# Patient Record
Sex: FEMALE | Race: WHITE
[De-identification: names, ages, dates, MRNs, and addresses within clinical notes are randomized per-mention and may not be internally consistent; named-entity substitution may affect disease eponyms.]

---

## 2021-02-02 NOTE — SLEEPCENT
NOCTURNAL POLYSOMNOGRAPHY

 

DATE: 01/29/2021



ORDERED BY: QUYEN Campbell 



Nocturnal polysomnography was performed for evaluation of sleep physiology in

this patient with a history of nonrestorative sleep and excessive somnolence.



9 hours and 24 minutes of data were reviewed. There were 460.5 minutes of sleep

identified. Sleep latency was prolonged at 39 minutes. REM latency was

prolonged at 195 minutes. Sleep architecture once established was good. There

were four REM cycles noted. Overall sleep efficiency was 82.5%. The patient's

electrocardiogram showed a sinus rhythm with an average heart rate of 92 beats

per minute. Rate range 84 to 104. There were occasional PVCs seen. EEG showed

reasonably normal waveforms for wake and sleep. There were no focal events

identified. There were only four obstructive respiratory events identified of

10 seconds in duration or greater for an apnea-hypopnea index within normal

limits at 0.5. Loud snoring was, however, noted and arousals from respiratory

events when snoring was included occurred 2.7 times per hour. There were no

significant oxygen desaturations appreciated and scattered limb activity was

appreciated, but no trains of events. 



IMPRESSION:

Normal nocturnal polysomnography with snoring.

## 2021-11-09 NOTE — REP
INDICATION:

RT FOOT WOUND WITH NONCOMPRESSIBLE CIRA'S



COMPARISON:

None.



TECHNIQUE:

Real-time sonographic evaluation of the right lower extremity arteries with Doppler



FINDINGS:

All numeric values represent peak systolic velocities in cm/SEC



The ankle brachial index is 1.16.

CFA: 119 triphasic

Profunda: 85 triphasic

SFA proximal: 128 triphasic

SFA Mid: 115 triphasic

SFA distal: 114 triphasic

Popliteal: 105 monophasic

Tibioperoneal trunk: 74 monophasic

PTA proximal: 38 monophasic

PTA distal: 88 monophasic

Peroneal proximal: Not visualized

Peroneal distal: Not visualized

MIRIAN proximal: 107 monophasic

MIRIAN distal: 137 monophasic



Mild to moderate plaque formation was seen with calcifications throughout.  No

significant stenosis was identified.



An ankle brachial index was also obtained on the left which is 1.16



IMPRESSION:

As above





<Electronically signed by Sunil Lozano > 11/09/21 [id number]

## 2021-11-10 NOTE — CCD
Summarization Of Episode

                             Created on: 11/10/2021



ROMELIA CORADO

External Reference #: [id number]

: 1980

Sex: Female



Demographics





                          Address                   [address]

[place]  [address]

 

                          Home Phone                [phone]

 

                          Preferred Language        English

 

                          Marital Status            Unknown

 

                          Quaker Affiliation     Unknown

 

                          Race                      White

 

                          Ethnic Group              Not  or 





Author





                          Author                    [facility]

 

                          Organization              [facility]

 

                          Address                   Unknown

 

                          Phone                     Unavailable







Support





                Name            Relationship    Address         Phone

 

                Sylvester Corado  Next Of Kin     Unknown         Unavailable

 

                    DARY WHITFIELD  Next Of Kin         [address]

[place]  [address]                  Unavailable

 

                    Brian Kohler DDS    Next Of Kin         [address] [place]  [address]                    [phone]

 

                    TOPS FRIENDLY MARKET Next Of Kin         [address] [place]  [address]                     [phone]

 

                    Nigel Davidson MD    Next Of Kin         [address] [place]  [address]                    [phone]

 

                    TOPS                Next Of Kin         [address]

[place]  [address]                     [phone]

 

                    ARBYSL              Next Of Kin         [address] [place]  [address]                     [phone]

 

                    PCFOOD              [place]  [address]                     [phone]

 

                    BARBI'S BAKERY        Next Of Kin         

[place]  [address]                 [phone]

 

                    ROSLYN LOPES   Next Of Kin         [address]  [address]                  [phone]

 

                UN              Next Of Kin     Unknown         Unavailable

 

                    OSMAR WHITFIELD   Next Of Kin         [address]  [address]                  [phone]

 

                    SYLVESTER CORADO    Next Of Kin         [address]

[place]  [address]                     [phone]

 

                    SYLVESTER CORADO    ECON                [address] [place] [address]  [address]                     [phone]







Care Team Providers





                    Care Team Member Name Role                Phone

 

                    SELVIN Rosales NP Unavailable         Unavailable

 

                    LEIGH VILLANUEVA MD Unavailable         Unavailable

 

                    LEIGH VILLANUEVA MD Unavailable         Unavailable

 

                    LEIGH VILLANUEVA MD Unavailable         Unavailable

 

                    LEIGH VILLANUEVA MD Unavailable         Unavailable

 

                    LEIGH VILLANUEVA MD Unavailable         Unavailable

 

                    LEIGH VILLANUEVA MD Unavailable         Unavailable

 

                    Ting, A Cecille NP Unavailable         Unavailable

 

                    Ting, A Cecille NP Unavailable         Unavailable

 

                    Ting, A Cecille NP Unavailable         Unavailable

 

                    Ting, A Cecille NP Unavailable         Unavailable

 

                    Ting, A Cecille NP Unavailable         Unavailable

 

                    Ting, A Cecille NP Unavailable         Unavailable

 

                    Ting, A Cecille NP Unavailable         Unavailable

 

                    Ting, A Cecille NP Unavailable         Unavailable

 

                    Ting, A Cecille NP Unavailable         Unavailable

 

                    Ting, A Cecille NP Unavailable         Unavailable

 

                    Ting, A Cecille NP Unavailable         Unavailable

 

                    Ting, A Cecille NP Unavailable         Unavailable

 

                    Ting, A Cecille NP Unavailable         Unavailable

 

                    Ting, A Cecille NP Unavailable         Unavailable

 

                    Ting, A Cecille NP Unavailable         Unavailable

 

                    Ting, A Cecille NP Unavailable         Unavailable

 

                    Ting, A Cecille NP Unavailable         Unavailable

 

                    Ting, A Cecille NP Unavailable         Unavailable

 

                    Ting, A Cecille NP Unavailable         Unavailable

 

                    Ting, A Cecille NP Unavailable         Unavailable

 

                    Ting, A Cecille NP Unavailable         Unavailable

 

                    Ting, A Cecille NP Unavailable         Unavailable

 

                    Ting, A Cecille NP Unavailable         Unavailable

 

                    Ting, A Cecille NP Unavailable         Unavailable

 

                    Ting, A Cecille NP Unavailable         Unavailable

 

                    Ting, A Cecille NP Unavailable         Unavailable

 

                    Ting, A Cecille NP Unavailable         Unavailable

 

                    Ting, A Cecille NP Unavailable         Unavailable

 

                    Ting, A Cecille NP Unavailable         Unavailable

 

                    Ting, A Cecille NP Unavailable         Unavailable

 

                    Ting, A Cecille NP Unavailable         Unavailable

 

                    Ting, A Cecille NP Unavailable         Unavailable

 

                    Ting, A Cecille NP Unavailable         Unavailable

 

                    Ting, A Cecille NP Unavailable         Unavailable

 

                    Ting, A Cecille NP Unavailable         Unavailable

 

                    Ting, A Cecille NP Unavailable         Unavailable

 

                    Ting, A Cecille NP Unavailable         Unavailable

 

                    Ting, A Cecille NP Unavailable         Unavailable

 

                    Ting, A Cecille NP Unavailable         Unavailable

 

                    Ting, A Cecille NP Unavailable         Unavailable

 

                    Ting, A Cecille NP Unavailable         Unavailable

 

                    Ting, A Cecille NP Unavailable         Unavailable

 

                    Ting, A Cecille NP Unavailable         Unavailable

 

                    Ting, A Cecille NP Unavailable         Unavailable

 

                    Ting, A Cecille NP Unavailable         Unavailable

 

                    Ting, A Cecille NP Unavailable         Unavailable

 

                    Ting, A Cecille NP Unavailable         Unavailable

 

                    Ting, A Cecille NP Unavailable         Unavailable

 

                    GANN, JUDAH CLEMENTE MD Unavailable         Unavailable

 

                    GANN, JUDAH CLEMENTE MD Unavailable         Unavailable

 

                    GNAN, A BRYN MD Unavailable         Unavailable

 

                    GANN, A BRYN MD Unavailable         Unavailable

 

                    GANN, A BRYN MD Unavailable         Unavailable

 

                    GANN, A BRYN MD Unavailable         Unavailable

 

                    GANN, A BRYN MD Unavailable         Unavailable

 

                    GANN, A BRYN MD Unavailable         Unavailable

 

                    GANN, A BRYN MD Unavailable         Unavailable

 

                    GANN, A BRYN MD Unavailable         Unavailable

 

                    GANN, A BRYN MD Unavailable         Unavailable

 

                    GANN, A BRYN MD Unavailable         Unavailable

 

                    GANN, A BRYN MD Unavailable         Unavailable

 

                    GANN, A BRYN MD Unavailable         Unavailable

 

                    GANN, A BRYN MD Unavailable         Unavailable

 

                    GANN, A BRYN MD Unavailable         Unavailable

 

                    GANN, A BRYN MD Unavailable         Unavailable

 

                    GANN, A BRYN MD Unavailable         Unavailable

 

                    GANN, A BRYN MD Unavailable         Unavailable

 

                    GANN, A BRYN MD Unavailable         Unavailable

 

                    GANN, A BRYN MD Unavailable         Unavailable

 

                    GANN, A BRYN MD Unavailable         Unavailable

 

                    GANN, A BRYN MD Unavailable         Unavailable

 

                    GANN, A BRYN MD Unavailable         Unavailable

 

                    GANN, A BRYN MD Unavailable         Unavailable

 

                    GANN, A BRYN MD Unavailable         Unavailable

 

                    GANN, A BRYN MD Unavailable         Unavailable

 

                    GANN, A BRYN MD Unavailable         Unavailable

 

                    GANN, A BRYN MD Unavailable         Unavailable

 

                    GANN, A BRYN MD Unavailable         Unavailable

 

                    KEERTHI GIRON       Unavailable         Unavailable

 

                    Yazdanyar,  Amirfarbod Unavailable         Unavailable

 

                    Yazdanyar,  Amirfarbod Unavailable         Unavailable

 

                    Yazdanyar,  Amirfarbod Unavailable         Unavailable

 

                    Yazdanyar,  Amirfarbod Unavailable         Unavailable

 

                    Yazdanyar,  Amirfarbod Unavailable         Unavailable

 

                    Yazdanyar,  Amirfarbod Unavailable         Unavailable

 

                    Yazdanyar,  Amirfarbod Unavailable         Unavailable

 

                    Yazdanyar,  Amirfarbod Unavailable         Unavailable

 

                    Yazdanyar,  Amirfarbod Unavailable         Unavailable

 

                    Yazdanyar,  Amirfarbod Unavailable         Unavailable

 

                    Yazdanyar,  Amirfarbod Unavailable         Unavailable

 

                    Yazdanyar,  Amirfarbod Unavailable         Unavailable

 

                    Yazdanyar,  Amirfarbod Unavailable         Unavailable

 

                    JADA BLANTON MD         Unavailable         Unavailable

 

                    JADA BLANTON MD         Unavailable         Unavailable

 

                    Ting, A Cecille NP Unavailable         Unavailable

 

                    Ting, A Cecille NP Unavailable         Unavailable

 

                    Ting, A Cecille NP Unavailable         Unavailable

 

                    Ting, A Cecille NP Unavailable         Unavailable

 

                    Ting, A Cecille NP Unavailable         Unavailable

 

                    Ting, A Cecille NP Unavailable         Unavailable

 

                    Ting, A Cecille NP Unavailable         Unavailable

 

                    Ting, A Cecille NP Unavailable         Unavailable

 

                    Ting, A Cecille NP Unavailable         Unavailable

 

                    Ting, A Cecille NP Unavailable         Unavailable

 

                    Ting, A Cecille NP Unavailable         Unavailable

 

                    Ting, A Cecille NP Unavailable         Unavailable

 

                    Ting, A Cecille NP Unavailable         Unavailable

 

                    Ting, A Cecille NP Unavailable         Unavailable

 

                    Ting, A Cecille NP Unavailable         Unavailable

 

                    Ting, A Cecille NP Unavailable         Unavailable

 

                    Ting, A Cecille NP Unavailable         Unavailable

 

                    Ting, A Cecille NP Unavailable         Unavailable

 

                    Ting, A Cecille NP Unavailable         Unavailable

 

                    Ting, A Cecille NP Unavailable         Unavailable

 

                    Ting, A Cecille NP Unavailable         Unavailable

 

                    Ting, A Cecille NP Unavailable         Unavailable

 

                    Ting, A Cecille NP Unavailable         Unavailable

 

                    Ting, A Cecille NP Unavailable         Unavailable

 

                    Ting, A Cecille NP Unavailable         Unavailable

 

                    Ting, A Cecille NP Unavailable         Unavailable

 

                    Ting, A Cecille NP Unavailable         Unavailable

 

                    Ting, A Cecille NP Unavailable         Unavailable

 

                    Ting, A Cecille NP Unavailable         Unavailable

 

                    Ting, A Cecille NP Unavailable         Unavailable

 

                    Ting, A Cecille NP Unavailable         Unavailable

 

                    Ting, A Eccille NP Unavailable         Unavailable

 

                    Ting, A Cecille NP Unavailable         Unavailable

 

                    Ting, A Cecille NP Unavailable         Unavailable

 

                    Ting, A Cecille NP Unavailable         Unavailable

 

                    Ting, A Cecille NP Unavailable         Unavailable

 

                    Ting, A Cecille NP Unavailable         Unavailable

 

                    Ting, A Cecille NP Unavailable         Unavailable

 

                    Ting, A Cecille NP Unavailable         Unavailable

 

                    Ting, A Cecille NP Unavailable         Unavailable

 

                    Ting, A Cecille NP Unavailable         Unavailable

 

                    Ting, A Cecille NP Unavailable         Unavailable

 

                    Ting, A Cecille NP Unavailable         Unavailable

 

                    Ting, A Cecille NP Unavailable         Unavailable

 

                    Ting, A Cecille NP Unavailable         Unavailable

 

                    Ting, A Cecille NP Unavailable         Unavailable

 

                    Ting, A Cecille NP Unavailable         Unavailable

 

                    Ting, A Cecille NP Unavailable         Unavailable

 

                    DE LEON, M FAISAL PA  Unavailable         Unavailable

 

                    DE LEON, M FAISAL PA  Unavailable         Unavailable

 

                    DE LEON, M FAISAL PA  Unavailable         Unavailable

 

                    DE LEON, M FAISAL PA  Unavailable         Unavailable

 

                    DE LEON, M FAISAL PA  Unavailable         Unavailable

 

                    DE LEON, M FAISAL PA  Unavailable         Unavailable

 

                    DE LEON, M FAISAL PA  Unavailable         Unavailable

 

                    DE LEON, M FAISAL PA  Unavailable         Unavailable

 

                    DE LEON, M FAISAL PA  Unavailable         Unavailable

 

                    DE LEON, M FAISAL PA  Unavailable         Unavailable

 

                    DE LEON, M FAISAL PA  Unavailable         Unavailable

 

                    DE LEON, M FAISAL PA  Unavailable         Unavailable

 

                    DE LEON, M FAISAL PA  Unavailable         Unavailable

 

                    DE LEON, M FAISAL PA  Unavailable         Unavailable

 

                    DE LEON, M FAISAL PA  Unavailable         Unavailable

 

                    DE LEON, M FAISAL PA  Unavailable         Unavailable

 

                    DE LEON, M FAISAL PA  Unavailable         Unavailable

 

                    DE LEON, M FAISAL PA  Unavailable         Unavailable

 

                    DE LEON, M FAISAL PA  Unavailable         Unavailable

 

                    DE LEON, M FAISAL PA  Unavailable         Unavailable

 

                    DE LEON, M FAISAL PA  Unavailable         Unavailable

 

                    DE LEON, M FAISAL PA  Unavailable         Unavailable

 

                    DE LEON, M FAISAL PA  Unavailable         Unavailable

 

                    DE LEON, M FAISAL PA  Unavailable         Unavailable

 

                    DE LEON, M FAISAL PA  Unavailable         Unavailable

 

                    DE LEON, M FAISAL PA  Unavailable         Unavailable

 

                    DE LEON, M FAISAL PA  Unavailable         Unavailable

 

                    DE LEON, M FAISAL PA  Unavailable         Unavailable

 

                    DE LEON, M FAISAL PA  Unavailable         Unavailable

 

                    DE LEON, M FAISAL PA  Unavailable         Unavailable

 

                    DE LEON, M FAISAL PA  Unavailable         Unavailable

 

                    DE LEON, M FAISAL PA  Unavailable         Unavailable

 

                    DE LEON, M FAISAL PA  Unavailable         Unavailable

 

                    DE LEON, M FAISAL PA  Unavailable         Unavailable

 

                    DE LEON, M FAISAL PA  Unavailable         Unavailable

 

                    KEENE, B JULES    Unavailable         Unavailable

 

                    GEOFF GARCIA MD    Unavailable         Unavailable

 

                    GEOFF GARCIA MD    Unavailable         Unavailable

 

                    GEOFF GARCIA MD    Unavailable         Unavailable

 

                    GEOFF GARCIA MD    Unavailable         Unavailable

 

                    GEOFF GARCIA MD    Unavailable         Unavailable

 

                    GEOFF GARCIA MD    Unavailable         Unavailable

 

                    GEOFF GARCIA MD    Unavailable         Unavailable

 

                    GEOFF GARCIA MD    Unavailable         Unavailable

 

                    CELENA SENA MD    Unavailable         Unavailable

 

                    NICK, B TAY NP Unavailable         Unavailable

 

                    NICK, B TAY NP Unavailable         Unavailable

 

                    NICK, B TAY NP Unavailable         Unavailable

 

                    NICK, B TAY NP Unavailable         Unavailable

 

                    NICK, B TAY NP Unavailable         Unavailable

 

                    NICK, B TAY NP Unavailable         Unavailable

 

                    NICK, B TAY NP Unavailable         Unavailable

 

                    NICK, B TAY NP Unavailable         Unavailable

 

                    NICK, B TAY NP Unavailable         Unavailable

 

                    NICK, B TAY NP Unavailable         Unavailable

 

                    NICK, B TAY NP Unavailable         Unavailable

 

                    NICK, B TAY NP Unavailable         Unavailable

 

                    NICK, B TAY NP Unavailable         Unavailable

 

                    NICK, B TAY NP Unavailable         Unavailable

 

                    NICK, B TAY NP Unavailable         Unavailable

 

                    NICK, B TAY NP Unavailable         Unavailable

 

                    NICK, B TAY NP Unavailable         Unavailable

 

                    NICK, B TAY NP Unavailable         Unavailable

 

                    NICK, B TAY NP Unavailable         Unavailable

 

                    NICK, B TAY NP Unavailable         Unavailable

 

                    NICK, B TAY NP Unavailable         Unavailable

 

                    NICK, B TAY NP Unavailable         Unavailable

 

                    NICK, B TAY NP Unavailable         Unavailable

 

                    NICK, B TAY NP Unavailable         Unavailable

 

                    NICK, B TAY NP Unavailable         Unavailable

 

                    NICK, B TAY NP Unavailable         Unavailable

 

                    NICK, B TAY NP Unavailable         Unavailable

 

                    NICK, B TAY NP Unavailable         Unavailable

 

                    NICK, B TAY NP Unavailable         Unavailable

 

                    NICK, B TAY NP Unavailable         Unavailable

 

                    NICK, B TAY NP Unavailable         Unavailable

 

                    NICK, B TAY NP Unavailable         Unavailable

 

                    NICK, B TAY NP Unavailable         Unavailable

 

                    NICK, B TAY NP Unavailable         Unavailable

 

                    NICK, B TAY NP Unavailable         Unavailable

 

                    NICK, B TAY NP Unavailable         Unavailable

 

                    NICK, B TAY NP Unavailable         Unavailable

 

                    NICK, B TAY NP Unavailable         Unavailable

 

                    NICK, B TAY NP Unavailable         Unavailable

 

                    NICK, B TAY NP Unavailable         Unavailable

 

                    NICK, B TAY NP Unavailable         Unavailable

 

                    NICK, B TAY NP Unavailable         Unavailable

 

                    NICK, B TAY NP Unavailable         Unavailable

 

                    NICK, B TAY NP Unavailable         Unavailable

 

                    NICK, B TAY NP Unavailable         Unavailable

 

                    NICK, B TAY NP Unavailable         Unavailable

 

                    NICK, B TAY NP Unavailable         Unavailable

 

                    NICK, B TAY NP Unavailable         Unavailable

 

                    NICK, B TAY NP Unavailable         Unavailable

 

                    NICK, B TAY NP Unavailable         Unavailable

 

                    NICK, B TAY NP Unavailable         Unavailable

 

                    NICK, B TAY NP Unavailable         Unavailable

 

                    NICK, B TAY NP Unavailable         Unavailable

 

                    NICK, B TAY NP Unavailable         Unavailable

 

                    NICK, B TAY NP Unavailable         Unavailable

 

                    NICK, B TAY NP Unavailable         Unavailable

 

                    NICK, B TAY NP Unavailable         Unavailable

 

                    NICK, B TAY NP Unavailable         Unavailable

 

                    NICK, B TAY NP Unavailable         Unavailable

 

                    NICK, B TAY NP Unavailable         Unavailable

 

                    NICK, B TAY NP Unavailable         Unavailable

 

                    NICK, B TAY NP Unavailable         Unavailable

 

                    NICK, B TAY NP Unavailable         Unavailable

 

                    NICK, B TAY NP Unavailable         Unavailable

 

                    NICK, B TAY NP Unavailable         Unavailable

 

                    NICK, B TAY NP Unavailable         Unavailable

 

                    NICK, B TAY NP Unavailable         Unavailable

 

                    NICK, B TAY NP Unavailable         Unavailable

 

                    NICK, B TAY NP Unavailable         Unavailable

 

                    NICK, B TAY NP Unavailable         Unavailable

 

                    NICK, B TAY NP Unavailable         Unavailable

 

                    NICK, B TAY NP Unavailable         Unavailable

 

                    NICK, B TAY NP Unavailable         Unavailable

 

                    NICK, B TAY NP Unavailable         Unavailable

 

                    NICK, B TAY NP Unavailable         Unavailable

 

                    NICK, B TAY NP Unavailable         Unavailable

 

                    NICK, B TAY NP Unavailable         Unavailable

 

                    NICK, B TAY NP Unavailable         Unavailable

 

                    NICK, B TAY NP Unavailable         Unavailable

 

                    NICK, B TAY NP Unavailable         Unavailable

 

                    NICK, B TAY NP Unavailable         Unavailable

 

                    NICK, B TAY NP Unavailable         Unavailable

 

                    NICK, B TAY NP Unavailable         Unavailable

 

                    NICK, B TAY NP Unavailable         Unavailable

 

                    NICK, B TAY NP Unavailable         Unavailable

 

                    NICK, B TAY NP Unavailable         Unavailable

 

                    NICK, B TAY NP Unavailable         Unavailable

 

                    NICK, B TAY NP Unavailable         Unavailable

 

                    NICK, B TAY NP Unavailable         Unavailable

 

                    NICK, B TAY NP Unavailable         Unavailable

 

                    NICK, B TAY NP Unavailable         Unavailable

 

                    NICK, B TAY NP Unavailable         Unavailable

 

                    NICK, B TAY NP Unavailable         Unavailable

 

                    NICK, B TAY NP Unavailable         Unavailable

 

                    NICK, B TAY NP Unavailable         Unavailable

 

                    NICK, B TAY NP Unavailable         Unavailable

 

                    NICK, B TAY NP Unavailable         Unavailable

 

                    NICK, B TAY NP Unavailable         Unavailable

 

                    NICK, B TAY NP Unavailable         Unavailable

 

                    NICK, B TAY NP Unavailable         Unavailable

 

                    NICK, B TAY NP Unavailable         Unavailable

 

                    NICK, B TAY NP Unavailable         Unavailable

 

                    NICK, B TAY NP Unavailable         Unavailable

 

                    NICK, B TAY NP Unavailable         Unavailable

 

                    NICK, B TAY NP Unavailable         Unavailable

 

                    NICK, B TAY NP Unavailable         Unavailable

 

                    NICK, B TAY NP Unavailable         Unavailable

 

                    NICK, B TAY NP Unavailable         Unavailable

 

                    NICK, B TAY NP Unavailable         Unavailable

 

                    NICK, B TAY NP Unavailable         Unavailable

 

                    NICK, B TAY NP Unavailable         Unavailable

 

                    NICK, B TAY NP Unavailable         Unavailable

 

                    NICK, B TAY NP Unavailable         Unavailable

 

                    NICK, B TAY NP Unavailable         Unavailable

 

                    NICK, B TAY NP Unavailable         Unavailable

 

                    NICK, B TAY NP Unavailable         Unavailable

 

                    NICK, B TAY NP Unavailable         Unavailable

 

                    NICK, B TAY NP Unavailable         Unavailable

 

                    NICK, B TAY NP Unavailable         Unavailable

 

                    NICK, B TAY NP Unavailable         Unavailable

 

                    NICK, B TAY NP Unavailable         Unavailable

 

                    NICK, B TAY NP Unavailable         Unavailable

 

                    NICK, B TAY NP Unavailable         Unavailable

 

                    NICK, B TAY NP Unavailable         Unavailable

 

                    PEDRO Bustillos MD Unavailable         Unavailable

 

                    PEDRO Bustillos MD Unavailable         Unavailable

 

                    Bustillos E Delfina MD Unavailable         Unavailable

 

                    Bustillos, E Delfina JOSEPH Unavailable         Unavailable

 

                    Bustillos, E Delfina JOSEPH Unavailable         Unavailable

 

                    PEDRO Bustillos MD Unavailable         Unavailable

 

                    PEDRO Bustillos MD Unavailable         Unavailable

 

                    PEDRO Bustillos MD Unavailable         Unavailable

 

                    PEDRO Bustillos MD Unavailable         Unavailable

 

                    Bustillos, E Delfina JOSEPH Unavailable         Unavailable

 

                    Apollo E Delfina JOSEPH Unavailable         Unavailable

 

                    Bustillos, E Delfina JOSEPH Unavailable         Unavailable

 

                    PEDRO Bustillos MD Unavailable         Unavailable

 

                    PEDRO Bustillos MD Unavailable         Unavailable

 

                    PEDRO Bustillos MD Unavailable         Unavailable

 

                    PEDRO Bsutillos MD Unavailable         Unavailable

 

                    PEDRO Bustillos MD Unavailable         Unavailable

 

                    PEDRO Bustillos MD Unavailable         Unavailable

 

                    PEDRO Bustillos MD Unavailable         Unavailable

 

                    PEDRO Bustillos MD Unavailable         Unavailable

 

                    PEDRO Bustillos MD Unavailable         Unavailable

 

                    PEDRO Bustillos MD Unavailable         Unavailable

 

                    PEDRO Bustillos MD Unavailable         Unavailable

 

                    PEDRO Bustillos MD Unavailable         Unavailable

 

                    PEDRO Bustillos MD Unavailable         Unavailable

 

                    PEDRO Bustillos MD Unavailable         Unavailable

 

                    PEDRO Bustillos MD Unavailable         Unavailable

 

                    PEDRO Bustillos MD Unavailable         Unavailable

 

                    PEDRO Bustillos MD Unavailable         Unavailable

 

                    PEDRO Bustillos MD Unavailable         Unavailable

 

                    PEDRO Bustillos MD Unavailable         Unavailable

 

                    PEDRO Bustillos MD Unavailable         Unavailable

 

                    PEDRO Bustillos MD Unavailable         Unavailable

 

                    PEDRO Bustillos MD Unavailable         Unavailable

 

                    PEDRO Bustillos MD Unavailable         Unavailable

 

                    PEDRO Bustillos MD Unavailable         Unavailable

 

                    PEDRO Bustillos MD Unavailable         Unavailable

 

                    PEDRO Bustillos MD Unavailable         Unavailable

 

                    PEDRO Bustillos MD Unavailable         Unavailable

 

                    PEDRO Bustillos MD Unavailable         Unavailable

 

                    PEDRO Bustillos MD Unavailable         Unavailable

 

                    PEDRO Bustillos MD Unavailable         Unavailable

 

                    PEDRO Bustillos MD Unavailable         Unavailable

 

                    PEDRO Bustillos MD Unavailable         Unavailable

 

                    Bustillos, E Delfina JOSEPH Unavailable         Unavailable

 

                    [facility],  [place]       Unavailable         Unavailable



                                  



Re-disclosure Warning

          The records that you are about to access may contain information from 
federally-assisted alcohol or drug abuse programs. If such information is 
present, then the following federally mandated warning applies: This information
has been disclosed to you from records protected by federal confidentiality 
rules (42 CFR part 2). The federal rules prohibit you from making any further 
disclosure of this information unless further disclosure is expressly permitted 
by the written consent of the person to whom it pertains or as otherwise 
permitted by 42 CFR part 2. A general authorization for the release of medical 
or other information is NOT sufficient for this purpose. The Federal rules 
restrict any use of the information to criminally investigate or prosecute any 
alcohol or drug abuse patient.The records that you are about to access may 
contain highly sensitive health information, the redisclosure of which is 
protected by Article 27-F of the [facility] Public Health law. If you 
continue you may have access to information: Regarding HIV / AIDS; Provided by 
facilities licensed or operated by the [facility] Office of Mental Health; 
or Provided by the [facility] Office for People With Developmental 
Disabilities. If such information is present, then the following [place] 
mandated warning applies: This information has been disclosed to you from 
confidential records which are protected by state law. State law prohibits you 
from making any further disclosure of this information without the specific 
written consent of the person to whom it pertains, or as otherwise permitted by 
law. Any unauthorized further disclosure in violation of state law may result in
a fine or care home sentence or both. A general authorization for the release of 
medical or other information is NOT sufficient authorization for further disc
losure.                                                                         
    



Allergies and Adverse Reactions

          



           Type       Description Substance  Reaction   Status     Data Source(s

)

 

           Food allergy No Known Food Allergies No Known Food Allergies         

              [facility]

 

           Drug allergy No Known Drug Allergies No Known Drug Allergies         

              [facility]

 

           Propensity to adverse reactions NO KNOWN ALLERGIES NO KNOWN ALLERGIES

                       

[facility]



                                                                                
                           



Encounters

          



           Encounter  Providers  Location   Date       Indications Data Source(s

)

 

                Unknown                         [address] [facility] [place] [address] 2021 12:00:00 AM 

EDT                                                 eCW1 ([facility])

 

           Outpatient Attender: Cecille Maguire NP            10/21/2021 02:48:00 

PM EDT L03.90     [facility]

 

                                        L03.90 

 

                Outpatient      Attender: Cecille Maguire NPReferrer: Cecille shin NP                 10/21/2021 

02:03:00 PM EDT - 10/21/2021 02:47:00 PM EDT                           [facility]

 

                Outpatient      Attender: Cecille Maguire NPReferrer: Cecille shin NP                 10/11/2021 

02:04:00 PM EDT - 10/11/2021 02:49:00 PM EDT                           [facility]

 

           Outpatient Attender: Cecille Maguire NP            2021 04:55:00 

PM EDT E10.65     [facility]

 

                                        E10.65 

 

                          Inpatient                 Attender: GEOFF Philip

nder: BRYN GANN MDAdmitter: GEOFF GARCIA MDConsultant: Kaylene Rosales NP                           2021 04:0

2:00 PM EDT - 10/04/2021

02:10:00 PM EDT           RIGHT FOOT CELLULITIS     [facility]

l

 

                                        RIGHT FOOT CELLULITIS 

 

                                        Patient discharged. 

 

                Outpatient      Attender: Cecille Maguire NPReferrer: Cecille shin NP                 2021 

11:26:00 AM EDT - 2021 12:09:00 PM EDT                           [facility]

 

                Emergency       Attender: IVONNE VILLANUEVA MD                  07:40:00 AM EDT - 2021 

09:24:00 AM EDT           SORE THROAT, FEVER, R/O COVID [facility]

pital

 

                                        SORE THROAT, FEVER, R/O COVID 

 

                                        Patient discharged. 

 

           Outpatient Referrer: Cecille Maguire NP            06/10/2021 12:00:00 

AM EDT            [facility]

 

                Unknown                         [address] [facility] [place] [address] 2021 12:00:00 AM 

EDT                                                 eCW1 ([facility])

 

           Outpatient                       2021 12:00:00 AM [facility]

 

           Outpatient Attender: Delfina Bustillos MD            2021 09:00:00

 AM EDT            [facility]

 

                Outpatient      Attender: Cecille Maguire NPReferrer: Cecille shin NP                 2021 

09:52:00 AM EDT - 2021 10:12:00 AM EDT                           [facility]

 

                Outpatient      Attender: KEERTHI GIRON [id number]     2021

 12:00:00 AM EDT - 

2021 11:27:25 AM EDT Retinal edema             [facility]

 

                                        Retinal edema 

 

                Outpatient      Attender: TAY ROMERO NP Physical Therapy  12:15:00 PM 

EST                                                 [facility] ([facility]

 

           Outpatient Attender: TAY ROMERO NP            2021 08:22:0

0 AM EST E11.65     

[facility]

 

                                        E11.65 

 

                Emergency       Attender: IVONNE VILLANUEVA MD                 2021 07:07:00 AM EST - 2021 

07:49:00 AM EST           BURN                      [facility]

l

 

                                        BURN 

 

                                        Patient discharged. 

 

                Outpatient      Attender: Amy Dykes [id number]     0

2021 12:00:00 AM EST

- 2021 11:23:03 AM EST            Type 2 diabetes mellitus with stable pro

liferative 

diabetic retinopathy, bilateral         [facility]

 

                                        Type 2 diabetes mellitus with stable pro

liferative diabetic retinopathy, 

bilateral 

 

                Outpatient                      [address] [facility] [place] [address] 2021 12:00:00 AM

EST                                                 eCW1 ([facility])

 

                Unknown                         [address] [facility]

[place] [address] 2021 12:00:00 AM 

EST                                                 eCW1 ([facility])

 

                Unknown                         [address] [facility] 

[place] [address] 2021 12:00:00 AM 

EST                                                 eCW1 ([facility])

 

           Outpatient Attender: CELENA SENA MD            2021 10:06:00 AM

 EST E55.9,L02.91 

[facility]

 

                                        E55.9,L02.91 

 

                Outpatient      Attender: Cecille Maguire NPReferrer: Cecille shin NP                 2021 

09:34:00 AM EST - 2021 10:02:00 AM EST                           [facility]

 

           Outpatient Attender: Amy Dykes            2021 12:00

:00 AM [facility]

 

                Emergency       Attender: JADA BLANTON MD                 2021 1

0:06:00 AM EST - 2021 02:38:00

PM EST                    CYST                      [facility]

l

 

                                        CYST 

 

                                        Patient discharged. 

 

                Outpatient      Attender: FAISAL Abbott/Cheryl/Lawrence/Veronique brown 01/15/2021 

12:00:00 PM EST                                     [facility] ([facility]

 

                Outpatient      Attender: Amy Lemusztravis [id number]     0

2021 12:00:00 AM EST

- 2021 11:36:56 AM EST            Type 2 diabetes mellitus with stable pro

liferative 

diabetic retinopathy, bilateral         [facility]

 

                                        Type 2 diabetes mellitus with stable pro

liferative diabetic retinopathy, 

bilateral 

 

                Outpatient      Attender: TAY ROMERO NP Physical Therapy  12:00:00 PM 

EST                                                 [facility] ([facility])

 

                Outpatient      Attender: Amy Dykes [id number]     1

2020 12:00:00 AM EST

- 2020 09:30:14 AM EST Retinal edema             [facility]

 

                                        Retinal edema 

 

                Outpatient      Attender: Amy Lemuszdaavila [id number]     1

2020 12:00:00 AM EST

- 2020 11:17:07 AM EST            Type 2 diabetes mellitus with stable pro

liferative 

diabetic retinopathy, bilateral         [facility]

 

                                        Type 2 diabetes mellitus with stable pro

liferative diabetic retinopathy, 

bilateral 

 

                Outpatient      Attender: TAY ROMERO NP Physical Therapy  02:30:00 PM 

EST                                                 [facility] ([facility])

 

             Outpatient   Attender: CELENA SENA MD              10/23/2020 12:28

:00 PM EDT 

M25.559/M62.89,M65.81,R70.0,R79.82      [facility]

 

                                        M25.559/M62.89,M65.81,R70.0,R79.82 

 

                Outpatient      Attender: JULES KEENE [id number]     10/22/20

20 12:00:00 AM EDT - 

10/22/2020 11:22:54 AM EDT              Type 2 diabetes mellitus with stable pro

liferative 

diabetic retinopathy, bilateral         [facility]

 

                                        Type 2 diabetes mellitus with stable pro

liferative diabetic retinopathy, 

bilateral 

 

                Outpatient      Attender: TAY ROMERO NP Physical Therapy  03:45:00 PM 

EDT                                                 [facility] ([facility])

 

           Outpatient Attender: HENRIQUE [facility]      2020 02:47:00 PM ED

T            [facility]

 

           Outpatient Attender: HENRIQUE [facility]      2020 01:12:00 PM ED

T            [facility]

 

           Outpatient Attender: HENRQIUE [facility]      2020 12:03:01 PM ED

T            [facility]

 

             Outpatient   Attender: TAY ROMERO NP              2020 1

0:22:00 AM EDT 

E11.65,E10.65                           [facility]

 

                                        E11.65,E10.65 

 

           Outpatient Attender: HENRIQUE [facility]      2020 02:16:01 PM ED

T            [facility]



                                                                                
                                                                                
                                                                                
                                                                                
                                                                                
                                                               



Immunizations

          



             Vaccine      Date         Status       Description  Data Source(s)

 

             COVID-19 Moderna 2021 12:00:00 AM EDT completed              

   [facility]

 

             COVID-19 VACCINE Moderna 2021 12:00:00 AM EDT completed      

           [facility]

 

                                        Vaccine Series Complete: YESThis Data wa

s Submitted to [facility] Via [employer] 

 

             COVID-19 Moderna 2021 12:00:00 AM EST completed              

   [facility]

 

             COVID-19 VACCINE Moderna 2021 12:00:00 AM EST completed      

           [facility]

 

                                        Vaccine Series Complete: NOThis Data was

 Submitted to [facility] Via [employer] 



                                                                                
                                     



Medications

          



          Medication Brand Name Start Date Product Form Dose      Route     Admi

nistrative 

Instructions Pharmacy Instructions Status     Indications Reaction   Description

 Data 

Source(s)

 

                    doxycycline hyclate 100 MG Oral Tablet DOXYCYCLINE HYCLATE 1

 12:00:00 

AM EDT       tablet       20                        TAKE ONE TABLET BY MOUTH TWI

CE A DAY TAKE ONE TABLET BY MOUTH

TWICE A DAY  SOLD: 10/22/2021                                        [facility]

s

 

           Cephalexin 500 MG Oral Capsule CEPHALEXIN 10/11/2021 12:00:00 AM EDT 

capsule    28         

                                        TAKE ONE CAPSULE BY MOUTH FOUR TIMES A D

AY FOR 7 DAYS FOR CELLULITIS OF RIGHT 

FOOT                                    TAKE ONE CAPSULE BY MOUTH FOUR TIMES A D

AY FOR 7 DAYS FOR CELLULITIS OF 

RIGHT FOOT   SOLD: 10/14/2021                                        [facility]

[facility]

 

          Glipizide 5 MG Oral Tablet GLIPIZIDE 10/11/2021 12:00:00 AM EDT tablet

    60                  TAKE

ONE TABLET BY MOUTH TWICE A DAY TAKE ONE TABLET BY MOUTH TWICE A DAY SOLD: 

10/14/2021                                                      [facility]

 

        0.5 mL 30 gauge x 1/2"         10/11/2021 12:00:00 AM EDT syringe 30    

          USE 1 DAILY USE

1 DAILY      SOLD: 10/14/2021                                        [facility]

[facility]

 

          0.3 mL 31 gauge x 5/16"           10/04/2021 12:00:00 AM EDT syringe  

 30                  USE AS 

DIRECTED   USE AS DIRECTED SOLD: 10/04/2021                                  [facility]

 

        28 gauge         10/04/2021 12:00:00 AM EDT misc    30              TEST

 ONCE DAILY TEST ONCE DAILY 

SOLD: 10/04/2021                                                 [facility]

 

                          Insulin Glargine 100 UNT/ML Injectable Solution [Lantu

s] INSULIN 

GLARGINE,HUM.REC.ANLOG 10/04/2021 12:00:00 AM EDT solution     10               

         INJECT 30 UNITS 

UNDER THE SKIN DAILY INJECT 30 UNITS UNDER THE SKIN DAILY SOLD: 10/04/2021      

                      

                                        [facility]

 

           Cephalexin 500 MG Oral Capsule CEPHALEXIN 10/04/2021 12:00:00 AM EDT 

capsule    28         

                          TAKE ONE CAPSULE BY MOUTH FOUR TIMES A DAY FOR CELLULI

TIS RIGHT FOOT TAKE ONE 

CAPSULE BY MOUTH FOUR TIMES A DAY FOR CELLULITIS RIGHT FOOT SOLD: 10/04/2021    

                     

                                                    [facility]

 

          BLOOD SUGAR DIAGNOSTIC           10/04/2021 12:00:00 AM EDT strip     

50                  USE TO TEST BLOOD

 SUGAR ONCE ONCE DAILY USE TO TEST BLOOD SUGAR ONCE ONCE DAILY SOLD: 10/04/2021 

   

                                                            [facility]

 

          BLOOD-GLUCOSE METER           10/04/2021 12:00:00 AM EDT kit       1  

                 USE TO TEST BLOOD EVERY

 MORNING ( FASTING) USE TO TEST BLOOD EVERY MORNING ( FASTING) SOLD: 10/04/2021 

   

                                                            [facility]

 

                    Ondansetron 4 MG Disintegrating Oral Tablet Ondansetron     

    2021 10:10:49 AM 

EDT           4 MG                        active                      [facility]

 

                    Ondansetron 4 MG Disintegrating Oral Tablet Ondansetron     

    2021 10:10:49 AM 

EDT           4 MG                        active                      [facility]

 

        Ertugliflozin (Steglatro) 5 mg tablet         2021 09:59:32 AM EDT

         5 MG                            

active                                                          [facility]

 

        Ertugliflozin (Steglatro) 5 mg tablet         2021 09:59:32 AM EDT

         5 MG                            

active                                                          [facility]

 

          4 mg                2021 12:00:00 AM EDT tablet,disintegrating 2

1                  PLACE ONE TABLET BY

 MOUTH THREE TIMES A DAY AS NEEDED FOR NAUSEA AND VOMITING FOR 7 DAYS PLACE ONE 

TABLET BY MOUTH THREE TIMES A DAY AS NEEDED FOR NAUSEA AND VOMITING FOR 7 DAYS 

SOLD: 2021                                                 [facility]

 

                          bevacizumab-awwb (MVASI) 3 mg/0.12 mL intravitreal inj

ection 1.25 mg 

[id number]&* 2021 11:00:00 AM EDT         1.25 mg Intravitreal          

       completed 

Retinal edema of left eye                           1.25 mg, Intravitreal, Once,

 u 3/18/21 at 1100, 

For 1 dose                              [facility]

 

                                        Retinal edema of left eye 

 

                                        Medication administered onsite 

 

                                        Proparacaine hydrochloride 5 MG/ML Ophth

almic Solution proparacaine (ALCAINE) 

0.5 % ophthalmic solution 1 drop        proparacaine (ALCAINE) 0.5 % ophthalmic 

solution 1 drop 2021 10:44:04 AM EDT         1 [drp] Left Eye             

    completed 

Retinal edema of left eye                           1 drop, Left Eye, Every 2 mi

n PRN, Other, Starting 

u 3/18/21 at 1044, For 3 doses<br>1 drop every 2-3 min x3<br> [facility]

 

                                        Retinal edema of left eye 

 

                                        Medication administered onsite 

 

                                        Lidocaine Hydrochloride 0.035 MG/MG Opht

halmic Gel lidocaine (XYLOCAINE) 3.5 % 

ophthalmic gel 1 mL       lidocaine (XYLOCAINE) 3.5 % ophthalmic gel 1 mL 2021 

10:44:04 AM EDT         1 mL    Left Eye                 completed Retinal edema

 of left eye         1 mL, 

Left Eye, PRN, Pain, Starting u 3/18/21 at 1044, For 1 dose [facility]

 

                                        Retinal edema of left eye 

 

                                        Medication administered onsite 

 

                                        Proparacaine hydrochloride 5 MG/ML Ophth

almic Solution proparacaine (ALCAINE) 

0.5 % ophthalmic solution 1 drop        proparacaine (ALCAINE) 0.5 % ophthalmic 

solution 1 drop 2021 09:45:00 AM EDT         1 [drp] Both Eyes            

     completed          

                          1 drop, Both Eyes, Once, Thu 3/18/21 at 0945, For 1 do

[facility]

 

                                        Medication administered onsite 

 

                                        Tropicamide 10 MG/ML Ophthalmic Solution

 tropicamide (MYDRIACYL) 1 % ophthalmic 

solution 1 drop           tropicamide (MYDRIACYL) 1 % ophthalmic solution 1 drop

 

2021 09:45:00 AM EDT         1 [drp] Both Eyes                 completed  

               1 drop, Both 

Eyes, Once, Thu 3/18/21 at 0945, For 1 [facility]

 

                                        Medication administered onsite 

 

                                        Phenylephrine Hydrochloride 25 MG/ML Oph

thalmic Solution phenylephrine (MYDFRIN)

 2.5 % ophthalmic solution 1 drop       phenylephrine (MYDFRIN) 2.5 % ophthalmic

 

solution 1 drop 2021 09:45:00 AM EDT         1 [drp] Both Eyes            

     completed          

                          1 drop, Both Eyes, Once, Thu 3/18/21 at 0945, For 1 do

[facility]

 

                                        Medication administered onsite 

 

        3 mg/0.5 mL         03/10/2021 12:00:00 AM EST pen injector 2           

    INJECT ONCE WEEKLY 

INJECT ONCE WEEKLY SOLD: 03/10/2021                                        [facility]

[facility]

 

     Trulicity Trulicity 2021 12:00:00 AM EST                          act

[place]                [facility] 

([facility])

 

                                        silver sulfadiazine 10 MG/ML Topical Cre

am Silver Sulfadiazine (Silvadene) 1 % 

cream           Silver Sulfadiazine (Silvadene) 1 % cream 2021 07:36:02 AM

 EST                 1 

APPLIC                          completed                         [facility]

 

                                        silver sulfadiazine 10 MG/ML Topical Cre

am Silver Sulfadiazine (Silvadene) 1 % 

cream           Silver Sulfadiazine (Silvadene) 1 % cream 2021 07:36:02 AM

 EST                 1 

APPLIC                          active                          [facility]

 

                                        silver sulfadiazine 10 MG/ML Topical Cre

am Silver Sulfadiazine (Silvadene) 1 % 

cream           Silver Sulfadiazine (Silvadene) 1 % cream 2021 07:36:02 AM

 EST                 1 

APPLIC                          completed                         [facility]

 

          1 %                 2021 12:00:00 AM EST cream     400          

       APPLY TOPICALLY 1.5MM THICKNESS 

ONCE DAILY APPLY TOPICALLY 1.5MM THICKNESS ONCE DAILY SOLD: 2021          

                        

[facility]

 

          50 mcg (2,000 unit)           2021 12:00:00 AM EST tablet    30 

                 TAKE ONE TABLET BY 

MOUTH EVERY DAY TAKE ONE TABLET BY MOUTH EVERY DAY SOLD: 2021             

                     [facility]

 

                          bevacizumab-awwb (MVASI) 3 mg/0.12 mL intravitreal inj

ection 1.25 mg 

[id number] 2021 10:45:00 AM EST         1.25 mg Intravitreal          

       completed 

Stable proliferative diabetic retinopathy of both eyes associated with type 2 
diabetes mellitus                       1.25 mg, Intravitreal, Once, 21 

at 1045, For 1 dose 

[facility]

 

                                        Stable proliferative diabetic retinopath

y of both eyes associated with type 2 

diabetes mellitus 

 

                                        Medication administered onsite 

 

                                        Tetracaine hydrochloride 5 MG/ML Ophthal

pacheco Solution tetracaine (PONTOCAINE) 0.5

 % ophthalmic solution 1 drop           tetracaine (PONTOCAINE) 0.5 % ophthalmic

 solution 

1 drop  2021 10:45:00 AM EST         1 [drp] Left Eye                 comp

leted Stable 

proliferative diabetic retinopathy of both eyes associated with type 2 diabetes 
mellitus                                            1 drop, Left Eye, Every 5 mi

n, First dose on 21 at 1045, For 

3 doses<br>One drop 5 minutes apart x3<br> [facility]

 

                                        Stable proliferative diabetic retinopath

y of both eyes associated with type 2 

diabetes mellitus 

 

                                        Medication administered onsite 

 

                                        Phenylephrine Hydrochloride 25 MG/ML Oph

thalmic Solution phenylephrine (MYDFRIN)

 2.5 % ophthalmic solution 1 drop       phenylephrine (MYDFRIN) 2.5 % ophthalmic

 

solution 1 drop 2021 10:00:00 AM EST       1 [drp] Both Eyes             a

[facility]

 

                                        Proparacaine hydrochloride 5 MG/ML Ophth

almic Solution proparacaine (ALCAINE) 

0.5 % ophthalmic solution 1 drop        proparacaine (ALCAINE) 0.5 % ophthalmic 

solution 1 drop 2021 10:00:00 AM EST       1 [drp] Both Eyes             a

[facility]

 

                                        Tropicamide 10 MG/ML Ophthalmic Solution

 tropicamide (MYDRIACYL) 1 % ophthalmic 

solution 1 drop           tropicamide (MYDRIACYL) 1 % ophthalmic solution 1 drop

 

2021 10:00:00 AM EST        1 [drp] Both Eyes               active        

              [facility]

 

                          Cholecalciferol 2000 UNT Oral Capsule Cholecalciferol 

(Vitamin D3) 

Cholecalciferol (Vitamin D3) 2021 09:44:28 AM EST           2000 UNIT     

                          active

                                                                [facility]

 

                          Cholecalciferol 2000 UNT Oral Capsule Cholecalciferol 

(Vitamin D3) 

Cholecalciferol (Vitamin D3) 2021 09:44:28 AM EST           2000 UNIT     

                          active

                                                                [facility]

 

                          Cholecalciferol 2000 UNT Oral Capsule Cholecalciferol 

(Vitamin D3) 

Cholecalciferol (Vitamin D3) 2021 09:44:28 AM EST           2000 UNIT     

                          active

                                                                [facility]

 

                          Cholecalciferol 2000 UNT Oral Capsule Cholecalciferol 

(Vitamin D3) 

Cholecalciferol (Vitamin D3) 2021 09:44:28 AM EST           2000 UNIT     

                          active

                                                                [facility]

 

                          Ergocalciferol 63753 UNT Oral Capsule Ergocalciferol (

Vitamin D2) Ergocalciferol

 (Vitamin D2) 2021 09:44:09 AM EST       1250 MCG                   active

                   [facility]

 

                          Ergocalciferol 92364 UNT Oral Capsule Ergocalciferol (

Vitamin D2) Ergocalciferol

 (Vitamin D2) 2021 09:44:09 AM EST       1250 MCG                   active

                   [facility]

 

                          Ergocalciferol 03031 UNT Oral Capsule Ergocalciferol (

Vitamin D2) Ergocalciferol

 (Vitamin D2) 2021 09:44:09 AM EST       1250 MCG                   active

                   [facility]

 

                          Ergocalciferol 02662 UNT Oral Capsule Ergocalciferol (

Vitamin D2) Ergocalciferol

 (Vitamin D2) 2021 09:44:09 AM EST       1250 MCG                   [facility]

 

                    Clindamycin 300 MG Oral Capsule Clindamycin Hcl Clindamycin 

Hcl     2021 

01:43:41 PM EST        300 MG                      active                      L

[facility]

 

                    Clindamycin 300 MG Oral Capsule Clindamycin Hcl Clindamycin 

Hcl     2021 

01:43:41 PM EST        300 MG                      active                      L

[facility]

 

                    Clindamycin 300 MG Oral Capsule Clindamycin Hcl Clindamycin 

Hcl     2021 

01:43:41 PM EST        300 MG                      completed                    

  [facility]

 

                    Clindamycin 300 MG Oral Capsule Clindamycin Hcl Clindamycin 

Hcl     2021 

01:43:41 PM EST        300 MG                      completed                    

  [facility]

 

                    Clindamycin 300 MG Oral Capsule Clindamycin Hcl Clindamycin 

Hcl     2021 

01:43:41 PM EST        300 MG                      active                      L

[facility]

 

          300 mg              2021 12:00:00 AM EST capsule   40           

       TAKE ONE CAPSULE BY MOUTH FOUR 

TIMES A DAY TAKE ONE CAPSULE BY MOUTH FOUR TIMES A DAY SOLD: 2021         

                         

[facility]

 

          5 mg                2021 12:00:00 AM EST tablet    30           

       TAKE ONE TABLET BY MOUTH EVERY DAY

           TAKE ONE TABLET BY MOUTH EVERY DAY SOLD: 2021                  

                [facility]

 

                                        Tetracaine hydrochloride 5 MG/ML Ophthal

pacheco Solution tetracaine (PONTOCAINE) 0.5

 % ophthalmic solution 1 drop           tetracaine (PONTOCAINE) 0.5 % ophthalmic

 solution 

1 drop  2021 11:15:00 AM EST         1 [drp] Left Eye                 comp

leted Stable 

proliferative diabetic retinopathy of both eyes associated with type 2 diabetes 
mellitus                                                    [facility]

ospital

 

                                        Stable proliferative diabetic retinopath

y of both eyes associated with type 2 

diabetes mellitus 

 

                                        Phenylephrine Hydrochloride 25 MG/ML Oph

thalmic Solution phenylephrine (MYDFRIN)

 2.5 % ophthalmic solution 1 drop       phenylephrine (MYDFRIN) 2.5 % ophthalmic

 

solution 1 drop 2021 10:15:00 AM EST         1 [drp] Both Eyes            

     completed          

                          1 drop, Both Eyes, Once, 21 at 1015, For 1 dos

e [facility]

 

                                        Medication administered onsite 

 

                                        Tropicamide 10 MG/ML Ophthalmic Solution

 tropicamide (MYDRIACYL) 1 % ophthalmic 

solution 1 drop           tropicamide (MYDRIACYL) 1 % ophthalmic solution 1 drop

 

2021 10:15:00 AM EST         1 [drp] Both Eyes                 completed  

               1 drop, Both 

Eyes, Once, 21 at 1015, For 1 dose [facility]

 

                                        Medication administered onsite 

 

                                        Proparacaine hydrochloride 5 MG/ML Ophth

almic Solution proparacaine (ALCAINE) 

0.5 % ophthalmic solution 1 drop        proparacaine (ALCAINE) 0.5 % ophthalmic 

solution 1 drop 2021 10:15:00 AM EST         1 [drp] Both Eyes            

     completed          

                          1 drop, Both Eyes, Once, 21 at 1015, For 1 dos

e [facility]

 

                                        Medication administered onsite 

 

           dapagliflozin 5 MG Oral Tablet [Farxiga] Farxiga    2020 12:00:

00 AM EST                       

ORAL                          active                                  [facility] ([facility]

 

        1.5 mg/0.5 mL         2020 12:00:00 AM EST pen injector 2         

      INJECT ONCE WEEKLY 

INJECT ONCE WEEKLY SOLD: 2021                                        [facility]

 

        1.5 mg/0.5 mL         2020 12:00:00 AM EST pen injector 2         

      INJECT ONCE WEEKLY 

INJECT ONCE WEEKLY SOLD: 2020                                        [facility]

 

                                        Tetracaine hydrochloride 5 MG/ML Ophthal

pacheco Solution tetracaine (PONTOCAINE) 0.5

 % ophthalmic solution 1 drop           tetracaine (PONTOCAINE) 0.5 % ophthalmic

 solution 

1 drop  2020 09:30:00 AM EST         1 [drp] Left Eye                 acti

ve  Retinal edema of

 left eye                                                   [facility]

ospital

 

                                        Retinal edema of left eye 

 

                          bevacizumab-awwb (MVASI) 3 mg/0.12 mL intravitreal inj

ection 1.25 mg 

[id number] 2020 09:30:00 AM EST         1.25 mg Intravitreal          

       active  

Retinal edema of left eye                                         [facility]

 

                                        Retinal edema of left eye 

 

                                        Proparacaine hydrochloride 5 MG/ML Ophth

almic Solution proparacaine (ALCAINE) 

0.5 % ophthalmic solution 1 drop        proparacaine (ALCAINE) 0.5 % ophthalmic 

solution 1 drop 2020 08:15:00 AM EST         1 [drp] Both Eyes            

     completed          

                          1 drop, Both Eyes, Once, 20 at 0815, For 1 d

[facility]

 

                                        Medication administered onsite 

 

                                        Phenylephrine Hydrochloride 25 MG/ML Oph

thalmic Solution phenylephrine (MYDFRIN)

 2.5 % ophthalmic solution 1 drop       phenylephrine (MYDFRIN) 2.5 % ophthalmic

 

solution 1 drop 2020 10:15:00 AM EST         1 [drp] Both Eyes            

     completed          

                          1 drop, Both Eyes, Once, Mon 20 at 1015, For 1 d

[facility]

 

                                        Medication administered onsite 

 

                                        Tropicamide 10 MG/ML Ophthalmic Solution

 tropicamide (MYDRIACYL) 1 % ophthalmic 

solution 1 drop           tropicamide (MYDRIACYL) 1 % ophthalmic solution 1 drop

 

2020 10:15:00 AM EST         1 [drp] Both Eyes                 completed  

               1 drop, Both 

Eyes, Once, 20 at 1015, For 1 dose [facility]

 

                                        Medication administered onsite 

 

                                        Proparacaine hydrochloride 5 MG/ML Ophth

almic Solution proparacaine (ALCAINE) 

0.5 % ophthalmic solution 1 drop        proparacaine (ALCAINE) 0.5 % ophthalmic 

solution 1 drop 2020 10:15:00 AM EST         1 [drp] Both Eyes            

     completed          

                          1 drop, Both Eyes, Once, 20 at 1015, For 1 d

ose [facility]

 

                                        Medication administered onsite 

 

     Steglatro Steglatro 2020 12:00:00 AM EST           ORAL           com

pleted                

[facility] ([facility])

 

          1.5 mg/0.5 mL           10/29/2020 12:00:00 AM EDT pen injector 2     

              INJECT UNDER THE 

SKIN ONCE WEEKLY INJECT UNDER THE SKIN ONCE WEEKLY SOLD: 10/31/2020             

                     [facility]

 

          1.5 mg/0.5 mL           10/29/2020 12:00:00 AM EDT pen injector 2     

              INJECT UNDER THE 

SKIN ONCE WEEKLY INJECT UNDER THE SKIN ONCE WEEKLY SOLD: 2020             

                     [facility]

 

          1,250 mcg (50,000 unit)           10/27/2020 12:00:00 AM EDT capsule  

 4                   TAKE ONE 

CAPSULE BY MOUTH ONCE WEEKLY TAKE ONE CAPSULE BY MOUTH ONCE WEEKLY SOLD: 

10/28/2020                                                      [facility]

 

          1,250 mcg (50,000 unit)           10/27/2020 12:00:00 AM EDT capsule  

 4                   TAKE ONE 

CAPSULE BY MOUTH ONCE WEEKLY TAKE ONE CAPSULE BY MOUTH ONCE WEEKLY SOLD: 

2020                                                      [facility]

 

          50 mcg (2,000 unit)           10/27/2020 12:00:00 AM EDT capsule   30 

                 TAKE ONE CAPSULE 

BY MOUTH EVERY DAY TAKE ONE CAPSULE BY MOUTH EVERY DAY SOLD: 10/28/2020         

                         

[facility]

 

                          Vitamin D (Cholecalciferol) 50 MCG ( UT) Vitamin D

 (Cholecalciferol) 50 MCG 

( UT) 10/27/2020 12:00:00 AM EDT        1.0 {capsule}                      a

ctive               Vitamin D 

(Cholecalciferol) 50 MCG ( UT)      eCW1 ([facility])

 

                                        Ergocalciferol 27319 UNT Oral Capsule Vi

tamin D (Ergocalciferol) 1.25 MG (37498 

UT)             Vitamin D (Ergocalciferol) 1.25 MG (63702 UT) 10/27/2020 12:00:0

0 AM EDT                 

1.0 {capsule}                         active                  Vitamin D (Ergocal

ciferol) 1.25 MG (16795 UT) 

[facility] ([facility])

 

                                        Ergocalciferol 04301 UNT Oral Capsule Vi

tamin D (Ergocalciferol) 1.25 MG (36218 

UT)             Vitamin D (Ergocalciferol) 1.25 MG (49965 UT) 10/27/2020 12:00:0

0 AM EDT                 

1.0 {capsule}                         active                  Vitamin D (Ergocal

ciferol) 1.25 MG (09047 UT) 

[facility] ([facility])

 

                                        Ergocalciferol 55295 UNT Oral Capsule Vi

tamin D (Ergocalciferol) 1.25 MG (82413 

UT)             Vitamin D (Ergocalciferol) 1.25 MG (41833 UT) 10/27/2020 12:00:0

0 AM EDT                 

1.0 {capsule}                         active                  Vitamin D (Ergocal

ciferol) 1.25 MG (23966 UT) 

[facility] ([facility])

 

                                        Ergocalciferol 87764 UNT Oral Capsule Vi

tamin D (Ergocalciferol) 1.25 MG (45995 

UT)             Vitamin D (Ergocalciferol) 1.25 MG (03751 UT) 10/27/2020 12:00:0

0 AM EDT                 

1.0 {capsule}                         suspended                 Vitamin D (Ergoc

alciferol) 1.25 MG (32428 UT) 

[facility] ([facility])

 

          1,250 mcg (50,000 unit)           10/27/2020 12:00:00 AM EDT capsule  

 4                   TAKE ONE 

CAPSULE BY MOUTH ONCE WEEKLY TAKE ONE CAPSULE BY MOUTH ONCE WEEKLY SOLD: 

2020                                                      [facility]

 

                                        Ergocalciferol 50780 UNT Oral Capsule Vi

tamin D (Ergocalciferol) 1.25 MG (51829 

UT)             Vitamin D (Ergocalciferol) 1.25 MG (09772 UT) 10/27/2020 12:00:0

0 AM EDT                 

1.0 {capsule}                         active                          [facility]

 

          50 mcg (2,000 unit)           10/27/2020 12:00:00 AM EDT capsule   30 

                 TAKE ONE CAPSULE 

BY MOUTH EVERY DAY TAKE ONE CAPSULE BY MOUTH EVERY DAY SOLD: 2020         

                         

[facility]

 

          50 mcg (2,000 unit)           10/27/2020 12:00:00 AM EDT capsule   30 

                 TAKE ONE CAPSULE 

BY MOUTH EVERY DAY TAKE ONE CAPSULE BY MOUTH EVERY DAY SOLD: 2021         

                         

[facility]

 

                                        Tropicamide 10 MG/ML Ophthalmic Solution

 tropicamide (MYDRIACYL) 1 % ophthalmic 

solution 1 drop           tropicamide (MYDRIACYL) 1 % ophthalmic solution 1 drop

 

10/22/2020 10:15:00 AM EDT         1 [drp] Both Eyes                 completed  

               1 drop, Both 

Eyes, Once, Thu 10/22/20 at 1015, For 1 dose [facility]

 

                                        Medication administered onsite 

 

                                        Phenylephrine Hydrochloride 25 MG/ML Oph

thalmic Solution phenylephrine (MYDFRIN)

 2.5 % ophthalmic solution 1 drop       phenylephrine (MYDFRIN) 2.5 % ophthalmic

 

solution 1 drop 10/22/2020 10:15:00 AM EDT         1 [drp] Both Eyes            

     completed          

                          1 drop, Both Eyes, Once, Thu 10/22/20 at 1015, For 1 d

[facility]

 

                                        Medication administered onsite 

 

                                        Proparacaine hydrochloride 5 MG/ML Ophth

almic Solution proparacaine (ALCAINE) 

0.5 % ophthalmic solution 1 drop        proparacaine (ALCAINE) 0.5 % ophthalmic 

solution 1 drop 10/22/2020 10:15:00 AM EDT         1 [drp] Both Eyes            

     completed          

                          1 drop, Both Eyes, Once, Thu 10/22/20 at 1015, For 1 d

[facility]

 

                                        Medication administered onsite 

 

                    8 HR Acetaminophen 650 MG Extended Release Oral Tablet [Tyle

nol] Tylenol 8 Hour      

10/12/2020 12:00:00 AM EDT                                    active            

          [facility] ([facility]

 

                    0.5 ML dulaglutide 3 MG/ML Auto-Injector [Trulicity] Trulici

ty           2020 

12:00:00 AM EDT                                    completed                    

  [facility] ([facility])

 

          BLOOD SUGAR DIAGNOSTIC           2020 12:00:00 AM EDT strip     

150                 USE AS DIRECTED 

FOUR TIMES A DAY USE AS DIRECTED FOUR TIMES A DAY SOLD: 2020              

                    [facility]

 

                    Blood Sugar Diagnostic (Onetouch Verio Test Strips) strip   

                  2020 09:16:40 

AM EDT        0                           active                      [facility]

 

                    Blood Sugar Diagnostic (Onetouch Verio Test Strips) strip   

                  2020 09:16:40 

AM EDT        0                           active                      [facility]

 

                    Blood Sugar Diagnostic (Onetouch Verio Test Strips) strip   

                  2020 09:16:40 

AM EDT        0                           active                      [facility]

 

                    Blood Sugar Diagnostic (Onetouch Verio Test Strips) strip   

                  2020 09:16:40 

AM EDT        0                           active                      [facility]

 

                    Blood Sugar Diagnostic (Onetouch Verio Test Strips) strip   

                  2020 09:16:40 

AM EDT        0                           active                      [facility]

 

        0.75 mg/0.5 mL         2020 12:00:00 AM EDT pen injector 2        

       USE ONCE WEEKLY USE

 ONCE WEEKLY SOLD: 2020                                        [facility]

s

 

                    0.5 ML dulaglutide 1.5 MG/ML Auto-Injector [Trulicity] Truli

city           2020 

12:00:00 AM EDT                                    completed                    

  [facility] ([facility] )

 

                    Blood Sugar Diagnostic (Onetouch Verio Test Strips) strip   

                  2020 12:31:02 

PM EDT        0                           completed                      [facility]

 

                    Blood Sugar Diagnostic (Onetouch Verio Test Strips) strip   

                  2020 12:31:02 

PM EDT        0                           completed                      [facility]

 

                    Blood Sugar Diagnostic (Onetouch Verio Test Strips) strip   

                  2020 12:31:02 

PM EDT        0                           completed                      [facility]

 

                    Blood Sugar Diagnostic (Onetouch Verio Test Strips) strip   

                  2020 12:31:02 

PM EDT        0                           completed                      [facility]

 

                    Blood Sugar Diagnostic (Onetouch Verio Test Strips) strip   

                  2020 12:31:02 

PM EDT        0                           completed                      [facility]

 

        Ertugliflozin (Steglatro) 15 mg tablet         2020 09:59:57 AM ED

T         15 MG                   

             completed                                           [facility]

 

        Ertugliflozin (Steglatro) 15 mg tablet         2020 09:59:57 AM ED

T         15 MG                   

             completed                                           [facility]



                                                                                
                                                                                
                                                                                
                                                                                
                                                                                
                                                                                
                                                                                
                                                                                
                                                                                
                                                                                
                                                                                
                                                                                
                                                          



Insurance Providers

          



             Payer name   Policy type / Coverage type Policy ID    Covered party

 ID Covered 

party's relationship to tao Policy Tao             Plan Information

 

          Medicaid  S         [id number]            S                   [id number]

 

          Managed Care [place] P         [id number]           S                

   [id number]

 

          [facility]       I         [id number]           Self                [id number]

 

          Medicaid  O         [id number]            S                   [id number]

 

          [facility]           [id number]           SP           

       [id number]

 

          [facility]                      SP                  



 

          [facility]     0         [id number] Self                0

 

          [facility] [place] [facility]           [id number]           [id number]

                  [id number]

 

          UN COMMUNITY PLAN [facility]           [id number]           SP           

       [id number]

 

          [facility]           [id number]           [id number]     

             [id number]

 

          [facility]           [id number]           SP         

         [id number]

 

          Managed Care - [facility] Community Plan P         UNAVAILABLE           S   

                UNAVAILABLE

 

          Managed Care [place] S         [id number]           S                

   [id number]

 

          [facility]           [id number]           SP                  [id number]

[id number]



                                                                                
                                                                                
                                                          



Problems, Conditions, and Diagnoses

          



           Code       Display Name Description Problem Type Effective Dates Data

 Source(s)

 

           H35.81     Retinal edema Retinal edema Diagnosis  2021 09:00:09

 AM [facility]

 

                          E11.3553                  Type 2 diabetes mellitus wit

h stable proliferative diabetic 

retinopathy, bilateral                  Type 2 diabetes mellitus with stable pro

liferative 

diabetic retinopathy, bilateral Diagnosis           2021 09:41:50 AM EST [facility]

 

                    L97.412             [id number]           Non-pressure chronic

 ulcer of right heel and midfoot with fat 

layer exposed       Problem             10/26/2021 12:00:00 AM EDT eCW1 ([facility])

 

             E10.621      [id number]    Type 1 diabetes mellitus with foot ulcer 

Problem      10/26/2021 

12:00:00 AM EDT                         eCW1 ([facility])

 

             M62.81       [id number]     Muscle weakness (generalized) Problem    

  10/28/2020 12:00:00 AM EDT

                                        eCW1 ([facility])

 

           G47.9      [id number]   Sleep disorder, unspecified Problem    10/27/20

20 12:00:00 AM EDT 

eCW1 ([facility])

 

           R20.2      [id number]   Paresthesia of skin Problem    10/27/2020 12:00

:00 AM EDT eCW1 

([facility])

 

             R79.82       [id number] Elevated C-reactive protein (CRP) Prob

dylon      10/27/2020 

12:00:00 AM EDT                         eCW1 ([facility])

 

           E55.9      [id number]   Vitamin D deficiency Problem    10/27/2020 12:0

0:00 AM EDT eCW1 

([facility])

 

           G62.9      [id number]   Sensorimotor neuropathy Problem    10/27/2020 1

2:00:00 AM EDT eCW1 

([facility])

 

             [id number]      Lumbosacral radiculopathy Lumbosacral radiculopathy Pr

oblem      10/12/2020 

12:00:00 AM EDT                         [facility] ([facility] )

 

                [id number]        Idiopathic peripheral neuropathy Idiopathic rosalba

pheral neuropathy 

Problem                   10/12/2020 12:00:00 AM EDT [facility] ([facility]

[facility], )

 

             [id number]    Numbness of lower limb Numbness of lower limb Problem 

     10/12/2020 

12:00:00 AM EDT                         [facility] ([facility] )



                                                                                
                                                                                
                                                          



Surgeries/Procedures

          



             Procedure    Description  Date         Indications  Data Source(s)

 

             Plain chest X-ray (procedure)              2021 08:59:00 AM E

[facility]

 

             SARS-CoV-2 Rapid RNA (RT-PCR)              2021 12:00:00 AM [facility]

 

             Viral antigen assay (procedure)              2021 12:00:00 AM

 EDT              [facility]

 

                          MVASI INTRAVITREAL INJECTION - OS - LEFT <td>MVASI INT

RAVITREAL INJECTION - OS -

 LEFT</td><td>Routine</td><td>2021 11:59 AM EDT</td><td>



Retinal edema of left eye</td><td>



Results for this procedure are in the results section.</td> 2021 11:59:23 

AM EDT                    Retinal edema of left eye [facility]

 

                                        Retinal edema of left eye 

 

                          OCT RETINA                <td>OCT RETINA</td><td>Routi

ne</td><td>2021  9:58 AM 

EDT</td><td>



Retinal edema of left eye</td><td>



Results for this procedure are in the results section.</td> 2021 09:58:23 

AM EDT                    Retinal edema of left eye [facility]

 

                                        Retinal edema of left eye 

 

                          MVASI INTRAVITREAL INJECTION - OS - LEFT <td>MVASI INT

RAVITREAL INJECTION - OS -

 LEFT</td><td>Routine</td><td>2021  1:19 PM EST</td><td>



Stable proliferative diabetic retinopathy of both eyes associated with type 2 
diabetes mellitus</td><td>



Results for this procedure are in the results section.</td> 2021 01:19:16 

PM EST                                  Stable proliferative diabetic retinopath

y of both eyes associated with 

type 2 diabetes mellitus                [facility]

 

                                        Stable proliferative diabetic retinopath

y of both eyes associated with type 2 

diabetes mellitus 

 

                          OCT RETINA                <td>OCT RETINA</td><td>Routi

ne</td><td>2021 10:15 AM 

EST</td><td>



Stable proliferative diabetic retinopathy of both eyes associated with type 2 
diabetes mellitus</td><td>



Results for this procedure are in the results section.</td> 2021 10:15:17 

AM EST                                  Stable proliferative diabetic retinopath

y of both eyes associated with 

type 2 diabetes mellitus                [facility]

 

                                        Stable proliferative diabetic retinopath

y of both eyes associated with type 2 

diabetes mellitus 

 

             Aerobic microbial culture (procedure)              2021 12:00

:00 AM [facility]

 

                    Blood culture for bacteria, including anaerobic screen (proc

edure)                     2021 

12:00:00 AM [facility]

 

             Aerobic microbial culture (procedure)              2021 12:00

:00 AM [facility]

 

                    Blood culture for bacteria, including anaerobic screen (proc

edure)                     2021 

12:00:00 AM [facility]

 

             Aerobic microbial culture (procedure)              2021 12:00

:00 AM [facility]

 

                    Blood culture for bacteria, including anaerobic screen (proc

edure)                     2021 

12:00:00 AM [facility]

 

             Aerobic microbial culture (procedure)              2021 12:00

:00 AM EST              [facility]

 

                    Blood culture for bacteria, including anaerobic screen (proc

edure)                     2021 

12:00:00 AM EST                                     [facility]

l

 

             Blood Culture              2021 12:00:00 AM EST              

[facility]

 

             Wound Culture              2021 12:00:00 AM EST              

[facility]

 

                          FOCAL ARGON LASER FOR DIABETIC MACULAR EDEMA <td>FOCAL

 ARGON LASER FOR DIABETIC 

MACULAR EDEMA</td><td>Routine</td><td>2021  5:13 PM EST</td><td>



Stable proliferative diabetic retinopathy of both eyes associated with type 2 
diabetes mellitus</td><td>



Results for this procedure are in the results section.</td> 2021 05:13:57 

PM EST                                  Stable proliferative diabetic retinopath

y of both eyes associated with 

type 2 diabetes mellitus                [facility]

 

                                        Stable proliferative diabetic retinopath

y of both eyes associated with type 2 

diabetes mellitus 

 

                          OCT RETINA                <td>OCT RETINA</td><td>Routi

ne</td><td>2021 10:18 AM 

EST</td><td>



Stable proliferative diabetic retinopathy of both eyes associated with type 2 
diabetes mellitus</td><td>



Results for this procedure are in the results section.</td> 2021 10:18:01 

AM EST                                  Stable proliferative diabetic retinopath

y of both eyes associated with 

type 2 diabetes mellitus                [facility]

 

                                        Stable proliferative diabetic retinopath

y of both eyes associated with type 2 

diabetes mellitus 

 

                          MVASI INTRAVITREAL INJECTION - OS - LEFT <td>MVASI INT

RAVITREAL INJECTION - OS -

 LEFT</td><td>Routine</td><td>2020  9:28 AM EST</td><td>



Retinal edema of left eye</td><td>



Results for this procedure are in the results section.</td> 2020 09:28:10 

AM EST                    Retinal edema of left eye [facility]

 

                                        Retinal edema of left eye 

 

                          OCT RETINA                <td>OCT RETINA</td><td>Routi

ne</td><td>2020 10:27 AM 

EST</td><td>



Stable proliferative diabetic retinopathy of both eyes associated with type 2 
diabetes mellitus</td><td>



Results for this procedure are in the results section.</td> 2020 10:27:41 

AM EST                                  Stable proliferative diabetic retinopath

y of both eyes associated with 

type 2 diabetes mellitus                [facility]

 

                                        Stable proliferative diabetic retinopath

y of both eyes associated with type 2 

diabetes mellitus 

 

                    Plain x-ray of pelvis and lower extremity (procedure)       

              10/23/2020 12:55:00 PM 

EDT                                                 [facility]

l

 

             XRAY HIP RT 2-3 VIEW              10/23/2020 12:55:00 PM EDT       

       [facility]

 

                    Plain x-ray of pelvis and lower extremity (procedure)       

              10/23/2020 12:55:00 PM 

EDT                                                 [facility]

l

 

             XRAY HIP RT 2-3 VIEW              10/23/2020 12:55:00 PM EDT       

       [facility]

 

                    Plain x-ray of pelvis and lower extremity (procedure)       

              10/23/2020 12:55:00 PM 

EDT                                                 [facility]

l

 

             XRAY HIP RT 2-3 VIEW              10/23/2020 12:55:00 PM EDT       

       [facility]

 

                    Plain x-ray of pelvis and lower extremity (procedure)       

              10/23/2020 12:55:00 PM 

EDT                                                 [facility]

l

 

             XRAY HIP RT 2-3 VIEW              10/23/2020 12:55:00 PM EDT       

       [facility]

 

                    Plain x-ray of pelvis and lower extremity (procedure)       

              10/23/2020 12:55:00 PM 

EDT                                                 [facility]

l

 

             XRAY HIP RT 2-3 VIEW              10/23/2020 12:55:00 PM EDT       

       [facility]

 

                          OCT RETINA                <td>OCT RETINA</td><td>Ayannai

ne</td><td>10/22/2020 10:56 AM 

EDT</td><td>



Stable proliferative diabetic retinopathy of both eyes associated with type 2 
diabetes mellitus</td><td>



Results for this procedure are in the results section.</td> 10/22/2020 10:56:58 

AM EDT                                  Stable proliferative diabetic retinopath

y of both eyes associated with 

type 2 diabetes mellitus                [facility]

 

                                        Stable proliferative diabetic retinopath

y of both eyes associated with type 2 

diabetes mellitus 



                                                                                
                                                                                
                                                                                
                                                                                
                                                                              



Results

          



                    ID                  Date                Data Source

 

                    [id number]        10/21/2021 04:24:00 PM EDT [facility] [address] [facility] [place] [address]                

                                  [phone]  NAME                          
                    SEX    PT STATUS         ACCOUNT NUMBER  ROMELIA CORADO   REG REF              [id number]    ORDERING
 PHYSICIAN                                LOCATION                 MEDICAL 
RECORD NO.  Ceclile Maguire                                 RAD               
       [id number]    ATTENDING PHYSICIAN                               DATE OF 
BIRTH            DATE OF EXAM/TIME  Cecille Maguire NP                            
      1980               10/21/21 / 1451    TYPE / EXAM  Xray Foot 
Complete RT    REASON FOR EXAM  Cellulitis     CLINICAL HISTORY: [facility]    
Cellulitis      TECHNIQUE: Frontal, oblique, and lateral views of the right 
foot.      COMPARISON: None available.      FINDINGS:       Overall 
mineralization pattern is normal. Vascular calcification is seen in the dorsalis
 pedis artery crossing the ankle into the midfoot. Joint spaces are preserved. 
No bony erosive changes seen. No soft tissue gas or opaque foreign body noted.  
    IMPRESSION:       Vascular calcification. Otherwise unremarkable right foot 
radiographs.                Reported By Jose Vidal MD on 10/21/21 1624      
Signed By Jose Vidal MD on 10/21/21 1626                          
______________________________________________   _______  _______  Date        
Time  CC:  Cecille Maguire; Jose Vidal M.D.  Techn: [facility]             
Trans Dt/Tm:             Trans by: DT             Prt Dt/Tm:    [id number]: Total
 DLP =    0.00 mGy-cm  Fluoroscopy Time (in secs):    









          Name      Value     Range     Interpretation Code Description Data Gregoria

rce(s) Supporting 

Document(s)

 

                                                                       









                    ID                  Date                Data Source

 

                    [id number]           10/21/2021 02:10:00 PM EDT [facility]

 

                                          Patient Name: ROMELIA CORADO      

 : 1980  Sex: F  Pt Unit #: 

[id number]  Location:[facility]  Provider:   Visit Date/Time:  10/21/21  Primary 
Insurance: [facility]  Secondary Insurance: Self Pay  Intake  
Vital Signs                                                    10/21/21         
                                         14:16     Current Weight               
                  168 lb     Weight Measurement Method                  Standing
 Scale     BP                                             106/60     Blood 
Pressure Location                      Lt brachial     Position                 
                      Sitting     Respiration                                   
   20     Pulse                                           104 H     Pulse Source
                               Pulse Oximeter     Temp                          
                 98.1 F     Temp Source                                     Oral
     Pulse Oximetry (%)                               98     Oxygen Delivery 
Method                        room air      Intake  Visit Reasons: Cellulitis 
Follow-up  Nurse Note:   Cellulitis F/U of top/side (R) foot. Drg lg amt yellow.
 Wound yellow center red edges. Healing slowly. peeling. But pain is now going 
up (R) leg again. Bg 358. prior to coming here. Continues to take her medication
 properly. 2 days of antibiotic left.   Accompanied by:   Is patient in 
pain?: Yes Pain scale (1-10): 4  Allergies    No Known Drug Allergies Allergy 
(Verified 21 08:09)       No Known Food Allergies Allergy (Unverified 
21 11:37)           Medications     - Last Reconciled 10/21/21 by Cecille Maguire NP    blood sugar diagnostic (Accu-Chek Guide test strips) As directed  
blood-glucose meter (Accu-Chek Guide Glucose Meter) As directed, once daily  
cephalexin 500 mg PO QID 7 days  glipizide 5 mg PO BID  insulin glargine (Lantus
 U-100 Insulin) 35 units (0.35 mL) subcut DAILY  insulin syr/ndl U100 half sylvia 
Daily  lancets As directed        Coronavirus Screening  Screening  Are you 
currently positive or on isolation for COVID ?: No  Do you have any NEW signs of
 one or more of the following?: no symptoms  Do you have NEW signs of at least 
two of the following?: no symptoms    HPI  Additional HPI  HPI  Details:   
Romelia presents to the clinic for follow up on right foot cellulitis. She 
reports that it is painful, seems to be plateaued in the healing department. 
Pain with ambulation on that foot, rubbingon shoe, sore.       PFSH  Medical 
History (Updated 10/12/21 @ 13:14 by Cecille Maguire NP)    Anxiety  Asthma  
Chlamydia  Depression  Diabetes mellitus  Herpes genitalis  Retinal detachment  
    Surgical History (Updated 21 @ 16:48 by Geoff Garcia MD)    Hx of 
tonsillectomy  Status post tonsillectomy                           Social 
History (Updated 10/11/21 @ 14:10 by Priyanka Kuo)  Does the Patient have a 
Healthcare Proxy:  No   Does Patient have a DNR?:  No   Does Patient have a 
Living Will?:  No   Advance Directives on File or in chart?:  No   Hx Recent 
Travel (where):  No   Smoking Status:  Never smoker         Review of Systems  
Const  All systems reviewed   are unremarkable except as noted in HPI and below 
 Reports as per HPI and Reports fatigue  Skin/Breast  Reports skin ulcer and 
Reports sores  Details:   Right foot with healing wound, cellulitis. 
Painful/sore.  Thinks it is getting worse rather than better.   Endo  Reports 
fatigue and Reports other (BG's still running quite high. )    Exam  Const  
General: cooperative, healthy appearing and comfortable  Nutritional Appearance:
 average body habitus and well nourished  Orientation: alert, awake and oriented
 x3  Skin  Other:   Right foot wound still open, not much improvement from 
previous visit. Tenderness extending up the ankle, but no redness.     
Assessment   Plan ***  Assessment   Plan  (1) Cellulitis:         Code(s):  
L03.90 - Cellulitis, unspecified        Plan:   Not healing as well as I would 
like it to be.   I will do a STAT referral to Wound.   Changed abx to doxy
cycline.  Out of work until seen by wound and cleared by them to return.   (2) 
Uncontrolled type 2 diabetes mellitus:         Status: Acute        Code(s):  
E11.65 - Type 2 diabetes mellitus with hyperglycemia        SNOMED Code(s): 
[id number]        Category: Medical        Plan:   Kayley DM is very poorly 
controlled.  I will increase the insulin to 40units daily.  Placing referral to 
Endo as well.         Orders:   Orders      Xray Foot Complete RT 10/21/21 
L03.90 - Cellulitis, unspecified     Referrals      Wound Care Referral  E11.65 
- Type 2 diabetes mellitus with hyperglycemia, L03.90 - Cellulitis, unspecified 
     Endocrinology Referral  E11.65 - Type 2 diabetes mellitus with 
hyperglycemia             Medications:   New      doxycycline hyclate 100 mg PO 
BID 10 days 20 tabs 0RF      Changed      From insulin glargine (Lantus U-100 
Insulin) 35 units (0.35 mL) subcut DAILY 10 mL 0RF      To insulin glargine 
(Lantus U-100 Insulin) 40 units (0.4 mL) subcut DAILY 10 mL 0RF      Discontinue
d      cephalexin     Discontinued Reason:  Clinically Indicated 500 mg  PO QID 
7 days 28 caps 0RF Cellulitis right foot          Coding  Level of Care Code  
41726 Est Pt Intermediate Comp    Diagnoses  Cellulitis  L03.90  Uncontrolled 
type 2 diabetes mellitus  E11.65    Additional Codes  Intake - Is patient in 
pain?: Yes (1125F)      <Electronically signed by Cecille Maguire NP> 10/25/21 
1250          









          Name      Value     Range     Interpretation Code Description Data Gregoria

rce(s) Supporting 

Document(s)

 

                                                                       









                    ID                  Date                Data Source

 

                    [id number]           10/11/2021 02:06:00 PM EDT [facility]

 

                                          Patient Name: ROMELIA CORADO      

 : 1980  Sex: F  Pt Unit #: 

[id number]  Location:[facility]  Provider:   Visit Date/Time:  10/11/21  Primary 
Insurance: [facility]  Secondary Insurance: Self Pay  Intake  
Vital Signs                                                    10/11/21         
                                         14:13     Current Height               
                 5 ft 5 in     Current Weight                                 
165 lb     Weight Measurement Method                  Standing Scale     BMI    
                                         27.4     BP                            
                 102/70     Blood Pressure Location                      Lt 
brachial     Position                                       Sitting     
Respiration                                      20     Pulse                   
                         100     Pulse Source                               
Pulse Oximeter     Temp                                            98 F     Temp
 Source                                     Oral     Pulse Oximetry (%)         
                      98     Oxygen Delivery Method                        room 
air      Intake  Visit Reasons: Hospital Discharge Follow-up  Nurse Note:   F/U 
[facility] D/C on the 4th.  Cellulitis and diabetes. Low HB.  Bg this am was 275.
 Not so good at doing BG everyday but is taking her Lantus everyday. Continues 
on Keflex for 2 more days. Ambulationis getting alot better.   Accompanied by: 
  Is patient in pain?: No  Allergies    No Known Drug Allergies Allergy 
(Verified 21 08:09)       No Known Food Allergies Allergy (Unverified 
21 11:37)           Medications     - Last Reconciled 10/11/21 by Cecille Maguire NP    blood sugar diagnostic (Accu-Chek Guide test strips) As directed  
blood-glucose meter (Accu-Chek Guide Glucose Meter) As directed, once daily  
cephalexin 500 mg PO QID  insulin glargine (Lantus U-100 Insulin) 30 units (0.3 
mL) subcut DAILY  insulin syringe,safetyneedle As directed  lancets As directed 
       Coronavirus Screening  Screening  Are you currently positive or on 
isolation for COVID ?: No  Do you have any NEW signs of one or more of the 
following?: no symptoms  Do you have NEW signs of at least two of the 
following?: no symptoms    PFSH  Medical History (Updated 10/12/21 @ 13:14 by 
Cecille Maguire NP)    Anxiety  Asthma  Chlamydia  Depression  Diabetes mellitus 
 Herpes genitalis  Retinal detachment      Surgical History (Updated 21 @ 
16:48 by Geoff Garcia MD)    Hx of tonsillectomy  Status post tonsillectomy     
                      Social History (Updated 10/11/21 @ 14:10 by Priyanka Kuo)
  Does the Patient have a Healthcare Proxy:  No   Does Patient have a DNR?:  No 
  Does Patient have a Living Will?:  No   Advance Directives on File or in 
chart?:  No   Hx Recent Travel (where):  No   Smoking Status:  Never smoker     
    Review of Systems  Const  All systems reviewed   are unremarkable except as 
noted in HPI and below  Reports system reviewed and no additional complaints, 
except as documented  Card  Reports system reviewed and no additional 
complaints, except as documented  Resp  Reports system reviewed and no 
additional complaints, except as documented  Skin/Breast  Reports erythema and 
Reports wounds    Exam  Const  General: cooperative, healthy appearing and 
comfortable  Nutritional Appearance: average body habitus and well nourished  
Orientation: alert, awake and oriented x3  Skin  Other:   Right lateral foot 
with open area and yellow slough, and some peeling/bubbled up skin blister 
appearing.  Redness surrounds and extends to the ankle.  Swelling is minimal.  
No swelling noted in the leg at all (patient reports that initially there was 
swelling into the lower leg).     Assessment   Plan ***  Assessment   Plan  (1) 
Hospital discharge follow-up:         Code(s):  Z09 - Encounter for follow-up 
examination after completed treatment for conditions other than malignant 
neoplasm  (2) Abscess:         Status: Acute        Code(s):  L02.91 - Cutaneous
 abscess, unspecified        SNOMED Code(s): 775085114        Category: Medical 
 (3) Uncontrolled type 2 diabetes mellitus:         Status: Acute        
Code(s):  E11.65 - Type 2 diabetes mellitus with hyperglycemia        SNOMED 
Code(s): 321377412        Category: Medical        Plan:   Increase lantus to 35
 units daily.  New syringes sent in as well. Start on glipizide BID, daily first
 to ensure well-tolerated, then increase to BID.  Continue to improve diet.   
Added another week of abx.  Foot is still quite red up to the ankle.  Open area 
on the right lateralaspect of the foot with some yellow slough, and bubbled up 
skin. She is healing slow, which is expected d/t her diabetes.   I would like to
 see back in 1 week.   If not improving, consider wound clinic referral will be 
placed.         Medications:   New      insulin syr/ndl U100 half sylvia Daily 100
 ea 3RF       glipizide 5 mg  PO BID 60 tabs 2RF      Changed      From insulin 
glargine (Lantus U-100 Insulin) 30 units (0.3 mL) subcut DAILY 10 mL 0RF      To
 insulin glargine (Lantus U-100 Insulin) 35 units (0.35 mL) subcut DAILY 10 mL 
0RF       From cephalexin 500 mg  PO QID 28 caps 0RF Cellulitis right foot      
To cephalexin 500 mg  PO QID 7 days 28 caps 0RF Cellulitis right foot      
Discontinued      insulin syringe,safetyneedle     Discontinued Reason:  
Clinically Indicated As directed 100 ea 0RF          Coding  Level of Care Code 
 52312 Est Pt Intermediate Comp  Exam  Problem Focused    Diagnoses  Hospital 
discharge follow-up  Z09  Abscess  L02.91  Uncontrolled type 2 diabetes mellitus
  E11.65    Additional Codes  Intake - Is patient in pain?: No (1126F)      
<Electronically signed by Cecille Maguire NP> 10/12/21 1320          









          Name      Value     Range     Interpretation Code Description Data Gregoria

rce(s) Supporting 

Document(s)

 

                                                                       









                    ID                  Date                Data Source

 

                    [id number]           10/04/2021 08:40:00 AM EDT [facility]

 

                                        Name:                 ROMELIA CORADO

                      :              

    1980   Account#:             [id number]                      Age:   
               40           MR#:                  [id number]                    
   Admit Date:           21     Provider:                   
Kaylene Rosales NP                                            Room #:          
      290                             Dictation Date:             10/04/21      
                                                                                
                 Discharge Summary  Discharge Summary  Admit 
Info/Diagnoses/Course  Admission Information:   Patient, ROMELIA CORADO, a 
40 year old F, admitted on 21 16:02 by Geoff Garcia MD for RIGHT FOOT 
CELLULITIS   Family Cecille Gonzalez         Most Recent Lab Results:           
                                                                                
         10/04/21 04:50                                                10/04/21 
04:50                                       Laboratory Results Last 24 hours    
10/03/21 11:29: POC Glucose 200 H  10/03/21 20:31: POC Glucose 307 H  10/04/21 
04:50: WBC 8.2, RBC 3.06 L, Hgb 8.8 L, Hct 27.0 L, MCV 88, MCH 29, MCHC 33, RDW 
13, Plt Count 323, MPV 10.1, Immature Gran % (Auto) 0.6, Neut % (Auto) 63.0, 
Lymph % (Auto) 29.3, Mono % (Auto) 5.7, Eos % (Auto) 1.0, Baso % (Auto) 0.4, 
Lymph # (Auto) 2.4, Abs Immat Gran (auto) 0.1, Add Manual Diff No, Absolute 
Neutrophils 5.2, Monocytes # 0.5, Absolute Eosinophils 0.1, Absolute Basophils 
0.0  10/04/21 04:50: Sodium 140, Potassium 4.2, Chloride 107, Carbon Dioxide 27,
 Anion Gap 10, BUN 9, Creatinine 0.6, GFR Calculation Greater than 60, Glucose 
117 H, Calcium 8.3 L  10/04/21 07:26: POC Glucose 120 H                         
                       Microbiology    10/01/21 18:25   Urine,voided   Urine 
Culture - Final  21 15:00   Venous blood   Blood Culture - Preliminary    
                          NO GROWTH AFTER 48 HOURS  21 15:00   Venous 
blood   Blood Culture - Preliminary                              NO GROWTH AFTER
 48 HOURS  21 16:30   Abscess   Abscess Culture - Final                   
           Strep Agalactiae Group B  21 16:05   Nasopharyngeal   
SARS-CoV-2 Rapid RNA (RT-PCR) - Final                              Sars-Cov-2 
Not Detected                              Influenza A Not Detected              
                Influenza B Not Detected                              RSV Not 
Detected        Hospital Course:   40-year-old female admitted on 21 with 
cellulitis of right foot.  She has a history of Type 2 DM for which she stopped 
taking insulin 5 months ago.  She was referred to the ER by primary care after 
being seen for foot pain with complaints also of episodes of nausea and vomiting
 the previous two weeks.  In the ER DKA was ruled out, but she was initiated on 
Vancomycin and Zosyn for cellulitis of the right foot.      Culture of the 
abscess grew Strep Agalactiae Group B.  She was switched to Cefazolin per 
sensitivityresults.    Review of Systems  ROS (Free Text/Narrative)::   Reports 
pain to right foot has improved.    General: Reports No Symptoms/Complaints; 
Denies Fever, Chills or Night Sweats  HEENT: Reports No Symptoms Complaints  
Endocrine: Reports No Symptoms/Complaints; Denies Loss of appetite, Intolerance 
to cold, Intoleranceto heat, Polydipsia or Polyuria  Cardiovascular: Reports No 
Symptoms/Complaints; Denies Chest Pain or Palpitations  Pulmonary: Reports No 
Symptoms/Complaints; Denies Dyspnea or Cough  Gastrointestinal: Reports No 
Symptoms/Complaints; Denies nausea, vomiting, abdominal pain, diarrhea or 
constipation  Genitourinary: Reports No Symptoms/Complaints; Denies Dysuria or 
Frequency  Musculoskeletal: Reports No Symptoms/Complaints  Neurological: 
Reports No Symptoms/Complaints; Denies Weakness, Numbness or Confusion  Psych: 
Reports No Symptoms/Complaints  Other:   Right foot with erythema, but not hot 
to touch or acutely tender.  Bandage intact over open area.  Exam   Condition  
Vital Signs - Most Recent:                                             Last 
Vital Signs        Temp  98.3 F   10/03/21 20:30     Pulse  83   10/03/21 20:30 
    Resp  16   10/03/21 20:30     BP  131/71   10/03/21 20:30     Pulse Ox  97  
 10/03/21 20:30                                                    Ht Wt BMI    
    Current Height                 5 ft 5 in                                    
          Current Weight                 165 lb 5 oz                            
                Body Mass Index (BMI)          27.5                             
                      Body Mass Index (BMI)          Overweight                 
                           Classification                            General: 
positive Alert, positive Oriented x3, positive Cooperative and positive No acute
 distress  HEENT: Atraumatic, PERRLA and EOMI  Neck: Supple  Lungs: Clear to 
auscultation  Cardiovascular: Regular rate, Normal S1 and Normal S2  Abdomen: 
Normal bowel sounds and Soft; negative for Tenderness  Extremities: No clubbing,
 No cyanosis and Other (Right foot with erythema, but not hot to touch or 
acutely tender.  2x2 absorbant bandage intact over open area on lateral aspect.)
  Skin: Skin warm and dry, Mucus membranes moist and Color normal for race; 
negative for Rash  Neurological: Normal speech, Strength at 5/5 X4 ext, Normal 
tone and Cranial nerves 3-12 NL  Psych/Mental Status: Mental status NL  
Discharge Plan  Discharge Education Printouts:      Cellulitis (DC)      
Cellulitis (GEN)      Foot Care for People with Diabetes (GEN)      Type 2 
Diabetes in Adults: New Diagnosis (DC)  Free Text/Narrative::   1. Right foot 
cellulitis  improving  will discharge pt home on Keflex 500mg QID  s/p 
vancomycin and zosyn    2. DM type 2  medication compliance was reinforced  
Lantus 30 units daily with daily fasting glucose  Glucometer, supplies, syringes
 ordered    3. Low Hb  No sign and symptoms suggestive of active bleeding  Will 
need to be followed up on as outpatient    4. nausea/vomiting  resolved    5. 
Ambulatory dysfunction due to right foot pain  PT joanne finds she ambulates well 
without assistive device      Follow up with primary care provider within one 
week.  Medications and testing supplies ordered as per outlined above.      Dr. Garcia is the attending.  Discharge plan made in coordination with him.    
Medications                                            Home Medications    blood
 sugar diagnostic (Accu-Chek Guide test strips) #100 ea 10/04/21 [Rx]  blood-
glucose meter (Accu-Chek Guide Glucose Meter) #1 ea 10/04/21 [Rx]  cephalexin 
500 mg capsule 500 mg PO QID #28 cap 10/04/21 [Rx]  insulin glargine 100 unit/mL
 subcutaneous solution (Lantus U-100 Insulin) 30 unit (0.3 mL) SUBCUT DAILY #10 
ml 10/04/21 [Rx]  insulin syringe,safetyneedle 0.3 mL 31 gauge x 15/64" #100 ea 
10/04/21 [Rx]  lancets #100 ea 10/04/21 [Rx]      Time Spent on Discharge: > 30 
Minutes    Quality Measures  Tobacco Use  Smoking Status: Never smoker  
Influenza Immunization  Hx/Date of Influenza Vaccination (from nursing Hx): No  
Safety  Two or more falls in last year? (Future fall risk): No  Diabetes Care 
Measures  HgAic/Microalbumin:                                                   
HgA1c        Hemoglobin A1c  12.1 % (3.8-5.6)  H  21  15:00              
Glucose/POC Glucose:                                          POC Glucose last 
48 hrs          10/02/21 10/02/21 10/02/21      11:36 16:53 20:56     POC 
Glucose  251 H  173 H  254 H            10/03/21 10/03/21 10/04/21      11:29 
20:31 07:26     POC Glucose  200 H  307 H  120 H                                
               Glucose last 48 hrs          10/03/21 10/04/21      06:58 04:50  
   Glucose  101  117 H            Discharge Plan  Admission/Discharge Dx  
Primary (Admit) Diagnosis: Type 2 diabetes poorly controlled, cellulitis right 
foot    Primary DC Diagnosis: Type 2 diabetes poorly controlled, cellulitis 
right foot    Condition  Condition: Stable    Discharge Detail  Disposition: 
Home, Self-Care    Med Rec   New Prescriptions:  New    Lantus U-100 Insulin 100
 unit/mL Solution      30 unit subcut DAILY Qty: 10 0RF    (DME) lancets  Misc  
    See Rx Instructions  .Route  Qty: 100 0RF     Rx Instructions:     As 
directed    (DME) insulin syringe,safetyneedle 0.3 mL 31 gauge x 15/64" syringe 
     See Rx Instructions  .Route  Qty: 100 0RF     Rx Instructions:     As 
directed    cephalexin 500 mg capsule      500 mg PO QID Qty: 28 0RF    (DME) 
blood-glucose meter [Accu-Chek Guide Glucose Meter]  Misc      See Rx 
Instructions  .Route  Qty: 1 0RF     Rx Instructions:     As directed, once 
daily    (DME) Accu-Chek Guide test strips  Strip      See Rx Instructions  
.Route  Qty: 100 0RF     Rx Instructions:     As directed    Discharge Education
Printouts:  Cellulitis (DC), Cellulitis (GEN), Foot Care for People with 
Diabetes (GEN), Type 2 Diabetes in Adults: New Diagnosis (DC)    Follow Up 
Visit/Referrals:  Cecille Maguire NP [Primary Care Provider] - 1 Week    Diet:: 
Consistent carbohydrate    Medications  Medication reconciliation performed by 
provider at discharge: Yes    Follow Up Care/Instructions  Diet/Activity/Wound 
Care..:  Follow up with primary care provider in 1 week.  Check fingerstick 
glucose every morning before breakfast and keep record of these.  Take insulin 
daily as prescribed.  No concentrated sweets diet.    Take all antibiotic doses 
spaced out every six hours, as evenly as possible.  Take all doses unless 
advised otherwise by your primary care provider.      If fever, worsening pain, 
worsening appearance of right foot call your primary care provider or return to 
ER.      Quality Indicators  Conditions Present During Course of 
Hospitalization: None Apply    *Discharge Patient*  Discharge Orders:  Discharge
Order  (Routine); Ordered 10/04/21     Ordered By:  Kaylene Rosales        
Dictated by:  <Electronically signed by Kaylene Rosales NP>      Kaylene Rosales NP      10/04/21 1332     
_________________________________________________         Kaylene Rosales NP   
    SIGNATURE   DA    Report Cosigners:  <<Signature on File>>      Geoff Garcia MD      10/04/21 142  <Electronically signed by Geoff Garcia MD>       Geoff Garcia MD      10/04/21 1426                  D:  LUIS  10/04/21  0840 T:  LUIS
   10/04/21  0840  CC:           









          Name      Value     Range     Interpretation Code Description Data Gregoria

rce(s) Supporting 

Document(s)

 

                                                                       









                    ID                  Date                Data Source

 

                    [id number]            10/04/2021 05:34:00 AM EDT [facility]









          Name      Value     Range     Interpretation Code Description Data Gregoria

rce(s) Supporting 

Document(s)

 

           Leukocytes [#/volume] in Blood by Automated count 8.2 10*3/uL 4.45-10

.71 N                     

[facility]            

 

                Erythrocytes [#/volume] in Blood by Automated count 3.06 10*6/uL

    4.20-5.40       Below

 low normal                             [facility]  

 

           Hemoglobin [Moles/volume] in Blood 8.8 g/dL   10.7-15.4  Below low no

rmal            [facility]                  

 

                Hematocrit [Volume Fraction] of Blood by Automated count 27.0 % 

         37-47           Below low 

normal                                  [facility]  

 

                                        Erythrocyte mean corpuscular volume [Ent

itic volume] in Cord blood by Automated 

count      88 fL      80-96      N                     [facility]

ital  

 

                    Erythrocyte mean corpuscular hemoglobin [Entitic mass] by Au

tomated count 29 pg               

27-31           N                               [facility]  

 

                                        Erythrocyte mean corpuscular hemoglobin 

concentration [Mass/volume] in Cord 

blood      33 g/dL    33-37      N                     [facility]

ital  

 

             Erythrocyte distribution width [Entitic volume] by Automated count 

13 %         11-15        N            

                          [facility]  

 

           Platelets [#/volume] in Blood by Automated count 323 10*3/uL 130-472 

   N                     [facility]                  

 

           Platelet mean volume [Entitic volume] in Blood 10.1 fL    9.1-13.1   

N                     [facility]                         

 

           Neutrophils/100 leukocytes in Blood by Automated count 63.0 %     41-

77      N                     [facility]                  

 

           Neutrophils [#/volume] in Blood by Automated count 5.2 U      1.7-7.6

    N                     [facility]                  

 

           Lymphocytes/100 leukocytes in Blood by Automated count 29.3 %     14-

46      N                     [facility]                  

 

           Lymphocytes [#/volume] in Blood by Automated count 2.4 U      0.6-4.6

    N                     [facility]                  

 

           Monocytes/100 leukocytes in Blood by Automated count 5.7 %      4-12 

      N                     [facility]                        

 

           Monocytes [#/volume] in Blood by Automated count 0.5 U      0.2-1.2  

  N                     [facility]                         

 

           Eosinophils/100 leukocytes in Blood by Automated count 1.0 %      0-7

        N                     [facility]                  

 

           Eosinophils [#/volume] in Blood by Automated count 0.1 U      0.0-0.5

    N                     [facility]                  

 

           Basophils/100 leukocytes in Blood by Automated count 0.4 %      0.4-1

.3    N                     [facility]                  

 

           Basophils [#/volume] in Blood by Automated count 0.0 U      0.0-0.2  

  N                     [facility]                         

 

          NUCLEATED RED BLOOD CELL 0 %                                     [facility]  

 

          NUCLEATED RED BLOOD CELL# 0 U                                     [facility]

[facility]  

 

           Immature granulocytes [Presence] in Blood by Automated count         

   0-2        N                     [facility]                  

 

           Immature granulocytes [#/volume] in Blood by Automated count 0.1 U   

   0-0.1      N                     

[facility]            

 

          Manual Differential panel - Blood NO                                  

    [facility]  









                    ID                  Date                Data Source

 

                    [id number]            10/04/2021 06:06:00 AM EDT [facility]









          Name      Value     Range     Interpretation Code Description Data Gregoria

rce(s) Supporting 

Document(s)

 

           Urea nitrogen [Mass/volume] in Serum or Plasma 9 mg/dL    9-23       

N                     [facility]                         

 

           Sodium [Moles/volume] in Serum or Plasma 140 mmol/L 132-146    [facility]                         

 

           Potassium [Moles/volume] in Serum or Plasma 4.2 mmol/L 3.5-5.5    [facility]                         

 

           Chloride [Moles/volume] in Serum or Plasma 107 mmol/L      [facility]                         

 

           Carbon dioxide, total [Moles/volume] in Serum or Plasma 27 mmol/L  20

-31      N                     

[facility]            

 

          Anion gap in Serum or Plasma 10 mmol/L 8-16      [facility]  

 

           Glucose [Mass/volume] in Serum or Plasma 117 mg/dL       Above 

high normal            

[facility]            

 

          Creatinine 0.6 mg/dL 0.5-1.1   [facility]  

 

                                        Glomerular filtration rate/1.73 sq M.pre

dicted [Volume Rate/Area] in Serum or 

Plasma     Greater Than 60 ABOVE 60                         [facility]  

 

           Calcium [Mass/volume] in Serum or Plasma 8.3 mg/dL  8.5-10.1   Below 

low normal            

[facility]            









                    ID                  Date                Data Source

 

                    [id number]           10/03/2021 09:15:00 AM EDT [facility]

 

                                        Name:                 ROMELIA CORADO

                      :              

    1980   Account#:             [id number]                      Age:   
               40           MR#:                  [id number]                    
   Admit Date:           21     Provider:             Geoff Garcia MD     
                 Room #:               290            Consulting Provider:      
                                      Dictation Date:           10/03/21        
                                                                                
                                                           Progress Note  
Subjective-ROS  Date of service  Date of service:: 10/03/21  Review of Systems  
Attestation/Length Of Stay:                                                     
    21 16:02  Admit to Inpatient, Acute [STATUS] Routine      Location: 
[facility]     Primary diagnosis: right foot cellulitis     Isolation: Standard 
precautions     Status: Inpatient Admission     Anticipated Length of stay:: 2-3
 Midnights          Vital Signs and I O  Vitals and I O:                        
               Intake   Output Last 24 Hours         10/01/21 10/02/21 10/03/21 
    23:59 23:59 23:59     Intake Total 2420 / 2420 2980 / 2980 590 / 590     
Output Total 1700 / 1700       Balance 720 / 720 2980 / 2980 590 / 590        
Results  Results:                                                               
                                     10/03/21 06:58                             
                   10/03/21 06:58                                       
Laboratory Results Last 24 hours    10/02/21 11:36: POC Glucose 251 H  10/02/21 
16:53: POC Glucose 173 H  10/02/21 20:56: POC Glucose 254 H  10/03/21 06:58: WBC
 8.3, RBC 2.92 L, Hgb 8.4 L, Hct 25.9 L, MCV 89, MCH 29, MCHC 32 L, RDW 13, Plt 
Count 287, MPV 10.0, Immature Gran % (Auto) 0.2, Neut % (Auto) 66.7, Lymph % 
(Auto) 28.1, Mono % (Auto) 4.1, Eos % (Auto) 0.7, Baso % (Auto) 0.2 L, Lymph # 
(Auto) 2.3, Abs Immat Gran (auto) 0.0, Add Manual Diff No, Absolute Neutrophils 
5.5, Monocytes # 0.3, Absolute Eosinophils 0.1, Absolute Basophils 0.0  10/03/21
 06:58: Sodium 141, Potassium 4.0, Chloride 110 H, Carbon Dioxide 24, Anion Gap 
11, BUN 10, Creatinine 0.5, GFR Calculation Greater than 60, Glucose 101, 
Calcium 7.8 L, Total Bilirubin 0.3, AST 30, ALT 44, Alkaline Phosphatase 135 H, 
Serum Total Protein 5.4 L, Albumin 2.1 L                                        
        Microbiology    10/01/21 18:25   Urine,voided   Urine Culture - Final  
21 15:00   Venous blood   Blood Culture - Preliminary                     
         NO GROWTH AFTER 48 HOURS  21 15:00   Venous blood   Blood Culture
 - Preliminary                              NO GROWTH AFTER 48 HOURS  21 
16:30   Abscess   Abscess Culture - Final                              Strep 
Agalactiae Group B  21 16:05   Nasopharyngeal   SARS-CoV-2 Rapid RNA (RT-
PCR) - Final                              Sars-Cov-2 Not Detected               
               Influenza A Not Detected                              Influenza B
 Not Detected                              RSV Not Detected      Exam  
Orientation: Alert, Oriented x3, Cooperative and No acute distress  HEENT: Muco
us membr. moist/pink  Lungs: normal lung sounds bilaterally  Cardiovascular 
Exam: regular rate and normal rhythm  Abdomen: Normal bowel sounds and Soft; 
negative for Tenderness  Extremities: other (redness/warmth/tenderness over 
right foot significantly improved )  Skin: Skin warm and dry, Mucus membranes 
moist  Neurological: Normal speech  Psych/Mental Status: normal mood    
Assessment/Plan  A P Free Text/Narrative  ::   41 y/o F c/o right foot pain, 
redness; was admitted for right foot cellulitis.  Labs and imaging studies 
reviewed    Pt was seen and examined at bedside.   c/o mild right foot pain but 
stated that it has significantly improved.       1. Right foot cellulitis  
improving  will change antibiotics to Ancef   will discharge pt home on Keflex  
s/p vancomycin and zosyn  MRI foot reviewed    2. DM type 2  uncontrolled  
medication compliance was reinforced  Lantus 30 units  insulin sliding scale.   
 3. Low Hb  will f/u repeat labs  No sign and symptoms suggestive of active 
bleeding    4. nausea/vomiting  resolved    5. Ambulatory dysfunction due to 
right foot pain  PT eval   Care plan: Plan of care discussed with patient and or
 family, Patient encouraged to ask questions about plan, Patient agrees with 
plan of care and Discharge plan and instructions discussed with patient and or 
family      Dictated by:  <Electronically signed by Geoff Garcia MD>      Geoff Garcia MD      10/03/21 0945     _________________________________________________
         Geoff Garcia MD        SIGNATURE   DA    Report Cosigners:              
                                D:  KVNG  10/03/21  0915 T:  KVNG    10/03/21 
 0915  CC:           









          Name      Value     Range     Interpretation Code Description Data Gregoria

rce(s) Supporting 

Document(s)

 

                                                                       









                    ID                  Date                Data Source

 

                    [id number]            10/03/2021 07:32:00 AM EDT [facility]









          Name      Value     Range     Interpretation Code Description Data Gregoria

rce(s) Supporting 

Document(s)

 

           Leukocytes [#/volume] in Blood by Automated count 8.3 10*3/uL 4.45-10

.71 N                     

[facility]            

 

                Erythrocytes [#/volume] in Blood by Automated count 2.92 10*6/uL

    4.20-5.40       Below

 low normal                             [facility]  

 

           Hemoglobin [Moles/volume] in Blood 8.4 g/dL   10.7-15.4  Below low no

rmal            [facility]                  

 

                Hematocrit [Volume Fraction] of Blood by Automated count 25.9 % 

         37-47           Below low 

normal                                  [facility]  

 

                                        Erythrocyte mean corpuscular volume [Ent

itic volume] in Cord blood by Automated 

count      89 fL      80-96      N                     [facility]

ital  

 

                    Erythrocyte mean corpuscular hemoglobin [Entitic mass] by Au

tomated count 29 pg               

27-31           N                               [facility]  

 

                                        Erythrocyte mean corpuscular hemoglobin 

concentration [Mass/volume] in Cord 

blood      32 g/dL    33-37      Below low normal            [facility]  

 

             Erythrocyte distribution width [Entitic volume] by Automated count 

13 %         11-15        N            

                          [facility]  

 

           Platelets [#/volume] in Blood by Automated count 287 10*3/uL 130-472 

   N                     [facility]                  

 

           Platelet mean volume [Entitic volume] in Blood 10.0 fL    9.1-13.1   

N                     [facility]                         

 

           Neutrophils/100 leukocytes in Blood by Automated count 66.7 %     41-

77      N                     [facility]                  

 

           Neutrophils [#/volume] in Blood by Automated count 5.5 U      1.7-7.6

    N                     [facility]                  

 

           Lymphocytes/100 leukocytes in Blood by Automated count 28.1 %     14-

46      N                     [facility]                  

 

           Lymphocytes [#/volume] in Blood by Automated count 2.3 U      0.6-4.6

    N                     [facility]                  

 

           Monocytes/100 leukocytes in Blood by Automated count 4.1 %      4-12 

      N                     [facility]                        

 

           Monocytes [#/volume] in Blood by Automated count 0.3 U      0.2-1.2  

  N                     [facility]                         

 

           Eosinophils/100 leukocytes in Blood by Automated count 0.7 %      0-7

        N                     [facility]                  

 

           Eosinophils [#/volume] in Blood by Automated count 0.1 U      0.0-0.5

    N                     [facility]                  

 

                Basophils/100 leukocytes in Blood by Automated count 0.2 %      

     0.4-1.3         Below low 

normal                                  [facility]  

 

           Basophils [#/volume] in Blood by Automated count 0.0 U      0.0-0.2  

  N                     [facility]                         

 

          NUCLEATED RED BLOOD CELL 0 %                                     [facility]  

 

          NUCLEATED RED BLOOD CELL# 0 U                                     [facility]  

 

           Immature granulocytes [Presence] in Blood by Automated count         

   0-2        N                     [facility]                  

 

           Immature granulocytes [#/volume] in Blood by Automated count 0.0 U   

   0-0.1      [facility]            

 

          Manual Differential panel - Blood NO                                  

    [facility]  









                    ID                  Date                Data Source

 

                    [id number]            10/03/2021 08:05:00 AM EDT [facility]









          Name      Value     Range     Interpretation Code Description Data Gregoria

rce(s) Supporting 

Document(s)

 

           Urea nitrogen [Mass/volume] in Serum or Plasma 10 mg/dL   9-23       

[facility]                         

 

           Sodium [Moles/volume] in Serum or Plasma 141 mmol/L 132-146    [facility]                         

 

           Potassium [Moles/volume] in Serum or Plasma 4.0 mmol/L 3.5-5.5    [facility]                         

 

             Chloride [Moles/volume] in Serum or Plasma 110 mmol/L        

  Above high normal  

                          [facility]  

 

           Carbon dioxide, total [Moles/volume] in Serum or Plasma 24 mmol/L  20

-31      [facility]            

 

          Anion gap in Serum or Plasma 11 mmol/L 8-16      [facility]  

 

           Glucose [Mass/volume] in Serum or Plasma 101 mg/dL       [facility]                         

 

          Creatinine 0.5 mg/dL 0.5-1.1   [facility]  

 

                                        Glomerular filtration rate/1.73 sq M.pre

dicted [Volume Rate/Area] in Serum or 

Plasma     Greater Than 60 ABOVE 60                         [facility]  

 

                                        Alanine aminotransferase [Enzymatic acti

vity/volume] in Serum or Plasma by With 

P-5'-P     44 U/L     10-49      [facility]

ital  

 

                                        Aspartate aminotransferase [Enzymatic ac

tivity/volume] in Serum or Plasma by 

With P-5'-P 30 U/L     0-33       [facility]

pital  

 

                    Alkaline phosphatase [Enzymatic activity/volume] in Serum or

 Plasma 135 U/L             

          Above high normal                 [facility] 

 

 

           Calcium [Mass/volume] in Serum or Plasma 7.8 mg/dL  8.5-10.1   Below 

low normal            

[facility]            

 

           Bilirubin.total [Mass/volume] in Serum or Plasma 0.3 mg/dL  0.3-1.2  

  N                     [facility]                  

 

                                        Albumin [Mass/volume] in Serum or Plasma

 by Bromocresol purple (BCP) dye binding

 method    2.1 g/dL   3.2-4.8    Below low normal            [facility]  

 

           Protein [Mass/volume] in Serum or Plasma 5.4 g/dL   5.7-8.2    Below 

low normal            

[facility]            









                    ID                  Date                Data Source

 

                    [id number]           10/02/2021 09:43:00 AM EDT [facility]

 

                                        Pharmacy Department    ROMELIA CORADO : 1980                       

                          Date:  10/02/21  [id number]  [id number]         
Vancomycin Web Calculator    - General Information  Hx VRE (Vancomycin-resistant
 enterococci): No  Hx MRSA: (Methicillin-resistant Staphylococcus aureus): Yes -
 11    - Labratory/Microbiology  Labratory Values:                        
                      Creatinine/BUN        BUN  16 mg/dL (9-23)   21  
15:00         Creatinine  0.9 mg/dL (0.5-1.1)   21  15:00                 
                                                                                
          21 15:00                                                21
 15:00       Microbiology:                                         Preliminary 
Micro Results         21 15:00 Blood Culture - Preliminary     Venous 
blood    NO GROWTH AFTER 24 HOURS      21 15:00 Blood Culture - 
Preliminary     Venous blood    NO GROWTH AFTER 24 HOURS        - Infection  
Infection  (Type/Site/Source): Skin and Soft Tissue Infection    - Concurrently 
Taking  Concurrently Taking:: Zosyn - 3.375G IV Q6H    - Dosing  Current 
Maintenance dose:: Vancomycin 1g IV q12h  Vancomycin Peak Result::   22.6  
Vancomycin Trough Result::   9.9    - Additional Comments  Narrative:   Ms. Corado's labs were within goals for peak and trough. At this time no dose 
adjustments will be made to her regimen.    - General Information  
Allergies/Adverse Reactions:                                                 
Allergies        Allergy/AdvReac Type Severity Reaction Status Date / Time     
No Known Drug Allergies Allergy   Verified 21 08:09     No Known Food 
Allergies Allergy   Unverified 21 11:37                          Pharmacy 
         Jazmine Quezada           10/02/21 0943                                  
             _________________________________________________   ________  
________      Date      Time   LAST EDIT:    









          Name      Value     Range     Interpretation Code Description Data Gregoria

rce(s) Supporting 

Document(s)

 

                                                                       









                    ID                  Date                Data Source

 

                    [id number]           10/02/2021 09:19:00 AM EDT [facility]

 

                                        Name:                 ROMELIA CORADO

                      :              

    1980   Account#:             [id number]                      Age:   
               40           MR#:                  [id number]                    
   Admit Date:           21     Provider:             Geoff Garcia MD     
                 Room #:               290            Consulting Provider:      
                                      Dictation Date:           10/02/21        
                                                                                
                                                           Progress Note  
Subjective-ROS  Date of service  Date of service:: 10/02/21  Review of Systems  
Attestation/Length Of Stay:                                                     
    21 16:02  Admit to Inpatient, Acute [STATUS] Routine      Location: 
[facility]     Primary diagnosis: right foot cellulitis     Isolation: Standard 
precautions     Status: Inpatient Admission     Anticipated Length of stay:: 2-3
 Midnights          Vital Signs and I O  Vitals and I O:                        
               Intake   Output Last 24 Hours         09/30/21 10/01/21 10/02/21 
    23:59 23:59 23:59     Intake Total 1552.337 / 6214.835 9928 / 2420 1740 / 
1740     Output Total  1700 / 1700      Balance 1552.337 / 1552.337 720 / 720 
1740 / 1740     Current Weight 165 lb 5 oz          Results  Results:           
                                                                                
         10/02/21 03:15                                                10/02/21 
03:15                                       Laboratory Results Last 24 hours    
21 17:07: POC Glucose 203 H  10/01/21 11:43: POC Glucose 249 H  10/01/21 
16:46: POC Glucose 228 H  10/01/21 18:25: Urine Color Yellow, Urine Appearance 
Cloudy A, Urine pH 5.0, Ur Specific Gravity 1.038 A, Urine Protein Trace, Urine 
Ketones Negative, Urine Blood Small H, Urine Nitrate Negative, Urine Bilirubin 
Negative, Urine Urobilinogen 0.2 eu/dl, Ur Leukocyte Esterase Small A, Add Ur 
Microanalysis Microscopic added, Urine RBC 3-5, Urine WBC 12-16 H, Ur Squamous 
Epith Cells Few, Urine Bacteria Small amount H, Urine Glucose > 1000 mg/dl A  
10/01/21 20:15: Vancomycin Peak 22.6  10/01/21 20:30: POC Glucose 287 H  
10/02/21 03:15: WBC 9.1, RBC 3.11 L, Hgb 9.0 L, Hct 27.7 L, MCV 89, MCH 29, MCHC
 33, RDW 13, Plt Count 271, MPV 9.4, Immature Gran % (Auto) 0.4, Neut % (Auto) 
63.8, Lymph % (Auto) 30.4, Mono % (Auto) 4.5, Eos % (Auto) 0.6, Baso % (Auto) 
0.3 L, Lymph # (Auto) 2.8, Abs Immat Gran (auto) 0.0, Add Manual Diff No, Absol
[place] Neutrophils 5.8, Monocytes # 0.4, Absolute Eosinophils 0.1, Absolute 
Basophils 0.0  10/02/21 03:15: Sodium 140, Potassium 4.0, Chloride 110 H, Carbon
 Dioxide 25, Anion Gap 9, BUN 15, Creatinine 0.6, GFR Calculation Greater than 
60, Glucose 142 H, Calcium 7.9 L, Total Bilirubin 0.1 L, AST 48 H, ALT 46, 
Alkaline Phosphatase 129, Serum Total Protein 6.1, Albumin 2.4 L  10/02/21 
03:15: Vancomycin Trough 9.9 L  10/02/21 07:48: POC Glucose 146 H               
                                 Microbiology    21 15:00   Venous blood  
 Blood Culture - Preliminary                              NO GROWTH AFTER 24 
HOURS  21 15:00   Venous blood   Blood Culture - Preliminary              
                NO GROWTH AFTER 24 HOURS  21 16:30   Abscess   Abscess 
Culture - Preliminary                              Strep Agalactiae Group B  
21 16:05   Nasopharyngeal   SARS-CoV-2 Rapid RNA (RT-PCR) - Final         
                     Sars-Cov-2 Not Detected                              Inf
luenza A Not Detected                              Influenza B Not Detected     
                         RSV Not Detected      Exam  Orientation: Alert, 
Oriented x3, Cooperative and No acute distress  HEENT: Mucous membr. moist/pink 
 Lungs: normal lung sounds bilaterally  Cardiovascular Exam: regular rate and 
normal rhythm  Abdomen: Normal bowel sounds and Soft; negative for Tenderness  
Extremities: negative for pedal edema  Skin: Other (redness/warmth/tenderness 
over  right foot improving )  Neurological: Normal speech and Strength at 5/5 X4
 ext  Psych/Mental Status: normal mood    Assessment/Plan  A P Free 
Text/Narrative  ::   41 y/o F c/o right foot pain, redness; was admitted for 
right foot cellulitis.  Labs and imaging studies reviewed    Pt was having 
breakfast.   Pt stated that her right foot has significantly improved.     Plan 
 1. Right foot cellulitis  improving   iv vancomycin and zosyn  will f/u culture
 from right foot infection site and blood culture  MRI foot reviewed    2. DM ty
pe 2  uncontrolled  medication compliance was reinforced  increased Lantus 30 
units  insulin sliding scale.    3. Low Hb  will f/u repeat labs  No sign and 
symptoms suggestive of active bleeding     4. nausea/vomiting  resolved  Care 
plan: Plan of care discussed with patient and or family and Patient encouraged 
to ask questions about plan      Dictated by:  <Electronically signed by Geoff Garcia MD>      Geoff Garcia MD      10/02/21 0920     
_________________________________________________         Geoff Garcia MD        
SIGNATURE   DA    Report Cosigners:                                             
 D:  KVNG  10/02/21  0919 T:  KVNG    10/02/21  0919  CC:           









          Name      Value     Range     Interpretation Code Description Data Gregoria

rce(s) Supporting 

Document(s)

 

                                                                       









                    ID                  Date                Data Source

 

                    [id number]            10/02/2021 03:45:00 AM EDT [facility]

 

                                        Special Instructions: DRAW 1 HOUR PRIOR 

TO 0400 DOSE 









          Name      Value     Range     Interpretation Code Description Data Gregoria

rce(s) Supporting 

Document(s)

 

                Vancomycin [Mass/volume] in Serum or Plasma --trough 9.9 ug/mL  

     10-20           Below low 

normal                                  [facility]  

 

                                        ****************************************

******************                   As 

of 10/02/21 0345Last administered patient dose of VANCOMYCIN HCLprior to 
specimen collection:10/01/21 1739  (9.6 hours).**
******************************************************** 









                    ID                  Date                Data Source

 

                    [id number]            10/02/2021 03:25:00 AM EDT [facility]









          Name      Value     Range     Interpretation Code Description Data Gregoria

rce(s) Supporting 

Document(s)

 

           Leukocytes [#/volume] in Blood by Automated count 9.1 10*3/uL 4.45-10

.71 N                     

[facility]            

 

                Erythrocytes [#/volume] in Blood by Automated count 3.11 10*6/uL

    4.20-5.40       Below

 low normal                             [facility]  

 

           Hemoglobin [Moles/volume] in Blood 9.0 g/dL   10.7-15.4  Below low no

rmal            [facility]                  

 

                Hematocrit [Volume Fraction] of Blood by Automated count 27.7 % 

         37-47           Below low 

normal                                  [facility]  

 

                                        Erythrocyte mean corpuscular volume [Ent

itic volume] in Cord blood by Automated 

count      89 fL      80-96      N                     [facility]

ital  

 

                    Erythrocyte mean corpuscular hemoglobin [Entitic mass] by Au

tomated count 29 pg               

27-31           N                               [facility]  

 

                                        Erythrocyte mean corpuscular hemoglobin 

concentration [Mass/volume] in Cord 

blood      33 g/dL    33-37      N                     [facility]

ital  

 

             Erythrocyte distribution width [Entitic volume] by Automated count 

13 %         11-15        N            

                          [facility]  

 

           Platelets [#/volume] in Blood by Automated count 271 10*3/uL 130-472 

   N                     [facility]                  

 

           Platelet mean volume [Entitic volume] in Blood 9.4 fL     9.1-13.1   

N                     [facility]                         

 

           Neutrophils/100 leukocytes in Blood by Automated count 63.8 %     41-

77      N                     [facility]                  

 

           Neutrophils [#/volume] in Blood by Automated count 5.8 U      1.7-7.6

    N                     [facility]                  

 

           Lymphocytes/100 leukocytes in Blood by Automated count 30.4 %     14-

46      N                     [facility]                  

 

           Lymphocytes [#/volume] in Blood by Automated count 2.8 U      0.6-4.6

    N                     [facility]                  

 

           Monocytes/100 leukocytes in Blood by Automated count 4.5 %      4-12 

      N                     [facility]                        

 

           Monocytes [#/volume] in Blood by Automated count 0.4 U      0.2-1.2  

  N                     [facility]                         

 

           Eosinophils/100 leukocytes in Blood by Automated count 0.6 %      0-7

        N                     [facility]                  

 

           Eosinophils [#/volume] in Blood by Automated count 0.1 U      0.0-0.5

    N                     [facility]                  

 

                Basophils/100 leukocytes in Blood by Automated count 0.3 %      

     0.4-1.3         Below low 

normal                                  [facility]  

 

           Basophils [#/volume] in Blood by Automated count 0.0 U      0.0-0.2  

  N                     [facility]                         

 

          NUCLEATED RED BLOOD CELL 0 %                                     [facility]  

 

          NUCLEATED RED BLOOD CELL# 0 U                                     [facility]  

 

           Immature granulocytes [Presence] in Blood by Automated count         

   0-2        N                     [facility]                  

 

           Immature granulocytes [#/volume] in Blood by Automated count 0.0 U   

   0-0.1      N                     

[facility]            

 

          Manual Differential panel - Blood NO                                  

    [facility]  









                    ID                  Date                Data Source

 

                    [id number]            10/02/2021 03:44:00 AM EDT [facility]









          Name      Value     Range     Interpretation Code Description Data Gregoria

rce(s) Supporting 

Document(s)

 

           Urea nitrogen [Mass/volume] in Serum or Plasma 15 mg/dL   9-23       

N                     [facility]                         

 

           Sodium [Moles/volume] in Serum or Plasma 140 mmol/L 132-146    N     

                [facility]                         

 

           Potassium [Moles/volume] in Serum or Plasma 4.0 mmol/L 3.5-5.5    [facility]                         

 

             Chloride [Moles/volume] in Serum or Plasma 110 mmol/L        

  Above high normal  

                          [facility]  

 

           Carbon dioxide, total [Moles/volume] in Serum or Plasma 25 mmol/L  20

-31      [facility]            

 

          Anion gap in Serum or Plasma 9 mmol/L  8-16      N                   [facility]  

 

           Glucose [Mass/volume] in Serum or Plasma 142 mg/dL       Above 

high normal            

[facility]            

 

          Creatinine 0.6 mg/dL 0.5-1.1   [facility]  

 

                                        Glomerular filtration rate/1.73 sq M.pre

dicted [Volume Rate/Area] in Serum or 

Plasma     Greater Than 60 ABOVE 60                         [facility]  

 

                                        Alanine aminotransferase [Enzymatic acti

vity/volume] in Serum or Plasma by With 

P-5'-P     46 U/L     10-49      N                     [facility]

ital  

 

                                        Aspartate aminotransferase [Enzymatic ac

tivity/volume] in Serum or Plasma by 

With P-5'-P 48 U/L     0-33       Above high normal            [facility]  

 

                    Alkaline phosphatase [Enzymatic activity/volume] in Serum or

 Plasma 129 U/L             

          N                               [facility]  

 

           Calcium [Mass/volume] in Serum or Plasma 7.9 mg/dL  8.5-10.1   Below 

low normal            

[facility]            

 

                Bilirubin.total [Mass/volume] in Serum or Plasma 0.1 mg/dL      

 0.3-1.2         Below low 

normal                                  [facility]  

 

                                        Albumin [Mass/volume] in Serum or Plasma

 by Bromocresol purple (BCP) dye binding

 method    2.4 g/dL   3.2-4.8    Below low normal            [facility]  

 

           Protein [Mass/volume] in Serum or Plasma 6.1 g/dL   5.7-8.2    [facility]                         









                    ID                  Date                Data Source

 

                    [id number]            10/01/2021 09:07:00 PM EDT [facility]

 

                                        Special Instructions: 2.5 HOURS AFTER 16

00 DOSE STARTED 









          Name      Value     Range     Interpretation Code Description Data Gregoria

rce(s) Supporting 

Document(s)

 

           Vancomycin [Mass/volume] in Serum or Plasma --peak 22.6 ug/mL 20-40  

    N                     [facility]                  









                    ID                  Date                Data Source

 

                    [id number]            10/01/2021 08:41:00 PM EDT [facility]

 

                                        Reason for ordering culture: Abnormal fi

ndings UA@10/01/21 2019: UA W/ MICRO 

added. RFLXG = UMIC CIF.Method of Collection:: Voided 

 

                                        @10/01/21 2041: Urine culture added. RFL

XG = CULT.ADD.25,000 CFU/MLYeast like 

organisms no senst done 

 

                                        Reason for ordering culture: Abnormal fi

ndings UA@10/01/21 2019: UA W/ MICRO 

added. RFLXG = UMIC CIF.Method of Collection:: Voided 









          Name      Value     Range     Interpretation Code Description Data Gregoria

rce(s) Supporting 

Document(s)

 

          Color of Urine                                         [facility]  

 

           Appearance of Urine            CLEAR      Abnormal (applies to non-nu

meric results)            [facility]                  

 

          pH of Urine by Test strip 5.0       5-8                           [facility]  

 

                Specific gravity of Urine by Refractometry 1.038           1.005

-1.030     Abnormal (applies 

to non-numeric results)                     [facility]  

 

                Leukocyte esterase [Presence] in Urine by Test strip            

     NEGATIVE        Abnormal 

(applies to non-numeric results)                     [facility]

al  

 

                                        @DO MICRO!!!!A Culture has been added to

 this specimen     per established 

criteria 

 

           Nitrite [Presence] in Urine by Test strip            NEGATIVE        

                 [facility]                                 

 

           Protein [Presence] in Urine by Test strip            NEGATIVE        

                 [facility]                                 

 

                Glucose [Mass/volume] in Urine by Automated test strip > 1000 mg

/dl    NEGATIVE        

Abnormal (applies to non-numeric results)                     [facility]  

 

           Ketones [Presence] in Urine by Test strip            NEGATIVE        

                 [facility]                                 

 

           Urobilinogen [Presence] in Urine            0.2-1 EU/dl              

         [facility]

                                         

 

           Bilirubin.total [Presence] in Urine by Automated test strip          

  NEGATIVE                         [facility]                 

 

             Erythrocytes [#/volume] in Urine by Test strip SMALL        NEGATIV

E     Above high normal 

                          [facility]  

 

                                        @DO MICRO!!!! 

 

          URINE MICROSCOPIC? (CIF) Microscopic Added                            

   [facility]  









                    ID                  Date                Data Source

 

                    [id number]           10/01/2021 11:26:00 AM EDT [facility]

 

                                        Pharmacy Department    ROMELIA CORADO : 1980                       

                          Date:  10/01/21  [id number]  [id number]         
Vancomycin Initiation PK Note    - General Information  Hx VRE (Vancomycin-
resistant enterococci): No  Hx MRSA: (Methicillin-resistant Staphylococcus 
aureus): Yes - 11    - Labratory/Microbiology  Labratory Values:          
                                    Creatinine/BUN        BUN  16 mg/dL (9-23)  
 21  15:00         Creatinine  0.9 mg/dL (0.5-1.1)   21  15:00      
                                  Abnormal Laboratory Results past 24 hours     
     21      13:40 15:00 15:00     WBC    14.6 H     RBC
    3.70 L     Hct    31.4 L     Neut % (Auto)    80.3 H     Baso % (Auto)    
0.2 L     Neutrophils (Manual)    85 H     Absolute Neutrophils    11.7 H     
Monocytes (Manual)    1 L     Glucose        POC Glucose  370 H       Hemoglobin
 A1c        C-Reactive Protein        Albumin        HCG, Quant   Less than 1 L 
            21      15:00 15:00     WBC       RBC       Hct      
 Neut % (Auto)       Baso % (Auto)       Neutrophils (Manual)       Absolute 
Neutrophils       Monocytes (Manual)       Glucose  312 H      POC Glucose      
 Hemoglobin A1c   12.1 H     C-Reactive Protein  117.0 H      Albumin  3.1 L    
  HCG, Quant                                                                    
                                     21 15:00                             
                   21 15:00       Microbiology:                           
            Pending Microbiology Results         21 15:00 Blood Culture - 
Pending     Venous blood       21 15:00 Blood Culture - Pending     Venous
 blood         - Infection  Infection  (Type/Site/Source): Skin and Soft Tissue 
Infection    - Concurrently Taking  Concurrently Taking:: Zosyn - 3.375G IV Q6H 
   - Dosing  Loading Dose:: Not indicated  Initial Maintenance dose (15-20 
mg/kg): VANCOMYCIN 1G IV Q12H    - Monitoring  Vancomycin Peak ordered for:: 
10/1 @ 1830  Vancomycin Trough ordered for:: 10/2 @0300    - General Information
  Allergies/Adverse Reactions:                                                 
Allergies        Allergy/AdvReac Type Severity Reaction Status Date / Time     
No Known Drug Allergies Allergy   Verified 21 08:09     No Known Food 
Allergies Allergy   Unverified 21 11:37                          Pharmacy 
         Jzamine Quezada           10/01/21 1126                                  
             _________________________________________________   ________  
________      Date      Time   LAST EDIT:    









          Name      Value     Range     Interpretation Code Description Data Gregoria

rce(s) Supporting 

Document(s)

 

                                                                       









                    ID                  Date                Data Source

 

                    [id number]           10/01/2021 11:00:00 AM EDT [facility]

 

                                        Therapy Department     ROMELIA CORADO : 1980                       

                          Date:  10/01/21  [id number]  [id number]      
Attending: Geoff Garcia MD        Physical Therapy Inpatient Jessica    - Therapy 
Evaluation  Date PT Order Received:: 10/01/21  Date Started:: 10/01/21  Time 
Started:: 10:35  Diagnosis:: R foot cellulitis  Reason for Evaluation: New 
Admission  Rehab Diagnosis:: difficulty walking  PMH and Social Hx. Reviewed per
 MD, Nursing, and ER Assess.: Yes    - Subjective/History  Subjective::   Pt 
states she has had some balance problems in the past due to her DM but that has 
not been a problem now for about a year. Pain in R foot is decreasing her franklin. 
for walking on the foot.     Hx pertinent to PT concerns:: DM, anxiety, asthma, 
depression  Prior Level of Function:: I for all activity  Type of Dwelling: Home
  Number of Floors: 2  Able to negotiate inside stairs?: Yes  Physical Barriers 
in Home Environment: One railing  Home Environment:: Will need to go upstairs  
Does the patient have pain?: Yes    - Pain Detail  Pain Location: R foot  Pain 
Description: Aching  Pain Intensity: 4  Pain Scale Used: Numeric Scale    - 
Objective  Hearing Ability: Normal Hearing  Visual Assistive Devices: None  
Observations: Alert and oriented x 3    - Transfer  Sit<>Stand:: Supervision  
Bed<>Chair:: Supervision  Supine<>Sit:: Independent  Bed Mobility:: Independent 
   - Ambulation  Ambulation Assistance:: Supervision  # Required to Assist:: 1 -
 for IV pole  Assistive Devices: Front Wheeled Walker  Distance (ft):: 20  S
tairs:: NT  Ambulation Comments: Pt was able to walk with RW and PWB on the R 
LE. Pain limits her from putting all her weight through that leg. She felt much 
better with the walker and can at least get around now. She says she will be 
able to do the stairs fine as well.    - Balance  Balance comments: Good with RW
 on level surfaces    - Assessment  Physical Therapy Impressions/Assessment:   
Pt has decreased mobility secondary to R foot pain. However, with the RW, she is
 able to perform functional mobility as needed. She is safe for D/C home when 
medically cleared. She would need a walker however unless her pain improves to 
where she can bear normal weight.       - Plan  Treatment Plan: Transfer 
training, Gait training on level surfaces    - Plan of Care  Short Term Goal #1:
 Safe mobility with RW  Rehab Potential: Good    - End  Date Ended:: 10/01/21  
Time Ended:: 10:50  Elapsed Time (minutes): 15  Elapsed Time Minutes: 15    - PT
 Orders  Current Equipment: Walker                   Therapist      Mahendra Erwin
           10/01/21 1100                           I certify this plan of care  
                       Geoff Ma MD            10/01/21 1244    
                                                                                
                                                    
_________________________________________________   ________  ________      Date
      Time   LAST EDIT:    









          Name      Value     Range     Interpretation Code Description Data Gregoria

rce(s) Supporting 

Document(s)

 

                                                                       









                    ID                  Date                Data Source

 

                    [id number]        10/01/2021 09:36:00 AM EDT [facility] [address] [place] [address]                

                                  [phone]  NAME                          
                    SEX    PT STATUS         ACCOUNT NUMBER  ROMELIA CORADO   ADM IN               [id number]    ORDERING
 PHYSICIAN                                LOCATION                 MEDICAL 
RECORD NO.   Geoff Garcia MD                                                  
       [id number]    ATTENDING PHYSICIAN                               DATE OF 
BIRTH            DATE OF EXAM/TIME  Cecille Maguire NP                            
      1980               10/01/21 / 0928    TYPE / EXAM  MRI Foot Right w/
    REASON FOR EXAM  right foot swelling, eschar  Clinical History/Indication 
for Exam:   right foot swelling, eschar      MR RIGHT LOWER EXTREMITY WITHOUT 
AND WITH INTRAVENOUS CONTRAST FOOT      INDICATION:  Right foot swelling, eschar
      TECHNIQUE:  Multiplanar magnetic resonance images of the right foot 
without and with 7 mL  Gadavistintravenous contrast.      COMPARISON:  None of 
the right foot      FINDINGS:   LIGAMENTS:      Medial collateral:  
Unremarkable.      Lateral collateral:  Unremarkable.      Lisfranc:  
Unremarkable.      TENDONS:      Flexor:  Unremarkable.      Extensor:  
Unremarkable.      Peroneal:  Unremarkable.      Tibialis anterior:  
Unremarkable.      Tibialis posterior:  Unremarkable.      Muscles:  
Unremarkable.      Fluid:  Small effusion first metatarsophalangeal joint.      
Sinus tarsi:  Unremarkable as visualized.      Tarsal tunnel:  Unremarkable.    
  Plantar fascia:  Unremarkable.      Cartilage:  Unremarkable.      
Bones/joints:  Os Trigonum.      Soft tissues:  Subcutaneous edema plantar and 
medial aspect first metatarsophalangeal joint with mild contrast enhancement.  
Subcutaneous edema dorsal lateral aspect midfoot and forefoot.      Other 
findings:  7 mL Gadavist IV contrast.      IMPRESSION:        1.  Small effusion
 first metatarsophalangeal joint.   2.  Subcutaneous edema plantar and medial 
aspect first metatarsophalangeal joint with mild contrastenhancement.   3.  
Subcutaneous edema dorsal lateral aspect midfoot and forefoot.   4.  Os Trig
onum.         Contrast Type: Gadavist. Contrast Volume: 7mL      ** REPORT 
SIGNATURE ON FILE  10/01/2021 (09:36 Eastern Time ) **   Signed by: Stephani Beasley M.D., [facility]         Reported By Stephani Beasley MD on 10/01/21 0936       Signed By 
Stephani Beasley MD on 10/01/21 0936                                                   
 ______________________________________________   _______  _______              
                                                                           Date 
       Time  CC:  Ceiclle Maguire; Stephani Beasley MD  Techn: HANJU             
Trans Dt/Tm:             Trans by: DT             Prt Dt/Tm:    6398-4204: Total
 DLP =    0.00 mGy-cm     : Total Radiation Dose =    0.0000 mSv    
Lifetime Dose: 0 mSv   









          Name      Value     Range     Interpretation Code Description Data Gregoria

rce(s) Supporting 

Document(s)

 

                                                                       









                    ID                  Date                Data Source

 

                    [id number]           10/01/2021 08:24:00 AM EDT [facility]

 

                                        Name:                 ROMELIA CORADO

                      :              

    1980   Account#:             [id number]                      Age:   
               40           MR#:                  [id number]                    
   Admit Date:           21     Provider:             Geoff Garcia MD     
                 Room #:               290            Consulting Provider:      
                                      Dictation Date:           10/01/21        
                                                                                
                                                           Progress Note  
Subjective-ROS  Date of service  Date of service:: 10/01/21  Review of Systems  
Attestation/Length Of Stay:                                                     
    21 16:02  Admit to Inpatient, Acute [STATUS] Routine      Location: 
[facility]     Primary diagnosis: right foot cellulitis     Isolation: Standard 
precautions     Status: Inpatient Admission     Anticipated Length of stay:: 2-3
 Midnights          Vital Signs and I O  Vitals and I O:                        
                 Vital Signs last 12 hours          Temp Pulse Resp BP Pulse Ox 
     10/01/21 07:44  97.9 F  102 H  18  149/65  99      10/01/21 05:00  98.9 F  
98  18  123/67  97      21 21:04  98.1 F  85  18  110/62                  
                         Intake   Output Last 24 Hours         09/29/21 09/30/21
 10/01/21     23:59 23:59 23:59     Intake Total  1552.337 / 1552.337 640 / 640 
    Output Total   700 / 700     Balance  1552.337 / 1552.337 -60 / -60     
Current Weight  165 lb 5 oz         Results  Results:                           
                                                                         
10/01/21 05:05                                                10/01/21 05:05    
                                   Laboratory Results Last 24 hours    21 
13:40: POC Glucose 370 H  21 15:00: Magnesium 2.3, Folate 7.3, TSH 0.73, 
HCG, Quant Less than 1 L, Acetone, Qual Negative  21 15:00: Vitamin B12 
420  21 15:00: WBC 14.6 H, RBC 3.70 L, Hgb 10.7, Hct 31.4 L, MCV 85, MCH 
29, MCHC 34, RDW 13, Plt Count 321, MPV 9.3, Immature Gran % (Auto) 0.5, Neut % 
(Auto) 80.3 H, Lymph % (Auto) 14.4, Mono % (Auto) 4.5, Eos % (Auto) 0.1, Baso % 
(Auto) 0.2 L, Lymph # (Auto) 2.1, Abs Immat Gran (auto) 0.1, Add Manual Diff 
Manual diff added, Total Counted 100, Neutrophils (Manual) 85 H, Absolute 
Neutrophils 11.7 H, Lymphocytes (Manual) 14, Monocytes (Manual) 1 L, Monocytes #
 0.7, Absolute Eosinophils 0.0, Absolute Basophils 0.0, Platelet Estimate 
Appears normal, RBC Morphology Appears normal  21 15:00: Sodium 133, 
Potassium 4.0, Chloride 101, Carbon Dioxide 27, Anion Gap 9, BUN 16, Creatinine 
0.9, GFR Calculation Greater than 60, Glucose 312 H, Calcium 8.9, Total 
Bilirubin 0.4, AST 8, ALT 13, Alkaline Phosphatase 78, C-Reactive Protein 117.0 
H, Serum Total Protein 7.5, Albumin 3.1 L  21 15:00: Lactic Acid 1.0  
21 15:00: Hemoglobin A1c 12.1 H, Estim Average Glucose 301  21 
15:00: Phosphorus 3.7  21 18:27: POC Glucose 180 H  21 21:02: POC 
Glucose 257 H  10/01/21 05:05: WBC 10.9 H, RBC 3.37 L, Hgb 9.7 L, Hct 29.2 L, 
MCV 87, MCH 29, MCHC 33, RDW 13, Plt Count 279, MPV 10.0, Immature Gran % (Auto)
 0.5, Neut % (Auto) 70.7, Lymph % (Auto) 23.2, Mono % (Auto) 4.9, Eos % (Auto) 
0.4, Baso % (Auto) 0.3 L, Lymph # (Auto) 2.5, Abs Immat Gran (auto) 0.1, Add 
Manual Diff No, Absolute Neutrophils 7.7 H, Monocytes # 0.5, Absolute 
Eosinophils 0.0, Absolute Basophils 0.0  10/01/21 05:05: Sodium 141, Potassium 
4.0, Chloride 110 H, Carbon Dioxide 25, Anion Gap 10, BUN 15, Creatinine 0.7, 
GFR Calculation Greater than 60, Glucose 117 H, Calcium 8.0 L, Total Bilirubin 
0.3, AST 22, ALT 21, Alkaline Phosphatase 81, Serum Total Protein 6.1, Albumin 
2.5 L  10/01/21 07:14: POC Glucose 135 H                                        
        Microbiology    21 16:05   Nasopharyngeal   SARS-CoV-2 Rapid RNA 
(RT-PCR) - Final                              Sars-Cov-2 Not Detected           
                   Influenza A Not Detected                              
Influenza B Not Detected                              RSV Not Detected      Exam
  Orientation: Alert, Oriented x3 and Cooperative  HEENT: Mucous membr. 
moist/pink  Lungs: normal lung sounds bilaterally  Cardiovascular Exam: regular 
rate and normal rhythm  Abdomen: Normal bowel sounds and Soft; negative for 
Tenderness  Extremities: normal inspection  Skin: Other 
(redness/warmth/tenderness over lateral aspect of right dorsal foot )  
Neurological: Normal speech and Strength at 5/5 X4 ext  Psych/Mental Status: 
normal mood    Assessment/Plan  A P Free Text/Narrative  ::   41 y/o F c/o right
 foot pain, redness; was admitted for right foot cellulitis.   Labs and imaging 
studies reviewed    c/o mild foot pain.     Plan  1. Right foot cellulitis  iv 
vancomycin and zosyn  will f/u culture from right foot infection site and blood 
culture  MRI foot reviewed     2. DM type 2  uncontrolled  medication compliance
 was reinforced  increased Lantus 30 units  insulin sliding scale.    3. 
nausea/vomiting  resolved  could be related to uncontrolled DM      Dictated by:
  <Electronically signed by Geoff Garcia MD>      Geoff Garcia MD      10/01/21 
1743     _________________________________________________         Geoff Garcia MD        SIGNATURE   DA    Report Cosigners:                                   
           D:  ARYVI  10/01/21  0824 T:  ARYVI    10/01/21  0824  CC:           









          Name      Value     Range     Interpretation Code Description Data Gregoria

rce(s) Supporting 

Document(s)

 

                                                                       









                    ID                  Date                Data Source

 

                    [id number]            10/01/2021 05:57:00 AM EDT [facility]









          Name      Value     Range     Interpretation Code Description Data Gregoria

rce(s) Supporting 

Document(s)

 

                Leukocytes [#/volume] in Blood by Automated count 10.9 10*3/uL  

  4.45-10.71      Above 

high normal                             [facility]  

 

                Erythrocytes [#/volume] in Blood by Automated count 3.37 10*6/uL

    4.20-5.40       Below

 low normal                             [facility]  

 

           Hemoglobin [Moles/volume] in Blood 9.7 g/dL   10.7-15.4  Below low no

rmal            [facility]                  

 

                Hematocrit [Volume Fraction] of Blood by Automated count 29.2 % 

         37-47           Below low 

normal                                  [facility]  

 

                                        Erythrocyte mean corpuscular volume [Ent

itic volume] in Cord blood by Automated 

count      87 fL      80-96      N                     [facility]

ital  

 

                    Erythrocyte mean corpuscular hemoglobin [Entitic mass] by Au

tomated count 29 pg               

27-31           N                               [facility]  

 

                                        Erythrocyte mean corpuscular hemoglobin 

concentration [Mass/volume] in Cord 

blood      33 g/dL    33-37      N                     [facility]

ital  

 

             Erythrocyte distribution width [Entitic volume] by Automated count 

13 %         11-15        N            

                          [facility]  

 

           Platelets [#/volume] in Blood by Automated count 279 10*3/uL 130-472 

   N                     [facility]                  

 

           Platelet mean volume [Entitic volume] in Blood 10.0 fL    9.1-13.1   

N                     [facility]                         

 

           Neutrophils/100 leukocytes in Blood by Automated count 70.7 %     41-

77      N                     [facility]                  

 

                Neutrophils [#/volume] in Blood by Automated count 7.7 U        

   1.7-7.6         Above high 

normal                                  [facility]  

 

           Lymphocytes/100 leukocytes in Blood by Automated count 23.2 %     14-

46      N                     [facility]                  

 

           Lymphocytes [#/volume] in Blood by Automated count 2.5 U      0.6-4.6

    N                     [facility]                  

 

           Monocytes/100 leukocytes in Blood by Automated count 4.9 %      4-12 

      N                     [facility]                        

 

           Monocytes [#/volume] in Blood by Automated count 0.5 U      0.2-1.2  

  N                     [facility]                         

 

           Eosinophils/100 leukocytes in Blood by Automated count 0.4 %      0-7

        N                     [facility]                  

 

           Eosinophils [#/volume] in Blood by Automated count 0.0 U      0.0-0.5

    N                     [facility]                  

 

                Basophils/100 leukocytes in Blood by Automated count 0.3 %      

     0.4-1.3         Below low 

normal                                  [facility]  

 

           Basophils [#/volume] in Blood by Automated count 0.0 U      0.0-0.2  

  N                     [facility]                         

 

          NUCLEATED RED BLOOD CELL 0 %                                     [facility]  

 

          NUCLEATED RED BLOOD CELL# 0 U                                     [facility]  

 

           Immature granulocytes [Presence] in Blood by Automated count         

   0-2        N                     [facility]                  

 

           Immature granulocytes [#/volume] in Blood by Automated count 0.1 U   

   0-0.1      N                     

[facility]            

 

          Manual Differential panel - Blood NO                                  

    [facility]  









                    ID                  Date                Data Source

 

                    [id number]            10/01/2021 06:37:00 AM EDT [facility]









          Name      Value     Range     Interpretation Code Description Data Gregoria

rce(s) Supporting 

Document(s)

 

           Urea nitrogen [Mass/volume] in Serum or Plasma 15 mg/dL   9-23       

N                     [facility]                         

 

           Sodium [Moles/volume] in Serum or Plasma 141 mmol/L 132-146    [facility]                         

 

           Potassium [Moles/volume] in Serum or Plasma 4.0 mmol/L 3.5-5.5    [facility]                         

 

             Chloride [Moles/volume] in Serum or Plasma 110 mmol/L        

  Above high normal  

                          [facility]  

 

           Carbon dioxide, total [Moles/volume] in Serum or Plasma 25 mmol/L  20

-31      N                     

[facility]            

 

          Anion gap in Serum or Plasma 10 mmol/L 8-16      [facility]  

 

           Glucose [Mass/volume] in Serum or Plasma 117 mg/dL       Above 

high normal            

[facility]            

 

          Creatinine 0.7 mg/dL 0.5-1.1   [facility]  

 

                                        Glomerular filtration rate/1.73 sq M.pre

dicted [Volume Rate/Area] in Serum or 

Plasma     Greater Than 60 ABOVE 60                         [facility]  

 

                                        Alanine aminotransferase [Enzymatic acti

vity/volume] in Serum or Plasma by With 

P-5'-P     21 U/L     10-49      N                     [facility]

ital  

 

                                        Aspartate aminotransferase [Enzymatic ac

tivity/volume] in Serum or Plasma by 

With P-5'-P 22 U/L     0-33       N                     [facility]

pital  

 

                    Alkaline phosphatase [Enzymatic activity/volume] in Serum or

 Plasma 81 U/L              

          N                               [facility]  

 

           Calcium [Mass/volume] in Serum or Plasma 8.0 mg/dL  8.5-10.1   Below 

low normal            

[facility]            

 

           Bilirubin.total [Mass/volume] in Serum or Plasma 0.3 mg/dL  0.3-1.2  

  [facility]                  

 

                                        Albumin [Mass/volume] in Serum or Plasma

 by Bromocresol purple (BCP) dye binding

 method    2.5 g/dL   3.2-4.8    Below low normal            [facility]  

 

           Protein [Mass/volume] in Serum or Plasma 6.1 g/dL   5.7-8.2    [facility]                         









                    ID                  Date                Data Source

 

                    [id number]           2021 04:40:00 PM EDT [facility]

 

                                        Name:                 ROMELIA CORADO

                      :              

    1980   Account#:             [id number]                      Age:   
               40           MR#:                  [id number]                    
   Admit Date:           21     Provider:             Geoff Garcia MD     
                 Room #:               290          Consulting Provider:        
                                    Dictation Date:           21          
                                  History   Physical  HPI  Date of service  Date
 of service:: 21  History of Present Illness  Nurse screening for 
coronavirus:                                                             Recent 
Travel outside the                                                          
country (where)                         Chief Complaint: right foof pain  
Emergency Room stated complaint: Multi system (Adult)  Admitted From: Emergency 
Dept  Source of information: Patient and Family Member  HPI Free T
ext/Narrative::   41 y/o F with h/o DM type 2(stopped taking insulin 5 months 
ago) went to see her PMD for c/o worsening right foot pain for past few days, no
 specific aggravating or relieving factor. Pt also had fewepisodes of 
nausea/vomiting for past 2 weeks. Pt was sent to ER due to concern of DKA.  In 
ER pt was found with vitals of /74, , RR 18, Temp 98.3, SpO2- 96% on
 RA; Pt was initially started on iv insulin gtt that was stopped after DKA was 
ruled out. On physical examination there was area of skin 
induration/redness/tenderness/warmth over lateral aspect of rightdorsal foot. Pt
 was given iv vancomycin/zosyn for right foot cellulitis.   Hospitalist service 
was consulted to admit the patient for further management.   Pt was seen and 
examined at bedside in ER.    at bedside.   Pt c/o pain over lateral 
aspect of right foot dorsum.   Allergies/Home Meds                              
                  Allergies        Allergy/AdvReac Type Severity Reaction Status
 Date / Time     No Known Drug Allergies Allergy   Verified 21 08:09     
No Known Food Allergies Allergy   Unverified 21 11:37                     
                             Home Medications         Medication  Instructions  
Recorded  Confirmed  Last Taken  Type     No Known Home Medications  21  
Unknown History        Medication list updated and reviewed:: Yes    PFSH  
Medical History (Updated 21 @ 16:48 by Geoff Garcia MD)    Anxiety  Asthma
  Chlamydia  Depression  Diabetes mellitus  Herpes genitalis  Retinal detachment
      Surgical History (Updated 21 @ 16:48 by Geoff Garcia MD)    Hx of 
tonsillectomy  Status post tonsillectomy                          Social History
 (Reviewed 21 @ 16:45 by Geoff Garcia MD)  Does the Patient have a 
Healthcare Proxy:  No   Does Patient have a DNR?:  No   Does Patient have a 
Living Will?:  No   Advance Directives on File or in chart?:  No   Hx Recent 
Travel (where):  No   Smoking Status:  Never smoker       Sickle cell  Sickle 
Cell Screening:: Not indicated    ROS***  Const  All systems reviewed   are 
unremarkable except as noted in HPI and below  Denies excessive sweating, Denies
 fatigue and Denies fever(s)  Eyes  Denies diplopia  ENT  Denies vertigo and 
Denies throat swelling  Card  Denies chest pain  Resp  Denies cough and Denies 
wheezing  GI  Denies nausea    Denies difficulty voiding  Musc  Denies 
arthralgias  Skin/Breast  Denies rash  Neuro  Denies confusion and Denies 
vertigo  Psych  Denies confusion and Denies depression  Endo  Denies excessive 
sweating and Denies fatigue  Hema/Lymph  Denies easy bleeding  Aller/Immun  
Denies throat swelling and Denies wheezing    Vital Signs and I O  Vitals and I 
O:                                         Vital Signs last 12 hours          
Temp Pulse Resp BP Pulse Ox      21 13:53  98.3 F  103 H  18  128/74  96  
                                        Intake   Output Last 24 Hours         
21     23:59 23:59 23:59     Current Weight   160 lb    
    Height,Weight   BMI:                                                 Ht Wt 
BMI        Current Height                 5 ft 6 in                             
                 Current Weight                 160 lb                          
                            Results  Results:                                   
                                                                 21 15:00 
                                               21 15:00                   
                    Laboratory Results Last 24 hours    21 13:40: POC 
Glucose 370 H  21 15:00: Magnesium 2.3, Folate 7.3, TSH 0.73, HCG, Quant 
Less than 1 L, Acetone, Qual Negative  21 15:00: Vitamin B12 420  21
 15:00: WBC 14.6 H, RBC 3.70 L, Hgb 10.7, Hct 31.4 L, MCV 85, MCH 29, MCHC 34, 
RDW 13, Plt Count 321, MPV 9.3, Immature Gran % (Auto) 0.5, Neut % (Auto) 80.3 
H, Lymph % (Auto) 14.4, Mono % (Auto) 4.5, Eos % (Auto) 0.1, Baso % (Auto) 0.2 
L, Lymph # (Auto) 2.1, Abs Immat Gran (auto) 0.1, Add Manual Diff Manual diff 
added, Total Counted 100, Neutrophils (Manual) 85 H, Absolute Neutrophils 11.7 
H, Lymphocytes (Manual) 14, Monocytes (Manual) 1 L, Monocytes # 0.7, Absolute 
Eosinophils 0.0, Absolute Basophils 0.0, Platelet Estimate Appears normal, RBC 
Morphology Appears normal  21 15:00: Sodium 133, Potassium 4.0, Chloride 
101, Carbon Dioxide 27, Anion Gap 9, BUN 16, Creatinine 0.9, GFR Calculation G
reater than 60, Glucose 312 H, Calcium 8.9, Total Bilirubin 0.4, AST 8, ALT 13, 
Alkaline Phosphatase 78, C-Reactive Protein 117.0 H, Serum Total Protein 7.5, 
Albumin3.1 L  21 15:00: Lactic Acid 1.0  21 15:00: Hemoglobin A1c 
12.1 H, Estim Average Glucose 301  21 15:00: Phosphorus 3.7        Exam  
Const  General: cooperative, healthy appearing, comfortable and no acute 
distress  Orientation: alert, awake and oriented x3  HENMT  Mouth: moist mucous 
membranes  Eyes  Conjunctivae: conjunctivae normal  Neck  Neck: supple  Resp  
Auscultation: clear to auscultation bilaterally  Cardio  Rate: regular rate  
Rhythm: regular rhythm  Heart Sounds: S1 normal and S2 normal  GI  Palpation: 
soft  Auscultation: normal bowel sounds  Skin  Other:   eschar over dorsal later
 aspect of right foot, surrounding erythema, warmth/tenderness   Neuro  General:
 patient alert, patient awake and patient oriented x3  Cranial Nerves: CN's II-
XII intact bilaterally  Cognition: normal cognition  Speech: speech normal  
Motor: strength 5/5 throughout  Sensory Exam: no sensory deficits noted  Extrem 
 General: no pedal edema  Psych  Mood: congruent mood    Assessment/Plan  A P 
Free Text/Narrative  ::   41 y/o F c/o right foot pain, redness.   Labs and 
imaging studies reviewed     Impression- right foot cellulitis    Plan   1. 
Right foot cellulitis   iv vancomycin and zosyn   will f/u culture sent by ER 
team   will f/u blood culture   will f/u MRI foot     2. DM type 2   
uncontrolled   medication compliance was reinforced  started on Lantus 20 units 
  insulin sliding scale.     3. nausea/vomiting   resolved   could be related to
 uncontrolled DM             Dictated by:  <Electronically signed by Geoff Garcia MD>      Geoff Garcia MD      10/01/21 0824     
_________________________________________________         Geoff Garcia MD        
SIGNATURE   DA    Report Cosigners:                                             
 D:  KVNG  21  1640 T:  KVNG    21  1640  CC:           









          Name      Value     Range     Interpretation Code Description Data Gregoria

rce(s) Supporting 

Document(s)

 

                                                                       









                    ID                  Date                Data Source

 

                    [id number]            10/02/2021 10:34:00 AM EDT [facility]

 

                                        Special Instructions: right foot@10/02/2

1 1034: Aerobic ID Rafaela added. RFLXG = 

CHGAERID.SP DESC:right footTHIS ISOLATE IS PRESUMED TO BE RESISTANT TO 
CLINDAMYCINBASED ON THE DETECTION OF INDUCIBLE CLINDAMYCIN 
RESISTANCE.CLINDAMYCIN MAY STILL BE EFFECTIVE IN SOME PATIENTS.Gp B Strep Spec 
Ql Cult 









          Name      Value     Range     Interpretation Code Description Data Gregoria

rce(s) Supporting 

Document(s)

 

          Quantiy of growth NUMEROUS                                [facility]  









                    ID                  Date                Data Source

 

                    [id number]            10/02/2021 10:34:00 AM EDT [facility]

 

                                        Special Instructions: right foot@10/02/2

1 1034: Aerobic ID Rafaela added. RFLXG = 

CHGAERID.SP DESC:right footTHIS ISOLATE IS PRESUMED TO BE RESISTANT TO 
CLINDAMYCINBASED ON THE DETECTION OF INDUCIBLE CLINDAMYCIN 
RESISTANCE.CLINDAMYCIN MAY STILL BE EFFECTIVE IN SOME PATIENTS.Gp B Strep Spec 
Ql Cult 









          Name      Value     Range     Interpretation Code Description Data Gregoria

rce(s) Supporting 

Document(s)

 

                Ampicillin [Susceptibility] by Minimum inhibitory concentration 

(PACHECO) <0.06                           

Susceptible. Indicates for microbiology susceptibilities only.                  

         [facility]                         

 

                Cefotaxime [Susceptibility] by Minimum inhibitory concentration 

(PACHECO) <0.25                           

Susceptible. Indicates for microbiology susceptibilities only.                  

         [facility]                         

 

                Ceftriaxone [Susceptibility] by Minimum inhibitory concentration

 (PACHECO) <0.25                           

Susceptible. Indicates for microbiology susceptibilities only.                  

         [facility]                         

 

                Clindamycin [Susceptibility] by Minimum inhibitory concentration

 (PACHECO) >0.5                            

Resistant. Indicates for microbiology susceptibilities only.                    

       [facility]                         

 

                Erythromycin [Susceptibility] by Minimum inhibitory concentratio

n (PACHECO) >0.5                            

Resistant. Indicates for microbiology susceptibilities only.                    

       [facility]                         

 

                Penicillin [Susceptibility] by Minimum inhibitory concentration 

(PACHECO) <0.03                           

Susceptible. Indicates for microbiology susceptibilities only.                  

         [facility]                         

 

                Tetracycline [Susceptibility] by Minimum inhibitory concentratio

n (PACHECO) <0.5                            

Susceptible. Indicates for microbiology susceptibilities only.                  

         [facility]                         

 

                Vancomycin [Susceptibility] by Minimum inhibitory concentration 

(PACHECO) 0.5                             

Susceptible. Indicates for microbiology susceptibilities only.                  

         [facility]                         

 

                Levofloxacin [Susceptibility] by Minimum inhibitory concentratio

n (PACHECO) 0.5                             

Susceptible. Indicates for microbiology susceptibilities only.                  

         [facility]                         

 

                Cefepime [Susceptibility] by Minimum inhibitory concentration (M

IC) <0.25                           

Susceptible. Indicates for microbiology susceptibilities only.                  

         [facility]                         









                    ID                  Date                Data Source

 

                    [id number]        2021 04:17:00 PM EDT [facility] [address] [place]

[place] [address]                

                                  [phone]  NAME                          
                    SEX    PT STATUS         ACCOUNT NUMBER  DALLINJONASDELANOROMELIA BURCH   [facility]               [id number]    ORDERING
 PHYSICIAN                                LOCATION                 MEDICAL 
RECORD NO.  Bryn Gann MD                                                
       [id number]    ATTENDING PHYSICIAN                               DATE OF 
BIRTH            DATE OF EXAM/TIME  Cecille Maguire NP                            
      1980               09/30/21 / 0    TYPE / EXAM  Xray Chest One 
View    REASON FOR EXAM  fever  CLINICAL HISTORY: 40-year-old female with fever 
     TECHNIQUE: AP upright portable chest radiograph      COMPARISON: AP 
semiupright portable chest radiograph         FINDINGS:   LINES, TUBES AND 
HARDWARE: EKG leads overlie the chest.      PULMONARY PARENCHYMA AND PLEURA: No 
pneumothorax, focal infiltrate or effusion. There are low lung volumes.      
HEART AND MEDIASTINUM: Heart is normal in size and configuration. No significant
 abnormality in themediastinum.      BONES: No visible acute fracture or 
dislocation.      SOFT TISSUES: No subcutaneous emphysema.      VISIBLE ABDOMEN:
 No radiographic evidence of acute pathology         IMPRESSION:   1. No 
radiographic evidence of acute cardiopulmonary pathology in the chest.      END 
IMPRESSION           Reported By Lia Moya DO on 21      
Signed By Lia Moya DO on 21                          
______________________________________________   _______  _______  Date        
Time  CC:  Cecille Moya DO  Techn: MORSA             
Trans Dt/Tm:             Trans by: DT             Prt Dt/Tm:    : Total
 DLP =    0.00 mGy-cm  Fluoroscopy Time (in secs):    









          Name      Value     Range     Interpretation Code Description Data Gregoria

rce(s) Supporting 

Document(s)

 

                                                                       









                    ID                  Date                Data Source

 

                    [id number]           2021 04:08:00 PM EDT [facility]

 

                                                                             ED 

Physician Documentation      NAME:      

           ROMELIA CORADO                      :                  
1980   ACCT#:                [id number]                      AGE:       
           40           MR#:                  [id number]                       
SERVICE DATE:           21     EMERGENCY DR:           Bryn Gann MD  
                                                       PRIMARY CARE DR:         
  Cecille Maguire                      ROOM#:                [id number]          HPI 
(Adult, General)  General  Chief Complaint: Multi system (Adult)  Stated 
Complaint: INSULIN  Time Seen by Provider: 21 13:15  History of Present 
Illness Narrative:   Patient is a 40-year-old white female who has been diabetic
 on and off since her original pregnancy which she developed gestational 
diabetes.  Since then she has been notoriously noncompliant and thisis 
documented well Rosemary 2021 endocrinology note which is contained 
contained within our our computer system.  It is very informative.  She is lost 
from 275 pounds to 163 she states and haslost about 15 pounds in the last month.
  She states her blood sugars oftentimes between 3 and 500.  Her last A1c in 
February was 10.9.  Although her problem list states uncontrolled type 1 
diabetes the endocrinology note stated that she has a C-peptide which is 
positive indicating that she really is type II.      Today she is sent here by 
her primary care physician because of mildly elevated blood sugars and also an 
infection which seems to be brewing on the lateral aspect of the right foot 
which has been present for 2 days.  Her doctor was worried she might be in DKA. 
 She has not been eating much over the last 48 hours.  Her past medical history 
is otherwise unremarkable she is not allergic to anything and her surgical 
history is significant for tonsillectomy and retinal detachment surgery.  She 
denies any possibility of pregnancy.  Normally she has been quite resistant to 
care as has been documented on multiple times that I can read today.  
Allergies/Home Meds                                                Allergies    
    Allergy/AdvReac Type Severity Reaction Status Date / Time     No Known Drug 
Allergies Allergy   Verified 21 08:09     No Known Food Allergies Allergy 
  Unverified 21 11:37                                                  
Home Medications         Medication  Instructions  Recorded  Confirmed  Last 
Taken  Type     No Known Home Medications  21  Unknown History          
PMH (from Triage)  Patient Medical History  PMH Reviewed/Updated as Needed: Yes 
 PMH/PSH from Triage:                       Medical History (Updated 21 @ 
16:48 by Geoff Garcia MD)    Anxiety (Medical)   Asthma (Medical)   Chlamydia 
(Medical)   Depression (Medical)   Diabetes mellitus (Medical)   Herpes 
genitalis (Medical)   Hx of tonsillectomy (Medical) Z90.89  Retinal detachment 
(Medical) H33.20                       Surgical History (Updated 21 @ 
16:48 by Geoff Garcia MD)    Hx of tonsillectomy (Surgical) Z90.89  Status post 
tonsillectomy (Surgical)         Female History  Pregnant: No  Hx Drug Resistant
 Infections  Hx MRSA: (Methicillin-resistant Staphylococcus aureus): Yes 
(11)  Hx VRE (Vancomycin-resistant enterococci): No  Hx C.Diff: No  Hx 
CRKP: No  Hx Other Resistant Infection?: No  Isolation: Standard precautions  Hx
 Recent Travel  Out of the country within 10 days (where): No  Hx Fever: No  Hx 
Fever with a rash?: No  Nurse screening for coronavirus:                        
                                     Recent Travel outside the      No          
                                          country (where)                       
    Social History  Does patient have suicidal/homicidal thoughts or ideation?: 
No  Are you in a relationship with/Does anyone hit you, yell/swear at you, steal
 from you?: No  Substance Use  Hx Alcohol Use: No  Hx Substance Use: No  Hx 
Substance Use Treatment: No  Smoking Status: Never smoker  Tobacco Use  Hx C
hewing Tobacco Use: No  Vaccination History  Hx/Date of Tetanus, Diphtheria 
Vaccination: No  Hx/Date of Influenza Vaccination: No  Hx/Date of Pneumococcal 
Vaccination: No    PFSH  Medical History (Updated 21 @ 16:48 by Geoff Garcia MD)    Anxiety  Asthma  Chlamydia  Depression  Diabetes mellitus  Herpes 
genitalis  Retinal detachment      Surgical History (Updated 21 @ 16:48 by
 Geoff Garcia MD)    Hx of tonsillectomy  Status post tonsillectomy             
             Social History (Reviewed 21 @ 16:45 by Geoff Garcia MD)  Does
 the Patient have a Healthcare Proxy:  No   Does Patient have a DNR?:  No   Does
 Patient have a Living Will?:  No   Hx Recent Travel (where):  No   Smoking 
Status:  Never smoker       Sickle cell  Sickle Cell Screening:: Not indicated  
  ROS  Review of Systems  ROS Narrative:   Lesion lateral right foot elevated 
blood sugars low-grade fever  Constitutional: Denies fever  Eyes: Denies vision 
change, eye discharge/drng or redness  ENT: Denies mouth pain or mouth swelling 
 Respiratory: Denies cough, sputum, SOB w/exertion   rest, SOB with excertion, 
SOB at rest or wheezing  Cardiovascular: Denies chest pain or palpitations  
Gastrointestinal: Reports No Symptoms/Complaints; Denies nausea, vomiting or 
abdominal pain  Musculoskeletal: Denies neck pain, shoulder pain or muscle 
weakness  Skin/Breasts: Reports rash and lesions  Neurologic: Denies weakness, n
umbness or headache  Psychiatric: Reports No Symptoms/Complaints  Endocrine: 
Reports No Symptoms/Complaints  Hematological/Lymphatic: Reports No 
Symptoms/Complaints  Allergic/Immunologic: Reports No Symptoms/Complaints    
Physical Exam  General  Physical Exam Narrative:   No acetone notable in the 
room.  HEENT is unremarkable.  Chest heart lungs and abdomen are nontenderand 
normal.  Her right lateral foot shows a subcutaneous patch of white material 
which is undoubtedly purulent and it shows some lymphangitic spread above the 
forefoot which is actually petechial.  She has small burns on the anterior p
ortion of her right calf but otherwise is unremarkable.  Limitations: no 
limitations  Head  Head exam: Present atraumatic and normocephalic  Eye  Eye 
exam: Present normal apperance, PERRL and EOMI; Absent scleral icterus or 
conjunctival injection  Pupils: Present normal accommodation  ENT  ENT exam: 
Present normal exam and normal orophraynx  Neck  Neck exam: Present normal 
inspection and full ROM; Absent tenderness or meningismus  Respiratory  
Respiratory exam: Present normal lung sounds bilaterally; Absent respiratory 
distress, wheezes, rales, rhonchi or chest wall tenderness  Cardiovascular  Car
diovascular Exam: Present regular rate, normal rhythm, normal heart sounds and 
no murmur  GI/Abdominal  GI/Abdominal exam: Present Abd soft, bowel sounds 
present all quadrents, soft and normal bowel sounds; Absent distended or 
tenderness  Rectal  Rectal exam: Present deferred  Extremities Exam  Extremities
 exam: Present normal inspection, Full ROM without tenderness, capillary refill 
brisk, full ROM and capillary refill brisk; Absent tenderness, pedal edema or 
calf tenderness  Back Exam  Back exam: Present normal inspection and full ROM  
Neurological Exam  Neurological exam: Present alert, oriented X3, CN II-XII 
intact and normal gait  Psychiatric  Psychiatric exam: Present normal affect  
Skin  Skin exam: Present warm, dry and intact; Absent petechiae  Vital Signs  
Vital Signs:                                                Vital Signs         
21  13:53     Temperature 98.3 F     Pulse Rate 103 H     Respiratory Rate
 18     Blood Pressure 128/74     O2 Sat by Pulse Oximetry 96            MDM 
(comprehensive)  Lab Data  Labs:                                                
                                                    21 15:00              
                                  21 15:00                                
       Laboratory Results Last 24 hours    21 13:40: POC Glucose 370 H  
21 15:00: Magnesium 2.3, Folate 7.3, TSH 0.73, HCG, Quant Less than 1 L, 
Acetone, Qual Negative  21 15:00: Vitamin B12 420  21 15:00: WBC 
14.6 H, RBC 3.70 L, Hgb 10.7, Hct 31.4 L, MCV 85, MCH 29, MCHC 34, RDW 13, Plt 
Count 321, MPV 9.3, Immature Gran % (Auto) 0.5, Neut % (Auto) 80.3 H, Lymph % 
(Auto) 14.4, Mono % (Auto) 4.5, Eos % (Auto) 0.1, Baso % (Auto) 0.2 L, Lymph # 
(Auto) 2.1, Abs Immat Gran (auto) 0.1, Add Manual Diff Manual diff added, Total 
Counted 100, Neutrophils (Manual) 85 H, Absolute Neutrophils 11.7 H, Lymphocytes
 (Manual) 14, Monocytes (Manual) 1 L, Monocytes # 0.7, Absolute Eosinophils 0.0,
 Absolute Basophils 0.0, Platelet Estimate Appears normal, RBC Morphology 
Appears normal  21 15:00: Sodium 133, Potassium 4.0, Chloride 101, Carbon 
Dioxide 27, Anion Gap 9, BUN 16, Creatinine 0.9, GFR Calculation Greater than 
60, Glucose 312 H, Calcium 8.9, Total Bilirubin 0.4, AST 8, ALT 13, Alkaline 
Phosphatase 78, C-Reactive Protein 117.0 H, Serum Total Protein 7.5, Albumin3.1 
L  21 15:00: Lactic Acid 1.0  21 15:00: Hemoglobin A1c 12.1 H, Estim
 Average Glucose 301  21 15:00: Phosphorus 3.7                            
                    Microbiology    21 16:05   Nasopharyngeal   SARS-CoV-2
 Rapid RNA (RT-PCR) - Final                              Sars-Cov-2 Not Detected
                              Influenza A Not Detected                          
    Influenza B Not Detected                              RSV Not Detected      
  Medical Decision Making  Free Text/Narative::   I took her temperature myself 
and it is 99.1.  Her A1c is actually 12.  I think she is trying to become septic
 and is undoubtedly dehydrated.  She will be hydrated initially I started her on
 a insulin drip until I saw her laboratory studies come back and then I spoke to
 the hospitalist and wechanged her to Lantus and sliding scale and admission.  I
 started her on Zosyn and vancomycin as shetells me now that she is MRSA 
positive in the past.    Procedure: Informed consent was obtained.  Patient is 
essentially numb from the mid calf down and I used a 15 blade scalpel to make a 
small incision in the greenish-yellow well circumcised area just near the base 
of the fifth metatarsal and was surprised that grossly purulent material was not
 obtained.  The subcutaneous area has all been dried out.  Cultures still were 
obtained.  I was reluctant to unroofed the entire process for fear of leaving 
her with a large ulcer.  I scrubbed with Betadine and left it open for the 
nurses to dress.    Hopefully this will be a bridge to better self-care and 
control of her diabetes.  She is admitted [facility] Hospitalist service.    Plan  
Visit Medications  Administered ED medications::                                
                Medications          Discontinued Medications          Generic 
Name Dose Route Start Last Admin      Trade Name Freq  PRN Reason Stop Dose 
Admin     Acetaminophen  1,000 mg  21 14:40  21 15:48      
Acetaminophen 500 Mg Tab  PO  21 14:41  1,000 mg      1T ONE   
Administration     Sodium Chloride  1,000 mls @ 999 mls/hr  21 14:43  
21 16:50      Ns 0.9%  IV  21 15:43  Infused      .Q1H1M ONE   
Infusion     Vancomycin HCl  1.75 gm in 350 mls @ 116.6666 mls/hr  21 
14:40  21 17:02      Vancomycin/Water For Inj (Peg)  0.025 gm/kg (1.75 gm)
  21 17:39  116.7 mls/hr      IV   Administration      1T ONE       
Piperacillin/Tazobactam/Sod  100 mls @ 200 mls/hr  21 14:40  21 
16:19    Chloride 4.5 gm/ Sodium  IV  21 15:09  Infused    Chloride  1T 
ONE   Infusion     Insulin Human (Reg)/Sodium Chloride  100 unit in 100 mls @ 2 
mls/hr  21 14:44  21 17:01      Myxredlin 100 Unit/100 Ml Bag  IVC  
10/02/21 16:43  Infused      TITR ONE   Titration      Protocol        2 
UNITS/HR       Insulin Glargine  20 units  21 16:01  21 17:08      
Insulin Glargine,Hum.Rec.Anlog 100 Units/Ml Inj Mdv  SQ  21 16:02  20 
units      1T ONE   Administration          Discharge Plan  Admission/Discharge 
Dx  Primary (Admit) Diagnosis: Type 2 diabetes poorly controlled, cellulitis 
right foot    Primary DC Diagnosis: Type 2 diabetes poorly controlled, 
cellulitis right foot    ED Provider: Bryn Gann    ED Status: Discharge to 
ADM    Time Seen by Provider: 21 13:15    Triaged At: 21 12:13    
Discharge Detail  Disposition: Admit to [facility]    *Discharge 
Patient*  Discharge Date/Time: 21 18:06    Interventions  Interventions:  
ED Admission/Handoff   Last Done: 21 18:06  ED General Adult   Last Done: 
21 14:13          Report Signers:  <Electronically signed by Bryn Gann MD>      Bryn Gann MD      21 1851     
_________________________________________________         Bryn Gann MD     
   SIGNATURE   DA    Report Cosigners:                                          
    D:  CHAYITO  21  1608 T:  CHAYITO    21  1608  CC:  Cecille Maguire         









          Name      Value     Range     Interpretation Code Description Data Gregoria

rce(s) Supporting 

Document(s)

 

                                                                       









                    ID                  Date                Data Source

 

                    [id number] 05:35:00 PM EDT [facility]

 

                                        NORMAL RESULT IS "Not Detected"Cepheid S

ARS-CoV-2,FLU/RSV is Multiplex real time

 RT-PCRNegative results do not preclude SARS-COV-2, influenza orRSV infection 
and should not be used as the sole basis fortreatment or other patient 
management decisions.False negative results may occur if virus is present 
atlevels below the analytical limit of detection.This test has been authorized 
by FDA under an EUA for use byauthorized laboratoriesSARS-rel CoV RNA Resp Ql 
SARAH+probeFLUAV RNA Resp Ql SARAH+probeFLUBV RNA Resp Ql SARAH+probeRSV RNA Resp Ql 
SARAH+probe 









          Name      Value     Range     Interpretation Code Description Data Gregoria

rce(s) Supporting 

Document(s)

 

                                                                       









                    ID                  Date                Data Source

 

                    [id number]             2021 04:05:00 PM EDT [facility]









          Name      Value     Range     Interpretation Code Description Data Gregoria

rce(s) Supporting 

Document(s)

 

                                        Influenza virus A and B and SARS-CoV-2 (

COVID-19) and Respiratory syncytial 

virus RNA panel - Respir SARS-COV-2 NOT DETECTED                                

  [facility]      

 

                                        This lab was ordered by [facility] 

and reported by [facility]. 









                    ID                  Date                Data Source

 

                    [id number] 03:31:00 PM EDT [facility]

 

                                        @21 1530: MANUAL DIFF added. RFLXG

 = DIFF. 

 

                                        Special Instructions: Lab may order repe

at test if initial                      

test elevatedPhysician If elevated, reflex second test in 4-6 hrs 

 

                                        @21 1530: MANUAL DIFF added. RFLXG

 = DIFF. 









          Name      Value     Range     Interpretation Code Description Data Gregoria

rce(s) Supporting 

Document(s)

 

                Leukocytes [#/volume] in Blood by Automated count 14.6 10*3/uL  

  4.45-10.71      Above 

high normal                             [facility]  

 

                Erythrocytes [#/volume] in Blood by Automated count 3.70 10*6/uL

    4.20-5.40       Below

 low normal                             [facility]  

 

           Hemoglobin [Moles/volume] in Blood 10.7 g/dL  10.7-15.4  N           

          [facility]                                 

 

                Hematocrit [Volume Fraction] of Blood by Automated count 31.4 % 

         37-47           Below low 

normal                                  [facility]  

 

                                        Erythrocyte mean corpuscular volume [Ent

itic volume] in Cord blood by Automated 

count      85 fL      80-96      N                     [facility]

ital  

 

                    Erythrocyte mean corpuscular hemoglobin [Entitic mass] by Au

tomated count 29 pg               

27-31           N                               [facility]  

 

                                        Erythrocyte mean corpuscular hemoglobin 

concentration [Mass/volume] in Cord 

blood      34 g/dL    33-37      N                     [facility]

ital  

 

             Erythrocyte distribution width [Entitic volume] by Automated count 

13 %         11-15        N            

                          [facility]  

 

           Platelets [#/volume] in Blood by Automated count 321 10*3/uL 130-472 

   N                     [facility]                  

 

           Platelet mean volume [Entitic volume] in Blood 9.3 fL     9.1-13.1   

N                     [facility]                         

 

                Neutrophils/100 leukocytes in Blood by Automated count 80.3 %   

       41-77           Above high 

normal                                  [facility]  

 

                Neutrophils [#/volume] in Blood by Automated count 11.7 U       

   1.7-7.6         Above high 

normal                                  [facility]  

 

           Lymphocytes/100 leukocytes in Blood by Automated count 14.4 %     14-

46      N                     [facility]                  

 

           Lymphocytes [#/volume] in Blood by Automated count 2.1 U      0.6-4.6

    N                     [facility]                  

 

           Monocytes/100 leukocytes in Blood by Automated count 4.5 %      4-12 

      N                     [facility]                        

 

           Monocytes [#/volume] in Blood by Automated count 0.7 U      0.2-1.2  

  N                     [facility]                         

 

           Eosinophils/100 leukocytes in Blood by Automated count 0.1 %      0-7

        N                     [facility]                  

 

           Eosinophils [#/volume] in Blood by Automated count 0.0 U      0.0-0.5

    N                     [facility]                  

 

                Basophils/100 leukocytes in Blood by Automated count 0.2 %      

     0.4-1.3         Below low 

normal                                  [facility]  

 

           Basophils [#/volume] in Blood by Automated count 0.0 U      0.0-0.2  

  N                     [facility]                         

 

          NUCLEATED RED BLOOD CELL 0 %                                     [facility]  

 

          NUCLEATED RED BLOOD CELL# 0 U                                     [facility]  

 

           Immature granulocytes [Presence] in Blood by Automated count         

   0-2        N                     [facility]                  

 

           Immature granulocytes [#/volume] in Blood by Automated count 0.1 U   

   0-0.1      N                     

[facility]            

 

           Manual Differential panel - Blood Manual Diff Added                  

                [facility]                                 









                    ID                  Date                Data Source

 

                    [id number]            2021 03:33:00 PM EDT [facility]

 

                                        @21 1530: MANUAL DIFF added. RFLXG

 = DIFF. 

 

                                        Special Instructions: Lab may order repe

at test if initial                      

test elevatedPhysician If elevated, reflex second test in 4-6 hrs 

 

                                        @21 1530: MANUAL DIFF added. RFLXG

 = DIFF. 









          Name      Value     Range     Interpretation Code Description Data Gregoria

rce(s) Supporting 

Document(s)

 

           Urea nitrogen [Mass/volume] in Serum or Plasma 16 mg/dL   9-23       

N                     [facility]                         

 

           Sodium [Moles/volume] in Serum or Plasma 133 mmol/L 132-146    N     

                [facility]                         

 

           Potassium [Moles/volume] in Serum or Plasma 4.0 mmol/L 3.5-5.5    [facility]                         

 

           Chloride [Moles/volume] in Serum or Plasma 101 mmol/L      N   

                  [facility]                         

 

           Carbon dioxide, total [Moles/volume] in Serum or Plasma 27 mmol/L  20

-31      N                     

[facility]            

 

          Anion gap in Serum or Plasma 9 mmol/L  8-16      N                   [facility]  

 

           Glucose [Mass/volume] in Serum or Plasma 312 mg/dL       Above 

high normal            

[facility]            

 

          Creatinine 0.9 mg/dL 0.5-1.1   [facility]  

 

                                        Glomerular filtration rate/1.73 sq M.pre

dicted [Volume Rate/Area] in Serum or 

Plasma     Greater Than 60 ABOVE 60                         [facility]  

 

                                        Alanine aminotransferase [Enzymatic acti

vity/volume] in Serum or Plasma by With 

P-5'-P     13 U/L     10-49      N                     [facility]

ital  

 

                                        Aspartate aminotransferase [Enzymatic ac

tivity/volume] in Serum or Plasma by 

With P-5'-P 8 U/L      0-33       [facility]

pital  

 

                    Alkaline phosphatase [Enzymatic activity/volume] in Serum or

 Plasma 78 U/L              

          [facility]  

 

           Calcium [Mass/volume] in Serum or Plasma 8.9 mg/dL  8.5-10.1   [facility]                         

 

           Bilirubin.total [Mass/volume] in Serum or Plasma 0.4 mg/dL  0.3-1.2  

  [facility]                  

 

                                        Albumin [Mass/volume] in Serum or Plasma

 by Bromocresol purple (BCP) dye binding

 method    3.1 g/dL   3.2-4.8    Below low normal            [facility]  

 

           Protein [Mass/volume] in Serum or Plasma 7.5 g/dL   5.7-8.2    [facility]                         









                    ID                  Date                Data Source

 

                    [id number]            2021 03:40:00 PM EDT [facility]

 

                                        @21 1530: MANUAL DIFF added. RFLXG

 = DIFF. 

 

                                        Special Instructions: Lab may order repe

at test if initial                      

test elevatedPhysician If elevated, reflex second test in 4-6 hrs 

 

                                        @21 1530: MANUAL DIFF added. RFLXG

 = DIFF. 









          Name      Value     Range     Interpretation Code Description Data Gregoria

rce(s) Supporting 

Document(s)

 

          Lactic w Rfx (if elevated) 1.0 mmol/L 0.5-2.0   N                   [facility] 

 









                    ID                  Date                Data Source

 

                    [id number]            10/05/2021 03:08:00 PM EDT [facility]

 

                                        @21 1530: MANUAL DIFF added. RFLXG

 = DIFF. 

 

                                        Special Instructions: Lab may order repe

at test if initial                      

test elevatedPhysician If elevated, reflex second test in 4-6 hrs 

 

                                        @21 1530: MANUAL DIFF added. RFLXG

 = DIFF. 









          Name      Value     Range     Interpretation Code Description Data Gregoria

rce(s) Supporting 

Document(s)

 

          Bacteria identified in Blood by Culture                               

          [facility]  

 

                                        NO GROWTH AFTER 5 DAYS 









                    ID                  Date                Data Source

 

                    [id number] 03:31:00 PM EDT [facility]

 

                                        @21 1530: MANUAL DIFF added. RFLXG

 = DIFF. 

 

                                        Special Instructions: Lab may order repe

at test if initial                      

test elevatedPhysician If elevated, reflex second test in 4-6 hrs 

 

                                        @21 1530: MANUAL DIFF added. RFLXG

 = DIFF. 









          Name      Value     Range     Interpretation Code Description Data Gregoria

rce(s) Supporting 

Document(s)

 

          Cells counted [#] 100                                     [facility]  

 

           Neutrophils [#/volume] in Blood by Manual count 85 %       41-77     

 Above high normal            

[facility] [facility]            

 

           Lymphocytes [#/volume] in Blood by Manual count 14 %       14-46     

 N                     [facility]                         

 

           Monocytes [#/volume] in Blood by Manual count 1 %        4-12       B

elow low normal            [facility]                  

 

           Platelets [#/volume] in Blood by Estimate APPEARS NORMAL NORMAL      

                     [facility]                        

 

           Morphology [Interpretation] in Blood Narrative APPEARS NORMAL NORMAL 

                          [facility]                  









                    ID                  Date                Data Source

 

                    [id number] 03:33:00 PM EDT [facility]

 

                                        @21 1530: MANUAL DIFF added. RFLXG

 = DIFF. 

 

                                        Special Instructions: Lab may order repe

at test if initial                      

test elevatedPhysician If elevated, reflex second test in 4-6 hrs 

 

                                        @21 1530: MANUAL DIFF added. RFLXG

 = DIFF. 









          Name      Value     Range     Interpretation Code Description Data Gregoria

rce(s) Supporting 

Document(s)

 

                C reactive protein [Mass/volume] in Serum or Plasma 117.0 mg/L  

    0.0-5.0         Above 

high normal                             [facility]  









                    ID                  Date                Data Source

 

                    [id number]            2021 03:54:00 PM EDT [facility]









          Name      Value     Range     Interpretation Code Description Data Gregoria

rce(s) Supporting 

Document(s)

 

           Magnesium [Mass/volume] in Serum or Plasma 2.3 mg/dL  1.3-2.7    N   

                  [facility]                         









                    ID                  Date                Data Source

 

                    [id number]            2021 04:13:00 PM EDT [facility]









          Name      Value     Range     Interpretation Code Description Data Gregoria

rce(s) Supporting 

Document(s)

 

          Vitamin B12 420 pg/mL -911   N                   [facility]  









                    ID                  Date                Data Source

 

                    [id number] 03:54:00 PM EDT [facility]









          Name      Value     Range     Interpretation Code Description Data Gregoria

rce(s) Supporting 

Document(s)

 

           Acetone [Presence] in Serum or Plasma            NEGATIVE            

             [facility]                                 

 

                                        @Reenter manual test result: NEGATIVE@by

 Stacey Knapp at 21 1526.@Enter 

lot# for AimTab tablets [id number],2022 









                    ID                  Date                Data Source

 

                    [id number] 03:54:00 PM EDT [facility]









          Name      Value     Range     Interpretation Code Description Data Gregoria

rce(s) Supporting 

Document(s)

 

           Folate [Mass/volume] in Serum or Plasma 7.3 ng/mL                    

               [facility]                                 

 

                                                          FOLATE INTERPRETATION 

               NORMAL:  GREATER THAN 

5.38                INDETERMINATE:  3.38 - 5.38                DEFICIENT:  LESS 
THAN 3.37 









                    ID                  Date                Data Source

 

                    [id number] 03:54:00 PM EDT [facility]









          Name      Value     Range     Interpretation Code Description Data Gregoria

rce(s) Supporting 

Document(s)

 

                    Choriogonadotropin.beta subunit [Units/volume] in Serum or P

lasma Less Than 1         

1-3             Below low normal                 [facility]  

 

                                        @Report as less than lower limitApproxim

ate Gestational Age      Approximate HCG

 Range 0.2-1 Week                           5 - 50 1-2 Weeks                    
       50 - 500 2-3 Weeks                         100 - 5,000 3-4 Weeks         
                 500 - 10,000 4-5 Weeks                        1,000 - 50,000 5-
6 Weeks                       10,000 - 100,000 6-8 Weeks                       
15,000 - 100,000 2-3 Months                      10,000 - 100,000 









                    ID                  Date                Data Source

 

                    [id number] 03:54:00 PM [facility]









          Name      Value     Range     Interpretation Code Description Data Gregoria

rce(s) Supporting 

Document(s)

 

                          Thyrotropin [Units/volume] in Serum or Plasma by Detec

tion limit <= 0.005 mIU/L 

0.73 u[iU]/mL 0.35-5.50    N                         [facility]  









                    ID                  Date                Data Source

 

                    [id number] 03:40:00 PM [facility]









          Name      Value     Range     Interpretation Code Description Data Gregoria

rce(s) Supporting 

Document(s)

 

           Hemoglobin A1c [Mass/volume] in Blood 12.1 %     3.8-5.6    Above hig

h normal            [facility]                  

 

                                        @A repeat was not needed due to historic

al results.@Review & document.          

 The following ranges may be used for                interpretation of results: 
             HGBA1C degree of glucose control:              Greater than 8%: 
Action Suggested *              Less than 7%: Goal of Diabetic Therapy **       
       Less than 5.6%:  NormalFactors such as duration of diabetes, adherence to
 therapyand the age of the patient should also be considered inassessing the 
degree of blood glucose control.* High risk of developing long term 
complications such asretinopathy, nephropathy, neuropathy, cardiopathy, etc.** 
Some danger of hypoglycemic reaction in Type I diabetics.Some glucose intolerant
 individuals and "Sub Clinical"diabetics may demonstrate HGBA1C levels in this 
area. 

 

                          Glucose mean value [Moles/volume] in Blood Estimated f

rom glycated hemoglobin 

301 mg/dL                                           [facility]

l  

 

                                        An A1C of 7% - the goal of diabetic ther

apy - is equivalentto an EAG of 154 

mg/dl. 









                    ID                  Date                Data Source

 

                    [id number]            2021 03:38:00 PM [facility]









          Name      Value     Range     Interpretation Code Description Data Gregoria

rce(s) Supporting 

Document(s)

 

           Phosphate [Mass/volume] in Serum or Plasma 3.7 mg/dL  2.4-5.1    N   

                  [facility]                         









                    ID                  Date                Data Source

 

                    [id number]           2021 11:29:00 AM [facility]

 

                                          Patient Name: ROMELIA CORADO      

 : 1980  Sex: F  Pt Unit #: 

[id number]  Location:[facility]  Provider:   Visit Date/Time:  21  Primary 
Insurance: [facility]  Secondary Insurance: Self Pay  Intake  
Vital Signs                                                    21         
                                         11:35     Current Height               
                5 ft 6.3 in     Current Weight                                 
160 lb     Weight Measurement Method                  Standing Scale     BMI    
                                         25.5     BP                            
                 120/70     Blood Pressure Location                      Lt 
brachial     Position                                       Sitting     
Respiration                                      20     Pulse                   
                        121 H     Pulse Source                               
Pulse Oximeter     Temp                                           98.7 F     
Temp Source                                     Oral     Pulse Oximetry (%)     
                          96      Intake  Visit Reasons: Foot pain  Nurse Note: 
  (R) foot- has a lg purplish red area on top and lateral side. Pinky toe red. 
Pt stated she woke up with it. very uncomfortbale. Bilateral leg cramps. Unable 
to keep any drink or food down. gets to mid upper abdomen and it comes back up. 
Pt stated she stopped caring about her self long time ago and stopped 
everything. Pt stated if there was a pill to make her care about her self again.
   Accompanied by:   Is patient in pain?: Yes Pain scale (1-10): 6  
Allergies    No Known Drug Allergies Allergy (Verified 21 08:09)       No 
Known Food Allergies Allergy (Unverified 21 11:37)           Medications  
   - Last Reconciled 21 by Cecille Maguire NP    No Known Home Medications 
        Coronavirus Screening  Screening  Are you currently positive or on 
isolation for COVID ?: No  Do you have any NEW signs of one or more of the 
following?: no symptoms  Do you have NEW signs of at least two of the 
following?: no symptoms    HPI  Foot Injury/Condition  Romelia presents to the 
clinic for right foot pain.  In addition to this she has not been able to keep 
food or drink down in days.  She is vomiting often.  Has "severe pain" in the 
abdomen.  She hasnot taken any medication for her diabetes (type 1) in months pe
r patient.  She reports "I just don'tcare, anymore".  She wants a pill that will
 make her feel like caring again.      [facility]  Medical History (Updated 21 @
 12:23 by Cecille Maguire NP)    Anxiety  Asthma  Chlamydia  Depression  Diabetes
 mellitus  Herpes genitalis      Surgical History (Reviewed 21 @ 12:07 by 
Che Duron)    Status post tonsillectomy                           Social 
History (Updated 21 @ 11:39 by Priyanka Kuo)  Does the Patient have a 
Healthcare Proxy:  No   Does Patient have a DNR?:  No   Does Patient have a 
Living Will?:  No   Hx Recent Travel (where):  No   Smoking Status:  Never 
smoker         Review of Systems  Const  All systems reviewed   are unremarkable
 except as noted in HPI and below  Reports body aches, Reports lethargy and 
Reports weight loss  Eyes  Reports change in vision  ENT  Reports disequilibrium
  Card  Reports system reviewed and no additional complaints, except as 
documented  Resp  Reports system reviewed and no additional complaints, except 
as documented  GI  Reports abdominal pain, Reports nausea and Reports vomiting  
Musc  Reports myalgias and Reports arthralgias (Right foot pain)  Neuro  Reports
 lack of coordination, Reports localized weakness and Reports disequilibrium    
Exam  Const  General: cooperative  Orientation: alert, awake and oriented x3  
Skin  Other:   Linear bruise to the dorsal aspect of the right foot, mild 
redness.  No open area.    Neuro  General: patient alert, patient awake and 
patient oriented x3  Cognition: normal cognition  Speech: speech normal  Gait: 
staggering (weak)  Psych  Appearance: disheveled  Mental Status: mental status 
grossly normal  Speech and Movement: speech and movement normal and slowed 
movement  Mood: dysthymic mood  Affect: indifferent  Attitude: cooperative  
Thought Process: normal    Results    Urinalysis (DipStick)       Urine Specific
 Gravity                   1.010     Last Edit by Cecille Maguire NP on 21 
12:20      Urine PH                                     5     Last Edit by 
Cecille Maguire NP on 21 12:20      Urine Leukocytes                      
MODERATE     Last Edit by Cecille Maguire NP on 21 12:20      Urine 
Nitrates                        POSITIVE     Last Edit by Cecille Maguire NP on 
21 12:20      Urine Protein                  ++    100 mg/dl     Last Edit
 by Cecille Maguire NP on 21 12:20      Urine Ketones                      
   NEG  mg/dl   Last Edit by Cecille Maguire NP on 21 12:20      Urine 
Urobilinogen                      NORMAL     Last Edit by Cecille Maguire NP on 
21 12:20      Urine Blood                  about 5-10 Stanley/ul     Last Edit
 by Cecille Maguire NP on 21 12:20      Urine Hemoglobin                   
                Last Edit by Cecille Maguire NP on 21 12:20      Urine 
Glucose                  1000 mg/dl   1+     Last Edit by Cecille Maguire NP on 
21 12:20      Urine Bilirubin                                    Last Edit
 by Cecille Maguire NP on 21 12:20      Assessment   Plan ***  Assessment  
 Plan  (1) Foot pain:         Code(s):  M79.673 - Pain in unspecified foot      
  Plan:   Right foot is bruised with a small white area to the lateral aspect of
 the foot ? infected.  Appearsthat she scrapped the foot along something.  She 
has little to no feeling in her feet, thus she likely injured without knowing.  
I am sending to the ER today, she will likely need antibiotics.  (Given other dx
 she will  likely be on IV abx and this should resolve)  (2) Uncontrolled type 1
 diabetes mellitus:         Status: Acute        Code(s):  E10.65 - Type 1 
diabetes mellitus with hyperglycemia        SNOMED Code(s): 33420726        
Category: Medical        Plan:   Office BG is 514 when checked.  UA collected in
 office and is POS nitrate as well as a pH of 5.  No ketones.    Given her 
severe abdominal pain; body pain; problems with balance and recent loss of about
 20lbs, I am sending her to the ER for evaluation for likely DKA.  Her  
is with her and will escort herto the ER today.  Urine culture is sent. SENT TO 
THE ER.         Orders:   Orders      Urine culture Today E10.65 - Type 1 
diabetes mellitus with hyperglycemia      URINALYSIS W/O MICROSCOPIC Today R30.0
 - Dysuria         Coding  Level of Care Code  66096 Est Pt Limited Comp  Exam  
Problem Focused    Diagnoses  Foot pain  M79.673  Uncontrolled type 1 diabetes 
mellitus  E10.65    Additional Codes  Intake - Is patient in pain?: Yes (1125F) 
     <Electronically signed by Cecille Maguire NP> 21 1232          









          Name      Value     Range     Interpretation Code Description Data Gregoria

rce(s) Supporting 

Document(s)

 

                                                                       









                    ID                  Date                Data Source

 

                    [id number]        2021 09:52:00 AM EDT [facility] [address], [place] [id number] [phone]  NAME                          
                    SEX    PT STATUS         ACCOUNT NUMBER  ROMELIA CORADO   [facility] ER               [id number]    ORDERING
 PHYSICIAN                                LOCATION                 MEDICAL 
RECORD NO.  Ivonne Villanueva MD                                ER                
       [id number]    ATTENDING PHYSICIAN                               DATE OF 
BIRTH            DATE OF EXAM/TIME  Cecille Maguire NP                            
      1980               08/21/21 / 0859    TYPE / EXAM  Xray Chest One 
View    REASON FOR EXAM  cough  Clinical History/Indication for Exam:   cough   
   RADIOGRAPH OF THE CHEST 1 VIEW      INDICATION:  cough      COMPARISON:  No 
old films available.      FINDINGS:      Lungs:  Unremarkable.  No 
consolidation.      Pleural space:  Unremarkable.  No pneumothorax.      Heart: 
 Unremarkable.  No cardiomegaly.      Mediastinum:  Unremarkable.      
Bones/joints:  Unremarkable.      IMPRESSION:        Normal chest x-ray.        
    ** REPORT SIGNATURE ON FILE  2021 (09:52 Eastern Time ) **   Signed 
by: Bam Gutierres M.D.        Reported By Bam Gutierres MD on 21    
  Signed By Bam Gutierres MD on 21                          
______________________________________________   _______  _______  Date        
Time  CC:  Bam Gutierres MD; Cecille Maguire  Techn: FROSA             Trans 
Dt/Tm:             Trans by: DT             Prt Dt/Tm:    : Total DLP =
    0.00 mGy-cm  Fluoroscopy Time (in secs):    









          Name      Value     Range     Interpretation Code Description Data Gregoria

rce(s) Supporting 

Document(s)

 

                                                                       









                    ID                  Date                Data Source

 

                    [id number]           2021 08:16:00 AM EDT [facility]

 

                                                                             ED 

Physician Documentation      NAME:      

           ROMELIA CORADO                      :                  
1980   ACCT#:                [id number]                      AGE:       
           40           MR#:                  [id number]                       
SERVICE DATE:           21     EMERGENCY DR:           Ivonne Villanueva MD  
                                                       PRIMARY CARE DR:         
  Cecille Maguire                      ROOM#:                             HPI 
(Adult, General)  General  Chief Complaint: Multi system (Adult)  Stated 
Complaint: SORE THROAT, FEVER, R/O COVID  Resident LT, travel outisde home, 
exposure to hot tubs:: No  Time Seen by Provider: 21 07:43  Source: 
patient  Exam Limitations: no limitations  History of Present Illness Narrative:
 41 yo woman with DM, presents with c/o sore throat, ear pain, body aches, and 
congestion since yesterday.  She is concerned about possible COVID infection.  
She has no cough and denies having fever despite the cc above.  Allergies/Home 
Meds                                                Allergies        
Allergy/AdvReac Type Severity Reaction Status Date / Time     No Known Drug 
Allergies Allergy   Verified 21 08:09                                     
             Home Medications         Medication  Instructions  Recorded  
Confirmed  Last Taken  Type     alcohol swabs 1 pad TOP QID 90 Days #200 each 
20 Unknown Rx     blood-glucose meter #1 each 20 
Unknown Rx     lancets #100 each 20 Unknown Rx     dulaglutide 
0.75 mg/0.5 mL mg SQ 20 Unknown History    subcutaneous pen 
injector          OneTouch Verio test strips See Rx Instructions .ROUTE 20 Unknown Rx     .COMPLEX 30 Days #150 each NS         cholecalciferol 
(vitamin D3) 50 2,000 unit PO QDAY  cap 21 Unknown History    mcg
 (2,000 unit) capsule          ergocalciferol (vitamin D2) 1,250 1,250 mcg PO Q
WEEK  cap 21 Unknown History    mcg (50,000 unit) capsule        
  ertugliflozin 5 mg tablet 5 mg PO QAM 21 Unknown History     
ondansetron 4 mg disintegrating 4 mg PO TID PRN 7 Days #21 tab 21
 Unknown Rx    tablet               PMH (from Triage)  Patient Medical History  
PMH Reviewed/Updated as Needed: Yes  PMH/PSH from Triage: Medical History 
(Updated 21 @ 10:11 by Cecille Maguire NP)    Anxiety (Medical)   Asthma 
(Medical)   Chlamydia (Medical)   Depression (Medical)   Diabetes mellitus 
(Medical)   Herpes genitalis (Medical)                        Surgical History 
(Updated 18 @ 15:08 by Blanca Gomez)    Status post tonsillectomy 
(Surgical)       Female History  Pregnant: No  Hx Drug Resistant Infections  Hx 
MRSA: (Methicillin-resistant Staphylococcus aureus): Yes (11)  Hx VRE 
(Vancomycin-resistant enterococci): No  Hx C.Diff: No  Hx CRKP: No  Hx Other 
Resistant Infection?: No  Isolation: Standard precautions  Hx Recent Travel  Out
 of the country within 10 days (where): No  Hx Fever: No  Hx Fever with a rash?:
 No  Nurse screening for coronavirus:                          Recent Travel 
outside the      No                                                    country 
(where)                    Has patient experienced        No                    
                                coronavirus symptoms                    Social 
History  Does patient have suicidal/homicidal thoughts or ideation?: No  Are you
 in a relationship with/Does anyone hit you, yell/swear at you, steal from you?:
 No  Substance Use  Hx Alcohol Use: No  Hx Substance Use: No  Hx Substance Use 
Treatment: No  Second Hand Smoke Exposure: No  Smoking Status: Never smoker  
Tobacco Use  Hx Chewing Tobacco Use: No  Vaccination History  Hx/Date of 
Tetanus, Diphtheria Vaccination: No  Hx/Date of Influenza Vaccination: No  
Hx/Date of Pneumococcal Vaccination: No    ROS  Review of Systems  
Constitutional: Denies fever  Eyes: Denies eye discharge/drng  ENT: Reports ear 
pain, nasal congestion and throat pain  Respiratory: Denies cough  
Cardiovascular: Denies chest pain  Gastrointestinal: Denies nausea, vomiting, 
abdominal pain or diarrhea  Genitourinary-Female: Denies dysuria or frequency  
Musculoskeletal: Denies muscle pain or joint pain  Skin/Breasts: Denies rash  
Neurologic: Denies headache  Endocrine: Denies Loss of appetite  
Allergic/Immunologic: Denies rash    [facility]  Medical History (Updated 21 @ 
10:11 by Cecille Maguire NP)    Anxiety  Asthma  Chlamydia  Depression  Diabetes 
mellitus  Herpes genitalis      Surgical History (Reviewed 21 @ 12:07 by 
Che Duron)    Status post tonsillectomy                           Social 
History (Updated 21 @ 09:55 by Priyanka Kuo)  Does the Patient have a 
Healthcare Proxy:  No   Does Patient have a DNR?:  No   Does Patient have a 
Living Will?:  No   Hx Recent Travel (where):  No   Smoking Status:  Never 
smoker         Physical Exam  General  Physical Exam Narrative: wd woman, awake 
and alert, appears fatigued  Limitations: no limitations  General appearance: 
alert and in no apparent distress  Head  Head exam: Present atraumatic, 
normocephalic and normal inspection  Eye  Eye exam: Present normal apperance and
 EOMI; Absent scleral icterus or conjunctival injection  ENT  ENT exam: Present 
normal orophraynx and TM's normal bilaterally; Absent normal exam (increased 
nasalmucus)  Expanded ENT Exam  Expanded:         Ear exam: Present normal 
external inspection        Mouth exam: Present normal external inspection       
 Teeth exam: Present normal inspection        Throat exam: normal inspection; 
negative for tonsillar erythema or tonsillomegaly  Neck  Neck exam: Present 
normal inspection; Absent lymphadenopathy  Respiratory  Respiratory exam: 
Present normal lung sounds bilaterally; Absent respiratory distress  
Cardiovascular  Cardiovascular Exam: Present regular rate and normal rhythm  
GI/Abdominal  GI/Abdominal exam: Present Abd soft, bowel sounds present all 
quadrents; Absent tenderness  Extremities Exam  Extremities exam: Present normal
 inspection and full ROM; Absent tenderness  Back Exam  Back exam: Present 
normal inspection and full ROM; Absent tenderness  Neurological Exam  
Neurological exam: Present alert and oriented X3; Absent motor sensory deficit  
Psychiatric  Psychiatric exam: Present normal affect and normal mood  Skin  Skin
 exam: Present warm, dry, intact and normal color  Vital Signs  Vital Signs:    
                             Vital Signs         21  07:41     Temperature
 98.6 F     Pulse Rate 100     Respiratory Rate 16     Blood Pressure 142/78    
 O2 Sat by Pulse Oximetry 99          MDM (comprehensive)  Lab Data  Labs:      
                                 Microbiology    21 07:42   Nasopharyngeal
   SARS-CoV-2 Rapid RNA (RT-PCR) - Final                              Sars-Cov-2
 Not Detected                              Influenza A Not Detected             
                 Influenza B Not Detected                              RSV Not 
Detected  21 07:42   Throat   Streptococcus Rapid Screen - Final      
Radiology Data  Radiology results: report reviewed and image reviewed  Medical 
Decision Making  Free Text/Narative:: The patient was evaluated for URI 
symptoms.  The PE was significant for nasal congestion and normal temperature.  
COVID, flu, RSV and strep were negative.  The CXR was ALANIS.  The patient has a 
viral URI.    Plan  Plan  Plan: d/c home  Plan of care: Plan of care discussed 
with patient and or family, Patient encouraged to ask questionsabout plan and 
Patient agrees with plan of care    Discharge Plan  Admission/Discharge Dx  
Primary DC Diagnosis: Viral URI    ED Provider: Ivonne Villanueva    ED Status: 
Discharged    Time Seen by Provider: 21 07:43    Triaged At: 21 
07:41    Condition  Condition: Good    Discharge Detail  Disposition: Home, 
Self-Care    Med Rec   New Prescriptions:  No Action    alcohol swabs [Alcohol 
Wipes]  Pads, Medicated      1 pad TOP QID 90 Days Qty: 200 RF: 3    (DME) 
lancets [Accu-Chek Softclix Lancets]  Misc      See Rx Instructions  .ROUTE 
.MEDSUPPLY Qty: 100 RF: 5    (DME) blood-glucose meter [OneTouch Verio Flex 
Start]  Kit      See Rx Instructions  .ROUTE .MEDSUPPLY Qty: 1 RF: 0    
Trulicity 0.75 mg/0.5 mL pen injector        SQ  RF: 0    ergocalciferol 
(vitamin D2) 1,250 mcg (50,000 unit) capsule      1,250 mcg PO QWEEK RF: 0    
cholecalciferol (vitamin D3) 50 mcg (2,000 unit) capsule      2,000 unit PO QDAY
RF: 0    Steglatro 5 mg tablet      5 mg PO QAM RF: 0    ondansetron 4 mg 
tablet,disintegrating      4 mg PO TID PRN (Reason: nausea and vomiting) 7 Days 
Qty: 21 RF: 0    OneTouch Verio test strips  Strip      See Rx Instructions  
.ROUTE .COMPLEX 30 Days Qty: 150 RF: 5    Medications  Medication reconciliation
performed by provider at discharge: Yes    Forms  Forms   Work Release:  
Work/School/Activ/Gym Release    Follow Up Care/Instructions  
Diet/Activity/Wound Care..:  continue with increased fluids, rest, motrin for 
fever or body aches    *Discharge Patient*  Discharge Orders:  Discharge Order  
(Routine); Ordered 21     Ordered By:  Ivonne Villanueva    Discharge 
Date/Time: 21 09:24    Interventions  Interventions:  ED Discharge 
Instructions   Last Done: 21 09:23  ED General Adult   Last Done: 21
07:43          Report Signers:  <Electronically signed by Ivonne Villanueva MD>    
 Ivonne Villanueva MD      21     __________________________________
_______________         Ivonne Villanueva MD        SIGNATURE   DA    Report 
Cosigners:                                              D:  DAVE  21 T:  DAVE    21  CC:  Cecille Maguire         









          Name      Value     Range     Interpretation Code Description Data Gregoria

rce(s) Supporting 

Document(s)

 

                                                                       









                    ID                  Date                Data Source

 

                    [id number] 09:28:00 AM EDT [facility]

 

                                        @21 0821: Throat strep sc added. R

FLXG = THSS. 

 

                                        @21 08: Throat strep sc added. R

FLXG = THSS. 









          Name      Value     Range     Interpretation Code Description Data Gregoria

rce(s) Supporting 

Document(s)

 

          Throat culture strep No Beta Strep isolated                           

    [facility] 











                    ID                  Date                Data Source

 

                    [id number] 09:28:00 AM EDT [facility]

 

                                        @21 0821: Throat strep sc added. R

FLXG = THSS. 

 

                                        @21 08: Throat strep sc added. R

FLXG = THSS. 









          Name      Value     Range     Interpretation Code Description Data Gregoria

rce(s) Supporting 

Document(s)

 

           Strep Antigen throat Negative by antigen detection methods           

                       [facility]                         









                    ID                  Date                Data Source

 

                    [id number]            2021 08:46:00 AM EDT [facility]

 

                                        NORMAL RESULT IS "Not Detected"Cepheid S

ARS-CoV-2,FLU/RSV is Multiplex real time

RT-PCRNegative results do not preclude SARS-COV-2, influenza orRSV infection and
should not be used as the sole basis fortreatment or other patient management 
decisions.False negative results may occur if virus is present atlevels below 
the analytical limit of detection.This test has been authorized by FDA under an 
EUA for use byauthorized laboratoriesSARS-rel CoV RNA Resp Ql SARAH+probeFLUAV RNA
Resp Ql SARAH+probeFLUBV RNA Resp Ql SARAH+probeRSV RNA Resp Ql SARAH+probe 









          Name      Value     Range     Interpretation Code Description Data Gregoria

rce(s) Supporting 

Document(s)

 

                                                                       









                    ID                  Date                Data Source

 

                    [id number]             2021 07:42:00 AM EDT [facility]









          Name      Value     Range     Interpretation Code Description Data Gregoria

rce(s) Supporting 

Document(s)

 

          Cepheid SARS/FLU/RSV RT-PCR SARS-COV-2 NOT DETECTED                   

            [facility]     

 

                                        This lab was ordered by [facility] 

and reported by [facility]. 









                    ID                  Date                Data Source

 

                    [id number]           2021 03:59:53 PM EDT [facility]









          Name      Value     Range     Interpretation Code Description Data Gregoria

rce(s) Supporting 

Document(s)

 

          Progress Note                                         [facility] 

LOVNJm5jWnOQZtGe90/WPYehICEdl2IePKhsRGc3VCnuHVIcW7NrCYU8mS8dWDP1BFqTTuRzYsTuOrAy

m
KzNcbJRiGyDRLjSjwJLdYrHRjrJlgeyCHjEG2UeSX0FFQwQ44wRWYiULZfZ7WmYNN7SMZ+Mt8FXHFgiR

JmVU6SGcaR2S5Cw8gCRU9TbL/GDZVGQ7bE+hPxSIRNpqXqvWtbVFhmDJW3dPhixsC9eztqsiDo6aiqo+

ISVpy7HRvkfZu0RNZfb773oyq9t+Qylv/7he1p3/M8
lv5X/msstkVl5bv/xQcTGVQr6i49ZTftcXIS+Q3Jn5J/cgUoo5X/2pJHU7m+TO3htcMA6XprJwn98eWn

fju+m8Xrp+a7tQxBqxS0hk6/Kjp1J5LJ/Sh4/8MP7Po/9Iupnl1Jx85C9JV2PaOeH0vZHQ7zlXxrFDk6

ZS+4nOSowP7UHchqXb8PEvrg/rtNS8aqIfxyIT2VTA
w6Zjd2+Ct8DGZgEf4wRrMzEhqhN7vA3GMzUj4WqlrQsbeomqumbe7j83PB1ML8cdyLKVt/coSg3f3MmV

08MmqpSTON51zFuNn2GpuQQmDorvQgqEjdQfDTXsdWKn0Fa9ZJT3ZyOWSlYebHqg5BxjXedQ/i+WhmyV

OPKaOCMKzMd/NilS5tIPhoD+7tC3/oGfz87K6hdmC8
XHQw39vFLceJ1lj630TYeZzT5dXX8YM4/xrzPjKxdN8HC8ATtcuKLs9vz8++r6X1ifdYj9BxbnN9TKGF

EQ7lPzprWtopK8LyoBTz/1eCv3bJIIuQxGqmbcJbxZlSS1PVXBkKX4MVW9xdNqnFbUuY8M1OPxeYFgGj

jL4MP7vqLc2X29BlPhL1onm70iwJm6fKVIo6C72nah
yutHFzPi0fzzH1ulQRKkZ7cZ9ENtHKsie/XPak1AywiNu3mahv6LOOqn1BZgIHdj+H+8ITnLHoQHrscJ

XZILw3qI7+uq6vqk0dBCw0mTxMTrTQ21vyuCZ9VWQGRtvlHRx6AwQaGUxJDIKZSY3XsoHTlf5VmA5A5I

rz5EYi4Sp7JzjDu22jSGNqA5WmHv1Kq3nI/HUT7f6Y
Pt0CAz/AoaSHESYSF/Ro2YKiN943lJtjD6P7JSxgtmBvAxdx93rcyzmzVqBsKaqOb4dPYZK8iikQgbGF

TSCdofBmLHk9nLo75e7MZAHICi5RSaaPE2M7u7ML1w7pWLqRu0SC0ocOP03o+aW6OLWl++AihxdDaqyA

Bl8Fcadh5ebtZzk4KQloHUOB3YKh2QPgucP5x7Mmyy
9F7CMJguduuA+8O45NaB0tvQJl+P+/IWi36C/sdgUzLlniSCuOyVp+0KB+Ym5CGL1uH9FPm3/93fM+iH

/8m12/dme/JAQFlpU1wlyZNvG8RkeBJLsdBbiyacJv5VWJSmLCV2zNeqT8srxGSlhBsM4M7rJp+BG7cL

w8yaO+GV5wiQc1u024DkfElOcGZK+jCqdchLlVLg/J
DeQiI2dfu6ioMGzKU+M/yP4Nn9ZwEA1WpF5jBWkJy4jWLLQnXFER4B5tSMRW4+FVlOhgDSBrCbyIZFhB

vyZKSJCkbThEStDKocJTCyR6P2QQb48LViBy2+5wKXzbykhkPUUIXCZu5f/9UpJjijc3gSN+FCsXFUmd

+fZuGt+jC9r5euecsj/Ue5FM/qGfCktHiSlEduTiyx
6wFr5Qgck1vZKkTu2tmsUZXOnyKMcg6t8exsYKjbLTjnDYoCpUTBlRZyCmMkW19XfbgelZTQ+0gt4CtU

p/mFR+T5Uk1CmXL+hkBN4ScQQ3GNkm4B7rWH5wt/rd6IpzG2Wb2R49iYyDe+noQ7yN+53DVLNcRmkVhT

/FTAhUkBWKlxev+glpnqEeMRdp0z0i6dCMK0eGmtX/
3ra0V5fv0gkDhHJBs0Z1TFjBlW+s2QrHT8LNPNRTYTUi2aSbU6b3jfBnPmkfjH+nWTydW1JKd0giE7Ta

PuO2jzvDwlPKhxhDeyE6PMkpm9G1ndNuaExzTTzm48HjIZBFvdhn+VPAuv8uXGD8Hj9jS8MzRtlZMFQ/

wc9OkVGuRSrP+gid49vUSbKiQN3FFBnZSQjLYq3R8W
rTQlnOUvMJloCanhwrRSJ2l4BXcChFQakFeP0cYciXSHFgGfXp94ORjLc0cAzvXODHBhmQwkeQH6NKWH

dATNoIyLYoirrPlzahAKslvIYS/r716UlDndEq9pvT5RyPo4LDyClqVWGC7vEHyG7+ZZcTWN/L8niHt4

6GMCiW/zwaJEZn3NlkIqoOjxfILOdX3+uZqlVUNL1b
MBlq9K0JOyeL0jN12SArehQVmQ73rKIKUHzO5UovX0CfEHGGWW3UtRQNsQckoNU49jTVb6mqJ2KUDfeq

bnwJjn9rf/R/xn4PwWSSX/tPEbQvdAONUotKOtHKMcsAj3D33NEjSVvHc1mCXigtydu5n8WmW8QBVog7

4qaARY9Jv1QJBw33ZrGCoBxnX1zLny7PH80N3CTQ6V
snbY/Iv+YwzUaBAlJrQrI/+jcrzUuIgmp9UfbnErOIbHb7CdWcDxoZ23zI7DWTrG7mksqJfYp3zIcYLF

E/0mcwqiVDw8WJ7NufnN+DBOwY2Ord/VAQSXLOccs6XExtzXdVlYep3iqgbQvrAS3DhhPOK7OSedsWEy

7URAYJ3swd17F6nIBbXdM97Pj9fEQQTzwWUjtL3AMY
93V+U/64IKRE2mxsT2WFQL9NFfeoWeGWTzSCVAKIsIkcRnMn4bs7JJF2Uk9O2lvlWhmq9X+pEDuHIUbW

YHKXcyNNVRO8PufoNHiQaoTJAdxRcN+AcO8lcQYePCrsw4YMMoB8ePY9bKo6ieUBZXxhubbDw2rIsVM5

5nlDiSxSPir4QsMvFlCvY8UAkbJ9Syk4hueUS33dJ6
rYvPmp/2a/o/7Iso3I79FvHusEd+MCHWSriHwzdtVzIwij2aU7kQr00Q8QJsTwoxEn8rHKcoHlIAg4b3

cbrF2X845r+n/CmNlepR7e2D+P0VCqVvsEt6DF7xCkEeSJqI0uMkVAOxmodOZiEIMYFdIBMKEqF9YxzV

Y3sSkMtTt8N7omAmvzBrVKnBdd7PPfecSG7JOGneMY
q5nLVhfVU+XgGL1Ru3vq0T371kZVldDSM9eMpygViizWLSx96fbFZCReog2cju7kz8jrmghPdbE3d3A5

7qqgGXkpXuPmBczXZW4kGkwT+Ly1Xfts9oP9OUm+VQn4cOnms4pIwVozYWInd78O0hQISVlydxAkeb6A

OI5XQc63hKGqnkgNL/MnWTid5AMxD+3EKS86za1dMT
bhjPG7NI1j5c7jEvG571wuCjQcTF3JRT0XCnXqSMXLJSpZHyfodJFSsGnx+xHMKLxXTubWPSC6j2ek9o

PSDpHQcQeAgijnkO0+wKyicLNluTMMM7ZmccvbCviaFAM6jmfVHqXr3CYYeriN47/Y6lXvjB++xpMsnU

2b6MzEx40gfGHx0zlXoU9WM13sHtve/aR22F1vbSq/
Eywr+QjwiPnO4vE8LHgGgFI2+qmHX5S+K51+nikki+FbJdhavZAtD4KqNcNP3kYPZwiNnVXasQNxVFZP2

jpwrpDzOawpdTm/aeed64EcE9i5HWzLPvAFpYoF3XfVF32to53c/ZW/VbLXVwZeRRyh5YTwdsA4lIk8I

2/pYMk9E0YJhEfVE727MZL9Lr8UvYR9477Gzwrwklf
2+C1iQpCXYsXVEWHcD+xagpysJ0aSCU+w6STdNsrDHSdDA3ZkBPW2sVnEatwMX8cOQl+/m1HE32Azoqn

ka2k88VcfpfHit1NJJDdsdEC2UDWXrJk90+qkJZdy8xtRXTe2IjVSKe4HlyMXGVf7N3aJdhzaKX0Kn1i

R1O11mUvFLNElaWXKcdt3rp6WKsA/aW5RwG1UX531P
480I5p1XW+4il4znzQJoPO1E2S9F1DmM33PWvJegV3FHnMsvEd7ZWkC5/KeMlbido7Y61wH6SFqBAVH4

Eo2WcewB0o9v3Jo6ACsqNzFhSgIaVQWGF+tjKn3qR15NxFW2Hvun5vwHr0KDdy/i+Coex4MmZ/Op9LLq

UHDv/eE1dnKqqZ1Fp0p/3J67ddkUA25cgY+wBreb9s
we/8eSpwlktnqpRFljbJxVLv5Z97DUd0HM+650sA410VM68kJT39FN2f8DCSrod2a68cgWNqzqFf6iPq

osisTGvwzq6De+0+lFY92ItzJ9nlJ3xEqsKUQ4SQSnvVnzggjYjGZtU3nOfLE3ZTOiqZXYSWjqVieBNI

GFssX5jae1xp89VRNZr4n/kYJ37sygHz1UCE29pA8F
4JTc0F6cF1d5DUw3W+bkuz3ZnG1K4ToC7oBtdtWEXDvVgX7IThl4224cZPeqPJJ8LwjMEFQ8zQs2q5+3

mQwi/pQXGUuPI3jMiyXRsWY5P+2GgRtsLhjnFXjuvDewOcFkq+Ztv2YvjxPEPumkpcfDDZeXd2NzLaDj

nWcZ5N0aqDBVPwF9vjq4u5dTu2x8kK3kwRQOlCU7P5
B0Skh8qTgPmGGKgL+AuemlLXtuH7igHrAp9yv7rvdYtUFVm5IBjNugZfXxqMRniSuvPCHyiuc2t3cKrY

jfeCienSD0wwFpPT7TzM+0emq5yQ9Pws5PyLh81n7JmZh4sIV4mGmcNJWtr4f7ThwDF2fe2M8dU8Y16l

r3a2hlCfxsObV27YoYYg5c1b7ss+sKVXjfjTnT/ksA
e2q3+s4eBlFBYbK7oDbFksF432NUk7hXfntD5BWSoSvphWNrm9YDTngDml0hjSFRJyE5Xc3O1rO4VxsP

pOdGgC1Fq8gdCdks8vpHzCAP0AyzM+DbGt+34M/bEV9EXItHCiZgJXM1qnTcrP4TGC8vc4FcQAz0FENc

j2YhGIcoKBe9RSxePVVfG3W9iLCqXOAdAG3PDMHsOT
0FLIGuelSrDwFpSYKQQmGnBOElIkInx2TiW1KtPSLaWIHGDEviJPSoO55zWPpzGs46KXfuCHDePbWwMO

c7As0UQyKhVXCmZ51fmNManLYzLMTkOVYYNbXgNRYaA7DszRInNMtjM9NzX1VbZN6grEKwEX9eaPFzU5

JbK7RzhdxhPFEAMdJkCLYyHBfaHQFdZoWoEVbrQNQ+
Bs9LLZY+Nr7XNM4li5WmMWk5KFHfl4NtHWpkWQr9V1BheEQksnSbXqstqWWNEJAgXQKdG2ntdbt8oUZi

PLyfLd8PEsDzm4YtONBnPXyHlx4utQQwyRB+3pn+X9giDO2MxDKAHAz7o4m5yIdpq8fBuY79fpIunTr0

qSGuz1QjrI++EHJDTShG2bbQ6kuUNyLoZN1XY9+ABS
FBag60c5pDcTANyp/0DrV1WXthl/vMsQ1pPy9f13r5cJk/TS2Cq2pqaRlebh49Pv+G0CDcoK3Sq3DI/x

piTA1ar/fJ/XuLjSnXsqY9g1gy/+nlsJIX1r3+ID+gj2Bcn4BWNZLaBIu/Bv/xhgzfUFKMjNzcFRfvSR

D66SNII0HefUOemRuYFA8G2Kr3eLmVTkLof97Neb5Z
AGGrDm111m62+DNy6UZNO+PdEhgdXk7nvbFC7UrTeEyi8tzZJ096aGIn54YICILg7NBCFVs+4TSOqPGS

Q53sxXvz8HHO64iCFFt3DAKljKO23TsKiCgxmQwCVwnP1hikYpWZnEXh4HTq70KdOaTU20NUzyaF9DYC

flt94imwJzT4BaLbSm58bQQM78jQAnFoKm4YITRBdt
35m6tfxzePGOQwyKIZN1ztCnLEQ3o0WooJcHiyRAvQOrcJu14OqVGuIJP4Oy67DfO4OK25CaEtiBtE6E

RyrWLBtLl2MVYB6ch9Fmoe0W8eUVBFgBiSjgDARUz6mvTHKxkWF59YYmlpo+qizlaugayIFJEA9pxdgP

iU0DYZOk+FlqfI4MFQYoBZk7noITDOp/seRYl1ewi3
MHAlLBchA+mpLxp+KevApXaH2MeuBRHv3krXGNYfauQKPVj9WnurcgEoGvw3BJruLAhtFW194fTPf011

0oTNXFPU3pqY6LmVOVYOPRRCkgYeaN3cexz8kN6tp8wktR8NsPc2G89rad3svBOHeFzmEXO/4EWjBBth

Ftz3ZhEeJycO0TMea3x8/6pdWrJzhqu1G+abtSAssa
ue8F64RHBSEPf+KNyu83JjAeweXiqeLy5UDcRBK7CvLlw2Txg462msBBJtBWfTc3k1tCeNHwEL88Znzv

Kx/6Z4mK6LxL4mi2hGtMbhHA6xR1PZgTxyQcxYgOXIpCdf7iv07Uq7euR3Gdb34rjkGkDsNtuJGLLP4t

kHDs+s0NMPQCLcptzHp6SQRM7mUf0q+mBwfZn6U3MY
izWWZRo6K0cB3GFUsIR7TXROr5se7MhnEAv9ihxY2NTSnqHf6K9/CF2TNmEhfs5KNZpy2+pRmR+UeL7w

FqwO4jA8kIWbGe3jDmnMvPsyfppsUPQQTdRteNYoRiqSE3FQb/jv6vXCPF+Wm/ugzOlEoNl0w2C1ngtl

ctd+1d+KUFZFCieOwmP2t0X97Vjyv2YGpqVjs1DJcL
RkKgRBDWYdtzxWOZYEO2w1VXTbre9w4YPqJi96Do9jiVtKfcvf+oqitm1R2XmCx8FL1awwCYzaSB8EiF

1m5B3m+H9kr93cKGqJHI1cMPyw0Np0Vbmeb36r0N/EiXFe4LyRUsZ6tUS1KmhCptymlFbQvDPPzxXQFA

NqB6TzRkBRB4SSjE6vsC59oDvhM6lAH9g4iZKALLr/
CsDd4o/TYmGGt/iTdBwu/rUgr3CU6aSikjKn2nlpTjHu/XW2+MMC9cXYzFV7RaPjsXoc/aKeI80pmvYH

u0w3+OJ3ugI6dzPDkjSL6RA8PQsDO+E+G0AKNu3OR43fh/2aJthoMDlULTn1wcm0nwIDHov8/C7fcWw6

xux+oeHZKdC+vziSElMS1ETNxl35nXCtj+hARLMXiV
fd4CRHocCjUwrVjjidr5vZRLrCSmUZdmgE0sI2E7/Q4cHR9Gsep7wk9HYOz+bb8GqVqJPLdTellFUWPO

wAVAov/vdAitnM0bwN+B989g4z3LMRD3YgbnStv7vH9pDNn6XkeZ0tRmybRHx40behGOV15KJUhZzKZv

EyxwdDM5eB81uxX9rpDwBmfKdROlduzNQe02aZMWsY
+mjDLbSb8QrvOspkfDDlcmAZqIytCewRgTeWbAnB+26ftP4+42fGM7Co9Q9LvK2mc4FyK+c8oxNd6ELj

3OCxodc8vbkDk5APiIQaEce3suo9IibfgiBJ8zXag6QVF2QN82Uokus05PasZw7JUqZ+WuOmtGkInNKG

ff1oajvX2Qr4LRUtg5WYm5zvb4IPvHX923ityqU2/V
SwbW0u2giCXDBd3xnlP/KDQYQiZ2yyC0WXKhV1fMeWc+J6hOk5rV4kHwDOwVAiP7m2qECeUkm8BzLmc5

pIywef9N15M5f5O5kD6+70/pU/Ryqm0J8B4lLI6K9BZKvxLxEeiQdidcCXGFLJCMB7aoK+2XBEAdzy+F

fDbB+xTY7l6Zn33vw5Oqyy3P3J7hIkdPUe0oDt3i33
X1lXD/YFguGyrnMwWjI+ubQ5+eLK0HZiM9xkL6L/2hfDvs/Nadya/o9zl8ASPhLzVfRDTHFiO5g/FeK3gM7

9eI2Orbf9Th05AChU36T75qG5lVlbcvQOxk6GygkpTFM1ZS0HQKWKb/desBQpvpgwE4UmZ8YMrg20IgW

+ccyY5ZBhBpUsKGk7DUm7PWrVyIcnU5tz7cpgKqaaw
Rv9jx0QmJ2T6KCcog5X2TutFlNpWSlE+q1vjvdAuuEpdtq1jkjtYCY0z3WF1d3Ug4/lxuj3MszfseF8V

itSflyzqkG93uKP0l/UqLtPHT/45WnuOWx1AMghrlWa3JCkkUtb9UjZSgZDHAGJrrYSu1JAhr/+Jd2tj

IXq/aZIxk1yr77v/O0nfBvfNNvu9zerpp3X7UbNQzN
IGuk3Ghq+btB3FOKlBEeiyTfiz2sesuJAH4Nnwved/GUpAYuwmbZFHVGTffov1HEyn+ruBJK5WhP2dHh

GmjsB5xY3qzDgZPnPE4Bi6E1NaKsbEURw5EkIx2OtUApry0Rc1xNKppLgCSwHz6hfD3yyhZkvMr3jMZG

5ONkkpLPs/WCQBmRpqGZL4Jtm7aiFt6bZe30MeTRmC
4NOZOB9dWrhFs1ExTFi9gilOok72hVvWWtXNPUfcunDG114SU5IfslUL7U2VtRI0lvjKR/kscdGH70dz

8G/TTND/ketiTt5sCuBDf/0P7szpz+IjykyHbVmEUpIwNSIh+H9lnhdmw/Fe0a+Vzc+2N7XoFiuZGbY1

rCQjbpOZte5lG1+KAObnlYoVILOhO8sDNU2kjj733A
Dttbo930HXDlsf5HwRP8VCGuYZHPZuLltXggPP4cVTy9jMScqkBAbPoMFNSBs4VSu1f0SQ32GCJYNkK3

MhHGYtMjtnBCU6KBdCLfnxfZIFiubrJ6a4/jmdB89lZ5W9l4BdTjhozH+UW/D3GKLn1qfsoxmIZttDIM

qDBuyOIdXp6Vi30VDWJLnrPyrPYKqfLOZXMGSALOpR
WyAqvOqqOBtfRrr+GBtfTXAbk/1nDocKmyP4JKHzH2LsNjxhMjuwd7gcpk6Yex+alBQ/YfG1QKFpChiH

vhKPExiYQKjy4jkVUZFSew7uD9QlqSi/BzBjgFuh6F+9KlN9S4gdHtWZQ4ZMUTIX5OObdri0FOQBIjZ3

mIFT59CXriOoJgARRX8ARLS+KUNmClgM+shYwfkNjM
RnQ7E5nvkPUgEWj2aohZruoj8G+UOb7BInuGGge1OLdK6DRdZecIxa8QAobY1bEjjiJ6Mx/IDxuwUsBn

9tXXM4jgYp4Q/ffdNz37XGdYrunBbZODKujLnFI0qOUwyuBC3XTXp7nKCA/wlUrzQDeZwx5972Vc4sy8

x+Guh6JjlBHMoSOZS1VUqK6e+tjdbnRwWHZ6u1mzx0
5Ip+Bxtc9xqVUwdVrnqnr9Z0gNTJmk3FpJ56dmCrIiaYZ6Bnn6J6gRHsBx+Mm47ERHNCWypwcr5MnM7O

CnVNNaCXq8QdVl4D0/vO3KSW/2gtYwl0VSsFJGH0MuhTeRqGAsCiEzq4prBUztROiiL0i26eZ1/F4mGV

p5bAwgUmrP5RSRCXPbWdbNmbRRsHimzSbMbu9VQyQl
fPvTLUNzQ09SQKGqpknNDeN9A2NDunVjqQGdG4o4Mx3dH4ctDDLvcqiEzWIS5GvfXuVCir8P3SRnHV59

PDOSykpvWxw0Rd0GlteKFb3REblM3Vl3YDBWjHPhVGtj11t9TU31EscSoen0dxP/tPR+AG//CxtnJcgN

LrSeAQR8uuUngD9MTJ9nf7KcLAn7WBGbm6MvLLfeEI
h9LBnbTBNtQ5S9kPTiHCNlVL1OVVZnNM5NVGPzpwRsQwEgAPAAPcWkLXIqZsNrv8XnM4IuWXTrPEVWIH

egRAUqS79dZYdiZb95KIykOTAhZuLlDEm7Mr8TXyGuZNFbM92abBTxeKLmWxNrLVYAHdNlZKDhM4YhcJ

RmVJqkM7JoJ6OeHV2ooGEfLB6sfSIaE7SiD5Oyrwym
ZVJHQiAvSSBmYWxzZSAvSyBmYWxzZSA+Zv8XZAM+Pa3KEB7ta2NrSFt2GVMtp1IkSWcsJPi6P4NfpQIx

vjPdIxaciEDXBKPlKRNdD6fmtwv3kVAyZgSfJs3YPhMff1XoVRHxWVuJag8ycC9tWbU+Xqn/OB5gMjYj

w77gafPBy0PqWZXDyzb+QSYVxMtMcW9Rp5/7zi/MsH
wgsYv2vSYTzeOx4txK820XwRfb7k1RkUWJbpJmfe/1M4Q80eyK+tBEdUsalfTgj67z0fFgiyuC9hsT+V

D1am3nTcbC+VV+lt2amVf5bvMX+4r82Ff11kvHeq2q36eedXQx/Xa1TQyVW7/ptLa2hxH625VKWWTdi/

T0Y+TmQ4S943nnAFdkzsbeHNx8csjgIvSmhVpUrrhI
/AxsmL8vb9winvyt3sCZ1nafyiM3BGZDU3+o1ZAqozu8Vr4ttF3bNi2rE4eCK1e78UsXbpWCjaDGs7RC

9clTBsVrDfy2iL6ttNA4JtjsA5Ny2KN77XB8UP7rvcK6ttJrPKB2vZ4RKhuvsSkQ0cs0Awm4Bz7OjCRk

EJbQYTno+4dujoJXw1VL3AF5lYS2BnWG00f4CuRA3/
6YxE689EHejctxfqGdS/akSQnau2NZ9aWLRK5oVSWn2weiendhi2cLyydzAxtyElJEwVYLzGGUhSA66d

ErkLTwHEJ3AP7k+pyHbCeh8oXveIbbWFWYxa3ojFSlhWtD2ec1/pH4RVLnfPmgr7hiB5/2oa26bPkqQx

UVxJQlpGrtByw3i2oM4iG8K+rUtOS3V9H4zsVctQG4
k0mK9RJiATLzKT5uMQL2lTBGrzbT2o5J+pzbaKLl0BaSh2QQ5s9z+zmJSK3u4PjFlGuzbk2q547VbTxi

v+YAO1SGOer2b8zTKlh/2LscWvfhH0+TMzhg6gaLP5XUzRvPW6XRV76Il4Ws5nn6ZT8zjYCLTzmXVtkH

CoqEIfHmObUpJpnT7bnF2P36lhSMCpjn64nqWWDAKZ
2S+KEDPImj7zmxqRkHlL+EnnIqBBuU10fRoJZYSLHtjoLJxD2Td5rjzWSRSjS+EGbtzuEp7X9Pk8oY6J

K4VB1Um+VoEKE5WARHLnjqumjBqphGtp7ux9Ys7ivRE6W+pEJ/UOGyjTFdRIxmWsLo6/w/oUpYlKLQ1p

QyWwXjQSDZ9CY1edgZVcaDyAptKvSmG/OOkDSbo6wV
7hK6quKxpb4XLaCIVVbcIeLLuM8UD71FUuNZs5c6psTXe8XF+PKvN9vqqryBhBR6xDf3hGiFY7+ffA5u

zyJkiYDnBitovqCRbprBwQpGtX0qmJOqojiK9HzPut/n1/qe44a9uOp3+oh3I6mKu30MFIojTtYEn2DX

S2jLGO7DZZVVpKXsz/JZNXquYsED5FUWo0IFcqJreN
OhJPgWNHvzFYAHTBGsbR8HR8Kvk33gz6rv3wUrHfuwWxuI4+e4Yg8IE0cu43SRLtSjZ0wY8N4++NqLce

E6ZkABrrN89NGsmH544QRyhshbI2u0d8RagTAzRwCJ7kaeiFBBHcmGBRvJB3N5mInjJzpXBrheFLwlIt

zNTXHFkn8uQHOnwDROG2apOHfB2ccFwbJ3lRIRqsVH
OjfwMWJrjaXR0iF3C2H7cHBcZpBfx+oSIgiI3FnblzNYO6sY49C9GizEdNBRzYe5nlvev6RDCTTfVebg

NghwhzzjtEIIiqa5kt1dSAsl7cczoibCVNWE3e67of2Vxts7Qs/3ofUutWfRQeSGC/zWXUWcOMFkYVtJ

DZhtOkfsIIDMwGQG5WImQJj0fjp/JzcEbmlL3Lvjo8
lgqbX4WZd4sgwrBg8P86IAD8giFgUyST2mwKaeg6anYSxVjdfAcoFah4mx3M5MfWYe5vR5NBWyZkANIG

pAhOBgYG61PiAmwIMJ4EDXiVVrHPLNvCJeJNLu7qOzSP6NB4ih56oUaZRjGmmCEW3oYUnfD56ZyF2PgX

bwh7KERdaDbH6ikfxCMPYjpMcD55sMVk7vVbjPukX6
UcWTpr4dBIcF5rvoIMCs/cS8qW1mfZKcqwwx45dMpklvX6eJp0bz2CteEZaq9HE4EscQZKw+8yDzy26Y

WuoqrYu2AuUTEeRQkIAE6PQmJj+mbqYeu2JRPxIUWpljp365ZvpEhZfEbty1KGL3ZoZYDn+vraCHnw3w

iLbTBL2GGmp2jpOCdHqJlNFqpxTqrQ3CDhdmcur6ZP
9vgsyL8S1D7/5tsThfNoSMJGaHlCBggMkii2c8igCG34bVaHD3eNDPaGSs+IRIf6VD9/oD5aZpAJkT82

Xrw4jNMHayJh+zpMlqaQAceyI8AyiGeMSejUVH4eCWlnWhUoON2BMGq9DyF8XleDniwZCGHJ3ljeCR2N

c17aK2yjxByEMuP27YRHPfXthKKUHYewLSUgAvLiKi
kwX1pT5hbZt+/DvJaMFtHReioGygf+3sYelG0Syy1Aja9MqWWV7H/jaWsrBbpW2QKcZEqLreiEMy+5Hh

YQIMryfEBoyuUFA8nns+KOclVaxLP4xbGeTQJg2Q3eLsOQG1iptWxj235HQpPmHV2+A1ssbOO2IXuI6P

wvi7c5NWtmNQ163e8f6y2ObEDKISS7W8V/i1NAztpy
F6JMZ6uUD9EvWfqZZnlOZDBg/ADSvTjQHbgj0UDzQ5nwP00RJBqR+YAnkkChwEB/HR5MCy6GPMrefhOe

l1PlbdYxJ4SymEHRGVpU2dGtQo/FwKaEKF2ODIT79yLAjzfR/OoX7Xl0qbPPo8FdYxn2mIxFwNp2ZqvE

NIwgp+wboWuM59Fqwur6yohxrQL57hWqiQTW/yVM9e
1nk/FDlmfFOpXnFGR/JLJ9A1WyM9iucjR7pQ9mG8I4Cz1zgSn+KJrXVAg1bM/BWCDFPzma0GjwdRZH+Y

qsG+j0RIxi25H25Z5cSuXLgGXWmUHgY7EXQG3bL6XjeHPLCoKTtRRcSMt6TCfJpfQG47aS7/3yZ5S0fU

Qtj3+ti3Yd+TxyDSSam1qcCx4r2Zcx6r/Or/k/dqjP
Y4zQ4W7TdAD0Z2rjUfB917+vVG7HE7v7hEczOQzsn+LFswjpCrtIDpSO6ZLyZqZL1mwr4YRPQaVZ2ogs

3PYHJ2SO6BDALjFW9JuRBlI2NwP5OCUyDtIIKbFPEgNU81TUPmOBXFDIhwPYIyJ8Nkm521hoNpctUqEZ

HoDy4RJPEaUA6TIYFaUNClmMTrOTTvHFNgZvX6LDKo
LLuqDMYrR7SojpRywgFfWOlcNVZZEFyxFBRnT3gxc5QiQTc3QB4ABS4SakNyv3IommFpV4euG0NRKQ4D

WJFlM9GUE2XsT2biLaRbo7DzH4lkHkJzo8ElRc1AFvQjHk5LBmXmAC7mpa5CJTKfVBFeYzzPDgVvXNgk

EoggtRUlQN3UlUN0UBXdI09iURJbQEYqA2AaNWH8HT
U+Fk8UNOAckLXlQK1XLcmZ9U0oblg2Cd2/7C3FfvFGAdZHieocbI0GlLXzVXHhYyPw5QL3O3IPtoLe1R

crybkP/Nr6nH3iVmEzsf2Lm0kjlb5pVEPlvevGi/lLMko1//1HrXmXZZma/vk97EmaYe3R7/+mlsOsuR

m2/NrMBK9AQm3c45BqkWV9++1gYzodHpyMDtU/B/vn
F/1y0L4dmII5j+UtZRvby72Mvj/eT2f/6/vR8HB0+t453smwBTyuAAIrSwLpz1JuW3+2rwYvMlVRpvaP

tx3tSCqvhB3oqkw/dJH2ZOuxoxm5wUu+GF1djQ+Npyg5i8X6+2TMA1iKjGkyH1M/x7xpV38692UUvhsm

vBW4QlyvyI3DdmA0eiwC+CW/9DrLAAGzEcPazxOHF0
IjParEaa68vK+71rmpuV/z5jIVvNsmOh77xOwdR79HEQ4r3iNbG62p1g2dMnXn3cyiah0wKeY3Raj8dv

Z+SMAD9noG8HxmKmFu5RwPifVB+5E2x+WKC5lMqr6EeHyJB1oQHQjB4iT4/8h757vpZjwy7SDEcd5Kn0

zeNK83pTUcZifx1QeRRuaHtgI5NQlUHgbdAUceln4j
ipxRgRkefBgyMqVd2rAtQs/XYeX1HqAfvqZ4+S646MhlvrU77k2FxD/xtqwF+ZnFzrdeoj0jst7wu6/q

GUAlPSoCdLvHnBCzRFZuGrLNPC1qV4yMwfI4Nbzacrvcv6hFDSpgei0x+7w0BdddzWGxjI3Y8gbe8GN8

K/G0sfwmoO0adNoeKI5SgO4tdL5sxJ0F9tuI+Q1ok7
sZ9V/JbevDesPrC07mwcHMPN4lo4Th4tVs37rL0JEvT9CrRoZBxczaOZ+X0vdSZAixkdT1C5LzyhSHii

kPBunJ7MNP6BtFw6G7rFTEW0ZioATx1V1CTGdiaDD+s4zNzdFfGUBHwetEddmSQYsUNXZjnXseNKR5Q7

NhkkK/pARg+ysQVoRkAhl/SsZSlIdwUShOzATGWyZF
zg2VUFKiWNRWbvajKlBunYRpztncLii+Mw87d+uzFiylBZyWeH09eWZX6Ri+Fyw+TgAYzdHDxZ0HYONU

Ao8N2qIN6b8mBdotaKUFq4naHgUd8Vys8F5rJVcnqu3dHcjOT4Zk3G/P5ULdR93oJ9A6BnYVI2zfp5Zr

MEnMT5Hd2RQIRziwRsC4FPg9SN1pudCxtKgnVtiqMU
VixvsEujHPsnxG+NJfYaGVojJaKw7zv8B0+QJMXDj1hBLzKi81Op1FGeGZMIUL7OinMFvdx3R3Y4lHfi

GyuiVd0tx2kTlUD2jWgqE9SzFe3Cxw9mvu63Ck9uizIXg9sfewp1jB24w65fEXsrLqC3IYec79wvb6jf

YkVvuTcauMugH32A2cyFb3cwRTbL1YEArorMjGOrKX
j2AM+poHejCQOs99BEsSfHsL8gcw1VTWI9crnOrBmQkIoQsyZ3mv6phMx7AUSO3Ua82WDqPvUUbWe7nr

YEb+JLskHI5lemqK7+NPWUwgI+jU1brtVLpyLaH+BRxGRbFCwthXTSbjl3cK7iF+S/cTmxbrleSVqGd5

yy25PtWbCW80mLXSMHWpSYLC1smcnXBDvd8FnRqanC
9r7pwt5kJh4tFBaJwa4gvRAB4/xMq7an+6vb8Y3J+fuL72XLVEmotn6Ge8RBDjOJ+YemDNnxHcbWpldt

DIKsi9adFDs1WMaVXoqFWusgy/Y2IYijyjvKptOnWdaNNWixHqvwRmmZDLLiXX0ZbG52uHfbjTqGamAN

Tk0+6jw538hR3QQB2D/FQRBt0ISA3K3zh5JoSJlKqq
2EoTvTkulttZw5efZ0bAXL+SHN12t1I/BSdNXTL5amb28F5M0HuykpoqHAFGBS2xeqbfVLTVp6xQBiqs

G7xmIaE6wKSHTAvSx2UV52l0SXJkYBuH+uzPwxKwh7V4p3QLQZkQEnxZEBTHQA4jfOZFR7t8ApS0zeMS

e1+p1ZUbpstZiR9mL/hgwBDQVd2ME0Eyg2GSJDnxnZ
uLHuMjiIloeCuU797NABlMOCYHHLA2FKdqf+qMznlksKIgCUNbe7RV9BvRqS/mJRQ4oMMRLE5VuOL7tC

Q7hCAt3Nl7CqemmVOddLLIJDUPrw7y94a2rgz5ZEJChzMThKYFU47P3913ekJQ3nP3I8uAwpO22/2ME1

PtKC0pOpBT00Wweg2THrUvQTL2t/qDWr1m3VfJanbp
5jxBMW1XAIhMrhjWYBH0+OVAShxvNg6hkY3QGw87dgK/poFxtEwZ06p+IpaPsv5WY1qy02CnzQI08zfO

vsM9CDQ511xjcy+HaCwDjw1PiiVsJtxFNrWPBQuJQLO3d7LS6iF7XpaR6EFku3W9YUQk0WXd6jhx1Bta

gUZPEkQd5sKBEfMF3OpTRW3kjjrcU3L0A21DPStRtj
cxvMMbo6JNlGu9T2sIJW9CnR0ti4YTHB6fnAmh2JqUcASmRfuA1SUT30KVAU8jClSwHbWjvw5Xxxv4iI

WHTxIBu0HjS5tEBL1qoBCgFDlVxeEWzBtDiuA/f6EqtTGvdvjYSFlFBfIDRlp36gawR+ijwSm5vvAcQ9

ckV5yow3zbHEXuF7RMuLTJVzgJ9d+n4zQmEir1HMgP
lCs+9CNIWDHUaZNbGNLSST9mN5euTwskfyw6XYe8vER4+0jFeJu0At9SUxPEvTmdJSmQwlyhXRv3ikA4

yTTMDAAe7fm6quKlFEW6GQRi0RCxQvkr0OSokO/ZuAylD9fJoLJi4IncxKQvPoZx/iBrAuenITZKn7sm

D28z8bB3PLtPoTe97Q2v1PTJMJlHUwFqQxhS9Vv0WG
ssVhxhOuZxe5NgkqFoSIml+DWEC2SS1EyY8H2crXXh2TQoFo07GkXurHrFOu6QvuAIFR6nus6thYmA3K

dnr9qVnBCz7bLR/cBNV+pbIX9oX3sgEMDWRbAjSQrwGdAlXEKqP1NsNLJDUqVhl8U/XOxLQheyktw5kz

LNn+PwWqNx4ZNd2QEaaR2TdbakEuhSeog4Y3BsEwKs
HT3iRP/e2e0Da4+qwop9QUPXQpGIGvTkx0SD1E3ENCwaApCKtpVEeC738SYHZGH9VCrjG/devA5uphKa

glzqMNGjj5AjebHzxkIkPfn2ea7ZV49uQwRZvHwE6ZR/8CCyoSzRH2BGDJQH9DnkNFzoW2dXOLOKqvI2

ew5WEFk6VMtM2eEvoyBAfXwm2UCZjpJfkTWkWQUIry
pqBQCx8M7DOFKjB0ltDJXL11igqMr08JUaoBmJsFAvexd7fWQhop1xrPDv3V6qHCWgyI3CL0a45meKED

7fBt2DbaFtVVuyi14J226L1L9kUhR+2JN1hVO761WLV8NHgPoyfc+E4qFtvRPPf2B6MNqpUCYomGK/rC

fowiBTAYtY9/Be8ZzB8M/SZnhLxxZt5DDWYMRLeTCV
OUtGD9L3Ie0aOZpHoEKJE6Gzkd5TJReARUhCC3yN/Q3dRM2F7i63ySps8EYDTbnSMfYhK39Vt3Tof9oF

xvH8p47EsMdXozbiLPS72NnJ0ByRqTwdJsrHLHQG9ng5E7EX6+MpPtn8p2nZRgBFL2QrEySfbRm//i3B

ycbi4/4C69/51IMoVnnL9IZ8exXxBk0S2tfNytGw9p
uy45NBgCFt3t1hSd0Xk05YvNrXoYHHpc9WxoMYttyTnI7xckmIFnrikJ44NTQIYU13COug9uCkMkD461

dh1oQBAuImKPhpSc3r20Ppdje7FtYH97MUbto8cP0TfMtNy7eI14KS1JYlPjf1g2725aQjk8FfBh3fHz

8ufKltio1qqAKx8jlMf0h9sq5e73osEltoWUuIb4Vi
+vmrNy+jbp4L8W+2XqvNrqha/zsA3UvXdSsQ9ViWx2vLbanAo4Fy1X7Cj1jEHiYhY/w9FKrktpMewVAe

QG8PIyLtBO4dow8FSDDgSJQwKggOQeWgLXaTXjBeEDBaRZtnIP5BNLdgLGrvLMNhC0QuxnDzcRPpWEVe

He8AVNYrQR1YYNVhzYXbEGJgOmGxKKZSGsEmMFHbON
OccYNWw9sjJrPgEGA5RKMbIqvdFA7LAITdQT8Rw073YI26unYuQBTaEWPSQqRjCROhY3AmnXXhWBbwI5

QxS5KjUH3hyRSmUJ4dzOIeM9OcR4EjzmheQHNZOqSsFHKyTQvjPEAyGkQbMWytIHJ+El1ZGEK+Pg0KZW

0ux7BpCNiqNsAuEV6uns2SUFA4GM3CiYf3HZYjE6Tx
YYVfSCFbf5EuUA4BCF7xpFqdUAT8Hs5+OHbxRND0lzHasK9BCIObNwstMveWyG/Qf+EqVWN03m6TMTYh

3ANIzVB49vw6U4dzfhwBQ5EPMz20Jo1XYn2kd7KFSERweqtSP2Isl7N8gtmX2+ftjI0Uilcokc/VzyTz

1ehW/fiDaqcF/mpa+2DDqt72p2SvkwGQCuGnqLRFzv
geghwl670f/vQbimI6rUC+82rRj0ey6J474/LTeuBsizM2dKr5kxlj4D9LwK+szsZFr65S4x+Fvxsq58

PXhHUotYZVNfh6PpCNyOzx1UaaCP2FO4Fb7fSpsddRcGyIW8JzNfd2hQZpheY79cOyCpcITN/A9qQiXB

cOvZXkapuFssjc8+JgxTyyydhPWrzU/ZVVpaPIltLe
C+L8eT9JD/zkIc1012K+gS0qrqrKdgcPbinfVZplexs3/Nf5GhRqMy2v4mChIvNc1WD4aI08r9PrpJkK

AOv6UBW6E5CLq8E9eT1rK6RZvt9HnkBIVlUzrKyD1wDUv3GBujXDywb/fvy5NNZGnlrCK9lnoBLIxnju

6+Qzt8XJ0utEAdTp8FPJR1LIMbUPMGCLgUnQRjHRhL
o3Omd5B0uHNqcxmwDMBd1CplNfGhDaMXxsFrxiOSDp24Do8nElzxODyrBUe6VgZ5u57tRzVqnklqFk4T

L/BpYECBB7HHLNp8MGcsAIgpKsJTgFMqNb3IQmLFNjN6nUfMvUyegJ1YDAuFOKHr7QiGCeyrpN1IBJOs

o1vlLJbQD3QjkZoPgdMFaTotyhYEtpqCBwT+C/J2NC
IX1toDfeQqfBFsFLRpXET9byYOJpUbEHfnfMrrqbtjilA8J1WcKFy7TSR+rRpBNA7kYZ+z3QTV6u5H1C

lUEIsNhjHwWMXc3KE+qKpL1lizM1MgB6zfUnUgoMoGKUh8eAKcBZZ6SQLxDd8MQPT2SN9DRzXDHq137d

jzBxLnvC1MYaHUEYfJ3JBBqFsDJqSiou1VOLqnlStN
jYfpz1uwN7YZZMzRGMvGAtJGQ+68R3o5vsJmzoa3tbrx49ecDXHem55A5Iy7vmHe4HsG5X3+DLQK93NG

AY8wwN8NLxHpzblA1Y8sP4TJ6wzYpW9c7JuqWVol7kVGc5H+2ygUu8iwkJ74ErLPVnMP+whSPK3Ff74P

UleqJgchSET4pZkcT5XMU+CI/eYucmaUd/EavfzubG
/l4TDbrKCctSN1IKwH8JVN2Qcp6hHTpAbQMSsEeTkIKMf+TSr5JgMivBpYmWJ2HtFubt3kK4P/Ra0agF

S63AQKliU88eH8tbiZQJyBEVTt8hqSTypKMg30omZbswBXMUp0g8G1GMMeUpjBSQZeLUhDiFMplOPnP1

vRnUAZXL7apGqA7hX3A3G7DEkHZduMlmwXt4mJQcAU
VScnC4yzclhUrD8txOhEaDtqgOTSUgaoToKy4zYclbjnCMqTR62DJQ7xW0eMQ8fyaXUHXWX8qJ2EKIE7

nh14GGy0jsze6Nqr4/CxSnSKWIsCPgpkdOrYw+mXYfUrBQtTlefG4rQd9wWAXdMWb7fOv/8njwmc88a2

DQ64tHi2sjXgi5lHfTUXCR3iM2r8Z5tJcVz17UAc3A
E8qv/KT1tUnPDi7+4oXpZ6X8yQAcI7UabAUUBXMd2UjOU5dj2mA8/TKXPsY0yqI+a6a+5Hxhr+LtV9zs

5AivY6mqCVOHHT3+OEK+ZKxVjZdowiJ2FkdCnGdxyhDi/LS4StJnVJ2sJBvClaeBq2teCciMfi8X9FvQ

vCyUzUgbhWnL/2k6eWh37Vr6nH7Sx0qBwkexD2DlXJ
H1Z/UpQAI9E4kp3n3pndTuKWH7uwM+EX93D3WwRuQaw8utKt3CfwwW3yS+vmC51c8dBPxpvdKncIIqJN

9RWdMzGT5zox9JJLXjXIScUzySGoDaWGoGNzTyVHJgQPzwHR3HQNuyBSodFXRcM4FtsvNieOOtQRMrSi

6NVBOnUG1QFWGwxRUfPNSyQfZqNLCKBbTqRILlSYFa
hSVSp9yyQmZxLLZ9EYMsKpylUP9NDYPaFJ3Ap917OH96joFbMqWhMRHINvBtDGWuM9ItyZUuVUvyY6Pr

M3VzNG3esTDjBO1ryWUtL1MqN5OfzxkuRZGGFlHzMPCqNEcxPGOrLnMfHMydQPR+Qw8TMXA+Zo0BSJ5w

v2ZeTWkeUDLwUR7oca2DRENhSUz4XWW1GWRbHvl1DO
O4NLBmQCKnWEQ6BMF1LhT0GGryDeI1YUE6QZCdLcWlKcHzFNU7BSB8WTVmQri0DMMfWyDrAgtuHja2JN

N8XnP7JBZbTAY7CRI7NkX7VJLfPXN4CLS5OfH6DJSbNZN4UBH9AaHjGhzwTgy8TSC8TJGDDvLdZKo7VG

V4ELR1ZCInKCVpWPA4FhbkMnI2LHpgSsG5JkWpTgW9
RPNpMUW1YqyfPxXtUCP8ENS5JKVrFpJ5BOK7EhC0QnYyHvLpWPm5VDL2FupjMcm4AEnxTgJ5FuwzKrZa

UXeeJkX5OlrcPTG5EGS6DpP2RpbbUnMxUR1CXIKuKdatMcs6QFN4EET7KejzJFT0NIMhWwQ0QYJkOQF6

YEFnPGQ4ASViGNC4UPC3KZV2CTMcQMP5TABeSwAfJa
FgIEXeLBWiMjB5ItDtZAA6PZY4JwA1NIRpLBJ6HHYoLsQ0NIYlGor8CIP4CnS4PRJyPkSaTNVgMDYFEp

ZyBYWmNHDtZKDyCyIrWcZzIAXzEIQ1GZIeGlWsXSx0VFD2QKAmKtHiFQi9XVA8VXPwMtPgCUd1GZR6QH

MpDhHjFVm6QNU4IAVmRwIyBVr3OMZ1BMIdXmScLAu1
ESZ5CANjKjFyLYt1LIF8KRCzWsItSMq7WIP8PVTwPXydGYt3TRA0HHRnXnFjRJx5SCN0FNBvKiGpFRd7

AMX2BNCqPsYfGKU9HHHdZmQdQNL4UYC2UgL3QPJlWEO2VHC4VNO7HRQiJkTiVJqhXjKjZrHtYMA0EAY6

WJGrKeQlYxS3KSX1JzU6UTBaRBY1RHKzPwZiTiWlGc
JvSV3TVXM2BbCoJLD5BNIcCvSgWeDlKjUtHGN6OIA5ZDIuCNU8ELhlFVW5BrLjOaWdGEZ8EqI1OjjpZl

L4ZVO8IjC4MnsnKzB5AGKuOVMvNkJlBCG7OfH8XegfDzW1NKS5ErLhGlecInp3ORI3VFGyRuwjNlUxKI

glFsT1SdyoSNqhHPq8KDI4QoltBfp1GQy9NJI9LSRc
Qfs6DVwsRaH7NcCrNsWfJNkhDyU8XlytEyE0QVTcSFE9KNWwLLY2BJZ1TrN7IWGuCLA9NSL3CwP2FKtd

GQStHDD0YhU9NTEdFVQ0WPZ4OwXpKfirZaw9OAV4KYErNhfbWYK9VK4KZMF9KDFgTIW5UMR3CyY1FGHd

YOS3XVR4TlH8RHvkUtLjYJH4UyU8LGLpQWM7MWP3Li
S5DDBkKDX7MHVsPTEdAR9TOQ6xv5HcNYanALBsMQ9jhs4RCHD7UD9VNQErCA7YdVIuU9IcewFOGLKrvq

kirN5cYXsfLZAcH5MepgCZFD4bZ5FnnHKwIMkkHCOdG9YtY8JtvMU9GXViZ2TqIERdS4n1ZZuuZC7MWQ

EyYI84AD3gMUXDNmIyGEIaUvxdN7YzWyGQIiLoYMKn
Iv4sqKCBn4axYuBbNZKgHaBfSFC8XKteZJ2EPoYhHDKtABGsbJfwAL4ekVRqCS1NyPTrUoAvNJksDS7+

DClqzeBlOkoIKbE8APMxe2PeMWbeRSq8SNhkBBRkG6O7lRRmXv4noA4NrLG8nUGeN8UrqUXUfQFrB3Xj

q1CAw793E6FhnBEuJ3ZmF75snM8mH8llfcYol5pZsp
RmJBypHi9QREPvHW0IsROtdTYhQQYuIxFtSLZjbEOiGYSsQzX0WZrvOAZjW1mcYLBsqsZmGPRnUXIJLx

UuEWBaUg4jaJVqq1GjhRT4o8OhVQOhTXUUXXarIL5+ECqecmMoCrcAWrN7HLXdb2WyDHikHHjeHoMrZF

y5WDYgArmdRiFqYVE6GUK8RSOpSDU7THv6ZAU6OzDm
DfH7EKElFpDrKiIjJnp7IEO3PAViPtrmLiEbVOG5DIYyIhydZUI8HVH4YsB4XRGsATG5KCN9WdK1BUSe

VUS9CNW1GyI7UHNzEJL3SBBeYuGlXqNqFWr3XZ6BMHV1AQEmRHw9JIDpXXT6HpTyFmAiUJqsPlB4VsQt

VzLiLOJ5DbQ3SAReKit9AZbsAdChMuupQQF8MFklAu
T0JRBjJYCwRGcyNhN6InthJwP9GHk4KSN5SiNaPkC1PJFlBHZ3HiVzGvJ9SBp5FGR1NymqZvQ2OJAkUN

DZUhYcSoRwKBG4KONfNyHsKGf0WWU7CtHrIhClVNW3VFOsQEO4RDLgJmQtDXV5QgCuWdBsSpNpYOQtYR

ToCkhhIts5SYK0NdDcAljhMTz2HKElKUM8WJRqPqUp
YFUrGUWmLUajUBB0IDSqCsU7SXNpRSS0QCk9WVB5DURqAQdzADC8SbF9DMFwFel1CPN8NXSnUIzmHJk4

ERk9CBB2MNFeYjAlUWw2ENP6YSAqTnCxQIj0FSC8VUCwEoGwODj2UGT0MKJpMfWzDKg7UMS6OZIhOlXo

SNu7MNS2ONHzKbGwKUp7SHQ5PHGaDlVbRDp6GMU6XY
BhZePeHA1FYOJ2LRUeWwUdTOz1DLT2IFNsQkFjTLa5KPA0FHEhVlFzMVw4IGLxUnfzAxCnNEU8CpN8QC

VoBDY1QZZ4AhIqULScYGO9XZRkYqQ8RnifInmdPQB3EeL3RVFxBzReVWrgVzD4IXMoQYYoPXX6XXAmYs

HzAmWvURNeOfJTAjYqYAc7JMG7HbUpNjTgLfTlRNFj
ArZtXvDaFNS0VBzoNIV8CsEnUWC5TGNsDJA7TfTpLiDkFZqvObW9UlZmIpMfEDfuUeNoAMZqARhvQmL5

AssjFlM9JQO1OmH5WdmaUvr4SLR8CLBoKvqnReg9YYioTkJ4ChToEyn8TS8UAGF2TbrmNku7KBf5QLE2

BlhbKNv6CSa8FDD2SuEvAzHkOFctOnI5UiWgZtN3WV
H9DiD4BIUmFSM7PFA2MuQ4XUDdPCU6YFC9CaJ7CVPbDYk3LQM6WzV9UBRvUGT5ZAU7NfR5YIEjMpi3FO

U5LRDsMcldZby3MMHmWTWHXeAsQxQhMEMhQDL9FCHaYwAkEXSfVIT5RXEiHJR0NHFmXXN8WFUuJsApNM

UvEDB8XLGoRFU0ADAfESQ6QKMhFAHIMxUvVF6goc5U
ORhfTPVmLuqMWlRuHTrJIrHbGEVdOVinQY1Zr621VMGfY7WrmVKwui7ZTXEfNP7Qr448WxUgVF2Laojk

eReVr2njLYmsOQQgB2NiZ0GnoDX2IGOaV9DjVNDlM8x5CEvkCU5ZQAMoVR02EN9dMZIDBkCdOSYrAcql

P7MaOgADJkExCTArKi9crBKFv0dfNhLqVLNbSbEiRS
IvBVqvST3HQqOtOXLdACMdrEnoMY5sgHOqIY4LtHHyGiFyHSyxKB0+FAwlifVvYilWTlM7CFZbf5UiPT

axVXe2JVjxCOZmL6Z5zAYqTe7fjH1AwRM2fQNiN7GxnYJWhJMiU2Brt1VLz360V3AvpKCyZIXftJYsWR

0po2PmtykjX9koTO4itDErP82lgU5eQFfyHWMbO2Md
hvM4H7zspkLrFM5KGWG9Q6jtypNkGASBMjRfKRKkQ0bppGmjMVD4QCAxCx2MKHFkDE0Bh943HSYvI2Wv

kVVitvHzIPUwBYCXWsFqUf7NWxEtRY4hnv2SSrQmSVXvWvnGAsMaNBvrJsuceRDxKQ2KbMI9PKNgB76q

AMHuMEKxQ7IoWNOsEyVmRT2HXP9aiDwhDRW1KZzySy
4BCnLfh5CkGVUmPMwRfc25JGkJZrg7qigKw9DCOMuzy+3WNSkXGiZdVGozZONYjIFPdfYJSRQVPPsYd4

yQ6TIVVNWFIJUoGeKPBa6q4eYUCO8bxNna9fKkLrqjMpllhw+ma83aJvP3J//zP///hO9664wkJTN9nM

l3hASEAYLgKZB1stV6lVyfXj2/Fujlf0GSrlYnGdUT
M7i/IfuZrbLi9jjbiuxOFWLcZ/Sojy95yy88BP5bDWnNvIyZf0leob3SML40pgcju5+9cnlU0698vfr9

Manny/myd0hWFo/c2cLGCcbeoRUhBAT1SUA+8wQGMqKkLMop2GZI2FpxUtP9s8I225+zl448xiPTXCUu2I

LvJk7hKJxT+FPbGChqRYzq4ehvJlRzeEzqd/rTfZq2
I3fNK+A4W80r9j+21lLz0gE99x6JkNTagKRS8K7mUPYzo6aEPY89ZHHb//gAvKhEs6VGOp/eQhSbGUSS

VE+o4dz8SWKAIGWzOjJF1KkftpFrxlyOzE39URp8P3R/oA6xt6TQAQvgIp8OguKT+NS9r4cWuVBhjGLk

MuPGR75g85kDabwCOqCWRvY+T4zeKkB0g/mDTgAHgn
9OGsGT9MhgE70YsBaS6z8TQgEg6bSS9sIbOHNLgo4fR6YT7HtZb5VTmaUwYNcL0o0G74Zw/NEp1U4qU6

qqKngn7R16/Ht4l0cEwW4Ztv0KEdo2ByADXfazQ7vW4fgkcvrpVuc42ORWe1FGEGqPXxRMmuJuFzUckL

xxkKw4Gsu6JF1o/RhFSAVYrpxP2vlmwyT36O5wowFZ
AHYGoN6qkwdq544eZi2S1V7XjzUUTwfOLqTSPiYhefzRky6P49hU8qqcgRWKEtOxqPT/5IK5NwVcQla0

PAjwrNVMAdu94fXnQecgKp95l2Q/T1ITzrlnuYrz7EWA4JA8uzawLC0VKDTezfkZdnI5c8zp86NEdKJj

i5k9rL8ql2ca4LNMfphvA9rH1J81vkDMhGZlWmlgJW
8T03cF6EVpXzpdErnlNwXb/QIOkZ6q6/4p1Zv8Lw0PdVZ52Da5nfuuvnFOccUrhPw5L9bWLnQEdGHeDI

bJ14IxeRuWIGJC1Di6fkf6Dg4R0FH/UHboCjXsVqsmC696o6aw57P8zqXoPKZ50U9IeWYdsWYjlR9+D7

FG6eCVIUMFXPM5LUqaoC38MdnehpKswxtIyT9uj2ZA
aezducvlZLWDicFwyuxod2SkEJ2zl0b1aSLvV6wuoD+RFTRjvyPDv1ie6cxSFAZs9P1pV9Vr3sJDj/pF

jx4jpsvyKXeuEJlSvxcNJvv84xTn1mF/34ULgPG9NJ6oZ0DRK5hnbjlydusXmCDrYj5OqtGmvEBXqfVs

/nNc2RtKW1NyRNQRSYl3Spo9gnXwYIWUxhPAvyGeT/
JKgpdi3ZlrFsQ8uiIF1qq2fsVLodD+Y9fCC1X25wd0n94X+gI1voeQpcWayfpKjCEk8zp22oxwKG7zhe

n7WPte+0n/W68Vpvp7aK1/UMfbA+WZ+iz1SP6qnOSoecsyzamJkbLLMMbu/4YUzX1VesPsZ3wVRZE6hv

YR8ugxnzngolGmdE0rw6p9yrZOG9fw64pwcwS9W/T6
SxxWSON0XuuIA8p7bk2dhXuM5wswtMI/Y81DZE9dM2cbixpNWRdqKnfe13wqHQeDRSv69mxdAodfhnLM

5NdXFtjZfzQrOuWoHWheyI14QyaD/Sd5mWqcP/TO/p0aKV+Eo+ro1GL/gqAgVfcfMlTEqfb5gcSW/IPK

qud53X7OyesRbpbTMfM/aZs2iLO9FXbvez9KNnDg4L
R5ZtL1Y/ZqL75LX66RT43fB7VMXmt0Fg1enBS/yrZW3YLjnrVJwH3I48xHETY6gWaaCIna63EL1P9+mQ

Zc6fuI1F+tBbgF2BiyAZT5jaLv3cNBXGeTgsTsHzm6nXKZSeOA1/Ep22a6nvh1BzLY3pGUloW6h2MzxM

IhhOeAWa0toFysvPctpY64ikNO77lOhL+LYkqJO5sL
wna+sva1iW8rL2dhFnGJ40DuM04yD5eCM5pEP+LBdklJa21ui0F14Nx+tuoLnUBiPnAzHcyJeHjHyri6

rL47fCutcH4vTmb9Mj+wLfp+EZYDxHVfrbVEgDrRXWYfTuDtCwa+gdFtLZ7VR/TkjGkX5Cn32wnJihjk

z78y5vyHN32FIUUP4jMsKkpf8mRRkGNiUwXRGk7Z2i
qHimoVLWj1B0E/FhA9rcgEmvJ/1nGK2IK5C/IWCbNN3WDdCWkuCG3VNj5IZG2rQ64TokqwCpa12uVl6M

i7ru3HuonR54Ms16Y5Z8Moexmy3ioLjUTxklMAHsk+yBRMrytw527kLmlD8XVnX7ZmjTizPyxOXBPmiO

XkbYQlv96s/RCH4MtvVN4kxDqyL6Maq63yPLTtVTRl
EqZdjLP1EvTvrY7dnTELMMR4ZNWCKxtbdm9DUvI8NKqCG5LeL250PqsTvyM9RzeGymoIuH3E60pgPytM

Nx8fXgiEj77Thyb5T2b3EY6jza5/bucViWieDZREcB9y3CWqWCzXfWJSkjzuZ+neGQOMahx3MJOACh2O

SMjpqb8yNwDwos2QA2mDoCTK1oueBuzrJSHzGxQWgi
4sgn/ya/zlRmM6gkPcq1fTFx+AHqWHNPmraU88/uV4FyVDD1qfbM0HZuH6NWKO9O6ge+H+G6ridTNbMg

QRECXNjMVzN0G5iWk/xgtmXesaON0REzRtYh76blIx7W02nNdjk6Kvhhdamq9d2tjmSM7jDyWPdGk8ui

RPamC348bxx77t1bCrchW1SzrgJrusXo2hZcihThCq
wL5ihI47+Gr1Z+uoUk9Kj/zEj1o7BLcAVqknG8tY+YVluWhKEZ93rqyEAHkC1MaoSJkNqpVUs31G5aVt

Ps8uRPbyXWS68io2Fk2fSn9QreiNAJbq/DAPLJA/IVRV+QNpn8jjpQ7F2miRU5m4hj2yplXMJOF4ORqd

mVIdDsdJ1phAfitpT2z8cAds76P7ZXkBipJcWRXJeq
wi7LbK4AaBtM+2Fb+dPHcc0zO4ZAB5MMshB3Kwjpfu/pfS+CC4+H/A+ns9V3lgYv2hJ33ghe3vn3cVgv

2wy32bpSgS/5J46Bcr/wATCd7ig1fTvh2GO82Pk48GeR636d453Fv2Gg2O5YW04FMB1AJeoAeONz2AuR

8aUfdJbt/rqRiOLoh6WJLNv6WalXwr0fgfer1HNlm1
mRkyMjSbANY4GuCmsaXVJVlTraTbHa5xoF5BNnIHAyJx6+cGFSmC2COaCtJ3MrkijXI0UcTRQt5TpDrd

KevQ4DiKUb1gQrkDWFADU2sNTtnTUY9BzR5HKsdK8QKVLFptpg0eMChUH16sUFE67/464TiiH92fZpVi

VNXjwQ0OXBw2QB6tQHeqrgTe+FmPKuV3QysMJC7P4b
+X0nJXGnZxQPaNvvUYxAEIDVwFSBFEuIARkdKrnNBXgWiXn9+FUldEwYb37pQ5MrYyzth6LTgVKsWVcr

hhaYLAwavcfu0t4OJ3xy/43GV52hRxPWpbQd3j2J4CXFnWaSxW2S1JX2XhvxzfNs9n+ojY6eJ/pQukgD

7U0Vyt+IYduJQ2TUtPb7vjJjJRMV4VF40y2MOFgTfj
1EFEs1o9zYYkr/I1fC77i/Fxq2htWP0zkm79uUkj++aCxxJeap8luXxX9XlhMrg++p/Zi5ZzPxqePnBK

Kdp2HCXI+vDXApoY96mPZ+OUvmqJcD5jVnv8zwnN9LcJm6+mauBQucl4Q07CSmkp8kUdlc2PNaM4xkIf

XeVEPTxPaTvbEuY5Yy7WNJUrwSb5Og6lEkmCGlj5n0
93rn2hfqdm4lo5lxgnHrqS6RqiT2EwwwLXvDZg7WEK4ujy1wxSLq8g0XTmHYiDyClxELPCDWHKxFv5DU

wWX+8JlIkIjVHVPziVrtq1DeQOZKof07tipg6XfvQ16tAmSq5SqGBcgwvqvMH9FGmaQfrnOO8kPVxbS8

mRFaPNWokXbWuCRLaWPauAWmUoWnWWIljXC62uME9I
g8lrbmFxkwaLxKLaWYMlm1gT0GDiIWgl3SCZai5O+mrYzD7AT4fMvW08vGFY/KJzgo92FavOMnu2VxhV

vEY1mpQ78UT4bF7dDvF0Fl09Nu74rpK4ainXS8+WrhWY3YXLVmmeEaAGKfO+ilfybFz2fifj2dhbMkBy

LE4ybudcN/FVmfsZfpLMZhFfj7U1VxV8sHRyhGGMxr
IHYcNJ1nguMOeXlRf0fTvJaA43FkMmRp1cABqd6R6OmP7+QIeDJ+7CtnppjA2TqjHNvysKjAdVBP5uTv

NHTbNeUJocrL/V6oeQWu6A5t6vTBZV8gGjQegPNeNJu1XZTmb+786XlRnou3LnR2dzqjoRlxdyZjnkqS

D3qpPaCap0Kh8NQF4IacuUX0GM2g+hyZjebMLuCyBv
j2B4Ipun9JZxCrylfuDHKZn2KNxSi33DLnO6AE9Gaw1OjPWQj5lFI0jPW3LukzAynh4jyYDJu0BxbOHd

bVXYYwYJlPNIZ0yjsqWgO7MkKIa3yxAFpYRdyLPcQ4y4nqLSWbuOfbaoDgiq+DEix0R8Zlqsj3QCOiSb

e4/tqjOG2WaMds7OKprdlLZoYNpRXjrMAeOkv3RAqc
6ZxE5RDPFsQRa5Y56GBxBhuQKrOeztBH+lAIIXCDbUc9k1hsNz+CSvncVhFsI+ZRRYkWalMwTkIVjJuI

x821iUds9NqqLjeI8BUUYMtFsBu1JxvylK9mauvL0+J1Evud0rdFUcfrXM7B6J+0smLre5ogxoCJLxDd

RKIAHgMTCt2qz7WelFmnsNrOiwpEi7cR0zUCFpe7iv
IRqh0MvmRVtQUmHWLe2ep+p/qPjP2m/4hT/Wkg3LE/5mY3U8tz0BVxcCSk3d0+jp0ZUORCuz/fwwpztn

ohyrRq8Qi9S7YfHwe5lRGy29D97UY2cUp0skpg2qYavbC8W/AOVrbL5XP+tCooba21mcTS2857a/w+eX

RGmjbPzNxnCdY6NY5EkWNAKQWdPZLXFzhJ71ZC/0ti
+7bxksRUtlkK5863qFj3lqx0QprqHY+mS/HEqvnPildRlvX7ZlYJloRrUCcbjZZO0Q+oj0F9Iq7FrZp7

LfiLgA+B/qC2XCvXHfSNRNtSM86pzCM4bTE9PH6hcB6hlCJNS6lV5hjjUOkBaAG6d/8MmED49Dk6A9Z6

8QxSsVbKzNhUc1b8mh2V/GXOkPFzzm2ixYmxDAkCJf
XFNWiUHODpEWTXgx36ikprmjn4OlvosQnE1tiCNLJBu9IpiCOgEAoYAxhDi3xAsLoeiNsUCcYyk5T7kQ

nFLotrFdQMKh88+MhLsGJ9kZDYHCVHNuCV4vOa1E4BYNm/QVb6aRdnnS+5/y7rvrTJ2gRbBHhFkocwUT

S0hM4Re7Kyt7IwXWxKvqSz9JuwWFdeP3GowAcH3Dyj
32TQOVJylI9+MT7Uh4yh/OVzCzmldS27oZIDAcWu/GZs0m4b1ua79fuP6aoRwRnQnkSk98qhHueNpllh

ucj/PmJv5wqQ0LwuDzfkl8C65Y0ZbVrFvBJqGJwwlmoM/ggHhoUJ4n+3y0c6hzCegNassCXDf/Izuc65

3uD9ae1qURqaGXaMc2FBc3Mi9GTgHivgHy5dt7yQQ0
KzExM0ZOZV0htZP3ieZZIpT5wrEIJM1zAB8BYaJtFB9VePSC9VHwPqIvAD4Zdxp1e/fG5YP2KCEVmhx/

B2kUOhnC+Uw696nLuS13jo5LYJ8+Gmki8HrwT9svZR2l3zEwV/Z6l+/zd+T+9K0DAgN/+SBrMMagyib7

uSlq1p4dZaLghITgWqnKFeY3h+t4H3hH0CcSPy2iM+
Ye/jIR1XCI8l0OZ2TR5602DxazLgpYLQfMx9KO9C8fgOLC7xomb/QblaR+tasQXK0mx2HctyedUiuX1u

v4Hdj4laonxjx9RuwYqhmHz5tAcqNS7xxC+FpdBYmzB2W32cMUHCh+PR8Y2ORzKdndGTJsIugf03hyH0

peTFCoZk2DSf7CJlFaxkhMG6EetOnrnaqYwoNUw5A0
Ek1cpI9lceALd1klYu7GPnyiOfI4xpjWl26LtFXtQJxY6tg9xJshtIdJkZZAbco0FXA44Q8PugBLAz61

IUYD2zeuRB+hAViS3qE8oB9r+RlHW+44x7lKkO34JQDx63G1uBofSXZamYurWtPrT/JQfYK5n+Winsome+f+

3pM6Xhv/tnRD+dGdE/R5w4b6ct6tpsr0ifipuy9mla
vhSdgtdJ5SxcYAt/rcyfv0Go5T1YRr+jig51mrvjQGP07Bi12TMo7yaRSrGoa9xkcA7zhBKUxMW7K8Mi

FW82ZUA/veXclu074mSvD+6ziyBQw3DGro9zs3kFpjfy3lCt5ThjB8Y9vP70EwDvzARLuQzeHWu5zXyu

9FLU+KqxkybH4ZIkRFAjySX3dexFECNiFR6BhxiLrr
jNylHV3daqTkcmD2up2uiCWFWs79BhgWISsbolrTxKT2QRVyWlx0ySpA52MLljmdwhoSzgjFfwUAgr1G

fAE0/X0Ziq1dHMl6n+bYtyttTRYTwujxt1nuR8BX+AXQifzBb45HvvW3Zfltq5Ia0H8TnD87ojR3bQRb

aKIZVr6YKCUT4uFsBThsFF5ZVq5aZiYUgPaLvz++hi
jX0h7U5PaDGE7z+J7kehV80vPmG8bAPpbqLUioGSRaeu6q1Ac2WbCu1/0pUP8x8v8qo2nLmZzPDvfrf3

X9ROqE8RcssY7r7ToZsxmwxc7Z0Tkb+pscwcW2q7sb4OjezxTUzkKOKdeoIvU4ILZIdeyJkec1dTG9dF

9Z1Plu3IZJvgXoPFWavZou69QTamlTIWM9E6hg1aZR
QUIZ5yP8fmy2JJzVqxuk5eyouRO/0PGKs/1D1yET4Q24D4pu4tYD/enDpoIbz/VwaQRZ1VQsH2oAZ+1T

RR74/0y+lPgBGMrJHjq77UpF2xzS04ta3wQ+2Q/qhe24E3QcX03JxpC+D+AfMg+SOS15viGB3OaUFtsx

RdcPBnuysNZt2Ny5Wptc4hU2Eww8KH2XplqiihiGVV
efT7qT/i6D0eybV9E+GV3ugae+8UgzoXxnFthJ0w7MzPDHc44p1GzSMRgAx4F2rXt8lW/kg83P4Hda7i

OEOj4i9CleCOOBbJYCbaRzx9DGWvlzwSUb0oIWWzRLPpkGc3b4HoP4bfhI7BFD3B27Rr38L3JlpzJ55f

LTF5cXv9yqLPUhpMk7U6DD+UjIQ7CDIDkXw+IZl+Dv
q0C0sKTWo3qz3G2rJL8U3l7vywiO2/smz/KtC+zYBuqn7/BvS3B3Wy0k1gmiQf7a3H6bziPwUhl2F/uV

ufbjsh/B4VHtF+0RjGqW8CYlfFePrC/QXqAZ6M3b/q+8PdNIChDwQ/UZy24jOVPFWHgU71gp5/vAFMpE

3rQqFSnwF78D8cJIKchDfMBAdWBZyJ8d7K8UZO27u1
AHjyb6igN78Wqn8in+4JuDjzjq9D08Epr8n49xP4S7W8EW6m+qMmOAiN3xw2YNMTR5NNtCV9sYpndnw4

iHYCQuue9qr/FuE27mBwHm/2fw7TZGrsOZgM96YDEUa45lTfe3syMcPb0HYbJhHNxOeQqw8ECIsycY7O

DuE9V4ouqK8AskgW9zfCbimNPnAgDp/YlYkI2+6k9T
w5RrrzB99Y9NS1lJA7/clNwEy4/w5gPj/3B5FkLUsG/npbxrW5JHk+2BM8cyD2ue4b/UeYxKWdBYeK6s

loDsQh/GlV9rUyxLR/4/Hp901cHbLwVUKlFz9bYt0fhd5yx0xxmj4WgMOX2/7E4oX2lquq4uRKEC/D7g

uFr31+qu9nIeNdOPb9ZuhNuRoYlVvqJka1x2JaUmqD
u4mJDacZRPhWaaJxk5b5e/0K+iDtDbOTLCI0uwgcIzKBsP1F16H1LQFXnyzu1Hpg55gRhUjt6N+/xdro

BJj77QM24hA6EWGYXHe11bU443YbhD698HxasFrj9wV1gFDs5y/a/1bfzY6wRk6rRVsE3pu49gcLWGrz

KVkt8vIz/S/G8bfLc1Hs8G/U243cZpHVLOwYN5G4Yr
Vn3eYrbXs4Xnl3u2LqoHm5yx+IHzW3L2WOzCCuy+4GDEthL8W1+7XgtYX+uGtw12VyzytXmvoYtfnDAz

b9BXzmXc5A/UB1chIXBxd7F/4U7f4kf/sUZcG/SRD7oA0RiuwC4GGvsjc9gYLa/wb+LV6E8q15NLWFu6

0mo1uvS/pGpxb78X8rK5CN/IC3oGlO5qF0diNA6mFW
ODaOtcdO86l2XTFrWvSCs3HDwBWhx5KgA28vaapNtP7iw4k/GsLhSGo3nmbtYW4jwPj60ihw1bjz0BHh

+Sv4e8p1Eg3u0wpMBAahhWwZEDK67Jbr74Onb3dLHz/yn0u9zl4gege84gACxH+gmT8RjiCMdfClgvuk

i3A7nXlA6+zmq3R3nyx/xhpokkuZln7X0Y4rZ4QqH9
EcFMMqx0UB1u5P6za6s3lR++7aSNrlnG+DjukIoiuRkP07eIG0T3nvnaQtL2BRMA88dgeFfWx6vgIrhe

c1K18h26/8BgZw9xFol391FQE0n9u845hZo+um8+SvHz8l4KWN+4oD9AfM22qEOxl7jpji1Tok8rtCdf

r0U5fzQe/nR+8RP5Zi7cQd+2t04arY52KS9YLOA5mQ
obvfbP9Qbd8Bgq9BmFfcwS89kRf8fXpLenYrm1a+DLQSDF9lxabGfFy1p9JVj3/xehNY2DkIyU4Z9U6N

aY4Oi9KIDSrD+NisyB2PxLO+qN+yraAlmZYt8cBF1qy59PgxHZGVyozUc9fXazoU5oNqdmiZWPWc2sMm

DfHWqKK5umsyPkUZjy7EZ/WYBY0ipA6+N2aSC6p94w
84YVMsE3jByby71i6SdMYLtwnlqn9S8+c2Fh0M9Fb2mN3Kr9umLPP2mzcjaVsi0GwrbMelO/HM80Sf0W

otVMFqccYmyvN224AzPDKnBP80NGfSHuugNy6E+Mwrx8RZB06w04DozBp2D57jNnuKPfKbv5mRQVgdNM

Jal7BFh26tnuEjW27LfLDXPW+KPQsQf6NWdC/KzqYy
NQhb4pUKgQAJDboQEYmqc5NZWGxwEvIn7VqoXLXitVCGLcAX7oCckHcFLnCGjzYR2FqiyQX+ooHQkp1d

8WvgEO8/SHc42t2DJiVHk1CrRPHRI/gVcpyvnttFAuHtfkaOkZFA+WcBUwWphU9ftmao6zfR/+TM95+D

RlqU27u3Cq8pJ6Zu/17vpn2UoO866sHTI9oZTpErMA
4JDzV0S9K3dVYsHFz2wgpJp+ZaZfO2KQjdnW6Ae119hb3ezJbmpdxf/fKAhG7I+UyAl9SZZ1Qc5b7BRr

DWOu+5pVd4EYgvw68o/roU+q7AuyBzMLbxGuc5lLOuQxbFUEtd8VIS76w+6NkX3pIEgtN0P8s3rgUC7D

O+PgdjxOcnnKlDOqfxG3JfcMIRmXAOaLK28XXrhCEa
Nb6Hg9JQNuHEXgLzmybLlwcEVB5tHwbgMsF0YpeB6LlNyKAdmOHrS8ipB3i33xqLxECgq8KBDltV1xc2

2ymWxgLWHI8ObbZpGaxSBJtxtof5GJW2ZmZgvTjC1eolzGvVUIKvNV6M+O/DgK4yYZAbuUx6u4d6cITb

CjielX310zCjD/oFrfxqX9G5VFwBHZk23Lwhdlq2F0
wT+fW/AJfUJuy3r7Ck2Lffe+m4l6gWlKX1U/b+iup/eV9IwMAvW+VJAzBive2OhMozSMV2EUWrjDMvJa

+IxfT8Zhhjkg+wAg8h9I3CsPuribbb5+Q7TBeiPexplsyQ8405/LmqUBB6puU5m7B2c8Bgw7Q2F/k8zt

1NqL23kS2g0MZ9hk2b5IzMJolEAi+gbNU5MoIFoaFh
aZqsvqkmvO2ofI4YjItF+zp9jZd2Xm0d0YH1Om9WyE7o9tNoeGFYGxdoz59z4TRe4UQIl7B+RtPNJVRo

wKX7TFzCo59S1tnjDNhOVV1g746tDne9YbTyHjNvcWLDqKsigcu1cSXbxGGkb1pzzHUJqHpNmyoEsKKj

EcKLnLSIt+tn0xuZc+saBM16i2FAywDuN/7V1N2waZ
/pGadsSgYFx4Izj3VHhsXvq+65/+Q3SH/zNmcNXm1CmX1y/yw6Z5k299lr3gHJ3Q1F9hlM694CvNs57m

/Fakx1FAytF0/SzL6edZxkjC/lfPdZfsaOtceZS8+89+RIKt4iXf6purJlxi/mNLUsx9/0l+7dOXtuSS

49x/0De0/uNDXPWj+XY3FvdJf3TNwug2C7L1/N+RnU
3+L7TpaZlVsJ3WbV02GUnBKejyORUVm+yIZniOBT3qaZ1q0PxRhWEtt+K6502EENIHxKcF8WQLm/KJz7

fl2u+UIEZgzjxRf89UR4/88AdUAezKTeOEVNngt/KdFwSD0wYn0mGEbZk17peeXkZz/3I7k5yh6JpwvV

vv9uO/677fKfeu+hkj6gsu93h/HoFvCasnA2HF3rA7
6j5IMLAWsq6+eAzcB+B/cwMFaS+U7MJh52bVa3v5ds8vz+nCDyS3eoy+6odXVtX4+TVC403w6ZChxV/X

fn9b6I390xQ7Jtf5x6NvI1VMLo9K32lS+7fZD4pQHX8jj8UmGbm/Nu+vM0/Lazhhs387QGQipQHgvrEk

MoX75q/fl2FesvuDLRkPCqVJEx5bhkz1J6gi0rcCFo
TCrkIkIwcoKqKXf8HRCXQK9bqqi9LxacJ3tiD0hUn8kR9k3qK/OlMivW8N3NnzG1xA6I5iQkACK1YxCq

F/e5SFtRe9hC3F8GczH3mwR088F06+EDUBtNUC+lrG4b74oy8/M9RlRyjuU9owkiEKS/pNK76/sOan9p

FvT4e+ZHc45Ny+70IA++56X3iOGTqTGW/WIMeMdYf9
LLgl3k2G/nIib1igfNP5hVEu7q9war73Ts/EshKsnDqBNWGqXUnK20W+En0U9+BD+g6fC/YtiiwX2f1D

oSX0ecjNzpg4hg8EDsiT/uA4tX9LXaNJgbknnJajPq+XNKIkftBt9zfWkTG0k6j+31O+8rML+Jv1tyUx

Z3zvU00OfZoa5DnYPiXowuSK5h/ymDtaK1zXF9AJsj
cWFSwD20ZNG4hDWr87cV8vLqg5GMQv/SIZHq8cKnqt5zCeoWNfvowYf1YtQumtre39aIrAI4rOT+T6zu

Eh+2Ift08s5EWgiYzbSMwf0W18+4VG4m+/dPRWo+qa1wuqGNgsqjHUsz9xGx2GtXEq9OMR2Pyqo2nfpy

84EpACD4qKlyqyt6THzAhXA8okdZfVj4XjxlJ+r3Xp
jtyDjvK72ohFbgy/WTEcmQwbTulqtIvHk8B0pfLpk0e+8FeM/fraZse5LD/8tsQzAcDNl518WX8m8i7W

9KRn6gjgDXrWujhrgeOvocbflwIf4SX1GyZE/apz8lf0EM/qaIo1EvH8OxYKpuo4T955VyfFZ130EWNq

TaOXuivE/tYyzgzz3M9ekuwjWl5iTW0chplVLA9fbY
tZ/Cf4Gjc8wZ2qcIJv4kAjr/Yc7+rfrdRP0+7QVUxLqWdaqaM/eoX8Ik6GkoiEykVschh8V9hDavOLoI

3P0tXqJKEvH5DQiHw9AWnBzMl0zNurCn9FW7eqalfwZInaEitxP/4worlKHb8e/2fw8P2JvukeFE8FMd

0xT7Im0hiP94hZjeabN6iBAQpY6Wi/o9ir1+iuExyL
+DlijqdpvFgQF5NXf4UBp9nWQ2ht5mq86oWKf7O3Oid7SD6/dTy9wYbxj86F3L58QndFdf1NCz3+63q3

ZCG9ln+eNpTiCdGhs43ouq4nlqeqXMKxoz1wxcj3Lfg0Uu5j3GHNPVgoAlLtnoS41paxBlz79Xy/Zz13

ZgCpKqu08ZDoMszc2Jmz2zpsh+B6t60xYKonHcrf9B
t3lJUATAxYPkrCPGYI4SyoW/ZQ2KyQSmK879w530eINJte28hR1V2E0v9ft0jJZN4fNVcI829KBmfq9y

fXEG/98TbB154MIC09pGs4J2Bs7tAgw9MlVHtsYJu5ih336VNmuot9ZnS/a2Yezo9k3Poag3hse9JpGh

j287ElYGG2J8xO52ERhS6FoMg9ae6ndtJ9UV8ISOTN
5IU41/0mNRw+ex9Xx1Jo9EQckyP/SrKZ/QugFdtpjaiOftxQElq7aeqKieUCgm4LGvVseATT6vNyg2fS

s0KijG6KWud1C/PI2GI7MfmEIgAI8ty9l9vZz/R0l9dM507SkZdi45WPr+R7Fryor8cWHnyLlg7n3Ggf

LP5N+kpPHJb9l26hPQ3IrAhXmA2gDckUkx3zxXIzgj
C+teuBA55RgnUIfSYMt1xbKxJLJbajGCQYYkTuvpzyU2xc5QFCF+MrG0fUcRgRIv8Wg9l41PskVP21gw

8OQ11RQyCxHksq1gRpD9NwX9y9U76nTr93OqpHWdM4BDGd/AlqmQ7Xe8Zr1UbafnD0BtqUhOqV/0yeFK

6p5mlLgdrYLNmCLcyZgjFoP54xS1xnawwjrm67K78d
CpsuWJcum2U7BLEsah+qDvWyhxnK5RrpfS4V2kw6VET/BMpYAqm/UsLgpj3l0BXjDe9teA+zBXfYowRk

a7GjyigGc43Qp+PBanRXuUqsXLRI5wCstraTtliQ9F9BOeZ7vOfgqg6TWdJr7D2Qfmx7zpp8wMApvu/N

hyfNOniBiwO2bUfyHvNh0kzlB/AJpzAX43ZDXEIynB
DNNbWk+G+1l/GDNoLKNLcsgADP8nj7z/NQTMY4+7+yzeBOGmHUgSLva7FohZ96Rb+hm3i8pt0w/oQYbZ

LHyXAVmpxDFKDPe9mcnjWtzSFIedUxWCONScA+uroOM2fy/QofEqym/wiMuUa4vJQsWw3lshvxL4vwn/

FAb95uDxhuUGEmqDv8Jn3fXVw1RJv++uy/a3dFtlHa
/SSBNEXUQd7F4xnoHIe8ro+irrM/1c9EukXtlPvggO6gD6NmUeuO1lD20e0SbJeLiIJakBdloS3/3/0v

gkd9PByfjaa7o1Jt/PJC829B0Ryto8Od/9F0Sp5uM8vAyjqMIYxELAx8JPjqNALx6hi2zcsBgIKnO1f9

SopphnDzpnHFjnG7+F/ORH1ZW3Q88lYss2Xh7ALkL3
h2QtcTWrWLA59Q0TIdtKHr++MbkdEz4JuQN7YwiyE/EZPdutzm+b+TfMM8z+oIfGgNfU5lWlfgCipcW/

gl7hbdtTTxCQ4xAQw2UsQL0YVztgzPpJK+POkn66g42E5Oe8ycCKxiztUNnv99MspZlb56jCkZOS1Bon

t7e+Z3Cd/wv3OdnV5O9HK4q4E0ooBxnTz/TZkrH89u
qdX32a5QBiuWEX1bjcE22a+8Ae9h5fWjoV7BJ0krj2MEJA7vmcY9BM6H8W7HJpFVRh44MHBMF1642jgE

34b4/1f4LIll+T+/nwRHy40iMONE4Pn8X2sW0cYx0RjQeG47qy0dQYROB75SXtHcs1GSnQCuzjmFJDyS

dYjio1VqjsVJ7vG4p16G8+fCwRc8iNJvD/95Tq5Spp
PwXJb/1k/SWerKjuWRAumgQdh61h945poCNusKFaG5t9z/cpIj/B/BGF42tUAKmGWWLM/359+/Tu1fOC

Kyatu2X89hN0s8ZNOd7A47vJ0ye9iO3cpeRwBJj1acuankdet9fSoe0fD1yM0edIP3bvlP36sbnFnbi9

NQNdJ7JH4fH+iZCVQWqGSO18OBV/Jk/IX6rY/GdyBs
K1IYCssLKI9ffTjJ8/9e/A3J3J4CsD6Cm5eFXzpIF7O+WAEtbts0I5kMRiixfC3I9NHDF/SrX1ag0wIa

39mozqdnecYoHGBTGv3TxWolGpWc6dXrtVlI5hGeGVuO9CcVVI5QGe9kfOI/X6kFBpfRLsOibtpYvSwO

C7oPcjohNqqdRfd9LCD9k7DvfufQGODrG/aCRDsVCO
UxHx9sS4jjh/ON0luc4o7GaZ8HmvTNndSYVsJqQzmsUlgOedwAdvjfTwr0Xmid+acNqLzONyipeGxyZr

VXkJM/arcgvi5i+vU0BiDdoFx5VW3QH7Pc7/wKpmBn1LZFnq/8BJrlUxpYAaKPV1+N31puo3f/9TS1rp

QA4eObOpBN+cJChyYfoK4ttwRefZbmX6dHPHmSinCw
fkOU8ZHU2OMXyQrP3EJHAt43eE8IrwOuTbv6MoB6osn6xIUV1EBqXb4+nmrOlog6yuiF7IXxIrEEBwMd

Ve2yhrlHJ9E24+HWqWKIE3gv5jQeGZxAD9vZkVXI1aKRqkJgk1MBHjKaZy+pbQAwJf/fXMkClOiJkW+y

2xk/dWpRhZqToODAJZDpBvOmMZZWhDszPL05TT57gd
QJHuQ5PXtmyp5ageFJI3I2U8kezioFgBXndigTCIK8jcu98iN30fQCQYAWvGjigzOob+zHTe2Fy45ZXN

0QCz6uaA5CMhhd3pAEdA5oxthJLe3p3RS4MlBniULYGiQwlbduudtj1Iu9gIll1xF+z6Rj5iegXp3nun

oiIl5K0a79WZU6y8kJvkx0PeolpL16RlTHYMvw+KLR
4vE0lmI6R/QjEx3tDwZfI4KbtTWC+MM/78hbFyXJ4tJs8W6+myYNzEqqroM+KE4xlQevzAIM6IKuy7r4

PBiJDFqi5by57mTapdEFfhx0NU5X21iCXpmRgdsDfx6k5LuWJ+ZU2COK6Up33NGaQm2ca6OTVDYBXqmH

/GZ0oe8gT1QadB2ovrjzg/ODgqvtj0yaHUgO7iUOyU
VAfFssKJxTtioeyXjSuukULmlGaXVLdDXPEOP/F1XwSflrFd4jWKJsEedhX2TY/lbkTYfQnQHBXhE72C

hWF0sliheqLISfo0S2eNQZIACuf2M1Zyn01edx8KzOI0sYx2sAcvsK7b/eYtCYgCqrhyFMR9y8MkuARz

UQS1elQJdCq1iynkiPsrFe2piRHXFV3WJWtsPv5h4I
UsXvUUXBnx0TWNXYNEVinj/SgTAPyD6aC5d7SL7hZyCQP/fpkgWW5zIUI7LiLlsdbkBUji7vj0uA1vlb

ROifn1tGNNz2RJ9HHpTv5xOwT+5YAGyTiCsERIKfMyhxFvYRncYPeimLGo63YOfMHnOo0NmN4Lp/hf/C

/Og9r/h/DF/kT6pLjAXBiyczTtwAUpNI6PPuGlHV8m
ws8RDfHgNHTpCpmIEgUyHPpdIrfdnLZhNH5HbLQ6MJJlD37iZKYzYVTxE3YeIVV4PZ0+ORtaRCS4ldHk

zM5YNDC5ah9NwWXId++M7wICqTTiKJtxGLJWnMl/fB3Hfec5lY5Dazhtt4+aXbZNV+cS8puZ/3wBnIzt

q9fKaA/gn7cy2YEhkJVc6ObKroFKy2xowyFuCABVcm
vjV/bCLWjWrafBY1+P5k55XZQ2s/LH7oE5t9f9AoiL5wqN4RCCutjfmyNsoNcqo7MSGvMTNpz0vCfdAh

PIuNHdgkV5QP8Pp8626R2RCIuswVEjwPFpRbzO5w8OmJF59o+Mp2Vk26jSwBdr+PP15MycqwJv9dPZkJ

+pWhr0zwjV2q/agFFO3Jz5FEu/HlwbvBzXWZfobD6/
ZT+NtJ9INJ6hu1YlHWUsYAxnajTnXwiQRwLgXQNmg1BvYEp9NF1HFLHlJPhdBX9Or883DZHmI1RvkPMg

ub5CXYEoSd3ovD4xjHNxODVWLMYNY9EwoXYzEGixLU9Xw0KrxoCyIGV8I4MayXckgXgfaQT3OQZqHZFa

Z7FbxCNhPlNfORyuNF2NhZQieeHtHy4OEVQjKs1vyI
QDm6rjIvNcOQGkPkNoSWUsFFezRA8WRlJaO7f9IGkbN4HmX2kkWMYzX9AhtHQrZC0BYGHxZo5oyDLypA

SyLZVbMTRfCw2SVs9DKiNuWU9iiw3ZGgDlDQOlPyaITbw2IPjdHU9HbALeM7QbxjUyD3HqhDljZY4BXM

EOo128ZMndERSoPhPzHZQapnLeNAIRKRTJD2OprCYp
A2CAMVGvE5tFTXNcV9anHU83zWM0RPhvZV5OSGCIoYV1PN4FoyWeDXj3Z4VnA0gjrAL7TYmRWP9iOYny

C4ZjILNutydiTJpgIJ61rDR7LDWyC2TtdAecmCOraPQjTp5zRLeoOX3Ji510MYXeZ4XqgFPerkWaDiKm

GHYRShMjF6MKQMXmQPEuK0mnOMg5NHRwIHNaKFGcVb
ExIDIdGpnaWrRfELXmBO0WUp3+WVzyunZsYmiPHhA2XTSeg4BnVZe0BG2ESYBjMVkuJK4Wd278U9A2St

I9cLWwWMhtSAUsMmCyWWMxutVbOYKWCEDCY5OsvAOtL3YrD71ptQ5oT8qwOA86lDA9JYhQCxKkS0Unz7

RecyEcqhHNw767udUeZbNyBDYZJN8GEKZyDI1Ksimb
s9OsBDGbOSSpSz8YKb1DDrXsJT5ujv4QJaYgEUGiOzyUFqljQHkwcwQoXaqPKuHrDFWmQwvXXcc7ISmc

CA6Lhl9iV0X7JYmdRFNXV8NpuESgHE5rB1WXJ1fwJWffMd6LCyHvH3IkpiRiKMsmU0ZyUBS1RWRhYd9C

IHSiRZ5YBKJrXJJgUIQMIwFaQXCsSbOuNzIdVISUCa
6EFyGpA7sMLrwvV8PyLCncQl3UIlVzA3M8uCjQcGC0BCX3UO5ALoWOMhTaDEa1P9R8sCMkR0R7qEmGhR

Y7WD9XAD2QRFEvAG1+DdOuO9DSJUtGTHH0LT6PxNYdJO1ExBQSA6WvbMHsCe8sDRXsdIutrAr+PiAvU1

ZZYGQLHPT0WM0GiNYrHC3ScGJOM1LlgRZwHb7kIAvo
KtUiVB8gGR0+KE2ZIMONGbMXNqQcTKizYHwiXGRvDHy4N9V5GPZrQ2BYO1Q5D6w9s6flaj5+LS9ACZMa

R9AMPYCZGzLuJYpuQHdwZLUmYJz2L2Y6TSFgK7CIH2rdP2l3YP7+PiANCiAgID4+DQo+Cs5TPR8fi1Wn

KBzpKcTcHZ9oun5ZTMzyIDCeT3OjRHNwCWqhI6HkiX
kjYT7NUCzdHUeuEC1HWVQxYBD0KV7+DDzdrMTiPV7RZao/oVEnJ6fjlRMmCJcqeb4b98o/TuUrJQ3zBl

BXAT7gD9OntWt4dvLVbf8KF4tbJamzXe4+PSrtKOf3JluvbK4nzDGewFl6rVE8gi1lLb5eEBlvOFkdlE

1zoqV7eL9iQEOjDwD0qjV1bFY1HX7oBb0JTMTcBNty
TOP6MgZIRAepaG8xImMmUl3pbMO5mMctV1j2ne05As1pcwclAZv9LP2wUj9dTu3dBKCkv2mcsBO5DR7b

Iyc+EZtxHBZoUI4fJXC9WnEILn5CGXY5Q8k6mM8fpQR1IP4NOqWyNDZpQHTzYQCrZBZmECOyVYFvHAQm

ICAgICAgICAgICAgICAgICAgICAgICAgICAgICAgIC
AgICAgICAgICAgICAgICAgICAgICAgICAgICAgICAgICAgICAgICAgICAgICANCiAgICAgICAgICAgIC

AgICAgICAgICAgICAgICAgICAgICAgICAgICAgICAgICAgICAgICAgICAgICAgICAgICAgICAgICAgIC

AgICAgICAgICAgICAgICAgICAgICAgICAgICANCiAg
ICAgICAgICAgICAgICAgICAgICAgICAgICAgICAgICAgICAgICAgICAgICAgICAgICAgICAgICAgICAg

ICAgICAgICAgICAgICAgICAgICAgICAgICAgICAgICAgICAgICANCiAgICAgICAgICAgICAgICAgICAg

ICAgICAgICAgICAgICAgICAgICAgICAgICAgICAgIC
AgICAgICAgICAgICAgICAgICAgICAgICAgICAgICAgICAgICAgICAgICAgICAgICANCiAgICAgICAgIC

AgICAgICAgICAgICAgICAgICAgICAgICAgICAgICAgICAgICAgICAgICAgICAgICAgICAgICAgICAgIC

AgICAgICAgICAgICAgICAgICAgICAgICAgICAgICAN
CiAgICAgICAgICAgICAgICAgICAgICAgICAgICAgICAgICAgICAgICAgICAgICAgICAgICAgICAgICAg

ICAgICAgICAgICAgICAgICAgICAgICAgICAgICAgICAgICAgICAgICANCiAgICAgICAgICAgICAgICAg

ICAgICAgICAgICAgICAgICAgICAgICAgICAgICAgIC
AgICAgICAgICAgICAgICAgICAgICAgICAgICAgICAgICAgICAgICAgICAgICAgICAgICANCiAgICAgIC

AgICAgICAgICAgICAgICAgICAgICAgICAgICAgICAgICAgICAgICAgICAgICAgICAgICAgICAgICAgIC

AgICAgICAgICAgICAgICAgICAgICAgICAgICAgICAg
ICANCiAgICAgICAgICAgICAgICAgICAgICAgICAgICAgICAgICAgICAgICAgICAgICAgICAgICAgICAg

ICAgICAgICAgICAgICAgICAgICAgICAgICAgICAgICAgICAgICAgICAgICANCiAgICAgICAgICAgICAg

ICAgICAgICAgICAgICAgICAgICAgICAgICAgICAgIC
AgICAgICAgICAgICAgICAgICAgICAgICAgICAgICAgICAgICAgICAgICAgICAgICAgICAgICANCjw/eH

QnP6fjuYJezfF3C6yjVz5RZk7QHH6ni2KwZRXdEYyciyQhCfnDOvEeUOQvVunIZat9WQhpRS9ZkLGcI2

FeS8VeFAfhCQ9PTSRbSXLbgQEaIEKrGXFjHuF3SPYg
MVomOT0RzGEqLLaxQDFlLSIkTlIcRGXcEMOdGYIfLQBqMTGUXZFiCVPjGaKfHJkkQB4Rx7JptYY7JOi+

Xu5CYV1os4WbDAjgJaXgOM6whi9NNZdKUjUqZ4PuybT7EYG3YFIdYe0NWJEjJXPuiFKoYQKzJUUUHjCi

W7JonN32BOOIVf0+XDxwypMvLzuNVuK2UBUaf3QlTE
k6UI6UZXLcMJp6yWNlZEKsX5Olc5HmFg53ZJAbFwsgLGE1qJIbU0XjaOgtFUFRDJR8UGOqDByrRmPgGS

VaYOeoSDWFTEsBDrKpK9Axm6BgYzI0VXQfKgNjHSrwGOBdHsA1PC59wSseWI6SAVDtNTJdHP07XFN0DR

VjSo9YYa4BEyAhSE4jdx6EHzqiDRYaGcrPJbj8HTar
OL6OfRCbH4AdaYVme4aTAeWrO0UGNLC1KURoNk3YEDUoZyUlPCDaPMycJK2jHFBtADSWgRxlpoI5ZV2S

KI1nxrScFB0SJgAvUu9iAl5DAhFiB5VwK4OfAOIcAYZWCXzbSL3GETcpSN0yUQ8Ag0QBkEGwwV8owd2U

ZEImYAKyOspbjc1YMczoX2D1cWwrONWqWpspVUDDJZ
wmQM1ZSSHmCWD3XOAuFfEvYKWTCqFkP88lAM6NM7Kns38hGoA5TQElKeQqJMqxBY99nVikvcMflAWqtG

zbWM4BKl2+DWklhqGpRblNOincODFLVwWvIdATYmAxPLXdZVCwXUKzXnY4IqCbPt2JGXAoAGIkCDSmOx

SxQRFaFWKzYSvvEXLrVLQ0DeVjGSJeCYFzFQ0PGpYh
IRKsFyQ8WOznQBCsRCYmcm9IFVQuVELaJQW9NlSgXKEnMWNvHKbzBOPzQDVmPgPtZQSaPAGqAZ6FKbMc

XTNrWZQ6DOXvBFVvJEDktu2MAIAbDIKcFrXkASNgWFNwZYPhKSvfSPQwTQW6WiPrLQKhIUCcLP8MUpPp

MVVnKGe8CVivQOVwFQAphi5VCLDpIWRgESirCYCqMV
ChDMMnSLlzTQCoMRQcQah6ISWyNBXzMG9VFsBlSETrOQKuENKeRAUmJQOynk5HPJOfOGWyESG2XoHjEL

BaWFRfKHemLZXtEDQ2SzJ2OLTqWIGtIK2PJtRcNCAbYCA5ZbnkNBKhEPEbym6ZMBOrEGGlZYuoXmQjWB

GcFZDcJJlePNCxTGX5PBD2TGHoIMImAW1ZUsJdRDEl
OBJ8WwglSAIpBZUeyv0ZESRzFHWhDqD3DWSwCTBaSOClQVqoDHKgJIC1ByD3UJUiDTChCH2ACwFpAOHl

Fsy1JRydRMDoNVNqsd7VMLTuBGBzPRTpEbBsQFHtZUJfWFiqSBHzCQZ5HqOaASDjOUNkHN4AHoUlFDOm

Rcd1PtQxVWEuEOXrbb1JMSHcEGKwKDagVEUqAEZgMW
YdENimAACyFVJhWZX4LGBlPAEwFZ3EHbIeGGMiUvI5KKVrYXYuEDAahx9MKGCnNVDkPOI4ZJLtMIYaFL

ByWZgfQXIlBZUzUUJbEWMcOVXvPE6IPfHdQThaZKUPYgx8VTffN6n5GVRqYT7CR3Ltx9KjYbwlPTDWRV

lxAM6kjzPvLYSiHo3TB4sJPoy8GrR3OGFgRWP2CGZe
KtImFGC2TZC0LCa4GLT8ZyNrWO8mIKo2LOFbFWRvPfjrFhBgNiH1Ylz6ZMZ9YmDfGbHeTCBiQyZmHB8W

Zi5YOiM8BNV6yIDfTv3QXdUvVFJOKoQtSV5SKOk=









                    ID                  Date                Data Source

 

                    [id number]           2021 09:11:00 AM EDT [facility]









          Name      Value     Range     Interpretation Code Description Data Gregoria

rce(s) Supporting 

Document(s)

 

                                        SARS-CoV-2 (COVID-19) RNA [Presence] in 

Respiratory specimen by SARAH with probe 

detection  Not Detected                                  [facility]      

 

                                        This lab was ordered by [facility] and reported by 

[employer]. 









                    ID                  Date                Data Source

 

                    [id number]            2021 10:09:00 AM EDT [facility]









          Name      Value     Range     Interpretation Code Description Data Gregoria

rce(s) Supporting 

Document(s)

 

          BinaxNow Covid-19 Ag                                         [facility]  









                    ID                  Date                Data Source

 

                    [id number]             2021 09:00:00 AM EDT [facility]









          Name      Value     Range     Interpretation Code Description Data Gregoria

rce(s) Supporting 

Document(s)

 

                                        SARS-CoV-2 (COVID-19) Ag [Presence] in R

espiratory specimen by Rapid immunoassay

           BinaxNow Covid -19 Ag Negative                                  [facility]

H      

 

                                        This lab was ordered by [facility] 

and reported by [facility]. 









                    ID                  Date                Data Source

 

                    [id number]           2021 09:54:00 AM EDT [facility]

 

                                          Patient Name: ROMELIA CORADO      

 : 1980  Sex: F  Pt Unit #: 

[id number]  Location:[facility]  Provider:   Visit Date/Time:  21  Primary 
Insurance: [facility]  Secondary Insurance: Self Pay  Intake  
Vital Signs                                                    21         
                                         10:00     Current Weight               
                  173 lb     Weight Measurement Method                  Standing
 Scale     BP                                             100/60     Blood 
Pressure Location                      Rt brachial     Position                 
                      Sitting     Respiration                                   
   18     Pulse                                           104 H     Pulse Source
                               Pulse Oximeter     Temp                          
                 98.2 F     Temp Source                                     Oral
     Pulse Oximetry (%)                               98      Intake  Visit 
Reasons: Medication check  Nurse Note: Medication check. Had 2nd Covid shot on 
Friday and was vomiting, headache and feeling blah from it.   Is patient in 
pain?: No  Allergies    No Known Drug Allergies Allergy (Verified 21 
07:20)           Medications     - Last Reconciled 21 by Cecille Maguire NP
    alcohol swabs (Alcohol Wipes) 1 pad topical QID 90 days  blood-glucose meter
 (OneTouch Verio Flex Start) As directed  cholecalciferol (vitamin D3) 2,000 
units PO QDAY  dulaglutide mg subcut  ergocalciferol (vitamin D2) 1,250 mcg PO 
QWEEK  ertugliflozin (Steglatro) 5 mg PO QAM  lancets (Accu-Chek Softclix 
Lancets) As directed  OneTouch Verio test strips (blood sugar diagnostic) USE AS
 DIRECTED FOUR TIMES A DAY 30 days NS      Fall Risk  Medications:: No High Risk
 Medications  HIV Testing Offer - ages 13-64  HIV testing Offer: No    
Coronavirus Screening  Screening  Are you currently positive or on isolation for
 COVID ?: No  Do you have any NEW signs of one or more of the following?: no 
symptoms  Do you have NEW signs of at least two of the following?: no symptoms  
  HPI  Additional HPI  HPI  Details: Romelia presents to the clinic for 
medication check.  Also feeling "lousy" from her second COVID vaccine given on 
Friday.     [facility]  Medical History (Updated 21 @ 10:11 by Cecille Maguire NP)    Anxiety  Asthma  Chlamydia  Depression  Diabetes mellitus  Herpes 
genitalis      Surgical History (Reviewed 21 @ 12:07 by Che Duron)   
 Status post tonsillectomy                           Social History (Updated 
21 @ 09:55 by Priyanka Kuo)  Does the Patient have a Healthcare Proxy:  
No   Does Patient have a DNR?:  No   Does Patient have a Living Will?:  No   Hx 
Recent Travel (where):  No   Smoking Status:  Never smoker         Review of 
Systems  Const  All systems reviewed   are unremarkable except as noted in HPI 
and below  Reports as per HPI and Reports headache(s)  ENT  Reports headache(s) 
 GI  Reports nausea and Reports vomiting  Neuro  Reports headache(s)    Exam  
Const  General: cooperative and healthy appearing  Nutritional Appearance: 
average body habitus and well nourished  Orientation: alert, awake and oriented 
x3  Resp  Effort   Inspection: normal respiratory effort  Auscultation: clear to
 auscultation bilaterally  Cardio  Rate: regular rate  Rhythm: regular rhythm  
Heart Sounds: S1 normal and S2 normal  GI  Palpation: soft and no 
hepatosplenomegaly  Auscultation: normal bowel sounds    Assessment   Plan ***  
Assessment   Plan  (1) Encounter for medication adjustment:         Code(s):  
Z51.89 - Encounter for other specified aftercare  (2) Nausea and vomiting:      
   Status: Acute        Code(s):  R11.2 - Nausea with vomiting, unspecified     
   SNOMED Code(s): 67982247        Category: Medical  Additional Comments  
Additional Comments: Doing well with her Diabetes, sees endo regularly.  Numbers
 have improved.  Using trulicity and steglatro with good result.   She is having
 some side effects of the COVID vaccine #2, mostly nausea and vomiting.  I will 
order zofran prn for this.  See back in one week if she is not past this.  She 
expresses understanding.  Education regarding GI symptoms and maintaining BG's 
given.   Orders  Other Medications:        New:       ondansetron 4 mg  PO TID 7
 days PRN 21 tabs 0RF nausea and vomiting          Coding  Level of Care Code  
04042 Est Pt Intermediate Comp  Exam  Expanded Problem Focused    Diagnoses  
Encounter for medication adjustment  Z51.89  Nausea and vomiting  R11.2      
<Electronically signed by Cecille Maguire NP> 21 1013          









          Name      Value     Range     Interpretation Code Description Data Gregoria

rce(s) Supporting 

Document(s)

 

                                                                       









                    ID                  Date                Data Source

 

                    [id number]             2021 01:31:00 PM EST [facility] ([facility])









          Name      Value     Range     Interpretation Code Description Data Gregoria

rce(s) Supporting 

Document(s)

 

           Glucose [Mass/volume] in Serum or Plasma 185                         

                [facility] ([facility])                          

 

           Hemoglobin A1c/Hemoglobin.total in Blood 7.9                         

                [facility] ([facility])                          









                    ID                  Date                Data Source

 

                    [id number]            2021 10:01:00 AM [facility]









          Name      Value     Range     Interpretation Code Description Data Gregoria

rce(s) Supporting 

Document(s)

 

           Urea nitrogen [Mass/volume] in Serum or Plasma 20 mg/dL   9-23       

N                     [facility]                         

 

           Sodium [Moles/volume] in Serum or Plasma 138 mmol/L 132-146    [facility]                         

 

           Potassium [Moles/volume] in Serum or Plasma 4.2 mmol/L 3.5-5.5    [facility]                         

 

           Chloride [Moles/volume] in Serum or Plasma 105 mmol/L      [facility]                         

 

           Carbon dioxide, total [Moles/volume] in Serum or Plasma 27 mmol/L  20

-31      N                     

[facility]            

 

          Anion gap in Serum or Plasma 10 mmol/L 8-16      [facility]  

 

           Glucose [Mass/volume] in Serum or Plasma 193 mg/dL       Above 

high normal            

[facility]            

 

          Creatinine 0.8 mg/dL 0.5-1.1   [facility]  

 

                                        Glomerular filtration rate/1.73 sq M.pre

dicted [Volume Rate/Area] in Serum or 

Plasma     Greater Than 60 ABOVE 60                         [facility]  

 

           Calcium [Mass/volume] in Serum or Plasma 9.4 mg/dL  8.5-10.1   [facility]                         









                    ID                  Date                Data Source

 

                    [id number]             2021 08:35:00 [facility]









          Name      Value     Range     Interpretation Code Description Data Gregoria

rce(s) Supporting 

Document(s)

 

           Urea nitrogen [Mass/volume] in Serum or Plasma 20 mg/dL   9-23       

                      [facility] ([facility])                  

 

                                        E11.65 

 

           Sodium [Moles/volume] in Serum or Plasma 138 mmol/L 132-146          

                [facility] ([facility])                  

 

                                        E11.65 

 

           Potassium [Moles/volume] in Serum or Plasma 4.2 mmol/L 3.5-5.5       

                   [facility] ([facility])                 

 

                                        E11.65 

 

           Chloride [Moles/volume] in Serum or Plasma 105 mmol/L          

                  [facility] ([facility])                  

 

                                        E11.65 

 

           Carbon dioxide, total [Moles/volume] in Serum or Plasma 27 mmol/L  20

-31                            

[facility] ([facility])    

 

                                        E11.65 

 

           Anion gap in Serum or Plasma 10 mmol/L  8-16                         

    [facility] ([facility])                          

 

                                        E11.65 

 

           Glucose [Mass/volume] in Serum or Plasma 193 mg/dL             

                [facility] ([facility])                  

 

                                        E11.65 

 

                                        Glomerular filtration rate/1.73 sq M.pre

dicted [Volume Rate/Area] in Serum or 

Plasma     Laboratory test result                                  [facility] ([facility])  

 

                                        E11.65 

 

          Creatinine 0.8 mg/dL 0.5-1.1                       [facility] ([facility])  

 

                                        E11.65 

 

           Calcium [Mass/volume] in Serum or Plasma 9.4 mg/dL  8.5-10.1         

                [facility] ([facility])                  

 

                                        E11.65 









                    ID                  Date                Data Source

 

                    [id number]           2021 07:27:00 AM [facility]

 

                                                                             ED 

Physician Documentation      NAME:      

           ROMELIA CORADO                      :                  
1980   ACCT#:                [id number]                      AGE:       
           40           MR#:                  [id number]                       
SERVICE DATE:           21     EMERGENCY DR:           Ivonne Villanueva MD  
                                                       PRIMARY CARE DR:         
  Cecille Maguire                      ROOM#:                             HPI 
(Adult, General)  General  Chief Complaint: ED Burn  Stated Complaint: BURN  
Resident LTC, travel outisde home, exposure to hot tubs:: No  Time Seen by 
Provider: 21 07:10  Source: patient  Exam Limitations: no limitations  
History of Present Illness Narrative: 41 yo woman with asthma, DM, presents 
after an accident at work this morning.  She was making Donuts at the local 
[employer] and hot oil spilled onto her R lower leg.  She has blistered skin 
and c/o increased pain.  Allergies/Home Meds                                    
            Allergies        Allergy/AdvReac Type Severity Reaction Status Date 
/ Time     No Known Drug Allergies Allergy   Verified 21 07:20            
                                      Home Medications         Medication  
Instructions  Recorded  Confirmed  Last Taken  Type     alcohol swabs 1 pad TOP 
QID 90 Days #200 each 20 Unknown Rx     blood-glucose meter #1 
each 20 Unknown Rx     lancets #100 each 20 
Unknown Rx     ertugliflozin 15 mg tablet 15 mg PO QAM #30 tab 20
 Unknown Rx     dulaglutide 0.75 mg/0.5 mL mg SQ 20 Unknown 
History    subcutaneous pen injector          OneTouch Verio test strips See Rx 
Instructions .ROUTE 20 Unknown Rx     .COMPLEX 30 Days #150 each 
NS         clindamycin HCl 300 mg PO QID #40 cap 21 Unknown Rx   
  cholecalciferol (vitamin D3) 50 2,000 unit PO QDAY  cap 21 
Unknown History    mcg (2,000 unit) capsule          ergocalciferol (vitamin D2)
 1,250 1,250 mcg PO QWEEK  cap 21 Unknown History    mcg (50,000 
unit) capsule          silver sulfadiazine [Silvadene] 1 applic TOPICAL DAILY 
#400 gm 21  Unknown Rx          PMH (from Triage)  Patient Medical History
  PMH Reviewed/Updated as Needed: Yes  PMH/PSH from Triage: Medical History (
Updated 21 @ 09:52 by Cecille Maguire NP)    Anxiety (Medical)   Asthma 
(Medical)   Chlamydia (Medical)   Depression (Medical)   Diabetes mellitus 
(Medical)   Herpes genitalis (Medical)                        Surgical History 
(Updated 18 @ 15:08 by Blanca Gomez)    Status post tonsillectomy 
(Surgical)       Female History  Pregnant: No  Hx Drug Resistant Infections  Hx 
MRSA: (Methicillin-resistant Staphylococcus aureus): Yes (11)  Hx VRE 
(Vancomycin-resistant enterococci): No  Hx C.Diff: No  Hx CRKP: No  Hx Other 
Resistant Infection?: No  Isolation: Standard precautions  Hx Recent Travel  Out
 of the country within 10 days (where): No  Hx Fever: No  Hx Fever with a rash?:
 No  Nurse screening for coronavirus:                          Recent Travel 
outside the      No                                                    country 
(where)                         Social History  Does patient have 
suicidal/homicidal thoughts or ideation?: No  Are you in a relationship 
with/Does anyone hit you, yell/swear at you, steal from you?: No  Substance Use 
 Hx Alcohol Use: No  Hx Substance Use: No  Hx Substance Use Treatment: No  
Smoking Status: Never smoker  Tobacco Use  Hx Chewing Tobacco Use: No  
Vaccination History  Hx/Date of Tetanus, Diphtheria Vaccination: No  Hx/Date of 
Influenza Vaccination: No  Hx/Date of Pneumococcal Vaccination: No    PFSH  
Medical History (Updated 21 @ 09:52 by Cecille Maguire NP)    Anxiety  
Asthma  Chlamydia  Depression  Diabetes mellitus  Herpes genitalis      Surgical
 History (Reviewed 21 @ 12:07 by Che Duron)    Status post 
tonsillectomy                           Social History (Updated 20 @ 10:50
 by Priyanka Kuo)  Does the Patient have a Healthcare Proxy:  No   Does Patient
 have a DNR?:  No   Does Patient have a Living Will?:  No   Hx Recent Travel 
(where):  No   Smoking Status:  Never smoker         ROS  Review of Systems  
Constitutional: Denies fever  Eyes: Denies eye discharge/drng  ENT: Denies nasal
 discharge and throat pain  Respiratory: Reports wheezing; Denies cough  
Cardiovascular: Denies chest pain  Gastrointestinal: Denies nausea, vomiting and
 abdominal pain  Musculoskeletal: Reports leg pain  Skin/Breasts: Reports rash 
and change in color  Neurologic: Denies weakness and numbness  
Allergic/Immunologic: Denies hives    Physical Exam  General  Physical Exam 
Narrative: wd woman, awake and alert, appears comfortable  Limitations: no 
limitations  General appearance: alert and in no apparent distress  Head  Head 
exam: Present atraumatic, normocephalic and normal inspection  Eye  Eye exam: 
Present normal apperance and EOMI; Absent scleral icterus and conjunctival 
injection  ENT  ENT exam: Present normal exam, normal orophraynx and mucous 
membranes moist  Neck  Neck exam: Present normal inspection and full ROM; Absent
 tenderness  Respiratory  Respiratory exam: Present wheezes (b/l inspiratory 
wheezing); Absent normal lung sounds bilaterally, respiratory distress, 
accessory muscle use, decreased breath sounds and prolonged expiratory  
Cardiovascular  Cardiovascular Exam: Present regular rate and normal rhythm  
GI/Abdominal  GI/Abdominal exam: Present Abd soft, bowel sounds present all 
quadrents; Absent tenderness  Extremities Exam  Extremities exam: Present full 
ROM and tenderness; Absent normal inspection (2nd degree burns over RLE)  Back 
Exam  Back exam: Present full ROM  Neurological Exam  Neurological exam: Present
 alert, oriented X3 and normal gait; Absent motor sensory deficit  Psychiatric  
Psychiatric exam: Present normal affect and normal mood  Skin  Skin exam: 
Present warm, dry, normal color and other (2nd degree burns over RLE); Absent 
intact  Expanded Skin Exam  Expanded:         Type of lesion: Present other 
(burn)        Distribution of rash: RLE        Description of rash: Present size
 (multiple discrete areas of blistered skin with rupture of blisters, skight 
erythema: 3 cm to 5 cm diameter lesions), tenderness, erythematous and blisters 
       Body image:      1. 2nd degree burn  2. 2nd degree burn    3. 2nd degree 
burn        Vital Signs  Vital Signs:                                 Vital 
Signs         21  07:16     Temperature 96.9 F L     Pulse Rate 96     
Respiratory Rate 18     Blood Pressure 140/80     O2 Sat by Pulse Oximetry 99   
       MDM (comprehensive)  Medical Decision Making  Free Text/Narative:: The 
patient was evaluated for a burn to the R lower leg.  She was found to have 
discrete areas of 2nd degree burns over the anterior aspect of the R lower leg. 
 Silvadene was applied to the affected areas.    Plan  Plan  Plan: d/c home  
Plan of care: Follow up appointments discussed, Plan of care discussed with 
patient and or family, Patient encouraged to ask questions about plan and 
Patient agrees with plan of care  Visit Medications  Administered ED 
medications::                Medications          Discontinued Medications      
    Generic Name Dose Route Start Last Admin      Trade Name Nasim  PRN Reason 
Stop Dose Admin     Acetaminophen/Codeine Phosphate  2 tab  21 07:38  
21 07:48      Acetaminophen/Codeine #3 300/30mg Tab  PO  21 07:39  
Not Given      TO GO ONE       Silver Sulfadiazine  1 applic  21 07:10  
21 07:15      Silver Sulfadiazine 50 Applic/Jar  TP  21 07:11  1 
applic      1T ONE   Administration      Other Medications:        New:       
silver sulfadiazine 1% (Silvadene)     apply a 1.5 mm thickness 1 applic topical
 DAILY 400 grams 0RF          Discharge Plan  Admission/Discharge Dx  Primary DC
 Diagnosis: 2nd degree burns to R lower leg      ED Provider: Ivonne Villanueva    
ED Status: Discharged    Time Seen by Provider: 21 07:10    Triaged At: 
21 07:07    Condition  Condition: Improved    Discharge Detail  
Disposition: Home, Self-Care    Med Rec   New Prescriptions:  New    silver 
sulfadiazine [Silvadene] 1 % cream      1 applic topical DAILY Qty: 400 RF: 0   
No Action    alcohol swabs [Alcohol Wipes]  Pads, Medicated      1 pad TOP QID 
90 Days Qty: 200 RF: 3    (DME) lancets [Accu-Chek Softclix Lancets]  Misc      
See Rx Instructions  .ROUTE .MEDSUPPLY Qty: 100 RF: 5    (DME) blood-glucose 
meter [OneTouch Verio Flex Start]  Kit      See Rx Instructions  .ROUTE 
.MEDSUPPLY Qty: 1 RF: 0    Steglatro 15 mg tablet      15 mg PO QAM Qty: 30 RF: 
2    Trulicity 0.75 mg/0.5 mL pen injector        SQ  RF: 0    ergocalciferol 
(vitamin D2) 1,250 mcg (50,000 unit) capsule      1,250 mcg PO QWEEK RF: 0    
cholecalciferol (vitamin D3) 50 mcg (2,000 unit) capsule      2,000 unit PO QDAY
RF: 0    clindamycin HCl 300 mg capsule      300 mg PO QID Qty: 40 RF: 0    
OneTouch Verio test strips  Strip      See Rx Instructions  .ROUTE .COMPLEX 30 
Days Qty: 150 RF: 5    Medications  Medication reconciliation performed by 
provider at discharge: Yes      Forms  Forms   Work Release:  
Work/School/Activ/Gym Release    Follow Up Care/Instructions  
Diet/Activity/Wound Care..:  Continue with daily dressing changes with 
Silvadene, return to ER for progressive redness, pain and swelling    *Discharge
Patient*  Discharge Orders:  Discharge Order  (Routine); Ordered 21     
Ordered By:  Ivonne Villanueva    Discharge Date/Time: 21 07:49    
Interventions  Interventions:  ED Discharge Instructions   Last Done: 21 
07:49  ED Burn Assessment   Last Done: 21 07:17          Report Signers:  
<Electronically signed by Ivonne Villanueva MD>      Ivonne Villanueva MD      
21 0758     _________________________________________________         
Ivonne Villanueva MD        SIGNATURE   DA    Report Cosigners:                    
                         D:  SKEMI  21 T:  SKEMI    21  
CC:  Cecille Maguire         









          Name      Value     Range     Interpretation Code Description Data Gregoria

rce(s) Supporting 

Document(s)

 

                                                                       









                    ID                  Date                Data Source

 

                    [id number]           2021 09:49:39 AM EST [facility]









          Name      Value     Range     Interpretation Code Description Data Gregoria

rce(s) Supporting 

Document(s)

 

          Progress Note                                         [facility] 

GFFERc1xEtNHKmRa27/RMGhwTRGtk1OaRGwfHDz0JWakCQDhN8UlYDC2nS8fSZX8PMiVGcPyXmUcQqOx

lbm
VjSatSIbIiCVIyPbhXXuEqTPytRstfrGZkMV8VkQL4UPDgV10yKCAoFFKhE4MdYZYiSgq+Yb8VYHUluR

YzOA3CIfkA2Z6oain4Qq5wwY+JRSO1caRU84z4HLpRZkqWTGjCGQzbgeRyOiJtBd+gOYoKCd0At5+4y2

RYQ4yG8jZT1q6nQ+VCLtLexvyxgBaM9s+/5EBFlFJS
/Fc6qGFw6oi48k8O510tlsg5e3NXxZJaGrZ3Bt3uWJ+/0SAqtU3lfyqI79j10XYyeAKgXM0I7wYYxdM5

fDfLls/Tp8g3gb8MdpM+Q1j0e9zPoQO0/vzDD+T6P+XekxxiwrqmNDqPfVVzBiYrl1Ns3NJfHnxv0abW

SW21Qx0OnuAXjsvHsTP0tk9KE8Pe8hSHfbFOmxGcE9
Ljl3/HwwqFhdI5GVbWUFPbI3N0Bw9Ma1kBz/beFg0d2+A6sn6lq+tX8QY50morLJkdknD91DnS1IUsjU

zsQN6TP83EhFE9PRsON9dnRcxDBD7GcwsRNAiYsD/ZL/19j8pGxx4u68ZakH6zoRKktzYQEP717Q+Zmn

KtZSGhFe11KyAcoUaD4nmmUV8LCkai2FUPYYn2nn6f
dze9xAEqJOg2MJVJsmXqW+Mqsi97GjJwtHse+dt/qpw8bz0JVf4KWmTklgdM8vVOzgF9j0fFthTDtiLC

Jy7NYKbgLvyXG4XzkUsfUNZEOXBJNwUHwDphdE/fbqfHL3FVFvebrkIfJXJ0SsggyedYN/PSoqtxXVKx

ukjC4q4S0a1ro3fL6lGSB4UkAYZxttg53OrEMSuLEX
TCAKcdTyMYBK00ZTVgEPYkFHZrBvV/JtdT/7oWYOeX4KIrmbcGKuOlqoxoXVMRsQ1So2SE1IOt+k6ORr

lh4Ut4b0JPmvqebcjjxxIxEC4Wu51ApPfPNpHNr14gLT4T06nOkkoy7xa0ne3w+Q2+iyrwHMgEJbyAJj

zC0z1cmEaj/tkUzrvS68kMw4/UCLV//JamW9cPgIY5
j6/Ntirf2ZImVgdvjEHevGHVcwBzh7Ftp3aP+QQZ5TNU6c080jAaSNlJOkbSfoAKguuZzj1kdNXoPIVF

9A5hEcmn1EWIkohxYLkX68iJ8kwg5z7ddYd3N2HbwTUfXGKfp09MDDSMJ3eTuMhZUX+dkS5ezfIS9Wv3

VcNOY7EhfzWo3cv7QmyYlIYL7cmewILRsxwUFD/0jT
C+T8ATodbSCjQM2a9+Yeowrcv9kKPH9d1ieehzJyyJWcv/Td0hj8iMtw1B+3qk9HNqpm1MbDg2X7wgyO

QhweEQ6sY5Dm82kZW7KxDjeEcgmPu2//obpV/43/9Nrt+Es1/YLsg6F6w+8wE26SNslMvH2ifrxKJ0HA

qpPu41CkQR12ChrPfMUbuh7piQc4NU0KiWM013KF8o
wE/ux4vH+5V2ABQrk6lFJOm7wRR3kPizN+WQBvPDOl0/panstEI1fmwPe++yqHZiOGAsxEiSkorH5Wne

2fL//vgnoBinK2K3+Zjs10NCo2lK4WeipBLhnyi/G7gsl8c4GSmWO1JidHqoxsOfO69bcJeO/GGWTveP

6Qda5P+aA4dfwO2WTv3OJOWJHTBcGuiMGbNdZNfAmm
aUAyjHMmETvMbhs8mWGJ+dqyvRydYse+meyV5hEZydo1XXh4iZ+uz4tL98XtGhqG7OlPOzdcs2DsJ73D

zAX4TwrNl7mHo1cgmTm73L3C1t0zzjzeP+5y9bzRc+ibYEt7CZcnHDCQIc+KTdGRIskGIQwn05wXlMFh

qr28MxcCpiUqFGIHAU5uoHLS6FIyahwKmpn30cnRdn
Y/LFDLFe9ffDo0qMG8XEAdTLQ86oQEZziXLzUYL+maLFEK6SIDz73iESaiUIBrvv1irYDzi3NoMU+75L

ig0S/5x53SiexpR6KLg4X+jNe4oZ5J9tzxZREZJS1kqas7DNc85qWGbpAZ4K7dA06NM2iY4Mm1ISj5qq

296L9W7IGSct7nlbvO+bYjN1uYZhAHBAY6ALZJSoSN
m2smoyR30tO3xJAJRMRdiZHkxFDAcpFqLVvI+IQv8qiw3jIednra3WBd7JeMb49XsmiZQ6lPxdnEUDv/

wK/jVybXI8cH/cZcnrhPoPke2YjJbqFYUqF4rrABXc1sWHNtmLFJklG/p83JTXMp/VU0vkrCs44xxX9s

AVsCZ1pEvL4LmEFHkVEYpPd7SMuaiaFRN8F40HkKFr
2b0fmNpMBEzywXcsr7GdlKJnatWETkDn5b3S276gZcebrXa30yX1FmRS5KGpyULIpOdRQGFymRu+C6yv

70mG05wChHOBcnoqpYJ3V+gM27TqJ+Z1jOlMeW9YCD8toNAZqxHbnU/imcimI9baA2RmQpjB5BKYUUb0

jPPIi92kMdhlTLcH297iST2baPWx3sRPOEgPdYGJEb
T6uHjIr6QhTE2G3Q1C8zzfKssxJSD/llc3AYp/4hVnHW60T6Zha+x7/8B2v2bJcf/y6TjymIkjxGFzuD

BakcRWHcA6KqOQL6oW9Ldkpzx3UMPkkPgKpXX4JmuOXn65iBpoN0FRoT6XDirix7m6oTTcvwYTRwiMI4

P1HWb4/QPc/qQnr1gPLqN5z2Gy7VJK6wiKZH9JIYbp
Sc7PRWmOsJ+rLXlY4sY6YyNmZlD8CnhIwS12hRBQehompyAZTT3P587ksI1ruUJb7irXNrbhUTdlEfk1

k/uuPOCg0QvNVvlOkkrtnWZM4x0BaZ2XewP+wQkPTDPFLmwydKTZtCSl282V6TgAUessJ7gOeSSN+fCg

sabewBcdPiuUnAJOjrgpJCGqYwbY+Bp9YwJXkrTBcV
XLwLBU6nNMyH34woOl0eUUHtWQSPIVmuuYijRS1SIYK3JaRa55DnCWrTMXqtYFuKY/mmbcDDZCPqewa6

e4/RpwAlPL4mHzp7Lxn3IyjxLAVz640mtlOFDeoI9HRSamjE8cXCiq9KD9k1PBJWB1KdYGdYOwKySaTl

01OcDwKXuZwWXm78hcIT3rr8gJ62GiK36mUP3Rs58T
60mgukLJmYqGXV6kFs+AVGbSjAqNQEndg2bPEoYGZrNxJNvgHUrtEYw/vb3rcc+wWymlaOMHIklYcKVy

t6AJaiMblve7+Cm5wxu3AT+3ax1gWtokhNpyuvoI3aKDg8/MSzhca33WJiZRXxRG54+xOO4fruRbD8HI

uyCGq+9bYei4lNqA0wc0X6mj6sp9m+qib3J919qJxD
8JwfmyL2wzW+WDntSoi7Asgx662K0rvo26g3w2+IBQZ0ZxigNs+ePTgiBwnSTnYbd4s10+kZVLhYbMyk

fwyOs0QziHcIjHKibm75kTCaCN6TTnjeaYg5LUYDR5Y7zyWGuzojmE7NTsGf904iYYeIv5z1SEaUaXyC

r0vEloUyImVOgHUBQ5AMMVgAtWQKyJlAGZwUDEA1D2
Yg8WMlry1cqpMF6HbhV32KDeyRiEDzb2Gcno/YPsrSKaiIXsnRe+e0LHayHQaU65LhSObyP23aPbzeJv

VFJtbfjLNbaddHVJcu/VS235O39Zz15zsY0p/tKe9vdngVFFsCvraFa/HYVcS2pDJdC/GXEdNvxXCAOW

oKITSvNcDWMztsO6MzLEoN8MtUPXY6w3JszY9fkeIt
P614qdz1JK4/rSB/POjyC9/0cuP/k57/wjYajuLcX6vFgFLBPryTTsTUTt90CEHCcklieDhnXWPZGkgG

yCldReYBFl+ioxMGKNgOddnRWU30PBGYv2M4Yz2DGZp8WR9z3+aGY4K0YQhdmaq24saJTrhVcjydRw26

dBO0sAwTOPf5WKjDkc4nW+FilL1c4kqWfujDrqcsyk
628xYWhJoBPZGOYuH2gQTbFQkyokccNAayg37BZ2dQToDEJEjQbCva4MftsspXtmdlnbZxui9MGSyFSU

CFR2RLO54wTlbNI536rzVhI5Hx7gwUvQD31iCyBULFUTCVmTc7RSwMJ0HXd1XTdzyotmII0xvJ4j+rnL

niKPDR3deZqJFRLyOWqAKpBW2SWu5hYeTLlNRNnwD0
pseCMoyqiFhVaGcZQ0oLL3uVoRQrYieCZH+4jcfQE8rqMlQXseAcGt3oqbFWOjoTRG0JHyYgEzHNr0dj

v6WzKPqdjF4NO7iPVeLRDXvMeBff9ZhpnxCQB/p0bMllZUVzYAyKo8VIseGkFZkELaqSC2nq2nc8z3lJ

/4epJTmGrdd1ZVZLu09Iysxy869zGg5K3UFq2XK4bg
TMyYl2qw1hz8uoq5y+878Of/HQJMFnoKZsLxFHH6wgFpfS4WTI0ud1EnIOk8FZLas7JkFTchHTw7XRrs

QFTkP9O9kCPwHNHzWY9LYZXjGL4USGJsrnXaIaQgCAZYDwHwARBkUeByd7WaV2DhAFZtEYIEOKnkFDOo

O16fNZcnUv35QBluSIOaKdTpRQi0Ff9PIvUnHIPjS8
3eyOLunYLwYNQcPJTCHwIxZCSpG9XniTPuJTfzU4CbA3OhYH5dsGBgJS1caMJeE8SoW3EbhctxXJKQLf

AvSSBmYWxzZSAvSyBmYWxzZSA+Vp1MKEV+Pe8NZC3rd1BhYRx0UQMbx9EhWCysHXz7Q9JdzXTfbfEtUu

viiBJGHKEmUARhL2ghqfx9aGUkMBM7Ct3MIzScj4Yi
SZBwJTqGku2ytO/bRhL+fsD9h/6JPFCsq4skT1UJlM8tKSRWliGBAOwx61LHtGo8jaowCpcj0a+5L1zO

yq8ZBujHvIVZ3XjfyUDwzzphZAmkEE18GaghMqZUWW/V3/LEI5Ml+etfiLnN9+uceRsHVzj08TfmsS/T

SUF++tb6IzQrTKDCrw8D4/Imc7dbu3vz6D05pP/SYr
1xkQ48c4I4V13x9KYYu/0NpJt2n+AahCjZ9NX2Wnkv+MmmYy6jkMpXIh+NQd4RCCBnQZIoHh3+uNoWk9

kiy0/Ik3gjU8+y1Rv4iIO2s20qYorD8CN799j6h3G6Kh2HjyW1CvhWWVfCaK1AQao6yPZtT5VhV/4i6/

bmMy6HHrTdFDnCDdyq1hDn4qhtbP0bgRYoC7eT29qb
hFyCbgaAEatZambNVpoIveK7iDJwjzskcS1RTusrOjsWLxKOaFSbIfO08qWMF4AsGYg8sSFiMYL4gBGA

eRIErsw1C9BxqpTkQFvjOZ9XRxbkNA/Vq7+FjDEiz30tUr/gYyQDryei1GUGrbSG7iCed2s6Gbrqrtg3

8T1EFpH4OQ/9+ENGWMLuq2aHEIB/RNFsIk97MSIvvq
s9BpdmjxsTj7wgy1c569CnzJEmtyCKrePnv78EIDMUCyYkLi1Mi+vdaiOdJPr8CZ89P8T6NE9tvcxNb+

yRZLZFG0q7l3dBPC4OwflqN2Sb5iwSTFOxWZvJMELrc7reph3v4tfICmCNPfAGjgAOKTMQxiZB7T4Pd1

JG2q9uRhxGT0CGhtyWwSIs/3/f4opoSqjjqihFxLkb
2s9SHTJW8U6NkB69bXzzSnPn8tt4mRimU6ts5MBphqAsjKF6WnuWNGdpvmKKT64OvEi9V2WkuHiFZJox

4hI78ZfED13Ssj249MPgNYXz73wQlyP14ESe20cyW5TTnbcIt95MEobdqKhDf5wSG0OKdphnLgrabGcI

Ppt1d+4mrgAx3C0J5/RC+0sBlRHagiBlPOVvKnYOX7
LWoar2MECFpUdWjocaBSSCVUex53a9crddK6GyfXA7faaEdNkgfHJ6GqBUKjDtNOD4gwZ7iYY9y1IFC8

STH/FTaAo0RQ8Ln/mHN0GR5eeqjHTgnANYGV0iQpwwocNnOttKSQpZ9bzkPmLllHB67zX8gMd/uT7rj/

Uy4dEDtL9B7e/aNGhhEKz2BwgyGZYENBTadaOjkhzs
ZBo0DtLIFSyWGgacIFnoMEw9i5Wteo2x/rjVmAL53lM4SUWKCwSvPChixpgK67bv2aeqqjj+NIqg7aFf

zKq9OrrKKsLaGkW4pd2bNQJOp6pTaf7LQNSy3LmzZrph042S9resFisoaWQBUf8BsMica/sesyWgfmWA

Q8Ebxn7aTn9p6aOeOQdanciYhh/Cu862+Kj6elL0is
Q69WpUeAU8jbWVrOuNFz/VvvamZ4lFUpRucN/6ngwNTm4hcFdt04qzC0fxHza//IYiMZZ1iyjIN10Qet

s0NtKJuzwtVb/g6sq8BMibAjUM0NwZI852NpmTtBYO2cWWCYbe4stp4oq/i1ic+Eb/ZHik3wpvPv4Iv5

/+rfKYEYDlffAGoxzuiMWUqx2UktJHIGpFVWkmepiW
ZJmi8GngBuo+a3HKNsE5P3v+bOWP0pKkxUCXRJq1d+FWr+dssTvVPsF3v+Z5mmNidtU2V+2Hjo7KtwEr

O3zQ5PMbcIm0KThyulIVcId0YKxEkZoNGGbdDkU0Dl0B00OTCFIzoJ4Z3DWNVbkThoCbzJS8pDPJdHEb

Q8JCNEuxgHsRfQItkY9dIlnHRCqJp4An5+2T1t/nHs
x5WWbv4GYo3yNUV38cnE0fOpYmShCrqpFYj5JDTYyaJV8BUaBQityKFnTjqBLN18n6xfn5ap8ugm7UYD

Bu4xo2aNH3q48ehbPTesByKQcVgwZx8FJTRZi3VjqYVkm7nq/Lxu180hX35yfx5iDz4c48UAEHv7L8f5

E4N/tc8nW9TRuaj+pKsXUgzOolXK//adx8uctcuGfw
ve7IeK6eav8z8OWGb8qv59dTVgPpXO/nq6x/9bhR3W0S2or5vBhq/Cu+1k0GzQszkr7osQ5O43m6gFw8

TUn0wmjZ8fABAIpz7tDTZRX7NeWyhyuIlUllbwi/wor758cyNxVt75SfYQC1djoB4wMLMDLyS81hr9bV

EFXpDyzJ0RUASnvJlVjm2X3jWe8S/+ZfDP+48xX3AA
PtxeqwfDGHcHhMyxaJnlLxN6+tg6aYnSj7cLTj/AK27llwZ/BJBGfI48n0aDfrOjy9whlObxw9qc/Flx

Okzxwl0I2A6sAaN+956VBgWOqgjWtDmDefyNved7gnZwslxRRucN9FCXIvT1UFvneNe+XIEOgp9DSEq2

C7ENEdUKtY8gMZPCQRphBUfL0dsbempYkbZKl0Od6t
J6UjH/Yde2ud0ElZIb+cCsekyCQ9EKdBfby49gX8L6N+E6s6oMXV2nAxLOnOdHcIhh18HXaGcB5+N/RJ

Io//4PK02EfA14lD6n01q/JpyNReAaMzjDCp9yQIuvfUuRUFrxA/dpLm+Yk4bBZok49AZPhUHpghPsUO

zXtcJCDgaQXWpINmIs5ndDZ5Wl/mjVz9HYmBMoqgq1
dlays7Hu/LlmXhul+kBVmlm/OhyytTfFPwLjUG2kOne35ZgP07VevB4WNjna3zQ5KZRZQnym7XSRXDgY

Pi3fTlxIg8Y7ftVxfki+fdiZRV6ghMzBHpAt0bI7O5xjW5mcsH73T6w8TQNYUrH8EkJcL5AXDZhE5oex

KwCjDAACPN6ooDukjxPfylnSTtltQtiRX7dFzcAA2g
R91oR9Ml7olTzHMsvoSAxanoMN7le8+tQTNAuX+QPBO/XbsnLRMzl7N3M9ZDQJdZI9tpR1imAo1QL4zY

1V0VYgeo1gv0C0Kw65MlORZijSZjMYDEP8q6BD6MAYo1k245CB4iuSSZ0p9hNuFuujax4/bbcG7+Xna0

4KW288/g92JYooYUc5cAH1E9H9kRn+BT6NmRXG22x6
Fvk7h6FBJBpJE/YK0fIkyL/6O7sYqaWm4J2OR1JAB7kATz8NbqXLtFlTvMIVF+XFv1MzQCNaWZMNODap

BGnAwaWtQGdWRpDWdNT/JDZYGzZVLU7cbJXWCYRecY3RFOLJFLkh02ISFW/pqQ+8S4MkF7gwVACOcM/U

YQr1nn80XS2JBpmODS7mmEUYYS4uBAYGIWtVXgPFXs
AOBs0KaQyWFKX3GVF3iv0vMd+lBF1MAk+mtC7o/PaPw1VEmNjUVQXugO7Z6bNUAxIIKzBZYmKIrRB2YB

o2IvOXsZizIsytI+3AviGkwI+HZ0pTBBqC6Ez0TmBQ5eQBneo4qM1kk7QhkhOc/L/2rsfPHwSIMNp9VL

lj0H+8TCYFsiNWGMVYwUnSm0BI91CnvjHiH9jXBVM4
hsy56F/XJtRc92BEfFNvWj2Tpppv6bwHhXZlK1dxbU+EBXivm52T+FrkGLbPOW0UtrCNgLn8B/rn55Qw

7WQxoWALJxWTwg5CMtNlDCin7DU6bEUHSCc0RP0d1kBnagBCkPPZLV+jKTysWSzUYKE2Rz9zUwUtexg7

zaek7gmZ4ojrEx08ocKcSHSJwGh0OL71XNcZm5rjHt
bWp9a2Qgk0hurFSvFsmf3qT2TVRD+NO6sXTnDfuvAAJ3soZgWRwU7MzICw9yLtluv5N/4aw7HQRR+JY1

lBDGsM8Ql2PnCx1pHNlbc0xkqe3bfMSMm2XrfJKXlWCky7IfvhYhqmnIEV76JSm7vgbsCC8p6AztUB0Z

38c2OYmoQgprmx4PHJUkxatGeXX30rY8XtGyhN96qa
5fON8IjV3kLfKRT7Xn5HsPqkFXzZEutievytOC8dRjIZlomn8L5T6V85rn2w0Pbhn81H2NMH0fc8RcVA

KfUAwyczKwJytXXpuzRULnQmwGSmWsQMxFLvScGYDeXUppFS9DCHrwPAgbFPChD6XxlyJcwCKiOWDrFv

7DFOAyLZ2YODAvqHBwWWGfQhMaJTTRDvInTWWbRHXx
xDIQd1fnOnLdAVA8RGVjEfpwJS3QRCEkND9Zd944FX04ewO9QZIpAh9IBMJiIT2Uwr22zJR5QCBqSpEk

BWRjpkEjNQBouyA3EQ0BGuYqFFA3rLDnQqrYXB6DSTPnkZZsVB4KLUTfsTPmHU6+DQogID4+DQplbmRv

SkjYFxtiVALsNtpSZmGlZGkfWtcowAPxEO1QhYV5WR
SrP34cSKVqLCPcO5EhWPJlZxF+Ma9FUMWjzMKnGA9BEroL7LclE2hGOW1+0v2H/Mbxcfl7wlXIxhWdSj

IPiJBO1/tgEgfSUqAhtMe/L1rMOMAOL456LogJRHEer+hl1Bl2stS5WYIKv8/DrhG9tvFGLw9up6QT5E

/q99/SoYwsLqgqlGPDOp6og3TChA38K+jy81lmsUZ9
L7Ke+faJdo23c9K6s1tsq9Klv2v5RCf+qh2ydL2X/0Gmgr3b+O6der/8vWS7xsuSBbN6A09/Jf6+o1of

LRCelbh9tXSqkmIi5yWQqQn9yfptaCkdqD4qb/8b0jcVZZSxsR4nJp+mSWmQWTk58bDjyQKhDXYZjI54

Ql8/LM7IeQyZd4cxtd+WegZumoTPdBZ33woSGYDlEh
b5kG8NjC40KsvfV5UH9aC36AK9WH9yuyvuxIERKIL0mW7Xow2cEq8fj11Klz5eh+VxrKdzG7RjsZg4+n

6j0RHVQTF9KTc7X4Kv+0TpMSC5UJx9zQjNzPdqBaCTleeccVUwXJrAanJQuOrrXLEUXnpNmj+iVOlYuG

127WPKS5QIw772bPYopBsuAAn87yracr3E8UbmJLSG
geDx5jOWrijeGnfWbVaiF92LPOVHdQmsN1yhfdWWrMXCB4fXa0xfwnwpWFE+gsDQyckkObGczPHg/GKk

tu+eqgobJLo2w7irdPCprHxv28d6Ox/0p8rtexUgQuLKH3Pa0DTjvY/BvdJpWFQFDeUjul7kyDaAyCcT

a87vAp39JOrnB6p8GPKy5rRcH+SHULKT5QNlb/k1aP
LSWqigyfM7IpM0xM8EjIOuzrSvVXSY35KQvCWCfnrDcWCaWAoIyFYC3hsSrDlhvUHJ2OyJuLs1KHwzWa

eFlbBZ98ngywu4kcl5hMBTfqopU1pMNZUfx5LODTtvKBg1h9YuQyRyeOQ5wtDAeQuzylmKCxoB9PmzTy

jKflRMuHr4AjhGXRcriEiIOVO7FHCshywBEk0dVFaG
3bLNHS58TDmsXXaxneNA61/WDcSd0cdLPF2tFjo4G0zbblj09Me7oOr2W1Pv1hIHqu9hgm3Ra5qw3+DG

wJmZFBjqj+AcgfZI7KtEIoKQegGG46l4uR2GnxLr6T8fwVUhNWER8sgnXJzEXcouvqX7XdHdN7DR2kQ0

QirpfFuxKdHAdedugIwlBnTU+1DbqPgb98ij/KZrg/
iA5YY5ZF486JrutLPf2s9paKlB6QNNfUJmY3RczCRIgHXdu5Nv6BeCx7+48btf2NgDgvPYwUcrgvxWh+

BJGarybDnKu2VOt9ZyEGF8FInNNQzWemoOoZUYAMeQaURdI9uPAdfVcIum96NEKfkr2XQPn3YEKJhmpR

ULjYXA82MlKyyBKKlMX1z4uAS55JAQmtDNgm8W+Q7J
COIGVD/r/LyptRydryLjw5Lawn7vXOJZGBHqSlaEFVWtNz7jaZ5Je+AqFYnB1iBMTF+te4mrQ/ZnWioT

jd1ITy1urd3uax3RblEu6O5wxQbsDY/sb3/s2S19TB/Vu8L416Z7l394gj6jY9kUgf8MLthA9Gz06gLy

pIMlFRRGKJZStx8heRSuYSJI8pgs6HacYIbMb5AYc7
OzvpsBjH37ZsB1naNrDo0JpzoAGMnhWsC6jUIz/GT09NwEDAhSUNuIbQcytxlFQtSu1sGnyhLTeSuZfI

lpNEplry6Lx7mYAQMvQnDnEL9+oRQY+lcUJ2MgNvAA98F9bXB0l2NUDbFL6GLTeUTi6UM3cJ+q594JfA

keqRGXFtUVne5ryrzNIMU6xHfWy8yXJO7e0plufpQD
XCEUyD6+sQHW3KUv4PLk/EuoPV6sdQCl82wyB1cm0Ry+x7Kp6l/S868CYYeHQd6YK8n0NCLl/k0gmrxu

ybhe/imHkgMAflTke+CTMFVCBSR0d97Mn9Pa0aICY4uWa5voz9vaoCREFY3hVVfcdLkAJyf80VNFz5Yt

YY5IACzrJZ9pie9ievf4rVYmhDDv1YnHQwOqbImReU
cjUYfMjOUsPRlSM1Zk9OXw22gsMgUyZSJINJpGvd2dXg3OOXuJmLmRAIFgnvPHPqzXJkFdgh31H1YMub

EPhUeyEKUfN2F/wodrmKMU58iEz7Hl0T03f0e7KSOa6F9uS7GRxVhFgOuTfwCQKekOxevMwmHALqiIM5

5pbk5vHZ4OGFODZKbitpkkpdU1W9MKlwSG89xymaks
ve84GOWM2BKg4tA/sSAWunv3ZmrfOrKvH4TKr1Ks7PdUDNzMg4N8jReUDK3lVOGpKAzR5fzv30SE7cP6

TzZLb4rZVTG+msDc043biWvZ8DuhcLDkQhKOyvNbXRqBgWJRZgD3bNKXD+YYqXAFd2F5lpAt+ma3GZiX

fTT3Ld3d/kTCg/BduQf0e+aVEAVCPQ1VSbiq3cPzj5
wSZ+WV9yZ38IQHWExM2wW7ziNPi6RS5nQURp0hGhPXxNOY71FLQVA9CbFiU8TmQXkMB3cUicOHXe2qcK

YgI1bazaDs+LD7aIEEilzwbQRpIz0DHnjZNcZ2bNU6MYhRQS576WjKNTDfeQrc+TCU0YGAVN1KwFJRer

cBQULGUTcj187y/tLv3MLcc9UCLqHg4V+O91uOc8bD
Mj9LeOwAGrg6qXcQTazUqE+kgl+Mc2ClEpm0JrNTun96w5mvdvPcm+MbPY1k3lnY+rVPSCPjPCfnTu4V

9xISyuaVf/ak9uzAZzaiXg2sVwuMzuSju+pcnjnHlDMfC8rRU1EYr9T1qRccG98vG13cnxH5Of2dAybA

9tj3Yc/ZoNCDUeZIa9avVyXCIHseUqRXo/HZu/Lr1I
72JS7da/nY/cn3qo/c7h1bzpbGrFy5LAyn1MxD2EG/8EJAPr2ROG8zz8NqXABzQKwxzrZwXszJQlcjMM

CiImtTWuXjITjDPdNiTUAaEWthDP3KAIxwBZhnMRLtI3YuzxCmtWUpIOBdKy2CXLDnGE0IIYCprHAbVD

FfCqXzYWRHEiRqOVYdHSLuxEFUj9arTmYqOND1RFSy
EirwYO8UFNZhVN5Vb066GI70rnZ3UMReMq9XFGZiPD7Qhy58eWR4VWOcZxJcPOKlwuXfSMMjcsG1UJ2O

XwObUBR8nHPiAlpRDR3DNWPafALwSL8RHHUfgYElHN9+DQogID4+OPiqqwFnTieETePbYIUql5CuBJnh

JWf6H0NtxGNlunHdWceovULVATNdGZFzB7nqbmb5fY
JiAyMiOq3BYgQih7HiLYPxCPwFdb8tsFJcPcC+bdA1R2pnocyJdVPThXQduKU9Yezd2vZoQBtkcSiasm

MfKaP0qB4Szu302DwyAZj7QK9rXD79Z9/vRfI2x6ehdbNXSfR46r2n43nbiPy1rwckBa0WFaWEtmLdKK

ZfDVv/FPLBe0iuk+XkiVkxBN63Uknpo2HQ4/JQ/Sivan
W7z/04gd0r17eon2C2yLg6VOySE8JA+5+l8/aaSG9jg872ZejQnNtNWbahI67kSnL5G/6kUaa1FKdAp8

e2nmloH3Dd0jIT55Im4EUfbTgk/Tbb5eK6b+PjuYiol4+2Yf53l6212Mw/3mM8M1q5xoW07232Atn7GH

7sfugOdYYDnsQHSPUsRQna9icUzYYfV4x9BnS5IbLl
DvrJAH5I+zdtmVynTN/NqXPvrMNRNY9rjhUDtSbSubML/XMg5xMw1TKc8e2PdUY/Ex8mdypRFf8yAfsR

1t0FC4+xNAYzADaD0aoLF2qMtClQWXdUfyPWS2IGL+fCUfJMJ1UxyRGRUmtXQjQp1FnkUiiP86bJFWP5

CYgP25acPM1lmAMyMZ2XlRh6ErCKmMcsG9NKj+fUjQ
ZHR6WSkIGnI+GfZmW6o9NJE5vJ1ldoEg4i42XJvbkU64MjWSrYVp2wAj+s/Ry5Pp/KXoPyjIi6zp3oqD

Nm8G9SGSXBY9TXOQvWejnxejkot9dGRKlqIwOwL4A9ddh2Z3b3aB0tPyaDzGufXrlnkI4ajO6bdUH+ib

RZFLz/11hDds2bM3WEbHTwvn+0rXLiiFk8QjlebG1V
+ge/m/bV9oJ4jojgIlnC1b09ObG2OWYtNbbt0+XFsSKE9jP4HQmgIfaUagYUFJ5zh6LSJO2tIVK1Hyb6

9t3BKmeQsKOLa+f6oBxRHMvjlTsofWd8FhM0TWg2E1XMkIQhbjrCqQOmaMzkZIDSVTx7OVMkC6BmKdJh

YFVaV0gkJYnvZBJoQGhLsC0hKqTyPNuhM5j0XxTg4A
SEYGfiJyWCtf0i7DF7waoky3aWmp2gCno+1Euw44aEkZiKGyOFKRyoU7BBpCNhE7kzPz2FPOkXJJsfzC

hIKFtrc9kmHyDXapMQOUDYbfzVrM8l0sCeTx+Ql2XoovT9Joub/N8PAkV99eQ3O0TjIOw4ykJ3P5cSgt

NjRw9NUJAkcjlpS6DCq3Ta2HftWbvROaszqN670UlJ
BDxRhDIrtb50HvZSABktKns0cAXlWflUlm7Krfu1QybyLZXqtabO6cQHpCrioiLloVqDVwpc/w8vi9L4

4I3AqC8YjVAQ/1EAWK6vZ36EHK1qazVqkKSus3lPC7usMfNvhfMwTPtWA94AzjMeN26sycrJpEULRLET

Y1wsd4WXnH7TaW61LpZy3/J8OXUzdw/8FYp6EfbEkr
GVrB4GXEn0TG5M2edDfsxnUdVKRyAH3N4GRhuZIORAhMuY5ylGIc/u6D575pU+lQ5ubPPKeIWDTaUQE4

NIvpvCoIw6W3lfvVnIdRrYL1FKaA963TuvDW0z2bS4yANUnHTSEMYrjG0BRZdKa3ks1WSDVxkC3Am8q6

Vja0GsKBNORNXYnwJRJGn2wmZ+RXKtHydG2YEK+uvF
X1yu/HbguXaB8Kt2E0q0FVd96BrYsNirwVS6bl7c9rLXCba7lWAlRyb+4L5ZlNQqulf1XepNmMBJiqLE

Gnw+8SF7U6Jxh6I/m0YgfVLSREfJAgpswR3WNkWdyb5bQ0cuFahWwauBPHV+by+kxr2MhdK5/Yz0k3a0

tRkhAQYsPHTOUtJcZX8xf6OHgOYrXIwmF9ciidRKIA
EqXKLWyK62cUSKbLjmat0QyMxKb1N2vRGyMlNQUXlfOdeNPYLfv8FjJfSHDR15roBFaxack/s2XNRKPU

kEgMsTzc9JIzZjfR3tlT3FSPnPFc8MR43Pd27spdN15SL0sA8tsJNK1QoCdpXYir0hIbTg/jyqXYse7l

yhvQ3Ld3M80tnF84HHvLl9ALnu8zi7C5W4Pwuq4zSW
0cE4r//9IDo5YAi3C34AouzGrTOPIYh12RxytiUA0eJdWgaUrGKag5l9MmF2AvKnOrbCfbD9YHp2Bnhu

BYuEq/NZ0+bH2ybPjea+vOIUwbnNMT3KWjEWH/Dc9FflDxAUoWYyTxZENM8aqLhLVf0/efHMuNkdUKqg

Em75ff8VF+En6KMbU2PMzYccZekKqvJZ29dRPAU1ZZ
UfuWJGZ223Ha0UGoqcNpXQI8tNVv3+qsgDD4Y+tHGibCDH27suaaI3KI099yH+PSppqnWgzUq2mJDAnJ

JCFs4EB9LK0fSwhgRupvnBTVkrjPTFfFiiQ8m5MieshKgNXJ/2zCCl3yCcZnzS/FsbDmWJnjM3JbrC6J

yEo3HfbEx8mscKivniw9zoisVKtDE2DThzuO+sp7VF
JUfAY4iNWl/pF2CnQxMEH94vBf0k2JNea2uo2PZhfy4RTB6M2J0AXm8qi1pZDqVmRQjqxzcsC6fhzxbh

7+DPnQszS9h8ofETuJTlNMwnpbxndiA4pO18MQlZwxMh1SIV1etKr01zeBfzN6SqqNFdcdUyGHw0EtRF

PEiw1JgM0nXUccVdOWsQpWB/6C9Xu6XdIfcwZ5rQ+7
J7fJs9+bAhqay4AFDroWEBwmeefwKivqSyGB5ugHjUjazNC7EQXk1ZI4bZhGdjRvBUaQ4ZGC3vd64p6Z

sBby1RmQHdvE7LTYMfjBBuQXzB0dcStXTBZW0MDz02bKeHROpeISs8tAZyLC8SV79GemZs2gGhmmRzgK

8Se7iN8bJJ0g4kTlWmZIXF4o/7JNdHsgAiIIe4m5Ax
xNGTudJLxKcBDPOU45dTginEmx1UmTLN8Jz1S7aZyohDbs8aTMXzSNokXBdpR2CbbpRkQpTPBhfgU0Nw

xGFbKz+2MMgOcNnFbofoeKBYaU8aDJEliaJv6KHK+6KYQKBF/7tt8bnjruqhMuMMl9GQ1J3zLLtCJBD+

w6Kb30CjxnTROhiZ1oMFr5kzmzHBLk31XEhm+jrEZU
IaOw84vBa5l/KB5SBW1xQ+l2K3cCguMgUNDctjnzscvl4yfTEkneC+G+9Mr1gywF+Ptq4fW2245TCV80

29a33zbp/YzgpjlpY+mLOypaw3VL3p8VsYazdiXk+HuAFPXwSlOMAx0MmJUb88HE52JdSFGWZXO367uP

0L3oO+ji6Cfy14xydJfjM5gNqXfLtY6PXz3E3Hb0e0
vn5+xf25OkIkdJKvDMn+sibndEvLQDh+GeDsYk91gp3nLoZ4OwYpb0Akf6CDDfs5g+tI0wzR0vaDY3Y5

Pe4RVoIK7GgB80H6wGVbOuZLpZJGPjacRUGtlFWpqyhOYgVa6mADaDXuoOyO0MggaOaDZqWsBSAQcCbO

wto1Ivi9kAhhg29XOm77bpdY6WT/aTVGJn8DAuAQQz
DX4lS8O3RT8emwmrFz+QuDzuNGhNvZq3aHTDQdfjaMqovK1vVXoV7JvNMRInEsg4PSCP50MPq0NHA4NH

qca0Yfo/ezkDCnX6JTbBXxwcK26ZDIVPassFTTP0+I8rFNhepVFWdlK4SHAIne10dU9ShTJXs9Hij0Jj

uZ8PwxKy6Iozj6M0C+ORZ3zUc57IX1PPqqyIbT/cNN
791cxaPaXEv1z06O/l0QqzGWyAmVBCuNsBfE+64MRlY1tnwjB//tnZ3zChFTiprbZuhVUuNM4FBlXhYC

4ylp7TIRRdYNQtDkeIReXdZWwNRbYyQDTuCTdnVX9MLHymRKmcCHMmP1UrywOmdDSjUFVfCt1BAJKsNF

7JZEUivLFpVOJaJhGiLPYZLsGrXPLqKWNiaHJHi9od
DgDfNVM6FENsEyjnYJ3XOFYcYY4Gq523UB85dgZfCFEkBSDOCaKqESUiQ2TbgNQrTIrpM9WpO0SbPD5x

nINnOJ4tyENgX1CkK8RtbmslALZUQsYuFEAaHPcaZAIuNdPlFVhbOYV+Ih6DLFF+Ch5KLM1jl0NvLVxu

GyYqDF9qfn7GDIX3TL4TfVa7FKCcN8BmDTDmCFZoc7
QxMR3KPK5nrTlaCMU3Xz6LKuVyk3CvOANlFGxLch8PQ50SHZT+N/E/cKGlfX2H+xpCUiyKJEUuGxMjPi

wodeWdsh9Aje/rpT8bcdTvN6v0TiCLDEzD7ZoyurbC/ijO1oCDqB1k/Ubv39Pv2vWrj4XHaA7cwZmGOz

i91QENKi+ml5w5NORbHMf7w1BTqovFpGNXrT5FuFNx
3/bv87I/yRxppszu1RojNr4vmiiPmFX3k473xwFZ2wo24CeWlw6lsh67JJlZFnfb3bCLWsJubOHPHzzz

axns2MLqVRjjDgA+O6cWXe+2Ms6pwDDfqq15vyo5ReoZMZvfO4kCoEjMbiNLqvvodveiYi5nSqpsQq5q

2LXqdBjaclotFO/4ikf96g/tC0gHFjd18vp6bP5Taq
qoAMloMVDVjdZ8Kx5rtezDqioEFmiR5PnUS95liYTONR5FzsZC5yUF8b9jYV+rqTU6pLl3Uj5EKK8vHO

1GFci/F12aig1HHc3yQoyUWn6adHYU7nn9DO6X2+wrMkx2LTZrbzcfrgDoVfghfnmjKd+HgPp7XbkRSZ

b02TUPJ1NhihGcbrvv/BHANcHcW0DwzRtUWggYGEIy
3UDGchmisSmTo3lOEquCeVn/mE+7mhTHIssNLbpk7L9rjaVeItMecyjX2GSjRDEdoi8uGUhgE/SEwOZY

fvHA+8cBaD3IIJrthvSTMX3FA3T7mGtRXRfnu10T84alS1a/hYiL7P2wt7ZHO9WtgjNaZUQCCq1RTpiH

OBnFeTYV3rmtoQorrDR0QAc6MWqWKv39gjbhELKRcA
Y/NowBEkPEWDLlNe7yccbD/Q/ouigWvK7rW/QuDV7hGkeeIGONPEIzJRgGGU7g1wv0kdZDKF2kej3gSh

dNN1Ttsm/YIi8tQuuvEC5L1wAiyvavYXmzSZwHOvsR5Vt60AaBZnFUBTbII9OpIt1xTY/rRvXAZB5sD9

d6larDk3nruhuwtBgvNBqsK+n2rPeOvmg2qtTdd8Pz
DT9lErM3HnaG5GvIZZjPm8q8lxh6K+wF2+2oOtPWbnj8Ee0jdkb/d/S5b4gOjBar81p5a5DL++ncmI5p

8XJeggJhAIDvC/h7l/XY0nT+acGxzl1PJUd9RNL7LEBWaU4ERYg94b8eox1R3X8x283QrTN8Fs4ReX0l

BPfEJPmzVWIw0XOGrd1g32E8bbTVccNHaUNuhq/ew5
1phVaZQ5NnIdKHqFs81nKfS0uULgN8fI7kABmEZ4BxhgFeFf9r3xTyU/7oVjzLrExR8tJAb9OBy9WjfY

c//Ddx5F6CCG3go1PrKIShFSiihvOiQtoIUzKkDRPbk9PqDUjlGFh3TYntFIBgL4S7zVCgYUIcWP3OSV

IeLV4DMZDcapWsGjInYJGIUdScVWOlLkWim2ZdI9Hm
OTErEKAPCCovGLXwF45fNTpnJy30RVihNSPdBvZsYYj4Cw6QTjUtKODqI52xpMPeoIAyRKJvESWLFCtc

CIGpU9pci5EwVHs2QQ2LBN8XmqDmd5PlxlMwX1ozQ6TXQF3LVFWvR7ETU9HsE3nsIjDkc8HtF1fwYzCz

h1QrZo5GVeKnSn4VRpAfIT7pmz0AGPQbSFDyLbjNLn
CtCiS6KUZgMzVwCIH9LGGuRuepKyZ9ZJZ7JhU9VKWgSJg7ICP2OcJdHNBwOwNaJFUhYvPpEVklMOt7CM

Q5AUBfUlFwWor9SOA9JEL1MFObFOS7LIB0YxX6GDJsVZI4BDN2ByP1SAUrKRQ5ONZ9DkG3LNUyJgo0YK

I4KFJ2ZAOgQRilFOY2XJS3GDHiOOK8ANYyGEXdDqE3
FNF2EvS6ZoYiNdNyLCS2FdO2DGEsVux5NLpzPgJyQptcAABqBMU5ZsB7LHIvBDJbHJonAgH9IvjtRyW3

DOh2RFL4VvGvCdL6GBXdRRU9NrZeFhM5PQu2RHE9SkniCaF0JNBjZMTZClAnYfk1KBR3SEJvMrmvIDS4

JTX2UpEvJiCxTTH9ZUW3HiW4PQZfVMD2OAE0EhOuOn
lwCNX9VTS4TpUnVtCkRaYjLWZiALOoNrDhBATuZZI2AdR4DJRdHGN0WSR8YdRhXbMjVLMuNTK4UIU8HM

UjMAWbZYcbOxN8MWSpPRdePIAlDVN0AVMbPmW0HVC5PLZlPlQyKWg4ROp0XBV5ZZHnTcHhMJh0VNB4YY

HxFtMoLXi6CTB1SDKwRaZoXPg1ZUK8SPHsPqHhDZz8
RFE9SPBySdOoFFh1YSV7UVVsGqIlVSm5RGF2BNEpBpIkELe4QLF7DDJuVnYrHC4ZFIP9GXOsYiOgFYt9

IXN4QTYpVmNzJLq8XXK0ZHNwCwPtHLt1PHFhEmkiMlFcMRI9BoG9OPXcYPQ1BNS1NsHhKmEyYTL4QMOn

MdF2EeifTtraQSU7PvU3NXEdWwSbZLnvTuE8LVSsNF
JkFAP5MOMnOjUaRzCnCDYcExJEPnVvVPv8MYDjGmBzUaPiAjUbRCQlLjQxBmKxQFR3QXmwFIQ8FmWfIQ

T3JBCoPSN8EuzcRmT4NVL6CbX3MtwcSkD0BZX6VvKwJXXeQYrlVqU9OdheQoZ4MHR6UxP7CbhoZxu0QK

Z8YFVwTmxoKdb5TGamCyW7RpHfBby6KV0GCGU4Jgia
Tzs9WPz4OGH6AyadPKq6CPi1MNS5RkLfWtFtUTegWfZ0RwWrVfT4TRL4JgX0OPQzEDF7AUT9SjH6NYCr

PCI1AUP4XfV6GEOgAKy5NLDkZIZ5VGHcPXY8HEH0PfN3UIJxVhs2GMT4AMRoEdnsFim8QRT8UdBYQrJf

ALG9HAS0XsA4GZXqRRM5MEK6TfQ7ETZvMVC6MXMhQQ
M8CDQxLDG2SZN3WqV8RRPqPSKuKFV3DtJ8IGJgLAIFBhQzFG8buc9EKJFgBAKqGsuRYbDnMDyWXoNzXF

InQRrtXI1Qw128ARNfU5LbaPIyhb3DMKKiDP7Tp691KdZtXA7LcaadlI7WOCPbIZ6Uj2JkdoRoUJS9O4

MwgYqduLypsWI0MYLnTWZrG5SknDEkEeOaNUmrQEJf
S4OaMTcaRQDeRHglOTKgL1PczxSPAs42RVrcXF6gEUZpTZLlZYU4AIDaHCswLSSjL8m8XFtdZ1KvH5uq

VVTjB6MtcTEjHX0BGVD+Ze5GOI8py3EqTOuaDoHfKQ8ftq7RUTU1YN4HTYYwMF7XfVVwR2ToqpReZ2Sx

dGvhCD6GtnEoDGrjZN9PJBSgHx2rdM1BudcqqG6Orb
HqOXgjOr4ToT7IpsUsEK4cd6YklznAWwIbYADoPdbgj7KRhZZfICZxJ0mpi8KEwFWtAIA5NC3TNRMrSC

9BnKI4fJMzGDRiHTQAAMulMSSuT0PojbZOATTxxtogfW1lWSL2HXBlKi0VRGB+Cb0AEM3hi9UbGDfyHy

WoJD5sai7SPUSvSNs9BUF3OOAjLrv5UEFlYtM7JyJh
BHH1JKN8GhT6CRauQvSfWXDgGUMpUrNqZxZiNYK5IGJ9FWSvPdu3DKJbMxUdBqceKqi4UUL3AuF2IHXo

HCP1PFL4MyR6VUHrJXA2DWY0ZdJ1HGPvUMM2YSY1UsTzAvFcCyLpQNE8ZSMYWpYtPPp4GUN0HFB8HVTa

BQs2ZMuxNdJ8FdDbAiOlLRssSpT7LuhiOuKdINl0LF
J9MmRtFnd5RLT6PlJ6AbHbSuUrDIupEkX5YxWmFah5GJC2SnN5HberXxSvTLY7TfX6KLYlHnZvPND2Gx

E5EUIzRnA9ENN0VyT7KJDzJCyfSZBxRmPjVcprTbWsMRP7BMI0RJEsDiCtDLT7ZaW4QCQyFEC4SIFtYB

X9HKXjUeXtPGWrWKF9TAHsXot9KDG7DSM6SHNkDiu9
RHl9PVM4JMGrBmNjECDwHMM7OXGsPtd4BHR5PyNdDxKcBcFhLKZ9TaY0UkcyAXG0MF5CGDL9RBJzIPYw

LKX4URGrPWLwYsa3WZY3KOQ2ELVmPgKiBBv2RQE2YBMsFjKdDIp2DSM6ZQCvNhSjQPi9NMP3UIGoOmMn

EMj6EYM6PHViXlTsJUw3TOR4KAUgQzKuQZg5DMT6NG
CtGaSsRCp0DKA7CHWpRzPyDIl9WASFJzPpTyLoTNw8YLF4JEUfBeAuPUw0DSP3KUCpLzDjWEl1TTD5FW

PwZoe2LSVxXbI7LFWyFQW0LWW8SrK2VVIzUxwkMKV1RzIrAbRaGeY1IOL0DPI9NPZdCLs8EYZdTeW4Yl

qaNPNrTLOzVOV4QAscTiJiCHWlHzZsLvXrMWnrDLJ5
OfX7UMFyAeLsNIObHdDyBjIlPoA0APO4HvX6NmHgNVP6CSutLGF8SHNnWnRkBWdiOoS0AgHkYmGzJZxm

BsE5JzXtQIPaYQZ1CtXjFhK0MKN2ZbC9TmhrJvT2MNM0ZBQiIbrcHch6ZSW5SNQ8MdZeThXqSZj4LLSR

UvYoDkt0LHs3VFL5TkupLka0ZQJ2FGM1JrfgKvClRM
ekQnI9IsTrYkIhJFK7RwH9WhdkOkJkFQE4JcS2USBzNYF6WEY3WwU5MVDyYSB0DSu9JFZ1ODOeEQI9DC

N0MiP0DMFrZEE2WHL5PGPzNyjaFso3VIX9TVJ7LLOePAjmDYEtODW0JURfNhZwMUVuQZI3HNSnFgYbXE

R9UEW5DVIjFuRnOQDcDIX7OIRcKyKgMSD7ItU9HIIm
HVQ3TK5eFTcfmoIsTroDXpW3OWWmx0BvAZeqWTa1UHcxRZNuW8P4wTBwKy5zyZHip2XgcXT9j2AJSjCu

VOMsLw2tgC0pkVCmGLDqOXrmZv6lXC7KCMAcXR4Vi9HwezYqDNR4I1OkfNmwuIrqbSY3IODtTLOeU6Jj

vCKnGkWlBMoyDCMqM2ExSHlnIKRsSGguUUFkL7Htix
ILPe21ELrjSS5dGEPdRTCnKJH0XHZeLTduGDRwU0g3OJpdT6XvY0scBFQyQ4XwzIOwMD5AFQB+Pg0KZW

7fq7UzLTleELZrAV3usb3SBBY3YI0TEKRxOA2LkJOsM8IzdbZyN8HdxAanNT7UisBwOUynEE0FEHYxMv

1quF7CehpmfKtLq3hvX3DqS58toA1iS8ctvuYxy8zJ
txOwKLbhFc6JKDBxLA1ZzAYyvZYvIBJuMdGnUVHhsWDyBPJmFiG8UYbqEWKcW5jqCOLmjePtAqLyKFWF

QoSkOPYrMy6pxMAbv2TdgLU0m1UzTQfgPLLKULtoOD7+TCpcsqCpSbeCGcKxVAMcy9LwELmmNJv4N9Ei

sHAnxwXrByxqrQORYVSrQTVjT1ngfph2oKOsKEJqTf
KtWNLzF4MtCOXoNHM3Ja5+EGdiRSO5vrYqqV8ZRWAhxRs4DRQW8rd4D7zXymOSCVXk1UNpSPFKZICmpC

flZ4SQZDQDQKJDWkS3lLDaCxlxJ6TTyiEXbWVSDhWnNLENHceG5YtUbkXiKb8H6bNP8B8iqTeumBduEt

P/8z///3zdee+t7BAQnwMtNyH3XXIAjkHI4U5J+bQp
MO6pMbfNNkxMZ3kY1AFgMsF5yaCksZ0gTN29vRA8kGxN5y/1mnn+O44t+wz+2xTbnDwYT2v86qpG0TCu

n6WitoxovC02Cf+Nupqo+Y3g/4N99buB2/7+7o4XWfgJtopnCsQdas/kZkJ18nnKx44uFyYcYFUj1P/K

+U+fe/nsO9/kx06HvMA3a70fE+qUl4g/l96ytf9Xp5
5+7uxC2ae3AO7q/2186cgvP9mFhdXSL+38ggqSU47iFthvGhCFqR5yZgp0H+pGUk4s/bjlx+tjyQmv/8

R6VMhW+Qz1p/4xICbafQikUHzm730jiYK8DaOJEIyYyMu5yL8kO7VlBXfcOZ1yMDT6MgTgbIU5hN8PZ+

xa0w8zvENZmET59VF0qVmZDm6CZ6I6hIMSbgwQ8JEW
GePi04rRrywTb8g11UQ9U3NM0dagKWT7Ub8Ep9R/ZwIpylWusZLLocz6Ptf22jMz+yZcQH7wSC2Bc9Su

SGknww3jWHMG5p8yOssLBvXlgtujOmpmvc+q26nCGjgYm+hDcXfUMxREKZXgfJCuprXaJbqwLrd+hASp

dC1k0GkEHRB7ZAZyn0bHRIK4UctBLr+7MezkhDUSz6
O1ujNgmfuASHQPXbAB4dK5YHmEnKmyltCa+NbS7+ll6vq7RU2oxlWA3zoW0M7a9GBPz0XoqzUxIEtBVP

51mX6XjHK/TS/O9tEhdnsfuVWQubM2edvvcBhScLxw4oBZ+UptxQuP156lZ4q+nN1HWA60tV5Hv/SRSB

PHUjUIIzzIQUXygcS7GKH7iHmtSan9JFY/YNAO0oDv
PKT9Vt+aemXz3p4lhfDW0tb+lxPvH05vvq6SZQ5k5dYyhHg0Jx1yR0ji2A/Q3/NQcTasXwImRB1c26On

2G6LpUdJpvfXTQC2zSZreHdrwNKUjQNmvzvajUyVdq/Ts/Zb6Ro7MoQU3oofh1zwce/X/aHueyvLWkJj

1G5uyaZ07Rp07L86TV/g+fB9XKzZVBAU9b3TERM3Wj
+PHdBWUZcSU7Z2pJY6+Kz0Nj0Y92fIKn5PglKBggn9+Fxp3yRgfy63HU3V80F9F/bX7ezeb2ZsIfB+Wo

r6Q4Sr5g4I2uenMu2JF1BxvYsoZ2KjoNYT3Lb1QRbnW1r+4yXvaz/99lSnUx/BEv9ajlZ2QULtwA6pVt

6KjOdhMrYLmZu8nfA1g3IC96R/GH9Jx5fnkjkgqfId
NuuG3ttiMPY6z5jcJnWoBb05kYfeRigzwgWHadNyBumDtMpM21gwIMzWxODoHefSc+UGwsxttLVo6Ovj

myX9sRLJi2uit58FVzlv2g+7p9GsmG/gzuIu7ds21fB0LC9cOynid6/Tj+dFzCwJm7hpIpO811jl2qu/

56jjGtKC6RlhitGwl5q6893uWFmlGclDpo3WAvDBrl
bYvr3Nt7ywuzC1XS+iP0+mDf3YGP+PL2PgdIBTFceSB3r+nb9AYEc3vGS1H/Ih+Q8ho39MCcJAIbiqao

3rG64UKU56J78e37T0NMXSF2jRFmL9vykoUkRaL7y0RqjjT7nv9oaxj1KgK4fk7nSapjqOEw2VMd/4nT

3VQTXK6VK4YhwA2TBIuVge8cGxDjgZR8Gr5DSlVcq1
U0NCmmMTh9szz0NjahV/RF9hHC+j/xJl+uV0O/QJF9HevxwxjtFndevAsDy75Zi9WrTJ51nyP+Dt+oh2

HtCv0J4jJs0adYk+Q/BrpZ9QP0qXLgkA3xufjF8IWOmXNWOmsaX7oZVACMjI04+Ms5rR6rzHEzFuqbQQ

fcwO8payIwh8sDeL665nrLCcSRMvzYeMO33wjoCHh+
7b4FknPNqOezyVgCBSvZ6tX4xtyxEruBhCIo38jefArU0cFonqzYF9P3PPHptz2PldY2JxxTfxUBtqrw

Z74NXj9nNn2SeQf5i7u0rfod/UUhgpqVBEheblKwgN17upWUG0X7X102XCT26H1sM71p6SfWllZimIB/

TEe8HLO9Q39BXjxNh15pF6kM4RcVf2kYQM2ULtPZRb
MI8+6jFoiicDbRwSb+V3z6Shcxn1C1nHViU6mJgsFTbjgj/9s1y/Sl+k/7P0lWCY7twtQ2yz5sQW0JuU

VyLIGdZMkB20Uiyr2Jh+86uyRneRDPHRFSmunSw96bfyyb3+KF0O9OcOlpTgqVizztvEfU0+JAhPJ2Qu

hG2NcSDUf9WcDwIM6IHlMy1EEGI3IkMJbKCjJt3qvW
ZaS6nx+2AEstjUjQIQCmNRz4EYAUwRQUhjaVjVUaMGxYhVglCA2NABUMXlBZpzPhLPbEA3r/yM9V3xyr

Se75aedtN1sm4jhDkeyu7SSjJUE4jsD7Vm55cmgl/IMg4oynT5kI9pj5EIwMW8RdubVS90ilzNiiG3Gz

5C/k5srFQIY6/JRjmNverZMOA7sr1Vag5rsojwUPrK
TBU+a79jcih2xmcLNS21qGUOV/9an5Iv4SrxpgzElxtAGOqoxOV1H8k3RT+C6Em1fflPsZ0sWGKxMPuk

CDgOmPIIvh/i+2B5wKOngndOVOyJHq8ShcMZXB/9J47s3o73FmCH1GHQNl/HM1a+q5v5L22C+O3f92i2

Isuhk6y2palhICbDwgYT7vclESfFnOWKrjNZns11tv
djf6PIq34J1dGX5uL2aKhdtd6RVLA/lm8LwnUZw+IdD9stbULRsRApk7TiO7oDmARTuzaGAodhrH1Xbp

OsfSktZAPE2RkI+OV+4DXgLeoGBIHRgFe+GiJojdVfjsXg21AZRZlnuoUygCJsGXAyfq0h+yx2ZbH9pS

7F46nStna0kFCceWGRzBPPsyCj6cJPt23F75RNHYD6
UXc+DHUNaeKaGPLdH6ZW4xAoMLfaLqa0J/thW/zhUS2h2DN2rAwNrl7lnk44vL8q6B1pfcuSx/wPpctg

+gb26PIi/nj97uQN+f4i1QGF+dS1cFCH1NyinExx6SWq0mPY+hNKfJJlcsK8oBSpgrkrxser08cGWOZi

AKMtMN3yl3u8DnD6QnlI5/XuS0FT9v4sWy4MN7dFRM
07+Uk5kjuildUzcFloVneOdxVh3qVP1B8RKtxoI4CcrguwQwlYtjE6jHEYNiVn4ttwSlCKPuyeQF7qZk

dZDyotQESE7ZQhK/FB6rfCpiI6YSeKNOvyTzMpRg2M5nnHposUNJH5SVzT3ZkhRhrcS6GK41VhXywOvT

4ZUxZpn5w+XDRiqMZi7T68g704HQCKRJrEwcBjd76Q
XEfScfWgRH8bOTGAU5nlGulw1qtGFfInBQzV7JasV6ercpFW2ltSyW8ALNA78nF9JFAVongb0YWeD5Pp

X8FiT9TUzLjuqIb0OjGbf53gPX6dUisGh+ZtViuabP++yT+/k1PGc7Cn1aUNJp0wB8s1W70OSC3wwL63

l42L7hW/mF0Udmm5ACaYH+1FEnyzz0D5EE+gFLkZNK
smZTySxdSkd/lhZH332NyjMzkTxK+f2Br0A7e2dhq7e7UCPm8XVJt24icvqbFxhqqnL9F9usuCgF2+xb

hbtavneh4J2WXIgT5dKRDr6IpHjySliYATEt7IdDKCrbZDs8r4FL9T1UrevtlcSVD8k/nTwrtl097e8z

J4178rYGLJ7haORUyyaNEJdTKBLs0fNFlcq2lTv2ic
AkFKllsnU0Ok14S6ZIwjqBB6xj1g1kgcbT/Uv9u8i9No4oLZBqZI2JEZM3JwyLlgm4Cn1DYWh9Nknhij

eBuMG072WjuAgB6SJxqsHhWs/vC6dTOkVH5nTblQPWnTCZZSsNyH3h6coGMAJ8E8GzrSnzFAP8rKgEbM

RsTtFk1knlL5klSqwglauHntTKGXf6Evz6x3POoyzZ
FBPy1uCYNv8CHlwoEjv/VvnD5Yrt9ZE9htc5YHXCmFBwPkBYNhm6fzNdL9x2K/aSrEBXRMbri7lF+ZlB

Tf1UdVu/RFmwi1UBulnPAOtZg22Zt/Yf26hW4WjmpTJ/AW05vP3ozw92VpQgklAHYzASPvSp20mSzZwz

aay44jo5bk3r2l8bipnkqGC3GYZ95lHg15hJpNo3Sg
7Vc8+7rAQg2zdNOJZAIKjaEBGcQ5K0KAbxXSos6siBXIamvsTOvtnfS5F2cC6+XRk9JjECjsbqbaVsCB

sqyge4LjuNmklshWBQ55mKB14jIiUsDBj/z+3rajLOWMvniad62rHseaIvneEP39RNKK60/uN+kc0ZM4

bn3dk8zu0H9ema4CpESk0w/RLl5mPezw7+DRF/Q6o6
yj6WBmXXmxuH1fzY9qipb8Iup0X65fgAY39hU5kJqQt8WVZrH4+Qyskc9pb+DHkvfC9Nrs/xW2DlYmf6

Zn2JNc4/TOKOxEqYUSh3UnmYQPiljFYeHNPMfKfL6ZSCtultWpJlwRjNaX0G34eliV9OELHbmLOTds7v

6dgX1AzMBg4h7IuJY4kj8SqG4HxSuwA9K0dzIG5Vn8
LDCiFnZSrSDgZlIFwuwKKfLTDqY1mlc6zdu27xeQBVZUcoW3hT2Tx/wgIULNf8i7+079yX3Rgsb19OLX

xHNhLYUASdhJUJ5VDXeiiXuzNSK1M+exhbKiXkDJGTauOyn8RIJfKLDfR45wC/UhQf0lWqH21S2GMGOq

ldR/hYYa9fncdheIIJmPsqM5yUUU0AZOOO+M3voohU
Qhjp1A5J9rWN1XcloI8MoAXdieykCzLnxWOThhJ/a7/dz8f4c1bm+IU/1pINyxP+NAsPaqu4InkLnBO7

ZUsv5BQ0KBOyJt95XDg7Z8BISzApiYgUmeLAoAHg6gV/NTN+jT+M5jNY54GdIYOL5XyXppKm1hoqZqso

UZaJs++94zVoyvZ8O9Styjlm29dnJrHS3Un3zrAYzA
ce5gy2yMXyv83lfNV/5CUddaqIc8JA15sjJod5OcerqLlH3jEHQqvgv1XyzShvpm54LDlRiFY2ni0VW9

MrTWFNSfpKvBuwn+DnB4XZ67m2GVgt1RpDHDVQtENIUK4I/tAUxFoearmWIknVgyT67qkdszOM4d68Qo

PchYu0S+CW00+9Bs+Q6Psz0RQo4NM6tYeVxzXyHePR
mFkH4BXUh/FyefEP51hc0eLMdHJXXS4O3Hv10H+6iClnlX7HzwD8yONgFzVktTAnRM65KH0UABtErQjj

bloYiA9gL9YVsR8ybrQLWy7VGJYeNqMrZjZ/iAiJ4YWLxTuPBAyJ1jHvttL4Qy5Ij2/lB1yKJSiQlkma

3nIG6b16klNXkGI3c7FWDGhtcFAeDWFXiIidmF7OtB
JegsIBkYIw/NdX39VxENSzUI6zwatvrtPaBm9mdkQZiVmeGc91sg+9OSUnFDOz3NII8+av6My26zaypz

H009R83BOomQ5cHq7a9kH1gF/nUfPjD5DHLWHhxD1qgP5pC/YC3dBpGMOZ0471rwMb1g7L5uMcKWmMPS

6OGJFAWWBESSn9WqB2eNpa1J+Lo+4P4CgmxGeQuNkG
tzn+X3nQ58aEe7hGLqBmaCjpH9adQTW+RDQrouzkrG8ByPOzUxwtTOB+BTYAMwC+fFFLFnWLymLY61B/

YO5ctuy0IyQT+fHxQd0zR7K9wD2PlAfYrtuDebWUOPyuPqxfTc9tK0GrYFa0zhwRJ+72CMz2ebkw32tl

/uU/UUY0//kgazDGoMom+8yBtcARxcwAnF3ANRj7XT
1HtAJbqwxQVsK3AGExWB/mUd6d4TTBOENPC17GWikilrdjsUG8zh8NxVBnH6OaXu7NYyJxaFV3G6Ohgd

unHZYH6DmFO9IN022vQorFyP4P+KmDbLjpRnpmEO75eW2FiMNbYnQpRaqi79y8GrDsplKeO6VUbdIvV+

i4JU4DVepNDF035a+L8C+ZaxGTpzs/JMwdws7A3665
jolWe4UP5FWtpSfIC2jMJEqo3rEelmjweu+h5Y4wtY9vlXuZwcpiP8EVG7rdDNq2wSkYe4lmhwm811SM

Vw9jG5mBOGR+Pa4ARQQl+tHTpEx7zGzseNNP5vib+85V5ttDJ3fmLliKFpfw7AU8kvsJn5t24UoB2RgX

OV63j1T1QXC8uh/jqzvn/df0zUe7C0K9E/nRnRPxtH
0gyIehGP5La9dXIo8f04n69uSkq7goPe8eoPS4EOts+Sx9FHSP5msUhGmL2aSAzw/NgZ+1CPhN3BpnYz

W4+k59nNRvl73ogF+0+XEA0XiayIG8vPL0EAjKwSbIhkvMooweSSR8ou3ACWrQ26db6K4+h4JV+UuYVu

Lw8a5gJLqsrncLCP+oTcs/QG1WhkArM3HEWBKgekOL
jOxaYanbnL5nKrF5M6FE31K55wEqt2wQg6cC7dxuxHmNOOxs2BFJbCfVSLSTF2bC0Spu4PTngZpkea+D

7XEh4661lu3r8XgloN0gGocMBK5wNsr+ogSI+wpuJwY8TZQ104yvJOpOPheLk/i5WkMCK63bWYgboQTj

GG67GjK+YuzeYHUF60MF0DJXGBU+adXmN5cNCchMIN
PwGpVOSUlUqVc9Kz6/heDfmvOuDZr0OkVCD/gjXOdYNtOgI1OeXOle70A3GLhCsach08K2r66BBwK6ea

Suc+Ln3OCkl8bJ7SwxGTZccRDfJlrM3fXaBganKnEC98OrdaD3wlr6sNMP1PCLmlfRVU1uy7XbwVem4Z

KqvMnHiuf1N53KP5ZRgHo1LRW2whEs5xyt64xs4Jf3
3pSJpW04VDGymE7xU94Q5O5IhqrlY20RoPLJ73RIigAGemUBp8TaadN0T5OhyA0tnF/XotrWC/3pw6aC

G8/zbYlhx+iEZzuHwD/tU0Ue+K4MdbPw9lBQsX7NwuZaA+XO9QnNei4BkynO1v3LjFlEMRagPFF2ko/g

InFEkSJprsiDa5HXET0iCaOIIXDkAhsMcRO8zWKakh
XS3wiQpYilNh9bz12HqMjnh6+6k/5uw5ZQ2nwATyBquXwWhdJi14kc0WT89wkRN3MDwL9+b6cG4RJ76c

IWgbLonx6J5Rcsk+0S057Z7pXxNaT1pB5rWKcX7JJtJJFlN81IflGo6e66oj8IOWyR6t+J/HKKIPr6vl

jEh+2JVVTR0L/PShRvS9sBkMhIzzgY0dMtn9yGwDua
i/eSdj8Fi833u3yhJPif16yUxvq6F2E8maOztbI+I8IWyc2jhS/7Js/qdYlc8Yalw+/0bxdh+f1tGl19

8Q2vKzjqXtup4Y4FtVw0peh800QmglZW4GhbzioDWlWG0py3yVlvlcD10Wy4yT1oeH6UKQzY6FLvIl6+

2qDGrwQpJ0zKLzc7zRGRRB9fiWIT9s/dl+owT9V3z7
wPFA65SJZeS+I3BHSOAc2F5jLLBMSjRtc7wJoF/nTVwXyUy5uGtUG9uXw+3jtktk+0C+7vpBGgSaDtoH

mHK6lTcGf2zspH3Gj2MDpNrrxhy96+X5/xMwdvYDLwMv/E1iWaI9TcERnD/DDhkIO/Jh6XDXzHNYHpoH

hhSAb9HQelP+lJGX6aHpcTNoT2EH9uKPPr53Vg7zVM
8QohS3MNpm0XQYCShsyAP4+RXa6U++BePhI5UVU/3ULqEMsT5PCT7/+SmA05UDh/4EtIlW4DO3/tRk+K

8BdsH/MxaWPMNj1wcnT6sUwJ7RRi9mElqqO46F/+Q34HfIE0itd5eJkX6o1vW2VFNF1And352Y8Ax0wx

v+SiFzAXECjiyC5vfep+3Kdi00gx4D61N1S6438MQc
LNtGkyeLoh8E1wHwJf0z6c/ObcK2QO9yftIn273xW0W8Clxz8mQhwAmNjE3FdkmcSoxlAq/QOiAWSHbo

SCH7JGunR9M4DgA/c7Si7TZQ/OztNL3RnYouCsnekc2Pc6+Radha/Ukn79guv3imlzMdLo4g/4z1s88k/

BA7NmdJ0CkzKu9PzhZKE8Ih2f1RL6L9fk+XnmaPD5+
l/1+/Y0v9bSbVVesD1qxIq1eJd4KFN/A3Zub/WH2l3/Gp/hF3i+xWvPofs02XykDRSuwtNpAn01ZVB/s

xp29+VAEUm2844u088legW0SYPBc9eIGsNXkU2sDlEbic+nr8jTVg3SUJUppsYuNvNTYk1FvRCyPB2y2

1T8P8G/hu9dDJzmxWQNN+G9Edltut6TFDr7XLjQO3b
Mv1SRJOllV02Tt1hOpOVbFnU11e0PUnx/LEVFNG3YSbDCiDZ4NdTCjHg3VTsSs2clNi9Bt64BrXn+n3M

6T0+96V8yoIGhszD49UH8Rit3nK762L2De6v9YBPj4R2B3eYTqUm3eEFH083Nu2/86wLbh1+YXq9RixX

LCkNl8quVPsf1QrAQV29sj+tb7PRrSrdEkod45D6g9
fqC9Blyro9eYB27S7nO2+dYCTxym9WNuE+i/qvXxlGjwpx3oxx8VzbwcV50apbr1lil0xmlZnv05VY1g

F4EMHJvA0oI2yWqx4W0B6uBPrzY++O0wHL5nv4tWfNbPgGuHTlMT/ZxZegkiGjLh63Ka4X7rjZYrk3bS

BymEM5jLW18dj5nmXwpw5xgXEJZ2O/50fu00+iMdpC
rPvcPdnHQN+iS/WwzBAKoiH9WoGpRi1FCsQcJ9eqeG0++2dO54Zrq85G0et5/st503KlH6MUakEPd6+1

Z8pZ22E0VfVGhTtpB+y9R3U+c6qPqYW4TmsRUIhunfYXHvoxs81zsaPSQvxfzf9F+7aNXBJSQc1zMIzT

i12h0APMvX8tKWMhmThPjT8cvvFK5Is1ymsnoJHKLt
O1RSUR9HlbEdKX4VochkzE+aiP1A4fF8675c4IOHIIVHeMQmoirxxlkoKyHKBg/Gu6EkiA3rmsg0Sig9

8o77qP8tU6rL/5TPnqkS4QqL7oiRQ5KvXEd3RFkWcJYFO+bFJ7VFvVnAY/YOm6mHhUj6r/f4Jx1opwgT

1QHzB5f5XDjGPnsqTZ7WBKvDDVMhgc3mDakR1rQ0Lm
2CYJsSW21dlEHFwnop5mGdpIQeMVZeTfsNA1HEJiIaWOzCbnxL9VG+fD6XMHuCsxBrEqZQTx4VZvMKYo

WTGpygCjp6cuAa8iqRW+7WRSKLn3NqRA+fINRALhgZ+RryASCC/5CGExi20iFRUi5E7IkuAULHxJMDdn

t7NQuNeAh7HzNv/ooBalft4FQp+r+t0sCbAMUx+fHL
7/Mervat+iiICCSav8Y2g3rkqDu2X/KoKcmL67aRp8vYb0LqwimdGXJIaYDsfDdazs14kV8Zni97vzh5WM1

CvRPhbAn+xy1Nls+0tYFkI3isrt4cuN0PXSunKik90VIjnXmaUkMhzNVklrpJOg3Y8hM1aV+jTAO+Lvu

zOav31Pv9rRp/LeZ3WC/WgzeC8f4RQf7GGgrzp3pgR
mDFvL00iY02T+lQZ7rz3vYKr25+ydEwiWb7gaAPxUVtl6cT/iGRX5uqQmPNvBkcAS2pUM4NT74CpdL9W

rnh96BMJpT1DmfbDYr1xk5wl8SiSELGfBaohQD82BQXSc2wo+cIWTA13T4S2El0Hij7NPFvD7C1PP/jv

xLz+KQ8qfmLvctFdw5tLkj8l0ZcgpDPd5lD5iex6g0
bvPnIOqCRWUPC7smT0JwV2Q/t9b2SD6AQNwz/TtYZRD3ul+yC9T0WF+GP2/fyoj4VITuVkFTPvhF8zP3

3nfn88NLxJBilV3joZT15IoH28EqbQPCgFrN8bSYbtakPkOcKQo4/eEJdCdIzdSM92U/fd+VqVvpc5H4

VTdom9I7+etqtIzRu1HB6pS3Nx6sOtkroLWGBw8bqV
5UF6Sq9xy5ZIjWgk4j3m1fo6d0TUW2ctb4QtB2Kxa/l2s3zPsSDiIbeBsFuJEPhSd/KSYmuxextD32M+

5Powue47xDLEWzeWiQMOrFmMgJSgWl8wqtHDtgU1szbcKngU6jci+RC3mGQzk66eFUscpWw7eQuO2mru

2mA3G4dHiXagW6T0JCjjhkYKu5e5sRpu1+sMxcPnZ1
TUC5iMpu6YbJ/92eF/Uotb1rQymd8cA0xEeodOK39ib9/uuf/kN0h/9Y3bp1X+U3WOM/5Okr7vvcmFcd

ridbjbQSbAMLnQkyP4fi9/6RsXPyV8dJG/f93Fzd9ECrm/4Hh1SE6PyaQOuVoOyUjg5lhscw1/L6cBGn

7H1cS8DArj4Pag8Sl2xbzoGth1N1tA7pO6k6d/hVPV
GtQ1jtjy51GBIV/xfhjM7M/h+r90qJhJo+C6ZRiRaVeJ3fipPUo4/xmg9yF0oLvFZnzmIFd0iq6uvemf

VdqU7XEZ9wZ+VySsfyic+34qnxqotnL2IIg75tqE1m/PHOGCCkci1ynMSZ4MwtyfJtvd5y3pi1CZNCkh

4Rl57qQi3jj8b9y3tO73Ky1/bjv+u+6ze4mta3O5HH
1sQTgm3F2gmQRntFU+AKLuwypAoU0PrRgjvT0Xggz8ZQKGsdwnqhAV/Oytkv8Zs+mghrRWc0CF32nu77

dzRAkSqf7H+W3PFYw1O/86mvu2d1Tnoox0m9o3mA2WatMl5xfJq9Zpt6fP2LYhN8mXkvAkGu/zbvrzNP

5Mv86swYmR6mkOxwx9fmxdPQ++bw1LoQ125HE1psTo
fOROYn7hAV+F9ejdD66x8zSr1c8mFkwmLVnpspNaho4LGy781eizkZwcwMkli9+Xl1V97c/bBlzqFm5q

Pjh77Va1Qjr6OgoH+bPhlUa6tTifUkK6izAduK2Cd71AJKzuwu/hA1AbTVAvo7WNYeOb79fwvRpA3cnh

lurOmmEfd6LRv+v7Dmp/aRb0+HvkU3d/XYlm6IFSxk
hClsEAGDCeQg1fTUqQMR/DGgDWwnYQ4AjSG94vjYmevzKq3+HXYe/EI/fwEK1jJdUjSRmeMGGAwHjkP5

FPfgQ/qRuirbQwKJ7lFFrNBommwHbaj1+bMshHNhjY0bvd4yipiEj1gByy74NBG/rtXxkzAEzj4rAxfV

+LdCav9IhvLkU/ybF3jt7ltH8p/HirfA5cUu4GEMLE
2ik+p/32STxOIy3N5FZrX2WHU1cbUvXcybt26Y+NlthC5RSkverD5AofZdziiBK05I35bZXpp3bZ9hgK

pCW2FqUSg6R5+M0lvk+q3qBrqj98f71alRBZm3eVQzBVrb9luOMpZbi4G9TeUwB/Y68PLb1130P2u+2x

vvgcTk7kkeeHF4S1421a9+ripOPg0qO2jaqK/VFqjZ
uqgRzyB37JWP4OsNyx967L4i843jkRDiw53aWnEL1UGmBuy4cqVY6loPiv26WR12svWWmT3rTt50jQxW

/j70iz8segeJnXOi0BvQcyfvz+C1ntFou7p7Z7YV68L67tXobbW47gCubg03cwb9S4NO3zsnorWb2d2M

RZan/XoccAq84hrbdfQRlU3kn2incY3gu1u73+Qu3p
L7Hzo+vSZzc03vl2u/E9W7TzpceibLebMyq9GKnI6sn5/2HO/q363UT9Pu0FVMS6lnWqmjP4bjfWadA3

Ooeh3E1kXQ+giBP1rZOhe3Y+KNOaxUSUpOGcG6fmF1HedIu1i9CLG+6JIuqpbwW1Zd68hA3g/8g5mkGR

ZOsv+aatg+MA2KTjVaWPl6XblwexgAzf5gJdv5307q
VHvcmXCqh3UFq1ioigApz/x8iCZhDwf12E8LcCJkFXSip6V0bJ1iwSuiBKBIoc1gnBmt/21q+5kJvwh9

F/UeuVIYhnG+S0ne/jm1q4ZixAM/fgre0B2ozjmc0v4b72a0mtpJ87vlTxpsgaiYfnJa3QI8ADi5cbbu

weT2xAZd3tqa/eVr/4a1wx6ucPoR++pd0TTjqk7G7w
DEx9uhjJ+lARlreJtm7xQLxIGRY0KSfW3OdYhVGkVz5EtgH+Pzhl53Tpyva/V2R7Bv1ajerdG6TbDP6N

sh9QJe3jmUrngB9LxBRd2w3vZz+ugKF+O+ViE9+rUXeh+dWjuSdVhK3WpoNiNu4RmPQANMqx5DWR7Ra7

zVR81wN+PW7Lwql6eW57Kd2V/SamSEtEPZz+fVUWh+
yNmFwe4yzV5mfSlyFopYB+VKQJj4WyDrWcIcSDQBk2iFV59zq8beof44arF4bReRtTRwln5fdscZ9Mms

eSBsTolKb5B4UVK46e1e0TFfU1U/BiHsuE/wStFl/Umc1S3GIlcUl5I87g/mfhp6YKcrx7NZqC/RALvj

/HIVj3et6B3Ob04EWbJWwDd+Bvt49U6upgL9C4I+Uc
w5y4l+1SJxpCUHItXjGB1zthR1v1H+p66G3PekduRaTCueOmWM2phqpG3WD6LtZY5aO/TV1R/retp6Wm

kGkAsNTIObO+ezCe9bPzU+JQbsbO4jOXJMhhram2bJ6nxRrAbacBp4bwUuqExzLROa5q9R2Q3hhp26vo

L4nQ6LjKK0cY8z3/4XqsYf2/IGwZ71y1H6hIuuo09P
L1YyxXmdkJ20h1i9qDFaPRgPgBeh0ctYv8MPC5rUhZOH8A80d+hD7xqbkDutaAPdobLAUCEtjfKbLYf9

NAeU3LAyX4oAkhQ71VMCZNV75S67xNroxm/ar66sFy5ETEussYJNaj5LP74oZ/RqoC6AlTyAyr47xm6a

sKN6As0HN67i/OXig7OdqqhFXEJ7XX09yjJc2rBquc
778U/6B1OKcIXEPSDhCXojjFA6zQhhkHovLg3MuTO9Py07C+rKNYPzUEzGOPu/galsjUBUiRPP4u7Yx9

1rDPsXnRic03hyRg9CBR6Dn6uQWxtFsGv5N91Gh35p0agxNwFRzSQCzMGtcl0VHmF95f0JXdDjzp2pfl

LLzu9acKLeohE3qHYrq6noDsMXBTr07XML3XnSyXU4
Zv7W8HK1/vrsv5/QI1kYUuhIQa69x0ejE8um5BZCBmQ/em8lH4sWNXYmBspgveh7YIrWu6uvS+21NdZ9

BWK6DGCtlTSis3A+/9/0L89wiIasS5UW+7qR7pqfkyyBoAJgsFjXqu2zxJ00tsDRWM9JLresB9ql/TUs

8TFGW+W3+MOAd3ZQVoFFxCoDgCVg54LluF2nb/hf01
R+2VNle/EIg1zp6o9tVQT0qTl6dMWRXYssB+fwooE40Zxs8x3p2+qkcDbcU4oBvbMS4nno5sb/k3zDPM

/nPm1TswIz7nK37FH2tZO9s6R836GrxjJn9gd3vKSHdLxLkpK4MBDtmw2P4XduBpVCWh+7gDM7KdaYU5

HqubbnTblxHHSueYIe2uzzz4okzrsd5s0Jdtx1H+RR
fDdToaS5xv4S/wDn4Dd+5pFIOYCmfx1QCbp/ND8YBbqDpBukuMCdU8W+xw2cscAUoOMix4OFS2f9XRiV

Bp4udv0eqD4xLZd3e+57V9+G+P+LdDzpq/k/v8AP8URmIGNCUTK8Gz/WZtcq157H4qdJveE6xclFnKXb

0nEendjbpgFQLv7fFHN5d6qk2e/QHrnTivIRuZuuSP
vrZ3NDbgIX4uu/LVL3iGYN0PiH/2RJvazfhm8FmbbiVfQTyYeaAM30Ols4gNvA+Pcsz5NZH/wfziCeOL

gy2AQqphEj6+yqw37jAwfq6P1ujjod7QKpWZuv7f55RuRlha+q3xVxyvQgOujNvA31Q3UDAJiEUHUxbF

qkZ9QRQFvJSRdHZ0Gs9ikXHgafsAZMoC/sAUBEwJCy
gN+RGUfyZPyF+r9VslylxHKL8SP4avXeq6Zsp/P/Qq9rhsI/CHDuQG/MTs0muooPjxLxj4qn+Sj9Wh38

xpQvZXLDLIh24Tmb3/FFe2haA8n2xu4fglgF88p+xgUY342J3qmSw6KBnL7Pm9PuexOe7PUPYVlbS0Fl

voAnVRvaSJbu0oGLSYBfY8cVgfLzUKK4bbmmXKC4y5
dL4NrRcWb9ZW/9yhJ6JCotvdRItVWInO/wx+O6yKR910K1HE5ws0vTJxjGnllNjt4kX2lRYPtAbCzmDS

ra4/irBcf5bsoloMihpor9S7UTK13F3bCsaKvJ7HXM7xxe+SZuBWxCHgw3WvC0RaMKJP1G+gCcnFJcUh

sRhF+vlN+K3s97P/S6ip5Rs/KCqQHSivmlmKpkmqCt
KJ21EeycAMQ3ifiSZsmhiEXVgONOgXlBwWYtrnBAYdh8nnLEFhfqqLeFJ5iFA5LgfpdwdDue7KQwRs70

FYevJlNAf7pGbKPMVEQRYkl+yOICOAuPVrqlmgXFHDh2SMqkBASGRzcUfUGYTQpJDgEi3t9OIcOPjpd/

bL4XTRY/17uWLvQccHVypahBK3TV5UK0hqWfRneRi9
2q0wvWQeOuZDzo+zS+jrqsFvAUbFG6Tnn6Zj6rmheSFK/jqi1KS73HbrQavZnZCDwr+fLkuuoWBmRklI

egRDIljsuqcwWe4W+xUaDcQrydsWEkCcy2u9M4okj+Y74T7DpzBMJP+y0nuJSbNjQyr247pLnalwNJlL

z47vXR/llPOCm3n5ECa2NDWhLl3/QSUitNSzVYSy4s
6Njbff3cDKx0Wfzi1zzr2yIq0OnpYVf8EnCpcJl0ZdvXRGjAAwoCK9/w3cbrRx3vgREzgkyhIosElc2G

Cs6NSoUvr1LA0wiuWe+nNCVISRmYa/GxtRa6QGuzsSFrupXuH/P3jgghSY0TiZ4HLz4sQyIpGeZDP+wJ

2yFGd4izizaWnpmn8L4ASkkWbk9jiGqp7h2lv6Z7Td
c4WI8fas6LFdmQZuTSBNsfACbxGTssB6ZbWha36fasMS6gAha3T8C4xeSm/7kasYHDcZ9NDwyszZWuCZ

ehV/1r6hUgUU0GYR3v+zWcZMaD6ZbSa28n3PaPnKLiKNPGGIu2WOIgcCMQ2yVpzbl8F5oXqJ6Gqx5q6L

dOt0c6swJ1ydfZsBM8pfSiBVT1KUGWYnBJ7E1h6sWW
uy22NqZVfZNgrKrGphzgGC06LRTCk2zcIHEI6VP6kkgup1MdC95PMT5oByMNhJ6PCP/87T3N2MCCoUiU

zwjXtBrPf78JdZp7Ro6NbLfh3li0vN7JNZEhfN7hY+wk2jngDZQUDYr4WvJ9k2gAjkULP6vyWD4GrEp5

OUEpUDJFnNkpyeyq/4X/wvzoPa/2hxip3Y/rOwDP5O
EJ8wy5VqHTVbIXvartMfSdzYMrOrBWEzv9WdQUelLMr6G2LbeLWgwlGdMqsgsEOQCDYxKZKdX0vvccg4

aCAyNDQ+Ax5IETMzzEVeHO1MPgcCaVZWLvGqNPrnmg0cH8PZ5rszeMBMCtWMX1T+HLlIc0SlWhllbI0l

bj76MDllMlHlnx90DAgH4ngAqxbX8EyveNJSl09mRO
lMOgYKKdbmk6Bb0EoDsa4E64y0rm7p3m8kiEYypeJgcZsVpJdmqaAJn7rN0y258uC0B+eSWDU25JOuLd

06Yo/CUsOaEFlojhg34O8Mb9NFkD2M/UwOgaeO/To2TOd1VSLX6MQEqdkcfVUlQRdiITe/jMeTYNPKg6

Brofgk+YI4FB0cgjEPP7e/qEL6slAvkcUa95MqNpuK
MIhszs6oN2ui58dD4Qa+v2U/tcrMIvUrHVI0wcVgyD7SLQ9kc8RqENttSlEzBC1xtj3NNYoMXlHbH6D2

cYOqMj1roPSmx8EooQA7p4GNMnEiT1BfkyZCIC6kX0AEKOQSIvvYrufuwM1MQZRzPNGqTP86QYvpDY9J

IFSSKSmzwCVlVMG4I0Gas7CpqrNdBIEjGh1NYZDmFn
ebU3QuIsSIAtPhR4QzwnMNWa62MSigJB6rITPaRSKoDEM5HAImGZnbNQ4HxGNfvQBHuftgRPZrF5E9RR

2FOIWFPwFdV3BhzbQKlPsxCgEsAUTxRLSYHe2+AMtjrhMrSfyRXuGqQTWpk9RsUSz7SM2BTBOfKIwfCO

2Zi453C6R8XpZ8mSBfE9mVCp8cvAI1tDXrG5Wyg8HQ
k700W8SFSAROQixDdkwqhA6DOVUWh7gRDY0jzF9KPLJqaBf6lM2WIVAuY0nFC9ckoNCyPY1ozsK8HI5T

MPmeq2DkxOYcIXZuPuIrH53cQBFqwZ6bVFsPGVSacAo4oJmwT4Q9oWBfRT1bxjOsQO5+AW3OKUOcYb2y

mXNnn6DsvLB5s5NbQvXbDDHBJMlaYT0PMqDcPNItV4
kbHSSsClHqYFGlJgYlBxB5NI4gZJu1UCguXQOzTBLfMGy+Ox4MBI3kj0DvUIelJUZvXY9jya0EGUmVQi

IpH5R5wJCiSl9ebU4ZoOE3mTMpT5V9gGKcT6Vxs3HEf940A9QBBOSMQhrYoyjswK7JzgPjNDfaSb7VLV

XvvMr7lS2LKAkqVW7EUGDiZG1jDL44Vq5tgKTmGrNq
FVRaTk5KQdGnI3QmRF7kM20mBWAaBTYeTUHYIo7+JZvqhgRnGgqBNyK7SWJah5AqYMkkKS8QNI3ho0Ni

NAseBKOyh1NkALs0RP5NPRIbDPXyM7QnbCNmD1PZLb1LJOq9G6qmNLbxUk9XuGIoVZGoXThnMI7Pf184

KGf4ZJ6ZFWDcTgKjELJCZyNcDXPaXcBcHWklEOSRZB
cuRHPiS3GwUPY1UOAzSk4+ATfqQQ2QA1HqOGX9GJc5XZ5+TJdxDC6QqJKWL0IhiNEySCsxD2KXWV2TPJ

B4AZ2NzWKlUR4SzVADL9CtjRHnHc5fQOWnb0BnKo6sX5VKMZMMSKEvDAuvAXdxGTNdSKc2Y0H2ZEAaP8

QRM509tOCbgMd6Fh7rB0WICAcEWiIlDLlpIKztCROo
LPc9M6Y1LRIpS6DWA1BcAoTdybWiA3R+MzGbFKGSVA5AANWDGQd7A9O6vRAmQ5C9yNoGuSK5WC1DCO1T

yMDmyLFop10+DhPXQoDdR7ONC7ULXT9FCId9T2K8qINjK0P6pPeNjST7WW3QRI9RuKhhnRFtWw3zZAqt

ICA+Dv9RTd1AZvUmNE3hue9MUqEyIKHdOmtCFsr0I0
nzmbv9hNJyZyO5N9L1VtG2lEOcHZ3DS2M0xRZnFUJ7GXAkdYP+In2Uy1OlEMCmWDv5C0hgWBCsUJQvVw

FipU23G++6vpumvYU6S7m3XHSDzWAlkToYlvHwT5gSXII2t4M9QTv/Ak1KVRA0sFc8uYHcKGCxMAj8fE

0txGt9UlWbAW33OYEiPPjodZ2nCze8E9Gbu7JbRl7d
Ov5bqHPcLs2WEdKoQWT3qfGwPyMLBcI5wMznbmasRUJ6E1j0jXZ7Jh12q4rbzoKtc3ZzVqN9RNjiJMKa

GpRxgjAfIYF3jgEpvA9tozDmHy5KUFMpVShbigUjFpTLZo1ITeZyXS79JngibE6vaWY+DQogICAgICAg

ICAgICAgICAgICAgICAgICAgICAgICAgICAgICAgIC
AgICAgICAgICAgICAgICAgICAgICAgICAgICAgICAgICAgICAgICAgICAgICAgICAgICAgICAgICAgIC

AgDQogICAgICAgICAgICAgICAgICAgICAgICAgICAgICAgICAgICAgICAgICAgICAgICAgICAgICAgIC

AgICAgICAgICAgICAgICAgICAgICAgICAgICAgICAg
ICAgICAgICAgICAgDQogICAgICAgICAgICAgICAgICAgICAgICAgICAgICAgICAgICAgICAgICAgICAg

ICAgICAgICAgICAgICAgICAgICAgICAgICAgICAgICAgICAgICAgICAgICAgICAgICAgICAgDQogICAg

ICAgICAgICAgICAgICAgICAgICAgICAgICAgICAgIC
AgICAgICAgICAgICAgICAgICAgICAgICAgICAgICAgICAgICAgICAgICAgICAgICAgICAgICAgICAgIC

AgICAgDQogICAgICAgICAgICAgICAgICAgICAgICAgICAgICAgICAgICAgICAgICAgICAgICAgICAgIC

AgICAgICAgICAgICAgICAgICAgICAgICAgICAgICAg
ICAgICAgICAgICAgICAgDQogICAgICAgICAgICAgICAgICAgICAgICAgICAgICAgICAgICAgICAgICAg

ICAgICAgICAgICAgICAgICAgICAgICAgICAgICAgICAgICAgICAgICAgICAgICAgICAgICAgICAgDQog

ICAgICAgICAgICAgICAgICAgICAgICAgICAgICAgIC
AgICAgICAgICAgICAgICAgICAgICAgICAgICAgICAgICAgICAgICAgICAgICAgICAgICAgICAgICAgIC

AgICAgICAgDQogICAgICAgICAgICAgICAgICAgICAgICAgICAgICAgICAgICAgICAgICAgICAgICAgIC

AgICAgICAgICAgICAgICAgICAgICAgICAgICAgICAg
ICAgICAgICAgICAgICAgICAgDQogICAgICAgICAgICAgICAgICAgICAgICAgICAgICAgICAgICAgICAg

ICAgICAgICAgICAgICAgICAgICAgICAgICAgICAgICAgICAgICAgICAgICAgICAgICAgICAgICAgICAg

DQogICAgICAgICAgICAgICAgICAgICAgICAgICAgIC
AgICAgICAgICAgICAgICAgICAgICAgICAgICAgICAgICAgICAgICAgICAgICAgICAgICAgICAgICAgIC

FmTGReMLBjOQYyCFi8X7kkDCNtQDSzCX2kSWk3Vz0+LHaGMiWjZVT8zzDdhK1TAR0zn1GaYSeyDTNjl3

TsDRy1AP3PDOOqJCqeIJ6KJHauns2WVEPtSMVrlSUK
c5gsVkKkDUY1YPAmBiwgUZ2YIRAjB2hoslAeKICbHDZVKViaQMWNYRmnKTTTNDFuLDUeExNpCjOoLNAz

FS6MQCVrO242igYaRD1WDf7HQcErXW2wap2QBymqAROqLyiSHyh0KNswJC4CeKZtwUJzHJHaTCKYNgNk

S2pnd8NiYefvHRSEBHfyEB6Hh8WmcTRwJYd+Pg0KZW
8rn1BpLCopBWIzOH1qcg2XYGmKZdNbE5EioTgjVOEde0uhJRNfOZ6qgDWoNTI4GN2hnCRqqNNkZOGhow

ayQT1JHKN1CUPpIU9gECLiTPAaOnB6QSNXWA6ACATwOAMduMQfTPSaMSQNUP1ZCXsnVYJ7LBVkibMlkA

ZsRKqxRA7POAGjusZeBgonSQNWNIj+Bj5LJG3ad6Gx
XLfpNMUrTH2qui8TPXnBCxAjC8L0cAKoH3F8WCidJl3KMPUlOGXoWfCdIGNYFWpbOH5VEG1facQ2ZN2R

hLXcQGSyYJTenUJaTGw9N07jsOOgFVefDF8JMIS+Nino+Lf0XVBAaGEAtJCEaNlAsVTKWZlSaT9KrL0TO

h5ZkO1YaUC43dRpurtLaJKvhTE9KOX1pJIKqAGLXET
8XaLBknI8jpcJtVvLqCRBOHwLvJ14gkNTcVVBkUNN1RYBsWa6MDRWyE1OhbyOupHjjwjZcJRJnXJBXGR

7QZCtvgwAaaDPkjFnpNY46pYziPW3QNy1FPxYaEU8jbe0YlLZfNh4LUWCnII3AZFEzZGXeMLIlEFS3NI

AxMgDmMMpiNXAnIFDwMYN4QTKgSFPrDR1NSjAzNZIk
Fmj0GLHnIIPpNQZfcd5AJNYsXIYzZHT6OoYxEZBkXXQbVYsbYJMnXTLoQTB3WCNsVNOwST9CTiFaSGKu

WTPsFajfACKrULZozv9QCBAeTBUbHyY3VURaDJGnCGJyAEuvHVJuVBW5ZSf1WTYfNDHeUO3OFeSaVLCv

SHP6VCHsICXmDWSbkt0GYTNyZCVgQSg6PFWiHKHvZR
VzVMsyWDLzXOC7YWx1MHMmNTCdGJ1SEpVqEIEaNEUtVgTnDGGaNPXujv3MCSDjWSPzZkFkODPoRECaUP

RvFFovMELxMXPcYbQ3JBCuHMWaPB7SRdQmNJVlHOW4YqFfNGNiRTVabh1INPByTHZbTPVqQzCfAGWrXI

XnKZuzUZMzJVR1UfLsGDSkQJJrRC8DSwPfHMHaDHU6
HEHkYLSsMGDpiy7FCQVgZAItXWo9UxUuSGJpOZZtAVitCEWpDNI5JNe6JUDyEBZyXG5SVzWdATAmPXNe

JgCbAJWeXNTjxc8BGZYwKLYeHhZ2MfNxESOeIFDqJHvuZOKuTKY6DWK3MIJfXGWgKY6BNiKzCJXcFmyk

BtXmHRHrCXAsiy4XHSGxRTXqBYS0OnOxLYCtKHXtTK
bfYQGqDEJ9SLHbWODvBAJlDT4JFpAtEDMyFhq0RkNyBDCsTDDkky1YTPKkOAVuJNR1WhNsHVGpZVOnAN

ktSLEyGSFsFFA0LXKwLDHbRH3DGcToKTLgNfV2DTEcISTjLYZkrx1OxYOltUasoq5SMNzJQh1PwMcmJP

KqMVqoAi3ehDXlHNDxCWROXw4OyzKyZFCwYKOJHViw
VQXtOSByKEY8LGA6LYT9KzV0LFR9BeH1UoR1FeRfBBxzWDL3PqB1VpW0JblsPgf1RGFePCT0XQTlLFnc

HvHsXrHtUOF2ZLe+AX9sTCg+Ar0Nc4SrcuL3itAcBVmpWLm0OB4SDNDRQ3QPQj==









                    ID                  Date                Data Source

 

                    [id number]            2021 06:51:00 AM [facility]









          Name      Value     Range     Interpretation Code Description Data Gregoria

rce(s) Supporting 

Document(s)

 

                          25-Hydroxyvitamin D2+25-Hydroxyvitamin D3 [Mass/volume

] in Serum or Plasma 39 

ng/mL                                         [facility]  

 

                                        Vitamin D Status         25-OH Vitamin D

:Deficiency:                    <20 

ng/mLInsufficiency:             20 - 29 ng/mLOptimal:                 > or = 30 
ng/mLFor 25-OH Vitamin D testing on patients onD2-supplementation and patients 
for whom quantitationof D2 and D3 fractions is required, the QuestAssureD(TM)25-
OH VIT D, (D2,D3), LC/MS/MS is recommended: ordercode 05379 (patients >2yrs).See
 Note 1Note 1For additional information, please refer 
tohttp://education.[employer].cs.Cloutex/faq/FYX965(This link is being provided 
for informational/educational purposes only.)THIS TEST WAS PERFORMED AT:[facility]  [id number]-
4609RADHA MORIN MD 









                    ID                  Date                Data Source

 

                    [id number]            2021 10:34:00 AM [facility]









          Name      Value     Range     Interpretation Code Description Data Gregoria

rce(s) Supporting 

Document(s)

 

           Leukocytes [#/volume] in Blood by Automated count 8.8 10*3/uL 4.45-10

.71 N                     

[facility]            

 

                Erythrocytes [#/volume] in Blood by Automated count 4.00 10*6/uL

    4.20-5.40       Below

 low normal                             [facility]  

 

           Hemoglobin [Moles/volume] in Blood 11.1 g/dL  10.7-15.4  N           

          [facility]                                 

 

                Hematocrit [Volume Fraction] of Blood by Automated count 34.0 % 

         37-47           Below low 

normal                                  [facility]  

 

                                        Erythrocyte mean corpuscular volume [Ent

itic volume] in Cord blood by Automated 

count      85.0 fL    80-96      N                     [facility]  

 

                          Erythrocyte mean corpuscular hemoglobin [Entitic mass]

 by Automated count 27.8 

pg           27-31        N                         [facility]  

 

                                        Erythrocyte mean corpuscular hemoglobin 

concentration [Mass/volume] in Cord 

blood      32.6 g/dL  33-37      Below low normal            [facility]  

 

             Erythrocyte distribution width [Entitic volume] by Automated count 

13 %         11-15        N            

                          [facility]  

 

           Platelets [#/volume] in Blood by Automated count 405 10*3/uL 130-472 

   N                     [facility]                  

 

             Platelet mean volume [Entitic volume] in Blood 9.0 fL       9.1-13.

1     Below low normal 

                          [facility]  

 

           Neutrophils/100 leukocytes in Blood by Automated count 66.7 %     41-

77      N                     [facility]                  

 

           Neutrophils [#/volume] in Blood by Automated count 5.9 U      1.7-7.6

    N                     [facility]                  

 

           Lymphocytes/100 leukocytes in Blood by Automated count 28.3 %     14-

46      N                     [facility]                  

 

           Lymphocytes [#/volume] in Blood by Automated count 2.5 U      0.6-4.6

    N                     [facility]                  

 

                Monocytes/100 leukocytes in Blood by Automated count 3.1 %      

     4-12            Below low normal

                                        [facility]  

 

           Monocytes [#/volume] in Blood by Automated count 0.3 U      0.2-1.2  

  N                     [facility]                         

 

           Eosinophils/100 leukocytes in Blood by Automated count 0.6 %      0-7

        N                     [facility]                  

 

           Eosinophils [#/volume] in Blood by Automated count 0.1 U      0.0-0.5

    N                     [facility]                  

 

           Basophils/100 leukocytes in Blood by Automated count 0.5 %      0.4-1

.3    N                     [facility]                  

 

           Basophils [#/volume] in Blood by Automated count 0.0 U      0.0-0.2  

  N                     [facility]                         

 

          NUCLEATED RED BLOOD CELL 0 %                                     [facility]  

 

          NUCLEATED RED BLOOD CELL# 0 U                                     [facility]

[facility]  

 

           Immature granulocytes [Presence] in Blood by Automated count         

   0-2        N                     [facility]                  

 

           Immature granulocytes [#/volume] in Blood by Automated count 0.1 U   

   0-0.1      N                     

[facility]            

 

          Manual Differential panel - Blood NO                                  

    [facility]  









                    ID                  Date                Data Source

 

                    [id number]            2021 11:25:00 AM EST [facility]









          Name      Value     Range     Interpretation Code Description Data Gregoria

rce(s) Supporting 

Document(s)

 

             Urea nitrogen [Mass/volume] in Serum or Plasma 24 mg/dL     9-23   

      Above high normal  

                          [facility]  

 

           Sodium [Moles/volume] in Serum or Plasma 136 mmol/L 132-146    [facility]                         

 

           Potassium [Moles/volume] in Serum or Plasma 4.8 mmol/L 3.5-5.5    [facility]                         

 

           Chloride [Moles/volume] in Serum or Plasma 104 mmol/L      [facility]                         

 

           Carbon dioxide, total [Moles/volume] in Serum or Plasma 28 mmol/L  20

-31      N                     

[facility]            

 

          Anion gap in Serum or Plasma 9 mmol/L  8-16      [facility]  

 

           Glucose [Mass/volume] in Serum or Plasma 248 mg/dL       Above 

high normal            

[facility]            

 

          Creatinine 0.8 mg/dL 0.5-1.1   [facility]  

 

                                        Glomerular filtration rate/1.73 sq M.pre

dicted [Volume Rate/Area] in Serum or 

Plasma     Greater Than 60 ABOVE 60                         [facility]  

 

                                        Alanine aminotransferase [Enzymatic acti

vity/volume] in Serum or Plasma by With 

P-5'-P     33 U/L     10-49      [facility]

ital  

 

                                        Aspartate aminotransferase [Enzymatic ac

tivity/volume] in Serum or Plasma by 

With P-5'-P 10 U/L     0-33       [facility]

pital  

 

                    Alkaline phosphatase [Enzymatic activity/volume] in Serum or

 Plasma 103 U/L             

          N                               [facility]  

 

           Calcium [Mass/volume] in Serum or Plasma 9.2 mg/dL  8.5-10.1   [facility]                         

 

                Bilirubin.total [Mass/volume] in Serum or Plasma 0.2 mg/dL      

 0.3-1.2         Below low 

normal                                  [facility]  

 

                                        Albumin [Mass/volume] in Serum or Plasma

 by Bromocresol purple (BCP) dye binding

 method    3.5 g/dL   3.2-4.8    N                     [facility]

ital  

 

           Protein [Mass/volume] in Serum or Plasma 7.6 g/dL   5.7-8.2    N     

                [facility]                         









                    ID                  Date                Data Source

 

                    [id number]            2021 11:25:00 AM [facility]









          Name      Value     Range     Interpretation Code Description Data Gregoria

rce(s) Supporting 

Document(s)

 

                C reactive protein [Mass/volume] in Serum or Plasma 5.3 mg/L    

    0.0-5.0         Above high 

normal                                  [facility]  









                    ID                  Date                Data Source

 

                    [id number]           2021 09:37:00 AM [facility]

 

                                          Patient Name: ROMELIA CORADO      

 : 1980  Sex: F  Pt Unit #: 

[id number]  Location:[facility]  Provider:   Visit Date/Time:  21  Primary 
Insurance: [facility]  Secondary Insurance: Self Pay  Intake  
Vital Signs                                                    21         
                                         09:38     Current Height               
                 5 ft 5 in     Current Weight                                 
180 lb     Weight Measurement Method                  Standing Scale     BMI    
                                         29.9     BP                            
                 124/88     Blood Pressure Location                      Rt 
brachial     Position                                       Sitting     
Respiration                                      14     Pulse                   
                        110 H     Pulse Strength                                
 Normal     Pulse Source                               Pulse Oximeter     Pulse 
Oximetry (%)                               99     Oxygen Delivery Method        
                room air      Intake  Visit Reasons: ER Follow-up (Adult)  Nurse
 Note: ER follow up - 2021, she has two abscesses one under her left arm 
and one on the inside of her right thigh area, like in her inner butt cheek- 
Lanced the one on her leg and packed it, states this one is doing better, the on
e under her arm was not ready to be drained, it was too hard, and she is still 
feeling pain from that, States ER said that she should have repeat blood 
workdone, CBC, CMP and C-Reactive Protein, She is currently taking Clindamycin 
orally . States that she is also struggling with heart burn, has been taking OTC
 omeprazole, Rolaids , and an off brand Acid Reducer that did not help   
 Required: No  Accompanied by: Self / Same as Patient  Is patient in 
pain?: No  Allergies    No Known Drug Allergies Allergy (Verified 20 
12:14)           Medications     - Last Reconciled 21 by Cecille Maguire NP
    alcohol swabs (Alcohol Wipes) 1 pad topical QID 90 days  blood-glucose meter
 (OneTouch Verio Flex Start) As directed  cholecalciferol (vitamin D3) 2,000 
units PO QDAY  clindamycin HCl 300 mg PO QID  dulaglutide mg subcut  
ergocalciferol (vitamin D2) 1,250 mcg PO QWEEK  ertugliflozin (Steglatro) 15 mg 
PO QAM  lancets (Accu-Chek Softclix Lancets) As directed  OneTouch Verio test 
strips (blood sugar diagnostic) USE AS DIRECTED FOUR TIMES A DAY 30 days NS     
 Is last menstrual period known: Yes  Post menopausal: No  Patient pregnant: No 
 Vision  Wearing glasses?: No  Fall Risk  History of falls: No  Ambulatory Aid::
 None  Gait/Transferring:: Normal  Medications:: No High Risk Medications  PHQ-
2/9  Over the last 2 weeks, how often have you been bothered by any of the 
following problems?   1. Little interest or pleasure in doing things: not at all
  2. Feeling down, depressed, or hopeless: not at all  Total score: 0  HIV 
Testing Offer - ages 13-64  HIV testing Offer: Yes  Requirement for HIV testing 
offer been met?: Patient reports past refusal  SBIRT Annual Questionnaire  Are 
you currently in recovery for alcohol or substance use?: No  How many times in 
the past year have you had 4 or more drinks in a day?: None  How many times in 
the past year have you used a recreational drug or used a prescription 
medication for nonmedical reasons?: None  Do you need a note to return  Do you n
eed a note to return to /school/sports/work: No    Coronavirus Screening 
 Screening  Have you traveled outside of [facility] or [facility] in the last 14 days.: No  Has patient experienced coronavirus symptoms:
 No    [facility]  Medical History (Updated 21 @ 09:52 by Cecille Maguire NP)    
Anxiety  Asthma  Chlamydia  Depression  Diabetes mellitus  Herpes genitalis     
 Surgical History (Reviewed 21 @ 12:07 by Che Duron)    Status post 
tonsillectomy                           Social History (Updated 20 @ 10:50
 by Priyanka Kuo)  Does the Patient have a Healthcare Proxy:  No   Does Patient
 have a DNR?:  No   Does Patient have a Living Will?:  No   Hx Recent Travel 
(where):  No   Smoking Status:  Never smoker         Review of Systems  Const  
All systems reviewed   are unremarkable except as noted in HPI and below  
Reports as per HPI  Skin/Breast  Details: boils X2 left axilla.  Right upper 
medial thigh (was lanced in ER and packed)    Exam  Const  General: cooperative,
 healthy appearing and comfortable  Nutritional Appearance: average body habitus
 and well nourished  Orientation: alert, awake and oriented x3  Skin  Other: 
Left axilla, small lump under skin, redness has improved as has pain per 
patient. <1cm.  Right buttock healed, no induration appreciated, no redness.    
 Assessment   Plan ***  Assessment   Plan  (1) Abscess:         Status: Acute   
     Code(s):  L02.91 - Cutaneous abscess, unspecified        SNOMED Code(s): 
674198364        Category: Medical        Plan - Cecille Maguire NP:   Finish abx
 as ordered.  Doing very well, educated and encouraged warm compresses to the 
left axilla.   She is receptive.  Follow up in 1 week if not completely 
resolved.   3 months for annual PE.         Orders: Orders:       CMP Today     
   CRP, C-REACTIVE PROTEIN Today        CBC W AUTO DIFF Today          Coding  
Level of Care Code  31198 Est Pt Limited Comp  Exam  Problem Focused    
Diagnoses  Abscess  L02.91      <Electronically signed by Cecille Maguire NP> 
21 1210          









          Name      Value     Range     Interpretation Code Description Data Gregoria

rce(s) Supporting 

Document(s)

 

                                                                       









                    ID                  Date                Data Source

 

                    [id number]           2021 01:28:00 PM [facility]

 

                                                                             ED 

Physician Documentation      NAME:      

           ROMELIA CORADO                      :                  
1980   ACCT#:                [id number]                      AGE:       
           40           MR#:                  [id number]                       
SERVICE DATE:           21     EMERGENCY DR:           Jada Blanton MD          
                                                 PRIMARY CARE DR:           
Cecille Maguire RACQUEL                      ROOM#:                             \[facility]\"" 
(Adult, General)  General  Chief Complaint: Skin/integument  Stated Complaint: 
CYST  Resident LTC, travel outisde home, exposure to hot tubs:: No  Time Seen by
 Provider: 21 11:05  Source: patient  Exam Limitations: no limitations  
History of Present Illness Narrative: 40-year-old female patient underlying 
medical history of type 1 diabetes, presented with suspected abscess on right 
buttocks.  Tender, erythematous, present for the past 1 week.  Patient has 
similar symptom before requiring incision and drainage.  Denies fever. No 
diarrhea.  No urinary complaint.  Patient also reported questionable lump on 
left axilla.  Not erythematous, not tender at this time.  No shortness of 
breath, no cough,  History of Present Illness  Timing/Duration: 1 week  Past 
Medical History  Past Medical History: Nursing Past Medical History Has Been 
Reviewed  Allergies/Home Meds                                                
Allergies        Allergy/AdvReac Type Severity Reaction Status Date / Time     
No Known Drug Allergies Allergy   Verified 20 12:14                       
                           Home Medications         Medication  Instructions  
Recorded  Confirmed  Last Taken  Type     alcohol swabs 1 pad TOP QID 90 Days 
#200 each 20 Unknown Rx     blood-glucose meter #1 each 20 Unknown Rx     lancets #100 each 20 Unknown Rx     
ertugliflozin 15 mg tablet 15 mg PO QAM #30 tab 20 Unknown Rx    
 dulaglutide 0.75 mg/0.5 mL mg SQ 20  Unknown History    subcutaneous pen 
injector          OneTouch Verio test strips See Rx Instructions .ROUTE 20
  Unknown Rx     .COMPLEX 30 Days #150 each NS         clindamycin HCl 300 mg PO
 QID #40 cap 21  Unknown Rx        Medication list updated and reviewed:: 
Yes  Pain Assessment  Pain Location: Right buttocks  Pain Description: Throbbing
 and Acute  Pain Intensity: 6  Pain Scale Used: Numeric Scale  ER plan  Plan of 
care and ER treatment: An ER treatment plan was discussed with patient and 
family, Patient encouraged to ask questions about plan and ER treatments and 
Patient agrees with ER plan of care  Plan: Bedside point-of-care ultrasound 
evaluation showing right buttocks abscess, no left axilla abscess detected at 
this time.  We will perform incision and drainage of right buttocks abscess, 
wound culture, blood culture, CBC, CMP, C-reactive protein, lactic acid    PMH 
(from Triage)  Patient Medical History  PMH Reviewed/Updated as Needed: Yes  
PMH/PSH from Triage: Medical History (Updated 20 @ 14:53 by Cecille Maguire NP)    Anxiety (Medical)   Asthma (Medical)   Chlamydia (Medical)   Depression 
(Medical)   Diabetes mellitus (Medical)   Herpes genitalis (Medical)            
            Surgical History (Updated 18 @ 15:08 by Blanca Gomez)    
Status post tonsillectomy (Surgical)       Female History  LMP:: 4 weeks ago  
Pregnant: No  Hx Drug Resistant Infections  Hx MRSA: (Methicillin-resistant 
Staphylococcus aureus): Yes (11)  Hx VRE (Vancomycin-resistant 
enterococci): No  Hx C.Diff: No  Hx CRKP: No  Hx Other Resistant Infection?: No 
 Isolation: Standard precautions  Hx Recent Travel  Out of the country within 10
 days (where): No  Hx Fever: No  Hx Fever with a rash?: No  Nurse screening for 
coronavirus:                          Recent Travel outside the      No         
                                           country (where)                    
Has patient experienced        No                                               
     coronavirus symptoms                    Social History  Does patient have 
suicidal/homicidal thoughts or ideation?: No  Are you in a relationship 
with/Does anyone hit you, yell/swear at you, steal from you?: No  Substance Use 
 Hx Alcohol Use: No  Hx Substance Use: No  Hx Substance Use Treatment: No  
Second Hand Smoke Exposure: No  Smoking Status: Never smoker  Tobacco Use  Hx C
hewing Tobacco Use: No  Vaccination History  Hx/Date of Tetanus, Diphtheria 
Vaccination: No  Hx/Date of Influenza Vaccination: No  Hx/Date of Pneumococcal 
Vaccination: No  Immunizations Up to Date: No    PFSH  Medical History (Reviewed
 21 @ 12:07 by Che Duron)    Anxiety  Asthma  Chlamydia  Depression  
Diabetes mellitus  Herpes genitalis      Surgical History (Reviewed 21 @ 
12:07 by Che Duron)    Status post tonsillectomy                           
Social History (Updated 20 @ 10:50 by Priyanka Kuo)  Does the Patient 
have a Healthcare Proxy:  No   Does Patient have a DNR?:  No   Does Patient have
 a Living Will?:  No   Hx Recent Travel (where):  No   Smoking Status:  Never 
smoker         ROS  Review of Systems  ROS Narrative: Negative except for those 
mentioned in the HPI    Physical Exam  General  Limitations: no limitations  
General appearance: alert and in no apparent distress  Head  Head exam: Present 
atraumatic and normocephalic  Eye  Eye exam: Present normal apperance, PERRL and
 EOMI  ENT  ENT exam: Present normal exam and mucous membranes moist  Neck  Neck
 exam: Present normal inspection and full ROM  Respiratory  Respiratory exam: 
Present normal lung sounds bilaterally; Absent wheezes, rales and rhonchi  
Cardiovascular  Cardiovascular Exam: Present regular rate and normal rhythm  
GI/Abdominal  GI/Abdominal exam: Present Abd soft, bowel sounds present all quad
rents, soft and normal bowel sounds; Absent distended, tenderness, guarding and 
rebound  Extremities Exam  Extremities exam: Present Full ROM without 
tenderness, capillary refill brisk and full ROM  Back Exam  Back exam: Present 
normal inspection and full ROM  Neurological Exam  Neurological exam: Present 
alert, oriented X3 and CN II-XII intact  Skin  Skin exam: Present other (Right 
buttocks lump likely abscess, approximately 1.5 cm in diameter, tender to 
palpation, erythematous, left axilla tiny nodule, nontender, no erythematous,)  
Vital Signs  Vital Signs:                                 Vital Signs         
21  12:02     Temperature 98.2 F     Pulse Rate 109 H     Respiratory Rate
 16     Blood Pressure 139/76     O2 Sat by Pulse Oximetry 98          MDM 
(comprehensive)  Lab Data  Labs:                                                
                                            21 11:49                      
                          21 11:49                                       
Laboratory Results Last 24 hours    21 11:49: WBC 10.6, RBC 4.08 L, Hgb 
11.4, Hct 35.2 L, MCV 86.3, MCH 27.9, MCHC 32.4 L, RDW 13,Plt Count 331, MPV 
9.5, Immature Gran % (Auto) 0.4, Neut % (Auto) 73.7, Lymph % (Auto) 20.9, Mono %
 (Auto) 3.7 L, Eos % (Auto) 0.8, Baso % (Auto) 0.5, Lymph # (Auto) 2.2, Abs 
Immat Gran (auto) 0.0, Add Manual Diff No, Absolute Neutrophils 7.8 H, Monocytes
 # 0.4, Absolute Eosinophils 0.1, Absolute Basophils 0.1  21 11:49: Sodium
 137, Potassium 4.1, Chloride 105, Carbon Dioxide 25, Anion Gap 11, BUN 14, 
Creatinine 0.8, GFR Calculation Greater than 60, Glucose 238 H, Calcium 9.3, 
Magnesium 2.0, Total Bilirubin 0.4, AST 4, ALT 17, Alkaline Phosphatase 96, C-
Reactive Protein 27.1 H, Serum Total Protein 7.8, Albumin 3.2  21 11:49: 
Lactic Acid 2.2      Medical Decision Making  Free Text/Narative:: Procedure 
note  Patient's right buttocks cleaned with Betadine,  2% subcutaneous lidocaine
 infused  A half a centimeter incision made, bloody purulent drainage obtained, 
culture collected, site is packed with quarter inch iodoform  Dressing was 
placed, patient tolerated procedure with no complication.    Point-of-care 
ultrasound done for left axilla nodule negative for abscess  Left axilla nodule 
barely palpable, nontender, no erythema    Right buttocks abscess  Culture sent,
 minimal elevation lactic acid, 1 L IV fluids sent, patient afebrile, no 
leukocytosis, dynamically stable.  Already n.p.o., IV clindamycin, patient will 
complete oral course of clindamycin as outpatient.  Sitz bath, wound care, 
follow-up culture, tight glycemic control    Procedures (comprehensive)  I   D  
Site: Right buttocks  Betadine Prep: Yes  Blade Size: 11  Sterile Dressing 
Applied: Yes  Sterile Drapes: No  Wick Placed: No    Plan  Plan  Plan: Status 
post incision and drain, sitz bath twice daily, dressing change twice daily, 
return to the hospital if febrile, worsening pain, worsening drainage, or any 
other symptom, tight glycemic control.  Complete oral clindamycin.  Plan of 
care: Pain control discussed with patient, Activity limitations discussed with 
patient/family, Follow up appointments discussed, Plan of care discussed with 
patient and or family,Patient encouraged to ask questions about plan, Patient 
agrees with plan of care, Teach back used with patient/caregivers to build 
capacity, Person receiving instructions verbalizes understanding ofplan and 
Discharge plan and instructions discussed with patient and or family  Visit 
Medications  Administered ED medications::                Medications        
Generic Name Dose Route Start Last Admin      Trade Name Freq  PRN Reason Stop 
Dose Admin     Sodium Chloride  1,000 mls @ 999 mls/hr  21 12:33  21
 13:20      Ns 0.9%  IV  21 13:33  999 mls/hr      .Q1H1M ONE   
Administration          Discontinued Medications          Generic Name Dose 
Route Start Last Admin      Trade Name Freq  PRN Reason Stop Dose Admin     
Clindamycin HCl/Dextrose  600 mg in 50 mls @ 100 mls/hr  21 12:31  
21 13:20      Cleocin 600mg Piggyback  IV  21 13:00  100 mls/hr     
 ONCE ONE   Administration        Discharge Plan  Admission/Discharge Dx  
Primary DC Diagnosis: Right buttocks abscess      ED Provider: Jada Blanton    ED 
Status: Ready for Discharge    Time Seen by Provider: 21 11:05    Triaged 
At: 01/21/21 11:07    Condition  Condition: Improved    Discharge Detail  
Disposition: Home, Self-Care    Med Rec   New Prescriptions:  New    clindamycin
 HCl 300 mg capsule      300 mg PO QID Qty: 40 RF: 0    No Action    alcohol 
swabs [Alcohol Wipes]  Pads, Medicated      1 pad TOP QID 90 Days Qty: 200 RF: 3
   (DME) lancets [Accu-Chek Softclix Lancets]  Misc      See Rx Instructions  
.ROUTE .MEDSUPPLY Qty: 100 RF: 5    (DME) blood-glucose meter [OneTouch Verio 
Flex Start]  Kit      See Rx Instructions  .ROUTE .MEDSUPPLY Qty: 1 RF: 0    
Steglatro 15 mg tablet      15 mg PO QAM Qty: 30 RF: 2    OneTouch Verio test 
strips  Strip      See Rx Instructions  .ROUTE .COMPLEX 30 Days Qty: 150 RF: 5  
 Follow Up Visit/Referrals:  Cecille Maguire, NP [Primary Care Provider] -  (5 
days)    Diet:: diabetic diet      Medications  Medication reconciliation 
performed by provider at discharge: Yes      Follow Up Care/Instructions  
Diet/Activity/Wound Care..:  Dressing change twice a day, more frequently if 
soiled.  Sitz bath twice a day.  Follow-up with primary care provider in 5 days 
for wound care, close glycemic control, complete oral course of clindamycin, 
probiotics, check CBC, CMP, C-reactive protein with primary care provider.  
Follow-up wound culture.  Oral hydration encouraged.  Return to the hospital if 
develop worsening pain, worsening drainage, fever, nausea vomiting, increased 
redness, or any other symptoms.    *Discharge Patient*  Discharge Orders:  
Discharge Order  (Routine); Ordered 21     Ordered By:  Jada Blanton          
Report Signers:  <Electronically signed by Jada Blanton MD>      Jada Blanton MD      
21 1346     _________________________________________________         LEIGH Blanton MD        SIGNATURE   DA    Report Cosigners:                                
             D:  HARPAL  21  1328 T:  HARPAL    21  1328  CC:  Cecille Maguire         









          Name      Value     Range     Interpretation Code Description Data Gregoria

rce(s) Supporting 

Document(s)

 

                                                                       









                    ID                  Date                Data Source

 

                    [id number]            2021 09:16:00 AM EST [facility]

 

                                        @21 0916: Aerobic ID Rafaela added. R

FLXG = CHGAERID. 









          Name      Value     Range     Interpretation Code Description Data Gregoria

rce(s) Supporting 

Document(s)

 

           Bacteria identified in Wound by Aerobe culture                       

                      [facility]                                 

 

          Quantiy of growth NUMEROUS                                [facility]  









                    ID                  Date                Data Source

 

                    [id number]            2021 09:16:00 AM EST [facility]

 

                                        @21 0916: Aerobic ID Rafaela added. R

FLXG = CHGAERID. 









          Name      Value     Range     Interpretation Code Description Data Gregoria

rce(s) Supporting 

Document(s)

 

                Ampicillin [Susceptibility] by Minimum inhibitory concentration 

(PACHECO) <0.06                           

Susceptible. Indicates for microbiology susceptibilities only.                  

         [facility]                         

 

                Cefotaxime [Susceptibility] by Minimum inhibitory concentration 

(PACHECO) <0.25                           

Susceptible. Indicates for microbiology susceptibilities only.                  

         [facility]                         

 

                Ceftriaxone [Susceptibility] by Minimum inhibitory concentration

 (PACHECO) <0.25                           

Susceptible. Indicates for microbiology susceptibilities only.                  

         [facility]                         

 

                Clindamycin [Susceptibility] by Minimum inhibitory concentration

 (PACHECO) >0.5                            

Resistant. Indicates for microbiology susceptibilities only.                    

       [facility]                         

 

                Erythromycin [Susceptibility] by Minimum inhibitory concentratio

n (PACHECO) >0.5                            

Resistant. Indicates for microbiology susceptibilities only.                    

       [facility]                         

 

                Penicillin [Susceptibility] by Minimum inhibitory concentration 

(PACHECO) <0.03                           

Susceptible. Indicates for microbiology susceptibilities only.                  

         [facility]                         

 

                Tetracycline [Susceptibility] by Minimum inhibitory concentratio

n (PACHECO) >4                              

Resistant. Indicates for microbiology susceptibilities only.                    

       [facility]                         

 

                Vancomycin [Susceptibility] by Minimum inhibitory concentration 

(PACHECO) 0.5                             

Susceptible. Indicates for microbiology susceptibilities only.                  

         [facility]                         

 

                Levofloxacin [Susceptibility] by Minimum inhibitory concentratio

n (PACHECO) 1                               

Susceptible. Indicates for microbiology susceptibilities only.                  

         [facility]                         

 

                Cefepime [Susceptibility] by Minimum inhibitory concentration (M

IC) <0.25                           

Susceptible. Indicates for microbiology susceptibilities only.                  

         [facility]                         









                    ID                  Date                Data Source

 

                    [id number]            2021 11:57:00 AM EST [facility]

 

                                        Special Instructions: Lab may order repe

at test if initial                      

test elevatedPhysician If elevated, reflex second test in 4-6 hrs 









          Name      Value     Range     Interpretation Code Description Data Gregoria

rce(s) Supporting 

Document(s)

 

           Leukocytes [#/volume] in Blood by Automated count 10.6 10*3/uL 4.45-1

0.71 N                     

[facility]            

 

                Erythrocytes [#/volume] in Blood by Automated count 4.08 10*6/uL

    4.20-5.40       Below

 low normal                             [facility]  

 

           Hemoglobin [Moles/volume] in Blood 11.4 g/dL  10.7-15.4  N           

          [facility]                                 

 

                Hematocrit [Volume Fraction] of Blood by Automated count 35.2 % 

         37-47           Below low 

normal                                  [facility]  

 

                                        Erythrocyte mean corpuscular volume [Ent

itic volume] in Cord blood by Automated 

count      86.3 fL    80-96      N                     [facility]  

 

                          Erythrocyte mean corpuscular hemoglobin [Entitic mass]

 by Automated count 27.9 

pg           27-31        N                         [facility]  

 

                                        Erythrocyte mean corpuscular hemoglobin 

concentration [Mass/volume] in Cord 

blood      32.4 g/dL  33-37      Below low normal            [facility]  

 

             Erythrocyte distribution width [Entitic volume] by Automated count 

13 %         11-15        N            

                          [facility]  

 

           Platelets [#/volume] in Blood by Automated count 331 10*3/uL 130-472 

   N                     [facility]                  

 

           Platelet mean volume [Entitic volume] in Blood 9.5 fL     9.1-13.1   

N                     [facility]                         

 

           Neutrophils/100 leukocytes in Blood by Automated count 73.7 %     41-

77      N                     [facility]                  

 

                Neutrophils [#/volume] in Blood by Automated count 7.8 U        

   1.7-7.6         Above high 

normal                                  [facility]  

 

           Lymphocytes/100 leukocytes in Blood by Automated count 20.9 %     14-

46      N                     [facility]                  

 

           Lymphocytes [#/volume] in Blood by Automated count 2.2 U      0.6-4.6

    N                     [facility]                  

 

                Monocytes/100 leukocytes in Blood by Automated count 3.7 %      

     4-12            Below low normal

                                        [facility]  

 

           Monocytes [#/volume] in Blood by Automated count 0.4 U      0.2-1.2  

  N                     [facility]                         

 

           Eosinophils/100 leukocytes in Blood by Automated count 0.8 %      0-7

        N                     [facility]                  

 

           Eosinophils [#/volume] in Blood by Automated count 0.1 U      0.0-0.5

    [facility]                  

 

           Basophils/100 leukocytes in Blood by Automated count 0.5 %      0.4-1

.3    N                     [facility]                  

 

           Basophils [#/volume] in Blood by Automated count 0.1 U      0.0-0.2  

  N                     [facility]                         

 

          NUCLEATED RED BLOOD CELL 0 %                                     [facility]  

 

          NUCLEATED RED BLOOD CELL# 0 U                                     [facility]  

 

           Immature granulocytes [Presence] in Blood by Automated count         

   0-2        N                     [facility]                  

 

           Immature granulocytes [#/volume] in Blood by Automated count 0.0 U   

   0-0.1      N                     

[facility]            

 

          Manual Differential panel - Blood NO                                  

    [facility]  









                    ID                  Date                Data Source

 

                    [id number]            2021 12:18:00 PM EST [facility]

 

                                        Special Instructions: Lab may order repe

at test if initial                      

test elevatedPhysician If elevated, reflex second test in 4-6 hrs 









          Name      Value     Range     Interpretation Code Description Data Gregoria

rce(s) Supporting 

Document(s)

 

           Urea nitrogen [Mass/volume] in Serum or Plasma 14 mg/dL   9-23       

N                     [facility]                         

 

           Sodium [Moles/volume] in Serum or Plasma 137 mmol/L 132-146    [facility]                         

 

           Potassium [Moles/volume] in Serum or Plasma 4.1 mmol/L 3.5-5.5    [facility]                         

 

           Chloride [Moles/volume] in Serum or Plasma 105 mmol/L      [facility]                         

 

           Carbon dioxide, total [Moles/volume] in Serum or Plasma 25 mmol/L  20

-31      [facility]            

 

          Anion gap in Serum or Plasma 11 mmol/L 8-16      [facility]  

 

           Glucose [Mass/volume] in Serum or Plasma 238 mg/dL       Above 

high normal            

[facility]            

 

          Creatinine 0.8 mg/dL 0.5-1.1   [facility]  

 

                                        Glomerular filtration rate/1.73 sq M.pre

dicted [Volume Rate/Area] in Serum or 

Plasma     Greater Than 60 ABOVE 60                         [facility]  

 

                                        Alanine aminotransferase [Enzymatic acti

vity/volume] in Serum or Plasma by With 

P-5'-P     17 U/L     10-49      N                     [facility]

ital  

 

                                        Aspartate aminotransferase [Enzymatic ac

tivity/volume] in Serum or Plasma by 

With P-5'-P 4 U/L      0-33       [facility]

pital  

 

                    Alkaline phosphatase [Enzymatic activity/volume] in Serum or

 Plasma 96 U/L              

          [facility]  

 

           Calcium [Mass/volume] in Serum or Plasma 9.3 mg/dL  8.5-10.1   [facility]                         

 

           Bilirubin.total [Mass/volume] in Serum or Plasma 0.4 mg/dL  0.3-1.2  

  [facility]                  

 

                                        Albumin [Mass/volume] in Serum or Plasma

 by Bromocresol purple (BCP) dye binding

 method    3.2 g/dL   3.2-4.8    N                     [facility]

ital  

 

           Protein [Mass/volume] in Serum or Plasma 7.8 g/dL   5.7-8.2    [facility]                         









                    ID                  Date                Data Source

 

                    [id number]            2021 12:25:00 PM [facility]

 

                                        Special Instructions: Lab may order repe

at test if initial                      

test elevatedPhysician If elevated, reflex second test in 4-6 hrs 









          Name      Value     Range     Interpretation Code Description Data Gregoria

rce(s) Supporting 

Document(s)

 

          Lactic w Rfx (if elevated) 2.2 mmol/L 0.5-2.0   [facility] 

 

 

                                        Called to [facility]AZAR @ 1225 by Ameya Martino Results readback. 









                    ID                  Date                Data Source

 

                    [id number]            2021 11:53:00 AM [facility]

 

                                        Special Instructions: Lab may order repe

at test if initial                      

test elevatedPhysician If elevated, reflex second test in 4-6 hrs 









          Name      Value     Range     Interpretation Code Description Data Gregoria

rce(s) Supporting 

Document(s)

 

          Bacteria identified in Blood by Culture                               

          [facility]  

 

                                        NO GROWTH AFTER 5 DAYS 









                    ID                  Date                Data Source

 

                    [id number]            2021 12:18:00 PM [facility]

 

                                        Special Instructions: Lab may order repe

at test if initial                      

test elevatedPhysician If elevated, reflex second test in 4-6 hrs 









          Name      Value     Range     Interpretation Code Description Data Gregoria

rce(s) Supporting 

Document(s)

 

           Magnesium [Mass/volume] in Serum or Plasma 2.0 mg/dL  1.3-2.7    [facility]                         









                    ID                  Date                Data Source

 

                    [id number]            2021 12:18:00 PM [facility]

 

                                        Special Instructions: Lab may order repe

at test if initial                      

test elevatedPhysician If elevated, reflex second test in 4-6 hrs 









          Name      Value     Range     Interpretation Code Description Data Gregoria

rce(s) Supporting 

Document(s)

 

                C reactive protein [Mass/volume] in Serum or Plasma 27.1 mg/L   

    0.0-5.0         Above high

 normal                                 [facility]  









                    ID                  Date                Data Source

 

                    [id number]           2021 11:52:22 AM EST [facility]









          Name      Value     Range     Interpretation Code Description Data Gregoria

rce(s) Supporting 

Document(s)

 

          Progress Note                                         [facility] 

NGWKMt5sDxSFIuOn70/EQAjaQTYay5QpZOesRFr0GDeySZSjI9SdUQY0iM8zFWH5YBcYJyUqLfVnCVQ9

lbm
NpRqiQZjYpBHVpKeaVEvSuEVwyKptkiSKcNO0VfQR0TBHiW24uKDMbFNAjR3HqGXI7GQx+Hx0HZCYdzO

NfWT9KJouM6BaqmakZMS4i4U1tEDBJXCuaT3crYiZUdpoOaeWOroECpZifZcmG0mDCVMyYiB/fvcySPM

mIX8bfOQItxB1vz7z7icx2GPWdXEH//3h4uQLtE9E/
Bp5GEdJWO/CeX7bHPX5N92G/nwuxnABLV1jWYV5j+x0VM5fw3TLhYwsr9seJiPVs67yrpzFHu/He6X0+

ap2o5onaOcPDpLDSoIxLt8mdx0NDjN1jh/uyW4R4q/AlClfgSDf3kauTtV/lo3ICYT3vk7jDsP6MRawn

V3TBZkj7wDXN/yxY3tIrRL64dHySxdiB89RS5LontJ
NEXNvFX+MGRCCQIpqSyCNhD6GhytuZFrolrRnsBleZyfzqINks9+xmHt/exwPEkR7PQoNljXgi0v2FgL

65nAnz0QvGkbeClSD6s2dICRFwwCs3uy4KTPCVtNMKRD+S/4lEfNE5ed2XwmUIof07WeD3WoyK1lhlP4

vCGNRGGOkcKO/OZbMn0V0/Qh+TphmLhI3xZvvP5/G8
oJV75bo7Icdiq73waKzFBp9r+MjtGljRDg5IMFVzQ1v636zORiwbiDdOSsQ1t5BMRIWuqG9W6JEsa2CC

5EaNWnpPQ+k/4CQyt3MrCCOUpHSOwsoWDnW8dv1kl48cCfERTSm4WNhqFzkVAlnGRXjB66gbqxHfzBsw

rKwBF9hTyWGXLFbKwRGlfCngZFYE2pmPofkq9tCRAJ
N+RFqLS6C5VsN0fv4s/izXHfCYtaWg3doFzmLQbu7QmEHfiHGA0lMzqVFmJtB9joZHlPtZok9NtqQoPR

28ITnRFWCpT3MLAuoKpJoLhby9YC3bp6KaJJ0ROBooUZDio9W/AkaZTabFAh0iQlYxSDjI23LBcdZkXB

cqTLNDx2jL19lywofEh7qgI7zj6Z60NFsITu0gmQmF
yPjF4baPeoYFsUWP48AGXEKd3xej/eeUvwRuOcUL+dOQwS7yD0/OwsfvJxULFwrFFNR13M0w3iOKEVze

zFGMlR1qQlwDswDKV1s4oJRGrPsHMpKzf+axTYphOGNQRAU5sNbSr4BgjeZBDsUhP6mDFcnZQdnW9Gh4

MKKCMv4+u9QUQOVbP+DOPydDuhF0dzOOR0mloyDBZL
HtlNSIKCPVnsjXX1I285nSrE+euojBPfCJ06314oReFXvKAHOOtn0w50i+aLNnFvpyhb/g7gge2bLTcW

fX3cnClUm8ngpolw7x8Ta8cPE3vMMG531KWfgUgg84qMkSyjTSkdldttI5nIQ/JaiDAajKC4RKtw/+3X

Gu1D/+HV6kULx/LRptv5p0/IuP6ZAEfMAYwxd4WTRx
T1oMrkb7ClGsvc+Lo/zYqucHjrtCLw+8+qBy7BTQjj89AWqAfxD+PrwC1be1FqjejfJquK1Mp3Wn66Nf

sLLVBtnQvMjj/T5xA0OvhNv1/TbUSIbtkNUIw303D+G2n1tScwaz4b/uMV7FlXaTgjlpx8TTNIHcTR5R

IcbL29M/f3PF8gu29i2l/AcRXp8br2UY56l6vUrQy+
sxNF87x+n7kS7+zT7TkLHkMTWvLlEF88eZoz7hdyrIsuiudzY4dt3uiHxLCE7LG86kRjyJmSEDWJhpIa

EGRkNvFjUfrJe9UW14CPflGA6g20jQfYiGdBqWOXja/ZzpYgk5Dn9GtMJdQ2pDqdbjmziOtbQ0/Vn5iY

7KnEU/YgvlNWGbW1k+On/dN+KvSaOKeUvUvVPJuprK
pcPuKGMidIrEH6XZ6bdM9ZAm+2UDVGPXJjD6c4vc0jOKQT0CZZnMP23g3fmVOg0uRFajIfYhTxe1Df3W

vMDTaX2AFSHwk0/FojssMQGLLlIh16yMfUHWoO3wdFYJ2wF2CNhUfYm6VQlqBhQ1X3LRBZWwKWCYjJvc

NdMYQzylbtB+wcakNNqEalhVsEtpjL5xWlE2cmWvd8
zvCF3nuXlLGWeyDdBDzfv7bvBTUqD4HSv0df3rB7nXYKCWu0WVmqCkHEbKAKdB7SSl5drCPccysOCS2w

GgVCE514FwFX6Mn0CzUwOh7BeGbw/BWdxeeuEMupB5aBV0T6eXrhOKIVrgfEXgjYVLDzdj5igeERwNXj

7r1cPH2LZEad/rTuT5prPDUuBnbV/1NgxzKOvllII6
7QRsLhKXBRUUU7wObbDtGvDlLmiSLF+D1FPAhnbB35CzQVnxc9QENWLXQse5VPXIM7ek+x9EtFEq7pIN

rgunXlWEsLwiGAK4G/kfL3cgicerYpMDNwcP6iQPTCuVc4AJ26wb4ZJi8QtxtfgK1iMbu3201TtQ2kRT

CCajeVwyB7u2LvAo7P1ipbGXIz07uxJPKfj4F2Ktjf
ONuic0Ii5S/JOph108Hx4Ua8cR1FMN/nBJDEolGRq7X0Oyhw4u8CEL+oA0tRFvkZ4PrQ91mFaigXmlkI

yGHUwm9UEF4vhYdS9vn4p1M4tS8XFD5wNcdZdoS+XZlfyeokExWT4JJKf/hA9d4KPqaNZybgCty8s3tg

t7sh2rhQNVdXkf2e6hAEXzZ9LfejdHK4iKxlHXD45l
XcvV0ZrsmCpQ5EIVamcE70FHrEJkyUBeqbZ5YZLBkm7b7e4QMaHu6HOmAzwc8+VGC3rFCIvj6tndbIJX

03WpohIEjPOl0ov+9XBb+wWnBvKEQU18k8kKtitsFboiJp/BnwK11Q04WrRss8qqkq8xSczVknExtmv1

8v/8IJDOrIYOpaIMTOv75bYvc68VujuGbfF1xYzX6r
a83vBJaFkOTVwf35kS7SqFytrr2jqR26uVRo9IViKIdwt0uRDJKQywGsizccYnFKeLuQrca7C8wH1G7J

Tayg5va2I1f88cGtT0HV4rxmue+VEwGBng90PfW9PwMEvx42BCO1YsGzM+Cv2rcYhGukxr1O8PgZ3HpH

+eGyS/Wxvehi6bYE4tm4YVDO2IHZfS3J5rYiUhBcnI
dISb92FyzzwPYWrPp5WWV0AO0ZhHUHRaEkjGgdSiYCI+1eVfr2lxc6w7ebQ2csVtsWY92ox/k1FfhtEC

+PY7dZfewTdKlrNhY8dp8vxv+TKdJD1l7PFQEFW/R3LD0nLSZKDAcBW2/pBHPYOe6/Ee7i4+B8HWQ2cr

12B/2TS91gNk4erBHOMWpblCG6XswcY/Gc5Qa/T86S
B++EG3Hl4cdLBMXCMz1xnqonoASzUafvSWqp1gyEO4QY/rLdaxxw/jxN4Y56LlhftQ+KjKRulS+ybfbN

KuOBl4buMwO0v0DXfpuh/ObYfjjv3Z67Pm64ASR4lE/H7BNyeAiuFKV23+/8VymIPv0tlH+pm5CPPePW

nD0r2mDRw0MD1SUPa6/k8ZCz5WWvCoy0Fe8EUTr6rv
0SreRu6qPvqBtq0DzlsmSvWMo7qBJ8eQ3vJU5eBk7+7AlMNA+XIPElUu78L73ea7EMlKxzahCn9FKo2b

n7J5j+uDg25CrR3t8omlmEJ+hcvXs/eqiqX6OWs3OB8MYnWW0swivGfp9gdZx54DCpLxalTUEZdDN6a1

Fw4N0Nss1ulE3WejUpJ5ZGUTzg9WgKoPWnSvfiKKoE
gIfCN12ZT/7IPmIYS41pAevtIHqZGqLybbmk+twXBb08R4yYzDqzLCGBaZQxevgpeS1JqUrmesdE34N6

za+ME+QmhedjMPdvmcjabV0zJ0qXyNs10/OzDm1UUy398osqFvKz7w9FXwbOhqrDZjXzR3uoUppMFqxd

SIdvvI2OJJvBT1X5NViNh5B2H3afg1N4eETrVCQmTf
CJP2qzAliT0DWD6cw8NzETi0DGYml3MrRPneYTb2DBhrGCZqK3G0bURlGRXeBT8USSEjZR0BMNBdjuXx

BmMyDMHZEkEaSQByCrFac1DvT7ChHWZlFKZXXNkwMWAuD91sZNdkNb66NJvqNOOaLiZsRRt8Cv2TBoNl

UOYrD23eoYJrqKDcONPrZALHXrEoSINzM4HkvMWeUH
glG6JuF5DoFU4qoXOlMT5caJNoS4KkA0CpcdppWHXBIpAbJRQzUAboTLRyNpRvZPakKFS+Np2EOWO+Pg

1ATS1uj1KsVLm7LSQlc3ZyTKqxAOc4F3XyaLXcseMfVbnryLWRRYEyBUBtH7zycdi5wQPnXiI9Da2PRn

Suc9RlNZWfAOzFrp7hD9YtBkT+23o1I4kMDqzdZ3RK
XLKvfL7t6Ce7fIQfUC5Deuz58vYJoGEiS49ubEFDGP6MHhRzRdQ8xHIZ98W6DiuyLsa/ficnWlFKSfav

+eslXbSv2yy/vM5ghger99/McZ2514PFJf9uqBCp0vIpDYIOnfA6zam1Udg+/IA8rYzVI83F66NRKIwl

Y0h8s53R7d8l2uUulDn3gdm5jh5JZO5yxkXOv6P4KZ
81JKc/UNUJyPyu3m50NixVYLncCKf1f7aDPd2V6nO85N98Ct/SB6XLCm1gz0+TUIcqCj5+9x0Z/pNcDM

zDgouZPhXLfYgdYURWeEBTPID9W0Bkg+KDrbaTQ72LwsPUN1XxTLsgqROJ15EIaE2nxO30ZTTJYmLHMo

dmcaI4mti6Qi+y52SVi2Y0S2U5p116d2CbMK+9zzul
bERNTDPQ+4IHlr7xjku2IqInmLSrScWuAlL6Yfcf0xM+EST1QzW3bpIaSUHOYjHj0wa7kmnk7HyiVFca

4GU+nJA7ORoxBZoLtETNdEgHT31Wk2R7GqpwbYf5XdDVNRYUT1+BtNupCnrPNkjyG5Yx6CrLgLDQalzp

6utQV6icu7gew0iKzQ4zohZzdE5gSoTd9mQnoPSU2c
VMFOoURabBeHj76wtKQ/5hQfT924UKJIKGFA368lXziYcPgHiRYjBGKJAhsl9voFX3XLayesHPhY3xOB

D2kfnZ1UiUb7EggD753kRD8zwlKA2dz+M+8C997Ok8a5nd4fl4EtKdLiyJ8PRoiSGbNpKkKVxYpiKO8d

jZ0uSu7zrUKMsELxOyOQGDA+znSCpsrrO53rGNeb1S
I5hzfSt3TSEYdKr3VR4LuEbCc9EB6fDfNA3Eb8rpeSVk5GBxUNsTuvW15URMiACDggyCYPKoCZu+oU1a

tszjHrMWK+d5eEVK6bBPYPnkDsml2v/xitzMl5/wvUh4rlY67JNZgQNwcByV5I73JXW9mUPXiRzmbDMA

dsSPCS5baSEzpuRQWRMdTS9/3s2OoaQbjtk0ONWLce
UEYwGXhTyuJh1BTe9h2OE/vmasYR8Zf7gqMeuNX7EnZAspFikOAy0uYFJ+7GNj3AKgVr/Bp6A+OJuNl5

WtcFsKvMRkPLPjTuSlncWhsAiw6plTGhn0KcliKpwdwi98sQvGFPRNoMn0Bv6Ls2Blk7SpEUXHwl5uJV

xDzDxdliZoWgeYNIn4KRfs0nVzCorBZ2nxymiH0ceC
KgSU3dDrZvmPqvZ0zH4RBr2AZxXGGkkjlI2+ttyMw8TV6QVDFjm46DHkrYk0gvk9QOssb1FTai3TAysM

9SD59MohhPj/oKXlqCoCshvGpxGd2sTEuL+NIR9NUrmirqNOeggvRtbX77BsADUmw1NPaMRQEFR7FjwZ

Q8EGsN3t3aN6SRs+gNzlA3ym2cyg+qOL1xAzxvP6gg
DTQn7LfvJYjNFOq3T8AJGpMqJcxfwcDLsdtVciAoKkqqb6xZREvINGxMZpkBtoiAWD4TZqYZQDUV28lt

/p70UIemdL7HF7zVuQj/GqF3EfUBU1314bqwNLHh/AFkHndrJRFqHDs1RwuY44NhTGRcknKJo8IiAupp

NERc8Tq1qJJEBKjFccFgW8/XtbsSwq01PZDPgY7dab
soJdwQIAidZga7S0LnNxn6QenzsVf9T4Km+l2nSo/kBTxnu8eFGg7Y7YYY0dclc72MF+f66BJGL0M7KF

lV6HOu2sqcBjsJY8OHOyDZbglQsxb3IoH0X7X/Sz6ntDXd95xkvC0hYitgYk6JBSpaJg9aXKlQeeDu/P

5VntnQ8hxbM2glO40NE0URS50AjGS/Ww4VjncFoBQw
k33TPu5zGY2EvDfeQDlWw1SUU8aVhL5yfN4sOIeNHSavURDM9WlwffuQXQmx5+sVM5wDXWedMYgOL5qK

95QfVP8VXWYnuoNEWhuUw2LrMBJmNxCSiUyOmo9XKg189e4tqUewbU9eOYNw9DvEbxJI+J+QI512OXNB

j8DGkkmmo2AC5CVwFEsbYEr57I4f0rFbIIadchuGmr
WgIlKAqyszS4P6Im/x1rBV0XB4YfsS1r2+IXP7MLRhF+pmeftXwN9suzSu/VwYpsTH03+rlFc2CJHZNE

ZO6h+3PveBz7s99ahESBfgLFRa0wGWveFrNrx7GY/5FWQbG1TxDPftkS6/b3ZPRO2mTMEzweuDYFCdsH

1nE6AjKv47HGMv16jQTxGP8mYotiz3334AO/FUM62u
nA7RNocnBveN2vxYQqWtPP0vljiZEc4Mvsw4QFr7VakZh4T75LN+8w+JNCmqsHau1NNf2qOPkNRsBxaU

Of2rLGTw5/M6Szoq9GHgWay6UKNscBp1zd2yAXC561NJobdH5PR+UUuGNOJB5G26ewb8l9sEDDD5TM+f

k20Ai7kov0IXq/28aRPMZ3SDLqDujE6sP5BMB4/a9G
36rdOFXN9v7uSYSp8qgQAXDlk5JOWLraFSchB6qsXRDQc6X+Thc84FeIJXHS7y4mkDqQ8ZJ8JnbHv5gd

z5BI/E7nT03g3unm3zpLpsOiS/WzvZdfgQlxnLgdjmERp0lW6x+dZ4xaaU1INjNGcWFBqtVIjKI5xsVC

ZuOD0AArDRNUsRE11g2IdSjLIPcs8d4R32buTKx7KI
KMeUWCWs7WeZzU1u2I326301DyIFsYiv6zO1G6ydbhRFPooN0AODLvpNwkO3tmIWvL2DYrETk1GJ93Yw

HfYlq9+X9kqVcoKRFbBEGjrpkefB8cuzlysCK1pBMk2N6DuKFPmxj3mZ254PDxSKpuvldQy4HN1H/eyR

Q5kUadzGPkS+6WwgtBArLUCtGCedUPoiqbnuj3X1Js
vqRnON7waxEBy7wU2ywHLVD99KSuq54OzYL61CQetjO76cPsM77sYqnbWH9OxpkABNYZpNH3tEQW8V+D

iD5hOoHiyOyK6txmbYK/mqWz0wyjd55Nxp8JycUNrluSyyOqQR4RYu6pDcM4eDgg7C6J6rYoIowr05DM

vfFMtNlDL3SscO6nIW57ryyfbRMOTxnlTLKNYIvYmi
QbsCSC3MkFnnYajnTXogdlBgxXswuJW3mB1Lq8p7h/UrI++/wRgoFm8NUO4+sf0/UZfeCt1sLK8My1rm

WfqjUjN+7SQBQAWA5QwnkgmvPDKlvZkdQGR9fgcGxwh1qIevOUc3DIY9w3DUY2KRPgB+cS1ZZcv/vCZR

qXPLhF2uT5Tzb7PKnIR2IDG5v/C8faf7ciRGc2llZh
BROpQ49RQBIIzHMmpoy8Gtge1OgriuyCG7u5MVQ2r0I1mZ4hBw73T6klkx0+nFQKb4HDimwrJaPNFceV

GbRjK8Y+baWurhqeN+89SO5kVr6yczwPo57KG8yqs8DKoSmNqtRnPL85e9sFwUz5gPx0MoQ22zP1c1bS

gMv2mht9OcKdjj5V+pBsmEq0onHot1ilnFhTO4a0iI
5d3KO0y+fYE/yUp5ZLxo8MUVQko+uPiSvSRs4WNSn6XYJMYaB1s96W0GpDUtpmTId0/wBCdceMtq5qvK

+Ar/3Q3Pgmzp7HFC8xe0DdTPLyIMhuhxAgWwiOGnseVSJsIchWIdJiWCsNKoOyTPImDVbkTN8UFHmpHC

lsSIViP7MreqOyjJKlQZMlNj2WEHLcDN8FMOMigUIi
FSZtCeAzRCVYArQmHUAjFMAnhUNIr9ojLlYjEHK9NHRzGeehXL6DOJYsWH4Ry336RK39czW0BKDcSd0G

DHUeTU3Zkv66uUK3QVUdXqGhLFJfgeDdIUQiknM5PC0DQnYqYNP2sIFfMivRAK3QUWEsyPVwBR1BYBEr

bHNlID4+DQogID4+DQplbmRvYmoNCjggMCBvYmoNCi
OyMCbnSpzhyIGiGR1CyUK0WEYwQ02hNNMpEBJhC7DfNUDvOCF+Qp3EYSQbcYXcEF1UDudM4Iosm7b2KZ

3sFH3EFbVlG+B6KRJluQ7yqDxg7RRplL1rMamC2FYZtRnpNjVxNGq//VSQMNhbaOwYO2yqaEFETdQeM5

+5EecQDELiv/+gC5aGZOP2J7FKDwXiZbr//NFSx5A5
kBq5tT1W83vr3ih+VCzw6LLIl7Z3jgbIjvj35+h6ef+i1xHVjLK7ZT/aPnkoi3qUe61Gcvevm7LqyWs/

+QD1UR4mixfGTgUIgy1I/N/gL6/Q8NkLdOOl+jUP4eH5S4WKIEDz4+pF5SM0zXYYw4Z+rVarelKiMfrw

Wdj3vEWhm0S21wws1iUj3Jwrci0hQ9xWvisrKoDRDs
LmiFBQKGSdLLESU5y673BJoadIil4jOXlSpvSp5gr5mQndY7tSOBjdRjAGATTwy6c2I5PioLstCeqfw+

c0jQsXKLyJzuYXHKbC9VBAFHWerHh+gAkoQNcsY3XSapnVecBbDT8XjZ/OwwLAVYNv3c7jbvR/NYluXP

s6yWStoSyt+5r6ej8eLS49NGbwUkyYL5UbJ872ochW
FwZoLvBRlL61PtKK53DaNrkp3Yp0UBKAmMQv+vMpqIvApCgyaxlrO1TylG3qVMZPWL8QH0Nk3sh1aQLF

GnPVhNkfhW4GVRoVDDz9NRz/1drNntfj13Jckem4TITiIgDG6U1Up0Tsdy4ZyfYLwI3T866UtR9k1/tS

yLwwDA1AYok9nLCQCrf9sLLkGp84T4g+aKbZGO5AOs
QEAfl1PpgxPqHLEpatXkVHzfDiIX2Ao93aJ2Mf8+XElnj2kdDzFDmqB0pEdrvTUgT7Thh5BlA3LgnyEO

CqYqWuSgfRAceZDhizAr9T+JXAVuhnfWAlHd1qUmJsekU0WW7s5N8lZ45Ig8va/tslHTexBM2oC0D6xJ

auPTW57epAVaYYYvchvvf/sXltTgA1ympuiT/hNIli
JXxcL+l19leUA0ZKzwyD9bOeLKIf6LOC5nMHDRghI65o30WqaHV1KPGWpD4HqvGolCEqftrmT2VoXncm

Q3FgH/y+18HJwnc1Owa+MVsLByRa1NoDAUnh7YvESlamwjiUjZxxyv2itSVgs7PGCyt2UUSkVyT1jj0F

4bdRT6JuudLbo/vh5aVhQcAbFBAL2BXibzcG6ABkSs
55N7dwYxqJbAuffK08+mGgn0mg5qvEseHD7SJE5XS9N3Oatull/8n5ILiqH30O028s7oRuWzvqO4VZ7e

fpyzNLDTYI6JRkKKGdPgKyHfTNfzxXL97or3gvgmriSYfblvGM/V6YVcmfBETPdLc/bp3wLfsmuR0bm7

62KC9eHlBeh/u4nWzPSc8Zv+kUfKcOud1DOOr61o3r
Ney77NrbQPFhgwY6tiGetA8qGLW4PF1jTIq35P/Z4562YfSYqyaLq1XWx/CJpjWo80Q/590ts0udm9u3

x0JPiVzTP8LAc4WglmeuV9Zt22a8J05n/dBIQyUqu49uOD4K5PHw1TOzR2N38p+AyW9KzCnaRKQcdJkq

YDwmAar045Gh1fEOpEMYgFzmsRBXudX4XdIg5Bym/i
zyF3hFhd8p6NyFpfC79h+uF2my+QDphxZLBeAjVlu2JO+2EhaJmMDejvSsZ1EnhSs4+9qc5zAUzV5+Ea

b5vTTNXvyOmyf3qbPJ6MjcwBVeyfDxsCfygLVGoCPW7TqS6VhsOntbAtI+T9M2FpHz8H6LAyi+t0yqno

GT4p0elOaWh+xIekn3usPIOsTTj9B9KCMuAfLf0tvf
hg4TTXey33QD2zQQKrQkCAkh1stzRZug0PIoAtBpywhauJbRX5+GnwsuBxWsvRZUe5YdvwyaD3scWywi

re8Gdm9YQ+H+dFeFww5mmJr37JB90OoBsZpxubi2uZFeo/1tjrF8yjvzp59sg7U3tp9RB3L9aHjbFRpz

XoDVmbPxygYCin1S2tF/aempEZsnvHvzatkuKv/em6
dCelOVrXej+guy2wDVKN/KhIwIZinnybhQsNauLE0GLk6GZWjyBBCziHVb8i0SLZhvspt5uCdRw8JTz7

1ua+/0tlPOqMmegXu6OX7Yes203kvzr+GzKzm4YHoZtwKUGN5bkkv6JJ7zrigagJ3x3b/pF0C/j8RLlh

GHKhl0m6hSBdd4ZQZ3W8woZ1ck15FgLLw6eHzsg8kw
gBX6H450/+0rlhVl6Jjkp/Rx/fkITTqeHp+73Hetbwn3Wb6uFChmuAvcHV6sx9v9jKiYUYyP5LkFavQ1

UI8Qrg+GU0zvNbtzOHFBRjnBKVYAB4U8gKS9fMPJtOSDTqTWx5+n9WoCm/JZEXvpLbay0nmg6e5hTpze

ii7en/dZelKwmeUoBi9MH22ZG3afze7bhtv0+S8KuS
9eJT2SXN/w8b5khhDp3gLQ471FKuVZomMsblw4rAK6AnWzbEtmYI02zZ7TxS0sEI41cAtRew3kwr1KVc

inE7NglM3oahr22KhvFlWiX6baXkHk5ljHyedmV/SpXs+n2lLgKWI2fMzeccrK1dT4ZWDvvhm4DdmbQf

7MV9iVSYR5nCnJyr6klU7BR+i8+mXoi0lrxz5RRP4a
1IusFdB6FalXe0bTHNqPdd24UbN0JKihw/v983BzH4sKrfcw6jZdANNfWP5vBDRdiCdomLHo53DgrVpS

rQy0vyeZl6ifpkJ8JG7NULhbp3RemwzPDW/nPpwonyOsEunvpb6FCyf7nwIwsi7fnlO9LzIgs2bBheqs

E4b7dhU4MaEyR4czJRTWE652Tx+a8kkgUuvgzMT6S1
F5TpkqfvjMzDoZdY31xVrm+fUNwhU5ZNIY3xyuL9vxWUeyxvlI2OoiUqDUTmqnhLZ3RwHMF2fKed1yPv

cindi/qmQqz+f1iF2Dll+2r+T9d8d3uV/n8fQ575gzWjmZXZeQysAA30SknMCGnI/w1VlZVO0MdPU0k2avT

pvDOhJ83txVhSHmrMQvQBLFTEVbU7QnbIQpRUVAEqF
TNqsfDQysDApCdQOxnWBfrFsEyFQUlbciLKuhQDPObWxveSpXyvOw9RQo/xw2ooa7RHXOTa8Kp2tuHFa

+DQ6qzcEpZM/Td5yBdSS8VpN4MxuEx8vgqoAPDazY5ReKW4RXsjJLKfIZHObQRAhnmiAbEFZRbwdo7NY

eI6WuLcnpvLDJyxMLEVGPHUnE9a/Ph3NnsbJNzEP7+
FEAcx6sL5SyAmfX4SBHVpddjNUOWAbHKKS1GBdSTJqUkQUYkZHXK9iBqeXwSIM52GJ9GQjIwXqcGH3YT

MI9dT730gqHPd0eHQ1KH8+6HTRY4ddhJctqaqHiSUb/EfMm3fpb9KOQ83TeCG6XXAOcWQpsiLA1FBo6d

Vkp6d1P2H3XOUEukSXqGKQfqb8ooCX8YpfgCDk4pqN
eHkcmoxXS2FdmWnMGRj3nDykBXDIqwkWRGUxQqJaAXpFvdy+JSpNUlBuz+H+WCEeoN1Mj9gIahwVbWmQ

7m0GeLZbFJScO4nKKLbFIHj+NRGBiSnOS8TJ7G5NpgwiqX8tsjB9UYTs1wMdYpdqV6x/gfNsAOMDeGYa

IabMLuX0Eh/xjxldEJnu8wvxMt3gZ4O6o7Yb5kiPF2
Iw63XyHDRp3OF8a151hUBGwzUwK2FlcJqUjn/b82raaUcFOCQGNm0xgXi4l/lVl5g1m7ZzNg1j1yG7Qi

Z81ieKBQnroMN0CanP0LQLrceZyMXzDLlfsuK9AYni4uM2uUxthZl7kNB54/TbdwohAXmMBxVSSDDX5b

YD9cLfxBZRFW+Ww5dY67HfK0lrwW5Or3lS6EuraOrW
LYHuUBwx8SiuzHWX53GbF04MEI7MOva8u88IxKTvPIr56AYVWm6auZArR08NelxeVVEUci6s3troTLIT

OILQfMDj90HGVZc7q4yVju37Qq2h9HuMs7n5tS8WDKkB1+jTmGlVCEsXX8LJaWfAATo76AYbbggNFCRO

e+QtHAyJVVQeR9q+WmBcDEoyMVTmxOWslPCgC69EJ7
i7bCx0ls3BiJ0RdwI6c35zvsJ/vIKrVVfxwpEquLQzHL6ADkBeXM5dps8YSZAiDW3fko4FWAG1BN1QSR

HsEZ3YcUZfY3CmM0FSGcIfYNIdFVZnNO85IOYdNDJDFZveZNBuX6Tjv801gxGavgUzCGQkBv1XRQHxJP

6MGSXeCFEboPLeEDKuQDDeXdQ3NPYcNMmbWFNhE6Mf
koDugeZkMIqsBUHJPVkdGZGkE2cnm0NaICy0UO8AFX3BzfHgu2EfgjOaY3qmC9NHLH3QOPBzI0OTD9Zq

G8ylRzKta8EiM7vgPmGpx3ExZz0RMxQqKb7ZCqSrXP1nvh3ROHElKJCxBabCPnQaPIcbFvwyxJOpEY6S

iJX5HYJdP06rALVcVPAaI4BiSXYxBSZ+Ec5OVPPkkL
HbRW9SRsxZ6R6kwjbQPB7v8X7oc2yIt398qtUTaid2Dm5yV2RxfbztMsoydbTOndv+8176jxSczE37nk

k7Hc3MsPwpw+Jwgpz3kZxU/43U94FoGP2z/1f9NDDiasoTj/3pVvJ522lw0PBkCDxptE1Fwyy3JfkaoL

v0d2U55nmoqg3bso90Q43F564K+Ahy0e9JNu9/1zgb
nQ/H//Q+J8wK2Y0hnfA9Ohd6wzsPsf7bd97+ssMfzfx0v3MIxldh2z+vWFv4yltaLiZrucxLembDM3je

acwXSHu2XjTGRq2gkEhFUm6zQNd/v3ypOn+r/Y50KBqc6OY+hnm8R4jmZU5pwdh0Wq7bZAwBGlgW/Uur

GDvCqHdyxHZ4zVXqp2nbr/29cVvhv26pfJ/wcO1dOv
qdVpbtEHljhntOhnjrJM9DeTh7SDyo+PPDJamEtGoodw5010fbnVeE6JDf2HAE1ZUE7iLKNMOID36lO4

ym1n0F0Cu58Bv4u0/oLNcNx9mVP/PryN9h1Jik8N4CkSYmEjZi2qIwx/taeFZbCnykSdAXIS5dSO907G

CtZj09OT/ZNgocNKFTXxPiGUUBWrZJWcQYSOd+fkG2
YSJlAmdSo+yFAmZqxSymS5/bdLRbxcGYwtztzx6P+cpz7hSb/KrL6964I4m5uZcDiysrsFLwnzpR+0sb

3hWIpI32Vu6P6BnBKLkq5ByjpkKjmOqmDv6jjbla8gYHcFvtBjn9EsGxHlTIJyKIX2pjat5uWunnjEoL

n42DiytKaRDq3i8LVGcAz+pyIMHLUHo8EWOPADot86
MPQ3Fbd9c4eh9reryWaMfLKccO2EWmFn76QFUS3xAkwnPfPG79FZosxMsqU6k0pVyQWrnuTgXj+NQCk4

DrJMsBPmwqn9HvN2zQYgF954R3YZlWItRYnCv2yjVNbfoYMGWADnhlvAA3z75qMBFBfOvFSV4qvsIUKa

Zd+RGfRFt225N13/PBK8blf7xu6Xi+oZMnH9+rk92O
4lYg0r3zg3x+8jSAYd54Sw+VpSkvLQNbRHial3KIKTkpv6o4IYT3x2w9mdveQN4m3rWTCbWIHhtXG9Uw

Q+pTLYDsVQwBrD7iwaZjQbOw6bXEy67Q2SJHs/D3mHnNUjkPMuGRj/AlUNZtOwM9i5qU1YbnBWSDY0AW

NEmos4l//9kuVCMh/VXXFmrIk1TKjqfNYC6Na7BZKi
VXFz2MtOMhqdIDGK5a4EMSOZNVzs8FfX6iWOBbW/WUVReg38pNPZklWrVdPzQZLEL0eq6NCwN40/biEs

XklJ2dNzXMN62AsEDlcc/OEgV5hD3L4wI1yZ4J02dlmoixoMtgy0pcyui4d1Y7kwKlw1Zc+PLXU41mN0

1iRwo7FgyqvDbTWmT7FbNe6xPJ1UEzsxNCSjN+NTs3
uTJNLd5K8Q1yzOgyKfkciUmX7woBt9yALSXjXBTvIZnD0TngMDxI1b9wmJugbm7OHmJJ8+fetDgTSxUY

5nS0XVvePp1kDNvJPQcoG1h9DWVwEiXtTgSGIFY2eBffLU/CYVZYrzYjPZTwaLek4GTH2aIyki4lu8SS

X6x6gpzPCfmoHTRPpZlisLfXiZviu+QXZa5hECJGBU
f4SJMgww0XI5TJaEPwbixKzk3ASySsKklOHZGGo7hoxzXFmsLIX+ITye5un/HO3SZSYcRsAltK0rtU/6

EBZXQSjmbIOGyLRz3fMRD+X9gQ4ivYTQJFagI/Y+7/v6Lb2usanuQkpGYdr7iVf2Fsl9miC4Tc2s/aIR

02BcRxVER/72gFeoy8rZONdS+zBFfd5we+O/+Xtto7
eM4baj55S64TocN/n47j0bf9+M1TLW0I3A3XsDAc4+MF2uoqHsWnFdmG2PLI32YrNpfUfM990eyPpkZD

eZxfIleBr5rr0Cf74s0g1Ag2higSKvougJTzWFPOzSQ39lUDXaqjploRvZNCh+G1qbTtK0pN9WEjJyHi

fxuRHAjBlpExJwjV7JDznwj4zfRSmcKGYZ5jPo6dLq
5w9Y9g6FrcKBi57NbSoFKUEtDTbJIzlHG9nAtVfLPSUCIZInBWvLFMI+QM0xlro8QMWWz/wWWDyQFd8r

XfpDntHL6mJWH1XJ7M+PQZuxddubTWhDHtMd75wgu+F/krkdCTfvAgYROUut12+dEAC2GiwZMUlVYV2z

lZFbgUFIewDFg9nVczcOPauD2LPmhL0FFngf3FQXgM
Ngg2Thy+s871aTXwpJrI8MHZC6qNV8cWarm7Qq61f8wYWmkOK46mhiXkH1yjDvB1h0yALOECKoMGNHAJ

LpCUNZq0DlUpJ1gWjFlG3vZoSMSahvVmU/+tAPFOIXSoWmpZTL5vURjAzTvyCIIFjbJE3NVxf5Qv1YR1

RYqHIzmtJ2mFUMRr95MiiPrkM06AAbw6t8Nlb2oBLn
DaVWnulX3qPr5yv7BXBEUt3uAITAdvnQ8dvVi5/5ABsG3muT8BL4E7moNQKDNjYsfnWihyHWGHJX5awC

/AVJxPBoBrWzifBPtdEb85/1s3eFKEMwZVfzF4vWQbAwvjVm0jUUBX3XXb1GWFEiZD6LsaCcLWespL8U

2BqWkTKhFUsCiya98c2hPinSQ3FWxrpsv0In3yBJUl
BymKqbTOowX1gmNsgalySVQNC0xU3fdlxSkvIhjj7mJBzoBONsTLWQXhAIGiJNvEzYBWmbWT1jzRCjTQ

VAzjIaUP+9VYbnv94gt258hlOqGDQjyxZcgzp8n7MQpsXcqHwvgwrQq0WVycz4FGx/hMok5iftMOJ79L

sYkd3YtDu3QMJUOOHIckYv2ge4fn4Q0sBhb0ur/B/L
tjofHmGeyhmZQnzATdDdk3aYqbqCdOal/oivaTqOgM/2afAXvIWSrZ0iiACC9D+K5hRf3Rf7CzfBoOMQ

q8Rkrahqmo6wG0lAt06JxPIujR1pcaU2rpEDyTJuuHvov0W58/Z9a8jxfU24p+98OaF5AWYZA3ip7a6P

06+36z0+/WPCUf/uMgv7UeQCqvrwMymDUkKH8JSbPy
VZ8uji3VVOQzHPNrJhnHHuCeDHbFTwZnANUfWAsnAM6HQGioQJlbGCVqY9JqlyZdiGRjSXTkKy5OQXKb

SH9STUMwwLJhWNUkLhQpUNMOKkMgSTHjJBTmnUYTi9vrDeXbIGJ7PDSgNefiAE8XFZOuBX2Ze406AA64

tyPlDGSeLCGTThDpWCWoN0YwaNSwGHuuZ2ZiR4HePT
0klBNrTF2wxYTeR9WeG2WgohlfCWPLWtEaCPPqNXguEQDcZcGxUHgmPXF+Vd8TWXS+Et5RGW1uv7LaBZ

ovMjXlQR2exw1TUQF7VZ4UjYc5HCWxV1ZcSQHsPDJey0TpAF2AEB5nfWouIrZeVM1+YKrxPOZ5izOshZ

0AFEJrYQwh21SKuj6M/8NO+lInUEasDiwfT3yxFAFV
gCrQRyutVtq2Fhx7tcBK8UVcA5ago7C+eONxjQIc3tWZA5/0ksoqACFlT2ER+nRhekGx6a0wbbVrMG61

gVhpr45IjnZGbjr7m3N9TTdh8RaqTn5d3XOxnGvJldiAOzgk6Tvtu6TYzV1d99E8HnvSsuzl/CRd/NWP

XopRac0bxwd5Y+nmjqWtsNg8++nan2/+lQv2rtiJQn
x2Z3xbzlJStn5VIjA1vVdW9/E5C9poZ5EQAPTwqOdgIP5/i2e5PwrU+1Hw/t190b9jBUXMd7i66cbt9m

nyBiUSBRhotqW3SgdWVUfPy22qzYli7g4/RjpoBdguAe+8MdQx0d/qTcc89Vq14/I7lLiEXBPFaK1TgQ

MlZUVKOffA6TPcWRbB4m5TRczqYHsNfsq5Pyz3prtl
+MAS14bE2W9SOMvJ9Zps4mR+7RxkE9Q8BVG64cM+BPe2LFchY74kGniEXfx+H+cxIAcL7h3pepNNn2Yk

lzw8qq2vS3Q5zJymBsnu7JNTV142nBEnIhzSdvIP1Rgz5JPMB1f0UCbBbwwzpM66o56xn0VXFU0zGaiX

IcmNcfvZc83P+iXp5RywGjyt66Yd06WkgKaGzRk89V
IqwRTMSMBcOG4fMvEEoM8QIjMx5JW8sUc8K5EMNa7PzU7nO+i3fuAHhBclgFqkONWEqsYzCODN4wkX0V

6gNxdrIrArzTVxDRfUgxRtLNLSKh3WxlGWae8BU4zCMZrfUrnNmODZHhEXoxgF29Gu0UTScvLxGM6D+7

ogotayxvKv6zSV+9NEfjUR3Eq1/QO1jEKikqBehikg
lAvW+655YT6yH62tryH7NU3MJUP1q8FQR/HkZPKDexZvVjeksNnURUDavA63pW3Je3RXGFeblAmUHFJ9

WtVG6bbDMj0N2vF4nE9XktOXj6G7AogSkoHrxYnrCbMeHStgSY6aO4PBgqnNfZC1SckSQl8KF1nBdhLk

HVhjzRHJihtl3lpZQebjKZX5XnVy4AbqGSru5UfS9C
Wb5AA2XTbzfUTM6GXdZehinBD0DYSpBCAUsYESIKzxNRcvzDvFzTO937crobRFUZHICHmi9e9VnU5BWu

B4yAVdNUTA7a0V2R8MipQiyM7WIjY5cfUMdswModnH3LbZHmXN8zoE1dqvRXI20kVXuwB/yC3rBozgfF

8K2JHRC8iiK9Z25Yu0c0PP3pNPZYPM3UFhKAeb1lDA
5Z2maRvaR1E17rFUedGISgsQFrJZIetE3Ri35aDgP+cavnIfawLflcWrjbavPAZquM0c5ydtRVZBNXeX

NJ5mEonvsFvpLkaI7UOqCAr1hPz6ZdXBngb6PdGa8EubkZ8V/ygmy/fSxIQzN9bXGtkxniBC2BuLTUcp

7u7Ff9aIHmoRqZebIrsMRjLxK9PiwiPtxUw+Xcpy/g
osWsaKc8KbCW6Y3fTpi2boKWNfJS9mmodzPMHt7Q99TOjdYOoxeVtc0CyKYALtMBzbdmwN00SUv/rJ2G

1IaOpL49rCT9P0rEiaw5rowV3UJ2GXE3wVB3EC7o779YTWdYidmYCVN0Jy5FoNPkuzA2tq2OprU9w29t

/n7dKSqxxFnrQNX/RjtHxyDFdW1Cl+7+DbL5yp5L4D
F+auxdJAXXkaVYDa5pVAAxhVjqPsEokNF8IoozcIF7QWyIBKyY+GyoHA4k7AB8f7iWtafjLciIz9gfZS

TcfU7MjAdbyWklRd+Ys3e9NXnU/aQhPkcILTq6lqwiDhvEyIQYt0B5ZG0v2IPRRZog5oOPg4DZ4a890b

Hx/+mr4qv0r9WzB39p/GS2aDWHaB597G/ex2r2vnU3
yS9e1vKbc76G4OWMug679GeBSiKqH9TVrlCqrejcHo4o7OfBBUvrzUltBjfPLcRqFN1f2c4fCUPYzOmj

f+OwbTj9zOBNO+hI9gdN1SbADVPwPUnnFVHzo2T9w1JHESGwgdTygPb63TBOvGJjd9ZZuGeyanx5wOa8

gp5cCM1BrHDojJgVUBcAdFCcOCnxi4+jB3xAHV0YzA
9s+XA8MPgFQOiahwxlpHP1uSDdxBDpVH3UjG3JDDhm/FRRqUTA+UFrWJNu67IvbaKPvm31gwaCiVBqXT

5lSRLaNa7Remo/YK+83ChE2YLhX+vGvrahYv7BBotF7PP3CtZ/V9NZOjA3TFGRqMsfXUv3x5Py5MZ81G

+JcfSkk6kzir+PLtGycYmr88Iu9vbMbB4Oep16ZS2P
wtEouDze+UCrrbgpORa+kP1giXYtu83tVuhnmn58PBMYUBA4e6H33WidS1qYgP0gxq95/nDJvzlW5NF6

0iPp6XlfHNRCVruYE8kMQcnSOtAQhhi/8CxJyvu53HfEkyrblVZaPBu04R67IvER6VabN0bSD38SWqT9

ymVtN4r6eKvihUZ5eQ0ijpDrAPnnu/N2VlEbFAOYf9
fN2HWmu9hSbYHehGNXFkAF4qSaXT9MBJOFI4h9OgsxCcD/y+Bu9UYqiC5kQt97r6vRWF7sBayogog/sk

sEUCb+QSULTHBJ9a7MGlbcGAHTP6dzoj9ErOdEe7jIaw0mqZBoNEKykcs3PaiDuVfzA1cE5Y2E4DMZii

5mt9BmbKlLse+WSEiJ7LS9d/0IermmNbN+sALYPpyO
eiXCTonxt6swtl2qPthE3SFvuncIQJsHqHEuNjA+rmT6rknRPFTho055NQHzZxUBLaxke7H4Bi8GSgpW

rfUcPfV4S4fz+ZFHTs73tuq3XlvnkVmlaf3Bh+5/y3qpQ3lf70enuf4qaBywitWY5ZdapcHC4JEzkEFL

/8Unx2YghWOd2b1JYutUjWR8kOcHz4VeSIHyKyglSe
llVOGObafTcdK0RUlIoBZWfvugjtLAx4XBoA3B2WoLLIF9BjEd51WvVQibHVBawCBkEfbjwHUD4TAWx5

5bVc4PR8CLv1WVTrAoKp1HloHmxcbPGswEBcoULgT6EN+Qy8hId1jRaHGSlIPt0GYNf+gNcVdXaao0Hm

Atj7DTHiXdIus9f0Ma1z+T4VFjqcwrYu4dxIM4jLTc
bWFYbIrXZ9o4r4Sjez5krrqNOhJgVTfPBcoAqtLaDQGSEOvaOkq+u2WFLiPyAesoHAXQc+GJdtJ3lIue

sxeffCJ3sz2niBqSGUU+/jG65DBw6l3BqRlgXdsofKlk6Z5Un+Bq5wk4EfibIyOZ7r2xiI6pi22

o6bHQhJthdWERMf+3IzYu00lGwvqQdPgYZlO3vE6Lr
QxSkM4wGicNl3FH9unzIkC5USDdi9JZAx+W/s2PW9uEsCwFdCZ1pgZzSzQmdHK+Qn/3DaBUFmd9ACZ4c

g9LkQYOjZJlielFrQtbEOeSvCIQmt2WfFRksZNi5EFdyUTTsB1B8vNHgYVSgRK6RDPXzVZ1VHOSkwhLk

GuYnOIBUUgIyWDRiHdKue9ZcZ4MtPCJrLKYZIUhcDI
EaZ41pMCcsEx87KWdwHSIwPxWvSNn1Eb3FBrEvPIEaJ95vmSEklDNcKRPdYHVJJAkjHGKnZ7pmr2NyAY

m4PO1WUE7PqvYgb8SnmuYsY7naF6RDPJ4EEFAfX9AEQ0PeH3gbIbRsz4HpK0kwKoHjn8LfYn0UKiGtFc

8HShIlHA5lke8MCFGvEIAoPmoIKtPkKLxbEamyiDMa
MP7AbXW4NMBjK83oOAUlLMNgF1LuYYZnTJJ+Vl3TMXFpeINhFK6FGdqP5MexPmWDTZ8JoV1qQF8eMCbI

/QGHRsbUUcjrn3DsYUOnQqMK4LMnCdFE4liedXkccMQK3pX527TLHrYERX6Obg/qGitV/vyptkLfsiz1

8Dv/EQhU9r9v823Ny2BnR0+2+RjEYX78nuQqtLz7Hp
PpXgHHLIeumItH498m+PqPRvf+Qn7jVDaFQSFKg8AY5n6w4nPhe6Xrm49hb8b7wN8I8BFnDlqD6f4n+y

odol3jbUSRoaffkrME5uhHNFF6edHU4B+4c8Vl2FNxxVxV5w1RdLbnsncXcRj5QUHHhlupxGlhzt/VXl

lv2xKmR3MlquWsq4iYlt6jX0LxfptuP0pYTh7X+Mwo
7i3lLyt6loYHg3NOaw2Wtbt8qa4ujN/Uad663qpzHzgnLKDnpBqqFUFrvG5l3cXziD5shHNURtb2cVfv

l3Lsb0XwRtHcVDZY2enGztJcOp6Zm2rAAHGaTTM1f8XF047wwaAXW3iCA8KPUjGbAGkQ32MMLoOZhH8q

rrDXqkbfY7D3BZ75nHGrSdsXqPBu8cPYQxz81MBEv7
dFeIWotDRfqa2MAhf4vXhoPIUrDY28cnSUKOGhmiAuBaCMImBdFqsDd7Aq4UqHBhVMbgeX1657ogedbK

uyvdwYL/YYykMeOa16yXd0P3aHMWiqAcy9jFFYC0OITpxdy8GxX7DB06rugTtYntHXKUKKBNKD6jMSR4

vYlId/VfMRXxAtajAKtu5ZgwVZyXYYnakO8M6N7eEu
a6tzGU5GpJ+y9rBUMIyuHheHbGSK2kEAbvTS01z8KvjiwWMWkfpRcZl5KdE7lw93nrflOpur2vnuoKi0

9DY2Jd8oEYpR8qlHlOUUOq2VeqCf7I9WMEKzgvCaR0mnhLj4mIN36JnqPaWAS8HHPfpnpUELIUwBdUfX

kZqZCs4iciSuTsWuMDZQLBNpxLEWjU/G69WIl7jg2k
P3tH0pzmLxYb1S4n9j/HRu6FKzgIw8J1DU8XJE5A3GimqJ0AqbpCmsFapmRLpADSzqserYscyVFJeHZz

qCDYb6VqpvjL/5WnC4CkPmFl/IDQogoduSEBcxiUkNfJLl+dVe9wX2zZRoYKSAPzQME0n6OhxJ2T1lR2

GGRvTpJ9niq7+HLtRq9ID0NZe02vrGv0ITnyf/hu6Q
nYxPLsD1M7+2SFhO02SUxIIOBnLcBUOzZX7TnDhnHCYyTRByYpi8pg2SVxM1bO4BqCTiXGvpiMMlLsWO

So7snQUDmeQbhMRrHwwjHOuAutp4hXarPPJF/RiOoUlkLNBDElZsqYB5RkfU4rBBhgxkCaIHGkRFIlvc

q6FbJRYUBawK3iV8xvgjCNpPmvRt1Pbb5AZ7/lOEPD
VQiLKHZ2o1S2MIPp04pdKJI2hRH9J6ry7jJFQwYXJT5qyupHkihSOIUeEDgB/ahXvFBsrj9iv5H+U2NG

oXdIjMKjsXLOBCEywwTK/mV5WRovdkcryIR51sxFT04wOE45oi7s81AQLoDK0We+2YhHAngauUJACooM

gLFcuyBslk6nBYMfAzNNY3TZVCzHxeCr028IfwdiEv
cQnUFk79c/nV61ijXRFtt6pO7Z0dK1hFXVmnMggA4jhAtdW9ByH/xXoUPG77o16PBewTfRR0vCeQAusq

YyKAm1nT3KhE1usz5UkTP8NYeDwDRGkuNeTYLXiiHGw0iG/VB3ELbFawcttVYtX9A7uSWCe15tQ2iIq0

Ui1HDktuaO27uvHAlCPpwYVk+nI0Greb+egv9NgMC4
lnSGWIDZdRdBYu5XAuq3TwRMYJZm85lYTUFFr75gQCq6HxUXZPqrXtwUq5GiJDLO7xpk9N61dTAz3R4F

1wp3kFifpsTx+voRUDV+QpJbRj5GUWQutdgpPHTFpRR7w3xlMWrWb9ETeSkRr1b7Ai9V8fY7zLRhnCWo

xGgiQfbctep1Dz4KYOJ1DrhVdCVIIev77K09PS/vep
2F28xFwMQNTdLAR6ZKvhw+mk1/LHRVqq8LC0BgL+ZnVd9VQRJs+gShShXB1fxwlLla5J++8wA+oFTt1h

JvdjudEyRhPyAzb/QucFkywY/fDeQ8co4IxTKOwTO71ffNYtWwS9Uf+UWhfLfg6MWeRcLlD7b09vuiOf

r3pRM7esWmBUXhR4N12QbQfLfV6DNBQg/jFpKK2nFk
z/N3C1NxPzSYUzksnhbunTL97X8Zd5HVc0RqDou1wxsEO84TEq6fhLKZQtKqBW+/Y92lGEyEJEXFXEEx

BwNyYpc9SQk0OS3l9iSYzhYnBWEyhMOGuSaz87ZuCdGrVn77fRzNQ+hkNoNN1zsHZ60+MQ8h5NZWpl4E

MqWSxoWZVSMnGwm4TTfVLL5WW5yHRskWrFpq/jGBhJ
t7McElYmyjDAQ+EvchmUvCDSYwyHpSZPLponFyRQQXDPNrD8zGcu1kvxOdUKNB0ZYjj6TnIK9nwt3kJ9

Lp7Ff2DkBAUIgelcAZzKgNvytFsX8qpN5WwLugtXZT58c1WvfC50hOuLcxyU9gPldn2Zp/4mdS8PJRdw

AkPf155lnm6YHdqvRD9nZVdZgpRkdvCEs8pBF7is/E
wJszDk2nioetS2EIcNFStR3DVlSANuTGg8hXFnGd1+9AhUmllGgDEQtCWdhuCYnxYQ4Vqie2Jxy+OvPj

P01jN3YGbKaveF5zKFXyCYnBQGLmbo15ZUVwFiLGa2ErOkqKqy/BHJsSqTzomXDnEPmLl5JSop2B9H9I

O/okh7CQ9DLI8kk8NcZVHlHBashlObIunJZiK3OIHy
b0QrNWouSMs8YQcaLGMqV1O1iQJmARKdXP2UJJUzEP9WZAJrktHyFtJdPXBTGzTnQBDsXxNjk3IfO2Vh

QWQdIBXJIAzgZLZiN45zZAthZt57FKvqMAZtVsSlGUj0Et8OQrFaAJFkO14sxIEkcUBeJYMnPMBVPJop

AXEtV9zrr0GkAFm1NQ9IHX1FuqDno5WaaeDoR1idR4
TADE8LNGZhX5SHJ8BaV3lzKhZct5SpF6ryArWww3BvTj3SYbUkYs0NAbLgEN4haw8UUNFsXZKpFhyUQo

SxKpN5UOEaKdAoOHU1USHlRcpuLnR0MKG2HsH2LAMeRQn8NRB6LoTsNGPuLlWiYXGcXkQrNCjgSZe5YL

Y8BXOtKpYfTgk3JHW8HYG2MXZtADX5SHB7AdN9UHLd
KUZ1GNQ7TgG1KTDbFOG2FDC1ThW1NXFaBng7TAP4CNH5WQAfMZppVYG0SCE6NWZvBCA4WZDxMBPaXeK9

OSK8TkL5NoUkWfQnCIQ2WwB7ECUaJql1NHsxNkCkQklnNTHdONQ4JuF6BPYxCRZnRMubHjM4IahuQaE7

YNj2CJC5SjNnAgC7AJQxCHS0YjQmFdE4ARj0DKX4Pu
moJeH2UATzACOMTqAaPku5DZN0ZGCjMnyeGNJ6YII6RiBjZyZbDFI0NAY9UwU1HOZpVML1ZFH6XlUxMf

npCRJ0DVR7CxJsPqBcApQaRKWbZVGyUeLfCMSxROL2RhI5TUHzZQN5YJZ1KaBaBvPdXXJqBLK0LQV4KL

WcAFNrUUeuUaL5SSObNUuyOPGrCHB6PYAfSwN5XEI6
PIKiKfSqCLs3KMz9POP4NVVfUxGfYWy5SAR5NPFjVqKwXBo5NBL2OQVeVfFsVXw3LOK4SYDzVjKoLPj1

ZBN1HZYtAyJjGIt3LXP4FMUaLuMpFOw0LDA8OJGlZfSsXQp4EMS3FNFhTbZeXT8MGPZ9EVVxJrYrYRw1

CKE2PQPdIrXoDEb0PPG6FJYjYpGyNYx3MXRbHbrnAn
EcJAF1LuF5NZXqHSN7XMM2WlQeCnVsNAG8TYQmSpH1GvurBspuNFW0VkH9AKKdMpIvFNwtQxJ9JINmZW

PvRWE5YEUjTfTpNpOuPVZcUjSUQbOuOVf4JLIoOnFdBqCzNsExHBRpBoQpHlXqXHA1IDoiEEG8RpIgXG

Q2ARNzTWF4QonmEgY2XLY7OyY2HjtkMjC1PYJ2TgZb
YACzWUplQbF3TbywFuY8IIJ9FzI0KulzZhj0RIZ4WAXaSgfoFim4ETplQjW5CoAjUgj8SW9DNHG0Fjxk

Bog4EOo2DWH3AknvWBo5MBu1HUA1GtNtYnTzPPasVxI3DkYxQgV9VBG4DqR9TPOqQYF7RSR0ApE2FBKz

WUV4YIC3OsK4QWApFVv0PABcEQA1CHZgZIH6VTR2Je
Z6UJEgHye3MAT3EXDjHutlKsb0SHM2WqQEPjKcKFS8BFS0ZcR0RGVyZHB1HVH2MtN4QZQiZNA3SUNqIU

A5FBRiQKG2SYQ0RcA6FVDjORUrXHL9YmJ7DBQkWMQZSoXrFY0iot2YVBszUQMaZgfCFaEwNDnXIrWoEN

CwNVqrVU0Tf642HTGyE8OibGYlyf8LCEZsCA2Pi868
BvKgBQ8FmzopiU0SFRWkKM6Rm9WpotTiDNT2S5EsaKxytCgtdXA2MCHkJPRwC1GlqBCkOpStQEdkYMRp

T5XfVBakMHCbEYcsXRSzH3YkauXJPu93ZAetFO1qRAOkAIIdKXJ8ZUOdCVqzNJOaM8z1NFlnA1BqC2kl

ASWqF6RjuWPzJT7SSOV+Ip5LHV9fx7NvUIjpFPJbUK
9lqi8JKIO4DD1RNRBgGB7YnBRjI9RdstKsH0TsdBkyFA1HekLyFPiwNU6VDSHdWb3saF2GknvbbE2Qja

UwBIlgNh6IkT5TkcXpGN5yj5IiugrBYzZcXKHbEtwri1OAvUTpCLGgL4rna6ACsUUbOWY4ZJ0FJFJjHE

7UzSY5yXKaARGqVQZCPWkhKVOsQ0HgmtWOQTNzwigk
hM2vYHI3NCSiZx8ZRSD+Xa4TEV1an7RnFIjkDTPfHZ9unq8LMLIzLVt5DWL2VXVaLok8UVKaDmE7JgZk

LMW9BKF3LkN1VDsdZpCpPKDeVAYxZsXjVfCbWHF9UGM2PAEzMmp7AWOuXhKoBwywFed1LQV8NwH0ZOGs

TET3TAN2GwR4KFZrRID3MPR4LcK3NDKjGQY8NBO3Fd
SbBvLoWjKpNJL2IUNUWhBcKDh9XVF3VAV9XEGoKKe0QApbOjM5XuNhNjXsIOcjSzJ7LhvyMbNeVGb5MU

O5ExOqVhm0URU9EhH4OaRnUmHyMXofElE1QhRqWch1BJH4ClH0CvosRyAqPUW6AlN0JOOyUtYrEWM9Wu

B3JDYiKfQ8SZP1JuF2NUAwPCabHMToRpRuWpvkHdGo
UPP8FSP9CXOyCuMaNSN4SsD2OUNjYYY6MHZvVGJ8JBJdHoPzWRBdKLZ2WAOeNhz3LMK6EUA2MWIzUss4

TTb8GMA2XVQqUyBbFOZxNOO8WDZwPis2HQX2RhElEzHtCsBmKGY6IfN2NedjCEY5QL1BDZQ5OJMiJHBh

VVF4OPWcXFQaHhx2TMB3GWQ3OZVjFnEtTOo1UCX2MC
MnLpVfOMm6UII6NQLzWoYrCUz8PLY3EKApUlRoGWe9EYJ1BLVgSoIkDVn4GEK7QVGfNfMpDZy5UCJ3PC

OcWtEzTMh7NDE4SACqOtGlHVf2GUSLZgUqTzAmCXu2XVK4GUQnCwDmWVa8DDH0DXDpOoPsTRf4GAP8CV

FpTth4MFXlYfF3CUYzRRF9CPX1PiU9GTPcMfaqEUF2
GaPbIvKcUlV6PQH6CIH2TDMiEJq4BIJdGrV0GjtiDDHvWSQqELK5IZweVoNbZAVuKiHmMiUcBPsaUOG1

UfS4LURkBeLuVHLxMbGsXqKmHhA9AYT1TnM2SiOeOFQ4VTjbURQ7ZJLsMbGqVIzgIbI3CrIdAeQcTSlk

MvX3XzUcSXXsGTI9UiXmRoZ6ZMY4GzI6AbtgYwS9FF
U4PXGmOboqSna5XFO7FNS7CuOoBoKvEMt5XZHKBwVjJzd1LQj1CXK1HrtkRfc7NEJ3ETK8CwgeKqErBJ

krMqJ4WpElFwPhGAR2OeC6MinzYjRgGMS4HmL6SLVqMJT1GTB2YnV8SKUfQUU4NBe5NWX0YHAkGXB0BS

T1AnF3MKBiLQG7ZMD9RAApHbytIag0NAV0AVP5BPOe
HUsbDQFhRWD3FCZcXfZeOVTcHAM0SEJtDeZjIQK5OWK9ROCcFkPeBTFxHAV4QJDiMfCkBMT0HoE2XGXe

PFE0YW9yDHzgwrZyOkxITbZcNNJmc6RvOQizNWd9UEumMEKeM3E1hJIpKj9fqPYns4ZzrYQ1p8YJZgNv

VBIaPy6ynP3htWUdADSpTLjjBq2iXB3YUKIpLF6Dz3
QfpaAgENT5Y2DttJqkcVvcmTT5NDPfZKVgR7JqhCDbAzJpYKsxLGVxA4XaAHnaQUYqAIruUSLnW5Czcw

GMNa70QSotGT2qWBTlCDPwHMN0CWQaLOahACHsR2c2XNkjP3YzW5lqUBXeX4OptMBkJW1ZLZB+Pg0KZW

4an2RzFMgpCOVmNP6hbw9CLWR0PN9QQGToRI3JhTWi
I0XrcsUqC8HddRgeCF2RxvFwFPtpRQ5JASBwTl4gqG4VgiyopDeTd5ghL5JdX43pvB5wH2kuihAgb5rX

svGmWSgtVs6JNYXuNX4IpTWjlQFnKUZxVjXrRBHyvMJaRJOeLxU7UFyfQILqY9zaCLLbqaVuWQSuPPTZ

OqHlIZSuCp8ekQLon2UigUR6a6NkMuExQRRRAIazCM
4+OWtivcIcGjgHKgLvLPTyg4UfZNvbWIc7T7VmhNUgvmCvUjjahQXCKKJwWEPcD5unmby3wFNnJUHfBz

FaAGNsQ9KhKDHvIJD0Kz2+KOpuGLW5dmNzwK6MWNRbrHa3HBIQ3nx4L3gWjjOLRPYc5LCeQOSHDQSnzN

omD0MJLZPNSOSQTpB9uHIxHlszQ6VFdqRAhXLPVcKe
GTZUSlgE4DxFxdZoBq3B3sCZ2T6thWaayIifBiJ/8z///3zdee+e4ATZdsOsZaH2LIZXosXB0M0D+bQp

WR2eGskXNlkJG8sM8SQnImA0nzNhxM5tCL64xQD2fZvP4m/1mnn+O44t+wz+8nMdeSuXT3j05xdU0IZj

h4TvvgjebF40Me+Nupqo+Y3g/3S90mkI0/7+8k1ELc
qJgimjCjOzlk/hNsF90pvEc12aSeZbQTPf4Q/K+U+fe/nsO9/mt07EfNR6p56bB+qUl4g/r44kea6Vs7

5+2ecE9vp1GQ5o/5854lowX1sGhrQLE+13aweSK90zUhqoMpFFeJ1cBmv6M+pGUk4s/bjlx+tjyQmv/8

R6VMhW+Qz1p/9aRGgroUmyLSlz251buAW9EkMOMLkD
mAn1uG1xM9InGWaaZI9bUIO3PpUlsVN4yG2FV+xw1v1edDVHyWV41MR3hNkKBs4DI8C5dKUCxciU7XGD

CcOr63hDlycWz9l25WD5N3NF3hqbNQE6Fr5Vi6G/HmDngkBvnUGZnrn7Bvj49gUz+tYuUO7gBM5Js7Mp

NEznov0hSHVV8z9rBslASwBsyjgkFehpvn+g40gSAt
xHa+hDcXfUMxREKZXgfJCuprXaJbqwLrd+hASpdC1k0GkEHRB7ZAZyn0bHRIK4UctBLr+1QmcieTVLj8

Y6czUzmmnNSKLGGhPO8kD9GFgFdIsfuzOw+NbS7+pl7gp5MH7rhwUZ8itY7X0n9NIMr8QkvlEbAVeGVG

43gB2UyEL/TS/D7xBcmwlodRJXqwH5ufgqrXhLmKhq
7eNI+RnspFyI937dF4j+lY4GLT54zV5Bs/QKTCTWGpFSYhfIHWFgabO2YKA7bIskExg9THO/QKDJ8rRh

PKT9Vt+vzqOy1p3nibKK2pp+oyEeL90pul4TGD3y6iMmtBz8Lz2iY5ne7X/Q3/DVjNgqHqAjTN4t17Bf

3L7NxVfIgqhMIGH9tHQekYbdkFRHqULqhbpnzLmZqw
/Ts/Xe4Nz4GbAN5ugeu9qndh/X/bIydztSOqQt4A7nxiF62Sp28O21PR/g+jN8QYiZNLQB6i7YYQP6Ah

+IYtVEBUsAC3R8yEU2+Ve0Bb4B11aYMi8VvpJBvuq0+Gkv0uKoqd84OE8Z31S0R/yA0dgbo6ExMyI+Wo

e9N9Hg0k5K9dvtHy6XI7ZdgMmzB7WflJUP8Mf8WPgb
G3t+4yXvaz/99lSnUx/MHx8ymaQ5NWKrmN4zXb6KmBxiKgPFfSo5jdX9m3EM25L/EM7Cq2ivyvnsqpGy

EbeX1pblOKM8e0xrGwCsLl82zIfeVxmgtlLSbtAwLqbSpZaP87msHWjYmRGaZhaFd+NYmwcbwBIw0Bee

mqD0sRMDl4khv35LWaiz8v+5z5CcdV/jkvJu0tn71e
W7WN3yMcytk3/Tj+jAxSbSn3ucZdO488yn3rr/10seGeJK3XyjbjQny3m2947vCMijNciBjv6NTiQOyq

gEvi3Ol3jbkwB6JY+iP0+eDz2HQH+EG4GjzPYKTktKF1a+il5UKBd4xQP3X/Ih+O2aa73ZPgPBWvneii

6vL78PHP92C96z97M6FNGQA6zCWyE2qoqgIqZlU2t8
TlspG3og6cugn5NaH9ik3uFlzjwTQb5HOg/3hY4HHOHI4IJ9AqnZ1DNGlTga0zJnPboIF1Pf8WJsDsf2

E8CQecUWx7qtw7WemgU/RF9hHC+j/xJl+uV0O/TMZ1IwpiqnmlAdgvmVuMb31Jl0AuZJ88kcT+Dt+oh2

LmKx4H2hEn0luOl+Q/SqcT9BG9aILfvQ3xawfI3MZD
oIPVCptxT2uUIBDCcD73+Be5oV0jgAJqDzxzMJyfzH6euoQoc1qQfT273juEIcSNQfvBxCF84tuqGMx+

9e3MniCYwXnvjLlERGsK4iI4livpGuqTtSKk26imeGkA2uAqhruGU2P4VCDtbu0JttY7NeyFrnSQmohg

N85UPd7hGl0QwQk0y4w5ifjq/IQrnqzPZFbadhZvsC
48tcIAB1U3I686URI22D0sM97t0NhQwvRrqHQ/KUd3GBJ3C14TSozKh07fQ8eG0RbTw6hYNO7BCiDIVo

MI8+6jFoiicDbRwSb+X6w8Tvzko8K7uOXwE8hGozCLipzr/9s1y/Sl+k/4Y2dCEQ1ahcI4vk9dXJ5YqJ

WtUYAxLBcQ69Nnll4Md+40gqTluUVLZWRDnsqSi41g
rrmn6+XK0W8RlTxtCkjOevemvRtM5+GRvHA4RbsE3YfILYs0BtUlYC0OVbSq0KHIX2AlERmRAlPp6pyS

ZaS6nx+6PZwysPyOJIMmPJa6PPBUbIWYvsjVzVPkGHcPhIksLC1GDUBYXzHFvuTqFCgGJ5h/lX7S8urk

Sl90pofiK1cu7nbCpudt9XOpEYA6wiV5Ws27ngsb/A
Fs6iqgH5aQ7mr4FUiSB7PvwzFG77qikMgnT4Xl8G/m3ehUBQD4/BOwxGiobXYLO6du9Rck0tcfvnXWsP

TBU+z96cvon8eelZEB92cVRAR/0fk4Lx7YsgwzqNllpUEOzrsCK8Q7i9TM+E7Oo0pfxZwE7cLCIjGHph

CDgOmPIIvh/i+0L3fCDnuifGGVmKCe3NpxHTGP/8M5
8e3v70NwCY9WKLCh/HM1a+d0q0Q27J+P3c55i7Dkdci1w7muduHCuTmwND5zvbXFrKrHJDpgVUff15da

jvg6JIs68O6yWP8bS8eNbznj1CQEF/gn4DtqZNd+PbT8nfiUTWpMSjn8EpE6sRnAEDprcIPkgltD6Fyi

ZgvWksFYKJ0MjK+OV+4DXgLeoGBIHRgFe+XoNiauWh
gnOf35YDSPkqqeRmwTYdXUGvfl6s+mu9YpB7sN7P91gHzhz5ePPwuRTOoKLXfmJe0fCNd05U94OURCG5

UXc+FTCCreLyUBRuE6GO0pZyLKynRmx5Y/thW/rnGK0k0JW2kWjWeu0tdq81vO9r7W3tvyhEo/wPpctg

+fr58FAx/il41sPH+v4n6QNE+dY9nDTL6SpzsFsw8N
Uz4eJH+yBCoWSoiaU8wJZzywzqqfnc90yXMKBtRPEdDQ7fu9b1MuX6BjwK4/JoU9ZT3r8iWr1MJ5aCYL

07+Tf5ivzhrxVyxUtjEaaWtcTh3tKW1Q1LTlpzN5HjlclwAnvZoeU2eHKFIuMd9fndNkMLKbnfSM9bSq

vCMxalSIPV0ZXeF/JE5xhHwdS7GOjHPQsdJdBeHo3C
6ifWuiyIWQF7ZRnJ5ShoDxiqL7WB67XhMxnKpD5HEtEix7g+TOKvaEZz2E56c857SESZTFgJvhQfc03A

ZNaCwnMlFU5dGBYIP5bzOrmm5mrEEwApSKvJ9NbjT2oannYE6elEhU9TBDF51pW1YWWVuncy8IDoH6Ez

B4VnO5BNeNthlNz9FhObt53fAZ7hBbfNq+ZtViuabP
++yT+/v4FLs6Gj5kMLRh9oF9v6Y71AKD7qkR11x47V5tM/zN0Thlw2OIlHC+9THwbfg2G7GT+gFLkZNK

smZTySxdSkd/kfRH260GiiUkaAaN+o9Uc1Y8y3syf1j0ENWg1SDVe69ntfrvZwwgjqH0Z8ioqTvN7+xb

hqjdqzsk9O8CSAlU9dNXVk3ZdTxdKhaNQLKc9BhNJM
pgBAl1q7JL6E8NwcvlejBNB1o/kDleet605x6aP1231bFPIM5hkGNLqemQUNbVXKCr1dAAtrd2tFg4ky

FnEJpfxrQ4Vf24W0DKgoiUF0wa0b8ndwpI/Fg4s7p9Cc4oISKiMR4CYUB5XzkAytc7Ie3HISr3Bjznaf

dZyOL549IzaCmT8VPhqkWhYt/sF7mDYrOW4wTudWUF
hVTATHvAfP5t0lcNQAK5P4KaxAisBVH3tCpXyPCuKePt8ogfZ7diWvlfrlmCmoMQZWp7Usu3y9JOgdtW

VFBn4vBZRw0ZXjsrRmu/StzF6Pkx1MM1miq2UXEBwKMkQtNAFvd2izGdY5w9W/wGnWVCSZjqm6qQ+ZlB

Cy2UyPf/RNnmm8BFfuvUYOkUg52Gg/Iy53gW2QeqaV
M/HU88fA1ldk89ZqVdvqXLEoMSXdGb45xEhBkbyhk52dz9ht6b5r8innhjoQN5YZJ11uVp95tGvGv0Ww

7Vc8+5wFGx7aaDMGTFDZroVONiP9S8CNebSDng2shZPGnbjuSYcyvvU9S5kG7+TVx4KaVBdjjpduJrRD

nmmft1JauVbczwzPWV22eSA38iEzIfAHj/z+6qklHO
RMtwxsv71pHoaoRdwoGC62PESK28/uN+gk4AO7dd6jk1kq1H0cus1GmIWa5q/CDl9yUnne0+DRF/Q6o6

hg3JJlZAgcrV5iuH8vpmr2Dvt7Q70dbPZ68hV9tGfTf7TPHcC9+Varwq5om+LKfxsB8Eyc/aS7BvPkq8

Vk4TCj7/LOJThAhTPYf4IjrHZHqcuAXcFMECwOzH2Z
FBeoweMeFcnJoPnF7P61zdwK6MARYbvFCHxf1n2uoB0MfUWa0u8NaZU2hv5CjG9WqStxX8V5itOF7Ms9

SEZfWgESxJJzHoSWdeiOWhDRGkO2cno5lqx08htSDPBFulD9dR4Lu/dpQRMQa7o9+402wU2Scks51LED

xHBkVVEMJjuPSW6RHKbxuYnjQCA7V+wpniIqSvCCFV
cdJxk7JCGcMYLpT63mN/PzNw1iAdZ45C9DVPRtwaV/gRTs5vbgapmTSGuIchL8eXZU1XHAKP+C4gpelQ

Zfxk3Q8O4yKC3UjbzF5RgLXwmwktRfTzhQXVmvE/a7/ux0i1a1jy+IU/1pINyxP+UQaQavu1KjxSqFR6

UChg5IU8DTQqKi92BJu6P1TVVdJizRqQxbCMyUKw3n
X/NTN+jT+M9iNE21QuHKVV3RfPtsHk5shjSnrlCUhEc++50oUlgsU9W5Hlsfof44hhDlLO0Ur2feOQjZ

tj6kh2aROph50dyVA/8YKnktxSt7KM35upAsd8NgvhoRaG4tPAAwvff9SwtDupjc59JUpIpTJ1wb1GB7

MrTWFNSfpKvBuwn+KeT6ZC07f3HEio7OuDCGSOwHGQ
EK2Q/hJUyXrkmkvIGyeOueY41nstsoJD1m68OeDmxQh8L+CW00+9Bs+X3Emf5XLs6YF7bJyYbcGnNfXP

hNxM7RAQd/ZdftGA13fj9fWQpIDTLS4J4Di49C+9lHvwcE7GotY9yOTuAzMocZXnPH69LB4ATOxWjOku

msbLwS6vA0VSaY3dqaAFYq9ODOSzPqEhVtE/gXwZ0E
RRyIqTNAcP1mWnuoN0Aa8Wy1/cU6xVFXqYaezg9yHB2o69bxBVdJM6h6NNWRdekBIsEMXBjMwrtS7SiL

JegsIBkYIw/IfE36YvNRNrVP8clbvteqYmNz7yfxRNuSqbEr01kz+2RTTqOUXx6NRH9+ts5Ih42pbsva

Z594I94LPtnZ3cQa6n4yS3mC/eVsNhM9UCXCJiyI8i
uC9tA/SJ5nQvBMDE7052ylTx2w8L0xAoXKuYMJ0PAIAGFSWVAQa7WwF9sVci5Z+Lo+5V7GswkPvYsGlI

tzn+B5xI38qIj3xAPgUbeEzxT3leQXK+BVBrcpxxfT0RpGOfLqvkIIE+BTYAMwC+zCNVBmPRtrVA66H/

SY6eeuq1KvNQ+dIjEw4aX0X7mQ6ZbNsSngqUrdVLDZ
umZkwiNx2wF9PaXAp9yihOZ+97PSa0oyrc06ey/uU/UUY0//kgazDGoMom+4pSddSFwpbXsU4GEXc7JW

7OiLUjsqbRGgK0QGIaIF/xKp3q0ADSQMDPW28LQcvmapqxxCN6ny7OcBNkV4WkYd7KIfSgrXN6S4Jcjg

gkOORD4LvEM9XK950yDsdUjG5V+EmUlLzzPxtwLE51
mA8OnMIpUgHrFvuk76q1NaEuedBwM4LHpsLaC+n1SC1ICpgAGW920z+L8C+ZaxGTpzs/MEymlv2N1530

rebFy1EF9JLrzUwYB0zTUGhj8oKsglvkwx+c3G1qsU7ahTgYfcezL1VCK2tgQIw7pVfZe0fsgil322GE

Md2zM9yDIWZ+Xp1JUMNw+oYFlBp5hHwzjNHE1fjv+5
9B7diFQ3isYtgKYugt7VN1durYa0f09EfJ4OpMVO59y9V1ATS6ix/jqzvn/wd0wGj5N3S8X/nRnRPxtH

6sfJqbBV0Jx1wSRj0w49n18mZqv7xuGy9lgTV6FRew+Gf0RIHO1qeBwExF3jAEzd/NgZ+0OQyJ6TuiGr

W4+l80fCZdw49xzE+0+PQJ0ZvwcBB5dNP0CVuNwVyJ
thoJzfuqSDH8sn5XANsN48qq4Z1+h4JV+CcTGiAt0g0kHWllzwvYYW+oTcs/FC9SwpOnK8ICXSExyaYH

qAdnIbfaaL3cIdX5D9CD92Z31gOgf9yMh6sD0jzwsWvBDFou3YENyMiEWMYGR5uV5Ebb0NHcwBxatu+D

1KAj6499rk5x0PdyzV0uLkjSTN1cTdg+ogSI+rfsFp
P9WPW527zsEDgVNceEk/s9HlMBU06pOCmsdKIqZS57KvS+ChprOTTO16QJ1TSNCMD+mcOvY3zXQyoDDZ

WzBkWHJGgIrVm7Kx1/ekLcjoMnLJy5EuROD/fjGJmCQvHyW8JcDVen48R8IEnPahqp71P3g57YHfW8zz

Suc+Cp0ETrr0rS4CutAIYspHIuXhhT9yEnIcfpYrPL
00AklwD9vjx9dSON0WWCfqnEBE5ji1WcwPyk7NHecGbDkiz6Z08PF5SFeGs2DDA8miUr6xmm07bf7Ey1

8aBZuR60TUOgbS9fY22M4L9IjaumM03YoWOJ54YVjjEHvuNDh9GyicF6T9TleW4yhP/XotrWC/3pw6aC

G8/zbYlhx+iEZzuHwD/tU0Ue+P4FsrZn4jIIsW3Hlx
UrQ+NA1AgItu7OwpvU8n3IkBmYOHrbSMR1ck/iXpOIkXIviorGv2CAGW9kSbWLRLAtMbaXyBM6kBZrgz

LK8exCpLgtKv8ug93NdYxum3+6k/0dq9EF5nbCPmYgqSlUedCd26uu2JJ04vwFW1CIsX9+b9jT5SV80d

VOdjMeph4J4Gbai+4M944T6fCrJhF2sT9tWWaX5DNu
ELOpW02DjjGq9k02rt3YIXxI1j+J/NKEPJq7jfdHc+7WIZDH9B/ZYmNyT9vPwDsLhxtF7hFzd2mXyGdd

i/fShz1Se844v0fvYEkd17dEiwg7E6S4oqIqjcL+V5KIqd7fjK/7Js/siLjk6Jaxt+/0bxdh+x8eNk98

9K1eJwroTyrt2U9QlUt3odc779AgcfFO7HmlqqhLKq
ES7km7jLltkpZ01Ct3mE3ezX3OMWyR5WByZz0+1tYGltTiF6tJOjp8oWBLSV3djHCA7c/dl+gfL9Z4s1

uJLO01ISPdE+R7RKKWTq8Z4aBQZNNyHnk0dVtN/zFRwYdVk4bHjGL1oDi+3jtktk+0C+7vpBGgSaDtoH

fMN5gNdJb0vydV9Si3AVlHilbrq04+X5/xMwdvYDLw
Mv/W9mVkY4SeICbD/DDhkIO/Fn8WOLsPCJSkvPjmMEp1FNcxY+xEOF1fNcqBDdA8CB6iBXTu13Hg6tRH

6OieD7XJvv9KKADWymaPL2+RXa6U++CuHkY7ROG/7APbPAaN9ISD3/+NnE45XTl/6JqLbQ8XN2/tRk+K

8BdsH/EzvNIATa6teqC3bRmS5ZJe8lCsdaB49H/+P0
7KqFI7tck6wTxC5r9pN8XCRG1Bdp272C0Yo1tsf+RpLtQYOSgwsD5eams+7Ixe72ao9E98M1D3225DIc

MQkHyynFmz0W9oGsJq0h0f/UutE3JX9bjyEe244zT0H8Chpv2zNivQyItP2UtdpxWrvfPd/QOiAWSHbo

KRQ6OQbtX7E0LlK/l3Aj8AHB/CdvEJ6NqSkmDhsbhj
3Qu9+Radha/Fyx92fwh8rghhBjJy5u/5t9g63b/UW6NcnM4NbfSf7RcoCWC6Jh6s8QN9G5ke+XnmaPD5+

l/1+/C0d6hDrPNzlM8ndZp0mHr4MRM/A3Zub/WH2l3/Gp/hF3i+vKuOsck74AbdNTXaviVmVp72LST/s

xp29+NIUPt2080e477tzvB0JJZCr7jFAgIVjD0nZrX
uhv+jt5iDHm6KDQEeicIbNwVBBs5PgSEgQI7b93K1H4C/qt6zFPapgGCRS+H9Kwrxts1MRLl6HTuCM9b

Dq6PWZYhxE60Oc3sZgNZjZcH69j3WKci/EXOGDA0KNnBKnLP7QmNSkMo9VLrNx0enPz2Qt36ZbIp+n3M

6T0+14A2mhJWcdsX74EP8Zed8xV737J2Oe4s3XEGr9
P0Z4kSKzOw7tNCW688Gn8/39pPqj1+CJs8TqzXQBwEp7iaRFee9XgWYB21on+im2GJoVmkVyke58A0g4

uiQ4Jluki9tCF67S2lO6+lMWWqxz6KFpH+i/nqMkfKbvzm7kne2PmwrwV61bbur6mhv5mwkVlq12WJ8v

D0IMUAaO3nB1mSvx8Y8N7sRMhaM++X6oFD2xu4iRyF
tAmUfJNoOO/EaCgemyYdVw79Ew5T7daGObj2vTIocJJ5wCL76vw5umEtwb3pkJKEW6M/50fu00+iMdpC

rPvcPdnHQN+iS/VpkACRmbB1FfSsUx3ICvYuZ0atpF0++2gV10Rcb04G0ee6/xm018AuU5CHvcZNw8+1

X2kY29B3JpIQaYonA+y9R3U+g7hRfZP0MtcXBQqidf
WVVrrsi81ppwFXSqygvd6U+3lBABAXVt6fZJrJa99r0HSMrX6mJZAvcIkClX1rlcVP7Aj0bojzmCQUEs

T9STQU7HbzEqEX4LqgurfV+naZ7V5tE4954t6NGEKREMzBJgcfhlttpsZbRYHl/Hc3MrvB5ikgx4Aap2

8q56vZ4eQ8iU/7FNxzbG0NlK3twOU1YwIQl6JPdPbT
MUS+xVM1DKiFhSQ/ISh7zPwIs6l/p8Mr9pxemR8LDxU6n4OSzRSicwBY9KXMbYDMVmht9yObaJ5jT9No

8ITVuZU42joJFLbznl6yVbfUYvIOZgNkrBI9UUYbBgZYkBxqpT6DC+iH3RVBaExuSuPfZVSa9RIhRKLu

BFPsolIia2zmKz0tuKG+5EQJLEv3ZiQO+fINRALhgZ
+RryASCC/8HNTjn93lPDCe2T7YquRFQQqJEVqyi0THmRpTc7QwBm/apMoqkm5PGt+r+j5tHqVSEy+fHL

7/Mervat+hgNHWYzu8P8d0bzjKo6T/FwRovW29nUu8uNh9NeeaqsOVKVlXKckSiihp65oE7Lrx73hgk9TO7

CvRPhbAn+xy1Nls+1eTZwE4rjlq0khP8HFCjjUgm96
IEjyOscUvTweMGnapfUQl2I8qQ4lV+jTAO+QnwdXfk39Du9yGy/LeZ3WC/RkkoQ1o0AEb2QMzurk8yiT

dKJaF19zA11X+eCY9nc3nOEi83+mcOqeNo0adCGiZSjy9zC/eAHC7soNrKSxLooSZ3iJL5AH24RhdG8P

nwq45YVXcR3ElpqJTe9ve3fr3KiFSGJsAeawFE70IM
BLo7cp+wWDOZ60W6D2Cw6Rhh5VFZmX6D3WH/jvxLz+TP6vrzBluxOjy6nTbv2m1DtbmKFr8qA7wge1a0

mmTpOCaZXCZPT6uoK7WsM3O/o9t6QQ9XZSxa/LkNLRI4jn+pP1V0TP+GP2/vyhm7SEHoScOSQxlG8cG6

7ezg94BCfWLncX2blSP29NmH71JjwDTRzXzS8pPSex
mbQsKoHWm9/aHAaFhVofFI32A/fd+RvYqaf2P6UNiis4J1+mirzDwCe3LX6qL8Vq2uQyslqLUVWl3ddI

1QA5Py1in4WBgUoe1f2m1yf7c2UQO9sfk0SuB8Khk/q7z2dUoDBgRlxPrGmUTDgUt/PJOscpsudO88B+

2Oltro56mVHXAnmIiUQZyUaMqPAyWi7zeeQIndX6rh
qwPbmW3mlr+GW6cUMda80oWKjynJt8sJqL5emn4xD9X3oVgKhtP2H3MEkyocDVo7g2nXzk2+sMxcPnZ1

MAZ4sZfh1OjV/92eF/Zvyj1vPxqn0eS7pPsbrGB13dg5/uuf/kN0h/4W6ls1J+U3WOM/5Ghw2qcnzMdg

hqdpmoHFvFLGfRheG5qi7/2SwPObS6aKN/x56Prx2L
Gnl/2Kt6QY8KnqOKuUkTiVga6oiivh8/G5kXIs7V9uY0EOsq6Qad1Ko7buryAhv2F3yN8nT7a2g/hVPV

ZeQ1yocl76UGKB/dvjiW1Y/h+u46dQyLy+S7AGrNvUkY0pfxIXp2/ybn6aU2tPzHGcltTPj8ge6jfnyu

IkoS3CQH0kG+VySsfyic+34ptsivnbZ5NBi87awS8j
/UANQOOgvz0gqHHO5HisgqKnvt5t7yk8VNAZgm7Pw77yOr9rr8i6i0yH67Ri8/bjv+u+8rl2wqt3W4WE

6lUKck8Z8jwMInwZV+LRFhcrjQkY6SaYdjtK5Ujbv7TQSPeznzqmOB/Madhm5Vk+gjlhLUn4YC64ha25

qeAFwLba9P+P1CUHr0L/61lqd0x3Urhal1d7m8mT3U
feWg5xaQa3Xgb0oJ3QOaV6jTvbPtSd/bzgwePM9Aj91nkCoM6uiVbgq7lywaKH++km2DdF225PM3jgUh

tABNPb1aRA+P4vmzU28h1kIy3i9yMbokVSvhrpZlhb6LFc170eyabVjrhDmcy9+Ss5Z53c/bTlglMo4v

Sry76Lo9Ica5QnrQ+yIswDz8tPgpXzP5irTvfL5Wq8
4WOPdlee/hA1AbTVAvo7WNYeOb79fwvRpA3cnhdnoJcwDzv6TSu+v7Dmp/aRb0+HvkU3d/XLja3SYBzq

cOdcBMNLQtFo4iBLlXWJ/WGhIJunRW8TgKM37qsRihqoKt9+HXYe/EI/atDT1qUzKfHBgyJVVVkPnqG4

FPfgQ/hIrvxoMqIZ2hZPsGKlniiRomu5+nMtjEZcjP
5rir4ywsuZj8hKih88PJY/ktAwxlAAir4nEszX+ErBfb2EvqSgE/jkZ2dd7qfS2o/FejpM2sHm9HIJVW

2ik+p/64DJqBGw6R6DKrM6PHR1szTjPitwg59W+PtqhF7YVdkriS7FzlMrerfDM82A13sMTyj5wP5crT

oYW0OkJFe4A8+M0lvk+z9pIfxn98v25hyIIZv9xCWu
DBnu3qlBRgGkz8L6TbBhQ/P81VWh6929X4n+3beyffXd3mmjjVP7C4074g0+idtCDq1tW7fwtN/VFqjZ

xmvXpjP25HEZ5EwPje017U0w332vdBFfs53oOxSC6UPsAoq1ilVW7soMki96NQ06jxHAyV0hRi43yZdC

/o90tx7srcaCuTLu6PnIuippw+E8ykFdj6f0V9IJ70
H08zEwsjW67oFwrt35gwg3X9JS8oicbfVc8m2GOViq/MotkRu51ntwttALbC8aa4wpvK6lh7s23+Qu3p

L7Hzo+tQNor70xj6n/K7R5SxpcbbgLzvOfj3EHnY2is5/2HO/q363UT9Pu0FVMS6lnWqmjP4bjfWadA3

Hryd4L7fKJ+pwDR1cMEud4U+KTUowMPMbAJbC2iuC0
EliOe4i5PTE+6QOtzrmsK4Zv73bB3a/0g9raWHLCva+aatg+EU4CElVpVNg5EnklzgtYja3lRft1722a

PBgzfMGfs3MRo8bplyWbx/k8bRDvSfe20Q0HmMTcWYZkg2L3iJ8izWmeSIQJes2oxDjz/21q+1mDprn7

F/UeuVIYhnG+S0ne/yt6f2MkjYL/lise6V6consl8r
3f98v5dsmG79diLrzafyfPuiRe7EL3GYi1lbwlhkL1vBGf0pmt/eVr/3q7rt8hxDmG++yx1NCsta5C5b

OGl0kxvB+eEGuzzUjn5tVCzJQBJ1UYkR4NuDdMObMf3AmrJ+Uzcm49Nyoba/Z7H5Lh5nsqymK9AgRA1R

pu8IYl8jrVtkxP7EyQGw1s6xLo+ugKF+O+ViE9+rUX
eh+nHerVtYzA2LueFrFt9FcHZZYDim0NNX9Hd6jLK13fG+DS4Oabt9zC00Ii5T/SamSEtEPZz+fVUWh+

cIrNpm4jzX1wbHxdIucOJ+LGOKa5HnDvLoLbNDBEi6yKC90lj9bjkz27npJ9uBhFaPPtsd3yoztN7Wqn

lRCzWurMz7Z3DEF52k0b9LFoW7D/BiHsuE/wStFl/Q
mp5U1FAoxWm4A37y/jaft3MBheu4PXbN/RALvj/GKFr0ll7L3Ew23IBhJJmOu+Awh83Z5jqvQ4W2V+Uc

w5y4l+5MUpyTAIHsTdYN7dwwI7q7N+e41G1GjyyeUsZObcAgXG3whmnX4ZH8DmTQ9cT/TV1R/eemx2Lv

kGkAsNTIObO+uuMc3qCbL+LJlfgL9aWTLVneasg1jJ
4jsOzFcohGo7tyPygCmwELQp2a7E8Z2fyz65frU2qC1LoXR7pC9s3/0VavTh5/AMmL93b7N7oSeeb19M

K6EtwIwasO56y4d6nGGsJFkWtGzs6ptXo5KVC8qSfPQV4J37n+zR4rnrmHnbbBEufpEDVRDhhjExXLg8

WCfI8SUyY9rQgpH73QWXSUI04X25vYjehv/fb80kWv
8CZWslzTBAje7VZ82tS/IgnI2TjKxAjj96ze5omSL2Ed3DI95p/YHem8WjqqbBBYS2AQ85zgKf4qMosi

778U/3P6WIaWNIYUSsOAzeaVB0oAhaqSuuUs0AnDL1Qk90J+rKNYPzUEzGOPu/kpzivYARdUUZ1t2Ih6

5mKLxCiMaf65axIp2UPO7Gp4oGDnnBnDt5E57In15e
4yjxAiRBlQVVoDYqdr6MGdU25s0QTzLlxe1dkgYCpu4xiGBijqU8cOErd0glCyEXIQu17AHP0MoCjHQ9

Tb4F7MZ2/vrsv5/DN9zKSaoBAw81h9lqN6dq5PUXLeS/hm8qP7eWSJOrYkgewkr1PIpOp2oyN+21NdZ9

POH4IEFadBXfu4Y+/9/9G90bnZgrO4LS+8pJ6xdkje
jCaRPgsXyGod8agD01iuZLYN3QQkyfQ6pb/FCt4MAHU+W3+QXKc1DGElOEhNoZzQKd87EwiO0ug/hf01

R+2VNle/EAf6jj2m1uXQY1gWq4jBKESJsaI+jrvuX63Dua2v0d9+pibPktF4oGreSJ6oil0wa/k3zDPM

/gLf6ZrcQv8yT97CR7tIB5m5I928XtulSs6op2jKWU
zPeCqlU4CDPsvw3S4OnhXfUGVy+4uBE0MgsOZ1FkwkgkDthhEWWbkPYz8wodk5tubyvt6u7Vvzl5X+RR

cMkAwwM9qy7E/wDn4Dd+6tFCBIWrbb6MDqq/AU2KSbfGpKkngOZmY6S+ig1lwbKMrZJud4XVG5h1WCgM

Mx5lkr0ojN3dDHy3a+57V9+G+P+LdDzpq/k/v7ID0X
FyCLSHDHT4Au/NBwam379P2vzOqyI9zkfBiLAo9vCrbqbloyMSSg2kMJW1m7uz8f/QHrnTivIRuZuuSP

bfU1QQfpAB3vp/NEZ8dRYF8EzP/2BWcxvgpe1NuysnVlMQkQmeXP25Lwn3iPrM+Aqwj5TBU/wfziCeOL

si3GFclyJx2+mbu02yJhae5B6urgnn2ALxGZjy1h98
PaBdqm+m0jWaocXoMbwUiF87N3OJEDtZMLIfrVaiI3HEOGmLJJeBG5Tz2bdNJitxfEASgF/sAUBEwJCy

gN+RGUfyZPyF+o1MtbhnmIKY6RF7vbDwd2Ski/P/Mm7halT/CHDuQG/KSg7pmhhHefZkg7zm+Dd6Kz50

xlImZFKVTRz88Pna1/CXq8twI4b4xv8flhyG38d+ic
MF304S9xrRa3APgL0Lz9MhxfYn7YKIWAduB2YvthWrHYseVBji4uBDFOHxO3aYjvGkITN2bfjbOTZ2s6

vO2ZiWoQb8IR/3ngB3STzlevWXgCAYqD/wx+R3cEO082I9GB1gy0gPDerIkwzYiy0yW1tTHNhVcLayNZ

ra4/ekIrw7ilgnfAlkori4O6FAH35J7hItwJzJ0SPW
6bys+PVyIJnZWyt0ZiK0JzBVTU6H+gCcnFJcUhsRhF+vlN+K3s97P/H3ji8Sp/KCqQHSivmlmKpkmqCt

JX95FoswITR6grcKLruvuJHJwUEQbGvRgPGsccOWVim6zuNXFruvkUcAC3eZB4EepzmsfFuv9MKkQd66

IQymQqYKk8mYlWAKAIMEJch+qHJMUBvFNvfkzrTVVT
c4PMptQOPCSojNyBUDDXbUQgLm5t1CIaWYgxb/uF0ZJKA/17nXDmAcdXDzihpPX6SH1AM2eeIvSbmMy3

5c7xrEXsPbYDpv+zS+udrqRkDLxZK3Qdm9Bt1mbrtCWA/vnj5GE51BgkNorRnGLNiz+fLkuuoWBmRklI

obSXGaszepjwZu5O+mPyQkQxydlVOpWcq0d9R9kfd+
K35F5ZvwZGDS+y2zuVLhDmNyj540qZyqsdMWiLi29yCK/agUVYs5l2ZDg5ZUEjTz3/EEFbgEMdAPCc0z

7Ynfms6bBEl6Qmpp6ldd8dIo5SyjRLf7YxHsuXq0ZigXKHpPDpuFF0/g5aedXs0gkMLpbbcjAqaBpz5K

St7BSwRzh8DS3tuxQh+nNCVISRmYa/RhdOk8UYpksB
KsthBvQ/J3hofhME7XnZ0ZMp4kOsNrQcSNY+bX4tXDk0zfrguWtnci1W1ZYcfHmd1mtSde0n1hc6D7Sp

l8BT2xxb1OGnxNPeMPQAvdHWreVWykG4OeBpl91niaJR0lWxb9F7M3fvOv/5crqDDXkB8GTqraoNDrBB

ehV/8y1hMzEO1WUJ9m+yGcURaM3LtGw55l0FrNgWNw
RVMMIMn5HYJvuAUB4qBwghc6Y6kUpX7Mpw0s7KxMh8j5btV5ccxOhGA5ktAgIJW2GJBVOfFM9L5e0lDJ

lc98UbHMiYLzvTgVhwrfSG91UWEFc6qsCXWA7RD7ultmd4DbA93RME2mCxEAnG1SUB/76H0C3GGHfOmQ

xexNgSpDq78EkGg1Rd4YaRhb7gj1pQ7EGVEsmH1kZ+
ii4fyhBHBURPu0BwQ9c7iWpnBFL6fkIT9KyQd2AUDlTTNQpNnzeirl/4X/wvzoPa/4mrzc5U/pZbXR8N

US2yr5RaCXCpNHiijdYoSsaWLuYnUQIst0WsSNjlVKc4C1KlsAAvhnUxWbdgiULXAOBzSOBqR6sprwg3

aCAyNDQ+Io5KUIFyvANdAV4TAasXbNMJQyQdZKoabc
6sH1AB1vebfCGLWtJPD7P+UKjCh6LcKbbkqL8kzy04UTlbHgJnhv06KTcE5soCnjfO4WkbhHJGp23sBE

mBOzQDCxwrm6Yu8AxVox9D16m4ps0l2b1nyLEoujMnlYtBkFeytlUGh8fF5v859vU3I+rDAEW31ZUdLc

06Yo/PJwYhYFavblm69V7Od2WRsF5B/UwOgaeO/Ny9
CQx0TBRO3CLFiemhsEOhLVfwDYk/nDnUJHPWh0Cmayze+PA8ES1siwSEF0p/wOE5hxKduyNj20TeBilY

JLizip0tH9pt56hH3Xr+v2U/wbrWMwWsWEE9vtXfnE4GCT5ox7HsAMuhTIAxIN5fjz8WIYoGAdPsD4J3

oYQtJy6acYIee2ZhlVZ2j3NHKoLtU1GkcbGEMF0gA7
UPQREETmhKjufdiR5WNKOiJAYyCV21HImhWB5VFFXGLZcgxXQqEAF1A9Fxq9SblnDpURDvXo5DZRDpTb

wkG7PuXcMMLaAmG1AjabRYIf33WUgkFE9gIRXkEAYyZLT1DBVxWHjcYA9HaBGvrYMRdnucIGFmN4F7AC

3MXDKSDdNvO1IhpfQAtJmpBeWfQuAxJXTYXh9+DQpl
lfFaPwdYIxX5TZRgl9JcGYa6YS7MIMCfDQihFO8Ag026B4C7WhU8xDUyL8cWAs2syZE5xKZoO4Jjl0VA

v410B7CEIAVIYnkBnhvknW0WDCGMi6yMNQ3jnH3IQVRkiLb2pQ5FLCZwG4kIL0onuGVvML2ezyY6EE9W

WEjsh3ZvcVJbBMYgYbOgJ68dITOvmD3aZXjGYGGuzQ
w1sEfpO0X6sCBoRF6qigEqUB1+SK1WXYZaCd0rcJKra6UivQL9s4TpAqNwOUSYDImtPC9YXrCdEOJlB6

dgMKLoPkDgRWRjVmGtQdU4NG0iHAo4ECpnMVIkFYEtHEc+Xj5RLT7jm5KhWJnfAzMsTW9ulq1ZAEnFTs

EyC9M4rHXxGy4zdU9QaUN5aRKlP6W0tPDhQ7Ylp3ZK
m019O8GJKYVOTxpJbebffH1ZsyQyLMddCy2OOPTrqPx9dJ9CWWiyUE6ERVXwRG3vPW30Wk6ytXNxLbV5

XEImYq9JEkJhX6TuMW4fF86nCRDyCjGzXLGPXi7+HAuihlOaQdoQYqZ0FGOov5XnHWefEF8ZGN5ug7Hc

TBpeSRYdw2PwMIh5NU1VWAOiYGOkJ5BvaOWrE2MIJw
5UYKz9E7wfZDzqKj9SxCBxVNDnMWwtCW7Xb067AMl6EN6FXLJmDEZdEGVZPyRnWBAuGsNkTkDoKHIPFA

stRKDuD3EyEYS6OBCwUz1+JQgtJV9WI5YhUQJ7UFi7MS9+ZUnkGK4HpILNM7PxgAHrHRpfQ7MIFM1DRF

S9YM5TuWSbPP1CgDRXY5JbiFYmBl6bUPPnp0XhQm2i
S7RLGBYFLHYcGFuuSOqtVEUkPMn9Z9V8XMZyQ7PCI994zSJzyZk9Sr1zD3CFSYhZOzTfYIxqKZunOKCr

UFi2D6L4PLLbL2GHV2EjViZhogQlL9S+LjPfNGXPFS5XYPDUHZw3N2N3sNQwH3L6tDcHgCL6AH8LJA6O

fHBxiGOfz75+InBOKrEbZ8IWB7SQFB9RXNd1F7H7mC
FbC3I2xXwKgFU3FM9AJP0LoBnfvFNiXj2iCHlmPFL+Sq3WSn7GVlKzHX5rbc1ZTofaBJGnXzdJFsa9K6

jzaxn9jAKwVwC0K7L4FdA7oTMqAO5JB7I4sSPtPWA2KJOaaRR+Nc7Kx3FlCBZqMNo8B5lsSXYeXYPeZa

UxiE85H++3hynflGW4H8k0WAILtBXxrNzFwrQzE4qT
LQM4s2Q8YZs/Uc2ETDF9bKh9xTVgXWHkBZh8mQ4cgYe7XoIiES96RINoGKgvfX8uGjk5W3Tze7VgDq9d

Di6rsYRdCh3JKgRqNNW3acXbGjNDCqZ2qOsfdjklXVB7R8l9yCI7Gd40o4umleLde6OjFeF1PLfzAXTz

YqUhriHrEID1qjNcjF3vvdVgPp7LIBQjXFmwadRnHd
LYBb8RUrTwUO21MbgzqF5vhKV+DQogICAgICAgICAgICAgICAgICAgICAgICAgICAgICAgICAgICAgIC

AgICAgICAgICAgICAgICAgICAgICAgICAgICAgICAgICAgICAgICAgICAgICAgICAgICAgICAgICAgIC

AgDQogICAgICAgICAgICAgICAgICAgICAgICAgICAg
ICAgICAgICAgICAgICAgICAgICAgICAgICAgICAgICAgICAgICAgICAgICAgICAgICAgICAgICAgICAg

ICAgICAgICAgICAgDQogICAgICAgICAgICAgICAgICAgICAgICAgICAgICAgICAgICAgICAgICAgICAg

ICAgICAgICAgICAgICAgICAgICAgICAgICAgICAgIC
AgICAgICAgICAgICAgICAgICAgICAgDQogICAgICAgICAgICAgICAgICAgICAgICAgICAgICAgICAgIC

AgICAgICAgICAgICAgICAgICAgICAgICAgICAgICAgICAgICAgICAgICAgICAgICAgICAgICAgICAgIC

AgICAgDQogICAgICAgICAgICAgICAgICAgICAgICAg
ICAgICAgICAgICAgICAgICAgICAgICAgICAgICAgICAgICAgICAgICAgICAgICAgICAgICAgICAgICAg

ICAgICAgICAgICAgICAgDQogICAgICAgICAgICAgICAgICAgICAgICAgICAgICAgICAgICAgICAgICAg

ICAgICAgICAgICAgICAgICAgICAgICAgICAgICAgIC
AgICAgICAgICAgICAgICAgICAgICAgICAgDQogICAgICAgICAgICAgICAgICAgICAgICAgICAgICAgIC

AgICAgICAgICAgICAgICAgICAgICAgICAgICAgICAgICAgICAgICAgICAgICAgICAgICAgICAgICAgIC

AgICAgICAgDQogICAgICAgICAgICAgICAgICAgICAg
ICAgICAgICAgICAgICAgICAgICAgICAgICAgICAgICAgICAgICAgICAgICAgICAgICAgICAgICAgICAg

ICAgICAgICAgICAgICAgICAgDQogICAgICAgICAgICAgICAgICAgICAgICAgICAgICAgICAgICAgICAg

ICAgICAgICAgICAgICAgICAgICAgICAgICAgICAgIC
AgICAgICAgICAgICAgICAgICAgICAgICAgICAgDQogICAgICAgICAgICAgICAgICAgICAgICAgICAgIC

AgICAgICAgICAgICAgICAgICAgICAgICAgICAgICAgICAgICAgICAgICAgICAgICAgICAgICAgICAgIC

UtRSCuVPPdILEeXIt2R8okCFImASOyQI7bKDm8Mc4+
CIbBSiLcRRW4vnRqeV8PHT7gy3VpBXtuXRTmh8XaKHs2KK2YAERnMNfvAL4RCBkxhu1IQRWeMEDjiZHR

v5icQmItBGE9LMXnHvhrMS6OKNHtU1amskMxELIfRVZKLUjsRLIQEKecCYYYVBWgIUMmOhYkUmEhZVSw

IPNkYHGYUI5XVoAhI4XdiN37YFIBKx8+DQplbmRvYm
sGEzW2FUInk9LeYMo7RC4VADKcFpypj2GsNeXySJSCNWxyNU4AVMD3USLvNQHtWj7PVIAeH252zxRjMK

4SHh7OLfGtLQ7htz8ZBlIvHDDgHiyLDri7HXnbUQ9JwKHfSYsIdu8nsqYbjfUAn1BlhbHgiOQQvXvhcE

UrU4Fgp5Rvc8yrNOYSGSItcPWnDjPrBkNcBDQgYWu7
PTGGSRcSWePeD6Eiv4FtSoH1CUMwJgAfCQyzLZWmMeJ4AB50kEpxYZ2YPDJuKFKgZC47SUJ6EAQyBb1J

Vj5FCfCrOV6krn9ZQwGfWKYuRmjXHzq2CVzqTV8ScWMbS8NnwHUyz2uBTgGmO0FKPKM4GOCwQw8CNAGg

VgNdDISnXQpsAJ7dNHHyYSDQkYigshK1AF3KBU0eeb
RoUY0SEjVgTm2wDa0XIxSaP5JcP5QiJTPdPZAPRPsgLW4EKDleFF8uZP6Mg8UPrGZorH9ddf4NJINjTQ

UySqeypz1AXljrJ7E2uNlqQCJhLfmkDMYRFFxfRY6HINHwVJC2BZFiUSLiBKAAVcQvH27qEN5DL5Gim3

4mTmV3GLTpQxWwHNjnBT51vSysggQsmWOgkUemLG2V
Cj4+UJjdlcUwWunVDdsyNGXXRcPyVsOFMzCmDLZuMAQoDONzKiH0AqWyZy7YKKVeVKIdETZmKtTgZLNu

EUWgNZgtJURaIYReQnKoLXBvKEZrWD4MDfLiMDHcDgC8BVweZOPjOALvyp4FHFLzYAPlYNK9MlClLUQt

ITNeSIbfWJRcPWChFRV1MLTzJSDxMY6KXaKrHKChOT
TwCKRwOJRyTKMgmh7CCECxLBRbXTa7RTVqFXGcNPPiVOljVGQoYQQ6LfShCPAfYJRtED9KLkRyBEGtMS

jfWTGuAFFvDMKcog5ASWNrUNXhKVD9VXHcULSwCFGpMPjfNDZcHGZaYCByJIOcKYKxBV6HJyXpALHyVL

SkZnooWLVfPSRmnl4HPESgPLXrDFDbWIDdLIPeDSFw
YWwoKLShMKE5NIY6SWDpNVEyZD8COsUzIEUdDUR2FhOvRVHjEMPnqt5XAVUxMLLqPhH8CBWyPVPsOWId

VXngCCRzQHS5SID3GHRcXYUyBE9XBpVkHIFoOPviZsXqILTvQJIrwd5VZPLoQEWiSACoSEWjGFEvFWKz

JZqmTHMeJUO9RSS9TJJuWUEtAJ9SJlRyVWRcUSb5Yb
AtNQDvHLDwqq4ETPTpFKQpXKw4WXEcESZtSKGsIFasMVWtJPVjGey9LJQeJAGpDN1BVfJjFECkEzJ6TD

IlMHMlCOZwlc5LBRHoHBBcMLvrBzIxCWNqCPFoYJyfPYJeYRVaGBuvBAWuBUZmDT3DOeEjPTQbTvDqWR

riUGAyKMWfyi5CMBPvHSDwKuCtPXUvCXTtHFVqSQkq
UYYkXAK8PMetQCNxOLNjDI4WEqEbECHzYbBiJwPfOPUkTKPcne5INXSyACNfQMRcSCWhOIOcQEVkJRs5

wuXbmKDaXOu5NY9LU5KmvlRaKnVDPv4Zr408YQLwSFTwTm3RW6adGg7aFNWxVYDYQw4UMRp4QjGhUaTm

FGCoRCOiWKF9SHE5XIStBIZqRAA2L7GbVLB+IDwyMD
LdMAAxBFSlQRItXke3Nyz7LSH5ARV3Rhk5H4H2OK2dYOJQXf8+BDjxsZMapVfvDIQJLmO3WRQ3PXrmHN

VPRg0K









                    ID                  Date                Data Source

 

                    [id number]           2020 09:31:27 AM EST [facility]









          Name      Value     Range     Interpretation Code Description Data Gregoria

rce(s) Supporting 

Document(s)

 

          Progress Note                                         [facility] 

TUUNYm1yOwJXQiFd57/EGFkqIEFae9NvOEzlSTa8FKpkMSCyD6MgGGK8gC7dFDD0XMjSJsUfYjEmGyPe

lbm
JuDutZLxJyVAPeHebQSnTeANlaExusvFGaKN9DyIO7RIJwD77wDPSqUPAqM3MdFPM3NxP+Gx5GRBQbkG

RiIP2TJkjV3Lnvonc91g3N/4FAgdYLJDYvc+ICEBUlopqTujMO7hrNPP/E0bnwhkKnhLFD8Sq2X7KWFT

0ljVEp8i8q2IVnMO4rq062HKg//Vqcfu9vxRC/l1/D
CRVkuQ6kX8w9JTgvqfSQfZqouRyfxT1JlPKq5TTKW+lsaHEEIto4GxhcbA028oIqIarEQOuk4+F4ksxf

mHuIIvTZOkDnkb0UJp+ZDrUfBZ9/+IHd/Rh4rICtYmTETYSLTyWOiS3yp8ZTCYLOzKRHSKWugpCiqskW

wo1n7r1F4CZqXplmm3u33OlPgtrJ9fwAkh0BlzgXvp
IyLeEG053dBFGJUjHXgmafa23yytGWHy/bLXfq9DNMCydb+GGcDhfuTkZGob/E8ibRS1uI+ehjXqpBUA

uGG3PmEclXb1oOXtaoHHJS0LiiZulCiWKI78t/1SWoBNyLhmIrJCHec09OdTZJK6HX252rr4pJqDpG/E

p9Xb1eeDFoFXL8OAt+CgC20Ayob0qZ66Pd0KSxqDmf
+L9a2WiArxO2bsy/cTuAUNMwUAkJOf5V+58+oG3hmYATUnZpLRV5K9epuGQbQaKwwNvfSiFfWEwqb6So

dLpzYSdD4X78NSwsTYzPBSCIQyyo18nNcGeBRyc6PlWlxoRQRDRqbdm36FY5HMcjeGk5CrbYDUqaUXMi

10N5y0VZFO/huVFEGk4uEdmp8fRqoDST7bZmeFBlnR
HLtnGn4tK2e5ZpzPWnWgutXjvhk8thR9buaHhWaHoCJKygKUR7HJL8ggtAqLj5ykjg0zCfks9jNLuuNH

9JstPRJtrmmoYftyQuqGHVXf5EjqmonkQGCc2PuO8I5a/9rPyWtoYIh0kn+V9lPSi8KDvZPD1laM062R

7A8+Wa/cvN2DvqTntlJ/0wGIx+/ZpeD3MLtFCwdutv
1MYK4IFmbWOrWQU0RES2vbH2VOdrSY9RP4AZrV6qqnAmXSWWqnATTB4FOIFb8E0YercJAPa35lkr5dNT

lGM7B4X3qMPFqhwmqxoca6OxOeHZJo5uBVTTMZpchzUrNKMoaioOAOnNRa/CYhZNZOMGhShnxLuFbS73

t2kHBen27pMY1GnoCXFREVtr8vIGvSNGvcUeCeVRw3
gIn9SAtxDgvvWBzWKXEidxoHIfnIMbLqgXSYRI8GR9mrjS7liWRX3WY4XW/qjuzZjOoll2Us2ftRs0Mi

VDRn+UycCjHZJpznED+7tyKEG/oSyRLTf/+Cvnt/Jv/2Q37/yTz4EzoRzPmt78BD6YZDRQaWLjGj59PQ

gp1Dl7hg5GS7LY1SuoZSPyylLz4ce4jlEQENyC+q4g
f3210R/MhtnjLE/uxUqGYoMMKL23jjzas1RCVmlIjWd/PgwxcTLdw6TP02gAlR/sZAOr6UmC1xwbPVat

bwX/exTrJYptSEY/CcAwzOEpluAK4kUHdsXs8I3mDyw08ap9RY9/tA1aFDzrxe1lz4fkk94SVpEqfvph

4jBSxT/2cyHCCnOwQSTr5klKJ450X191ozUdgOfaNB
E9KNeIURSzuG7EOWpYWP3NatkNFk9imYVO5cINGmFIU+kSE6keQaNWl30K6IcW1BDx6PaOwFLK/+iwc/

8rDv7LGAYC9PNWFM0G8kOe9bQQGsQTpda8YVTopIXtXNA3p9Ja0aNzxCLQmYvKjQAPggkND2/OM9NhJz

LZ11/D1URF4Qq6e+0eS+brn2jPaB3LppLXJGRu09cY
oRn+NZ0iwjDsaIKJmODYMsoDoSQldMZhj4PlGcKZawRVWKuuOJrR/4EkhgCbjESrRjyQJjhCcJE/viex

ttgQjYzyHkQcd0fOyqQWdkV3f89SrSUeLgX/FLqzzxC3MFAyLHQqR3BCf3sTrzz5tmEu4rvg8AKlbgZB

SLnZVfYTwVwUKvW7naX6XkwptOwg0ABcJbBgLG9BOG
1zMoGhRMsT2WhOORESaoKiCAF740EFIA7CbhYr06snbVYRGb/TRmCwBwohKAuGTShOCw96MsIKNlXCWP

EvML63MKyt1Vt8dT1eS02uPgiypQR0vw9gNhVhUTQyhdEJ/jEhz/1URH+tq37KhdJtnKOXeY2k2JGySt

WRklctLQ2Z7sAdpN/bNWbTTWihPg17JWN5DjZYJW+I
BtP6KpHTN2DWedMWPHTRZMeTDtv0wiWLcvmXM6dki1YcSsnuZFpjtpMqh5LPdIQOWXbNbRgyAZV9rEgs

vsMYJusXoMjwHkgiSJ/cgXXyzGOSQrI/h4ZYQ4y1tYrHjxDdbXtAR4xuggWKmQyRbUIOPM6vHYWkCW1U

L9Dk9d88nM1UKc9tFZuDzfHGPaTmbPcDk4bldgVKdp
hFJE0VL/Y2XeTZ/NvxrvjJaw+tpSi/7enWI+Fn9qehjaT5NAizJ9kYFfIV+hzuVUCRH2b3zxDiny2mgo

YIz1jZujDUc0dOKrX72UKQt5jmh8H8UVydpRv+uHjc84RzKxZPlhjsAQoC6suTkko5txhU3D3oduxNaw

wcS4iSaSCXjqyKTChJG9mlBaYSqzGAiScJy/rpZtNJ
Gyk/MT5a8oFjpmbocYy259ujKCp2ue3jBVr9t3+ZlREDKwS22nGohZXoLl2xxv/MdCNboE7gm04918r5

v9c448m78ije9sQuA8Eyu63D31CG5CsZAWGL5E7+UlHV5bUUiuaZptbhRpXnng38f03rMPNBprn5Og8y

kLL6x5hEdgZlDJG9xdg9g5hcrVmOZy4HZmTNo13shA
6u7zsbMieBEmpMlu/9CIRMGOknBPQFQQ/SNdzTUYdy+Z9wFOpdsYHjpqHRzpa80olGQAujT5e4cY9D0O

Ek0TyzkApjdZgQCxU9oQ1kMqA1GLkPVycLLAykThy4cu7edfH43VWeFDiCaMRiFVx3DDK5wBrZoq5bO/

0okrPshKhpHlHnml2yXuzIboupzBOGsxLmgjCn5kIU
j6A9EimXI2BeUBTlWptsNjLCljAB1vf7i1QfPfPpqJr3r1vjFV7Ds77KrP7/oTUYSgYSw9IB1g+2VzdV

QwPr1Fg+RSi/qbVhJPYb0Zcm0uazc+w3tJOIb6m/t0eIZLGqcxBF9np+ejMtWGH291WF9N8sbXq2pGf0

yoefAj0petbjxXFfr8QU3muEVXzwo5QyuTB/vURdsn
UHU06yID/hpmS0Vjil2QFczyRr1M3PqkpCmTXFCXcicejxTI36KXPS9tvZ2eLpWV/qs/FmwB/cQNdARg

Cm1KbUV4Fu3xTiP1/H6EWin4wuikn0/sE88/JxV5fkWX3QCTkemYZAvNUeqiyz/0hAogoMsJUt6pSTAu

/3qXOuV2AZkaUW/ihdZEYvwFIGRu/eZjRiDU7FPLfQ
G2AmuSgFMKv/CSgeUCvSDU7RHvyGUFiNpuScqrVkYkvgWi9MgVUQJLFtITUoO4QJ9GZShJaRBRUXKGn1

1Vx+D6SRg5Zjt1fOTeFcB6p3ciruYlzqC7U9+AtTgLLQSi64X/oghuhgb9lvSjR59JnXgIi2BAYGwPoX

u8hlXTHOYEKxP4aGa3GTeBbsFZ/WYH4AX/8HGWhfuw
1KOB6tw2NgAEEyQEhcgxTdRenZBqTbPDNeJfrQUkDpFVmLUxDrKQDmHHtkNA0GQOkwNZwxIYSxR1Lgqx

XvbWSbIEGoYr0EWYUqMV5YYOCqxLHsHYLiNrDlXJROOlNaDHHwPZKiyQRPl0zxXvNkVUB4QBWfBqerTM

8TFBTcWE6Rg692OH21npR6UGAmCu3RXJRxYB4Fhd23
xVT2NKFbKuKkBUPypwKyEMIsgnO9LV5EBmLdJYT6zTQjSyaAUK3GYLMazRLyEG8TWUSaeBAnHI8+DQog

ID4+CJdssiBnWuqCPlKhZTOlRnjTWeEbVJcgIeseoCVfZO6JpKO0ZZAfV52xPZPuWNDsN8GlWYV1HVB+

Bs7LJHKrmGIpRC1RElqL9Cnie5a5Bm9+fL7FxjqDfD
XBrbdANEJ08T40zMOg8anfcl5EakBPkwylRowi5J1/HECgBEPGDAfP49xqsull2Hg298MoOIB4vh/JqR

Zl24Ezz/BShrpE8lTo/rTaaUeV53LgcZbl85uoaDJyQ1jSzi267CeUhfQJkOy+PxssV3+mDU3E6xn66O

96VKnM8uKbpeAdkq0fSHH8l+KAK9jNb2hKyPmVqbr5
/crpY/HOS2S2h0wWoDIFRD42R6Hds9btU2HG8iplFmuJAfC8yEwF6oqkqKfwsEkCw1yffzNZGeWfyh/I

4N/h4SZ508kFwdJvNQRUUsDaDjd36KRvRnJnx4o2reGX1cIV4Y8xmAPQJ2Ev4EuIU1wLigyz0Zzn/RyL

ib8QHhyoAvVCr9n7BbW2hC+GqN4fS4Ulk9r2wJZwAt
818xp4aeQ0lSpD2QnN7i9UYG0imEq2aE2Or9xjLlpfRImZgJ/B+hYmHWGiuPIQ9Wo0pXh0SsoDbAm2NR

8snHRWGkYgPNf5WkCNV1ktb2fZ6lXOMJ35MVSR9BTJmhW2FRYupPd0wJfWnMHnHfVNHi91XgaxSmgtTl

oMM896eSDxXuDjQLcN/pKnwdLpTxyfcHfzKCZ1Q8EZ
iz2gHjXoaJ91jWVs4vSpsQifn8Y+F2uNkcW5hYmkahssinujKFsOLVQJRCrMQaRwiH0ZbDEDMVGM3UaT

TiIBLrV1QVGaM+PrUqyN9JZSpGDvH6js3HFJwRhH3hJ3hkXYctT3rawFx4Qb53U3DdL/rQ/gX1gkuAZU

QdVSGK6b5P4+1RWNxDLDCjq7Kh8RJmOXM7olKlTVKY
qqAlo1RMax38UxzcolZHyNG9YCXMdDdHjruJHy93cBUC2Ki9VF7H5Di6DUzu8WT29WdF0C1i46FNDoVe

70IrMWIMtF0CbtMwRUJZdfMLfmD8DXwuvntaazMkL0QdberTEN+twkgKrLi21pNUG4ztOSsyIEzqcTRa

46onjXgf5X5k+3qL4cdkG4Tfh2iGsbmtIL5dPPSdZJ
U0uYW9gNr7VPLf05u86Rc+SZzPnZlA9tXW8TUPt9trEkcvxuKevVtKUiytrzM/El8q8GPsL4MMhARPb+

pTatjvmrBnanSmhm1J5pMYY2abA4cKS+nzmS+4ANgLe0YOpb7GSsb+rnUlCH/wNhf7gOTuDN5HoUH9k0

3bZrOlYoKban4FZV55YeMZ4UsSV7JBp+HMo3ZmIsVV
8gFOmIw7tOlJbIT8NvKMj25E53QtS9vb+L7h1v+qNBhkHJUcIV18a+FEPyKx2TbanxzDY1y9KlmG3dy2

DRyh8gQHR2r5JQgfv9pVWLS7DgZPZobG+fFY5LzX0XT9lH2up6Ziztw0E2DZivJl7vZpfI6Yy9XLspMJ

ANhVXdLPf2sWJsEwTWoLS05Jqk50GYFuYRLIVfFMBO
FvY3qOhWZnqgmfv0UM6d5kiDadSlt6/0+oYpiEQRHxubyY8cYn87zu9NWTt1/ShuUO09P4vxRf4xG8Aj

FCV9Wzt8xX1RFjZkkaqHx44IdIivYSXad+Ug9O5Q6wk7m72UCf/eXAOPqQZzPbzRg/ausSSLN60t9Fkx

j3jaExVuM0ueYGUGast9XNbC6pnnjFi8OTBMvnNJX8
zUh9NP9FWNr4chPh82+B1VcP+r7JIcg9Pvd10UlAtQ0UEQHs4QcqowmpaWr88kd8+hj7u+I2N2mWC5fj

i1QdJgyGgs6ejB1fVzaRFykM+RX6sIjY+PTM8cLKyUPvv7obU+8jPgr/i7u4tleOdPFxpuoEaEkYEWNA

CWNrgw0vN5VWL4uDxY4JBelZ4UJ/y8M/ijxinbWFEL
JxxI9rrYIB2kvasEswwvAJGM8al6TnEPEF+UrYZxsohi+6Ci7GSnDpsESSgS8L5k65o3+93nd3i+RDu+

sSBXQAM9BeIOixYa8JJSo1I3peqfrR/faSkAy0KfqNzEBKYjSyC70tv421GvQPJ3taOHGuGnWiGK9z4C

2Y1QJNt/qvAP1XVdofGhYlD6IYLoepb4PD/H6XwCvV
X4NzKFUtHKtfpir03gm/JReACgRHqrYmfpNmvzKMe8Fv8CDjcyQXZAj1dqE0NlZDimy1Z4s3DnLoI7Nr

MVcpp39/e6kxBA6QCqh3Ry3UFJQblU0nC7Mpqy4XvjNJBs6PMXpw79EZG/Iv29pBm+3s/jLN+x3g3HZA

4sd40CRmk3GHs6wydwtJAi0NUxH2JTiiC8D6hd5m1V
rdPWMSY/JafBEMYrFq2fOmIEX99HkpvQnxQ0mtDihCUgC8vbY8JV3f0bxqInTsYmH1PWR8nfnouEY9B5

tGX5A0BYH6uvhX3UKi37tr/uFij9U2rpSy4V82k+fI9okLZw/zWcP+sWImhVP/MN4yW71S5wtWq1N/By

eQ0RNucwtBbSYB1/9poVhJP5xuZTtFF3fw/6uSx/hd
PBQbrtOOJz5C9c+EJbntcnAnrnDf1cEJsNetFictOY+X9SY6486Ll1j7+s0mBl2kQE6rtMQI7CqwCE+K

iZn+ZOiS5Ku5IBrBxxtTp0OYSP/rEhcsT07Qaz3SJbN52KQFNlAcKLgj7B8r2VtuzoAF8xmI3miFDxZ+

UOSmjRfRDnpou6s1ZH15Xq8mueZ0vycyTfyi1FbdZG
Uz1Oa/MPn2StLt0MFOG8NFS9UuYWj45+lRsKwuzs67oQVV9tU9jChRBd07KxoY1mhP/EH150MZkYblnY

qXD/R+RJD1gsDyE1pxW1xjKXRmLSq5apSGKLeFyVII8iTB4tS+Qqy+QF0AayXnMaDqjSGZ5CjpJwrdrH

Ry2jpLmUOzWmfTkvdgWeCBRqkl1xWoMDV8hUP49lkp
GpYS3ZiVKRkjGZ1mVI2eYVlqOoamCYP18GBW/dy6DDVddMok4TvB+oP8ZE6T9Dle7W1KohHMcoW3h3nC

K6qeJrPVK6qt5GHn7TkQaZsJvtDekZRFZyLqiItKZ7+YQ+54jFoKH1IT6xV/SHh5lwgm4JuETs2+XZoa

DjXVvloBQ08ets9I/gBCiqcKdz3tpOU9uzmGlF0WSi
bJ0s5rqNzv4qeYGeTSo9U4M4nM0CqImZ8AKnmBs7c1qvH3d7wXpk2SdEyddyik7N6F4mzfGmXjtHseI/

+YzrXYwRkI1rrmSckz+pXxPklhsgjuDv7RDxQnxi4oEhL062ySXPBi1mtsNggiBxYV6IMT+7SM1u3bPW

0sVsiq7PORUOVWc7pbCFb0PYkXykAmBuCY1gc+KuHz
x2afHTZf9QxjB3TbiDkCJUvdHerrqSMHApAQ7OLy5zSNoNnOMP8NJKFCpbpBHhX9zY45L3DOXMSbe3on

GC3zYiJF/fVvUFKXyJZtVgA3AooyDcotbiN+57ET0xVv8hCbuHx24C04xa0uwLbFGxiGMfRLmMRHluPS

53tDsR9DXfAt/UXIwtPD25InoQ1AH274Sw46v+YFl5
+1V1hpfj9iMCI1eWvH8DPzH8eh2kC8cWCrpiyWgNADO/y48j4wFlP8fUDQk6CXRDJoH5k83Q2AfGMiep

EWdWfzEgT+mWv2+UhKTv1+CyUQ/cVeDCERzMChV3/gQ9rgZ64md0Y1anWFhsA5Up1QOUJnFMsdvfhHsx

L4CXbJUz8n5dffcKR6/Cn5q5fvVOklgy9jAWtd/RfQ
++/mqowusABRatnkNolXKiTN9XJuNsII9ivg9ADhMyIB3kba2QDUL2ZZ1ZAYNnSH7IyVOmM2XnF7JYAa

LeQBDoASAzXD37UVRcKOMNVKjrVAXiG2Isj821koSfdiDwXXFtEu6RQFJlMS8QRLMzHZBliMEgSQMxLA

LqDjQ2UXIbSLawFBXbZ1NbxrMhbfRvMFTjGJYZXAgm
KOAuZ3pgu4XkOWk0BJ1RCP1HurMpo8BukiBwS7zfG5FGDL9TPTSbK4FDZ8UpM4qsMpPsg3FuU3ptZoRg

t2YdAd6FAlXeQc5POgOoZE6xun4UYZZzPO5dty8IZLF6CQ1EjUm6JBAhH7FxZYHsNGGzk6DxBL1HMO8c

lTjuIcb3HQ4+MOfoBBL1emFvsE5PRHAwVLnk2yECkk
9g/mGid6RyYpFBMxDSm4pWQJbAOWbNrbWLdf9Y2niPKiBh8D+/ASvUGesGmH2oOxSuLvm6A/p8/M5Nhx

SVEiL++FpxHv1bIlE8v/4xiGwyWZB//fP8lol1Tqq7qEgwzh8rAIA7H86S1nQVn9LxhYgeEXb+GI1Xj/

3BjL4mg4Pg+JPrstB0Ga6y5adG+Vf/TyIgzk5tK/n1
9WwI28JaNLGr/ejQv4P/WjfwC5ogeGTtUnz5PNUwDo5awllm2msQdUSCkLIugAbE9dcxqFFXdHv3dR2Z

wiD0Iv/LDz+Q8Z/kdEw+74xLLd+kMJZoaWYCvULkL4FaI2eAsrIRm9zjU7553cJS0tEDTAKgW2sBNcu3

yCq3U+xx2CluxscZ12NWrDpJURNWsNnnChnGPxxgjE
xSZsZUfeO2jc+pK9OQvYsGJw56KE9XzSsKWejBvmO4xS3Ys8yDDMSBgOwH/X3RB0RQruGCBCkZt0AS9/

sd+kmB+E8IjcvRaBzckh9qOFQY3hGhrZy+CQ8uNh8Qeous4WIlWMAEnUiKxVhqLv9ucIEHKp8SgPeMRO

TSQO9HJS6OkQi38ofe4hDYCSoQ1ulrRkP+eZWq3LoK
Cg7tOJfnLQS8QWkfGY2Y1/Bsssher9K8PS5fq/kMl2oTpcICYzACKX3NRjl0YDRhVk0So+x0X4pX8PFR

epNF6HpipmDq+Z1DGFRl08Qux3Z3R3hDjogaJZvYP1Q6stAUOtMruPqM9OcbCGXcxwOsmEIlvETo5jOF

gnSyqorAyacVXukeZ1JsT0CWH8pw7jLEeVNzEQix3q
Quy4gjzKNQoZiHRHmLbBJ6RUxYuyDbBJIHikKmA/Ljt0EnKalg5CoUljMyNzg1LZDav74nUJnzDZWSfn

5tfJUywMjEFqfMkEMr0VXSZEem+QRBYagoY/Yj053VH3ELVhH+5t0kt/RFB+sIMcU1ki95QFdEZEgQIL

LhMdezayveL3BhWC0vxAfzUeRo5KFZ9GfC11fs6kct
NUd3cOuJMp5wmxFldVdSa2Stg2VwfTR2ZB8O5kFljDJJabxHmoJ78oZfLi+4+130IW6qecTPMPUm4p8C

Y77fFff9ngPJa8KZPslL0HdyyI9UaPFp2x0yeRHdt/H10e35+Ea31XZSF44Vx+icl8e48kCJ/XFxejYe

Egu0vUWqEehz2c3WhkRuAwUP4dusuex2zjLyAHtR6+
Fn1q2cEkRsDeanx/BC0aDf0WGXQ/j0b91TGUm1Ku1U0BIQrUmkggvz6HWTU/GCYLcI+EbuIipw4sBXkJ

8ZiWoj5Cbn75XZkKJHoaYztIcXbKlrr5tYk+i7GzS8lj0td9h4TAFL2NdQ6eN3aT4b9eNenVdWj1362P

7IK/cpvIM+v2jEgqxGJKns/f1X3mtI5R53whwjZLZY
MLr6j6udpwleEm+c47fvy2ffaptY0COGKbU547wewRWDzFd4vtrsWS/LpynGE71mf2Za3ihhWtugvXWB

pyk0lDmNMjpPquv8p+uYq0kAM1VE6U7RbmWfzjceAkFb+aczEfHKxdplHA9Fba72QoqUKKgb34RI3s6d

MYK7WFG76jWoQUpUBInmIW+xw+/KAO452xbgrGqw8f
B7C23cNXV67Vg834ZBJ+86k2J4mz9HYAm8GSza5AkTWZLyXpkEGN5uaA2GDdBab6FgG9KmmaYaEZT8C1

8JcZdiAsBcf+N1MxMVfLLi7R1TNxz1luOkkr+QlIbrD/LF8lg1hS1tP/ckUv+MhaepDcI043nw42X8Zm

jX9Gxzr20ECEuNun75S2i6wjNfG8d8R2He84K0Ztwy
zHZT4aVnc9INUgTI7/zI4+jIn9Qits5tgXGDk6zJPeyXA6fiiNyDFidJc2wL0RWX/bU8M1UBeVnmn0jG

9TjZlU4uiBvrp39bY9+WFgmCYijHrP5H87TkCVVhmfZzKRAkQOm1LQ+TU0+wrJn1SLfua14KHc/yDhjp

2874vgv4DW0VP9+kKJY10HqQ0L7X0fqvem+v/BVbBT
2fHY8iAkRlhkGxMNzPl1nXfWbfNLDZPLJOGWrAHHey6rMhoaa7XikBn5i6/AWnvOYH9jfGzhk5mrji2s

Wv3fCFbFtPEUxEMsdKky/3ZkPq2U7eOCXz1pJ3D8CSp5UwQW695D/bF8O+r+KVZ8tDRgwKoqvOgko0CJ

Hvx7be3tL7DDofVk98r3ZdZWndKLk6/9Di/1L47WyC
08tHj+QqaW9sq5NjnNffbNC0hxSmTNmmo3QtfzqpXfLfbMU07otMrZBpG+F4BLzYBSV6ZkAVvI1Ei2k4

FN4wrM9I0pHqHxQKfY2TMRxh6iWpI+A0DhqaPhJka7Amp8A6ocM9VLDAyRcFFD+f0Ya6DNaMH0tzim7u

sGzIjjyFm2QuP34/BpAzgYGUobysoVuilny5Iv2teL
vy5fih+9Iq4zsVgmmu6r82G6MYfs5DaeCLbayeFD/O0828DK7VVqBvodUwMSDhD5qRX+T0+vKAJPPkif

8SA/9DEYCb7ErYpjfvuhmXRWQaZxsLSO+/oM6jEOU5+f1CemM8EO60q/ErEKj9ijMzLxeaxFGSLJm0N9

RfiDirfTGu+JPLo/dKrTNwViO7qzLOnfm5fbCIqCPL
c5yxfG7ez4JPVSiLUP++PCKPSZY+xo/mrT8jFld40jdv+WFvz1VZtoqo7o2jdRQVGI4bEvhgnnOaW1VA

yFJ9rfHfuqqhZ45kusM9ktThp6D54/zuF24x3nK8TWoYGd5BmHkZ9z10P9OlTtvKcrCiS3QuUVB6i2Ln

jikFKrEp3nqKA2zeMR4EIt1wwD4IKPah0fYL3cJfgu
rVIfbR3NAIuWYtsHM/78jVf7YgwpyiKLQdPoWLfmBcfErgJrI2WqNeRjyIZWbzGY1yjbOZn740aQy0dX

HX/9VCFfMZjx2IiboHhlqSOmFacR0kCvvuHTm6uuwqaWfzW0VrUc98QFNBILFQ/oV8/QUNrKV/Id9MQQ

Nr6S+o4cJ6GPvTBEvWq5DLcDgBR8KtNP+ZRKZIYMBp
ayhSGVCVcmdpZiKjmpZGGeKDzXqb7AfP+wHKM2KjucIgOpSEFtKL+KOBtfTXXkMDa+jjDtVmwH96dwwg

/tNd5bKHEEpELjwFLXOIhBueQnOORJCzrefR3Rvh4XAlTGzHWSQEti2M1nZqwiKrUzTIvKSdS/toYC39

2dk3TI84kZCzl8pzaAwOpTySVKuQskk4vUQosjv8FS
QmYVNAqMwyGaF8ibq4MXQFL9dBIkMHgoOnK62OPXZIqDLSRKRzxxAjro8zOGsxqm20KYrF34aCIsMAS3

z4myLIfdAhDFxXLa+GEC6WzLpOi67IswlVwUjmN5jJggkfpU1fu3TCUgf8mStZ+ZVBrsnszi4zacSjF4

W4m1uDEtoJQHe+sb5ghYJSE5IYxr0CmMRgMw7GhNKb
JvnAxt1c94Zk9jq3z87adIXTShCEUj7ILyI8m+gXTpNo+7ULwjT4mXJepYhWM9IVhoOkMy8pErX+QGIz

e6nen4xSsk0HZldQDauvplRwWk+w/L18HAeg3CIbOwUVLQynl3f+bhbHmK8V8a1nLyjfH2Om+PteswvP

VGjjMzyJnRcOfZkQiCC2S4bURVGdTVWjA6zyLSrbqx
+Hjii91CSjGH9JUS/XTETIZ0ppTI194hq1IC79rLCvy9tmpPbQrQiE+epJDVYK+QqbrYn5Rx5gPlm8Kc

uuHyJ1UFBPDwfGNksy5iY3bAT+fHPblgBNGzj2Ho7NqpZ7ntgLlrLHqkgHmq2wl2KYPR8QdlFokxkOLX

bPHYwRLeZa6XilAyGp41m2iPNwAQHI7bDJf3uuoMZS
GVYYgnZ9yMEPbMhW9t4wrZIoyQsuj5Aac8UXWMaLQ4Ox/m/zpvpmLCZLx2PWNFJtJVGsbfMvyh3Zz/Oe

UPGMQEkfihggn6ksC/Dj/lXuxBYbXQflxvDxzYXlIW4VNpWmEO5owm8RSXDvZZ0wby6DEHF6EC2ZNJMd

CG9HkOKeJ5YaX4XEAzSkFJSsGCDpJI27USAmLORJYE
feDHXzY0Xlm372tmFkkxGtAMBmCi6LLQCyGK6KEQTaQQPepECbAJSpRLGvJwP5JUZoWWimXUEbZ3Jsim

VlswFaWOypWNQSUIbrXTZyJ8jxx2KmCBr4TQ5YPL5LfyGyb9WpcwGwQ5keZ9VFGW9SEQTpR3HOR4AyL5

ixBeDii3WfG7jtYvWhf2VdZp6GWqAuQt6LImUbLN9c
ms5KIDFqUDWvEqsTIfYrZXavSzbfiTLuHA1EcZN8TASwD82dLWTvVRKtB3XsMWH0Cky+Sq3LAOKcuWBu

EF0BRcsB2L2me5WLWF/vTL/XuxOPIoLZWVoLDhioNxjvKHGO5iCvJj+cRI4N+CjqhITY287pLmVe0+by

w4SzV0b3ZFYTsoa/3w3goWEw7F+fzErqgiAwV3+uf6
mj7Uhcsm+/S8RlRDs0iQXYFIm9ej3gIYp99m5YQMsXMzyRs2d0wB/87lpzlU8H6fcp9e2g3oXcpEvhmN

w3pv36s8v/9XPe3c/Avs99g5xqJ+QssKUhlvx4RpokvV65USnRXRkTUGiSGE2JZGwS96jNkJ1+g82L4V

7/UO87GX1p4+sBdr5TGL8klR3/NlmauXby/uFD0/lg
Ssqbibd4hMQK6lplhQKsAWLwj7ONpRKCjWX4pXc59isT3xcJnLcTGRrVnVKN6Uie/trldq0kHLukL+Ky

TSKJPhHf9UXBlnXCWGiM3fVXRucsuIm8phogd6GlFdoHy1cMEMx4dQ8MECGUOYQx4oHGh9GwpShZrvHu

pmfhcSlPSQoM3bD9rwXpbfGHpPnon6JP6bcwB7asi9
z9vAfQFB5JSfDBPVZ7UWr1cvqBqvikLvOrQRQaFpfY1wFkOrwNFeLbETkXaQuIKcGIcK4pEvlUZoJCQu

q46ak1RgsjWTiL3BdFUhK+dTV17IZGfL7it5GfFHHnpvtB2gOdee2DE5Y1QCfIwZVjOAvAXQ4nuew3Nq

xiD07JsGA99K+RmIJpCcftu+QxXnnrSa4TURMkLUD/
JG8jFoR7xTsJReMjdbTv9FDshJ1QGuh5XdZCixGLvPmtSQS5fMpSmKGAn3IiIPNiH3fIbVd+42N9jcGV

QpVyTtn+UEnyvrVwGf50p6qlc9dD+aB3LuDXxXGA/1kndF2ohc9E1nzwpeSBHTOYLQLVFjYRy8regH8T

iJOvypjdNDUxxOSnZPCthvCJAp3aaFTBTGd127NVI4
/OW3dRSaxhYCIxexoD4lQNfRMQp75iYuLA3YWLDqIzDF4JIk+TlE/Lmmd2cNzKcgDJq6qnOdlpQsOZPD

CBgP4sVBsbMfZCoPWBraNhifRaJhO7L/RKXVAlboquOXASzHGJE0a4h1hibZjLxcqwxluTUAzjU7PuO7

BnDNQKIOklmOtDybMzDtxbkDiZlmDk/O7fEExFCNlu
06HRrjAAr2c6O2RAUTA7p4FaoLtcwYqdXaHRoGriDk0E0hKwinegGx8bdRLavqBS5ZZ9TaBU8RjAVuT0

jWKBHqNtFbtqq6tiD2edNE2xQvdSMJDmS5nVWMRVXlX2Lupcp2XoTZWYExhi2QAGmacxuqmLiFWMtnil

yHGR9WUdznQlUSxANIVhj72qagxGXNHBQ1bvQI3/Qp
MfRU2ZC5hoZBW3fcnEBSSFuDsS4ZYbOf4HO1nrCAe6lyX8WT7XDyLADJIdKkZDYTpDDGFD3XC7uEQqJV

BOKUWphNCRkTFNKnecyn3OEjsf+dDalFagweZCGYdigMbylR7/1dGF8vVT+zaKA68g+RaLeuDI6a3ubB

9FKCYzR57kkSJC5DRqOw7I071Rk/CEA1CpXL5IFJ2G
FYbusgxQAqylGGRzybkCztVJuh5PriYGwiINzTiRPNdUqNSPjEo3cV3ciWgfUG8Mx9Ddfcb9qnCfxL/l

7YIoGEzGjLAo9Q7rVrhMMxB9H6anmkoemxe/jl8FS24tHXbmEHcP75EgKMoD4QROHcYbRWUnIytwncQB

IlGu3UMQQtH2T51oZDBw6ofGWHJCMUza5bcXgqXQhT
puhA0owxkIVYqIDDUyusUBgcDczetZTdDChP3QnmX1g1SV6cPOIjwBNgvom7mMVSR3ts0HodFFIB2lFd

A7YQeMQKPMF8hzZdoPNO3Enm6Nn4qEHmqtlP0HsB8QJgUgSMQO8GnfL96oK9pki96scgt/SbLtXF9t4y

rbQM4is+3DRV5BFJfglv2zJZpl/0H1FvYUF8zW6I4q
1WA6g/iWoVrcvKdP/JIqOeWP0LANjeRyhRhzwx5g9XktBTEcTToRP+NOMNGChdAFSRuGHMELXRgnAsvu

OV7Q0K/mta4aa7fyBRzu0B+j9JzarU1AW2giFhzyDAYFTAjN94ZTKChBbGoTWlUynRHaqjFWe/rvra3D

8nVo3vSpnckWjrfYKwFbISNEQTWfATNxFKiaZW7sD5
pX3Hftvhpylz3Oa+4zGSmZdogjawm5WkwNiVjcfO4f+/Ie0Haxc3fdNjwE+/EijTq1O8qNIubo89u4CD

fUnX+eOkHC1Gh0vhOlbgzUfHGPUhd95iQKIKlhKqKh1Z3Mxvia4d88T+KVC1zlvBe46xfqd6JaqxrgwB

oq1aVxfqDNA3SUNJYVwo9RqQfH1Plq6SZ6NszFCQWR
rIZQMXhkr8LLXT3SRw6v5QmefNzRXOK2+csA8HVmJYwihyxSrMXZa+zf+bhhalHYvtMGaOoK+aQp0kP0

u2s8WvWseOUblSD+ZBfOUzdVnmmslrZhIljdVXTNoz1TY+Eu87VIMRKhs7K1bEhLiGl94UM9vaIEwbIr

0QPTfdb06HqBypiOPaOYkuxAoh412T/WxhZbWD6x/2
z/ZArxfS5+nJhqpFNshmuap6p6B6PHi4WcThrHXQSobXSiusEfOaCO7YLjfZRsV4DRPjvPC/GPgBmtzB

RfrgvaN+MMv4nGJy/9vby3haL1XW5LnQMYHPFxTLMSQh87xQwN8Pia/8BQqyoH254ooVl3zcgOfzm7rL

0YTGLPL5UwQAVSrqrIeEnvHBwXuo4YjWXjT6yRjACs
KiqLfS+FMfeRrDssRagTzEzQ62XnRWtdc8ewLq3EUxd4noisWYBhED+Ipkou70PIkpoia+ErLkb87EYl

qFK8qwBJvlNOMxubtOP4pEeZ0c8JUqAqBFagDaRcbGMke6XeWCmPp/47okvfZnGokuD7Mbcrpe4jQyUD

+upjXpChQj7BV2rkza/6Bo6k+uSor531znB/af6SGS
i7sVfGBvFak/pHTGRd2KYvx2rly8hoHAQaA5eGOWwI8bRf8FGT5W/vUXGinorpt/Bd/cfsaBf4tjA7C2

Rbnk8vCOiVFTiNyLkZC4cDbHdjkPGibZ5wghSZ342xVCxpDXFg7kvmnzRg0mtKQf7/Fs5Lt/Dq5oB8e2

aRpWWxkKWQbMtjwfVrrrCptbs1/xUubDAXvGQRQ6Rh
cmSdA3rZ0EaP892tRC3AhpQoqhjpAQdmza8l/swUHsyLD6Slb50SEbOi1PszglIqmzcpCt2fF+AedEHv

S6b6NY0aojYr0ClvpWmI4f8/ZonSlUGpBE2n8+iqK/X3kBEpI9Zr93Yq/xQb5uyvORionnBbiNQxEG3C

YhFbLD7myz1SUHXxNGFkGeyWBwCqURqHGgOiZZRoUF
nkEQ5TMResPXohQPCcF4RvioZdiDQiZJKeEb8WGVWeSN9JDKUzfOMrMMTfVbLpFDENMkCpGOJxVDBiqZ

UGl9lvRjYgONT6EFVkJtskBO5TGEBqSV4Hg775JB68sbPmKQYqZTZOKfCtDGOnQ3IluMFcUCppF1CcV5

CfXZ0bqPGsIE3mnBVxH8YbH2HfwrciBSMUCcLcVQTd
YWxzZSAvSyBmYWxzZSA+Ar9WRTB+Rd6RRH7jl2NsHXqrLkCzAW6ydy3LKWI9MS1XbEd0LXPsX3VeAWFv

EAFfk6XnRD8HOA8jwXsoZOG0IZ7+DTmaCFQ7uoYpjR9QHHMCHafcY0fQdh/U/mASAYdQlu2wBXZTkN6O

UAwHlOppbOFLfEkly86d2Rl3YRn7rm5Z5Bdup7jKMQ
YwZ8/8Rshpzn6/upHx3npb1s/8G6ImCm3r/lfW9xHB/zyt7uzLMe0cf4cBd7d/NL6dex2lhGpsoRO+e7

3ddRhan1yXvO/Z7TdAYaBgwQciWjFE//q1dzg21AOs5yoqRzs8ZcVQipm0AMn2aZ/6rPoUu0Lj6IgBl5

dtePJ6lGIc7FkxsChCf7SMfl9D+/r3lseHLUljX0SJ
Uhj7ByCUuT4jksi1fu048WAwrN7FE5AdLHiLvwuNIeEFr+ONo9W2SXBrKX43EkXUEVE6hGK6QJvEogMQ

Qf90Y9D/Ja6EGcWWDy4IxJ4YBE9hzPBs+dL7jDFl5pBxUEH7N9BQxHVnHL+ipcQ0ci4DUEgeoctcMqSf

QIPmhm21zBGhA+CDcmPARcWHIF+HZkFxGZsuYtDS1Y
yQBtv148Rc5yoOMA+hBgoZgyTNHhghEzblZGrKhncKYquBZX8v+pw6mOzGhRSucfoPlPFiZOX9QahYJZ

PBCFcVn7rtK0Goo2WOwVv/o/Bb2zQyCLUSprVeiicVdXY5g3zhZ2ktdsq4CJ9eavuDQw5qyP9RqcTL2v

Hl8c9OOWzcncPVHm+wheVLo9+01dwCWYBImLtcvIoC
31WHUctGtwDUEVOcoubszRtr+BNvljyg1QVowLBKVAMxRTA38j1WYeiFF1LH4yS1LNMtyy5ot2OwVTOC

GAcwXwpmsxmgCguiQIhKs+hdd8kYF8vv4L35HW+oKz9GfliuIj2DzVh6IBmOyUWFjyS1lhdpfAjg6YSx

dxAPVhV3uib568QclcjQEIoDkGmun7KqJj3zFe+hRC
wN++qH2RFVAjgrZR0ZiZmKbCWEothUaXFNhmKvuCckgDdK2V42jV/2TTLMI8CW5m3Y/m/UbBvufniz21

rR0OuXdtuIAQMPnjGOoHUwLPS1Z08tDDBVDUtzVSpyKn986C1EPy17icmC0IHrNs7fUKN/dqilNJYZVe

LB8HxHIWLa5zXIRhu8SDH6JPpq2mka5IBIN0O2pMY4
eKLVQnGni4xi3n/I09Se5A518BEG+79YIXoYa9IvUhXjO0pgezHkOgPixuv8XMGqHGlIF2hWn0I+JAjT

m4eMzZjt9z1i9zJ7HckInzdPkbyqHmNLI2Gll8cI2n0QUZbhv3cP1qi9cKTapq6QrYqyTX+tAC4RzRCc

Dylon+8f03vDWXYlPjI1UQBXwigLSRXtfX0IjJqbt/Ij
Rr6zMykuK5f6FZ3XgzWnm8/g0GICzw6XYmdIS9+FJ39AMpzJ1WoHPM3cMpcw5p7HhQBzi7FHaItsrz8r

8AfzAeiOti+vkZ4Vcwd2A9l2Bg7LCi1MYOnNL8eyuk5AFHm3qoLpWAZbPoq9kHq2mm5or7GDObapIUG7

arKBtfZUV+h90Gl98wBULtHUE+Hs/UWXY/yCaLbKa6
QiDb0CixPea66jwqF71GhSL3Vj6npDM46j45tPrmft6A/7Bk9UyL3fpyoKQr7MYoSMG4bQOnlIufpuww

0NaRsVVfIS+/hmAX+VEi7U93r7r/P0WkD2k74VRu//1fv9bsv/IXPxStqG20sXv2MsVd8qip9Vza1a7E

NO5fnFNoKQzEqmXh+/heaLtj6uW7aT4gkfECzK4r6M
fUe52DRNoeXd5NWD0jk6MlYPGmOUkfxtCnBmrGSrJzAPGyj7EaMLfcSAx2WFqeXDXcZ0E4pRFdHYGrMT

8MZIVmGB2MITTwhzLeMqIxQPGOGlNfOBXkFkChk1DvG4UkIKEmHEOOKSrvHZXwO39cJHxvKb00BJjkLN

IcFlGxEVh5Ab1JGkMkHLUvB81prMKlnAAuCEXgRVIB
FWafCBDxT5qcd4KgXWe6RI7AXV5MprSrh4CpvkGnQ1alI1OTZR8JKXIpF7KWA3OaM3clHvHlo4DjU5ww

HxUeq7SnSx1GHkJoQk0EZyHqGI8ykv2UWAKoNFNkBcmVBjZoXaH6OABoOlDlIFH7CJEtWskrZjF4AEM0

QdP0ONAxWWz7WAV6YvPzGKKwReHnFBLzEuKaERpjSX
y7HLQ6LZSuWvZcNdj9VKR7KVY1APRpBCI2TMP4WcR6ZRRmJMS9XJK7SgE8LUUdHDR6NRZ2GiK3AZCbQh

b5FMU6RGW6ARUrKOkyVUP5BYX3OVQgLEO3OYTmPVGyGfW6AJH6GbW8WgNlItMqSPU2KjN0JQQhYoz1CQ

mgTnEeQhyvIPFaEIU8LoL3EGOsMDSpZRwkFgY9Rwkt
TkO7HAe1JLT0VcVmNqL9JGFpOWC6VmKlCdV0CUp0BJO7ZhfhJtK1ZSPlDXPFYrUpPdz0XCO9MFFtRpgw

AMZ1MJY6HnBuRdBeQTC1LQB4ZfF8OPNpESC4SHL1NsXsYwklNJX2EBL4DjIuDfTcUxOvTFMwAAVcCzVw

DKHlZUS0DfI9JRXjMUC1UPF0LpYlNtDsHMBxIVX2PJ
L2ZDYpACKlWOecHtZ0GWPsAJkzPHSjQJA0JCTsKuL4EHG0SLLnUtYhSZa4RNf8OGJ3GOFzEkEfTYd7AB

P9BNXzToWzZHo8LMK1BHSeJhTzXVd7RTU1DTYwDiOnDBs2FLT5CKFlOvTsLQo9OKJ1KHUmVkXlOTo9HI

I4GHQoTbUeHQj8LNF4GTUsTvPxAN6BUYL5RGPjXtNi
COe2GVA2XCSiLgNpMEf4EKK0MHTxOhRqEYl6EYNfWzqbDdBhQSX7JyO1NKYxRAC5ZXC9GtTgPtTuTTU8

OTWpAnJ2GnpxUdnfHIQ7UmF3FPExFbUbGKxjTdC5UVHbWWXrREI4GWIeTjKoWeTaEFHwVqTSZcHdMGd2

YNMiPfVeOqQkFaKyNDCtVjOzSjIjSRO7FRdkGGB3Vz
CgCNU5URPnDBM7UghyCuG0AKL5UgG5OxskUrY2OTT6UyOfYYYzNKopCuW0YhvwLaS4WMP0DeC8ZckmWr

t2QLR2OGWpHavnSzl5TNgmTpS5WmGgRee8GR2GPRL9LqsfDnf6ZSn5NIX8WobxCZw9HAl4UEG5IbFrNr

CiJHhpUtG4PkGlOuR4TVN1VrS9HMHdDDP1HNF3DeS2
NDNcLTI2MRH5MsD2BBDwWPo1AMLfURJ3YKOlDTX4VEB8NkG1EYJtPfl2WIH9CBAeGptkWfz2LTP4AnJI

SkKfJOJ2HAN9OgV9TSEyQIY5CGX0XzN9CBKrBIP0WOVqDEJ9BIRmHYT2ZAU4YgT5KSYfPYImZVS3SrL1

OKOuLFLPSxVeUS2kpb2BUMWsSYZyHbqZDoXfRHtNJd
VsVZDyADavLD5Fs418PCGoO8BabAMmci1CPMPrYQ7Xw816WlIgYF2GhcwinU6MYTKtLX9Ik3WjrkKfKC

A7W4IqaNcqsAzetPX5YIXzDVUmE3PbmPJbQeGrAPlzOOKiP2RbLLewARZwZOcrHZXvV3MsnrVGIy76WN

agHP5bZLIlIUDsIJC0ODFoOJdaIJAoV9r0ARaiG0Un
M6kfIJAoO8CmkZNpEW6FVAT+Cg2JGL8io6FqJRufTmAbTQ0zgp8UIOT9HX2DCZXjKX2PfZUvZ2CdqeSr

I5IuoMmvPF8WkzRwCShiJG0TMONkYk6ozE8LwynomA5McuTiJXvkLy5MfQ9LkwIcGG3eo9JthhoKBdDw

GBZxNgkzt4IVyORjUPDoP6ewz4UXnQMvCZJ4UL3JTJ
CjES9ZfFF2bSTcYTMuVBCMIMqaUQLmV2SxqyQTXBCtcswlzU7lMQR9QEZeXh1TBBM+Yy2ZDI2kq5XgWZ

waKqYdXL8syt1YGFIzIQx4ZKO4LKOcSlk7NCXxWzZ7TcJhBUY8CKO5EiG4REgbIkIlRUWgWIXaOmQjVn

FtNVM8QSN1QWTbDje3IDOgNzZeTlojIgc0AMC1CdX1
PIOkABU0XOV5RaL3URRdYOY3JTQ2CrQ7HYVlOKS2OTC7BlPyIwAoBwSqCPS2CCWKUzQtOUw2JDF4KPV8

XOCaPUw0PBpuJlL0YvTjXbUxZGmrPfK6KpalHiLsDHy1SFX7CdGbRbb3YOH4XhP2TfMgPaWwBMtlIkH8

AdRqQmb5FBV4MwT5PtvdFsJiXCG5LbQ8AIXdThBcVS
W3PgC3JPMeSsT4SHM2UxA1FDPwTMcxQJEhGcPgUchaKgXoAQM4KRM4EPNcXrScKIC3FpQ3IILeYDE0RS

GcUTN3GRPeYfQaPFHeHLQ1TYNqJec4QLW9XBD4UDKzHev7IUy9FZS3GRGwNnDoHLQhMKW2UXIaLas8ZQ

U7WyDhYmAxXtQkPLJ4XyZ3GwcnIQG3RN5MDGI6MHPz
RQIiJJW5UPFsXUYwEhz1YPO2ZHQ2LTCrHiTvGNk8FMX5WOIjJsLvBHv1WIB7LHYjFsSoHPo5YWM5CXJf

CxWxHEw8ZPF5PTEbYjSdMOw7UJT6VDObRiStLEn3ZZV3XCBdYqVcNNg4IYC4VCVlJvRsVFi7DQDOCnTe

GhVoVSa3TNC5ZRScRvMmYLg0TPL2DCBkMlUrHZw7VU
R4WVJgUpu2HGWgVjW3OFEbSMN6DZX2NaS3BFJvHbfdCWP3FbInSwZsZmK0RRZ3VIY7VAAoISo4BAStBf

J3IgijUPQeNOZlCTL9KFcsUfPcERRlGaRpZvHbWXrnBWK9RhL3GDJwSbHqAHPkEjKnMdWhUzK5BFA8Vv

P6CvIdDJX8LYpjLUP8IFNbSjRwBLcaMwE2BlTcAeCc
NUlwRqV8VmRtCFCmPYN9GvYqNkZ7SBA7JcS3RoozYqF5DZG2DQIkAuxlIlk0IST7RXJ5XoFzKjJeLRb8

EQJTZtEkDfs6QEp9UXO4VgxdOee3YVZ5RYF1SnmkSrEiIIrqPlX0VgCzJaLvZXN2LwN1XmbtQmQmODG9

FxN6YVMfSCL0EKP3MgO2HKKqYRI5WVs9SVA4LCGsQH
S0JLT8DkT3XBOgFQY5UVT7QUKzJcniTey6PER7EPM5QGCtJRgpTOQsIST7HMPpYuAwFFViYYY4UMJcKw

AqIOI6GMS1QBGvToPaXKVqHMB1SEUwZxUlWMR1ZcC2SNNkHNJ5KG0nUFrsjjZsYwkIKtP7WDDhi4WmTB

lqAIs8ZKceWYPzI3A3uLDfFq1spVUck7UwlBV5z5RH
LpKjYCRiOt5dfV2pzUMbRMReTHtpIg4nMW4GCNTeGG3Yw4PdbsFwAKZ6M4XebXpklErocSK8WWJyJERo

L8PbvQFwOmBbTNjbJWZsL1KdKQaqSWPyQVwpMWWgM5FwypSZHx39WTsuSR8wAVTnIDGcCWE9UEEvTHow

SYUyW2q1PJtxC0DqC9tcVXWgV3JdwSNvVB2LIRD+Pg
9ZMS9mb4AsGOdgZJRpKH9pwj0JVZE1AV9TINQtJK7KaJZkK0CfmbBlU2OfmMlxAM4ZnvSmELpgYO9UTI

SvOe7rrO3CnqapxSuDg7efN4KdW39peJ6aB9bkchDvh6nPclLfGJsiNc8CJSUtVV4NrHLtiNExNLMaRg

ZwTJBkqMCmYENwZzN0HLpsATXmO5ufGHIsnzNlWtRh
CKNNCgTkBGYcZh6ikFPet0NokWW2y2KvQQktAMXHFSjxQR1+JTmrstBcMusIPmGmCDUcl6ZdSDwxDXg1

B4XxvKHaqbDoFtwdaJYBDPGlPKCuM7rzszi5zXRwNZLhTfMhYRMrM6ZiZAUmAZF0Bc3+QCpaNFZ7ycPm

kD6DQFVdoYv5FKXC7vj6C4iUzvNOXIPx6CXfIYHSSB
BcyBfxH6AZTULAGQRWRjE8iJJaBztxK5BDczJSzPSSYlZcIPMRMqvU6LdFvuSuYf9I2eDH9J5rlGbdwF

owOjP/8z///3zdee+e0TQHurNlQwA3HNUAyiBI9V9O+rTqTQ1xKprHYgaQE4bQ7XPsHbO6xqGgdB0fGC

38uND0hTkL4v/1mnn+O44t+wz+9kIdoEdQQ7s41gqP
5MOrg3HhrcjvnK95Ut+Nupqo+Y3g/2Y63oyW3/7+6p0FUtbTiahfKjAshw/mLiO59myQk33zFoXpWXDg

4D/K+U+fe/nsO9/ai08VjVB4k70rT+qUl4g/z61xwg1Ag76+2xcX1qi5TR4u/8188pjbG3aCbzMDP+69

lnfVY46qHdkoBdHGfU6aHlc2T+pGUk4s/bjlx+tjyQ
mv/3K5RImT+Qz1p/5pEUcmoDldMVxq580uvUO3FcJWKQjLjBg1dC1zO2UkDZxeTY5oKNV7XuOweXD7tE

4SM+kg4p1wmZYMnZC01JF8aQiIJd9PH0G0xREMqjpZ4SXFRtWr81cWtdySm1x06MD5T0RC8eeoQMG2Uh

1Mi7U/AhHukzCgeUVHbhd1Sjm28aJl+wYfEO1mYB6W
r0NcRLkeqi6nLNUY8y3mCzlEUfOrwdgoEzzrrb+x81yGGlrRn+hDcXfUMxREKZXgfJCuprXaJbqwLrd+

hASpdC1k0GkEHRB7ZAZyn0bHRIK4UctBLr+1WvxevKCPr8M4mvFtmmsNJYRGWuOM3lV7NLjYnCjfisLq

+NbS7+fk2xk4XP6ugmPS8cqM8K4r1GCFp5EyvmBxTR
wWPG00dP1HsHH/TS/O0mZlvnldeAJXzbO3bhruaWqDwCmk1lQS+KlddBlL223aT9e+rM9YDB67vR1Ca/

YVTLCNRmDRDlcUJNXtjxO5CCB2pUtpClg7HCX/CKII6fNrYAU3Py+swmSz3c5lilYK4ie+leQbE45nfz

5EEL3q6dHkuTf4Rt0kW0bc4N/Q3/TZhFzfUuTaVI7u
84Fo1X3UxGsYhiaGSLM9qRPsbIjkoODToYPbsnkeaFrGjy/Ts/By2Nd7GkEM4sfvi6ulrw/X/aHueyvL

IyTx8G1yfzP64Ar51T53QY/g+rL5HQjPZYCC4m7RTDE6Tt+YPpDSJRzRE2J3jLA9+Mp3Bw5J97jJHc6T

mjJZpsq2+Eca2fUboe51OF3O83H0M/zB1pgua4BnSw
X+Bkd1U7Lt5g6R0wdfQo9VO3VzaCzrR9QrbKQY6Mb5JJrkD6h+4yXvaz/99lSnUx/VMp4nnxH7HDYghT

8lQg0SpGnbXyPYpIi2zoK7e3JY99P/PJ8Rs1lqlvpbgmTwZhxQ2ugqPGF4p7trFiExOi24zUlhLjnkcg

SLdyGuQzzQcSuT58coAOkQtBXzBjrUt+SPmkdrpMVo
6CqbpiJ5rIRFz8iqp58FCaop3o+7k7HkhF/mjqCv0ex90aX1WJ6rYbozf2/Tj+eCsPaWn8qgEtL668ru

1pp/14ubLrVW1DamapIwz8r2232iVQyiBcxZwi6SLqWCfqhAho5Fv9inplN2GB+iP0+pFz4OJR+VG8Uy

nBTLHwsVO5f+on7CCDm1sJD4O/Ih+O9jz44WQwMFTp
qqcm9xB55WYY49I75l52D9BOZNI3yZCaX8zfzlHcOaP2b7QecnO5av0fehl4MuS4ys5pVbktySGs1YDt

/7uS7UJBXQ9UM2FjbO0BDCgMkt3gLuQilBP9Mh4IJlJxi1Q1HIkuRJw0eng0XvirO/RF9hHC+j/xJl+u

V0O/FBQ0PhvhyqowBmuehDsRb28Dc2OqHK06vnU+Dt
+cq2BvYg3Q3zJg7opOz+Q/YffQ6WC7pBUcnH8gwuvR4ZBJyXQUAtbqF8wAKQCKhD41+Gl9jX6prDAeYx

jaWDngkL9dntGpl4zKwG709qwZJsLYVliUtOF69yzoPTi+1q7ZwiCEzUglyBvUAXjM1eO6exgwHfvFuJ

Wx98yciOjU4cAuprpII7U2NJBrls5WavZ0OycZzmOL
mskcY21XMl9eWh1KeUs3f2q6lbto/GDpsbzZDUlpnzMqgU07zsRRK2N9Z030VVN16R4sJ97h7HzIpjMs

pGR/ILc2TQQ4W50HZvlFm14nV4iS4LxIq2mGIF8MSaYPHfRY0+6jFoiicDbRwSb+E5e3Eujur9M7cQLm

D8zYaaYFqonv/9s1y/Sl+k/8V4wCXM2uviK0gi1aIQ
1TiVInSYCtKCsS34Cteb7Id+06ulSmhOQWCMSWyjeTy83jrakc3+MC0Q6OsHykTmvDvvhhlOjW3+POcA

C3QpjY1FxQOVr6LlQrYF8HOiQw6ZMXJ2NkBToNWgMk7wnKCvQ7op+1NCpkoNtMRHTyUOs2CKSLiZZVtg

nAlKRoQQpAfDduUP1RINOCLdJOdbWqFSaUT4f/eA7K
1dtiXu72yueaE1sd1tmEeiip7UDpORY2dsX6Uf00qiht/JEm8pekW9yQ8fb9UIfOJ1ShllJV07qnhUfv

B8Lp5C/m2svYOOR6/FZqfVemiYMCE9oe1Pjv0imcnuDKsKRLY+p47evln3vivTNC50tWLVB/4wa9Fs6S

zoidrCnvqEOBjivZZ4T6c6XB+K3Hv5jopKvL6zJPQp
YDdwCDgOmPIIvh/i+7Y7bROmlpzDACxMSu5OylQUVS/2Z94c3m99MqRF0MJNVy/HM1a+g0d2B07I+S5c

46b9Mnyqy7q8luirRAqAxvVX4fwxREvAgGTIjqKNxl12kvsdh5CPh60U5cMH6wE9gMyywb0JTWG/hq9W

frLVn+HdR6xobYFGdIHlx8OmG2cLpUSHejmZQlclsS
1YdpVipGjpPFFQ7TvC+OV+4DXgLeoGBIHRgFe+JxNheiQxnbOb72FZOLwonoNoaCKaTXDvys6a+qp8Ud

U2oJ8J19hFwrf7cDWtvVHSwUJSpvNu4mSHq45T47YLINS9LUq+MOPNtxGaYJWlL1AD2sWcIGvvEky7Q/

thW/cbHI3y2FW5dPaIxu9oxv34lU8o0S3xpboCd/wP
pctg+gb11AQf/oe19yJP+v4r1BJG+tY8oWLI4PreeUpw4ODn6qPG+oPJmICrozK6vHXlwlkrxxav91mJ

OWXhQLPgKL2kv1w6ImR9SdzU7/FzL1PM3g3uGa1IN4lXFO47+Su4ejhfwjNcaDjpLzbAslZi2qER1O6T

FzasC0DcbedgZtvRbcO5fNYTMyUy5vrwZpCRZltoGI
6aGubRFvxvCNRX3PQmH/BR3iyYreS7LOnKUEvzTvGhBn2E4yfFfhcPTQR2UPpZ9DjjJteiG2GS50IjZh

hVlX8RDcLny9e+ZPYccWYb0Q94w536IVBVEOcXroEee51UZOvHkhZtOH3qNULBE2zqOpcg9zjDHsGlRA

mN8GliB6qsmeAC7xgVkK1UUMC60kV8DPCDpyzi2ACt
Q5EnW9ZmZ9CDmPprtVk6NeVgr73mDA8cCnrRw+ZtViuabP++yT+/h2MOj2Zf2dYNPh9fF4k2D73PPL5k

gX02l71F0tA/iZ1Cbaf0QDoGU+0EAnuoy0Z0RX+gFLkZNKsmZTySxdSkd/viGN950OalGgwScQ+n1Eo4

A5p9xci3u4WBVt9SMQs54uxpogLpymytG0J6sahWqK
1+thvedndxcq3P3NYIbN3tMVBs6MnUnoLzlDOEZj4ZkEXJgeIJj1o4YP3V7QlnuiiwPML8t/mWstem01

7y5yZ8707sALXA3bvGYOgluMUCcLWREm1iLXvyn6gBq2ylNtUCmhweY3Ns16O9BYcxyAG0ke3u5iuzoG

/Ve8f8o6Ds8zRIJdQX3FZWY7IzaGmxz0In5QUTj1Iv
zjhepSfWJ787GzuZgQ5HIuonGeDy/uG5fTFlAB4aNfpGMTwAOCMLrUnZ3b4myAKLW9X6MpjQraCUF1pF

mHnVSpZcSq1dboH6tqKdkgejeAfxCTKWj4Wun3v7JCtowTQUFb7uRDPa4CDmvaTsb/ZdeX6Fst8GX9hq

e4ITPZjJWpHkGXXai3rbItP7d2V/sPyLRMTOomk7rX
+QbPFf6AdJe/NOihx9APckxZLXrLn73Kz/Oe31qM9FvxfJB/LK42hH0jkt77KoFqxuZFLwKAGfLp87fU

qRngsgm51eq7yp5m1k4knysxkPV8DTH20kCn44oDcAk7Ru9Pe6+5nHWz7gePLRVDCHshETGxA8T9OPlz

BJiv2aeCWPrzmvQSlvxoN5U2xC7+YHb9KpTXulvyqu
ItRBnnrsz3BheTthunyBUD18mTP41zXsIfGTj/z+7kvjITQJqaliv82mYljpJklcKD03PUBA23/uN+nadya

5AS8rm7yg5ia3M9psw7PuRXt2f/OTh2gZtey1+DRF/X5w8lj9CQtOSihuD0rqY0peqw6Gtl9R81zsPI1

3oF1dAgKp2TRElQ4+Hpamk4yt+LVatcD3Ptb/bU0Ob
Pac4Zi4IKh9/IJVLoLfNXIe8KmkQHLqysDSiMGJPpQgC3JJNwrqvJmEskZeVxW3N66npbU8EFUPyiODG

ar1b0xyY9VlKDi8d8NkZM6ej0HkG4AcUndP8E5nhOT3Fp5FVJyCuYQoSWcNhZNzcuXUbTQKbN7uoa3tu

j38giITRWOssQ2kZ2Cb/vjHUNTe4x8+638qE9Qrry4
0SIUmGXyTNPVAoiHXX6OMWattIqbTXT6F+lloxRnSkTGNNgoFhn6DACmZJVlT76uI/CmXs0cRpW61T5F

JBVuldR/uGKi6fgmvuxAIAuLjyJ6fZCE8ITHOC+V2srlxUScmx7K2R6wBA0TkzfG9HiREkjjwyPcKdlY

XSkhI/a7/qs0f5m4sa+IU/1pINyxP+RGkWimj8AnsH
eUA2YRlb9WR9NVVwWz08OPe4T2VDLkJlvPiDndAHhCHp9fP/NTN+jT+V3rPJ10OyHNLN0DpLwiVw6qpx

UyvoUZaJs++55kRfroV8J0Xycbvw77uyEeKQ5Oy3stCRbKcd5wy2qSPof39ycOV/4GAdrhgXg5OC09gb

Deu2EcvywXfQ6aJZJwria1ZkiAzzdl51IYaJgLE9wq
0DI0EzRBTUNbcFpUmfl+CsT8GZ17j8IFab2TxOOHOZyNHMFR1M/mNXnBedyizOOeyHkkR13aqhfzZK2v

40SmVkzYh9D+CW00+9Bs+P2Jbr2DBi2TC5zIjYhdQhBjBBpAfT3PNFk/GaajFB05oe9oKGuNWXOY0P4W

z23O+5bXoruI4LitR3mBVxCvOxmNJmXN79VC1NUFgQ
oVpuiybWnL7rY3TVzJ4hfuCMMm1IEYVdQbYuGeP/jXzS3GCNxKxHNToH7pUnvtR7Dj0Rp7/eA2sGLOtL

ewma0lKV1g47fzFFrQO7s0GHOArczTKdFOXXnGxxpC8YvNIvlgVPmSRv/XeR61LxIWZmLS3poryvjhGy

Nm8dboYDxEjsBp61ij+1TRDsQDMu4UJF8+ag0Ku17o
inrpE577N65QZmaV0tSz6i8rJ9lB/tEaDwT7WWTDTmvC9fvD8oW/PU6hUyFGJH9120pnGb9i0U0uYnXI

oLJR2ZLRCGGAGGYDw2WwN8mNaa6M+Lo+2E8TxfyBtQkRrFhxh+O8lQ26eFi0aEZxTlpAyjA3mnZCN+JZ

HzhfcfoF2YpOIlLafpANE+BTYAMwC+gLFAAdUDahXE
31V/IS4argz2JuPW+ePwYd7uO8U7xY5AhCuWwhbCtwADDHckRxxgKh7cA7ZiFXj2zahAR+62DEs8ekdr

83fk/uU/UUY0//kgazDGoMom+1eTtkJQsfoVeP4YLUo4GH2VjIQqqilATvK2SPHaWC/fLd8u5FMLHEJJ

W47YLvlrflqyxSS8va4BbRKbF8XfEz4RJuXcuSF8P2
GbfxlbJTBC8SbZE4FE161aOblXgR8B+IxQwAkpKyhbUT78uX3UxOXxLkJpXclk86g8EuFbcuWlP4STkw

QuU+p6IX2RSdqZUO010p+L8C+ZaxGTpzs/PFuisv2I0920zwmZr7BU5UHkkRoVZ3uMIMxr7aCqpglmwe

+z7S8rgC2lcDuLedopG7KVX2fkOUs1jTcCz8fbhyz0
28UTAl3qP8qGZEX+Ri2YEYOi+mWTgIu4gRzbjDDZ6asy+68T1ddEK7gqFehOVgdi0OL6xybSj1l61AxN

4OmHXW51z8J7TMX9nk/jqzvn/tw6xXl0S1J2O/tKwQSbfR4vnMgxIF7Ad1dWBi6j75e12cBcs3afYl1j

nHQ9ZJgz+Lk6IQYY5omRnFfL0vIBgb/NgZ+8RHdL8A
lmXmW4+k09vTJbd89ggD+0+MWE2VulkWA1gKJ8HGkZmMuJiksXxpirZWK2gu6WWLhE46vs7G8+h4JV+U

pUIyKp0t4lRMoropwVVU+oTcs/WO8HzaMeZ6JLMYOzyvUWbNduLnqcbN3mCnG0K7MR27V75cWnd4pKh4

aB4ufhgTgHICcq9IVPkMaCOQJKY9lS9Lvu7RFqsMqf
it+F9INl2072tw3e9EvwdI4iIbdQFJ3yQcn+ogSI+fshWmU1BSY063siCLuNVzlUh/g7FbDIP24nYRdv

uVMeVT42VcB+JrypIPUG56FP7GRKDNI+agDaL5lSVslFOZYdOwVPIPfNuOf1Hu6/njOzacZqXEv2VrEF

X/emVSbLBqRdX1RuKJwu66G7KAiGrlyy49W8g91ZMe
K1ufAqq+Qa0XOdc2fJ7BswMJZqaIUyXhwC9pCaNuhzBuRR75XfcnZ0vsj2zOOK6RTNgamTYC2se3NsyF

pj0SXbzLuGogn3A27PA5WEuNk3ERX4heEu2ulv61rc4Kp96mVBvC12HMYomX0eR67E8C1EfbilS29NvG

MD68WMzdMDxtIRo9LwgbR1W3JlvQ5giK/XotrWC/3p
w6aCG8/zbYlhx+iEZzuHwD/tU0Ue+E6MxyHi5oJZbZ3SlvByG+JB8InNuy5SdwvR4k2HzYhSQKkgQFY7

vg/tQaJTiSJgudcHx9RSZY9sDtJQFZAaSzeIyII5wNNwqhLY4gnWcAykKz8li57KzEakn3+6k/6ul9IM

5wtWIqUygIcRbwPp06yx7MM81jkVI5ROfX6+s9xY1A
R17kBUtpEgpx5D7Ubio+3X234Q7fDbIqI1dR4aWOyP7FKzOHAdL93OvqOl0a78xk4FCDbP8h+J/BXAOI

t9akvSt+5LCXCH1N/LIyCuD6qQpRtWqmeI4eXxi9aWeGebv/gAwi7Er016x0ymSGbh10tCseq2J4E3ob

AdryF+F5HXix4xdF/7Js/zeYqd8Zsgp+/0bxdh+i5u
Ug951W5yIaloWmrw7U5ZeSh6fxg996BrcgWA0BlolprYEvRD9gl5kSljrzJ03Ag3bI7mlB8ZDXlH7MYl

Dp9+5dDFskOvT1qDUot3oJDHYD5xtVET7w/dl+flB3A7y5dDUZ65WDPuV+Z4ZJXQJw3Z2lVDBUHeIpk3

oNqI/uFOgKtMn2lAwDZ0kKo+3jtktk+0C+7vpBGgSa
ZqaPvIX0bRdOa8zhrS0Up5EAyNhiucj75+X5/xMwdvYDLwMv/K5lHeN7IlIAqJ/DDhkIO/Lk7IQNqGKN

DhrFkoOKq6BLwpF+rFOT0cEpwGZpX3ZL4lTXWo18Ad0rBX3AfdZ8XQjl8RZBIHyyzMP0+RXa6U++QvTj

N8AWO/8DHuPAlD0ZDA0/+VcN54CPn/8UiPhP2NO5/t
Rk+K8BdsH/PdeAGYDm7xhtS6bLjM5SRw2sTkjjM65F/+L64FmVY4aew9ySjT2t2iS7FPDH1Csy340H2R

i1htv+SrYaFMUIwkoQ6qfmc+6Rfu42ej6U73Z9C1493HJdBPsNlowJnv2B3aScWe7g5m/AsoZ5DC7sfl

Pc176lK0L5Icir6cJyuAqGdT0WiovhXusePk/QOiAW
IYqhFFO1LDpfF0O3ObJ/y0Tm2VBT/IdaSR4CfJpiYhvzia1Ja9+Radha/Zaf74sad9ehmkAwQv7x/2v9r

58h/GS1HolJ4DftLn2JctNSH9Aw5f9SN5C1jw+XnmaPD5+l/1+/E0c7eGoWIdpU9kaQd9kBe5IDD/A3Z

ub/WH2l3/Gp/hF3i+jTvGtur47GyeBLLptfKiIw27X
WF/sxp29+TNCIz7816i713lxhG9MKDRw0hHOsJBfF9xEwYdlv+sl0wJQh6JTLOnahKfIlGTIv7QjJMsW

E6c64X8K9D/nm7hUUhrmBDLA+Y7Umadse3YONl6TYbOX0tQb2VELLtuP89Fy2uZlOMhRdJ25h3GCck/S

VCAKS7YMaCTbJJ3FrARcYz4JHcLp3azMp4Sc66YfRk
+n3M6T0+04G9mdDNpbkE78QW3Puz4aC445L3Fh7w2AYOm2N0Z4vGHyMg1gOEW292Cp5/78wUbq1+XKn5

YnwVUKvUk9ubCXqx3ZtDDA75zp+eb8ELzGehYiqj37O6f2deC6Ktdfw2pUA91X3iS9+gADYpuq4YKrT+

i/pnTnfKeijw8gua8PbzyiG59cwdn6glt6hchHbs84
DA3fS7NFJSsE2yS5zXxv5E3N8bLRjpT++A6aHR9tz9aHuCqAiQqPKjHQ/XbLafzxYfTe58La1C6vxNEi

j7hSCloVB8zOT37wd0raZsrb9ttCGUW6J/50fu00+iMdpCrPvcPdnHQN+iS/PpiTKIogQ3QtYcMf5QOl

RwJ4ciuK1++7cK20Jgu67W9hy2/lp066GzB5PAquEC
t7+6U5iK26U8WdRRfVchP+y9R3U+n5sDiLH3WhaBXPclteRESntmd61hudZNEmwyow7C+3vWWQVCDz4z

XEqXf00g4WURiJ8aTMRuvKeUvF7ablHB4Xc6ljaxnBLLXdG3RKIP2WwpFfST2HnolkgU+yjN1S4tS367

6g4LGISJACyEFotpmrbktdWbXVDx/Xd0HocV7usxh1
Uym90v26cA8pT5sL/4LKstuN5UnL7dhRI1OlRJa6KSjKrIJVQ+wED4IQeVhPJ/LGp2wNiPs9y/b9Ol7r

omuM2ZPhL4z8OQoEKreiXE8TSZsPPLBgns1gVhoT5wQ5Ow8FFOnSU35vuITAkrnd0oOuvCAmWCWxInkW

C5XQIxCnRVlTvdkF0WF+oH8LOFgYwiHtKzFBWp1NJe
FGSkSCTfwpCgn4tiGh1qzTQ+9FJJWMb4FkFK+fINRALhgZ+RryASCC/0IXGhg47cQTCp3R8NuhJJTOeY

VPsio5BYuFiNp0RdHu/zsRslqx7LFi+r+h1pRxNUUl+fHL7/Mervat+akOSWSsm7C3g7qlzDf0K/SuAdbU1

2fRz3pNp6OynxatBPOXgCVgyNdcmm85pG0Atr19pep
8KE2PnGXrzTc+xy1Nls+6rSPyJ5ufjl9vjX3YIMhbKer47KUwwFkzGeLohYPuwpnFDa4C9fR5iO+jTAO

+CbiuLxu12Bj8wLr/LeZ3WC/IiepB5u5NDq8YRnxsj1noYyRBpC46gN51J+vIE6nq7bBDx74+yzCawQx

6miISqIWao5eO/cNXN7fzLlTFjSpoDI8fBS7QC88Uu
cJ9Qiot59TOKmJ2GfdgRDz4tt1hp1SxTVJWcDaxwWG46OQVXq2fv+uUOQP92J0Q1Dm8Gsn0MMSyL5P5O

V/jvxLz+WQ8lfdRcpeLvg7pNap0d7SsefTGh4bW7ezc9o6txTmATaPKZQDP5nhR7MmQ3Z/s7f5RG3TKH

da/WhMOPC7ec+nA2Q5JL+GP2/hbna9YYYcOlKTTzvX
4jA50zjc78PXrJHkfM8uxKW70AvQ28HxdWXTqRfA2jNTkhnzWmDsJGs2/cTJlLrCmoXK98H/fd+PzJcz

i9K5VHfaw5H2+jsyzJgPo7MP3wQ1Fc5gMldgbADOXk5hjK9MD6Jr6rl2VTcEuc7c2s5in9t8BBY4ore7

EmA7Bvl/p9x9zAjHTyXrcAzGdWELtCe/ZEIqkjkzrH
24O+4Mbkvo95uFSOTrxWdKOSkZiKdYLnWt3qddSOuhI2ywvuCfnU1bwo+RD6uIJab19nPIphaOy3ySaI

4bza9lU0G8zDeEdxL5I8KUyrvfUKx9r5vByh4+vFliOiX7VRH1oPfn5YzX/92eF/Ztfn1uWzfd0kF7rA

fqtLC44gq9/uuf/kN0h/6N5hc7I+U3WOM/4Mmr5pbe
yWkyepeyytNDyUZCwUwyT1tx4/8PmCTwV3bDV/z24Bap3JYfg/6It0DA2EkzYKbIqAhRkp1otxlr8/L6

tPJe5I8kG9NAln1Rip7Yc1vzehSwf3B4dI6aF4y2m/zGCYKrG5ftgv68ICJL/hguvW0Q/h+p34oRrBl+

Q8KZzJxDmW2eaaAFj0/nwu5nA5zRbXMyxvZLq3dm1k
fzjhOtmR8QUP3jF+VySsfyic+74aqfnublD2QFq58aaR7t/TYOUNQpww7bgQNE9ScivzOpcx6m6oj7FS

IOyf1Ck36rEo8oh2b0u7bF36Tm7/bjv+u+8ht6euw7J4LY5sFLtm7R6vtBWneKA+IDCflqiZsW2RrCtq

lH5Mzmg0RGMOfuzyizWN/Nxrkq3Wo+jppvHVg0MV79
at34veADgTbc5Z+F6TYFn1H/75ryy9l3Ktdjx4x6e6aV3DjaLq6gkUn0Otu1eA4UJrP1lZxwEuCj/zbv

dgUB8Ae65bsWeL8drJosn5gkdgDT++ze8VzW362PT4zwCjdCANHj3yXU+S8unmV36k9wMb7n5pUpihNJ

luxsHgad4USr561rovlTihaIwqx3+Qi8I51c/xHicp
Ai4wGba36Yh5Kty4HbiH+vEihMh6pCauSoP7znOtdO4Ew99GDOhujz/hA1AbTVAvo7WNYeOb79fwvRpA

1syhjcsMxdSya7IOc+v7Dmp/aRb0+HvkU3d/EBqm5URTxuzMzrBSDIDxDa9jBSlYXI/AIpOJqeXW8NcY

D19joDomznMf4+HXYe/EI/plXM9jHpEpEFwnCFLPyL
yxT9LEdhM/dBpfxpHjZA6hCPqCBxmxxVneu7+lEmgXQhxE6ghu7szmwCq3zBfy67UUT/tcRxnhXBbq4s

GbiF+EfQlo9UwrMeA/oeM8td3hpL8r/XkfbF0sHy0ULVWQ4sq+p/12CCtHMc6P8CGxL3TSR1mqZcQsfk

x18O+ChwnK5GAgzvmR6LrnKchmpCB82M53xOEmd9wW
8juFiFX0YdFTc8N6+M0lvk+c9tNkut14m35sdHJBn3zAXbSGar7bqBYyBjr5A1LoJgJ/Q90YDq3568C5

p+8dtumqOc3yikwVS7F8255j4+qlwEGm5uU9qjeY/FDhpBdczTocK81GOH2UwJwl930S4i973ufOOzp2

6pFxGK9QXtVjc4dwVD1fzVnf70EC90egTMeY0zLx51
dVjP/w92iw7uwrhFgQMp2TiDgejea+G9ykBln5b0J5ME93X45bQdqbZ56xLxjv12pak8D3BB9mlqxfMs

4l9NFAnl/SmjhWb43xmqgsHRdP1vu6qdjQ2uz2k32+Ww1iB7Qhi+eXAkd48xa1i/E1L7MbxdrjaCmtBe

l0ZEvZ2yq4/2HO/c029ND5Zl8LAWG0zkKgzsE5pfuW
hiO6Pgic4P6eVA+geFL3dRVwl1N+CWRbrHJTiPSiH2ohT4ZykUb3p0VVW+7YFdzmnmC6Uk00yT9b/9r6

qrWWZOsv+aatg+NX9MUuTyCMv8LhljbuaFhn4kGku8550cUWrfqNWnj0GBi1dvuaDyl/v1lDOrVjk93P

6RdZKbWRBit6J2qF7kxRbnAVIOyc0frOtv/21q+7iH
qou9F/UeuVIYhnG+S0ne/nv6t3WwlBX/cprf0G6mloyv6y4o04g7crlX85mjOhsepfqJajUb4DD1PTf0

lvwcoeI5eYXn6nao/eVr/6l3oa0kmIlU++ou7UIerm2Z3oOCq3ollW+nBEwzdSpc7iACuNUVC1OYqA8V

xLwCPcTd0QtbW+Yayr00Homsj/L1C7Ea8epnxeV0Eg
IF5Tmx0CNw2afXsigX9PlIYk0b1aLd+ugKF+O+ViE9+rUXeh+yYbqQlMxX2YcpVdDi5OwTHZPRby0PGR

1Dr5kOU75fL+DS5Bxyf1bR96Aq1G/SamSEtEPZz+fVUWh+eVcOnv7xwP4kzOtgUqwCG+HORHi5VmNsCn

YuLYZCt6zYI25tw4fbgg60phC7jIiErJRcwx5xckyG
5NefqSAqNgdWt4V0MZV34j5j1QUmL4U/BiHsuE/wStFl/Unn0L5XKxmOr6O32l/wsfz9EIprp9CMtW/R

ALvj/RXQo6kn2L1Ue08TZoMGgJr+Yhh84Q2lsxV5U9Z+Ger4e0j+0QUzeGJYEyAsQD4hkgI9k7T+h24F

6SzymxEqUBfoJyYU7kosqD7GG2SoYD6nW/TV1R/irg
j3UibDoTuTRJHxC+nsMd8mYkU+MOzydQ9iGTRUcxhzx0wW1ucZhTrlmBx3qfLueUeuCLMh4y3W6U3zkd

46drM3cM5KgHH6rB1n4/8NspXv0/BShI02v2W2tOohn46MB6AstXgqkC91x9h2kHLyKHmKwUlh8exEe6

FNV0iWtZUH4D73s+vB8tcioJtloXKwdpDOSSEghbZn
OSt8JLnQ2YTkC4sBcqE46VTSZJA38N67pNgcgw/zp75rBg9LKDhkaMGHim2SP08sN/OinK6YwIyKlb47

mf0foEC6Ry2IU99f/OUvx6QhlggTKHI9OP34twXc8mJxtq355G/6M4GUxDFYBTCgMYcboOW4pFydzLbt

Wx4RaZA8Bl52O+rKNYPzUEzGOPu/faavwMRQiBCS0r
8Ss94nYKeEzVyx62osUp9LSS9Hk8tDYogWfZh8O88Gl58h4oygNpOOoQFXgCNrka4RTnK57h6QDtYrgj

4hlvJCea0weRUlgbP8sXUlj1xvEcSFTDa69OMG9RkLrCP7Sl4B4WI0/vrsv5/OZ7tYUfdAOe37s0tsN1

ds1ZXXVpU/gm7oL8uIHQKbHuzyaqa0YTnLr6pnY+21
AmQ6OCZ0RJRgcWBxt5T+/9/3X66dhMzjM9AY+9jE3gexnxsIuEVjcThTeq0paP60dvVAFC2WHbstX0jx

/KHb3LINM+W3+PTCq2LDDuXJoRgFrEXo17DdkQ7nt/hf01R+2VNle/DKr3rm2t9oSOD8hHl6cQUNQGuz

V+kegcB31Gdc3t4c5+yxcOsxN6gWmhYM1tnf4xo/k3
zDPM/cSa7ZycHz3rK83IL2tNZ7x9H614BqpnVb7np2tUFOkFzJqpG3KAJsdc1Q0PgiHuEGWn+2jTX0Ks

yYZ4XrublsWcykBNMajGYv7jynd3miixad7e1Ufkz5S+TKyDvEieN4yh4R/wDn4Dd+9xGJCJLcdk2NFy

i/NO4WLiwFlCeinDCfS4Y+li3szaDRhIXda8FBI4z3
SHfMKk5sqb1xyM9jKKm2a+57V9+G+P+LdDzpq/k/y8QX8FQaZPUXAPT3Ih/DEoau918L6qqMjfE0jlsT

vGXn1aOylwzzoaIZAd2tWEA0m2em0d/FKekOwlXLgOzpVBlwB4HAwrUO4yc/IFJ8oWFJ8LhJ/9ZPyxbo

pf6KmlqlLnJAtBuyOK91Iok0qUjD+Rnlo0TQO/wfzi
MoTRoi7ZSktiXd9+rrp21pOqbt3G7yvobz7CTwDGwc4b63McLewn+j4bJlldHhMysKxI88C0OPWGzLCH

KuzXeiM3DFMQjJOOgHG2Jp3pdKIjpleOKQtS/sAUBEwJCygN+RGUfyZPyF+z4XpxpzqASZ6PB6phAbn7

Ray/P/Tf6nfiY/CHDuQG/RUt2ghrkEheDwk0gz+Ue4
Hv37dnRgXYEDHNw95Fue1/VJl4suO6p7vf1lwhvT23a+krTL535A5jyGy8PFtU7Yp7AqqwGk4SZSDQfv

O3JtclDnRTlmCIec8cFWDDBjH4qOcnWdOKF6gzwlEGS0f0pR1KwRwGn5LN/2ulE6GSivmlMXvEAFhL/w

x+B3vKO681I9BC0dp4mNRzjCokoVke6iV6wIBRaOqC
cnNDra4/ksAsi3rvnihYigkej4V1QNX49U8rUiqReJ8TXS4ejy+TAwIAoHCvs5QzS5ZuDNPD2B+gCcnF

JcUhsRhF+vlN+K3s97P/Y7ne1Rj/SEqVMJecbwjBypujFiUV31WycsZCY3qhaJLcfdsCXZjJGPjZyBsF

JdxtFGDjz6ugVJIifrpQhOO8xCW2GegqjwzRbr9LNs
Ko96EShfDeBIo6pLrEVVDQMBArz+wFNITMlBKzybwxNBXZb9ZJqkDVCVLqiBzCBCLSgYTzLn7g9SOgKZ

bgr/eY6NJHQ/48zMUuDqvCQihlxUH8UT8BH0zyPbGfxHc83i6ylOJiJmCCqt+zS+qukzHmWIaUC5Ava5

Be6fzlnWXB/may0QS13JsxUmjFvNRPzi+fLkuuoWBm
XstTzbIZEbaxnafmGf7B+sDvSvGnuolEQkCzd5w3Y5bth+A15C4DewGYTS+z5zrIMcRtVcs334zElgyl

ZZnQy75gPC/lwTMNo9i1MGt8LBLyHh3/HDJgtTVlBQBp3u4Ckfev2pAAs7Tlya3ytv7dCt8AjlYMv5Me

SdqOp0EeyEPTbXNkbSL1/s2lnoTo6wxBMlocatDlyP
we7OFl5GZaSgs0BG6olcVm+nNCVISRmYa/MovUe9QMokhTEbhiXpA/W7qzulNP4YbM9SEk1gSwZhAlKL

V+oS8wUNg0lofitUgivt0M1RNuyNpl8jbQae4s6qd3X9Lht2KX5ktq7NWrbUOvQLMGehHOvzMNuwV1Tj

Gru32ekkQA1nCla3T3M8abLv/6areVSEwS7RVbvmgO
XrJGehV/9r1mLbSL9MSB3m+cSjVJfB3EyFh68x6HbUrDPbKWWRDUj4EXXpiGZI6kYeaus8G4pVjK5Qkd

1c0LiNu3o3hhB1hamYoQJ7mcCqNLO1CEWPGaTX5D5p2bJBhx26BaOOpHRphSpRiksaUE01NXBRr5ehEZ

YR0KP8kcwig2NyF98XJI7tEySRwR2BEB/79Z8W0QVV
gRfQlvgPhAaJo89SaSp7Zr2CwPpt9lo7sC5SRQLbyG2vT+qg1pjkCOKQXBa0VmR2f8wDkvJZI0skMN1K

zWx3WKXmFAFCrMbjsqbc/4X/wvzoPa/6ptde9B/kUnPF1CCV6rj7OkYOOpCMqsilLtDeaRUsLsFQVuh3

FcELnwOUp4V4FeaWTixyHsZsreeHJMIMZzWDRmL7if
rkm4vUQoSHQ+Va6DQQKqdESdKA5ZOcfKbEEAWnWcSVeqeh5uU7LG2ehtcSJMNkPEP6D+GHlCv3SoWefw

jC4oli15ACdhVwCjwu73TPwN5wfRpccM7GpynZABa90sLXiKWcSMCpquf7Oo9HaGak0P38s2ur2l7z3g

gRXyfwYktJjNnEztnxPOr4eP1p595kI1I+cZJLE04S
GzPw98Ny/TKyHuATbymjx12Q8Lw4RDoD3P/UwOgaeO/Gz7TIf9XBRD6EADelzhdHZkNMqpPVp/jMeTYN

CRj2Dmucwu+HU5NB8jxuEDM4w/iMI0alDzrzWm73IlBihQYGbzpd2pE3rc50lI3Be+v2U/iokNCmVuZH

Q9cxNgtV0SWX4of2AvJZimEkZmRM5lev6QWIwLZnOk
R9R4dGZmTc6eqENta6IonWZ3y2WMJyHxK7OmloNLWF7qQ2LZMUFQWuuGzebwvS8ZMUUgVCVuRL58BWhz

BY7FKWTVQCratKGbGZY2R1Jyn6VksjKpVLEtLt2KIZAqJjfaW9KuWcWIDfMrV3LrtgFXEz88LPotOW3n

VEGcHCOoVXU6LEEaWHgcYO8QgSVxlKUKeeypKNIeD3
L4DJ3LDLSNHgVgU1OgesNCiXgrNqZvITIkWAKDVu0+CYppuuXjOzaSYxOtADHuj4LcQBh2XH7UDBExKM

hgDL8Rx655Q1G4NhO8eJTuU9kMMh6zxHD0jLEmR4Mha8VHd517M3TQCKMPKatUgtopwG6HXGUEx3gBTA

9ttK3RVPYaaMn6vJ7XBEXvG8vGX5aqjHLiOS4pnkW1
XQ4OXLrgn5PomMYtDAZzSoQvQ22eMMZtcF5fOSkSCDXxdSs6lVlgB1Z3xLVaGD2vzvEwAB6+OK2REZHb

Ty9ttLOgc3QpwIC4i5SqVqQoTZIVMKdcSG5HHvKkQAXkM0zeNSZsPsGrZORjJhIcNoP0XM7xFDp2ZGlx

MDAwXSBdDQo+Ci4GPF8oi4TwUIjcIPRsXT8esk0FSJ
tKJhHnB7D2wNUzCg8wdB3FoFM8rGNdX6N5wFWrR5Vba5DIi086O4KUGISOTdwYtwpdyX4NzlOiSOovLf

0JLZEwzWc6rH9YNShbXF9JAQFkNO8hTK17Xh3qbWXxLzJvMOIvNj4VYoYdW3GgWM4rS66eVOEoPAHtZO

INCj4+XQenvnVbFvzKMtZ8AZKjk5EmMTwePD1EHO3u
e7DrKYbyOWUmt4NoLEs6GS2BFJDcBIUiJ0BcdALlO9WLCk9RIOa2S9mnYOpkRw4QrUMdSFXoXVnkAR4F

u920BWt8YK7IUZWfVmEcJLOJEnPsBPVwSmWnNDpnXZYDPDeaLLXbY5PpOKR0QRAgBe0+LKgcKZ9VS0Yp

RNE3ONj0CL4+BXciYU6PsGCMV0IzrHFfYUcnB0KFMG
3JBXP2AM8ShQEuXU4YqTWBT7QkmFOsBe7sNASnr4ObEl2lD1ABGPRJGMGrEThnKQxeAIOaVHp5S9H6RG

PyL9JIU782dOArfGu0Bw4rV1ZWWKsHNtJwZPpeVDsiTOLfLAr5O3I9CCNvC0EJH5ShOdOxhlWuR3A+Pi

ZfVWWDQA5YXWCWBOb8K7Q6oBRyL7A5fIlOfAK3SI3D
PO8QgFRywRXci59+DhFKViSwW4BLO6ZGZP6PJGg1P9O9wOXbJ4I8wZhImGZ1SB3SBR1KlOpqjUYaJv6r

DQogICA+Wk5ULj9CQcIsCI4jcn1WHrVaZRFxOzbQZbx7R5furvh5qBKtPtI7M4B3RuM0gZBmBW3LQ0M6

dDOxJXV0PJAjcEC+Ct9Mc5YpXMFeHEf1C0tdBFSbCO
DzUgMgzO06M++2bhaquLY8Q0i8NGRKsREyuYiZafDyD3zUMQM6i1G9PHy/Pv2TXSK8lGp0yPGmSRElQN

i0dR5egJk2DmDoPA46JWCtERlgdY9vLqs6C0Gez3TzLi8hWp5khUTdUs5JQrQfKTN8gmSbIzFXLmV1aG

xablxsLQL4N4u0kQJ7Ff82t2cmdqOhs2GkIdP7LDdq
UWSeQgWenhAeNOM0dbUzkF5mggTfZj5PUSZrLSxbahXaSrDKTu1QWvFhBI33AavjaR7hcJF+DQogICAg

ICAgICAgICAgICAgICAgICAgICAgICAgICAgICAgICAgICAgICAgICAgICAgICAgICAgICAgICAgICAg

ICAgICAgICAgICAgICAgICAgICAgICAgICAgICAgIC
AgICAgDQogICAgICAgICAgICAgICAgICAgICAgICAgICAgICAgICAgICAgICAgICAgICAgICAgICAgIC

AgICAgICAgICAgICAgICAgICAgICAgICAgICAgICAgICAgICAgICAgICAgICAgDQogICAgICAgICAgIC

AgICAgICAgICAgICAgICAgICAgICAgICAgICAgICAg
ICAgICAgICAgICAgICAgICAgICAgICAgICAgICAgICAgICAgICAgICAgICAgICAgICAgICAgICAgDQog

ICAgICAgICAgICAgICAgICAgICAgICAgICAgICAgICAgICAgICAgICAgICAgICAgICAgICAgICAgICAg

ICAgICAgICAgICAgICAgICAgICAgICAgICAgICAgIC
AgICAgICAgDQogICAgICAgICAgICAgICAgICAgICAgICAgICAgICAgICAgICAgICAgICAgICAgICAgIC

AgICAgICAgICAgICAgICAgICAgICAgICAgICAgICAgICAgICAgICAgICAgICAgICAgDQogICAgICAgIC

AgICAgICAgICAgICAgICAgICAgICAgICAgICAgICAg
ICAgICAgICAgICAgICAgICAgICAgICAgICAgICAgICAgICAgICAgICAgICAgICAgICAgICAgICAgICAg

DQogICAgICAgICAgICAgICAgICAgICAgICAgICAgICAgICAgICAgICAgICAgICAgICAgICAgICAgICAg

ICAgICAgICAgICAgICAgICAgICAgICAgICAgICAgIC
AgICAgICAgICAgDQogICAgICAgICAgICAgICAgICAgICAgICAgICAgICAgICAgICAgICAgICAgICAgIC

AgICAgICAgICAgICAgICAgICAgICAgICAgICAgICAgICAgICAgICAgICAgICAgICAgICAgDQogICAgIC

AgICAgICAgICAgICAgICAgICAgICAgICAgICAgICAg
ICAgICAgICAgICAgICAgICAgICAgICAgICAgICAgICAgICAgICAgICAgICAgICAgICAgICAgICAgICAg

ICAgDQogICAgICAgICAgICAgICAgICAgICAgICAgICAgICAgICAgICAgICAgICAgICAgICAgICAgICAg

ICAgICAgICAgICAgICAgICAgICAgICAgICAgICAgIC
HtKUOeUUHtNGSrMXLaSJf8O0gcOIIwDZQeAJ0kDZi5Gx0+VDkNGsXwXYB7quDutI7VVQ1xn8QvEGmeNH

Qsy9AyBEe4JS5UETGhIWunUK8ZEHyqfg1LWDLuRCGfcSNLm0ijWxYpJQU8IKGnZenkMH8FGODtT2wnis

BbIDUgMCBSIDcgMCBSIDkgMCBSIDExIDAgUiAxMyAw
ZAEpYX5YNDKoH897mzZlKJ4TOi4JMyVgFS9dgt4CLtiwJNIhPziVHsz7UYodYW5CfNEqjWRfPBUhCKEZ

TsNxK7loy1YqXgddWPJCAVliQE4Dn1QolEMhVWb+Gv0HLC5zx4JsMTyqNVDqMA2hux6OBNjUDkHvC9Ay

zAfqZCDlh5hcECRqOA0ktZHyBWH4RUBktIUoNLWfw5
OgOHS0QUXveOZsHMRQLHBcgBQlBj5fEC9jLFGtYDB3HeScZBPGRZ5GVZTbANTijRLfOMMyKMXKQJ3FXI

xpGXD4DPWarwOomNPkIUrrPS8OGJWvxnJlIytbMDJNXLf+Da0RRR0uw1IiGPraGKXmPT7drz7JKSyWAp

FzL7U2nTWmQ8R4MMbiGi2JLBFeTXBqVqSsPGEJLTrb
OV4ZTJ2wufX8TX7MsYVfBLRkRQAjjMCyTXn4G39ssTKkVAbnPE3GAJT+Nino+Pc0GBQTkDZRkUKSoCeBw

HDVDTuFgC3LtE6GJb4EhI7GjVS69yNfpivPlYZunYW6EXX5sLEAxQGOKTA2PiDYsvR0qnkPgXwCxNYXL

TgQdR17nyEBjPETyPIK2ICZlFb7GXVFhX1TxprImlI
lleaWdGBUdSOTSOU2ZZKxdyoUmyUSadMubFV48xDsyBF2RPf3FLeRrAA3nlk3PpMSyAd8KXNUbZK6QEP

UnMRJaEUQuYAM6RWPiWzCfODqpHUUbJZQmRNC1VMCbSMYpJK3TKfAxUVDgWxy6THTlZPCdYSRjrn8HCP

BmJCPsWKHlOIFkUFMiWNCvIXtnAQRbOJOwCEC9QJBw
TTLlYD6EEzNcXLUzQBK2SOzhJQQePKQeze0JKJReJGVvCbZmDkXbTUPhNQWtKJfgOAQtHWH0LFB8XIBv

SIOgBQ4KGaHqMUMmZGTbLQTeHDDuRTTmjn5GNQUcKZIjNPX1GhOgRQOoFJDkRAleRORuBKG9Zuu7ZKGm

XHWtZX0GCtLtNVKvGZI9HPUpABMvYVUksy8AXGSwNN
YsWbT3HqDxSXWgHODsAKjmIKJdKFNeZTTsRXWiDFQkGC1IUhRhUSPmHDM1UDonEZWqZQIdsx5QEMVfDJ

ZwKUO0JTWhKLFjLRTvOLoaLHEeEWX8YCj8MJRhJNHoOZ4LJlYyHOLnDNUiKZwxCFYpBVClts9WPXUpCA

XbFcIcBPTpDPWkWFGxARnhQPTvOQA8DbJhPNSpIPKx
RG6WUkCxGQYuOWG2FFntWXEzILCvtn1RTJBdKJDpXyvjDCSpRHPjIFTtTGslSLNrXVI9ACRrQWNlQTOf

BV2VLpOkRZAeMcwmVxCsJCCsRWLcmx7JZOFfKULfQAOdQkSlPOCbSHIaYQufUTNjFPE3PvN2XOHzQWQy

PH9MKdPpTYKpLgfnZjzzGJScGNXhjb9ZWBRiICIcNT
LsTlXhOQXwNDSlLHfyKRUrQPHaRlY3TWEbJOPvET0BFtSpIJDhYhM5JwBpYZIqRECkgl7WnMCfpQlnkz

7XGFbQOy4OoIozWMMwICvrOr8lcAYhQHTeZJGPAh3InkAoXFXrUVJBFXdqIKRhWOA3S5E5LSA0PhW4Jr

TkKJU6KTI5NxN5IuZ9PjK6QlwbGnH9WJjdYVj7WVxk
NFFyMWWvYjYbFKp2XOI2WYpcDwl4G6U+AO0cTXv+Iv3Iw4PaxtB6yeJkAKkyADCyIX2RJBKYJ5TOVq==









                    ID                  Date                Data Source

 

                    [id number]           12/15/2020 09:59:34 AM [facility]









          Name      Value     Range     Interpretation Code Description Data Gregoria

rce(s) Supporting 

Document(s)

 

          Progress Note                                         [facility] 

SJPHGx8sMpHTWwVh28/RXJryRLEco0PlKYbwQVs6OJcoSWHcZ8ShWXD6uJ7hWYX8HDdHAtVyWgMmUaI7

lbm
ArUdcGQuTbRIWyWgbLEiNpNGbyNzueiYVcWA2OrRH0XBLdT32sYBDjXNIqY5FjBCV3OfX+Dc0JVGQojG

CuVD0FOgbO9Kuoq8hMUX4l9Y0sAHNJDhm3aGT2LBeV3ny2ZXyUtt60ZDF6cVPuOzGDurHmr/768irpHF

RiWLHzQv44ZzMCeubdlJjDQHRZq/0UO1JakLUHf+XX
INRiNBV//UXGDKO7mhhfVqeUFgvwGMMJaL434cqY58/C8Dfzesky+jZ/aPlZ64oAjyDP7egy/GOjJb2q

+pU9Nt7KaZhW0+XluhkwE3FO91U+5+++E9f/FK+anvpemPO9OjBlyOegrcZyvrR9D8qf6BEfd7N+RHpr

uZdGDmOu3Vb3XJYD0sDIuuUEsdi6g6MrW2JGf7lu5s
7+JHpmULibd9IIvuH6tmxvCDam+4xAOvgn3I+OrLMOvKgu3v82oqvij8C5wrUi6U3qWYtxu1NxLhpYB3

prF2iCcCNEff4W64agtAN0zOOE0MfQ0EP7bMgTv+J/97DhLp4B1nbgFnsT3TALLWt8PeZUqz/w6bNdTt

0Lo+Ds7x8bYzQowkpU4MBaq6SP0qNcbx12Lv0wq2Av
TY2+KbpwRw04oyaBFigJ/Yx2cV5GDe5gNkck3vzBjh+0Ryr0FF3+Ecpx/ETKKOD4HBiWSIU4XbApRthd

Wcn5G8fhU/hUWIyw6OfpissNsBcTaFLC0Mo2Xz43ssrqiTmsqfJZf1du+VMFIgbV0ZFqsSLFdFWlz4hC

qT2GaDQU3fYrKbTPfkUbuuQRQrnYWSKndOn2bTWT2q
U7uKuvfCCJimT60/D8l3GlCLEReGyKGdEisD2Ob2xL92EysM5F8qXOlrudDwCcGuhOVXgHN+zn46Vpw5

8sk1w4V7kuvZ0WC8wCuejh3+eEsObiFWu4ly8ZZJdtFHYbEpx/jMx/AkZbl64pAgAqTOsO30UOCehEdY

ZfkjOOc5zT30u5fs5vo6mbZnZf6P611YLc+/ixkq4x
YaXNty9asBNiqRPBKUfSOVEQvCCy3c6P/xZ24IZv5R5Ipcufql+hDHh0lNTilLPZwOZpsp0E4Dx71RpJ

HPNYmY5wXpsS4fHsR3j3kKSSbULRWeMmpikoL0u8UVDyyDHq4YV89eVLGry5OLIHWHTB27Xs5N9TBzoZ

R8Wknh6XpJn6VvBHY7EatvA2UO/RWBeNZYgkJCPRUi
g09sNIagG2QP2v2sCg6jRT1Zbker/pl94Rk6S/1HFqv3IpN6b9S1PUO/duj2LbNk4Hi9rKsCBWpH0uVj

5o6YU356dJP8mFkPlyHCCzLSoa2cpuDj9x1Xe95ta8n5Lc5O/9Q1y/H05/FplyQ3i8/lNN9kAF6RIMyb

qacRGZ4aHIE4e0rfSdvDz2Esx4cdSHtFeje1vFo8gL
UJ980xHoSvW5JbkNo0L5HLqshPwtWwxw+ngTRUfiNjtDwnjaW3sGZCS6XbxUIDpxmuNO5lnufT/WWBT4

yJoCr2rjAV0Wy5Y//21jDL7czaWf2zJRc4jqJRGJJeCh3GSjda/Wjl2z5XUO2Sn3wExCN2JRhxlqLCTo

eJlOH+5gKVj1yxJ2+R/1VVLIoucQrci88IieI3suM8
s3KQa47VSFdwOTkjHlMXK6s8XxeY5BYOzojwsnAiBTezT1S9NKJY+/Vg+2kC69kfhAg/1Wc6QO7CAP21

bg08Z5K6Ie9r7YiBVUbzxpH1z1i2p7OS/tnjsD5FFK1nXcmGU9Tq0Kd7koZ4/3Dnlx8dx62IH873rbzp

hdrEQ97aaTUL62JNsGgrYC+h+qrXOSThG4jW84Mzv6
ok4nGT0lXzlv6gjX2UnH+/9Wqh+he4TGxoJPKHNphryPBaWgPZP1YJo/LwJQBFnt3edAVD1mKQBJKArR

QmCheUQqJvk7iCRPDbCyEW2n+9JxYqm9uiUh8vXqA7UtwKWFJMAFG3XJ4woAG7kx3ROrfHGLMQGLyg/E

c6fdrklF6m6eUlqdD80w0UOmypVXlK5LREUl2+PcJh
AWgjBvYyvTsvtmDa82oED3YvSVJbGEkc9QiPJfGOPNDEsyJ6NI0a8uB0tMM6FxnEI9gN+xHpj2x/ApNt

Tv4kOCO2nTefSMBJC21XZnIlrkFAv0hpJ50N/oiI59P5gqktlS+/FW8R3iYg1SAMzfZpNe6kSdATAVm8

wCU/G7ZqMG4qQlA4Df8HANkvoEEQikBQKh8a4m5K3n
y6Qj7ha07iJPf5M6rVJcZCLipLlSMCILhJE+2SrzJ5dLAOqQ85ubdsv8g1XkL+DOjJtL5S2eZjJV5KVt

SJ0BloDbzlmjP8ZUrXThhXaixw5DOBe9G5hwNhlb0uKedlpbmUtMPeOFdpHmfqZOD+SfJuJzy9yBHgja

QujMer+7FkX51XXemLW/gPmEWWj/q5KnbOio2Pav4B
tj/gAjzl7d5W+iSp5DA7A7SUQN1wRvgaau/B6NneSTCI3p8DR7Y4QrwlTvy7jgPymhmHVyJvIuUT3qHh

QI26AH1axyhcIxiA9S8I4JfLbItaoUe9w5emZ/e3PraYWDUA5oNrgscyK4RpEAkuODaHoNp8oTFvy6Gv

k6yGl8hok5zW5iPY4PTK1HfXPJ7HAB3vpBu+JC6aA8
abcmmhAo1IvGMR+RYZ1EWDCpxeW9JXKIv1VndnqERkTPYid8YjlLjDkOl4xssefkr1HWeh+b2OsiOaRX

RVWBf5b2fqAOpredT8goFodm3ZljdWGFaEoetKWon7qkjrhFDUO9Mac7cRZnaWcu4wz9a1LAc5M5gQuW

yDrzOpRDMl+zZ1opBi4mjHgX0/kS3kvxM7iQXp45ta
SjQt/tIEd50m0gZSasbbCTSnMoFlZEsnONM6D4lEes+gcgfBG8LZ7stNA0YutSlJL9JpTpHj+LS57wtl

G0v582Id+tku2NmzFH2OKdl84lNVKJ6rD5M/FErfrxD+VI3bUm/S03rL+NYiWxn70dyyvvEHGvSzNw5c

9oImt71Ss58FewXAgjgOd+CewXw3vvc1YD5R7TnvW5
3Uofk1iKt7vVqm93+yXrwV2coe5ncvvQ1BL+NtnF7cgE/m+NvZzeMrv3H9fvXpclGF+ee8HQbdlU08oZ

6C0iHq8qgIEhlKTd1byK9T/MeYORtoddKs3wpwYGdT5MgfweaRe9Qh9AtqfYA6t8hr+foToyGLJPrdE3

q8H12Q2lICt9Atj/Wg/uqr7q3zX7aiinT9gc1myzcP
8bkuGp8sL7L6//wgddu9/c5UUlrugh+ACBCkCFWwD4wyneyrlmHakrKu+phZNoIpM1K+plcQ7rfCLFto

nmaR2Bfs0krXEteQLiPBKIxJX65253TLP63v1cYJIOu79pdhUWK+kcOo5AKhVAFOX1mb38KYoQhJbR3g

/RmRrFqei35Zc033/AoeTf0bNGEiZvS0nlnMxozfCf
vJPT1T6Lzs2TOKzAnRovpFYq8Omui5JqR8K+pgniD3s02S5D/xjOi8q18BjICxcmDk0OgLhZbedBrevC

ypaipLOheMjV8oYus/3NPXH30uv4/T14kjOgdy4NRVYd30Xkp5LILVUjQYDzVmNnEQ209G/qWDPGMS34

MItD6Lx94Byhwgtq9/JkuumvYP77AkYFAwiF4VN2vT
fZ33wgwXo7AqIWlTelxPq3NamJfqj3I/uB5vaAy9fgh2yt5/b7mJ9+ELlL/ZqPfb+sAbWYFr/LtITuxZ

D6HufPZ5ckFfKkgZZQ7LOUAtUNCt7UXzl5D2tJHDH9pK5vV/Y9h1CLr/OGMrRdMBT9gpUayS2PGC0zp5

TbMSm2UFUdp6JsLMilQUm6XTxtBCXoR2A4oSMyVBMq
HP9XZPOrRH5ICWPnnrQbGfHhGIPRUiCmRGXcRoAxm3QiY0KwNLPuXOXUDIxvKOPmW00xBPxhOm43AUhx

CJZvLlGyIVp6Um7EGgHcQYUgU57erHMmtPDkCNOwJYWMHtIgYIJvR7BxyXFqKRrcV8DgH6WfYJ7puCMu

PP4owJAzP3AuJ2UiuhjvYRLIZjEmLYHaQBfeJBVxAz
BmYWxzZSA+Gj3CDVB+Iv7GAZ5tj9IxTJb6NJGdk4GyJNnqGMe4S1TtjHSrzjXkXjycgNECTKBzPHEoC8

kgitp5hNAvLYn0Io8LFeAve7JmQQSwIKhNfm2lZ3KczqR+BhT4G8mEAsSbRB0cP0aVFJinQIY3sp25jk

dODsRERpJIL7AJlw++XTzTj32gXCqrQ+N9AfTyiF/s
fbEAxVj+7i54zwHPTlx+AI1TFPusck99Y3hH9ccn0hGgcnJ7l5o1QMbdfCGuj8+fnqXpYDhObtmvp/35

4rfT/kZpDi4x8X0lp6Qec38jhT+l2X+9SGb4dlJJA/df7LpYLndSRK0w55qeGagZHto9qIS0NgMx6H/v

ZZK42DvQP2e/7V2s0+A1qfsazElHpyEBHxrX+9i7uL
2gcNrCGUi6/fes/0243lgeRwTHMJHWbxrLNAsP0dSNMxQe0fKq6ZSogT0tqvpc0Js5HWpsVVniuSgRRX

pZFmoXwpdG+aBRmD9ngTcHL9RVBgflL5oZD+/pPF9Z4h+LkOhnysbJQskl5LS+pd11rnKuBTfcULIbM0

AGUa1zzvDt89FtJg6CALkMDwI6NAZN7jjoqRGR8YdZ
4VtKTE/AVLiii/bF5lTXYZjnKhnCOBYORUdr3hCUgdbWpjCfWsQrBaJPzKrO5BaeFeMeRomQOI3446TT

pMAxATjOtMJFO8vVjwnuS9tAVo1gzT5YOWGlc2dMsNy/1Wq0qA9KXUOESV5WiVqe7PxjgSTwjA/PDmVk

g+IEKoRAkfJQIeZRYWJoOQjwxeKxA6XrBT6QXJjPEQ
xaBO9B7kOXxzt++xrvRZ32wwQq0YIlGIJTIz/0w6cykeJuwPIhQHXqsRE1zLK/OBFZwDV1VoPsmCtreJ

9H9BKegbuQmRoYz+0UVwhOShGvhcb1dT8wJYvvo9b2YJCQefioLyjqj/jDkIfgKMrvG4CZ/8WXkp9LIq

K7AzZy4+52qjfkbANaBZ16ub481NbM56cbzc7PWf1N
xjn8mHL5sKndps5WR6G1WWU1Teyt79IZn6UKsmUXKw7f97txhCYR3ExIYZMvmLQ8UhyZuVy7MNhb8u3+

TIwp4gCeY9p6eycdaTiHJDarqWuE5IDwRhCpOfXtVXHdUGY7vLrS8gbKXds5CIpLp9PF6nsbjz0rvtf1

pWIC5xSb5yttqHSldpCAltLh2PQYXmG9JE7fXCjcJW
3HnoOleaLaDEK7WzMP+JCUd8YN0+PTAbAczMcWstC1tZ2m13Qt5GoLpVv4iy0jr5wwfa0wK7hLrjEPkx

g353d2sIzCLOJuTOtWqX4tdraMDIB7ytH0fZi0ALodUdaky9Jd78eUoM9qFK8TwmNjuySUNroB6KbJ5z

eCO/BvfQyiyohCyuSY6ZKXJn9F0qSUEwIK09qz1Dd+
Ccba5EORXL+L8KS4Yc1VdliBp1ob4OX/JLBqM0aSN9EUgGl3cCtsqwBulk1Fxd6gyDWx6oMoaxrarW/L

iV3Hf7IDHtuPYzBvGakPJqm1WwwHE55JuYPFlFLuOdSDcLW33Eevsn3ScIrFmRTJ7ZmzCwgoaZ00cCz2

CjYLqlQFbkitQaHfkprCJ0d/PIO3MSLXdvpwtUPUJa
iypVQebpKOctYEOkBuWRZPFm8NbGlUIrOrUFdKlw7jq/k9EAW7CSrvzyj5C4qArnXUgjMPy133I9U8iw

7AfekasmOHFft5CHooOYZoWczUjLERPrpI1AKANjcYovf6Efxa9NOqU1M4xnGI9dVup1kVj9nr2+ghe9

bokAXNRZ+itnqLXRaS/FuHF7YBCncx71NSWgrhbKoS
/l8MSL3DGdpDir5CiYPxAha9Zw2m9+gdVcnY+1pqFp24DSWK1hw6YAFahJscz6BAdWRh5uyznbli/0Af

y35VoaQZ97k2IQy2afMpfRznFBecGNzYbqXsp+JhDjbc51Xopg6qcQaBo3lA7PjfYO/T51jhpTo9hxGG

qfvEQnuIXMrS1b4TIgqUUWbyP/UJ0S2MAvjZjxekit
I8S82CesFHMvX4sk0JCDz6qNVAaCI7kwKE1FR99YIlBlQPTOzKezzeSFeOruJwbwfMGPgcQcfwqM6zh7

l6xmysP4AaL0iHLCg3XIEm1wLPhsWej/weRDl21P6g5zCdOo4BP8Y2YLHjrBdFCs48cRvvTP/feDyhGl

RkmMJkzcw6m6JV4q0odbKxEPUxQRKXkxyDNyxrr/Oj
ba6W39EZQi4Ls6eH1ahNdAUz15dgbQNe4ZvJvecR0qwwGy/t9JG5UhsPl3Gt2fL2RO1k6c9mmX2tlV6h

Jh+pTp+HVafo488h6xkBC9lAEBref9FBhepkhAGLW8BeozZEG2jAh3XzxlF4r3lomfkQ7wyN4vzldBF6

v4JWjnb2sU1FNIzttmRoHKe2Y7bJuIQY8SUgEsV42V
V8M8J91Ehh9ZafdOKKBHYzhtTDqXE2t9GtAK/aYeTkYMDiRu5f/1rrSVanzF6YlA0AkenBNE2uWhboSg

aCNLKkaXyEFwKPXPoAEPHW7BT6lG2/vz+x5uWJuuXIL0141d9p785J1d5hGdHVFIY3wTSuoukFPWuWvM

b3WIzBqvTOimtVpXSMak5PWZM5oKvOjBLdSawjsRVJ
IR8Uhf6Ib0wExnh9L2Knrt+jdNpXjCqtaZP6uvwqlEnM1bv+6eWRvn/4b/qR6qPyP/bUKHS7llFok1ZL

O1Ati1Xxo5g1OpItn3FoQV7OHQqeq4I9mkpJv/zAACp+WYAondEMvUCXAjs9xeLGhsrcyJ64F9h6sBTR

4PGpPQGVwnPJ4zJm8qfdqKtmeniqzsDr9qtrRprRvM
RJGMR3qaCv3lBr2ozZT2VK4CWSYtLDpWVHG8ihzfb5DLfq69xYn6+tm9dohRIX4o2LkIwX/UcPfdCbdv

LxgnYS5gAR3CpUxD5NMa3KX/hcI0NIqVFRc5F6uEwz6LEWOK3J49NVQd4qda/3h0a1dal3OHzTi4a3X+

gwT+mMNkjwW2dE84mtZoD8mDFDHQ1w8GMhUhacKJ3z
onS5NW8j53AYJ6dRoTEcqYBTNf77ccj/DqCbZ0U4cKcgcMIw2/zLEgJ69ca7pJGKS2//qOMY+0chane2

9Xp8n/OBmycevg4OcVVfLdHUMern3PZK2Qt1f8bjJw9HzA/rTMmN7JYow8bhCBdtAOGsDC0KdgpXnUrR

0epP01rtV3Tc9PV1xRFesSD8mdhg4iopJN3qwhxeiu
ElgDyVW/7+TqfktzazJ7mb1TY1SyhfBJrAh+AsI9wGfVXGWgXkWJewggJ8R6743zelaWjaat18KyLYnr

einOZwfXJuFY0q6a38dPlKezm0w4abgBqry3f7o/z2pPmSXIgDcDRSQ5ZLceoNrAmuvcsqrrnSki9Co8

fEX4louinswYn1A5o7R6XgECjTPnNyZYP0taXwuF2G
MH0ro5KrJMr3JYMwd6MfBNcyMPl8PVugBRQsP4H4mRJiEPYlSH9KOPGqIG6YRRXxjwWiVbKeLFXOKbHy

TTPlBlHmk1NbP3KfHKZjEDTLDIygAKUnP43wBKcxHz24APfpWVPpYtUaNQj5Fc4VHsYnGSEsM90ogCEh

qMWsTaSzFCOQWmKgGHRdB3ZzwFRgBAejP4SlZ4SzCI
4flEYiCZ4dvHIqQ7SaW3BvwfrvKSCGWyBiRDYoBOcqSLDdCwJoUYagUFK+Gm3VRQY+Sd1QSU0mp6UsWA

j9AHPog2CxPUvrYSg3Y5FvlOGiyuAjHdciiDZGGUEmVHXaG7dmehv5wBBaRGb8Wp3PIhIcc2AlKKIpER

iWnn4gM66dtOJ+L9D/vQbBAgSjoLxXk6mS4Na64vhz
85KND4EMp2C0yokrjpnXtdaeu5dpKfpXMHdNWsWzOaCyNsr5/G7FPYqLPTN/+L21xDmYTIC/+a3cpmFi

KM10M1hEY6U+dHjOD8A+Hh21q+a2Gq/Qzz+SylaoccjfX9J/RleLh3+Ayq9s6gQ3snoCi0SicB4j4sau

/r/+HhiPfa1eU/Un4EjPGBZXVzvb/ZmS/wf/9QqN3m
EQrBve0FMF6gXkKhQ9lWW7lfx6Fh/G77W3JTG/16zgY77EVag63eNqACCW1mU0+RGY1SyKzTDRXuvIL7

TYqT34o5dtcOvNPjWakHjOhJAUkyNeRqmp43aKVo8LPsBYF4ZmScg4uUbfeJZPtZ7JRuzx9usPHusBSV

rsWprUUX0e5R3cr3qJIZK4AGqS6KLAvyMTMBrXdgXa
EwkIhjYaiPnipGoYVWGln5FHxvu9QdiacGLey6OT935CA2qHU1zvDK+GzHTgDHVndCq/yHdvh8W8y3md

uLUfmpWuraxslKfpNaIIE6TDNgZID9qo2X8smp0UmQknnRdMt61b6M+pMBwyAGdX80EYdu0lUd2SI498

eVGMF0PndaVeJKm4iKqQI0eEK9i7vbqwkjVgfJG+dF
6pBPK61WNliUG2wzdSdhvLwK4YA+anLoyRvQgxliPWW0UyC/wz2bSle44KvY+WoapL5mpSYE1zfd9w2h

PL0rj5IDTqOSPI7nKHXgU1lfj6D1Ch91ElIikif4BDqf8BUj0OXfeO9Qam4q+EZpQzTkMyYjwYgBnlZZ

4YRu+bu/rMbDgNXsSdPAWwXtvic6AY/zDDfIPvfpgQ
tvspoBj9WFCIxjAKD+ZUQNYH0lvOyx2V6eaIqSZVl5eVyYxwOBsOgWC5hGL7rMaSyAL3htYc7DwiU4Yl

FSa2f0OjJAhKdBMFo8v4NxaMS5NdTJvMgxQqmThC7LPc46LHMVdwVEdddsAOoAQPQKGOJazaBqdrnLRD

JcO/nccVbBRb4Kw2ZDOZhfoeDriJP9cHt87RQuJjc9
fDnKqiaGDVMW11xYdHi0w1VSdwRIQ2ckjAg4UcZdDzJ4QgDfjGezlprewUcd2pv42AZXJcmrVIJauEEN

0bSzeCfnJBa/beeL9sr1HiVwckcYe6oVoOEcZkE8uY4ATiJoTU6cOTicPFN+eHSPJbxp7VazgOAuzuhf

2mFHZ/MXdchbCCIGnR6v4toJeWrhh99uu8QAagXy6a
Q+9mVrB6vJ6m4np8ZWfYSGJ3n2ol4jaApJLx/La8Lgn3r4b7MSSrI09fnevmn0ESUuEBZAkbxxPTB+tA

+/UbeG5H0SctbTNmrVWoRX2oycr+W+xz2VSKvZJyyZQz9uxbz0eL8cWrARRPXOraJN9oakIvPvhCoPCi

3L4xwyjDHax1a+e6vjq7Nl6ADSMe6Ma2Q2g5A+FCJe
j0Mb6JvYY4oMXgt/OqUyeJBRty9TY181TrCUC+tKOuOA2F3vjE1o6E/ncI0b3T2KeLbkjk60R08D4Z+8

E8aRwgzc0818ON79JTopp75QSxGw1COhTteZE9TSJKl71W92V7HVhQ23EUoNnUAuVAJ43LyGgehqRctD

Q9Ty3TQSsPHySMjtRWeAeqlEjFqYMavoV0TsW6hb1m
AgEapuwzMXUdih9xcifwykdtZpqm8yeJlgiUTtHnbcbaozAKCSp2kkb1gmQumECE7nhdv7OEhC8DaRmk

WR20x4KE32qlW8EdoIlIwV6ZqrdKXYLMhK9wLQ9yvyrU50vviozL6PC0IXCC8vK6B8zlfIO7mMC0y7ei

3Q4/d/QS4MuvB/0Pw3BPK2x3C+6ts0tLbkoeXPi5Oi
9lcM+/6bZCwAgpCRYW/Zqk7UGqBw+5cWtHldp04/jn2WzXy7oc3WK5mLLdhbA5x2TDtjLAi1ek2OdpWk

E5HiYB7/VRdxHldGnMoXYul6w+xP3ECPzPSL93pK3zgIZx+4e6FdhnitqCaMjwlo3p1vAI2WpIDuFt7g

JbeuvKiK9YpNbkQdzF68hMFYZ4o4E97Tff0X9pv27j
UZEn1ZuL3tKlPtMb7P5IHB02L0YNCbGceC4dN10FJ/Xa6cWe3jXPHQVMZavpkQNmNJg6TWZXNbbhBigT

ZXXrK5v0Bxm0P0w48j9agLRbtDrCq23pD2USjgSsZJ1tiJjLE8233I4IhvjAVuLETHcZ1LICOMa/gbxD

kOT8Vm+PAfUSAcFlBvsWgUSIdxpESFLCHJ/I6uYPTZ
N5DT0G9JCDgAz5FjHSjsMqRPr+3ndacGcJdYuiVhT8eCaG+iWk2G8pK0e6OJ6xM7uHsVv3loV1MwN2c5

RRPsRkw699h9Z4KjGkPs66UMoBDTd3Olc1e3osUpMS/SzD1TaKgtDINdkvRrntfy4hKmigLowdXFglCx

ROq/A7oyzoqlVSwrXIjYdsC21oyF2L6UbX6TBdi/Un
3iIEsFtQbv8eChHxyXtnKmkxqTq89ATSWhCoX/qrjG1Qne0M5jrU37RMbivXcsJSh0e4eHhqQcwpKWzg

BewjDWFgD0pi/ra4oIeD3jvqRd1aPpcW8BFDo7fyJJz0jNg5hH+ej0iYBY0QncjzszNk6qP5oM6i9vWq

A6IfEeGT/J8w2hrZZxKwuvyAf7f5PV9iHW8f1haSQK
EUMiZqN1acs0aNoh+h+GAJ89D92ZE1Y9+rXK6yi6NerH9e1eYa3RTIa57C3yI65bKIMWqu5HuFc0kx/h

xAm1/NQUeAMn+UPEtL+Tj0OQ/6+3i7lIMvGO5G6WPs5hIEr2DbMehojkU2p74PEhF+7oLhSR5gfQL1oG

fQ+cKeLRWlaNZnCMU9XwpmzmqfTOJUH+w42oolIS35
e3VwS09oxawpF/S5G/u7+nIELsbU91h2gVskkw3bu/omUwwPRuZotI8pJE50aPcKMBF7J/pNdzAwS/9M

h6XGKrgtt+zDs4IALW79RpN8SfyUQB5RtS72UGX6QVGvrLolXVNXXGAEtAkkPM1IJf6ajKgRpY3cPZOG

m9UWG4O2H4bkx0yOJWmMKhKzZCWYlj2YEOCZaWQGBO
AHOd2dBkjatfA8ZwBudJrExU4Z2dtrzSMgFGcjXc1Us3QBhAgJlv5+O720QhCvX7yaQOmWoKPZVDr0By

DALP0z/KRsFCSVqmrNL8I3TdjSSyeUNEKW3GWYtVuuhOwEjBW7oQmUTWhPYI9l1wJ2TpKR3l4wTJg9P8

BsVyAzjLO+QAoBsEAqMBn+mBJSen27AKSZwq+Qz0fb
nvcKwcOuDFMnKmYYEUUEzVRUBIlhAnCSiFYH6lRUA/JyPCDtpozpWJaWqp1WVB03YLHBScxQXCPBcSfK

wcAs/dprcGCW/j4gn4+3KBgASX3IHMtb3CfJBWFfWEov8jqtZhr1qVv+MmhgXotqSFUSjSXpTkGyXYFk

rzvpWY8aRxNjJUr1SfhwwYd39tkgRRg+PkCsw9YcJD
lEbUsw/wxOLToY6QuAdUW31PmmPqx0oTugBAp+GFKVRGNJCsrtBcK/JEXRgwkBnpmFDB+Q8MlbKczE3i

TanhjSzq4+85YhLkk0d4cnMpVsoWZevHEHLlxGQ3Q/ufbH4l6V0mscpy63PcOIVcYFXsNDHNONER7gsL

d8UPNGq5g6/YFoyS2b77YyJia8v7pw0cNXOJDKQZ9o
fIe2HPBRf3f7/ELriR7j71AlFov3J6p/I3TbHQseu29JX9Dl3G3rA2PvGBhk1SVCuf2cqENhfvoRbZuG

wtMHXCE6lUR5q8T2WuQ2BDuvwecgtZlecxqjVBGb84sDU46DZznTjkavSGoV0d9tKfy/KgQMzgbovQcH

NpPJ3Uq+3palJRS9beH4++0AddlF7BrfbSlkJLPSBI
qk3JHzrG8wn23Opy9Fwa6INRN/D05ZeUvHJmp1NhBIc1ldZLHA3/R3vZxTzyl0Xu2qcQNaXYx84IRGIE

B8wVRtLVu5UihjySfs0xfeDeMT2XBVydjJkAXtBfZUScX9OltT6QUk80D4wXuy6tmAoL9pnxvQn23BfB

JDrCmGFwuDrm67bT2IIFOAirbGF9k3MJ7Y4lPi14w7
K/h9YG7u9U3i/luvdNeiJS1OFS3cq1EhPFJzLWdzvaJgBgtXVfrzJRToNzxMAlOhSPlJArXhPMHsRWig

EN8WCTobYCajTHQcO4TpoxGyaEDqPNNaFj4HOXNsAS8GIJDakKFvQPDfCqRxRWRXUpBrMNWtDOFqsVGE

j3loWaQjHRP5TNUaMrmbXE3NFYNeJS1Zq697KH48sl
R3OFRrDa3QRKBgRV1Hwa08mYZ4BYWpKzRoAATfguQeUXFjunY4LZ5QVaQmSKT2tFMdGggWRU8MPESdyG

AzCR3TDLLdxKLcQY9+DQogID4+IVmhskWdLvgGTpUrRBFjb0PsSDcdIWl5A8BseMMxccIjBkyjgTWRVF

VyHQYtB4uozai3eCFlWSmcLh4CRfHhn7FyLYVzMCnS
he1cbW/bNhD+PmD/gcC+tVWmb3nrhDZI5tbrYuRoH0muipMkSQkMUQg1jwlhqB+lpqsYaLbgFY1n4oP4

AjcfIpanu+W4K1fORLTfO8CoCulqBQ2rhlouZZaLbG+fGJV21rbc4xxRDc+zindG1IioTkKLY052zOWu

9rOe+L3VGV/+0spTD1BOg/Q0HSvii/Is0m14uIC/9V
AZ0fBgr7/Fs/1gfBlyE1hO8lzMT9lHP+bM8uooba3R2tanODllBZdQmXbMt/ud9KcI8O+zv0zmtAN3Zh

ra6kM1xHV7hpNjCKgG9GAbY2V5Wpv0iVLLaBAZXuBumQ7qRw2QRbQ7q+EXX4QwRtcOlJ+bSg4RD3gPpW

QhCuMLJ4BDzz8FG6BgEZgn8YrdslayKZxcioys3MgD
XvVjqXCyAwzjLcaCUjGsGNAwDdHcInIGRKHOnICSV18QEOf5cZLzhdSR5yXuTDjlRdnANwcNS91jIUrv

T+kA2MPXQSLSVWXhAhSFllQoNkcP75e9ONaapGChbNqOpaWAAnWPttWWNHSuwMoAvI1hf5CpDbC6ThQR

0ZfgHohlAZz6YWTh1iZgD13TwUY7AM4HPWyjlQbOIi
we3TXMWw7O562rSa3hEsyCwp9pp2PW/h5TfEV2p6KFJoeyDQW6IPFSRbFdsBGOzI4ODVIFDesucbwW84

+g8TRpZLoNa7eERsW0Y5dGRzWDp0RjiudINUtHBnx1WUFu5TvadAyWsuAT+3w0WFVQy6sz72Pvu9q/rN

YNerffupyh3SYswkHnulyLPAcztxkJXzmEY8TsTXbc
oVFjoSMG+qJUr4ASdAxfpjR/d3pUZojmrw1RJMXXGfZK3WKVEwrGB54t+ERy1ozQ2CjpNkP7EuBVWDPE

BdVYQfVQWdr0XmdxhT/gkHZrKvuNHuA77RPIIl5inI7dAjBubh88ybxdIhHcL7Z9UEMN0IVDLqs+lEKG

6PjBIO61mKcTMqDHfzvz6xw9d9wBVVMbf2/BvU1gye
Osclv2QS6t1wyvLUvtgX0pXuStRtMSZNkFsG2BwSaflj/6qArxLmBhM/tTlf3t+WjcChrw0wJfrRmx9F

moCWZb3Rz8hCdZ1Z1a07rJtVLuisEPKsWQz7MQOS9UqAxh1nJedtYgVDmrlWkQn7QGlsYR1IeNLjHvTT

jVMCKw/vY38suRMX3z06ML2lCityzF/JyuDskMNtS4
P9CV33UGyedZSPLZAOMIW1KH9rB0wqkRJnE2ajJmUXBeUr1VBSyXWwiX0eGeUgs93yAk1JgvgqKQxMSf

TfVk0IIbcTOegEP5IyVGDJT6fdQAzwWcOqwz8CjHUiNxsAY4eYWL8wMW9iEjauFvWOJ44oQLZDDYDa1B

nKSfx26rP4nxn4assumRbj+quy8nZ63AEaDKFVN+bW
7tWyGV6DS6cb6c6cxf45iv9hde2sHc84x5iofPSq7qKq+WXV7ll9nJ6wQlO5kWfnZruCi6j4g7WD87Ht

Q3R1bKxCmg5WcFMclaBQ7gE1ND92m8AquneTzsBvB/mrVMq4impTty8RFcI1oubfshOSW4JDDK78OcI+

IKxhmOX22BctS2yVp/jpNZZkDS1MpfI9fP7AZby4nE
xV3jPR972NEKR++CqQQuu/6N9Sqrej6mOoZRi2HgNPF54Fa/TwUOzSU0AysVYG/wC/oQZZD3iqjDYy0o

t0yZcCrGtbYJ0hiWiZy8b1MTNbBm2z3O3JPbmHBYeRquXzsBsBhEBin+BwlTPqngiuNHIzNd5FR5TpiB

CmlIPUHhtazRFErf176gycnuGGHmGcrl8PIr7nQKeF
l6iqvtvIMSi2OhwwLAL7chbymEAGWqdqff9AKn7gcriFcPChbvkGpt7Oqsb5crd4Y1/q+jz8O3tWzbzQ

tELVFYfPtVfQ/vaaTe2LVNTxKtUoqyIKVjVlJ4EdDyBPb5qcfaWEhITlwMl8CRu4TWxFddFLrKLUY474

VG79IoxhVddbPhkMa7R/yKLK2luUEIHyvmB5JOWaPF
7WgH35t3F+fBlCXohdITRAtEwYaP14IMygT34gju5S6tliosfTisPsm+r1cZs5FJGYpziWb0e2kiev5I

xbJDGPtDOrSoBtCD64hIB7hPTu8tBJAiwQbV9zR6MQmSaMk+3mlXDRcZIf6LWis/AzKO0atBvVWbn4Zz

0fkRrmqy9gwuuROEd41BRr/AkUQ8WcjR90dlIu3T19
Y9wWiQF7sjjfl4muEwHG7OZKwyOcbEySIG5xqzaCgV8HfTXDjNcxlqJdctz6gATIT6YV3GdVffbH5RU9

7GtlC1DMmOybc8bSooy9+TezLY97t62yGnIKwWtfm7Eq1SrfHugkAtKSbYVM1suTIGnVjCw30InwFzXW

PySAd16b/EMmrQqkx6qBhJsWe37CgfakFq5I4QvzKh
yWkVcQVRfSuSAWLcL6Rh7GesC89lZ6GZDMr3lRyBeVgaQWOv+8F5r+ZX5exacciXiTDIp66N3ku3JmJY

h2g/HGNkGVbu2HIUf7/NpSggTuv4jZrn6tZQZXz0ehBDMiWlXr4z80omYLZvmy7CujgVKm1i6R1NoOlt

eeb63VDDVlXELdiiVKSeNMaRl6jVzMN6tj5kFAckbh
9S8+9QGcyrHvjg2ZvHt8tktDS4bHWo36nvlMVlUThaqBDa3lZ28L0jB8PNy3+c8FjaPTN4t+JHUbhkw5

2H8kKSI3X4w75nNFsD9ZPiNBP9I0MV2auEGqDFSDKUyY0zRffVntfqXe70ePWPPolJmIy6eowCpqr+RZ

Yd30J9+Py728li+yaERVbW4CsQhjR6JoAepOS202b7
5HQJQ7Ec+732K/P8QUbsDawht6zsZ0SNt83PmyLs2UIxPJk+GdvrcGzvDLn2L3VNlNEJKqWjXXG8opHf

nK0AZM6zm8PhYZhlLZJcTM3lce8VVFE1QF7HBSLsJD2XiRRcX6RbK9PSZwYeYLUfMZAmBO44DPMoBZSF

RFxvJCHcY3Xxk078efBkwnLwDLJlHl4CKHSgMD3OHW
PfAUMfoNBdIGHiFHVgQvD5KQKfSUuaPJYoE5VudzLczbZjPFFlJNYgRs9FPDHtUY6Hcx25vIR7FGKcMl

ImRXWobvMjAKOfkhV8RK9UHiRcJMG2uMAjQakDQR3UNNYqyHCsNJ4DURWoiPQfQO5+DQogID4+DQplbm

HxJzlKXlIxNGHpy8KwEZdsZSe2T8QwuLJwhmKdRsly
rMHIBRMgXSNkD3dguvp3bOJzEBw6Ro3CDcQlz4WxIUMeXUmZvb3iV56RKrC+r9T/tY2IjbPmblbOgnaT

NqgmEBuktPThYJ/EBsdObYc0/Pqeiy+PGjKIm21lNPuhqHjragu9ss65H0nKyO/+Vnpnp0uRpx+uP4ax

o/zT7aon2Nxf76tbzC2gSTyFdqQdTdsS+gv1/HmztV
gk/PC7JT08i7Auj3t9+5u0+Xp2Vf7HbJc4yNNtKk3m//ahPMjh3EVZ7cB3XMw1BsWKCgAyx8m/y/xlTz

NZALFAsvzW8dWdXPOjfzRtnbvIhtLqO/3yQW691Bp5Ef/2T4IXP254ny+qWVt7BCzr5PqlyNNSEpZ5Kq

/bRtz6sAxaTyqJUgaseL2Z555EwaOSBhn6cRTi6owa
jM7hiLHbqZGuf9hGtcieoydFX3yMtpgSoonzDotE1PsqFlGfynoPd8TxVr864OW8q2GFHr0dY8/Lh56n

5bgdhyU5PLdC9nWyqJ2QeIp552h671RV+1SIDwZuXzOmzNzPg9PZQP7ODViTTPZNLYuOa/ziamqbok/n

JtqvZltOssHgNrbhtB1qkdgRtO4VtgLItl/L2xxsP3
ClMKMMnfBbjyfZEEYJrdcHHNl7VCdIKGj/mWmL2bg59HZwB3FAz0N7iUUB8rNb9QHwg8mApve1O+If0u

0g+AfLpZ+RJ0UoOO8h3+rz7Nhm9DSxfTUT+ZpxMfRiAv/aCkIc2DbBESzF3WzPpTS0cZ8JmfHUJJxBEA

TWgoC3t3ngTCvBwoned5OvbxDD+KZx7vPpK8rzeI3r
cfQiyIH0W98xPobLRjPv0olSPoWXmvVG8UVd8IZ4k5Z6UugpQrA9ZbkA4asV8NXZpUbiYCwFb+SeK3RC

WtxI3CI6p4BU3q5IlDYVapH1RVq4mNlbAyTpgnh9QjLNee9BOZ1XXaJXRCEZm9HAA1oJ5gI2ysptyNtJ

421Ua8DRW0zDasQiLyySQHKtotZRw025deDA91vccN
AQBORPD9NiQGZLaVKg3OIMTM7WdH2t7w2R41TfNcFfx3wSUp8FZg8Gn442E0Hyu3CRlEJlLEOWOcbDmm

03cEjViekaC/aF1NDcX419BNlRiMqeShCQ4UuHtRUd0LQ+ZlsFUeDu9JdKLhPR87Eiagf4S6YS2yL4U8

fyfpThuuZgUHQ/dh8a0/dR18FbtdZr3mpi79YMID8C
15BK5Mw3fJnCHE9V56MllWNASI+dZeVDYoDSptyAhsChoX7l4kqgjn5RYgGBhN5gVl/DiDcw7HdWu4ou

Rh3ffTDGo+wuaA2ifpzhWvQIoWmwjNvpS2yhjY33C5P/vwwnSXochdSF1hWSb0EM7jL0pZeP7u3lFc3o

ucAJCD9YHFVYVYe7eOaEAVN5ePbsIrTD7V1BBfH+2T
cFmbTAUTCtgd/HVItsM9o6FBplE5mk0rVIHIuTVLyKrVn9kBhjEEgbS6SO/aOH2vFieEX2AIj48yibMI

terence/XCQ04gZfS6wIqOc6hbv7EcsBfDkLx9XjRFO0zWsOtAz/FgKUsYOx2Q8UEiqJoRq2zA9FER9ClZqo

17e0PsFXiIuWVc9GYkkBhbCnZSzp3GSmr5EH+FyLJI
raC4oZcRigOIKRmLIdpj/uxtx3YnUeJuWKyHLA1zw6l0HNRSGFC0ZPhMGgjTXrSGPek7kS9s4WAdTXDb

bUoQ+1zyUjNfoNEMa0npVGICZGFDiXjQz8zA8r/6o0fB8pvKJZJgXFVlPaYQSWJwxQhNgSqxOeui6RYM

/E5MDwA6uFJUx2nWIn6TQpQh1K1tAkS2g9NMtKKFkn
XYfUp4B7HLJUg6aE6ZBa0Qt8tCbZ2vlK0SKEbMEiUyXULPacdhiTOxBu+LOVedTR2EOHKZHVcAiixw5y

oKk9eVg98DClA5j0tVj4cwbnawjIMmrkI4T4lEbbbPg7kne3nJ7iG4B3w6+kD51zHnhac2Add89Iir5R

bTSwxt9Lb15zFl42o0JBBLA0mjnlZfmabCpRbEkD52
FFFNkkUOgd/E3j1bOMxQY4gUZFOef2luaFWd52j9DDTjTU2QNUgCGEn+SVZh6efDdCZnF0i+NrWarf0A

whnBXHnPRKWA7HmxMbT1u/v3HT7qvTED68z1ByhUltvH+qfgvSi+v8O4uAcVgmUiLSD3lRIqJswVHH94

6sl1DmLvNx8QVp0ES/Fa2NaPyQo95wHPmwzDJ0YuXK
MX61JMmdVxqC/lQ/hv0SXZHyBm2CrEg57UM6EdDXFR+s2T3yEHggZ+bpSUg+yn2IheVPuLCC1qOYsQY3

w7fYosPAiqCWCl/3DrI4HaGGt/rZqAjzuglxpztYx9dsxi177YKMYH60mqW350xHdirzvtBLkhmYybJ1

RAZzBG9isqETSVXO/WWA4+yYjxzh7vIGwMZvm07PeE
me3siJiE4QdiC9WIoB9qyf2U3Rf+IeefbLBr9oMisEWO1tDGkg2R84sYKTRkXnoMU7PUGKW1lt+3tyOh

c8HGKOaykhHxvisIj3PVogW2wTqfZ1qV+l+D0PnfchBXdTtV9LyFZkG3Lr13V5NRB2m5mCrENXzpyAcS

9qetiASR0+n0H2lwoldyDHX1PjFwWNwarYLwts7WiI
Vg0+sy3EqzfM7kdkWh1zxM/4uSTb3qyEIxmEo8h7nUe/TDBQqtVRhI9SQQXf9vv+ACVg6QbXrfFsvON8

a7hGup+AAI2ArxHuYhiBqSdZtbzFU0Y1alBITuj95egrLLnP3d5a9hV1abt544+37hl0iZlrp5+utfkm

10azhoTVRtINevSoaaL5khdZJgHDgOzkN/K5Ux0OGg
ro3nsN3onNfUkwGBxo+Av4tTMx7S6ysVeSCagNd4/+BxH8RjRQHJ/Q8Fwzx7h5dmZ9bfdmx/DFPO51Q5

e6h0cIparw1RmT3KRmokc5tEaF7bL/0XM5Csep5IVW2zd9FgPTIlHDsloxKdIvaOCtSvSQRvq1TiCLlc

YYh1MZfvVFEgA3D9gIVcLZPaZL9NZISsOY1LLQZuux
SoOyBrXRTEUhMpQHDeGcBva0LmS0ViXQGgDIOVHJftTRMeA70cOClaIo63TCfdWFSkFmTiDKb1Xr8FNu

ErVOUpS30mcLHzwQEaKSDqLYOKKBjwQJGsQ9pwy9SfIMf3WP5BVQ8BveEaq7OazeDyW0rnC9XIQY6THQ

FlO9RWU2EjH9dsVvLtx3QkL4xmYeZib9JjTh1XTnNw
Ti6FYrNgME7mta2KILXrLSCaYwwDUjZgQhM3NDLyHdEfQNI5WTCeIteqKfJ2YAA0CoL9IYTeZVl3DOU2

VeVkRJJqFdTiORLoKjNeGIdpJWe3ROC0CGPdVzDuPdm2TDC6SQA8USPxCEZ0GKV9ZsA2POIxAQE3QRS7

XnS1UTErNLW3EMA3FeD9XJVhLum6TPF0EGR4JKYxNW
liAWG7DEI2WXWdENC4VSOqIHScLaN0CHA8VnW7MwReSpWdYGF8HbS8TBCaJic8FUhrXaPbYdhhGBMaOS

W4LdW1EMAkLTGtYNqyXnM2ZupgXoS6ZIb9OOE9GiAkAdV5TQFzETR0JeLfQzG0BGj6RZE8HsmcJlK9YD

OmTJQDQePfEmy0ZCC2JYCgGzjcLRO2RKZ5UjAsEaFo
LLY8UFF9SzN2DAHdFRW1BAX2NoRsLqrjLEF5ARV5LaPxQgLgCxCfZTUmOLUsEiUzPARiCFI9LhE6VHZv

SET2MME0PdBoGeRoLTTcNPP1WWP4OYFmDHOoFTkxFeA7EMDrEWfmXLZeXAX2YDGlWpR7NQB8DUIaCsCj

YMa5ACi9NMX0VIYmTwMdGXm1KLY8KMSfAgJmZNc7CE
T7ZRHvPtXsNKq8MCF6WTRdScInPFq9QTT8MNUlBdEwMTw9PGT1WQPeMaJtFSw3DBP0FLOwQaQhMFy2ZD

C9DESdSoYrXI5RGXD8UUEwXcBsSYv6XEX8BNNbWxEaMNk9AMY1STWkLaLhYVs3NENvEdfzEjWeFXV2Ev

E0ILTfITT9DRD3DfFnJfIdWKU6FQLuAmQ6TjxfWlml
BFQ0CpS0PQXcZyGuZGbuLzL1CWPyJKFvJXH1WXEpBiIkDkSqSNZqIjUYXgTpUAf9YMQmMiUmIjHqPtUr

NDPpXbAgHeAhZXN4QVftZVE4SfFoSQL4OAIiDTW9VpigQgF0GOP3ElW6GnshPgO7XJN5PxKwYSGoYBah

MsC2BtrnWqL8EIT4YbX4WhgzTan9HHJ5COKtAafrPj
b6NRtwHrE8VpYpKja9DK2NTZT3FiocZid6YAv6OGW7OjgrHGl9SCw0BTK1IlRaOkHzGGjaOkM3XaXfXc

X7HDP9YwU8TCMxIUU7VGI4BtI0JZBbRUV2HUQ5IuB9KBAoNSc3ZFDhHEU4IPLdVHI9TIR6QfW9UGAkZb

v1UBL4SDClMiygDxm2OOV6QpPALrWsMTI7WKT4OnF8
XBCsZJX9EJH1AnH5SSWhQRY3YOEbXNZ2QEWuDZJ0OBV0AnO0MMQoPVBgQQR2DiK0GAKbFAQPKtZpON1h

wx4ATCArBXEoCltFCmGsGMzPGjVoBLFqNNvjQG2Ub243CUDlN1FuqBEyyz2NGLZiQB1Qi434PgIdEN6G

cmpxaH2RLARcVQ7Ky3AmukBeCLA4V6CpzYagsLfohG
T3BLOwPNWnM9StiRVrVxZoLWkfOYLyV5IwOGdaSTKpXGwwOWPsO3NcpnRHEi12BYkiNL2oJUVwXMVoHT

J7KSIwSZgcKUQsT4g3WCzeN8OrH4rgLYFoA7LhhEFkQS5NJTR+Fh3JYO5fw1TwWJbhScPdYZ3udd8LZL

D5QW4LWSSvTE3FnZInO4RfrfFwC0UchYleCE6ElwRm
WSlmIC3CCEAySe8byM8UybtfxT3SayHrISfeSr9BkL3GmtTwDT0cj1VxlixPMaMyFDOaVmciq1YYqOOh

AWRrS3yle9JWlOVlKNC6RL1VGNFpBU8NtFA1vXNgHRHdAKZBWJazLRYhY6BrkmETQBUjifojnG5gYCI0

RXYcIt7VTUA+Kt5ZSJ4hs2JdQItnAcVmXT2bta4LNB
NnAQr6CFP9DHXeIbs9RHQrLhK2YpZsKJH7EUB8HfO7WJttNqEvOXDnGAZpScLfShUsRPU1VGC2YBZqJe

l7HMHyRuByQvddGyk4DUC7RiI0LCIqJJB3SET1FnH9ATHlNDJ6RKH5GoY6ICXlUIK8NBU7PtBvWpUvPx

WdAWK0ALDUWiCfCPa2DEP8KXC2PLFcTCg5GFbnGgQ9
UeLsAoPuFPgxSkG0JhyhEoJnPDa5BHF9RxIbSra2XOC1JrV6FkQeDfCtYXnjHaG1ObHzCys7HDE1SuH2

TiauOiRqKMQ2MqI0UTUoMhQoZIY1XnA1WKNxVfH7LLV2GuK4DTHxNPptXORzInAdTxdtAwOeLPC6HEK5

IZAxAkLuWYU5JdN5ILHlFBW0GKFqEFY9MZZpNeAzLC
SaGCJ1ZEJdFml7TGU3GOJ0ANOvSlq6TUy7KHI1YNQcVvNwJSGnJPY9YYIuMja8KYL9XpDrMyZgUoPhDA

K6WvY2SrfaNTC5ED8ZDNA3YLLnOLLkSPJ3EIQkRLEuRak3PJA0DIX2CZUpGsDmWNc5AKM4JTZoUwWyER

a5TQZ2AILlVlXfFZf1RMC6HTGgNpKeGCm8QQP2CVDd
DyAjAHo0XLU4FTKnQtBsLRl6RPC6RGEdSsVuRWg7ETJ1WPLaEzXzOHt4TTHMHeFpYdZnUNu3MNJ3PFMf

WxOxALo5CNS5BWWaLmPmTQb7MOJ5SIZcHal6WUMfJdC7AKCfPYR5YRX4ZtY4WCVuDpjfHHZ4NxKlPfPw

JcD0DOC0XTJ1ZARbCOi0GHWbVjG5OzrhLYZfTRBqIW
T0APnsOxGyLDYwQnOzRjLtXHsuDWD6EgV3DKWyPhKlRQKwHdBaZgAmEnH2TBC9LxK1HvNnQXN2ZDcwXC

H1WDEfQyJiYYaqNoI6FuXfIsOmVZbaOhJ4YlSiAPIqSXJ7YzLgGvG9SVZ8LcP3UplvJbT2IPQ9KXDgOr

mwUcl2AHA3TMA7JkHdLcLtFTs1BXJZJfJeDox4MWs2
MGR6WxmsAjk6BMM1DET0WrozJcSxENxoWzN8SjDeJzFbAHJ9VqW1GcdkSpJgTKR7HyK8TLPeXVR8ELN1

NlV5OZKmPOO9UQe8YWB6QTXmTCX2TZM5MsH6WRNyGNC7HZA7WOPyIvblKde1AFB7DNT8DNWaVKzmTIFk

LSL8QEYvAfIoUEBrIHL4LJQiOaAhVJQ6MXT4YXGfYq
JlVIDgGPZ7DYAnTtQxXDB6KzT4PVJqMXP2AP3sIDdseoJnKoiTQlG5MCIuo6AiCTgtWDl6QDqhANQgS4

T9jWWtLc8utYMfx2BunCG4k7IAGaBtKALeMp4poK8ixFOvCQOuVEooQo5oIP3RQKGzNI6As9NpdkKrMM

F1L5RkuXuhxXntrPI5JGOkCXVbN7WelAVkCoXfEOnt
NDCmZ2WgZZbyYCJmAOkyUOSqP7KggyLJEz48TMpaRW9yIAWaROQqMDN7DNBrLEqyJTFyI6t2DNfyB6Ih

D0spHTSkD8SowUNrXC7GATB+Wl3HRY2bk7XhTYpqUDMmLC4knh4QNWZ6ET5OBEQkZY1HiYCrQ2DrmbCm

X4TnsRvjKD0BcvTfZAuhND6PKZNpRa1juF8LbskreT
fLw2vsA7QkE72oiI5iJ7nemxVfj1yGqiJzFNpmTi1DBICoFU7CsZBzkNIhDORbSkNpVCUaeTRkUSXqYb

P6XScuKFFsV0uuLRDgekRpGmNwSJOXPsKlYDQoKp6emDRsd4JjtML6d9NkZKjzXXFHNGxlAP1+DQplbm

PwYyyAOnSvONBqn6ZiEAlhFCv2G1WwoWEubfEcXcja
sNTTWWIlCEIkC5uitei9yDTvERJhTdVzIUFbJ2UrTZEySAT7Ie2+MIpcUKF0vxZwtF4ZDVVqjKt6QDDG

8wz6L3pNuvYVCRQk2RRsPEJKGYAdqPczC8HKWNIUWNIVTeS1qFHtIahnJ5KBiwAYyYIBEhYhOHZFOtvC

8KkPgxAcEr3B8qXZ3G5aeEeveUmmEoE/8z///3zdee
+p7RKPyoFmHbW2ZYDWyfSK8E9T+mYmVE6fYpvLNlcCD1pQ8MKtWnR9awRujC5jMW92bQL0gRcX5l/1mn

n+O44t+wz+1hLaxVzTM2n54mkQ1QOem3WueodzvR95Mc+Nupqo+Y3g/1J21wuW0/7+7i8MGyvNwwaiIi

Ozlk/rXlD64mdKk39iRzNcCIUk2R/K+U+fe/nsO9/c
b97LtLE3g00dA+qUl4g/m10gly6Vw78+2btN7hv3WF2b/9856yzjJ6fApsPKY+44nodJA45qSixqCtUT

cH6sWwi3P+pGUk4s/bjlx+tjyQmv/2B9HRyH+Qz1p/1lGMoeiQlzESsx703mtCE0IdJOIVsMoOy0nN4e

E5QwTDuuIC8cRRL7KfZqtYU6uN8QH+tk6f5pbYWHbW
S60AU4lFvTWp0TE0T2eQLOomeF4DHLFkGf80xLyhaUn8c39HW0X6XS6wpsFBD8Rf0Av0D/ItViakJtaR

AFxtf7Pjh60wSr+jTtUX1eDX4Vg9KxRXpfpk8tVXAY3a7aOexMLyYvsvmyWkneod+q68uUSglWp+hDcX

fUMxREKZXgfJCuprXaJbqwLrd+hASpdC1k0GkEHRB7
WJQwz2uFXNW8AjuWWd+0FiptzUWCp7D7cpHxenaNUHUEXlNO1dB4UDnRkSratxRm+NbS7+os0pz1KG5w

utQS0fiK8I5c2OEQz1YoqaNjKZgAYP55gY8BxEE/TS/X3iNqvltyqZTEyvN8pqizvQgApCcm3fTW+Rfx

jWgZ917sY4g+bC8ECV68fA7Kc/SRSBKZYpQYLzvKOX
QqgiN2WXE3fRqqHtw5LGR/UGWF8xGoUIE9Qg+rlnAk3y4kvpPA6cr+rtSnJ74dkd1MLA7t4nSwpMw2Ko

3eS5ou4Q/Q3/JRqTkbQbRyEX7m10Ru3O9DyHlZxbzAZEX1nBForAhodLERyUPbleefwWcGml/Ts/Et1C

o4MgMY4bpkz8hlfe/X/wGdvgkMFcNm2A1pujJ55Tq7
2Q46RO/g+kR0SZjJEFGK6r9BDEN5Nc+PSoALNFmOC7V9nGH7+Cw5Dl1J42hRSh2JswARaop9+Zmd9jVk

xb38PJ5Q31X2N/aY0gyjb5VoIjP+Lqa6U4Qd4r5S7lkhEw2XX5ObySclZ3AtwQUE0Rm9JCndO1l+4yXv

az/99lSnUx/BMw4lcqD7VVCywN2kWp4WvBmmSrRKnD
q8pgU3h0BO47W/BO7Xb4ycjfpqddWcZbkT9fjzYHP6w2idGmBlDo79uQxpUclbivZGkuZpLdpIoSlW01

vlNHmVvFPeLbfJt+ZRlafbaBNt7SvrndF3lNFCk5tmo52BNzft7e+7p7EslL/jysHl0nr61gU2VA4iGa

mfr6/Tj+rAmZpNu9fvTjL664jy4ya/69jcErUV3Nmb
czYqt8h0799uXZiyQwhUqs7JApLGfhtVkg8Fo9wnykY9PC+iP0+rCr8VPO+CL5EviXQAHahCG0e+sp8Y

PVv1zXE6V/Ih+T8ok64GZkXAIxfgno4cH22SPN43Q03o31U2PTQVF3eXLnU3bnsfJxZqO3d5MitgU6rv

8tyyt3ZoP7ci2bCuzbmTMv5ZIx/6bF7QZNNZ2NU4Oe
dD7LFUaTjn9sWaJfqWU3Gp3SRjAvb5Y7TLhcWAo1zlg2GvtdB/RF9hHC+j/xJl+uV0O/NLG8Bwqcmffc

QpeueFyJq67Tr8UhMW37txP+Dt+po8CmDv4V0gYh0hcIo+Q/VyvJ4MI2pRKcbH2feuxV0FCSiVBPNhep

U8bVUAAPjL16+Ox8eI9zhARuAvooZEfeuB0ploHkq6
aJaD425mrQZjLUUsjZoNE96jwmDOv+5q5TpeHAcYymjCfPPZoI2pK8shpsEbeQbSNf75fdeSwN8lIwxc

cPL6X6RQFefd6QwgV4WiaRrlPMljzkZ49EPv8cPc0QeXr6p8i2zndy/MNiwikDQZdxfaOmcD47ujPPV0

A4U276QBZ72G2jR42s9DwGxtOidLH/TJw0ZGI3L07K
YwbWc85xQ5qN2VsXz8qYYR5WXvGYBkYN4+6jFoiicDbRwSb+F8u5Elbzw0T8vJVfO3yMfcOPoyfa/9s1

y/Sl+k/5F7wBJS1lxcX5bz9eOE4WsXXsAOKtSRcX44Gibf9Es+66ytYfbVSHUJSFevnWc58qiyvi0+YK

6H9DySwnRmaEdfferZcF9+EYbRB2DpzA0AfGCGa2Ya
KmWA3BMhMf5JVRI2PlMQdIDvDs0ugEUuR0ss+6DNlyhZrGICItOXj6IKSIiTWFcurWjMUcHGiDbObvFF

4HWMFFTcSBmvOpCVpVY1h/rC8Q4bpkQa23oukaL6wl7bfXeeet7ADoVWE7tuA2Yp26trnf/OPj0sfxJ1

bC0kz2JSyMG5WsjxIJ97ccxTzgU9Ib0V/v4urUWIF2
/HElqIhubRRTC0rf8Gcf5sstwjIIoFVGD+i79airx7lbmTTL87mJZQX/1om2Yj5YyxhqnJutxOMRuteQ

C0B2j2WT+R9Rh1zzmDhZ9wJSGcNYxuGDgFtUYEqq/i+5W5gQImmhlANVnFRc8VtxGIBW/0A47s4t68Cu

GK2TLLKf/HM1a+p3h6I39X+Z9v65i9Wpaec8a0qmeh
QAeKuoEI6lnkTOyTkBKAbyOXra30zovly5JGc87O4tTB1pW9mGyity1BYHW/xp3ZvtQVy+UjL1zqbTHV

tPCvx7EwD9tSkVTPiifNMqmafB1IqiPhoRfaAWVE3PkZ+OV+4DXgLeoGBIHRgFe+AoCctjUnwsLm47GY

MAtppuDpcGXmYMOscm2e+ul9BjU0eS3W36dYdoi4iM
MaeESLuWMVqfTz6pPJd50V95LIZPO1PZh+HBQPgrQmLQYpL5PU2zXbFBesNfs0P/thW/kxPV9i2SO1dC

nWmm1pau86qN6i4H9cepzTc/wPpctg+da94XMl/zk63nJL+l3i3RWN+kY3gQOI1TezuYvh4EKu8uJX+s

OKfTEnyjB9iVEawzqgxtrc10aAGBVwDRQvRC0iw0v8
PlH0IhcI6/RaM1ZX1h9sKr7LP9hMKR08+Ar4nfongrNmeEmxKbiNetPj4cEX6I3EZmmpY9VvwpjnJvoO

rgH4oGYFXnFz9ozjAwQVFyaxJM7aTdfWSsdsRFUI2BKoQ/FH8ywWpeZ0MOvBNXbaKbJzGf7P2yiCygsY

ZBQ3ZBzH3PhhWsxeF9EN04KlPdxFvV6GBhCns3n+WX
ZwvMJf6J41m893BCXXHZiVvsMct50WLYvMavWtGU1gMIEER4lpEmbm9srUDqVsWKvR5DmdX1iykhWA3x

sXbH1LKTA34uN6ZTVDfirb0PNwG5HsE3ReC3QZvMdvqMu5ZtFey33mYN4eAlzWl+ZtViuabP++yT+/b0

CQc8Pa6dTVQc5iM9r4B56BFF2coB68m58U0yK/qI2O
ruw9KDtOM+8WMtupx7M1JL+gFLkZNKsmZTySxdSkd/gfDX215DzdYckApV+p3Ml6K2a4tlx8j9LSSw2N

UGe43yyorvGwezmrW0K7jptUwI8+vaknbkfbpq2I5CCPeU7iKNRj3NvObsZjwHQQLv6ZdLJCxdROr9e1

CQ7H2YxstzmmPAF4h/gYejip928h0fK7108bOGIZ6v
oXDFvjiLHCbSMDMl2wKGjog6iGt0jkWdXWujplT7Hm53U4DIossTG8ev0n5ptdaC/Mn4f0s0Gn5eXLZz

JR8AAXM9QnxPwvj7Lc4UUZa0TdfwvqwWgWP715QqlBkC5HSdqqOuRx/vW2eHIcDM9xKvaSDNtLNZXUiJ

oA0i7fmAHHS4N9AtzEfuPKF2iGcEuIJtIpRz6zcpE9
ycZbraayjDceFCPSp1Sjt8l3ERgpbJCFPv0oLSSp6WXiatOiv/KszD8Jjl8BZ6yeg6IFSLmZQkFlJRFr

c5kiXqW8n9Z/hTeERSSPdes0wV+McWGd2XwUn/UFbed3SStamUJPnSy37Lf/Kv98qZ4JghqKP/OR39vG

8eca86RdJjjqWQOnDVEhMm02kEcTzkhng55kj8sx6n
8o5yqnpxyDV9AKA18fWz02fQhUb9Ck7Tp0+6nMKc4ohPAMNZDAjmYWUbZ8B6DEqoCZyp5lySXYyqtcNJ

xvobX7L8oB6+FYp8OzOEdhsmwmQiTVgwfxh7FzkHihhalRQD20oMF30dYeEqWUh/z+5wgdDJXXvvmsi6

1nFeojZpsfEL08YQLQ66/uN+wu0WP5qx7le8ac0Y4r
hz5KsONg0l/BVl6pWokv2+DRF/I4v6xr8HSuFNoucL6lgR1aoli3Jvb0W87zcHD74rZ2gFuNx6MSRnZ3

+Omwzy9sb+RDhrzR8Iuu/zU1JiBbc4Ps8CVn6/DJAMgCiYOEg9ZjjCWDrlpKBmQASHzOoW9TGNaxzxLn

FsjXnTzM7H26hwlP1HEZYtiJLHvi2w8ztO5LzPVj5q
1CqSV6cl1ZtS1FgEtgE1B8tlPH3Jc0VQWhTgTQmMXtKePKxbsNMeFTCvT0lnh1qhx05gqWOKVWlaA9tO

6Rw/xcAXVSg6s8+912aW2Axlb35EOQiIGcOUTCUfuAZW7BTWutdRzyWTB8I+dmzbVgEfFQONisYgo2TB

WnJKEwI39eR/AfMi4vBsX21Q4VTLEwzcB/lMMc9ain
eemBKVdFrdD9fJGI8ISUGZ+G0faetHUfim9V4R8qNH3SohwB5YeFAveuxaJtUjgOPWvtR/a7/kc6b4y5

pv+IU/1pINyxP+ZCrZwso0RsxUbIK0AIzc5MV6OSXwDd46ECn0P4KJCvNnvQuNyzWLoTKl9mF/NTN+jT

+L4mLN52YuAWED0HpHktYp0fneHuniNJzXt++77yGf
uoR4B1Yjasmh83cpUhPL0Zq5kmDOtFec7mh3nYMel38ubYD/2MYatkuAg2ZD25xjEzs3ShojvCaA5lAR

Huypr6KcnDxxcx18GAoYyXR0uo6EA9SrCENEYnnNhCnhs+JjA2ZX69p4HWav4EiWIGZTjPITIJ8V/eHZ

rGbtvewHGbzWeyI36aiagiCC2h83OxJfrIg7E+CW00
+9Bs+R1Agj8EWv7UA5mSqOakIxGfTIfHiG8QOHp/UjlnKP30ve3oIMrYMHZE0B8Ms36C+9bRptrA9Isd

W4eMXqTlDwvTTgWD40FU8YPEuUhYnbisgDkA2qV4VZeV6unuNUIc0SBRSgNbSuItN/sEaB5HQQcOiYEW

yV6cKfchO9Ze4Qy7/lF2bNBYzFppwu7dUI9i35ljHA
yKN7h2SCELlfnHBiUGMEyJaqwN2QqWDcjjHXaUXf/WeU31XaNHZaKS7otpncthThDp9wpwDLwNpcMa80

gt+8XBLlBGOj3GGN9+ga4Rz37fdzosV445K25EYgsF7wXc9i8lY8aV/xDdQlR0TNHRWhvV0zsX3pG/VX

0cKgDIJU6958fwLq2k2N6iTlQPdKYG2JVOKNFRQIED
d1BgP5pKyf8V+Lo+5E3UpghPfHuJiLchf+L8qH49dFj5cOIfIboNraQ4wmEXP+XOJgnpiakP7IsQCuEa

gdZAV+BTYAMwC+qHSONyVXxuAG15G/QH0fmiq6MsAF+yErKz9iB8P7tU5WcHvSywsUuiEIGSakCbdqNt

7tO7MjEUi9mewGQ+82ZIj3pefz90kk/uU/UUY0//kg
azDGoMom+9fNrrIPmokYqL6UTPc6JG9GpWXfuksBCiT1USPpWQ/sTr6w9URUTOGSU41PNqpkbyysaHH4

op0RlOZgP4YxYc0CIzBcrMR5X1UabnreFFGZ2BeWD0DA021aEnsPzV2B+MkWkYunYterQI91jP3ZwOFc

FyKxLadj93v5EiUlbbYrJ8NUezOwO+i8WS4ALqcTGE
435t+L8C+ZaxGTpzs/NQhtwq4L6134kwaUv1VL3HCbmFuUO2cFWCzt1cIwzfcnml+f7P7hqA0vmYgLvo

ssV8OIH2tyIWy9aQpBz1tmuxp942DLHz8xP9tCBAW+Sm0PAGVy+eIRbTm9pNsgnRCV2ydi+70Z8ejNG4

chMvmEWvhj2EG4zvqGl3l84DqX3VvSLR57e3G3HKQ1
uc/jqzvn/bg8gFc5B3P1Y/kUfZHewY6caEddRX8Of6rQEi6e69c35fZtx1siQq0wcBV0IJon+Xw5DRQJ

9idMcBmV4zKPgk/NgZ+0BXiZ2OcvEkT9+m56iDIlv28diS+0+SGR9RwdfNH9kMN9LLuJuAbSsbjJpwmv

TMZ9ox7COQeP08vt8A4+h4JV+CkLAqXm2j3jRFkpai
nTGI+oTcs/ID7JooHkW7HNRVQpbeICwHopIeovpB8sGgC8L5AB54F66zCrk5qRj3fQ6xazwXvNAUit0Z

VDdItSLITFL5xP2Afu7QAxjDhydn+Y8OUh5378cy1n2SbjcM7hBrqBII0dMui+ogSI+sscJfJ6WBV896

ppIHfXYkeDi/t4MxHGB23oNVsqzMDpJT27JkO+Rpxt
FVVW59VQ5APOAIH+mrWoC1yKTfsYOVDvExOOPAaIaRa1Ie3/uuZawpStUYs3LyRER/dqYVyBTsVwF6Dl

HKja11T8PFeXjmkz40G9z46XUmF5lxYiu+Ks6CJmg6wQ1NfsIOAucIVeBxaZ3jFmGvceYlEL63QmbzD7

wia8uHFG2NPThzoSZH0ig2NwfXhb7MObgHpMeal6B0
4AB0UFuAs1NPA6qkBe0vbw31xs2Aj85uFZmG90VJUzqO4sA02C7Q6EueabJ43WlOAC94BTcwVUtiFRh0

NnroV2B3LyjN0zsV/XotrWC/2pn1rLC3/zbYlhx+iEZzuHwD/tU0Ue+S9XmvTm9tZZqO8WiqKnO+HW2N

xWlq1DlluI3n3XdQzMROwsCFO5co/gHzIPhUGdusoQ
g1FVOU7fRiRUPSRaYqxVkUS6cNFcvcJT7dxGfDnaEu0za23CgBkil5+6k/0zv4ZM8coTMqQdyBvDnwAp

83ga6RR67mtXI1LUhQ0+y0rB0IT40vFKgxDrul1P8Kuxx+5F601O5pEyAeR4kT4wHFqX5RKpDXUnC33C

bvEy4j59zb5XBSjU8v+J/XIKCKa8zyrZz+1LRIJW8D
/DVaRiR3eRpPkNtpcO8vAeg3eIxLwai/pFpe7Zq366z8ifVTdt20aMawp6D1U5okZukjA+B5VYdp0yzP

/7Js/erAwr5Bnlo+/0bxdh+o8hFw742B4pLhqvEatb1Q1CgVj3fix072NrpaPR2FejkiyCXrFZ0ws2iU

dfmzG86Fc0nC7eaH1EEGhB2CLoWl2+5pELdyCeY5yF
Ywn7aLIVUD4fsLDM1s/dl+iaO8G9f7aLCQ72DTVrJ+E0HWKDEz2J4rSZNZMbBlf9aYpC/gLTwItHg2bJ

dXY4eTm+3jtktk+0C+5gyKLpIaZegXvYU2fNzXk5twaC8Rw1ZXkBgjyft25+X5/xMwdvYDLwMv/Z8uSx

D4VdNMdK/DDhkIO/Tc4TCHlJHFWrmIajNPd9WHiyJ+
bKMH8yXrgCTyE3TJ8sDDTd36Uv5uOV0KqzC5WMge9GHRNVypvRS3+RXa6U++CpWjI5SJY/2GCgMRmL1V

YD4/+LfW76RQt/1BoAbC5CJ9/tRk+K8BdsH/WfmDNKPe8tfpO4mDjP6KMw7bGlrbQ05K/+R79BjUR6hz

p6iOkF7t7tL2TRCT6Qbo961V2Yi1qtc+DdGwPYMIat
xI2cfor+5Ett72xu7N73F7K1835REzNRzWiytCkm3H9yWbZb2d3m/NaiR5WL4vgcEe991vP2F5Jovj2v

CiiHwBcF9BhbriHicjNf/AXmSYKMgfHHF4ADvlG8A6FgW/o4Mo8GZX/TitLI6FbPpfVntkrm9Xf0+CHo

C/Pmu17ojs0llbwSbSa6h/8v4p98y/ME8UciL4BtpY
d6SgoBEX9Ro7a7VC2U1yi+XnmaPD5+l/1+/H4i9iMdRSbpR8vwCw7tQo4OVZ/A3Zub/WH2l3/Gp/hF3i

+tUcYipj77HceAGVxaoMwEs62EXB/sxp29+GHFRk2955o397zbfK1TVDSp8jJJwTKuR9mSvYdps+hd6f

UOb7GDLRlbrKfMfIXGh2OcXPcWK4o65C4B0W/lj9gO
ItdlDWUH+L5Irjbsk1QUZb3LUyDB9eXp4CQPJznJ02Kr0wZeKWyYiL90j7SRcy/HJDEGX1PReSOlSP1T

jJNfKg7KAcDy4kpEv1Bs30WbPl+n3M6T0+74I9llCJcdvX35AB3Zsu0dN522O3Wz8u8LJJm4X1M2eFTu

Ml7eYMO724Sp9/60pOcs5+OMr7JztZMOiHt8mlFFzk
8QrHZK48bc+ax6EFgOpvGxov15B7u6onK2Kehag5eGA70A1dE7+hUYTvow4AEtR+i/jyMpwVydpm6pix

6BcbezW10tgez0mge0lgsJya86CI7kN0DLYJtM4jN9uSkt8J8U8cPKepJ++Y5nUJ4pk2pYsKiLvLvJEa

OO/JbEimzcTpVj72Go3X9yjQLcz6bCEoaSC8bJR01n
a0xaJctl1gtOFUL2B/50fu00+iMdpCrPvcPdnHQN+iS/ZkuCIHnyF3YlTlKi6NVuZcY4tmrG1++7yH96

Cah45E6zz4/sv249PaX0IAiiHHd3+9Z8xA74R4InUDkQytP+y9R3U+x6rWeFH6UzmQCTvtlmVOTyiyc4

9ynhDUJceavc1F+6iEHEUVZa8oYCtKl44i0YGLpZ7z
ERQpjCqDgF3ubkOU2Xb3bhbloQNDJgV9AWHU2HmlFrQT7LczhgeE+kpA8J0mJ5064o9BYWWSHPhZCshx

kffmuhJfGPBq/Zt3IxtL5nuhz9Jxl46g22fL8zT7lS/1WIsgnT4GzO3gnLG7RbSTe0ZMkMjNLRO+dVB8

CQpLnSG/GXz7bKeSs6a/g0Ix8lhexI9ODwF7k7WMiK
DwjoGM5MJNhXTJYccp0mVtuP4tG8Pu3LBIlBK89dpTTSsknv3vCswDVrZENvSiqPZ5SSTdBmATlWesaL

4BY+yX4FLXaMmoEcLpGRFv6ZQiAMQhEUBsaxIsh1bgVj7vjWN+3LXQQQs6RsIT+fINRALhgZ+RryASCC

/3NSOyp73qXUSr3J4SlwFUYQiIMEzjn3NBcPkVq6Rz
Qf/ejDropf4WJo+r+f8hYfHTHy+fHL7/Mervat+qxMXLKif7Q7m7oktZt9F/KnCghO75rZh0tKa4VznawmU

OJVtWUvqLvoyj89kA1Rhy70dju5WY1BpTWokPo+xy1Nls+6vJPyH9qdno5zkM0PCQbpBrj55VUdjNewL

oXbjUCroeiOFo1K4qM8sB+jTAO+QmajFdj08Aw9aGm
/LeZ3WC/PtgaM4i8KEf6VXtqcy0faCaBSwJ80fB18E+jNA2mb9tHJo62+ouEreJd9mrTQkOYfr7gF/bU

UN9bnYhCIfPvaDB5jCE8NP32DagS0Jniu09MYVxY8ZnoaPLc3xn2rq9EcSHSHmOxqnXI66QSMTt8oj+y

ILLZ91U4V5Wv8Rai0QVKcV0D7HU/jvxLz+JU2ppaBy
woXue0tGoo7r8SsbzUVd7uL4ues6c8mfVbHAcNMPXLM0bmH0VhP4Z/u3z9FQ7LSIjr/OcHDCP8cx+rN7

D7VA+GP2/cljx1VSGzNyECLwsD6tW36ckp59WJdHKrnQ0ewYG49RzS76KvtWUCuIsP9mMAlrnkUqArIH

m9/iKKoHsVmjNF03I/fd+UkQbbp5G1MDfrz9R1+ins
lEvEm0ZN9wF5Gz8qAjtjxURICo1ekP1VB5Rr0es9PYiCmn9r1l4nq7h2OWD0zkr3WpG3Xaf/t1c8yJmG

HvKeyBfRpPGXyOp/UVKcyfynlY75U+3Bqtau92kGJNWdcCtHMPfKfLcIMnQd4ljxNUxmF1oyklJyhO7t

na+LA0qAHfw83eSEiheXp6gGcG7ygw5uX9T9xGhJnq
G9I3DOhvhtMBu1a1gOkn4+aOwlXuR8UPA1pOhs8EvT/92eF/Umgt6aIsoe2fD6gYjwzHB98nf3/uuf/k

N0h/8D5pp5M+U3WOM/0Itr9kaqlBwpvvferiQPmRHTxQzaZ0uo6/6PxLJoJ0hCG/d36Ipa9GGus/5Xz3

OO8ZtaZQbHkVhVxp2tuqmx3/O3eSJq2F1oY4SLth5J
sy8St8lbzuOgh1Z0vO5rJ8q9u/vFBBLiC0rpxu56PKAV/mjjvC8E/h+r30vIaXl+X4NYtPhAsR6mzyPF

h2/hna8lC2lPnHHtgkRQf0av2mnzmdZjtN7CJG5tG+VySsfyic+35phxhxvoV5MDh82ddW4t/PAHVAHs

xn4ztNGH6EkwvzQvto7o1ol6KGXThw9Lk39zIt8dg8
n2k4tC96Ql5/bjv+u+1bo1nya1W2GP8aSOvr6O4hdRGurQA+HEJbixiLrF3ObJhqkM0Pfxo3KQUCtduz

kjYN/Qfbwi1La+ssskPGj7HU66po34fsNQlBao9C+H6ZVCh8J/36wep9m8Aogzr9v9y9yN8OweSs5pxG

k4Ydf1xW4ENbQ4yGrtNmKe/lprkxVX7Km34pwRrJ0t
yQoec4jtxjIR++ln9CsB480IW2xgYerKOCGx3jXD+B7dsiB14w4xGn1v7pNksfXUkcemNsmu8PWe837q

bbsVfbqDtmh5+Kn3Y09w/yFdqrCv9eQyf24Ae5Awz2KluO+fYgdGn8cEgyNlV1yrGplT6Mr24KFVuhge

/hA1AbTVAvo7WNYeOb79fwvRpA3cnhdnoJcwDzv6TS
u+v7Dmp/aRb0+HvkU3d/LHtu3IESsqmMdzEWMRVgWp1xHHcCJS/AGtNTmqXH3CoAR95wsVledtRf1+HX

Ye/EI/ldCN6rXyTvDBgvAIEHpNjmU8BPmlM/tJcjsfIjTL1bIUdRJietpZkvm5+dMvfLBeeI0zcg8ziu

iDg2vCxw31CZV/qrCzujJUtl9bHjhH+ApVng4UamGa
C/mvZ5hq4ocT4n/EdbdI5lZk9JTAIQ5tj+p/83UFlYEl6E9SOpS4YMD0cqKpApavr66J+CbuxO5CYrmj

kR6ZeyEhwocGY49G35tJMll7fM6tyUsQY0WhJFy0X6+M0lvk+n1xHzou44o72fnLJUi6aHMwTUxj4imN

UxLej3T0UzImK/P90VVt6157W4j+0qvrijNf5dndwG
D9U4553q0+wswWTo7lU6vqyA/CEhyFbacLpeN39DRA3FrZeq144F4b514ujZEyw62vShCU0CLfLnw5pz

OY1ivPmw64LR60urMJjD4gPf85rArQ/s07rb7nqxpUdNWq6VfFfiesz+J8qdOwv5n8X3WN20M09xChxy

P91cBhyu94hde4T1BE7djhwhBr1r9WBEzi/UtteWa4
5aswjdJCsZ5gf1qayC2pt7p17+Iz8dA3Vkj+hLNsm11ig0l/C7U6PwjufshYfrAuf9KXxB6py7/2HO/q

363UT9Pu0FVMS6lnWqmjP4bjfWadA3Zaxb5D7qIX+oxCW8bSZei4V+FYJjqZFCsQWgT5ygO4WjuOx1j7

YWY+8MDsjiynB2Vo81rX0n/1l8rkCIHCwr+aatg+QH
8EAeZfENp0FwrpefuPpr3hJcc4317tORhmcLKjp3QGo0klrmYug/g8tXLlTfv12E8KtLCjCSKhd9O8vM

6pzDjxUYBZun6ahYtj/21q+9hIpoh8G/UeuVIYhnG+S0ne/bc4p0LvcKW/jnoi5M2birty7l1b64u4rk

kE47jpAhztctuWomLg2UA1LUv5nugkcdJ5nSBr5xzi
/eVr/7n9uz6nhNoR++ou0DSuzd4F5eYBt5lkjY+oPVxzeHuo5lVKtINTO3XJjY0HrToNKtSg6OqaD+Br

mi77Xziip/K5K6Mk0urbtfL4FvDU0Fmw5ADu8qlQwesU2OfRZg7c5cPu+ugKF+O+ViE9+rUXeh+gHdnT

qMbX0SzgZyNl7VnHYSNEkq8USP7Pg9hSC18cC+SL8J
bzp7cT70Di4I/SamSEtEPZz+fVUWh+nPoMjx2ofA6ynOiqUmuUS+XXSGr0VfXzIxUoWZMTd3hYA94eo2

ezyb51alU8yLaQgUPbrj8zjqsF7VwirEDzVylPx5C3MUG85h4h9XCfR7T/BiHsuE/wStFl/Fbs9X8UEh

uEf5M31f/bhhc7OEsam8JBdZ/RALvj/JWCv3oj5A0G
m20CLfKQtPj+Hsj28L3eaoE3L5S+Nng8v8s+2XFkzLSQZhRfTR0pmrV4v6N+p16J1VqfurJkIAdrJwZH

9odmvF8UD4YzBU5mZ/TV1R/rgwz4XzxBpLjDNAOkR+mvWd7aPxZ+GPhlsN0ySPSVvjnmf6qK2fzFmVdl

iOn9qbQntCprVFMf9t0A7H9pon96idW4jR3PcVL6bZ
0m1/1FabZr4/HGqM54h2W0pTerm60SP9VohWyioG59j9y7qLNiCQmCcLvq4qwDe2ZGX4uTkOVT6T01y+

iW3uxzlPqclPTjqiMMAQHoacOrUFm0ZWrL9IClE8cEsaS45AVPJLC76O01yYqbvz/mw98qTd3PZXqkwJ

IAcx0CQ13sE/KutL0ZyTmOmo30op9scYA6Jd6EH44x
/ZAeq8CwwpiFUKD0NP94zrJc2pXdtb447T/7N5BAgUYMJWVuGJljbBN7sPkhxLfrMr8RxMZ2Se46V+rK

NYPzUEzGOPu/gdmmtYXBiMCK3u4Lo62fKKpGpHzn87swMs3JOD3Qb6iCWrcFnHb8U26Ih87w4dbuTwDL

dADUuNAqid5XXaX57t3LXdHqrp8vjtRDca0jnNBxlf
U9fQCpr7xgSiCGRKz14JAQ2HtArPP2Ei3C5PM7/vrsv5/FK2zMCevWMu74p5bsV4wr7IPCCrT/oq6zP9

wHJIKlVvnnjfj2BLaIw4dzO+92YwV7NCN5AKSnsKZde3O+/9/8L59yjUnfO0EE+5fV8ndhhetMiQHnsX

mXcl7qwD06isOOHB6YLupcG4dj/CJa3NTBD+W3+HZY
t1EJTqLWvPxOiDQh13AcjC6ae/hf01R+2VNle/ZLi9ve4i0tXNB6nOr2cDTDYWsgM+bfxsY11Okb9u6r

7+fhkMqfX2sFbxYG6sao3xw/k3zDPM/qZf8RubEl6hI61YY6oWQ1w8L635BzblDu0di6tCXAtDkSrjZ5

HLWlgq5I2PmeGoGSHy+6zXN9TkfBX1AoqnbrIxvxII
IeiDPi9lasy4acesgj6p1Bpjz1J+VPjIkDnsR1xy4M/wDn4Dd+0rHFXCNnbi2NXuc/KO4KLdsIpQmtmN

GgX6A+mh0ttjWZqNWmp4YQA6r6IZqRXj1rfs3bjD5tHTm0m+57V9+G+P+LdDzpq/k/j5KZ8WAlMCHXFS

C3Nz/KWqpg160O0miJzfU7mdyOhURz4kXtszjetsRP
Bd8rOHS1h7up9m/OBulSyxTQaTcoALxbJ7DNaoPH6ek/WKC2nGMW3SqW/1VFukveco6StyarTrZVwGbv

IN20Jae6zRmH+Lfmv6ZZC/lndjNsPLpo5VGsnjDw0+gve16bPgoq7N3ulrox5CZsEAzn3h61QbCugp+n

6bMglvGyLzyUrJ68B8JAKBtXEPTyhKecI7NXPPfHVY
tFL2Wb8vjCSoentVWZlG/sAUBEwJCygN+RGUfyZPyF+r9DqgydbGXI3OB5voDke9Krs/P/Hi8aftC/CH

DuQG/AOa1tmiiKzxIvo7og+Cx1Yt26vxInQNNCAKj77Tyw1/QXy6uuA0q7nm4kdewC62w+pmQK179V8o

mPg1RKmZ1Qq1GhkeEi3MEOVDleX1IrneEiXCutWKbs
7lFIAVHhM8rZlcUlSCK6kogtKDE4b8bD8SwTkYz9ES/7eoA4NXrnxgCDpYENzF/wx+E5bAO935X8MH4u

z9fMQlrGxkqGom4tC2bGSWyXgEzwWKnd3/chIqe1ulrvqOtnerm0H0RQZ13Q0xAvzLhG5EAT5dok+SEu

OItAEmf6JwT6VgCKQU2M+gCcnFJcUhsRhF+vlN+K3s
97P/F5hh1Mi/PKrVFFtkpijZyyrvVeOZ76UmnnQZC8juxJQcrfsIORmOQDoLvIoEZysaEUWsz2jkVRXw

xfnCzSD8cWX9JsuowbuQhm7XRdSk31LNtaEfRGv9bDmCBDRPPSLny+jQCUNBbWRqywugHOIFl8RSacPZ

QXXfpImVKCVKzEOcLp3o3VWkHDcvw/mK2OOPT/31zJ
RdMzlKLxadfMB3ML5YW7hpNjOttHw53f9zzCXlTdMCxy+zS+grqcPlDDqRZ8Upk6Ky6onunUKQ/amp3M

D16SqtZrsLwWVSxk+sRmhuwIVaVgmLmeEWCicxaexeRm0Y+aEiWpFffbkTGvKyi5m2S6vtg+H22L6Djy

XPRE+o4gsZJdKfQek883iAvbkcTBgOw90oRK/nuELN
m6n5JPq6FPFrEk5/XRAglDTyYHRs8j4Qjuga4cVIt3Mlgs0eob6tPl1UbjYDe9ClNqdNv7GhsNYFuHLy

jDP9/o6bkmZo1ovLIpxhioTibEqe3GNr0BYyPne7IU9pksNl+nNCVISRmYa/TqaIl7AVosaWWrdsOuT/

I6yharTB8UmH4COz4wIoDkNsMEI+yC8hJZe1ucluzW
bter4Q6PJacAgv0uvKef4q3mt5C5Znj5IY3lgi9MTzxMWhSYXDqtGNcrJBbrP0HvBgg31ysyXC5cHgo5

Q0A3hrWa/5dlwUMTlZ1XAlmotGAnQCpiF/1r0iUvCM0EYC1k+iIuWSiP7DiXi77r7LsPvEKqGVXYOKw7

IBBqfXIM4rVmygt7H9lFuL9Goz7v2CiKr7s2hnX6qm
bUaMN7nrExJAY7QTUDCwZR0S3h6vHPuy81DbVBuEYdcSrGbuhlWJ55QEWOs9qoCKFR8XH6xkyzx2DiI7

1XKO2rNvOTqM6MCJ/00C1L4YSCgVeOpecAzHnIj01OeDj2Ik5JqQlk3qf7qN0DNJXabU6bZ+rd5sgwFY

AUGAe1UrP6h8fYsyNIE8lbEA6RjYj7LWBaBYEFbQfv
yeyq/4X/wvzoPa/4ivso5C/lFfPO8IGE5fq8OsQOXmMNjrbiLcGsvCNlGwRBUsy7EvRWuuNFc4K1GcvN

XpzyUoAbgzdYYTTNIlMYQfE1mhopt2dWTtSXI+Iy7QUFObgFDxSG7SLptJtTNARpElJHtetz2hZ8CX6o

luzWJFXsBXO9S+COxIn7KcKesayE1lia09IGdmXsYg
vc80QKqY8jjChulR9NkkaCAGv97xMThPLeZGSbezg8Ow5AcJxv0G10e7kt0u0h0wjWWklmKapEsGpOib

zhRBr1bX7u498jO9J+tHMXD27XKhRw90Za/ISaRmSRnfjwb66U3Dx7AUeE7S/UwOgaeO/Md3JId0JVZM

6XDVbxashOItWLlgNOq/yQrABNRVf1Lcbtyb+OU5YJ
0istAXQ3r/pLT6ugNqcnFv95CgSveYXNrgfh1wP6so48qQ5Ot+v2U/ouoXTmGkNBQ7elUcoP2SSM3rz8

QcQHywEiNtRV2vsi9GKRrHSwOmS2V3pEWgAp3nyYUpc4NezAB9h1CFIrEyQ5EhzkRTXD3jA9DYMBLBDb

iUhvqpbN9TRDHhERFqWG06THquLQ4IIKEQUJxyyGMz
XTD1L9Cpf5BrlqJfKLYuEp8TVJQdAiuxX4LrUeHEMrQiR4VoqoQCHk83FZjfCL9wIJFdYEUyHWT9QNFk

HJblVM5CrSTigPHZytybVNHqR0B0TI4ZMVVHSvMkC4ShidUIfAsnXwYnDJFmOBVXRy1+DQplbmRvYmoN

KbTuWDRxn9SyHSn1AZ1ISSKgNTpoOV9Pu589Z0I9Yk
I5dEOjS1yAWv5thNN9sLAcA0Mjc8EEf322B6REHPAEQojWyzevhJ9OLGFTk1iIXR3bdN5WVGBziXz4rA

1UFRImN4iAA9ymkMOiMI8otyK8SH2YOZyym3PkqMOkDVHoSvZbN13yUZRcpH0rGXaUBUZkgIb6cNthJ6

S8fLFfJE3svrAmAG9+TN7CFVScQd2reHZfp1UryWP9
s1MtNdIoWMQVVOvfRG8UMsThIJLiE8nhHVGfHhYiSUFgXtBqIhL0PZ9dGCa0TTzbAIImZNZeVHi+Pg0K

OT6id2AyPMktIZIxTM4brh3IAQsYVbTmH9M4pOPsOa0meM7XvOA6bTIlB2F2vECoW5Dal8PPm837Y0BH

UGYIUfeQkyzfxP0QtkTnGWjqGp2CECUmvPe8qU8YNQ
zzEQ6HXMNhYD2hFM19Xg4unEVlIsQzLHNzAs5QTmZrL1TiOL8dG53oJAFjMJFwHQAEJk6+DQplbmRvYm

dZIuT8PSEzs4BiOPbjNF6HUP2vm9CgJFxiPIRzc0RdSBc3JM0RRWHdKRQqV1WopKIvS3TNFw9KNCq7W0

obIShcRf8AsYZhJTGxDIktXW7Xq250SUj5LE7NFQRe
OdQeEWUAQvUhMACnEtHsHRqrUDNXFVwgLFCpB7PrRLJ7QTClIg8+UZufSZ6QA1ZkDBF4SXo6MK7+DQog

AD4NbSYBX6DewGHjOAwgC0JOJT7HTOI9SG3AtCCeBB9KoPFFO7EsaWVjKp3vOUJsq2UhDo0wP9YGWYZY

JTMzRCkpSBuuOBAjMHe1H8H1WHZfJ7MLI881nVOkxP
v9Kp2kF5TACHnMVvBbDGrbUEobKCNtEVr6Q7Z8BWAjD0KJK6WnHvTymaTyF7J+OuHsWIUDFX4CYBFRZF

w2X4N9xZVwK6P8uEhBtBS6JI9VPK3HlEYnsHUau20+GoUPFaQzU6BBW0WAVT9NAGt4V6K8uHHxY0O0cC

yHsRO6VI8PPJ1YdEmvkQXkDc6tSTfpJIV+Ng0TTm8T
FlBiNG4bxt5YQmOjIZDaOmsSEgq5V5rjnkc1pPGhAqB6W7K7AiG5wKGuXW1LV7H7fXZqGVG7ROZrfZQ+

Mv0Ih6ApSQPiBPi6O2vpUWHvULAaHhThyM38Z++0hwcybTT1U6g5KLQHsYDtdVnWufToA0vNPSA1c7X6

ZCc/Dx6XPZK1pQh1wWGgYNZwMXe6yW9lmTg7DySnPQ
02QDEwDHwiuZ2dXka4P9Blh3BdWe2tVi8egBOaSe1BFoTnRZI8dpUcAtQJZsN5jChtiawfIVC7N9n8nA

X1En79a0ssjbTxp0ZdJzN7WNlzTUAyAkQpbmYqIOP2pjIiyY7gsnZgCw9AFQPiQVsftbBaFlNVEk0UWd

WzRT09RjblcK8ziIZ+DQogICAgICAgICAgICAgICAg
ICAgICAgICAgICAgICAgICAgICAgICAgICAgICAgICAgICAgICAgICAgICAgICAgICAgICAgICAgICAg

ICAgICAgICAgICAgICAgICAgICAgICAgDQogICAgICAgICAgICAgICAgICAgICAgICAgICAgICAgICAg

ICAgICAgICAgICAgICAgICAgICAgICAgICAgICAgIC
AgICAgICAgICAgICAgICAgICAgICAgICAgICAgICAgICAgDQogICAgICAgICAgICAgICAgICAgICAgIC

AgICAgICAgICAgICAgICAgICAgICAgICAgICAgICAgICAgICAgICAgICAgICAgICAgICAgICAgICAgIC

AgICAgICAgICAgICAgICAgDQogICAgICAgICAgICAg
ICAgICAgICAgICAgICAgICAgICAgICAgICAgICAgICAgICAgICAgICAgICAgICAgICAgICAgICAgICAg

ICAgICAgICAgICAgICAgICAgICAgICAgICAgDQogICAgICAgICAgICAgICAgICAgICAgICAgICAgICAg

ICAgICAgICAgICAgICAgICAgICAgICAgICAgICAgIC
AgICAgICAgICAgICAgICAgICAgICAgICAgICAgICAgICAgICAgDQogICAgICAgICAgICAgICAgICAgIC

AgICAgICAgICAgICAgICAgICAgICAgICAgICAgICAgICAgICAgICAgICAgICAgICAgICAgICAgICAgIC

AgICAgICAgICAgICAgICAgICAgDQogICAgICAgICAg
ICAgICAgICAgICAgICAgICAgICAgICAgICAgICAgICAgICAgICAgICAgICAgICAgICAgICAgICAgICAg

ICAgICAgICAgICAgICAgICAgICAgICAgICAgICAgDQogICAgICAgICAgICAgICAgICAgICAgICAgICAg

ICAgICAgICAgICAgICAgICAgICAgICAgICAgICAgIC
AgICAgICAgICAgICAgICAgICAgICAgICAgICAgICAgICAgICAgICAgDQogICAgICAgICAgICAgICAgIC

AgICAgICAgICAgICAgICAgICAgICAgICAgICAgICAgICAgICAgICAgICAgICAgICAgICAgICAgICAgIC

AgICAgICAgICAgICAgICAgICAgICAgDQogICAgICAg
ICAgICAgICAgICAgICAgICAgICAgICAgICAgICAgICAgICAgICAgICAgICAgICAgICAgICAgICAgICAg

EKEkLRQnJIZmWPHfPILeAHZeNFAaXGMqRSTlAJViUCDyUTx0A1ikWXNvMJBlRZ8pKDe8Lj3+DQoNCmVu

FTB3nwUeeQ6VMB8yx1UvFGylQUSqg9PbEXi3AD6VVT
DuZTehVQ4OFXtqro9MGEQpPNRouWDLi9olAzYwGWW6DWWaDlinKJ7SLVNdK1xqqgFqFITjSWTWESajQM

LHZQqyLPHOJVFzXGBrIqGyAgHfXYWfFH0QQFUwD724cnPyEH7NPa2JGzGlWB1ead8IQtesKDDmTgpDNl

i8QMwsIT0EkWZriXWeMIIrTBQERbIpU3rio9NxTori
BOJFMCjgLG8Xl4YwcQTkGDb+Ln9JNO3xn4VtZBvqFOSsDT1qyu0NLExSDfTcC4GiuUgjBZDer1jvCDGg

UO5jgSVqJBB3HNb8Z1VaCUKqhD9vbGM0HVXHAFZyrREkHf1eME1pBLLaINEkKrY5THUERQ5HLXYgNLNr

pZAcRKMaWZSPAL6VGZouOLV6OKCpzgGknWNxMYlmPF
9QYXJlbnQgMjcgMCBSDQo+Dy5GSV3te6QgFHjmLPCzSL2byl1ZHBlFZpKlD4Z6zHDkA5G2JBobJu8VIU

AmXMEtGrUhCFYBYDoeTY9XGW8qhmQ1NB5BpRMmBMFvHTWtyOAdBNd0R56rwNIvZUinEA9MFJK+Nino+Pg

7OIYKcJXOrEKZiBsCvXCHOPtBkK0OrJ3NAz5DxC7Hw
SS58rOhddyYsQCteJA1QKG7bMHZzLNNQEF6YsZNfrE4kgvUpRhGmSNXVCqIhZ53krEQlCYMtPIP1NDZv

Ao8VANFxJ4LsnnWweTwglvSbIVTvEOHGGD5AUYdhycFcmTOfdMxzTH32xQqaLS0JSm4CDlUgWY2qcw5U

eUIlZw1LTNOmQM0HWCHbGJMmUWYrHMR6HHXrLzKaTC
ioZQBiUQQjZKJ4JZKdYNJyKZ8AJdFaRPEzThavEbGhPMOiUVIrnj1NCMHlWQBpHLS0CxGyQBGvWGMuRE

ahAVWmCIRgTMD2LCUjKXSbQE7WKwSoCPXiURLuHYUoFUMgBCYydm4COVTcAHQtZzEbTiUmEHOuECQlJV

qsLPXnTJA6VzO4GVInYIPkHL3AHhPkNCUwTJB8Ebsu
FIFhSMYjxj4KQJDgHNZnYRCdLtKlFOOvYONxEKpiWEQuQQU9WbAlQJOoGLNeFF6ACxUwPFDoKDV4KNIl

LUTiNIJrwn2VWQWiHNFkFXu6CLNcOAQmKDEaBXquVHSuGDP7HMR1CSMhMSZwHW1FTqGwDNFbJLJuEGOj

KFHdTYTojn4ERYLtWSAgUMRoDsOjPDPaHEErTMyuNU
BnNNL4HKYbNAJkMFMaJQ4JWrJrHIGbRPG7FBFeXFUhERJxnk3HBPPqRPAuOxB2TfWhEQVnJRCbKAksWM

AdRUT5ShO1YZMoHAVnZK0ZXtGzKCExIYJ7DXZsXSHjFJGkrf5IXBXsKXKxNZJ4YgQzVMElFBCrQPufHF

XxXJL1Xrt5OWZaONAkQI7HQmQeUFXfPqu7WJGbVKSr
ZVRatz3MJBLzEOTjXHA5LuZfHWCaXLJrSHhfVDKlXSW2FqRlDNYkSLMwSV9COvOkVMMuFvt1JQIpVDZl

RQSmau2RKNTmVFBnPPW6ZqJyURHnZICoKCthQLPlIXQoAoR4BZJrEJZqXG9XHdQgFCFrImWwZRxnRAKw

IYLfzi7KbEBadUtjmr3RSMyQLd9MjXyhBDPiUZecFv
0zbWVjDIOtFNTIVf4KktNtGQYxMWVHYTdtPZVkWOW6OYLpVpbgZOarWrMbNaVhZBnkGeRpH3RwNgjoBY

BaBaL4WYvgRGMeLZFdY5TdHIUeWjW4SSGcGSH3C5ZbC0SmUKJ+FI5kEMh+Mu7Nh0DfyvR2qrYjVKakGW

HfEm7WLVFUT9EGMi==









                    ID                  Date                Data Source

 

                    [id number]             2020 03:39:00 PM [facility] ([facility])









          Name      Value     Range     Interpretation Code Description Data Gregoria

rce(s) Supporting 

Document(s)

 

           Hemoglobin A1c/Hemoglobin.total in Blood 8.1                         

                [facility] ([facility])                          

 

           Glucose [Mass/volume] in Serum or Plasma 205                         

                [facility] ([facility])                          









                    ID                  Date                Data Source

 

                    [id number]           10/26/2020 09:03:44 AM EDT [facility]









          Name      Value     Range     Interpretation Code Description Data Gregoria

rce(s) Supporting 

Document(s)

 

          Progress Note                                         [facility] 

TKFIZl1pNbPSHbEo26/ICYceQCVui7MkDKtyRWh9CXteZTLeM6ZhDVC1vE3dXST6RInIInWaQpQpEVV8

lbm
AsUvpHSfEyJCBcMbdJOlNiQEvwFhbgaAEfNB2CcSK0EIUtJ53nRBGcLIUbV7YaSEJ3LJI+Be3DBGYxaI

VyGH2ABdkL9NervcnX3o9P/8TAUIll2Wri5SWiAEJkUH1S7Y8Nj9JQqliQA4XVsgL7CH44/folOaSoc0

PhcDEP05BfDTfHeFl9KheJyCOb60/ljCg993w5g/7T
DySbLNgf/8CaZUqulzV/okVgg+Sa+2ZD/gyK8527ZhBju99kqLq5t5/eM+057EXAPTaeHlw+ZpPZPEoP

7iukaGcgYpgpqr7oC0pm2Wq45D60/z0b/0I0AgV1lpZU5wunqsg2nC+RLxHNsEjTRRoQBY6SDml5o5c0

2gXVz9tZZnmUP306foWSZrUm8tcL+gALdVQlf9wz97
DyqYVzlyzCPnMiEr3P5wSo4KPMz6zQ6C7SE0ePlAYF9O9HL9tgRUFiJEGtri4sT8j+UArBUW7cmUKsKP

sdK0Wn2XeqZOQDedOg1ZvfU3HWpGYr3osaLJ+9k5oOacKzVUpDz0hb38wZ72eyUPY87dGmEkZvwgxVh7

Y2iUPITfHmywJsvTotiXqL29Jek+a9jaGMQztV+EpY
rqMcdvKQMcmuw/PAcj0FIjZQqHCTpr4u7Y4y80rseAFKSyQcmPrYM8svs5QxYcAAFN5dTsFwfGqF+0IL

74WntMhTgdfJIrKZBFTIKjEiZJfJbTzvi+3aKDZiikuAiAbd6EmRsvSzYt6JbBjg0jrM53ISgcekhXlC

meKWQnVXCuD8LyvJKKxAE6+p9mHE0uLY8pSckWIysF
eNweRsKoX9t4TrnZQFLdahUxbBD1v7C2XCfzG0GIgheFglxvB+2tZakeZv1ZzILqiORLEnIew5jv4tcw

R4yf2WfUPLi0D5rCYxb58jko0OnO3HWJet3Q24IkaIUWE/CZ4RM1emJu63bnorUaohGb8OeP1RpKeN64

NYCOY9v2Tz1SyqBC8J93KHwKwzYU9Da18uO4vNgpY6
PBBAccJwDSRCSFI44qC5uqXO0EjuBo/xTzkx5AJsFe+ZgawTKsPwduLrTVclRNFFf6dNQlFXEhLu/Rmu

DhbqczQCwHsIikO6JcqPn+oqqWdXfM6ZeQAGeDsHeer2gM0gjsdfCUlTeRCY5AVXT+Ae4RDHz7eLlPfS

U9U93bIzRESkdAtswl2nlI7h8mmTTUKpOXNmoK8AmF
GaRsM84jFeb1H1FOKQ/deiCro7OKvGCXVOtxoKaf8cbjZTiKbWFNPn/P0vnN/Kv/4Lft9WAd3liYqXy6

2/HyG8vVKutE3l+JDt0muLNv0G478kZBpcCC/WhpqQ7hlLJ92IjB4ZqoVbfDGvpruF+xr9q+UkeVxmUc

gEOrMsh98YTATZQJbCwwlTzeA6h6VM73liR0q8vLb2
0y6win0XL0z1xtlhaJgY7mA+3YCyK5AjFXGLGQh69HEw2j4yj6lfQoUCJtq1/gTpQ2j+J37xxaSKd2Gn

APPoH4cxrQ1EEDsG92NqbZBsYvQzoDo1y6JQs93NN+6g5DjoetnG2dayeIMBl+xlsniYh/Frfh2sSFg/

0CDr6mGkUMK1CcJCj7uo/vUmOFTVddf4OlS7bntclR
6MlUE9plhuAQV33EpnsGvowdOJmeKXKrUFP4dzN1DhMaRV+uY/mqwLeH2ofX1Jjv7KQ+40Jk0z9UVUYt

TMseU5g8hZjOc+thh10rblgPhJVWXfac+8Wtfalh3irV+v3/T1+bi1oLolEqHotTRvvDAZyBCpBRjR8j

zLL+zs/DOxrGG69nMDGiRd7W2kyvt50PYrFt86Uri9
dcvCsmwwoy3APTIyYBBH7DzCEdmNcPti1ErpeDosUzZ6MGNIvC2LHqzqkHkn6f3QfYeV9zx4I7dhSFDz

wq5uzxbvG95+DVgy3m84MKIRpLCT3moieYxofFFtHprDVQECR2hlXF7/vZ1iZZ5P53fTwF5O1s7tl4mR

RBwiwVJaSe+EWNq7p+2mW/e8/NYTCckTmZJigXguqM
sGECFLxqPP1gYC/cxrJHB1KSGUZ4uHoEVHPJ2TcGxXL2UGjXvlxqlviPRB8m5rjIjjxZJaMBrUSRL3S5

jCpBcU9fblnlew/6IFHqazEj6Uvpkp+CYFlcpvEz0tej6wSkPfQoxDixT34HS6ZcMx5CqAWCwq7YuP3U

4py21iHOLB+xc8oBxpiETYJE9TpGCVEQkmHPVAIjBe
jBFeoYnDv8BqPXEMcL+cBkRPYq1qFkZ8W1gb7gGt10NMdgQNcIAupwHqjpRhNYt5eMyyANoGjwxsJllv

SJ/hy1MwbCEDXvSwyrSXJ7h6iWkalrOioNogommN1OZKNu0dmRDQEI0pNDBvSV6PW1Arhd39fxQfaTn2

jDQgMQp8sXJebsZk81c9gT4VZnoMadXlhXSQ1LbS2t
+xSjWVLaYaS2Q1vqzNxmSTuqK9Ive9l3XX4/xIfRtTgctrroQnh+lB2ZwqCNi7fmY0d81TGT/uHL65h3

QLWvfHCdEOxAz0O4aOMIlJA2U3fsWqwQgFjW9dp/2+99DCha+uNbbj2DbuBtJ+K5xaOMpV8PjsZq3Ciq

5nIApfQH1D0hdSQ+qkbomnj5soOVefZiaY+aHoZrcP
1A95D61Ub/EK5S6XYc+RorPgBHi53dlXCiJtSkb6iaqZKZUCwwMu36btYRxh50vvY+//ifaAifbvuTny

S8RGtYzpP1T/XVVsh5JLwxZlvBN7e+46yUZ85wpb8PF9IFhENiOUJjtoOl1ZhCv6v9gmzzEHtO43PCnr

tM8mievd9na8Vl6R2WTDRsoQ5kUx/8zrD6v0Vb1aae
FQZ0dyiH1axRik/LuEneCZZHxV43DPpOc+u0oWvdkmRLliGNJw9eY4Hc3yr/CXXJ33JvYuxPU3SQG+9H

U+yMSFft31U5AjaBfF/AwVVYkBS/kafP1g/IlBWvTLe0vIHJFXhoGC5Ga4HN6TcpYJib7u3mtEUyPZzZ

rfQt16+lFhsAycm6KVPWS2YNRiKgCbY5n9sPUTWgBb
Ckc9hvvjEZN8ZUrSQ+k0NDqw0j9oeuVh4hS+O8FD3t9i0ONT6N5GwHKOsM0ROuJZ5lzBXUPrSzrweh1w

wQRIH9XJMABVHel4O2ubogZcWOAA4z1AyyKXpO1TLV82I/mJBnf9MpJbXvvS+3GxPsZnY+lbzUGr+vv1

g5iDAY/Ngf7dD/CsRQuOvRuar6eDnIdEhaWnbZBgmX
76jv9F6j9NXlDbwv1+kDpidjTCVsclh1xcfDxGdtETWhF1PqYmdG8AWkrFxClCT4nqpeWhCE9ziFwXtf

QbqPCjb+owNgOdLG4pV5e2cmw89gpSm5yFRO/DYGn3A1XhIlHqVCGy5zSdjlR2MIp99GMUX3+QBU543I

LfA4O/DlLCm3AvgAMAN4uBxJrLijjTVqHKlVjqSrRD
ye/kSx9BMvtbkiFPdu2ZT/VqqZZ+5uUdoRSt1cU9sLWMHXxVDDiryXC0z3h75EIRhOzGG8bmUT8sob2A

eUEG1TfRKmupnBpjoyoFgE9/3Ixlbae1vI/58mp40lioCmV/ZZLiwfbLXFWVTzX5oHmHUoGvrIrY4QBA

ALgEkuOUr8DJbxJc0qeb06E/fwW+Z6/EDQplbmRzdH
UnXK0EIrCtTT1gez5BYTQxXK8qtt5QAVY8TU7VZXGkAW0BcGRjB8KsB0QHHqVfDOBkOFRuBE76SNZsRK

LRIJyaMURsI3Svt576ruSvcqRaCCTdFt5HDOUwRQ6BNIPqIIKqbBDmMOOqPLKzLlR0UTKpWIhiTMTmX4

HbarQwmgWqLSOxLYTLABxcPJPwT1vfd6VjVKn1YE8C
DN1PitSxa2JqioIwK0bpI5BXDR8XUWXmE4ZKB9SaS2llHnLep5YnI2vdJrDwo5OiLr5VWcUkXe2ZNtIs

KL8ful5ABqCoKZ1cym9GKTH1MV1XdEr7UDYwJ2WxRMXzBGOwn9RbBZ6DNI3abNxpOdL9JJ2+DQogIHN0

qfXndV7SEUYrQItj0zMZrz+Z/V71zSaWn8qHQYCAwA
sm62xcub3h7eU7iZHu5sWQY8uptrx//RBNAaOV8LivLlpxcm7AzIN1P68aHWYJoAn5uvfTSLdOdl+rfs

qYkdGM/X2fAHQQx5ny0djXsgG94NKMIh7Wp6Y4vNryhxjF9Fx2Ua+dDWHTq70RPz+Sk6vh/WQ+TCeL+c

nN+Mary Grace/9fbZHibLF+9Iq/C4aVlHyxUTiJkU1lOrI0K
hCoIDyvELiF35l24thREvrzIWLs8RlldpRWmSfoq6U1gQe5OL6YKKf5bZN0IBVYYUt9/+IEM/kXOB+RD

P6dmHVCShYvdCDDVkTPYN9YAD8JBqsSUWppHVq5Gw4NAmPhWczPjQ9IJRXsmxQRXRzZG29vB45bV7hrt

drFvaJQ8VOWMw1xYkGGoj3LDRIIEI7duFr2Vm1YYPv
JWAnB2Sugd+viEizYYIQ7e+0/PijxfpdFzbEaFntTBK2TQ3K2LDSSPxQOQEBkH8At2QGXqCQbQprvBpA

mRnVCaQhq7EkP4KxtprCY29Pcne5e584qoBySsiXM3DIpIyt/s+/HPsQbmSei5oepJIXA0ow7f57GKU1

zFXwYDsBk1hhJjAT3b4H7N7ZC9qiGlJTRBt4w2Pbah
g8FqQQmVK4NyOQWzJLOaBnadOOMU29g2CoveozilXtNkufc12Yf2AnV44zzjujjQBFs5WnaFtQqSNLsp

mNM6zgZ7CuD+QVMEIl5xnJWlZIuggbOW8gatr992FoxgENXxQ0wg3HY9w4gPtMEw62aphOlxyUUtfjvM

TRECCt+ursNeFkSkF26LdzJzgxi6Eu6bGOzj6QKyIQ
s+QPXfF1CpobvzE8FlL2AM83VbDO+PYNncEy9N4C55JsnuF7QcaVxzkhMVyFipMQxwMkayNzTe7gZRUg

eKUqjGKjLXv/IM1grKbM+YxoSKGmxljSqmjSvPUSValkRaULrFHrXYsWD+mAXFn8KOQS/CnEKYESYXGD

m/ZKUMHecsThhVGcjVEpp11us8cFhdgkI6Kub0GyFk
uUjJm/VymczTqXMpxTHzKCVD/kuupu73W8ZWUPodi1DkVVG+CLPL81dY3DIx0AO4CQyOfV6f721WLjJr

avXaDnz96rw8lGr9kVwa3AUPAb/+GKrnSR6s52YqoPwD7yFNTN7AWQpXmIwH7APgkTa3Ji/ZJOlrknx2

buolysGY+lZxslCvNi/amW1dvD1nKpWzqop9F+xrPV
qUEHnTkxbZVksFyBuuJ/nNJis6aOwkdcHTHV35LCmN+tFNukxWK/mHHbrzdXYGAMsHeDKmS37HmY6gYv

iIuyB9If4nBy8EKywx5N/0Okm11OMT+iaDGk2fhirJTn20F3LW5SoWl+zwf6B5ULZJK5MOMuaAMxJlJW

TKYIHGIb34ZG641CTwpSbJWsXfYyW9wPJXzNui0O3i
kEV1yzPF3v0kkJ9hMR21BegGFMn7/LvtVDw9J2nGr0eaD5O3ucSoRMZ58UlvEbJsIEWKCJkwCMU8MTvJ

d+Bi5OrPJqe8m21ZHe/eCUBHoOCz7z/Jn2hkrW3Bh0177tdfm4juFkO/P3AqXOlhuuu2Nzpj0pbGJ7N2

m6iWU+MbBlCp/4ZiQTcg/9J1aWm/h++oIRd/WV7B/R
aidmQUQi7aY/rh8D4DmEpUmuNA8UMshCAo5KC+TyCUnnDwLi/sB5uI5CvUSXfs26HMSUCLP2rD/2dOtp

VkFzk7gigvroj9qHxkjJ28G3Gf2/IREdvgFZApYHfMmHiXJxXLyLpDryRXT70PKF294QHyvRpXKR9YFP

t6ExIKe8LTvIOVKeGPuusZCNgMOA9q4Hj2e38QD+vO
YgsIritV2bRrijRh8H6MXFq+pHdPOSoWsYlunW9Vf5l8l/6wLUglhx4YFwTllaJf2LhsPe6UzOz7vt7s

hMRpwzZFqKUgSygKXUmn4Ttl1vhr74i9baMvj146PpuxKrrbvoZhI4j8W6go/w+MBOglTQIwUpqnq3fc

p3usekzDVstR8uEml+u4EDQI6yJz3jv9vQstyViiai
JYH+z0w7fEX4O0WzCfXfq3L9f6wgvYgvzav0qxjvCHabVKukzr2X5wgWVe8olmqWQxvATAOc07cKoEuV

PVjoAnCEFzMBKcgKwTrGtSYdPnDPg29BpUF8aZ+93AF0vamkhr8OVg0v5XJz8kGiZpL0+MjnJiu6EWLO

VyYxLPpkOFnr1IRJ229gWw48RBxRRudwUx/fb9MXIG
QD9r6AGE1tkRnl8qAx8/Qxckt9svP7XS60haeUaG01SVs44mPvX3RNweXtZlOgFR5sxmZhx8eZ/W7e/3

h389uTECf4i+rpe7nHxG/xzOXnQLEbSaP/ER7ra4kB0xxmScLVk3STFdMj9y7m6hGWh8cTmIe1TtDwGq

SxCxWVG+MCtzt8ed+hFTTg2taYxLWCRE/kxDJDTyrz
HhnbQEB693VtJsOMOfWtUMymTbdNQPpgpSOGsZbfiViK+N4SuUO74m6d/bKfb6HJOOiEsevnAAwi5B8k

uZyP6jXoPDz6WHzKe6iyeGh0MTzK+7uWnQoi9gOg5BH+vHHYsUyXJMTslRoPnefHS9Zjkxk42EmpDa0J

SlIg6vmWojhGL1Pomft1keNzlSYQGLhBj5q4BPNyVV
uughMoLYztFBwf6pNVcIOidn5JJ2Ta3qQmjRTWxo33OMv5MvjFq/hQbzSouShpVluowQfzHQevYZGAUc

C8QcF4g3htT5sxuncs2mjOv0lNY6vadZN8bjcL/dGJf97r9dfSEaiZu9C15wbRIoq5xHGXPu6woE4q1n

tT39cwbc/MMS0mUzEj3BMOYK9g1EYuNx5XOjkARzVN
9PK6Bnv7v+ZBjXyPuKaTzmod+RtpClvCmhJF2O316i3jUwxyXZLXt7H8rE1z3Hhi4Kd2+ueQpVzkfBk9

N0Q442IwGW3Ja79fGAP5l7UIfRZLNqEy8n8QOkYY7KAE5RXKPGAvZVvFZjTOy4Fo6qmC5Id3xp1VdIks

zcbr6XpC7LsnVQc08RLZKQA6qzsT5osvuLVktbOPX5
3edui0IpBb1xxq3TEnTkiFycl8p0SIksglMGhjV5hBIWv/Kf8jKyHs79/EHIJBvVdVwzYZ5vOy2AaeKO

GHGrnunrp3HLsLaG6ahOA4g7qUIVg6zogwIOthTs1nap+oEzvof1spLqmLetF7RnQTpW7Vn3iemCgAWD

3/OqhUdJz8TSlEVB3P+yYeWxlw2iSHLfqTxv1FeaPP
B0WsjDzytedVxVN4iRgNCDB7BTuheLtX6itgbqsurJim4Ka4qHJ2fLqwdrM+AUNFabwKlbPgNnAVfUIv

sK9CCbIMrZG7xnGTJz3Z6Z0CCMM7JF+1WfWmokzrZjw0JOVH6vbfh6lDq2xIevuRZpgizZBi7LqSycRL

x43rpa02/qJJRmLoIbkFES7bX6MEoiX1vTcbiLp1fj
rfMKBiQMuFCj5s4ly0ZX1P3Q62y7vpORE6iIRD3r/kUda6nCt1r3ijCrMXA8uWmU+QOS4rI7+K5myhET

VEgdmeRfaSIdTS6PQrBdUR7vtz4LIwJuUX1qul9KUSJ7TO7USQPeWC9DfXRhN9IbJ5DRAuWvYRGdSIZg

QR82CULjBWDWPZjtHALiQ9Lhn047dpRpnuRmTNCbDw
6GWPHuDD8RJFBcOOJgjZIwWZHxDXJhSrZ1BVVcBZbjBFRoS7EmxjXpcpVeUQIeDMJPOYznGGBsJ6jpt5

HwWMe6BE1TOP7YmfNxw6CtwtKjS0jmP6QVFZ7SBEJkR5KOV7XeF1fbVnAgz4IsC8prEtDqd7BnKe4XZm

JkDf6JYuWpHH7mnx0VFNKbXQ2tjm3JRJP0XD5BvIw4
WJPtY9BhDEUySYWfn2NzUP3LAO2znLyvCso6SA3+TDtoKMG8ghKcbV5LMELaQY7l8iBZ/n7A/Qd+KbAF

0hh5g9PAreho02xuq0uqOBe52/ea5UirxxpfFYds/PuTSFHUsFpGtjXMnbEtCpsVPQ+txQcJXLAORL0H

g6nh5BCMJu8o+zWIbDJdkD//pdekuy07u8+OJpU46T
nx93r4FnSctgewiZQ8fWhq9t/F7jdqR2Kp21jprcIqs0v5TKzVizL6rox/11+TeJbkP/7U1u3pY8Bzkz

Zw7GPMz/oj+PlsOfxYYYWnpt3TSowysy+SHyLqk+kFl1X9OC7pFM8Ppr8ADv2E5Dd5lpG1fD0AOYCi//

Z703Bn04xjChr8U18NpigmqWabMRrIwBE8FnirLrNu
HUh/spMdzIN3KScwiahInhW47DXzpAgP6mKioesflB3jFO1U9wklDDuMFE+t+XXr1j834UI2H14Qlitk

6Xq39ftzp894BeliTB8I0o+VyTY5v0SXW6/SqK/jY5OeXWmbziLa0tj3EOy4MtWKA4XXLR3Ifor3chEk

N1NXvycxoaR/Y7zTYKVhHATFL3wyqKxVmV772m4zQ4
X+GMr4rIvzOt57U6lN9ZYZv6Phn03AhEOdUgSNKQ2dwY8PjN0arhkf3JfKcI1xYfX7wd5kya4ihsGrqm

fcETmUoobdU7G6z38DRwjFNBpXv/HigcTLGXm/Rg2FMEx1kij8KlAmkz9PQQf9315XQkgj9nEazBg0Yb

Nc/RiEUsSOw/hXIBknPTgh331CLtxWgtKcMIvIYF4G
gIb7jexodd4rvAiz5T48Q9fdSJ7i9V+Yt4JFDpAFM7anbGrBs3mPlZbSEISFbSUDQAKdfgSp91qxtKJq

9QriZDKnVRre1BRR6Us1hhA9HFTii3Iqy0CTnf0xMGCM5ED63BxaFwfpnAP6/qJkMnMZCakUp9fOOEyj

lIvQrDK+UBtLYkw8kmXEzT3FHT1X05lmqbWXAqvASh
hkaHrhsp3KEJqKhz7+kWqvrFCKnFAb7a1Hwzs/O5P5jF3cl6y46g2eIxiWWwIBy+swHJ4+2ZUmdJh+dq

m9VmGnglmA2cJq6Ek0HbLA3LRrguHqkRY7Wk2eDrNx6wC/fKmMjSXOv/KTyuERQJyce2E9jkZ9ApPQcm

Rt5l4SAVHeOrsYQJE/JkN/hHXlBauwN+tEYfitZggj
OfJfSd5U8uuZKkBvYT1EvX3ByeoTx+XxlPEMMSZkCPboIb86+wNhvyqV+UjR1q1OscsfMKrqywcvi3rI

7mEVwMoQ/BzqzbVQTcqgH9xhGAuK7jWRtUqDKEBZ546PIlaR2Ns3E9emG08Np1Ob6QyAERADsjcjCBxS

4j05s407uKQAEIKIAkulnIeZ64rw1SW0AfRmZUuPRT
odSjlaa5FlAWwInJPG9/oX47DKRbm9T8e8euFDWG/r48EzINdANZHbLudgE4EDH89dNo3JYJ+elcc7Zl

aZ7m+iLMOyWg6a93zAGyebjrTSE/n1HRl90Piht7+B+xSxQLdY8Q264318X5lGrMY7Cscfeon8I/rhfJ

UvTbEV0GQEeICN8bs5LF5VcUIUuqXaMUdd32Ob9+9g
5HvvFf2SOqErsbk61Kp4bAZj1lkE0GyCXRwUOMcadgovme/HVdmH/B/txeTMckxJyhsVY7bB/8m96vMI

R3yVddzUWOkHKOm13oTBdRYDsX9bHdu9w6I948NfJnpEmAxbAggfQiDAodBm4xwcMrPrsYcCkWDOF6O0

K0G+syZ5TN5nAJ2BoP6cWM1jAhpJo8UI37I5KrZiha
32ylJ1i1lbaeRGejT7SaD3tmQi5vDMmTJBxnEVjtNJaS+yUesaBzBX1/wb5s9Eli9vvU5TamivFJK9Xe

fkTPYDHVY4wUFCWbtlQeiyD+IcSoEYULS4YEQI9pEup+Wbi45np/pk5Kt4MKw5EwOlZsdjsD75Z1c0hB

w297XFHetcLLYVjSPqtdO8GIWAGfXyE6+nnAVTlha6
p+xfVIio1LFFJ56AzCFihHVuumW6raH/wQxroA0z5511EcIPBLb02kkWygDzkqJNce8+5Id4RRRnC+Zo

ND8380PUlanq7gSQjClkvXmdHAcglifg0U490LSIThD/7gkhom3kww6J/tPbl80aAaqnnr5rWzDJPyng

DDVt6OIKR+3tPZ3FTNE2yoYE+fG5JFu6LHLTfI47TK
vo3/nV4c4J1tEqNo55RC5kA69skh8XKLh/4Klmg+pkRtRgfg9AF/LljiCkThX/Q8r+c2hekNuoU3ncoO

Q7fEJwFz8By6A0Wgh0ItdwrqatBuhgi5oj8T3AhcW4SwOlAgoz7Op4O6dVaEvZhuyLJ8+J7uWjNig0eZ

ZnuK8yDwitGoDqTItLJRpx5x0WryrcaNlphpW4rDGh
pCBuxE0ZODDx4sacL8f2XVmY5gcfFKlZjBf9Upo0YKnw8dT8RCfhRyAKS508reN4t3hYJ4FYe0xXq2EJ

IfDjP/LP3r4P1Q88dZu8OenToX+axaV3otxfndn4IpN7jXwXTR4fcQf/MU5ypt8RhaMG1PrMi+0UewyB

xQjfZ+owMEsMRilRML4k3PH7taWiDvog91PK75kn7L
GNvFPYL18BFbn8i+CXJUVkyl9tmnEAbQzSbcCktG3ZK+Q32uFfS+oIZWaTbIKLVnAI5Vbyug1OImWDdJ

LjB4M+JE1duCrhGr/ojyYW4iwSjL/XUvi96VgcrmTQg0s3mqiC23E4gkF8GFyQcbnTPqDVMJvU6mUvPK

x2H2lavQcg40MY5i6UkXI1JD4KWrBlExwiQnGtOLj4
majQ1d3AsM2fOdrawlkz+xJH9Q2hoqQCzcfU0Jaf93ITR6Bu63x+sYaw6e17rsXORgreVG6cJauJsN6B

TAMIA+70+bJcS3hH+eocsk6P9RFf5isYAQs3Ro5KRzE17Jnqlv4QEtYM1Mk3dpv0w1/mLO5S/ZhfX2edq+

2rz+Wa6zJm0379IEkeYD03a1WAE+gVRh52SuwviPcE
VDP2MLbG1HmHWRHgulmeYa4HFN47lcl+nSE4AKVZa6EBQ3MPacIb0vHQ8HUuaNjLTbKPJZxArPpmX5iU

UgmBIe5fhGMZM4HRyyWONiKiWZIp8sjLeTTGQghJDxDhgGs37VSk2Xk10izK0UkmJNrihVW4hJA5oaOm

JRREGxBgXywRdKBOJrJDJimRGg1pnmJLeMOxaMKAgP
8CXZmkmimxNHfOzizK2bRBj+JqW2qB54Sszs0VfSxIUeHWXLmJfceWpCl5lRJiLLxZ7aOGmgLR40Uzlh

PBYGkIwPRNj5C7T/SQXCDKX46Sk3pNBYwg5bNlSyBP2o2Amis2rdcq3ieOqpZxQoCRgi+Go1iKvIgMu4

KurnQRNo++NVcxnSWTSSxIu7f6MCPsGTzVtOSkVKHO
az11B/Q9NJeC9v8t8cMyedFKxG/oRLtxS3DA52Z4W4HbLZeljgQpBQHV4UMVO6D87eZvyEUNiHBpL//Z

zdYtW2kh+aa5nrRRvff7H+HjSBth+D6SoVtKWdRXoWy+lKXHaZiLjazYKSxuoXcZjBTFlfO93wwTUDsU

H89IMhfSi6ZS9nwqsEO7kHNstX+FJdSGtkWqQR94eF
UcMD69hvBbk92rAyMhK1gWD1yeoWrG7gM+sgI+S4dWLFDYASqSzPdL4zCXDNtYxSNC6eQD+R3qkmFRhz

4FQaOdPoAj6bs6UFLYrTsbq+5RMNb5PvjjFbRmr1uplCQf6X5DqhRtONjp31CxZTX+MxdHsPolyfH0dy

8HJukGgAVaXIDFHtrNLoMGy3EhfTQY/y0H62NbRT8A
NXp+IsbN3o4hL5VZf4JbvO0LOnJzuFXEJdGWnu0AH5nIYi9L7CfV1v4yA36ES9TVr2W56LhQoJXpHeSY

Q2mzHbdS2QZV2fq3VbHWg4JZQoo9JmWHxrHPt7ARsvKLBsA5R5lMAzFEOtXP2NWSFcWL6GFFHtkvWjWj

QrMINCWbMmYPBhQrJzy4PnL1YpJNRpXHABSWiuOKYn
M44hPYngKc20DFmjPZIwNaFeQLf3Ya0WQhWvTCMtU85ekOVodQWqVFPrYOCZTtNzHJIfK0PorVScIIzz

E3TzX0RoOI2rdCBnXN9kzHBkD8NbH1JjdcsoTTIPHvMoESNoHQobWAHdQrPvITrnQQJ+Hx6AMOU+Pg0K

WO6bx1NsZGltEHQaPX3rqs4MFGJ4GB8VdZr4LMPwU6
WrURLqVTOdg6NkMR7GAH4ubIhtGaD6KJ9+TVrwXKK2bxGnlI4MBHAzFJbbXxrXbM/TxoJDc9LWJzWbsp

lH2AicReYeElUalDoIY8pwMEj9yEr6VytOvoUlDisBQeSCtTNHpg9jgsVxB19DJuL/+0tsxKGUUkx+Zh

/j1BOn5+Bxf1Q4ol/Nls0/dyMrr22cf1RNb72F9uDh
v6urftK+3hW/dLtfm92wpXzWkNCFhC2GQKWQr1cuv00Bd/96kWfd/JWPE3B1o2ZMsmWRuSChkzEUy8a7

U8C8LCmFTsEajSXvMpgB2TYWzmh3A9Kgi7J+AX50NH2kI8fN07l8losP7JEIzhTLcGoqxcbGzK8Cxw0w

dORiBaNWgEFHbQqMCVDkKl2OGCVA4Vyd1sSfzuUuw9
TYrGmDKLSF5sFtrpb331IlczIJu5U+dGc8vufjDY6XaunPo1Bs8Vn0HDGUbkbIo2me1GHhnkVVqX1a9F

fyKGCgjbPLR9ISjf3CBa9rtQaUc7+kbiDL56GY+9FqaEvKgt81l1NQMDxjpRpxA1GBvktrIUx7VxxHuv

mVkJcrhsXLLZ5i3gEF6lj34wMBX+WP0vnBtMtAS4yD
b3eEaSsRyb41yGYuHyGVTWZ+KFQi8qWzMbTY8bExVmE1JfV2emTvwUtEjpvSqqexHeDo4nkK/ZpFuSBJ

RkkEd13Vr12XtAAeWwhaVr5BPx7hrRdoO1EMD/41vdm3gFENGFt9rVMzNixATc9SL/2I8X4y6uvbIpfT

VrEna8yNhmGbGaJEPuCS+5zJG8nCLUTquqzL1uOLXW
miXbZW5GeuE+XFdms8i3LcPRDmfwsvGac7IjCWdXm2PE70aHTWtmOMgc7GJtLBUXnLENd7HBzs7lTrmi

S1IpPpKiUVjdC25HqiiKJ7WuX+hEq97DXTSSKmRsWoYMjGXmdO5R4FjBv/oHJ59HbSQXFGrIM8CHnN8R

6tXJDQ1pZYc8bhpEkPICjYeFjHFUCm/YMwbYTJiGVw
/vFTBz5pG7OZR7RuuHNLo2bSssSDEeijqxsqWQKILF9Ft8dL2fDnTp+bytB1lfwfHRKkz/LeWsqMwJfB

B3siZQRNvi9xujFX4KHliVPK4kKNaZrl3zQeAASoEdSV7Tb9GqJh0Ks1b32F5w5S+EM4PSp9eW7VO82a

VOq8if35a/a5Hia+Kvwg5yNQ1aPY1WAyUbcwfSFByr
ncXATiMSNvxaxRCGNCcnaziGSpUBs2O2e8vKhCV7btNViHk7gX19o+Lp8RHfr1kww0uq5ThUnk/q0iNw

JWIukSmIFGSgzd9CPYp+6GYZvOyDLtcieFKaTx5F5LOvCq3cNBdKefeJT6fS9OQryuPKlmrTHkBIlHId

MeTzkQncnBfcZE0UmkVvbUnjCxsinmFhdOZxZICOIu
VxmFBGsg58Ljo0ZhTAODiawpkZedoyYAei+v9pOSWLnaYY4y2r8TSMTB6hl2DQcI4pFrlYXcUkkRg+oa

uXtqX2dDFU808x7lXxQ85FynsnsTnLrrNptPwKmb99OtW31q51pJ88d+kMzWJbRBkfLPqL1V2hFULzBw

GPJnWVhpPXxRc2OeW+TH9IdlqIeCBHGKbeDSa9UAxP
5n1v976To9hVLtAiCXay5NymBaGrx7buVxOA6NvT0H9ccg7MKyw1qWdpftvrbmG0Cwmip5UgE9P+MNh+

OncUJYCwhAbyB1g/l2KnH8VUPLswFqO2Cle+nYNlM/srP6b6NQVaP/OKLBmHWEWGyGrvWGrqVoXAVum+

LXZs/AvVWpnURFQEZBimUFYMhzeBgVRn8ykOjxsU36
Sz6V1RfdbkE1b/FO1JxHaburtTs9iyU5Wshgo+eBLmi7MgwbwrwgUjiP40ZJPe1Mn9XuvytPPljd0cza

TSEXgQcEiKxzNQE0pI0n1vaNFX65VulFuJ+Ty+xHalpxZkPkHuHxattlmUXa1csNyxla4jtbRUoiPALW

eoqjKsmJk757qRGiytGxx5Rwf/gLGgabxT5pzWWp6D
K1gy0zwlccDtcVGl6qKVBnUS1xpJ+iJklEq48Cy6v8ZN46H6pXYRlvqz3xYmvqPOvQGdnNIXVAWRv0tv

+aGUdBDLrtNtbNP1aLsGRzubtNOqBYa+FabGs/XjBaMAcuVLD6eyOl39t8EbMKzzX3+63YmGrwbToYXE

UMiBbLMIHoH2XEiQpTJE6xV78MBKKxtyVKmMmcjQRA
B9joe56SJSQc+3Q+fZVwzYd7ba5nAVOMsAAjkyKhRCNXANsbLaoaqhwvvvp4oYzDS2hTtHymQKR4v2on

KJtYuEbNKINmoqDSDLsERYmYJjw/XVYR0LwA1mQ41IdeIK7bNcgkUKp2QAHSGoqnwuyYI+jaXygaZr7/

WfMeCtDXxCL1DZqqabsXCCuCgNOL+zZYBwQSxWK+ZV
Z/swvJp+9YuSC85T3HfETgJUdpQ2XaXtVKW40r2wF2SzrQcQaJInCMk6SsJa3rmXPaKBTzG0dkRgXKF3

3+3C2RsPIYDGap7aeLBErjfLfsmUQzedGoVWKhvcEj73ukB8/u04fW0PMbq/Uc1GaWGS5jayypXSki3/

KcE+1PtoPHHXpfcN6sOt8yzrUtCKx+MEzH2AVCELj7
tOPJg3ai8fZkoChHa2R6MWXd2GY6ZmDSRVjaIItL4s3YkMns5fASjLS5T5omp++hFTqXrJbWSh1puPnE

eTH7OUB1i5H8EEhHwrF073R8Ny1ms5wF2EQFEFCvFApvWfYa+rB5UBXuRmSRqQicVZ3b7GItRvXvIdca

m+eHzSMFlELzZ+XbAvQMFrad26p3dJ0dAFDDF+2wq2
jInnWLdClWCcbhN5BvG0+9X2zmlNRfItqioFaZppQlOinsK+yseSHDdMzQk8Tp/Z6YNaAdr4n84hWkXa

g7U4YG0rAGkmAb+DpvWvR2Ea6o9uOXr+Yci1whJhYZLyPvMUF1tbHzaI4WPX7rt3RcYQtgRPAcTC5wnt

4UWIH3VH7NPJQtJZ2AkUAoZ7NjK6MPWaRmAUCxOZNq
LT68OIAeEMZBMEomQWLgO7Ljm642puFtuxRyBJWjAb9DIQYaCH5YPGXlWXBhnPTaXSGrYFZgTiP9KMPd

TVkxEILgA6GhywKgprIkCTOqNTYfDz3RCMFrVE5Jhq70fOK1TLWdKmIwEAJcawNfVHVxupF8AN9CKaNl

VPV6zXRbIloWVF5KEXYkjFUsYM3CWICrbDCbOK4+DQ
ogID4+UOdrbvUjNknRBrIkYLKzr0LnFWniWSn9M6OsdCIbxsGdHiyyrSLOWDAqUXEcM3eqbjw3zEMrDa

IdKw2ELxAwx0LhQNCkQLjNpg0fQ2KaOAT+b9X+i3hYSAmXNCesYqHQOBqxGACmyMBmghX0pA6IOBsgAL

zvQ1z1tZtPOlcpSSjrETQgR307/M3vbELZw9qJLE0l
tsXid/0xbEkxuBO//qW403m6xaf3zZWkPgzeXRi+jgep+PjRaqdpPBglQ/GH1Z/e/2n1n+4T6yS+/i

dDydWGcPV+wxD95k4QESalft8yl9rMp9EspbsJrpMjxL7rq0NY3pKyNP1A+zqAwOYG306BGW0L/udB/S

fmi1dAJE66X+Oa7y5ITSQ83uoJlUxO4Mc+/fi/5f4q
ZnBrnUikpHm0pTy17eqeXcutvlKIFkDMVUbyCfeivUqwG4Z5k0R8SG474TTuneX1puqWlLBw+s7qveor

sHCctKYkN4MGkxWYTaVjJ8FNWjGWJ7p6NrKy8wbKBh3DIYJAKgJLUsIFtcO7pcuFUnufqcUZ7nB3wTSo

9wKPrHEOVA2ttBBqUldDDrnvkzPyzRJLlvA1wjizxV
WQTruOcdWs475nqbNdLNFzi15xmkZKpY3JXX6Y4CyTyhXkEufZZejjiBPzXTwMPrS0TAr808yEFchuHu

ILjtX7jtQ6CDBHXFzbKBifSHoIEh1FmN9R+915jZWzxTBk6BD3qGxhfG15zi1IPJXQGAEan4SMw3NmmM

1W7+N+RMAc9a5vlLk2dKccLUBEv5MWbxZmLLvpBTFN
LK7Gfj5Gi4wYsF6xfdzxVI4EVSxLDgzMJtQWr3DOeKcjVVlqHc9qx5acJxQevQ0tbxJrpzTujdqEbwK1

FsJH3EacwP/XaQdTL+mOgTRJMiGiDRHywzUKhUE/MDnhKmajVye9hViNUoDR67CEj480QMEt7peBSRWA

KQNA1ZewZsRdYObuRxNCsMEYQMyeQmllMl6+haI87Q
yEwaIGx8ck6I6nNwD8D13pWagPmzBoi6XLFUfxMj8D9tVZZJNUibjBKi3ff0wewESNtsytChvALbvy8P

VQrJPvFP2USzz+doEJ+PzUqAPDLnHtErHgFDnXzOlglje5sZCwjEwwWkczjHg5vLL/prEsSbUXWwBs1O

eIq9HaSmilX9Ra9sSM5Sk4o9brNx6671JoTGSBbqPn
iREOvuusLrBfS9lE2+yfDJuFoCbyaod1WgUXf3QgJVgxM8CvhYQmRDAF4P1BQbTBsxtXASj8VaKxNndD

jGCxitp2hKbb174OJ8W7zymqKBtIATjw3UVDV87AFf265cMswbKKoEufAjPsJDfzjbzwpov7am74Yknz

Si/F6t0LB2x4FEU0rf+DefZJvSp4A4Gc5L00KGRkl3
l7Lhbsw3e4/sbSCpm930g+0b8cgX1B763e2eHVkpx6DsNiSTLn4cFF7SqH5JvjQ9Y32+ps6Bub1OmqGf

niTLHBpxD/s/xjAeLHxlk2TWgC7TKzd9S6++jDiTQ15KJ8rCcbzoB21vmHpZneHA5c6Y1omdu+HbcRcX

iIpqATfypCuDgds8uxPAIepkqTuXvfUbzCXGXXymSQ
oQnvkXeDxLIvuNEwPg37uHpG08EQkjinfvhmgT9UJSS08adcbZWDHyQxkf99kGExrVFHeIEwX+QwwT6x

2kA12q+gmSmTdVKrz/dXlpltc1mhXN8ku7S6CRQtwcuz/6Beh6JZSImwnMv2C22e4B17edQFEFaHVeqX

CE4szZO9JmrGDFu6rxqZ0tlr/y2kvo8H2fGiMx6pC7
aIP/EEqx2oyuYkUHlV60PO3JpbXs7OdJ2Tlif+N0dzZXVRO1pQaF+vlx86MnjN5kQ1xSRa/X16BoDbq6

I24+kXDjfuDFgg1M8k9adkVsiTETbV5E5419cHUbnJMCEXx+fKVEWEcfyCAebjSNQcns2rfOAdZN/cHG

RRpjNAfcm6rFQ8JjSUIiiTPceUumekP8PmkIhIZXCT
lMS+kTLH51QqGmOwyACEKMNfWPPkD1weM4wfhcSBiSTEJunwSFBLDcc5mFTPFBWS/0PBy7zJpp32syF3

U7x2+R4HJuU5h6evE5UajdjwPkhh2upE5kidCliEFWIBBtm7acKIxkekaWN4aktH0erV+A0ra0M2TCNH

IgLHBGlNQnBQfPgooCLkC2hkB3paAXjglvjcEo9Io9
sYRfcRxHwzjsqd9jaaEI/fQAotgPee4p5odmZj1d1iBUR/lkn3aW2hfysLt+3T9hkBNf7Bg+GjuAr4Fr

sSec6mF4fTH9X+ruEKIILTh2hOeg/7viBuUQUpUj1FmCegRNVgMabgc/cg26PUB9OoX5z1ySuCy31jMO

FTtcPbp0W1x4IQqn+ktnNQGmzW5LNF8uv6BqDWYuGQ
fwmiSbKbxSBfWaSQBvh8AoCKudMPh2JWhrCOGaZ6A4kTOxEZHtLH0GOCJbQB0OOLJrglIqYjHfJPONAe

GbCTHtOyKdg6ItG8QlWQTxEWALSDikYQHiN36dFShuPk86JOiqBNFdNqHuGFr1Ac3RHfVgVNIzP88odA

CsyDWgWKJyMJHQKPzgYOXdX5ftt2CwNHp0TI5XFX5Q
bcEcg3TkjmVgY7hoO7ZXRG5RTWJrQ4REZ7AdY6cyJiTzu3DpM0niGnCbk9UrTv9NKhBqIo0URzLlOG3e

qt1LIXYqPGSwGlvKHdWqVjE2ASAjPsBzNMB7HKFmWfkwDfJ6ZIH2WhX4URSoKWb9RXT1MaQeSDEfUyTt

KWXiTgHoPYlrJSj5AVI0NGOlEhNbAlr4SBU0QZV3VD
AjLEQ3OIU1LeU1EDDqAEJ1ZMB6GeO1AXMdCPZ5QSS2SkW6XUSqHus3ESE3LRH7ZQUjHMjlFOX3LIS3CJ

CeSCN7FLMqASVwLsK2XOG9BiS3TaGhVpQmYQT7RqL5FNKxJbv5GIjsWiJbTcmeCDZkJJP2HaQ0WGTlLH

ZhYYctStV8YgdgLnO6GJr5NNX7KsRoUeT8GEUtUCM5
PiYsCnK1XVs9ODT2VwbvDsM1LNYzLSJSSlQmIsa1QER6RUOvWbliJVF9ILK3OvXdLgGaZSL8MHR4TuJ8

TFNlGOG0TNP0ReZdDnipSOD1AQF7OdLhFwRyYuSmTOXxWSPrCuKjJZRcLDX5CzA5TALwTSB2TRH5OjIo

PbTqTHEdCWC2WTZ3LWCiNGHwLUwrBtC1ZRXpQNpyMX
GjORJ3AOFcNkV1MYT6LJRfUzSpLBq1XJh0JGY9KZIySfCgKNa8MOJ4NXJeKmYgEIz8WVO9JOOkCrKhYO

t0IRE1LYCgJiOrUPj1CLU5BHBoTjDwFRy1BQS8PQHjFtVrZOi3OUR8FXQwFiLxQRz3HXY8SDBmRpCdJS

2UWWA5BFMjWpGcYPv9UIV5XDRvBeOeGTt2MYQ1XJZu
OrDzHIg9EZIoJhkgHoUqGKQ7DkQ5NUEfNKT4ZCP7AnPeOoMuUOJ8BKRlKuT5CchzLpqiWRP3HcF9UJTy

OjYwDRugFxI0TEGyAUNnAOZ9VRSsTtXiWvZmJVTcDkYBCxBaGMg2TYAsCqUrJoObHwYbIISiBnDdKyLl

KVJ1ZPabVQV6GjKvCXF8BDApAQU9AmztLvH4PMT7Zt
Y2YxamHyY8KUE8LjVkLKFdAZnlOqO0ZtxkIwU4MNR1CsU7RiqfXkp1SGS1YMNvTfqfSbf8UAnuVzK4Tj

SjPtc3YH0RZLV0MihqMxg1JIs0MSK3RjmuKCi8MPj7ILP8ZaIhVbEcTAjyKoQ9BdSeLnW1RNJ7VrO3PM

QnFIO5ZDK7GeO3RTOdAVT4ZTO8PbI8OVMmPBl5OHZu
CIW1JRUkGCE7CKX9JlD7YOMjOjt2JOY2IEPaLrdyXwo3BRB9QtJYWtCiIDQ4KGZ1DjQ6DZNnNPA0ITO5

TwK4SRKrJPD3QDZrRZM7CSRcFZQ9YKA5KkS6ACEmUHMkSHO4NkJ2PCEyGVEZXsGhXS8sva5QFRQdPBQo

PtiYZdUmGWlLGuThUMVtYTdrAF5Ln416JNQfH4HipQ
Hpte9XLOIjRW2Cr903DbBqZI4VrksaoQ2SLHYvGZ5Hn4AfmuEiPOH6K1VdbTisjAflqDP6FJFpCUIhR5

TliFJkDyDnTArdVWFcQ0OqUOuhVOVzLIvhOWDgC4NiskTIHe02SUdiVZ2xVQDyJNJiOVM3USAxGYynLL

XqU4s2LRguF5NhS7xaGCLmV8MhiUJzAB1MZEK+Pg0K
EO4fg5NhWOzdSqUzMG5atm0MZIR6GW3ZYWIyGV2NcCOiF9BtykDsE2LbgRprMI1KdfWzIEflTT5IDAKq

Fj1inS7ThvfzdM0IrqDnPSfzLl0OlN5LnyPjSM5qp0FgbszOUoBhRTIlLrewj9CUoMUtTIBfB4nsz7VQ

oXDxVMN6KC4HFSDwOT5EsNW3tJGdNUEqFMAUOEhuVR
TkN7XintVGEWBcxrrgzB2tMAX4DAJxVt9NJIM+Et0RWP8yu5AyABgcIvOxHS9ffs2BVADgEWh7QEV5YV

UkPqx7IRKkIwP0GiLvROJ6KMX5EzP9ATqbZjNeVSXaJTHyIuQsXrVuPXJ2LOX8YAXoCoe5HUZrDiHmBd

bgBpk2XYP7WkC9WXZsBMJ3IIW0QyG7NBBnYUZ5WLL8
NuY6PAJaBTT1YUO6BuArCiAbEzNpFWJ0MXAXPoHgOBs6NFU9TXT8RVFaJNl8BEnhUrN3VsArDuDpFPwe

MpI9TwatIoOtRRi2MWB3UiHqWlb3HMG4HeD7BxBkBxUhUVcwTcP5LbCsGzh6SAX3TvV8TfaiHfJrJRZ0

OvR8VFOgUmEaIGO6UkT9RUIiIxW3JQK4WdN5ZTWqNG
wnKXIwXrRuIwyiMeHfJSC9KBB0FEHaJhZqJFA5JhM6VVAlIWC6GDLfZWM7OKQrHwPkSKGgDEO9DAStSw

b5OPR4BQC8CQYdJkw5BRe6XRE6GVOgAvPuVULlGIB2NJImOfy9USR6CsLjPhWlCmDzAOQ5RkF8RxmvPT

A9ZU9PPML4LGBiGODeTAW3QSMxIMSwXgm3PYO2YBW7
LWLmAjFhXWc9CPR2GOGuMuMcWZh8FDF5LQNuZlBjVOo8MOQ0IXYiYnSqISz1QMV6JVHsSiVeIZt1ZZB0

SJPaWdMwYJg3CBR2CTYvBsDjVMj4TAL8AGMfOiXvAFq1JJOCTyLgYvOcSEd2LBX5WGEnGoPmSGb4YSL2

WQBpIoVkGXd2PMI3XNTfYol1ZHEiLrH5ADUtJUE7RX
V6KqS5UCRqXpieRRJ3PiUwVmBtPnW8AUM7YXA4PDLmXGy0SVGvRfI3UbnvNCDeLZHuFNT4XRtzJsRgKO

ErLxKjGfEnRMhfSEN6MpV6VJPbPkIxTMGsJiJfFeZsOxT1KTY7WjT9WjFbYNS3RWwyQQN6NZHqCmUrNF

vdLeA4PdLiEkJlPLpnWeR3EdCrKCZtLEG4QpWwRdL5
HTN8DdW0CsvbFmU4TER3ALWpQxmuJip9PJF2MBS0IuHqQzOnWDc8KEHFEgNfYmz3OFh0YFA9WpklAxg7

VBX1NCZ6UwtzUrOnEZmiDqX5ZkMtIySjSUF5MaH8YlpnGiTsWSL0WvI2JPHeYGW3SUL8ZtG6GMKxDGJ9

ZBf1JGY2DHZuOZI5VHD6AfL2YQZcTNV0PQO6XSHoSh
ibDez3KUB9XDQ9DMYiHJzdQOIkUXZ4AMSmFeRsCOMiVNW9ABTeOdEoPWI6GDE1RBFmVhXaJBCqBTP5MT

NsPwIkYLQ2TeL0QERlNKH7AE0kKLoncmPzVtvJPxD5NTNtp5OxEVzsOIf1OPenPELvC2I3wXDcTn6noW

Ztt4HfdGV2o0UUIvNaVIUmMz1mwJ8fuCNmBLJlNIvq
Nn4mJS7MHEZgCC7Gp8CcvcKoMAH3E3YguDpfzVhsnBR9BFNcXXIzO1EsrLAxXwEmKWcsNAWdM4XyLJmb

YZIqAWgoQYHuS8HubuPTJm52CRbcOT7bMMGsXEQqQIB7DXDyQGhbOOXbO5b7QIypX5XxR8geYQVzX9Re

sVPzCA1CQWI+Wt5VTB0tu5NyGRtcEGHfFR6jqu2KLY
Y0NI6DWQTxXB0KcCXiD7AvoaKvC6QzwMuiKT9QwnEyDCsyGC7MFSZjNw6joZ0PicmmoMcLz1qhO1BlW4

0goZ2pQ1osjqOkp3aDlaAeBTzyEt6DEGBhGT1XxWZrgYLlCZUpPlMvLUClkAAnECFtJkA4TRdqEJWaY5

wxFNWyixFwCjArRSHNAqBjFVBcXr0gyBXbx4UrcJN6
h5QhNHyrKNRDNXqmFP6+JZeqeaKxHnoIPxRqRAAxe6XmBZdwRDa3F5PdnTGyhmFxMfosjIXOACAzHPLw

H9xctaq0uCDeXQQsYwDwIGHxF4YiTRBmEBO5Ba1+SWcjSNT6eaAneA7HVLIpyMe2MYIL9hl6T8uNvjDD

KFBz7KZdYDTNRTTnrNyvD5DXSKJPLOIIGfP2cKZeXh
bpS5JPuvUBbKQEPbUlZETYUjqN0IoOorMqDb2Y3nFP5H9gvGigvPfbXdF/8z///3zdee+h3AWAwdEmFx

E9UEGZqtAE1V4T+xJrIL4lKunRGytAP3sX0CDbSoS9gkYdoL0aHH36zTQ4iEsX7q/1mnn+O44t+wz+3k

FynGsIP3p66qyV7CHgv5MpyktkpH87Ip+Nupqo+Y3g
/0I51ifD4/7+6d8VQiyQznwfEgYwdr/hLwS07hjQv86oLiTtQTFo0I/K+U+fe/nsO9/ko87LjLD4w60f

Z+qUl4g/e02vti0Aj07+3ozF7mc2QD2g/6968fstZ3rSovCGN+88qyeGD67zKjjcTgAPvD8bIco2W+pG

Uk4s/bjlx+tjyQmv/0U6ABpF+Qz1p/3kIUmxlEklRP
pu190niDA2HcEQMLiGiCl8uG1mE5ZbBMvmXC9dWQQ3JeRhyOD3kQ6CI+ts0r0bpZOFcDK00MX4nGeCXk

7RL5U3fAAUxybX5RILLrDl74xJvsjEr5r85EJ9T0RN8xbcABF7Bn6Qd6G/YoUctiQevYRYecd4Ans28l

Mk+xVxBH2lNG0Gv0JfQGseyd3rMZJL3k2cClwHQqGf
morvAetvxp+y73mBOxfNz+hDcXfUMxREKZXgfJCuprXaJbqwLrd+hASpdC1k0GkEHRB7ZAZyn0bHRIK4

UctBLr+7QzoqrUUJx1X0faBviipRUEGLLbRE7jN0QEqEiQmsjhZd+NbS7+sg9tp7AU6ygwGC2vmV1Z4w

1SJUf2EoiuZoOApQLS75pD2SbXW/TS/X7xHdbpvryY
BPlrR7urawxOnXeXex0eCB+IwsuEmR332lU6y+zW5QUS31cI7Jm/PASURTSyFXRffQLPCtkjI6DYZ4tU

caRcx9IGX/EQUR4sKfAME0Bt+zjcWx9h2qmsVZ8od+hlGbH54qrc5EAZ7g2aPdbXd1We3hS6hg0F/Q3/

CFmEmeOfUgWT0v26Gr1C1RyGiErabNYGM0iABfbCxa
fCYHyXFyljyulWtXab/Ts/Oj9Gg0GeUN2jkcc4swjp/X/dLonxeBIiAi2Y2evzU14Fi23R60FX/g+yF9

ZQiLMMMR2m8SIXT0Pw+WMcDGGGcHK0W6gXY0+Qg5Yp0I72uSIx7WtuOXkcc6+Ixu1aFjfj62MK2F31Y9

V/gB0qtxr0NpBmW+Ktx2X4Au5p6U2wpkXu8BG7JrvL
owO4HzjKMP2Hu5WGwhM6h+4yXvaz/99lSnUx/VOb6nlaG6TXKjlL7yWw8WvPfyMvJNrJm1tkP7j3UC44

J/SL0Vk7zpltroxzDxUosD3yayCSW2q8zoRrVjGc44kCebKjtnexUNbbAgGfyDeUkR01lhNLdVcLFoXp

fJt+NAlxztlSHi6TjzwuL3fQVUt3prg64JUazz4m+1
j7XvtJ/uqpFc7mc72gW6CZ8xQuimi1/Tj+wBuStBl0tvNwT442ih7ut/11qlDeSW0LivdlIbu9e4812g

TOjvYljPap9NJoDIabcUdi8Nh3bkstV9CQ+iP0+pVc2CRC+WR3TthJSUEpfFY5c+mz5TUEj4uAO4I/Ih

+U6oj21WUzEISzvhiy9lC69BWH94B33u99O9JBMHY7
gDTaV7mdauTfSjM0m5GeqiV2in4tskg3WgN7mx9sOnyxnWOa8ESr/9aW1NXLHV5KU5AcjK9DGOcLej3m

MuEquYV8Jm6EShJah6H1BEmbMFt9lfu3MjmjG/RF9hHC+j/xJl+uV0O/CPT1VoynvzmqLzidwIyLq96L

h2KsPR35wrC+Dt+wg4QdEi2P9sHf9vgYj+Q/PpkB5N
G9lTAnfJ3llakP0RAOhGCKXowtW4tQSIQRlW15+Il6tO1fjLYpObsjZTvgkW4nrfLyl0eJbN034yfOPv

LKUpvDtNB89txjMMx+3u5GchDOsHlbtRsNQWmM4vE1tqgiAqrKeQDa79gurNyJ9pHfnlfJP6S8GJSzmo

8NugR9TikVrfUZjoboQ42VSl2iBo5IhAm5k7p3txee
/PFavguZLEysacFuzF75ejVWM6R9F644HCU85E3mA56f7DbBteKebKR/DOc7QAX1Y60RKhdNa95rO0wR

4NfJk8xXQQ8IMgXIEhZQ3+6jFoiicDbRwSb+O8v9Hgfma7N6wAAvE5tIisIPxazg/9s1y/Sl+k/0A0qD

FC3zhnJ2yz0cNX4GnTYnSZXxAIbW44Tqup5Qf+13dp
EevESOMPYKqodVx23hgxqo9+VT3U1EiSrsLxwWfhktwOkT8+ZJxXL4HfuD8BeAICj6FkErIA3NPrDw8T

CRF7IvFLaQSvEh1neVVhH0hf+3BUvnmDsNQZEoXMa8RPHHzUSEaggWfAMtRSeFnGhgUX5IBPPRBvXVeg

QpFJjYJ9o/gS8B9wqgHl04zebbF8fj8xdVoeuh4OOi
HGU5vyX7Fq98qmcq/GBd8uvrV8xG7ut0VSdIF2OrnpKS41kgzRfpQ3Ro8A/b9xsXKHB4/FUwiQmumEBL

O2oh7Kfa3rtfhyJKsUMTU+v17lbhc3lvhDQQ60bWFZD/9jx1Td2EsuhfhGlueRIAfqsQS0V2r4BJ+P5E

z8znbNoS2mBCAsZYieGZnZnTFJfd/i+5M3pSQcztzL
LAxSOe0SgiXKAC/8V62b5i41FsVB1EBOOa/HM1a+z3g5Y99Z+R4e31b7Eyqoq4f0kvzwOAoJdqZB9phz

EQhJmDXGeoFIyq21sifnv2KLj91P8jWH4eE5lCmwca5BMMG/ck0KocLAd+DwW2viaNSXiPFku5PkY5pO

eDVEebfKWoqneB5ZwqTiyItlUMFP0AhU+OV+4DXgLe
oGBIHRgFe+UyZezaRzrsNf92MSIRrsiyFeiZGnOCPidn7o+dn4JcN1dQ4I30nQjwu9vGZjxJEWbRATtz

Sx6mZAw68J92XIQHI0GMc+IEHOcgKcXMDsO2WN4tWlMSugFfe0X/thW/peOS4t5KR2nJfBfg2dsx42yC

2m3M1ysxdDo/wPpctg+vf17NVe/db52wXR+l4b4XBG
+nV5vTMJ8MjczOcw0AMe0jNQ+qOKmNAhyjN0nMBluektxaao31bDNGZsWIZcXH7of6m7NdM2XbbN1/Gl

Q6SS8x2qOn3MI5mAXY95+Vw9qawoulUzhYyaCwsYrbXd9xDI4O5MHmstG3RzsdgqSlcFlqR1gAIWStRy

8vhzDaDQSygsDD4uBlmGNzyrRPHN4IOmG/DG5ckZwv
W0OIrKXDiyEdZhPh2B1zcRcetOFLG1XDjK0VljJifcD7II41CpXriWzF2BUoZjs3h+NVPkvTHw5X62i6

12VAUXVHsVgmEnb96MXCnNqvNsOP3mFOCFD5xbFckn9ztDFcCoKLdH9VhmA8eafvTT8zzZtD7RRCB65s

E3WJLEvsvf9RMeC6KjY6FaJ0HDgYuupQo3KyIxc80m
WW9uYziHk+ZtViuabP++yT+/k2ZFw4Dw2zSABu8lW9k8T59KLE2fvG19b25Z2iX/oM3Lrpa6VThSL+1N

Jppmg3M0ZJ+gFLkZNKsmZTySxdSkd/vtGA198SogYmvOxA+e4Ez6G6t3cgu0n2SIWr1JSMp13duvokQy

cxwbI5Y7rzrFsW3+qefxuibwzk1R7TQVmL4dINWd6W
vDihVhrMUYHj5TcHRBupBNy0v2TW9F8DmsaingKAZ2v/qEopfj654m1cI7196sZRFD9xlZDVfefEAMxM

XBQt3qRMvcx7lAn1hpMuOCrfwwI5Ly63S4KOcidWH4gw6l0svgpT/Hh0k2u2Ij1tEBJaAG4YPSK4VnfG

vmt4Sm5ZZIg9BcmixvnCoGI332CkmAwJ4LIrurVfVk
/cA8lLDzXY0oUbpJWEqULGBNsDpB7y9wxITXJ4R0DvgHzwBQY8nQpWoENmKzOg0ngsD6aaElfvyufNgk

IRJNv9Ygb6z8MYqmbCSYTm0jWUDe6PGxrrVzy/AjfR2Kkw6UJ1byi1ETTGfSYcDbMXLjk0doNsW2j2M/

tBnGIOIFmla3oG+JyNPk2NoEq/KQtog9WOlegGNIpI
d85Bd/Fs91zR6HydrTV/LU09pU8iii25OjIlsxSTPbWXXwWn82qTkRkvtsu08xh9ld9x1n9sptqxgKW4

EAT32pIx06dSrWj1Fz1Dd2+8fWFr5cnLVLFLHAedQYJlF8I8CFfxNZra5njEANavckSXsjlmD0S3lP8+

DFk5ReZDwfjnhmBdXMcziyp7WuwIziscpRWU92rSI4
2zXlCaHKy/z+6ouyGTEMmxnkm57nZnklJgrwMU55IYRZ54/uN+be0FN1ns8uz9yn1O1imh2ElAUg8e/Y

Ys0vYxau0+DRF/L2b6af3PRvYFylsT3qdU0lwak0Lew7L36urCG30iC6jHbTt4TYQeQ8+Kmlxq0em+RM

bftY1Kzf/lL4PcYku5Ff3BQb9/FLSZiYgHUBw7KnnW
ICsfhYMaGVJEmQqF6OJXobsxFxUpxUiGkU7O84aiyY9HLCBrdGMVoy8v9heU1NqCRm7q7BfAI1qv0EuP

0VgNsjT1Y8dxON3Ld3DZAoLoYBiJFgGmRAxqpEMwIXFbY9bqu5ytf30gmWUSYHvnS5fL4Uk/pzWFSUw3

h2+961mN5Rxfc36GTMfSJaEKOEAagGOP4CGGksyLtq
NLF4J+pkolRqOzTLOMdaZwt9NBIaIFQfA03aA/XtHf6eXoH56K1SKYItzbI/iLRw6zytvumYBFnFsaX7

gMBP5HPMFV+F7nvljZYemu6Z8E0hBH5ZnhpG8UxSRwloamVsCtiHGXhhP/a7/iu6h4o2fh+IU/1pINyx

P+GOwWpxn2DitRbHB8IEuk5ET7ETGpDh62DRz6U0LF
NpFwaZqYtcCQqSUy0kD/NTN+jT+L5uGZ21VbGCPB9DyKqvJc2iptRneuSCdRy++11rQsvcT5B1Uhtnnb

83xjDwJT9Ls3dsJQuDet2jh9fOGaq92biEH/8SGrdihVh9ZH75gjOdh3AkaxpJkK2xZQQzsnl9DyaNus

rw62OJgUyJD4lm0PP6DuRMJBQdhDeBpxa+DiM8KM24
z6UTde5ZbAOEBBaOLVQX7N/mCJeUflvclIMztDwqD95jfmkdCV7f00PpJleXs8E+CW00+9Bs+P8Eda9E

Cq1ES7kFqVfkZkRfGLbJuK1WAWi/HnorGC90am0zMEtHDYQQ0X2Cd36W+0vBavkW1TcaX8nSXmPvDttS

EyFO95OU9DQTwUnJdnsdaIyL4nL5ZGwL0kgoNPNx4C
HIZzHiByJdO/rGsZ5EYKgVfRLNhH6oLxzrU6Zl2Jw3/zK4ySNKiHiogt1iKM4k09geHNeNK8a1HYXYit

tICaDBZKeCmuoW8ZvTPxceUYbTJq/NdA64MhCVOyZV8zxuqmutEaEj3euiNFgHsgTq04or+4ERChXWKf

0BLN2+yr1Ja79dteqaF242B78YAslO0tTb4p5bM5sH
/mPtPkB2MKGIGjfJ0zrQ1zC/TJ5aMfZPRE7916dlWr7q6E2oPfIIkNJT0IISYPJLYJAEb1PoX4xDqt0V

+Lo+6L9FzdjAvIgVmZrac+U0iT13dTs6qWFgKoiUadA1tkKIQ+DUGqccqahC2YgTEoMketEIK+BTYAMw

C+nNMEHgEHqbHS82N/TF0ntgu8KtVK+bMeXa9pQ4W2
mW1DlOiGafpQvyNQHMzqQqgmDq2vY6MkHOn1ghyIC+90NDx5xodk79jt/uU/UUY0//kgazDGoMom+5lM

tnRXngcLrK0APYc0RE7RiJExlafXAxE2YGIcDU/hZw5u8NVOSKDHQ19CBsamvwknrFL0rs4FwCJnG2Mr

Lx5NOsIthLR9N9CnvdcrHBUT4ByLB2YW113mYybSuY
8K+ZgDsSmdGsdgTL08yA7TfXRqEeKeNybp79r8SjZipfFsA5OXvdYhO+k2CE8KVwtNDU443s+L8C+Zax

GTpzs/AGxhjy8K2951degWa7JT6QOvkMwHH1xGSPux1zIbjudrel+m0V4iqR8pgUxEubihH4HUD7qdVS

g8oUuYc7xffgm667GNJn3fU7sEBJS+Vx3VQYOu+iRQ
uTg3mUyqlYON3bzk+96P5kfCJ4isYfcODguj5JR9qwmLq6d87XqM8RjWMV21e9K2CCH3xs/jqzvn/uo0

nOh1V5P8W/zVmZRkvJ1wdCpeLH8Zz7eSEt3n29a41rNqj1fhGd9heEV1FJuk+Vy0NAQV0vbIyGhV0mZE

hk/NgZ+5FCnP2CubEkJ3+y46aIAvw16oxR+0+AYT2O
algGX2nNY9YRpEjFgDgiyIouhdQDI1lk3HKPlN56my6Y7+h4JV+BrXUiXq5a6kPRyswiwXQA+oTcs/RS

8OlhCmR3QFZUOssdSSaVtuTtdlgK2bIrJ6B2DD26I12bAhp1bYn4vR7ihqcEiBQFwt6GMRsMsUDMBZI3

aR3Ymi7HIokIqisn+A5ULk6610sv4c9ZkxnS4tYayS
UW3fAvo+ogSI+rfqGkY3GHI260udEZrBWhkHj/b8FwIXV98yVMlgdPCzBR16HqH+LralGXWU34XK3THF

QBY+yrMrY9rMXylGYFHwYnRGQJcEtYh3Wd3/peFyqbShUGx4DcFGT/nrCLaYLyCiR7GrFDak67T2THjE

fpfz08E1w73JZrJ4ttVco+Ah1QXjy2eO1AaxCLXvsA
OhLaiU8kHcVigfSxEZ29OhqmN3lix3cEHI4QUCjimNBC6pk0EneOhw2YZesPiBiwz1P37AW6NGoCg8SW

K7idWn9ihj74ah5Fx96mWLzX98YPLgdA4mE23M2P3DikvuR43CpGDH91MJpxYUozIYj1QsgaB1R9YhzF

1vfT/XotrWC/5ho1uIB1/zbYlhx+iEZzuHwD/tU0Ue
+N3LazUm7hNAnT6PspKgL+ZV5PaFib9UuwbJ3m2CmJvYUPaeUTF1xw/kWaMKcZRemroMv4LTZT3pDbBA

GHZoNltMmYI8xIAdryXB0hrRqQmcOa3rv39LeNryi4+6k/4ko2MO3nsUIeFkgUqRuwCx65nh4CQ36hlO

B9QImO5+l7bN4OU54vYSjyCnmj7K5Qmai+0X847F2i
ShRgL4dX1mTUnC2UHdMDEfW34TyeZr5p35nz1TWYjG9i+J/EKCBFd3pioIi+2BPYDQ0U/EVcPuA3oLgO

tXkeiK0wHgl6aTtXfir/wTgt2Zv899q2muHOhh10jUugn1S2J8vxNavhU+V7UVgp3vdY/7Js/vyNli1E

bqp+/0bxdh+x2qWs652O5fNalbLzdi4L4UeRc3zty9
50JhwwRI5PkhuevFMjAW7kt3eQbuzbS94Bo0tT6zvM4YXIiC3QNaPd9+6wOUawMhL7tEVlh5sUTCFH3g

qWCX2w/dl+fcO9E2n2uSDF38BMGxZ+N6TCCNNk0G5aPMGYJoCmb1mEvV/wWTlKzFq8hMhGM8gWz+3jtk

tk+0C+1plGZlWxMfoYqKO5tYgYn4pbaY6Kl3QWrIxv
xka38+X5/xMwdvYDLwMv/V1lXuJ5ZxCLrV/DDhkIO/Jx3NYOuTZCJxqQwkWXf4MLizR+uIVB2oRkqPAc

K5ZC8xUDHh25Xg5bOM1NjkH3FSoj6KNTYIbenBX8+RXa6U++TnFyO1ECN/6UPkDPqZ4OYJ4/+NiP52HI

g/3AcOuG5LK0/tRk+K8BdsH/ZxvTMMJe1yjnH8wFpZ
3YKy5dTyyvK91Y/+E16SmYA4nbj0kGvK5l7eE5SNXQ4Qgp711R7Bb4gfg+JaHvWFFSfdwL4cvds+4Lha

63sa9E48O6H0625IAuEKnKtvhHex4N4gWbEz6r9w/PffJ8YL6rqrAl854wP1R7Rxpt2qFvwPyGnF2Kzf

nzRrhuFd/SEeWELOcdNZC5MJglM9W1DaS/k8Re9YDZ
/LmaZN6WrDwtMdcrmn2Fu9+Radha/Zbr61bye6yfaaOrAs3x/1n6w59h/GD9NtiL5XpaSm9RdiUBB0Ns8

l5TQ4P1bg+XnmaPD5+l/1+/Y4f2lAfQGlpE8cyPz5iNb5YNI/A3Zub/WH2l3/Gp/hF3i+kTwGded44Us

vGGGbzmSdXz88HMJ/sxp29+REDXy6088y605kulH8I
PCXy3lJBgVHqL2vJaEdan+nm0vSXt1FQMRrsrKhHzWLNh7QqFMlQL3y63C3D8D/wx4oIIoyiFCRB+O7L

lmsyd4JTEa2RAhPV5zAn8CVHJgpP70Ds2cChRNhFxX73t9NExh/HWIPUH5DZmBSlTE4LsODpGu4DZbHw

5keFq0Yt89KhWn+n3M6T0+93Z7laQGdxkD42LN0One
9nZ009Q8Ye2m0ONIx6Y4M6qDOrZh9nZZF879Wh6/46vSle5+GYe2ZkhANTtAw9rwWGxh0ElQSH02yq+m

a1FEeSqlKprg48C8j4rwJ2Flpnp2bPL37S4bR6+qXTMsza0VFvT+i/koLbjMlpqm4eqc7YjrrjS86rqc

x7ith6ihkHds07SP6yB6EYBPsV2gW9qDjl7Y4K3aOY
eeY++R2hDT6mx8rUkBqNkJuWBdIK/QlTjpdiLgKp85Us9S9ywCWqu5xHAjfHK6jGM40do1tcYgwz5yjO

PHX1M/50fu00+iMdpCrPvcPdnHQN+iS/GywRKFsoD8ZvKdUs8PToJfL5pqvW5++2mZ85Cpf44B5fv8/l

d321NgF6UGvwHCr7+5K9gP27G7GqVOuJhzO+y9R3U+
f5nZaFT9QhcLXRfwftVTDedlf62ikkSLUnlrjn4Q+4kWXCHVTn6zSVcVq78m0THHzM9zHGJazHuMkP2d

zkQP3Fl2ppkkbZOFQdL7GKAT9OvbLrFD0BrqhffV+opZ2R8rZ2698v2MSDISPKtGIashrvsseyVwZXXj

/Zy5SoxV0owan7Ctq67g47wG9pF2yY/7ZKlpsB0YtK
7ymDV2QjMGu1AGlHeMEBN+tNJ3BRmBxJJ/MAw2vZaTa9o/q3Yq0nokoG4VDzK2l6SWiOMpxyWU1KGCuO

DCZqye4nLdvU9rL3Xn8PIOqSH77wlHNMxpsy5rCjvFJnJRTqBvhHK8XAQsGtMTuAuqfS4TO+lQ2KGFeC

caTpMqUFGp2HQvUPThFVNjxrIjf4zmLd3etYV+8PRI
FUb3BsWU+fINRALhgZ+RryASCC/9EAMbs98kHPJx1B8DhuPONOeAIFzav4TShSvOc9UkAs/ziHzogd5N

Re+r+i5rShSIJl+fHL7/Mervat+gaGLITll8T3j6dbeAt7C/XrIwcN61lRz3jHs2VkmzhcAIUUmPGmhIjsi

q24aG6Lqw64aub0BU1NuGBxrQh+xy1Nls+5aEVsT2v
zeo3qbA0HNUazLfe99NJcrHtePhQnsEMwuonORn3W3nE0aP+jTAO+XfkeRfu20Ey9gDd/LeZ3WC/Sjvh

A1k6FVj2MYtlha4qbQxYWyA19uA33G+rUJ2to5zIMv40+dpYpxGi4wrMNzNSze4hV/kFAO8hcHaAZvRk

tVT7iRA9UK47GsrU4Viiy97ICAjU9SnsoDOj1xt3is
3FuFKYEdQbxuBJ25HAVNp7cn+aNKJF99S1N8Pv6Iiq4TLHiX0V9ZX/jvxLz+XD1lvnFdxgXdu1vQmj3a

2XnwcKPi9wL0bgh0b3paIfASbCEVIID7vhP7LdS8D/p6s6IN2INQul/LmVANL4bw+oL4V0CI+GP2/orq

w9BIQfWsIVPsoP7yL45jqh15MIkIEtzI1nkMO80VlP
91AbgGZGcHpJ5zVTtjwrKmNpKMw2/rXQxAhMjlKO18E/fd+UsSrga6K3EPzry8S7+hyxuUiWy7JR5rE1

Kf9vGxgbcQYVPt4xmQ2WB9Vn0py4HOkEdl2y0m9xy3p7YKK1hxi5PhP1Nky/o5q6mYxBZdIadRbQdWYD

yOp/FTRssutrxP12W+5Zehha34wROMQclBoYIEvNhU
hAWsTu9gvjNPlrP8vrqmPmqG9awd+QP8aIGbg04rSMjdjYf0pGdI6nhr3nR5T8rBgIxvR5R6HZjbbtBX

w6t9cWko8+eXkuRpK5OSM1vAzf6YsV/92eF/Bziq4aOzmt0mC6dEifgZX20mg1/uuf/kN0h/5K5mx7S+

U3WOM/2Dex3ljvlMbxjbhrfeGBiXULxTmnO3hg4/0K
hWZeD9bJP/h58Lqm7NNce/7Qs1QJ7YlzKWmWiIxTja2kesst2/R4gSYi3P2oS5HRwv5Nok5Mm2ocvrSp

b7J3zI2uK8t3p/iMZEGtQ2ljsp40OCSA/pnhvR0B/h+a05nPyUl+K1BGzFoDyH5wdkMMy5/whc3rM9bT

aHMgvvTDg9mw3rllfeUigQ2FZB0jJ+VySsfyic+35d
fubbohF0ZAp04psE7h/SAVAKZmms4cbODO2RcidyWaju8v4ka6NEKMuf5Vq23wBj7bi8v8d4gR19Nf1/

bjv+u+8lo4uvg9L7GE1wTLbh6K8jyQDgxKL+ESUkpiaBvW3KvPozqR7Yyau5IDFTmwbstwPI/Rjhxn0V

n+zajhXKq7WI57fl56pcBHiAak9S+T0WPRv8Z/14pt
l3q6Qmzkp7e3y8nX1UtmEi8hjVo3Dib8bM0LDdA9vZuhHiYc/mvzheSG7Dd43gcBmY8rbSgco8qxbfHQ

++ba2ApB809FU5wkLrvYKMGf3cDR+D1obpU01k4tIf4w3fVxcaFMogpcNosd8PDk554dnwmBwoaQtwg5

+Zw1K56y/uTrmkBj2rUsp52Ey6Kyu1YwpO+xLxsBf4
tKzxCnZ9tkAdwV9Xq22OIJvkam/hA1AbTVAvo7WNYeOb79fwvRpA3cnhdnoJcwDzv6TSu+v7Dmp/aRb0

+HvkU3d/AEun6SYTvgjMarGASJIlTx8eUEuBCV/CBwHPknEB9DtSH98ojMicizHi8+HXYe/EI/wqYA7c

ZuUcYDsrQHYRdPvzK4KOosE/oDfipxVoZA6wNRrMVl
tscLxab2+uEcbLTzrP1xxc7umiySf8dVxo86FOT/mmPndwUPkn4pCfuT+SdCgi7ScaMdJ/ujJ4qe9juJ

6i/TwioE5cJb4WNFMK5ye+p/66OLzOAi1D1AZzL6ASH9ebAjXgyvg17K+EcrwW0BIoukgW7OqxFhdcmK

U79E30eZMob8hC5wvImMC9VtSVh3N8+M0lvk+s7hIf
qw71l18ylPZXq7yIYuBPer7ynVNrHbl5O2EyLyW/N96FVv1778W3s+4xaxqkJp8kwhcLX7Z5867y8+dj

fUWl5lE9njqW/WLgpGetwPopL75ARI5RbIhm277Z8f048zdGQnr99xBgFZ6UBkUly5ffDI0srKhs86NA

52ooWCdI9mHs78kTnD/g06yc3crzzHxCQf9UcJrzvi
n+Q4ufRik2y7K5OU86U44pZxfrR22zKlmw27shd2S5ST7ppzmfEz3y1NDSdb/WyrdLy35nlmtrHQhD2s

n7cwhB4pc3b71+Xo2cD8Dsw+oRGoq04yy9b/C1D1DnbnxyxIahVul4YGdV5dn4/2HO/t537AY7Bz4YIU

S6lnWqmjP4bjfWadA3Zaxb5D7qIX+ddKN1jSGob4B+
OPZewLKPjWKuP0jmK8UloKx7v2HIF+5YDkfchbP6Om96sL9u/2r4xsCEDDcq+aatg+DW6BYcGyHUp6Or

qhqrqPud6kZsv8750sKMpshWYvh8JPs9kjguOuv/x6qJEfHil44B0RpAWbIAAoz1J6zO1joKqdJUHZso

8svUke/21q+9aGdiy2H/UeuVIYhnG+S0ne/us5m3Hr
vZZ/dfxz7Y4zklrs3l2j90q4kpnM92yfNdrtgxuXcjHa1CM3GWi5yrxazmI1pYLv9yfz/eVr/2f6wz6k

qDqJ++ak0UDdqm7K7nNKo0hmmH+oKOteeGzp1xPSuUMLS4EKkC3ErGiOFhXq4WpeT+Ejsk83Numyz/O8

J0Im1mcjanH3AfGX6Ere9CXd4ozBtmcC8IvSOc0g1a
Bv+ugKF+O+ViE9+rUXeh+qFkrMzPjJ3NwiTcVp3ZjXRAAKok0YLZ1Rt2kJI70nM+RN0Ezya3zJ02Yh9C

/SamSEtEPZz+fVUWh+fAbJgj7utG7qqOueUxiOS+HDLJz2MaNjHbQpOQHZe4uML61lr1yygg45olZ8bI

oDgWUoxn8sonsV8BrsuGIlBobHj4I4GMW19m6z6XXi
Y6B/BiHsuE/wStFl/Izc9G9LGiyJp0B71d/wqgk8KLwsg6MFnZ/RALvj/WZYt4hi4W3Qq75NJkANiLg+

Vri93W6lrxY1K7N+Gdb8f2b+8GRotBWTZzEuBS6uyoU6c3X+e22L5AydmtWbPDdzKgXK1hhxbY2UO7Ur

LB9aJ/TV1R/tyxi7EyiBnFdSDEQpP+wwXl0yIeM+VW
rarJ8pQHXEddxhr6oD2tmAlNwciPx7awKggSpeUPYh0z9Z4O4gxy55exG9aM9TeEF9dV7r2/3UxiCy4/

ICmI13v5D5gJqyo75ZY3DccGhlfT97u9i8aRVlRBjItHrb9kaId3GEN6rRhIVK0T42m+kY7mqjbUmyyR

SnqyAIGBYsjmVnREm4CUbT7MQkA8yIrhR45EWLRZS2
2T41fNjaek/kk96jKq1ADHbbzSKOzw6OC56mA/MbnG2YqGpRup31uc5vxDU0Vg4JY31j/JEen6XnaecX

STC5TG84fcEb2mIalb646L/7S7GFcVOAMNZeUXsdxTS2jKvetXvfZe8DiXT1Nu34A+rKNYPzUEzGOPu/

lcefdYYElULZ1m6Js94mIOjMjBlq29ziIm7OTS4Rw9
vPDraMxWz6E17Qh90k4emcMrBLfCZUjOBxaz5PZgR97w7CEdKerv2emrJRbs0axWYqlmH6wGFbt0jhVf

KPXUy50XIX3GqBeWZ1Bm9V8JJ4/vrsv5/PB1wRQnwDIg05p5dbW0fr0MEZCkW/hd1xN1jTBTVzSqzexu

w4JTrGl7rwI+65WhL9NKW2ZLTpaIMsr1X+/9/9L35n
rGlpI2WY+4yA2fknptaBjTYhhHyGyu5kgH05ioZMNI6YNbmjM1wv/TYd4SWHQ+W3+KJXs3HFXaULvWhS

gUMn97WynJ4an/hf01R+2VNle/AVn8pb9b5uDOZ4gRj7jUVLMDbcA+wwjxP41Rbo7i5a7+trjUerH4yV

suED8tyh1bm/k3zDPM/lGr5OfmJc9fA29WX8kFI3w2
B356MbcnGk7iw0fFUEwJkLgnQ4CIAcmw2M1AkkGsJMXi+6nYF6SedHO2RiisjqNqdgYGDsyRId9zyub7

yjizdh3r8Ccxr6B+FYiYvSdtS3dt2S/wDn4Dd+3aVMNNOjuk6LHql/QO6JYyrOuPrujCYnW4E+gz9nmp

NMoRCii3UWF4q6SBxQLs8tpj0sgJ0tWOp0f+57V9+G
+P+LdDzpq/k/b3QS5ITlGNHDPMU0Ek/NVqpr427V2ibCmhW0hznGcWTc1eKjgpzxgaOXNj8sHZY7a8bj

2q/NXedZwjENqTqaVNsoZ5GXimAZ7ss/VUC8mAYE2DlK/2WBtberpx2QmszvAxZXnEzxAO96Lvb0oMoP

+Cclx1VEF/gcgkSvNVqk7YFswuGi9+xlq00pGipr4B
8zcwxl3DViSTfo7i40SvJefw+p1fPdjlMqVklEoQ13R3JHHRvWUVGbkWrbU1CNBGoJDJpKO6Ev7ogBHm

pweGDOnC/sAUBEwJCygN+RGUfyZPyF+d8MnggixSTG8VM4tbSaw3Sbq/P/Er2dykO/CHDuQG/ARc1utn

yIeqQxl1hj+Mi6Hc96cvJsLTJFQYe32Obf6/JEr9ea
B8g6wc1fhnzE14i+rgWG233J3djTu8KUmQ6Pt3ZbemAb0DCPQElhB7ImuuRcQAxsCHid5iVXYOAeJ9aG

etRmQNZ0djmpJWL5g8xC6LvLiZa1AG/5cpO1SNfwceIBmTTNtM/wx+I4qKF789G0VL1hn5oHDwnLaqcL

rq5qO5kPVJjVjYqoECxs5/ftTnf2idkqjGfdxtt8E3
XXO72L3pAyjUiY6LQQ3tah+TBrXUwVZgk2IxN3ZvGFNR7E+gCcnFJcUhsRhF+vlN+K3s97P/K5vh5Al/

GEuDBEfztdsEyirxSrYC30UpryFUU2bcjPEbgafAFQrEMRkMkGeEHpahSGTzq1tpLOFzhmaUrOI6lGH6

PqgitcqXok5WUgBt72STkvHlNOx0lZgGPYUZIXCze+
gBTFEGtLJbqnpkKOFKq1WMuqOQXICtcKbJWOSIcYIwOb4z3ZOvIUkuk/aI5SSCG/31zJApToiZFvstsZ

I3GH1JH1htRgAuyYg22m7arKEkDjTKcq+zS+mdmjVkHNbFA3One8Yk0duewVTW/wxk9JX75ZwwWysDjH

OKbb+kAhzgdLFlQklHnqCNPullbdapGr7Z+uSlAfTk
vfiIYtQud6g5H3pbp+V82O4SbeBFAQ+p0ikOZzIhZsj389pTgbkeMKmPo26nFH/qfLOHx9g4NIh0TGEj

Yu1/WUUxbBNqXFIk1t2Nmybj2cQPd5Hkkm3lsr1cNm3YyiJIj0YnKvfIx0GymWPWsJCbsOX6/v4gmyDa

4bmPVvrpzrSgcPut6KMa6PAnKhx6MB2gezUk+nNCVI
SRmYa/YvmAk5LWewdAQehoBgA/Z7wepuAZ2DcS8JDb5xAoBlQkSOF+dM4oFFh9ppvezYuyyj6U3FQpoJ

na3pfFgt7e2cs2C7Zai0NR6qyj1QQvxXBaKHBEunUIbjYAmkY6RyOou84mkaBO2bWwf3C6W3irFa/0uw

rJRTdW9VHyussXUoOPyfE/7g9xBgQB0KPN4w+tFaTC
wR4CsPk88b0GwRxQTsKSHZADb5YXHikUBX1pHnwrs1H3gWcR8Qla9u3WkMm0w7caY9pwmZrDH9pxIhNS

R4XDWPPvCI8K2x8iXLfv32WeJKfAGafQcXlxidHY09EGRQe4ffJIPB2WI3gnimq7RuP32MFW1mAxFReO

5FTC/22G2H9GXPqMhXgwvGsQpYj24VsLq7Qm5CiFas
0dh3gT2ABATfpT6eG+bq8hicIGAHDVb5YoU9g2kPdcDRM8tvXZ4HyCg5DERrMMICxJehesua/4X/wvzo

Pa/8xiti7P/fPcPR0WPS8rt7VzSGGiWFmqcpGcFjcFEtKrOOTzv0RdLOduIUg2C7EbtQXzofNaEymafC

TRNDXqTFUvY2fejkg4gWNwZBZ+Vr6NSFCquUFkZP4B
CjpHvPTNRhExYYjtwl8iC9RU9qorfGMCXsKXK5I+HPeJl1NsPmrhcA9xek67OCjwIsTmzo33IHjQ4cjG

abdY8GxqvUZDl10sAXjPEbRTLejrz1Tn9TkXnq0O27q0jg5e8z8uePZfyyRwiJgPyHoiooAWx6lM0e51

0uJ4D+cLAYW24BFbPa78Mn/DJsBeFNrxyqc86M1Kf3
DWaX8E/UwOgaeO/Pm6ZKs6VQTF4AKMqghgyPWbVYjaVIi/bKvLYPEAh9Xiccip+IA0NH2yfiHGE7x/pW

B4mfHqxfKk77CsEyyYGDfipt1dD4op38mR2Aa+v2U/bjvJOaZtFXF5hjIpdU0OOB8iv4TqUAqqRaEzEA

9reu9MXZuLRyLgQ4S5nAEzCp8vbXUhe9NlyNV0e1QM
JbGgE2CsfmANJB9pY6KPNCBMQklUaklglU3TESOrLFHlIX10UKksJK1ZOPSMTFnomLKcXWX8Y8Cfo8Uu

mnAyOQWoCr5PAQByOkhmK2OfIqKLKeDfL5SkaxCZAp68FSmsRK6zTOSmMBYzVVP4JCZyKLgvKF9XrMDl

pBAOrytoMZDvW5V1BH8ZRRTBDoDrK2MsnxKWzDraVu
AyMCAwIFINCj4+KRxapfLpFysVNwMfKIXxq6TpUIo7BS3DAEIiHAvtTP1Fu819O8W0AcL1xFQlI8nKZb

9dmFQ3wEWnJ2Crc0VOp494M2XONTWNFzuOffglbK3CAILPe3jAQW0mfZ1LNENcsNs5tF5HNOIhN5jIG5

fyqJToVS5ekoB0MF8BRNmcn3VjeXPjZJOaXeXvQ94f
ODOwrN4oNSwJWCFnrBz7cMdbU2F9cQYaSZ9lrdOlOK2+SP4IJVJcNg7hhVSje5SmjVS1j0RyQzZeCQZI

RUeyLH0PGvYdCOIdD8ckBISxRnKiFKLpWrYtJxT9TC2cDOe8RWlsJQGeGVZgJNm+Ys7KSM1ev7CsPOuh

FLQeKV3dhy5YGXkIDzImS5G9sVYbQw6jvP4YtDH3kR
OoD0K5fOVrU0Qzw3CIf044J5LCNZCIBazSkbpxhJ8ZpmMfUHxrEq5VXJYaqBn2vG0KWCaoRK3PKIWjAQ

8qOB49Ek4rhQJxPaVePZSuAg3PFjWzW1FiMW1iM39wRFBrGEObWEPAEn7+KLusqzWbXjqYAkT5FFSnr1

AeCMgxPB2VFZ7eu0RdDYauHTDbe4RfZYb5WX8OAUTx
GZMpQ3PjoJRmV2BHBk1NYLk4L6xxVGjhHj9YiKNaYGTlCGigVD0Yf764SVa1QJ2VVJLsOmRzLCRUNgPi

QJEdIiClLInmLAVZCDriMNDbL9AmTYS2WGNeOn0+ARowGX4MS9IfLEI9UDt4SL3+SBrySH6AcDIQI4So

oWCwXQgoE2QSIM3OJOU7JL6DlYVeNG3NuZTKL9EjsZ
DwEc0dUEAbu1FaSc4gH5ZGNBUEHDVpGJygDGczXUUpVOx0T1N3BVWxA5WQO899kYNqkSt1Xj2qV9ODWB

hYDxEuGJgjTKvfPPJkEWs9F5K7SABcI5PTN4JfPoJrngUbW7C+BiHgVVRDMH9HKREIVJi6T4L2mZNcW9

W9nNtOiHA6BP8YBP6VkSKlfTWub94+JlTMIuTjR2VM
E9JEYU9NEBf3M7Q7oGTgW2E4eUaFqKC4AV0XYZ1IyIblaQHaZl3lUCguMHV+Ly3GGb5ASbNxWU7rxh6S

HqOaAXHaFmpJWej9F0rpdgy2sGDqHvQ3L9A4MkR9mDDuKV5BD3S1bBGwBTN8MNWvtPU+Cg4Zf8UuQEUq

MGv8T3ztFAIqCGLsPeEnaB97P++2tltmmCB8I4h6AI
PAbSDxmXlLwvScX6cKZLL3g4Y5GGg/Eh5XXZC6tWt4gIUzVQMuAAm5sV8agNm8SyOrUZ56XSJfOKlxoG

8dQss0T2Ffk7LxYr6nLk1dqJSeEe5OAiNlIVK1fcRyAbIHCiF3fGdokdouSMY9L2l5nMQ8Cp86e4fran

Fzz5GfYfS4JWxfYKVtDyReilDmTLD0xeWbtD7ykoQd
Mw8LBBZhSJdrwdSaUbZMSf4BVyKkCL29SccbrC5shIH+DQogICAgICAgICAgICAgICAgICAgICAgICAg

ICAgICAgICAgICAgICAgICAgICAgICAgICAgICAgICAgICAgICAgICAgICAgICAgICAgICAgICAgICAg

ICAgICAgICAgICAgICAgDQogICAgICAgICAgICAgIC
AgICAgICAgICAgICAgICAgICAgICAgICAgICAgICAgICAgICAgICAgICAgICAgICAgICAgICAgICAgIC

AgICAgICAgICAgICAgICAgICAgICAgICAgDQogICAgICAgICAgICAgICAgICAgICAgICAgICAgICAgIC

AgICAgICAgICAgICAgICAgICAgICAgICAgICAgICAg
ICAgICAgICAgICAgICAgICAgICAgICAgICAgICAgICAgICAgDQogICAgICAgICAgICAgICAgICAgICAg

ICAgICAgICAgICAgICAgICAgICAgICAgICAgICAgICAgICAgICAgICAgICAgICAgICAgICAgICAgICAg

ICAgICAgICAgICAgICAgICAgDQogICAgICAgICAgIC
AgICAgICAgICAgICAgICAgICAgICAgICAgICAgICAgICAgICAgICAgICAgICAgICAgICAgICAgICAgIC

AgICAgICAgICAgICAgICAgICAgICAgICAgICAgDQogICAgICAgICAgICAgICAgICAgICAgICAgICAgIC

AgICAgICAgICAgICAgICAgICAgICAgICAgICAgICAg
ICAgICAgICAgICAgICAgICAgICAgICAgICAgICAgICAgICAgICAgDQogICAgICAgICAgICAgICAgICAg

ICAgICAgICAgICAgICAgICAgICAgICAgICAgICAgICAgICAgICAgICAgICAgICAgICAgICAgICAgICAg

ICAgICAgICAgICAgICAgICAgICAgDQogICAgICAgIC
AgICAgICAgICAgICAgICAgICAgICAgICAgICAgICAgICAgICAgICAgICAgICAgICAgICAgICAgICAgIC

AgICAgICAgICAgICAgICAgICAgICAgICAgICAgICAgDQogICAgICAgICAgICAgICAgICAgICAgICAgIC

AgICAgICAgICAgICAgICAgICAgICAgICAgICAgICAg
ICAgICAgICAgICAgICAgICAgICAgICAgICAgICAgICAgICAgICAgICAgDQogICAgICAgICAgICAgICAg

ICAgICAgICAgICAgICAgICAgICAgICAgICAgICAgICAgICAgICAgICAgICAgICAgICAgICAgICAgICAg

NKLaFFZcJXElCLKkSDQcYVTcKVHzIRPfWRo7M3gkRJ
MzNZFpLJ6yFNg8Sn0+IBeKOaHnUNB0fjPnrD8QHS2cl7EvETxgSEHxq5CnFDf9JE7VTKAqVDdgRR5ZZR

wjrr1JOOGkNSCwiNJKm5fpQwMpPYN4KPDvPhvhRS1VWTKwA2txqlWeYALpTPCWNPpdDDBRSYenRVJHVO

MuBWBqPrUnWjExMVDyMX0XGVKiM187alVkHW3AMr7F
JuSmKZ7qhn5KOyloNAQrKayZOvp1EWvpZL1FqPNykEYrTYNlUHPNCgKmA0fof4HjJsugHDBVNVsoWM6C

i7XhkXYhUPs+Bb4AVE0zx2FrIDqrRACvMG2lqr5XIGfDBoTvG9EqgBriFFMbk2wzQWOzEX4jeEVqGWO1

ODidA1vwimyuNzOQn90jPBvdLFUfZFNhHSFrPtSjUa
ZeBMKaZJszQXPDMEaDSyDnR3Ska8ZlDvG1FIFmLcAwHMwrKVXtNaZ1DH16oArsMU7HWPWpMIFuTL29NA

K9DSNkTr9NLt9RZnZaSF6ylf6LNgdzRMMyFprHWhg2ZXnzCA9MxFSgG6AolMSys8rPDzUpE7OQOHX6QJ

VhMi9PDHTtJmAkJHKzVUnxZX4nEWWbAOLVrMlofhV6
NY6UDY7adtXdNR5MVgOwPn3qCl6SUmKlA6KnA2RuNCQaLREIBTdoLY3UAItuHZ5pBS9Bw3FDaICixO6p

op2GUVQfNTCoNcineo4GYbgbF5V9lEdsEPJmAowvVBNWBGdtGJ7VAINrJAZ9YXZaZxVzLKUJMkPsH88w

MC1AV5Qbm87wZfB8VTJwWtInSTrbLR75oCqzhfYcqJ
EqiQpeHN0FHe5+NVjgpnLlLabQMjrtUGJYFyQsDlTZDhQbFRYmMHOjIMFlEjY0GtCyMk6YNTCzLVSbMK

XuQzFuYZYsXATyVHmySMUtUFK1UrU2HXHuNTUtAB7AEkWmQVWvPwK5ZGbjWFLzIEAozm8AZKSnBXQzFE

X5IbAkOAJyXBZvUErbVJHiYNPfLNvuQVAgNEUeMK4A
MaPyXSYfNWEpCMXtZGNrHGTidj5YMIKwWWAmZWfoJESeQOGuVLQyHMhaZNIvQEW3IUCmWNPeGDDqNP6G

YePbLKKrOQsmKPYeXQMyNOBaxy3KTHSzRNLyHLHdWSVrBGVhTTHaAZeqVYZlPXIlHPk9WQVaUCArSF8I

ZfAvYFZjBPW7HHVpCLEcLXTfrq0TBISnDGXbMlE4Yi
QjDMQuPWGfGLipCKBxSTFfNtt7EKZgJQEpFI4DHnBgMZFjSRI8WMvtPKKkUIHoob2RQMUuWRKtEHt3LH

PaIGOdNHTfTSpbXVGdMUP8NKD7QREoIECvSQ9ECyKlIEFbDRN8NYwzTNXvIXBsmr8NQSCuNMXvIkE9IB

MpDHBoKAYlKLkmRIGkBVG1Kyw4HBJjSYIcCU7PAfOc
ADKaUbg3PZpwTOGuCXQmwv5KRVOjYOVxIJC1FfSeNXDcQCNvUQqgTERvDTK8NtBpLFCuSOOfVH8NPdUz

DAVhRet0TKMjCUBnRGAlzu4RUQIoELJkESh3TmSgOTWcBDDjELkzZYQlPKE3HPX8WCYnKRRrWA2XMaHw

JPNtNbI5TPUoFIBeUTLvtg9XJADhEPDcDCZ8QiNqZH
CwRWBpAClmPJVnELHhHEN0MESxJJDuAQ9ELyYvMUmiNWSXNrt8ZGvrP0s7ORBtSZ9MJ4Wbs5JfLljoXM

KXAYzaPZ4vwyGgFGCtWt3ID2mEPgqjZYKbWBghAXGaJRnoDXC8QXGkH4NeUXH3EFEsJAQvEs2zMOEhWi

K2DqGrNZK9TpV1NHD8HfL4QcD3LQW8ZAOwRJL5BjXn
KH6HOk2PZbM3JMU5uLHiDi5AYyVnYulFEvFoAR0OLGv=









                    ID                  Date                Data Source

 

                    [id number]            10/23/2020 01:53:00 PM EDT [facility]









          Name      Value     Range     Interpretation Code Description Data Gregoria

rce(s) Supporting 

Document(s)

 

                Erythrocyte sedimentation rate by Westergren method 51 mm/hr    

    0-20            Above high 

normal                                  [facility]  

 

                                        @Reenter manual test result: 51@by Yuki Cain at 10/23/20 1353. 









                    ID                  Date                Data Source

 

                    [id number]            10/23/2020 01:55:00 PM [facility]









          Name      Value     Range     Interpretation Code Description Data Gregoria

rce(s) Supporting 

Document(s)

 

                                        Alanine aminotransferase [Enzymatic acti

vity/volume] in Serum or Plasma by With 

P-5'-P     23 U/L     10-49      N                     [facility]

ital  

 

                                        Aspartate aminotransferase [Enzymatic ac

tivity/volume] in Serum or Plasma by 

With P-5'-P 10 U/L     0-33       N                     [facility]

pital  

 

                    Alkaline phosphatase [Enzymatic activity/volume] in Serum or

 Plasma 81 U/L              

          N                               [facility]  

 

           Bilirubin.total [Mass/volume] in Serum or Plasma 0.3 mg/dL  0.3-1.2  

  N                     [facility]                  

 

           Bilirubin.direct [Mass/volume] in Serum or Plasma Less Than 0.1 0.0-0

.2    N                     

[facility]            

 

                                        Albumin [Mass/volume] in Serum or Plasma

 by Bromocresol purple (BCP) dye binding

 method    3.7 g/dL   3.2-4.8    N                     [facility]  

 

           Protein [Mass/volume] in Serum or Plasma 7.6 g/dL   5.7-8.2    N     

                [facility]                         









                    ID                  Date                Data Source

 

                    [id number]            [facility]









          Name      Value     Range     Interpretation Code Description Data Gregoria

rce(s) Supporting 

Document(s)

 

           Aldolase [Enzymatic activity/volume] in Serum or Plasma 2.9 U/L    < 

OR = 8.1                       

[facility]            

 

                                        THIS TEST WAS PERFORMED AT:[address]  [address]ZIHNLQ MERATI,MD 









                    ID                  Date                Data Source

 

                    [id number]            10/23/2020 01:55:00 PM [facility]









          Name      Value     Range     Interpretation Code Description Data Gregoria

rce(s) Supporting 

Document(s)

 

                    Lactate dehydrogenase [Enzymatic activity/volume] in Serum o

r Plasma 133 U/L             

120-246         N                               [facility]  









                    ID                  Date                Data Source

 

                    [id number]            10/26/2020 04:36:00 PM [facility]









          Name      Value     Range     Interpretation Code Description Data Gregoria

rce(s) Supporting 

Document(s)

 

                          25-Hydroxyvitamin D2+25-Hydroxyvitamin D3 [Mass/volume

] in Serum or Plasma 14 

ng/mL               [place]                        [facility]

l  

 

                                        Vitamin D Status         25-OH Vitamin D

:Deficiency:                    <20 

ng/mLInsufficiency:             20 - 29 ng/mLOptimal:                 > or = 30 
ng/mLFor 25-OH Vitamin D testing on patients onD2-supplementation and patients 
for whom quantitationof D2 and D3 fractions is required, the QuestAssureD(TM)25-
OH VIT D, (D2,D3), LC/MS/MS is recommended: ordercode 80043 (patients >2yrs).See
 Note 1Note 1For additional information, please refer 
tohttp://education.I.Predictus/faq/OAV855(This link is being provided 
for informational/educational purposes only.)THIS TEST WAS PERFORMED AT:[facility]  [id number]RADHA MORIN MD 









                    ID                  Date                Data Source

 

                    [id number]            10/23/2020 01:55:00 PM [facility]      Value     Range     Interpretation Code Description Data Gregoria

rce(s) Supporting 

Document(s)

 

           Phosphate [Mass/volume] in Serum or Plasma 3.5 mg/dL  2.4-5.1    N   

                  [facility]                         









                    ID                  Date                Data Source

 

                    [id number]            10/23/2020 01:55:00 PM [facility]      Value     Range     Interpretation Code Description Data Gregoria

rce(s) Supporting 

Document(s)

 

           Magnesium [Mass/volume] in Serum or Plasma 2.2 mg/dL  1.3-2.7    N   

                  [facility]                         









                    ID                  Date                Data Source

 

                    [id number]            10/23/2020 01:55:00 PM [facility]      Value     Range     Interpretation Code Description Data Gregoria

rce(s) Supporting 

Document(s)

 

             Creatine kinase [Enzymatic activity/volume] in Serum or Plasma 62 U

/L              N             

                          [facility]  









                    ID                  Date                Data Source

 

                    [id number]            10/23/2020 01:55:00 PM [facility]      Value     Range     Interpretation Code Description Data Gregoria

rce(s) Supporting 

Document(s)

 

           C reactive protein [Mass/volume] in Serum or Plasma 4.2 mg/L   0.0-5.

0    [facility]                  









                    ID                  Date                Data Source

 

                    [id number]            10/23/2020 01:55:00 PM [facility]      Value     Range     Interpretation Code Description Data Gregoria

rce(s) Supporting 

Document(s)

 

                          Thyrotropin [Units/volume] in Serum or Plasma by Detec

tion limit <= 0.005 mIU/L 

1.67 u[iU]/mL 0.35-5.50    [facility]  









                    ID                  Date                Data Source

 

                    [id number]            10/26/2020 12:01:00 PM [facility]      Value     Range     Interpretation Code Description Data Gregoria

rce(s) Supporting 

Document(s)

 

           Myoglobin [Mass/volume] in Serum or Plasma 32 mcg/L   <=66           

                  [facility]                         

 

                                        THIS TEST WAS PERFORMED AT:[facility] [id number] [place]  [address]BQIRUNBMAL DIAMOND MD,PHD 









                    ID                  Date                Data Source

 

                    [id number]        10/23/2020 01:00:00 PM EDT [facility] [address]

[address] [place] [id number] [phone]  NAME                          
                    SEX    PT STATUS         ACCOUNT NUMBER  ROMELIA CORADO   REG REF              [id number]    ORDERING
 PHYSICIAN                                LOCATION                 MEDICAL 
RECORD NO.  [facility]               
       [id number]    ATTENDING PHYSICIAN                               DATE OF 
BIRTH            DATE OF EXAM/TIME  Cecille Maguire NP                            
      1980               10/23/20 / 1255    TYPE / EXAM  XRAY HIP LT 2-3 
VIEW W/PELVIS    REASON FOR EXAM  Pain in unspecified hip  CLINICAL HISTORY: 
Pain in unspecified hip      TECHNIQUE: AP view of the pelvis, AP and frog-leg 
views of the left hip were obtained.      COMPARISON: None available.      
FINDINGS:       There is no acute displaced fracture. There is no significant 
arthritis. There is no bone destruction.  Mineralization is seen adjacent to the
 anterior inferior iliac spine.      IMPRESSION:        1.  No significant 
arthritis.   2.  Mineralization is seen adjacent to the anterior inferior iliac 
spine, possibly related to chronic rectus femoris tendinosis or subspine 
impingement.      Note: nondisplaced/minimally displaced hip and pelvic 
fractures are often not visible on x-ray. If there is clinical concern or severe
 pain/inability to weight bear, MR is recommended for further evaluation before 
ambulation is attempted.           Reported By Nick Crowder DO on 10/23/20 1300  
    Signed By Nick Crowder DO on 10/23/20 1303                          
______________________________________________   _______  _______  Date        
Time  CC:  LOGAN Win  Techn: BAIAB             Trans 
Dt/Tm:             Trans by: DT             Prt Dt/Tm:    9780-0907: Total DLP =
    0.00 mGy-cm  Fluoroscopy Time (in secs):    









          Name      Value     Range     Interpretation Code Description Data Gregoria

rce(s) Supporting 

Document(s)

 

                                                                       









                    ID                  Date                Data Source

 

                    [id number]        10/23/2020 12:59:00 PM EDT [facility] [address] [place]

[place] [address]                

                                  [phone]  NAME                          
                    SEX    PT STATUS         ACCOUNT NUMBER  ROMELIA CORADO   REG REF              [id number]    ORDERING
 PHYSICIAN                                LOCATION                 MEDICAL 
RECORD NO.  [facility]               
       [id number]    ATTENDING PHYSICIAN                               DATE OF 
BIRTH            DATE OF EXAM/TIME  Cecille Maguire NP                            
      1980               10/23/20 /     TYPE / EXAM  XRAY HIP RT 2-3 
VIEW    REASON FOR EXAM  Pain in unspecified hip  CLINICAL HISTORY: Pain in 
unspecified hip      TECHNIQUE: AP and frog-leg views of the right hip were 
obtained.      COMPARISON: None available.      FINDINGS:       There is no 
acute displaced fracture. There is no significant arthritis. There is no bone 
destruction. 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 
tendons.      IMPRESSION:        No significant arthritis.      Note: 
nondisplaced/minimally displaced hip and pelvic fractures are often not visible 
on x-ray. If there is clinical concern or severe pain/inability to weight bear, 
MR is recommended for further evaluation before ambulation is attempted.        
   Reported By Nick Crowder DO on 10/23/20 1259      Signed By Nick Crowder DO on 
10/23/20 1300                          
______________________________________________   _______  _______  Date        
Time  CC:  Nick Crowder DO; Cecille FNP Ting  Techn: BAIAB             Trans 
Dt/Tm:             Trans by: DT             Prt Dt/Tm:    5584-4166: Total DLP =
    0.00 mGy-cm  Fluoroscopy Time (in secs):    









          Name      Value     Range     Interpretation Code Description Data Gregoria

rce(s) Supporting 

Document(s)

 

                                                                       









                    ID                  Date                Data Source

 

                    [id number]    2020 12:34:45 PM EDT [facility]

 

                                        Vital SignsBlood Pressure:     121/90   

  Patient History Medical 

History:AsthmaDepressionHypertensionDiabetes, Type 2Surgical 
History:D&CTonsillectomyFamily History:Social/Personal History:Crack-smoking 
MarijuanaMarital StatusMarriedChildren:2 biological (12 yo son, 4 yo daughter), 
1 step (12 yo son)Occupation: NoneSmoking History:Patient has never smoked. 
Current Problems: Need for prophylactic vaccination and inoculation against 
other combinations of diseases (ICD-V06.8) (IZJ85-W81)Compliance poor with 
medications (ICD-V15.81) (MTQ99-G49.19)Anxiety (ICD-300.00) (ICD10-
F41.9)Depression, major (ICD-296.20) (TLM71-R07.9)Obesity (ICD-278.00) (ICD10-
E66.9)URI (ICD-465.9) (DCW44-G84.9)Pharyngitis, viral (ICD-462) (ICD10-
J02.9)Mixed hyperlipidemia (ICD-272.2) (SIY44-F43.2)Diabetes, Type 2 (ICD-250
.00) (PIV57-D46.9)Abnormal Pap Smear (ICD-V13.29) (GTV80-N49.6)Hypertension 
(ICD-401.9) (YNG56-R15)Asthma (ICD-493.90) (GCU83-C23.909)depression (ICD-311) 
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 
(GGD82-H19.10)Problem list reviewed during this update.Current Medications: 
METFORMIN  MG ORAL TABLET (METFORMIN HCL) 1 tab tid; Route: ORAL* TEST 
STRIPS Check BG once daily.1ST CHOICE LANCETS SUPER THIN (LANCETS) Check BG once
 daily and as needed.* GLUCOMETER Check BG once daily and as needed,Medication 
list reviewed during this update.Allergy list reviewed during this update.No 
known allergies.Past Medical History:(reviewed - no changes required) 
AsthmaDepressionHypertensionDiabetes, Type 2                           Dental 
Chart:  Procedures:Type - CDT Code - Description B - () Periodic oral 
evaluation - established patient (Performed by OMA Kohler, Brian) B - () 
Prophylaxis, adult (Performed by Rosalia Glasgow RDH) Treatments:Type - CDT Code -
 Description T - () Resin-based composite - one surface, posterior on Tooth
 # 18 on Tooth Surface L (Performed by Rosalia Glasgow RDH) T - () Resin, one
 surface, anterior on Tooth # 7 on Tooth Surface F (Performed by Rosalia Glasgow RDH)    Existing:Type - CDT Code - Description[E] Decay On #18 Surface L, #7 
Surface F   Chart Notes:jone (Sep 28 2020  1:05PM): [facility]Adult prophy, IO/EO
 completedExam with Dr MORENO-Patient brushes twice/day and is flossing 
regularly.Generalized marginal biofilm and marginal and interproximal calculus 
in sextant 5. Franklin hand scaling well. use warm waterGingiva- pink with bleeding 
on papillaOHI-brushing twice/day, flossing Patient was cooperative. BP was 
121/90Pt is a non-smoker.NV-6 months recall, restoAdditional PPE requirements 
due to COVID-19 in the dental setting, N95, surgical mask, hair covering, gown, 
face shield. Rosalia Glasgow RDH by jone (2020 1:05 PM): ; henrique (Sep 28 
2020  2:46PM): PEcc: noneMed hx - reviewedEO - WNLIO - poor OH, caries, gen mild
 to mod gingival inflammationDx - caries, gen mild to mod gingivitisTx - 
restoration, improved OHOCS - WNLNV - #7,8 compGrRosalia delgado RDH by henrique 
(2020 2:46 PM): Tooth Notes and Watches:- Tooth 6 Watch: distalWeaver Rosemary MACIEL by kenanaver (2020 1:42 PM): - Tooth 8 Note: may need extWeaver Rosemary MACIEL by henrique (2020 2:05 PM): - Tooth 9 Note: may need extWeaver Rosemary MACIEL by henrique (2020 2:05 PM):  Assessment & Plan Medications:METFORMIN 
 MG ORAL TABLETTEST REPXXG1IS CHOICE LANCETS SUPER 
THINGLUCOMETERAllergies:No Known Allergies (updated 2020) Electronically s
igned by Brian Kohler DDS on 2020 at 2:46 
PM________________________________________________________________________ 









          Name      Value     Range     Interpretation Code Description Data Gregoria

rce(s) Supporting 

Document(s)

 

                                                                       









                    ID                  Date                Data Source

 

                    [id number]            2020 09:46:00 AM EDT [facility]









          Name      Value     Range     Interpretation Code Description Data Gregoria

rce(s) Supporting 

Document(s)

 

           Glutamate decarboxylase 65 Ab [Moles/volume] in Serum <5 [IU]/mL <5  

                             [facility]                  

 

                                        This test was performed using the GAD65 

CAMILLE method,which is standardized 

against the Internationalreference preparation 97/550.THIS TEST WAS PERFORMED 
AT:[facility] [place]  [address]
6PATAMIKO DIAMOND MD,PHD 









                    ID                  Date                Data Source

 

                    [id number]            2020 09:46:00 AM [facility]









          Name      Value     Range     Interpretation Code Description Data Gregoria

rce(s) Supporting 

Document(s)

 

           C peptide [Moles/volume] in Serum or Plasma 2.12 ng/mL 0.80-3.85     

                   [facility]                  

 

                                        THIS TEST WAS PERFORMED AT:[facility]

[address]  [address]PSXBDK MERATI,MD 









                    ID                  Date                Data Source

 

                    [id number]            2020 11:33:00 AM [facility]









          Name      Value     Range     Interpretation Code Description Data Gregoria

rce(s) Supporting 

Document(s)

 

           Urea nitrogen [Mass/volume] in Serum or Plasma 17 mg/dL   9-23       

N                     [facility]                         

 

           Sodium [Moles/volume] in Serum or Plasma 141 mmol/L 132-146    N     

                [facility]                         

 

           Potassium [Moles/volume] in Serum or Plasma 4.3 mmol/L 3.5-5.5    N  

                   [facility]                         

 

           Chloride [Moles/volume] in Serum or Plasma 109 mmol/L      N   

                  [facility]                         

 

           Carbon dioxide, total [Moles/volume] in Serum or Plasma 25 mmol/L  20

-31      N                     

[facility]            

 

          Anion gap in Serum or Plasma 11 mmol/L 8-16      [facility]  

 

           Glucose [Mass/volume] in Serum or Plasma 196 mg/dL       Above 

high normal            

[facility]            

 

          Creatinine 0.8 mg/dL 0.5-1.1   [facility]  

 

                                        Glomerular filtration rate/1.73 sq M.pre

dicted [Volume Rate/Area] in Serum or 

Plasma     Greater Than 60 ABOVE 60                         [facility]  

 

           Calcium [Mass/volume] in Serum or Plasma 9.2 mg/dL  8.5-10.1   [facility]                         









                    ID                  Date                Data Source

 

                    [id number]             2020 10:46:00 AM EDT [facility] ([facility])









          Name      Value     Range     Interpretation Code Description Data Gregoria

rce(s) Supporting 

Document(s)

 

                Glutamate decarboxylase 65 Ab [Units/volume] in Serum Laboratory

 test result                  

                                        [facility] ([facility])  

 

                                        This test was performed using the GAD65 

CAMILLE method,

which is standardized against the International

reference preparation 97/550.

THIS TEST WAS PERFORMED AT:

[facility]/[address]  [address]

MAL DIAMOND MD,PHD

 

 

           C peptide [Moles/volume] in Serum or Plasma 2.12 ng/mL 0.80-3.85     

                   [facility] 

([facility])           

 

                                        THIS TEST WAS PERFORMED AT:

[facility]  [address]

RADHA MORIN MD

 









                    ID                  Date                Data Source

 

                    [id number]             2020 10:46:00 AM EDT [facility] ([facility])









          Name      Value     Range     Interpretation Code Description Data Gregoria

rce(s) Supporting 

Document(s)

 

           Urea nitrogen [Mass/volume] in Serum or Plasma 17 mg/dL   9-23       

                      [facility])                  

 

                                        E11.65,E10.65 

 

           Sodium [Moles/volume] in Serum or Plasma 141 mmol/L 132-146          

                [facility] ([facility])                  

 

                                        E11.65,E10.65 

 

           Chloride [Moles/volume] in Serum or Plasma 109 mmol/L          

                  [facility])                  

 

                                        E11.65,E10.65 

 

           Potassium [Moles/volume] in Serum or Plasma 4.3 mmol/L 3.5-5.5       

                   [facility] [facility]                 

 

                                        E11.65,E10.65 

 

           Carbon dioxide, total [Moles/volume] in Serum or Plasma 25 mmol/L  20

-31                            

[facility] ([facility])    

 

                                        E11.65,E10.65 

 

           Glucose [Mass/volume] in Serum or Plasma 196 mg/dL             

                [facility] ([facility])                  

 

                                        E11.65,E10.65 

 

           Anion gap in Serum or Plasma 11 mmol/L  8-16                         

    [facility] ([facility])                          

 

                                        E11.65,E10.65 

 

                                        Glomerular filtration rate/1.73 sq M.pre

dicted [Volume Rate/Area] in Serum or 

Plasma     Laboratory test result                                  [facility] ([facility])  

 

                                        E11.65,E10.65 

 

          Creatinine 0.8 mg/dL 0.5-1.1                       [facility] ([facility])  

 

                                        E11.65,E10.65 

 

           Calcium [Mass/volume] in Serum or Plasma 9.2 mg/dL  8.5-10.1         

                [facility] ([facility])                  

 

                                        E11.65,E10.65 







                                        Procedure

 

                                          



                                                                                
                                                                                
                                                                                
                                                                                
                                                                                
                                                                                
                                                                                
                                                                                
                                                                                
                                                                                
                                                                                
                                                                                
                                                                                
                            



Social History

          



           Code       Duration   Value      Status     Description Data Source(s

)

 

           Smoking    2021 12:00:00 AM EDT Never Smoker completed  Never S

[facility] eCW1 

([facility])

 

                      2021 08:17:36 AM EDT No         completed  No       

  [facility]

 

                      2021 08:17:36 AM EDT No         completed  No       

  [facility]

 

                      2021 08:17:36 AM EDT Never smoker completed  Never s

[facility]

 

           Smoking    2021 08:17:00 AM EDT Never smoker completed  Never s

[facility]

 

                      2021 09:55:11 AM EDT Never smoker completed  Never [facility]

 

           Smoking    2021 09:55:00 AM EDT Never smoker completed  Never [facility]

 

             Alcohol intake 2021 12:00:00 AM EDT Ex-drinker (finding) comp

leted    Ex-

drinker (finding)                       [facility]

 

             Tobacco use and exposure 2021 12:00:00 AM EDT Never used   co

mpleted    Never 

used                                    [facility]

 

           Smoking    2021 12:00:00 AM EDT Never smoker completed  Never s

[facility]

 

             Smoking      2021 12:00:00 AM EST Patient has never smoked co

mpleted    Patient 

has never smoked                        [facility] ([facility] )

 

             Smoking      2021 12:00:00 AM EST Never Smoked Cigarettes com

pleted    Never 

Smoked Cigarettes                       [facility] ([facility])

 

                      2021 07:30:24 AM EST No         completed  No       

  [facility]

 

                      2021 07:30:24 AM EST No         completed  No       

  [facility]

 

                      2021 07:30:24 AM EST No         completed  No       

  [facility]

 

                      2021 07:30:24 AM EST No         completed  No       

  [facility]

 

                      2021 07:30:24 AM EST Never smoker completed  Never s

[facility]

 

           Smoking    2021 07:30:00 AM EST Never smoker completed  Never s

[facility]

 

             Alcohol intake 2021 12:00:00 AM EST Ex-drinker (finding) comp

leted    Ex-

drinker (finding)                       [facility]

 

           Smoking    2021 12:00:00 AM EST Never Smoker completed  Never S

moker eCW1 

([facility])

 

           Smoking    2021 12:00:00 AM EST Never Smoker completed  Never S

moker eCW1 

([facility])

 

           Smoking    2021 12:00:00 AM EST Never Smoker completed  Never S

moker eCW1 

([facility])

 

                      2021 01:43:13 PM EST No         completed  No       

  [facility]

 

                      2021 01:43:13 PM EST No         completed  No       

  [facility]

 

                      2021 01:43:13 PM EST Never smoker completed  Never s

[facility]

 

                      2021 01:43:13 PM EST No         completed  No       

  [facility]

 

                      2021 01:43:13 PM EST No         completed  No       

  [facility]

 

                      2021 01:43:13 PM EST Never smoker completed  Never s

[facility]

 

           Smoking    2021 01:43:00 PM EST Never smoker completed  Never s

[facility]

 

           Smoking    2021 01:43:00 PM EST Never smoker completed  Never s

[facility]

 

             Alcohol intake 2021 12:00:00 AM EST Ex-drinker (finding) comp

leted    Ex-

drinker (finding)                       [facility]

 

             Alcohol intake 2020 12:00:00 AM EST Ex-drinker (finding) comp

leted    Ex-

drinker (finding)                       [facility]

 

             Alcohol intake 2020 12:00:00 AM EST Ex-drinker (finding) comp

leted    Ex-

drinker (finding)                       [facility]

 

           Smoking    10/28/2020 12:00:00 AM EDT Never Smoker completed  Never S

[facility] eCW1 

([facility])

 

             Alcohol intake 10/22/2020 12:00:00 AM EDT Ex-drinker (finding) comp

leted    Ex-

drinker (finding)                       [facility]



                                                                                
                                                                                
                                                                                
                                                                                
                                                                                
                                      



Vital Signs

          



                    ID                  Date                Data Source

 

                    UNK                                      









           Name       Value      Range      Interpretation Code Description Data

 Source(s)

 

           Body weight 181.00 [lb_av]                       181.00 [lb_av] [facility] ([facility])

 

           Body mass index (BMI) [Ratio] 30.1 kg/m2                       30.1 k

g/m2 [facility] ([facility])

 

           Ideal body weight 125 [lb_av]                       125 [lb_av] [facility] ([facility])

 

           Body weight 82.102 kg                        82.102 kg  [facility] ([facility])

 

           Body surface area Derived from formula 1.90 m2                       

   1.90 m2    [facility] ([facility])

 

           Systolic blood pressure 120 mm[Hg]                       120 mm[facility]Hg[facility]

 

           Diastolic blood pressure 80 mm[Hg]                        80 mm[Hg]  

[facility] ([facility])

 

           Heart rate 109 /min                         109 /min   [facility] ([facility])

 

           Oxygen saturation in Arterial blood by Pulse oximetry 98 %           

                  98 %       [facility] 

([facility])

 

           Body temperature 97.0 [degF]                       97.0 [degF] [facility]

 ([facility])

 

           Body height 65 [in_i]                        65 [in_i]  [facility] ([facility] )

 

                                        5'5" 

 

           Body mass index (BMI) [Ratio] 29.8 kg/m2                       29.8 k

g/m2 [facility] ([facility]

 

           Oxygen saturation in Arterial blood by Pulse oximetry 98 %           

                  98 %       [facility] 

([facility] )

 

           Systolic blood pressure 114 mm[Hg]                       114 mm[Hg] M

[facility] ([facility]

 

           Diastolic blood pressure 70 mm[Hg]                        70 mm[Hg]  

[facility] ([facility]

 

           Body weight 179.38 [lb_av]                       179.38 [lb_av] [facility]

T ([facility] 

)

 

           Heart rate 101 /min                         101 /min   [facility] ([facility])

 

           Body temperature 97.1 [degF]                       97.1 [degF] [facility]

 ([facility])

 

           Body height 65.1 [in_i]                       65.1 [in_i] [facility] ([facility] )

 

                                        5'5.10" 

 

           Diastolic blood pressure 76 mm[Hg]                        76 mm[Hg]  

eCW1 ([facility])

 

           Body weight 181.2 [lb_av]                       181.2 [lb_av] eCW1 ([facility])

 

           Body weight 82.1 kg                          82.1 kg    W1 ([facility])

 

           Body height 65 [in_i]                        65 [in_i]  eCW1 ([facility])

 

           Body mass index (BMI) [Ratio] 30.15 kg/m2                       30.15

 kg/m2 eCW1 ([facility])

 

           Heart rate 105 /min                         105 /min   eCW1 ([facility])

 

           Respiratory rate 18 /min                          18 /min    eCW1 ([facility])

 

           Body temperature 97.1 [degF]                       97.1 [degF] eCW1 (

[facility])

 

           Systolic blood pressure 122 mm[Hg]                       122 mm[Hg] e

CW1 ([facility])

 

           Systolic blood pressure 102 mm[Hg]                       102 mm[Hg] M

[facility] ([facility], )

 

           Diastolic blood pressure 64 mm[Hg]                        64 mm[Hg]  

[facility] ([facility], )

 

           Heart rate 103 /min                         103 /min   [facility] ([facility] )

 

           Oxygen saturation in Arterial blood by Pulse oximetry 99 %           

                  99 %       [facility] 

([facility])

 

           Body temperature 97.1 [degF]                       97.1 [degF] [facility]

 ([facility])

 

           Body height 65 [in_i]                        65 [in_i]  [facility] ([facility])

 

                                        5'5" 

 

           Body weight 179.00 [lb_av]                       179.00 [lb_av] [facility]

T ([facility])

 

           Body mass index (BMI) [Ratio] 29.8 kg/m2                       29.8 k

g/m2 [facility] ([facility])

 

           Ideal body weight 125 [lb_av]                       125 [lb_av] [facility]

T ([facility])

 

           Body weight 81.194 kg                        81.194 kg  [facility] ([facility])

 

           Body surface area Derived from formula 1.89 m2                       

   1.89 m2    [facility] ([facility])

 

           Systolic blood pressure 122 mm[Hg]                       122 mm[Hg] M

[facility] ([facility]

 

           Diastolic blood pressure 76 mm[Hg]                        76 mm[Hg]  

[facility] ([facility])

 

           Heart rate 103 /min                         103 /min   [facility] ([facility])

 

           Body temperature 9.6 [degF]                       9.6 [facility]degF] [facility]

 

           Body height 65.1 [in_i]                       65.1 [in_i] [facility] ([facility])

 

                                        5'5.10" 

 

           Body weight 184.50 [lb_av]                       184.50 [lb_av] [facility]

T ([facility])

 

           Body mass index (BMI) [Ratio] 30.6 kg/m2                       30.6 k

g/m2 [facility] ([facility])

 

           Oxygen saturation in Arterial blood by Pulse oximetry 98 %           

                  98 %       [facility] 

([facility] )

 

           Systolic blood pressure 118 mm[Hg]                       118 mm[Hg] M

[facility] ([facility]

 

           Body mass index (BMI) [Ratio] 29.9 kg/m2                       29.9 k

g/m2 [facility] ([facility]

 

           Body height 65.1 [in_i]                       65.1 [in_i] [facility] ([facility] )

 

                                        5'5.10" 

 

           Heart rate 103 /min                         103 /min   [facility] ([facility])

 

           Diastolic blood pressure 70 mm[Hg]                        70 mm[Hg]  

[facility] ([facility]

 

           Body weight 180.25 [lb_av]                       180.25 [lb_av] [facility]

T ([facility])

 

           Oxygen saturation in Arterial blood by Pulse oximetry 97 %           

                  97 %       [facility] 

([facility])



                                                                                
                  



Patient Treatment Plan of Care

          



             Planned Activity Planned Date Details      Description  Data Source

(s)

 

                          bevacizumab-awwb (MVASI) 3 mg/0.12 mL intravitreal inj

ection 1.25 mg 2021 

11:00:00 AM [facility]

ospital

 

                Lidocaine Hydrochloride 0.035 MG/MG Ophthalmic Gel 2021 10

:44:04 AM [facility]

 

                          Proparacaine hydrochloride 5 MG/ML Ophthalmic Solution

 2021 10:44:04 AM 

[facility]

ospital

 

                          Proparacaine hydrochloride 5 MG/ML Ophthalmic Solution

 2021 09:45:00 AM 

[facility]

ospital

 

                          Phenylephrine Hydrochloride 25 MG/ML Ophthalmic Soluti

on 2021 09:45:00 AM 

[facility]

ospital

 

             Tropicamide 10 MG/ML Ophthalmic Solution 2021 09:45:00 AM [facility]

 

                    Tetracaine hydrochloride 5 MG/ML Ophthalmic Solution  10:45:00 AM [facility]

 

                          bevacizumab-awwb (MVASI) 3 mg/0.12 mL intravitreal inj

ection 1.25 mg 2021 

10:45:00 AM [facility]

ospital

 

                          Proparacaine hydrochloride 5 MG/ML Ophthalmic Solution

 2021 10:00:00 AM 

[facility]

ospital

 

                          Phenylephrine Hydrochloride 25 MG/ML Ophthalmic Soluti

on 2021 10:00:00 AM 

[facility]

ospital

 

             Tropicamide 10 MG/ML Ophthalmic Solution 2021 10:00:00 AM [facility]

 

                    Tetracaine hydrochloride 5 MG/ML Ophthalmic Solution  11:15:00 AM [facility]

 

             Tropicamide 10 MG/ML Ophthalmic Solution 2021 10:15:00 AM [facility]

 

                          Phenylephrine Hydrochloride 25 MG/ML Ophthalmic Soluti

on 2021 10:15:00 AM 

[facility]

ospital

 

                          Proparacaine hydrochloride 5 MG/ML Ophthalmic Solution

 2021 10:15:00 AM 

[facility]

ospital

 

                    Tetracaine hydrochloride 5 MG/ML Ophthalmic Solution  09:30:00 AM [facility]

 

                          bevacizumab-awwb (MVASI) 3 mg/0.12 mL intravitreal inj

ection 1.25 mg 2020 

09:30:00 AM [facility]

ospital

 

                          Proparacaine hydrochloride 5 MG/ML Ophthalmic Solution

 2020 08:15:00 AM 

[facility]

ospital

 

                          Proparacaine hydrochloride 5 MG/ML Ophthalmic Solution

 2020 10:15:00 AM 

[facility]

ospital

 

                          Phenylephrine Hydrochloride 25 MG/ML Ophthalmic Soluti

on 2020 10:15:00 AM 

[facility]

ospital

 

             Tropicamide 10 MG/ML Ophthalmic Solution 2020 10:15:00 AM [facility]

 

             Ergocalciferol 08724 UNT Oral Capsule 10/27/2020 12:00:00 AM EDT   

                        eCW1 

([facility])

 

             Vitamin D (Cholecalciferol) 50 MCG ( [place]) 10/27/2020 12:00:00 AM

 EDT                           eCW1

 ([facility])

 

             Tropicamide 10 MG/ML Ophthalmic Solution 10/22/2020 10:15:00 AM [facility]

 

                          Phenylephrine Hydrochloride 25 MG/ML Ophthalmic Soluti

on 10/22/2020 10:15:00 AM 

[facility]

ospital

 

                          Proparacaine hydrochloride 5 MG/ML Ophthalmic Solution

 10/22/2020 10:15:00 AM 

[facility]

ospital

## 2021-11-10 NOTE — CCD
Summarization of Episode Note

                             Created on: 2021



TERENCE PURCELL

External Reference #: [id number]

: 1980

Sex: Female



Demographics





                          Address                   [address]

[place]  [address]

 

                          Home Phone                [phone]

 

                          Preferred Language        Unknown

 

                          Marital Status            Unknown

 

                          Adventist Affiliation     Unknown

 

                          Race                      White

 

                          Ethnic Group              Not  or 





Author





                          Author                    [facility]

ems

 

                          Organization              [facility]

ems

 

                          Address                   Unknown

 

                          Phone                     Unavailable







Support





                Name            Relationship    Address         Phone

 

                    TERENCE PURCELL   GUAR                [address]

[place]  [address] [phone]

 

                    SYLVESTER PURCELL     ECON                [address]

[place], [place]  [address] [phone]







Care Team Providers





                    Care Team Member Name Role                Phone

 

                    Dana,  Porsha Unavailable         [phone]







PROBLEMS





          Type      Condition ICD9-CM Code XHA32-TY Code Onset Dates Condition S

tatus W/U 

Status              Risk                SNOMED Code         Notes

 

       Problem Sensorimotor neuropathy        G62.9         Active confirmed    

    [id number]  

 

       Problem Vitamin D deficiency        E55.9         Active confirmed       

 [id number]  

 

        Problem Type 1 diabetes mellitus with foot ulcer         E10.621        

 Active  confirmed         

[id number]                                

 

                          Problem                   Non-pressure chronic ulcer o

f right heel and midfoot with fat layer 

exposed         L97.412         Active  confirmed         [id number]  

 

        Problem Elevated C-reactive protein (CRP)         R79.82          Active

  confirmed         

[id number]                          

 

       Problem Paresthesia of skin        R20.2         Active confirmed        

[id number]  

 

       Problem Muscle weakness (generalized)        M62.81        Active confirm

ed        [id number]  

 

       Problem Sleep disorder, unspecified        G47.9         Active confirmed

        [id number]  







ALLERGIES

No Known Allergies



ENCOUNTERS from 1980 to 2021





             Encounter    Location     Date         Provider     Diagnosis

 

                    [facility]     [phone] [place] [address]                       Porsha Cortez          







IMMUNIZATIONS

No Information



SOCIAL HISTORY

Tobacco Use:



                    Social History Observation Description         Date

 

                    Details (start date - stop date) Never Smoker         



Sex Assigned At Birth:



                          Social History Observation Description

 

                          Sex Assigned At Birth     Unknown



Education:



                    Question            Answer              Notes

 

                    Level of Education: Not finished High School  



Language:



                    Question            Answer              Notes

 

                    Languages spoken:   English              



Roman Catholic:



                    Question            Answer              Notes

 

                    Roman Catholic                                No Christianity beliefs

 that would impact health care.



Alcohol Screening:



                    Question            Answer              Notes

 

                    Did you have a drink containing alcohol in the past year? No

                   

 

                    Points              0                    

 

                    Interpretation      Negative             



Tobacco Use:



                    Question            Answer              Notes

 

                    Are you a:          never smoker         







REASON FOR REFERRAL

No Information



VITAL SIGNS

No information



MEDICATIONS





           Medication SIG (Take, Route, Frequency, Duration) Notes      Start Da

te End Date   

Status

 

           Trulicity 0.75 MG/0.5ML  Subcutaneous for 28                         

         Not-Taking

 

                          Vitamin D (Ergocalciferol) 1.25 MG (49267 UT) 1 capsul

e Orally Weekly for 30 

day(s)                          27 Oct, 2020                    Not-Taking

 

           glipiZIDE 5 MG 1 tablet 30 minutes before breakfast Orally BID       

                           Active

 

                          Vitamin D (Cholecalciferol) 50 MCG (2000 UT) 1 capsule

 Orally Once a day for 30 

day(s)                                                          Not-Taking

 

                Clindamycin HCl 300 MG TAKE ONE CAPSULE BY MOUTH FOUR TIMES A DA

Y Oral for 10                 

                                                    Not-Taking

 

           Lantus 100 UNIT/ML 40 UNITS Subcutaneous DAILY                       

           Active

 

           Steglatro 15 MG 1 tablet Orally Once a day for 30 day(s)             

                     Not-Taking

 

           OneTouch Verio -  In Vitro for 13                                  No

t-Taking

 

           Doxycycline Hyclate 100 MG 1 capsule Orally Twice a day FINISHES 11/3

/21                       

Active







PROCEDURES

No Information



RESULTS

No Results



REASON FOR VISIT

U/s 



MEDICAL (GENERAL) HISTORY





                    Type                Description         Date

 

                    Medical History     Diabetes Mellitus    

 

                    Medical History     Migraines/ Headaches  

 

                    Medical History     Asthma               

 

                    Medical History     Anxiety              

 

                    Medical History     Herpes Genitalis     

 

                    Medical History     Chlamydia            

 

                    Surgical History    Retina Reattachment 2020

 

                    Surgical History    Tonsillectomy       > 20 Years ago

 

                    Surgical History    D&[facility]                 

 

                    Hospitalization History Surgical related     

 

                    Hospitalization History Right Foot wound -[facility] 10/2021







Goals Section

No Information



Health Concerns

No Information



MEDICAL EQUIPMENT

No Information



MENTAL STATUS

No Information



FUNCTIONAL STATUS

No Information



ASSESSMENTS

No Information



PLAN OF TREATMENT

Next Appt



                                        Details

 

                                        Provider Name:Porsha Bocanegravins, 2021-

11-10 02:00:00 PM, [address], 

[phone], [place], [address], [phone]







Insurance Providers





             Payer Name   Payer Address Payer Phone  Insured Name Patient Relati

onship to 

Insured                   Coverage Start Date       Coverage End Date

 

                [facility] [employer] [place] [address]  [facility] [phone]

[phone]    

TERENCE PURCELL

## 2021-11-10 NOTE — REPVR
PROCEDURE INFORMATION: 

Exam: XR Right Foot 

Exam date and time: 11/10/2021 9:18 PM 

Age: 41 years old 

Clinical indication: Pain; Foot; Right; Additional info: R lateral diabetic 

foot infection/ulcer 



TECHNIQUE: 

Imaging protocol: XR Right foot. 

Views: 3 or more views. 



COMPARISON: 

US Duplex, Ext,LOWER veins,unilat RIGHT 11/10/2021 8:36 PM 



FINDINGS: 

Bones/joints: No radiographic evidence of acute fracture or dislocation. 

Alignment anatomic. Prominent erosive/destructive changes in the 5th metatarsal 

head and distal shaft. Questionable mild erosive/destructive changes in the 

proximal metaphysis of the 5th proximal phalanx. 

Soft tissues: Large soft tissue ulceration along the lateral aspect of the 5th 

metatarsal head with a moderate amount of subjacent soft tissue gas, encircling 

the metatarsal neck. 



IMPRESSION: 

Acute osteomyelitis of the 5th metatarsal and, possibly, the 5th proximal 

phalanx, as described above. 



Electronically signed by: Shar Florez On 11/10/2021  21:38:11 PM

## 2021-11-10 NOTE — CCD
Summarization Of Episode

                             Created on: 2021



ROMELIA CORADO

External Reference #: [id number]

: 1980

Sex: Female



Demographics





                          Address                   [address]

[place]  [address]

 

                          Home Phone                [phone]

 

                          Preferred Language        English

 

                          Marital Status            Unknown

 

                          Mormonism Affiliation     Unknown

 

                          Race                      White

 

                          Ethnic Group              Not  or 





Author





                          Author                    [facility]

 

                          Organization              [facility]

 

                          Address                   Unknown

 

                          Phone                     Unavailable







Support





                Name            Relationship    Address         Phone

 

                Sylvester Corado  Next Of Kin     Unknown         Unavailable

 

                    DARY WHITFIELD  Next Of Kin         [address]

[place]  [address]                  Unavailable

 

                    Brian Kohler DDS    Next Of Kin         [address] [place]  [address]                    [phone]

 

                    TOPS FRIENDLY MARKET Next Of Kin         [address] [place]  [address]                     [phone]

 

                    Nigel Davidson MD    Next Of Kin         [address] [place]  [address]                    [phone]

 

                    TOPS                Next Of Kin         [address]

[place]  [address]                     [phone]

 

                    ARBYSL              Next Of Kin         [address] [place]  [address]                     [phone]

 

                    PCFOOD              [place]  [address]                     [phone]

 

                    BARBI'S BAKERY        Next Of Kin         

[place]  [address]                 [phone]

 

                    ROSLYN LOPES   Next Of Kin         [address]  [address]                  [phone]

 

                UN              Next Of Kin     Unknown         Unavailable

 

                    OSMAR WHITFIELD   Next Of Kin         [address]  [address]                  [phone]

 

                    SYLVESTER CORADO    Next Of Kin         [address]

[place]  [address]                     [phone]

 

                    SYLVESTER CORADO    ECON                [address] [place] [address]  [address]                     [phone]







Care Team Providers





                    Care Team Member Name Role                Phone

 

                    SELVIN Rosales NP Unavailable         Unavailable

 

                    LEIGH VILLANUEVA MD Unavailable         Unavailable

 

                    LEIGH VILLANUEVA MD Unavailable         Unavailable

 

                    LEIGH VILLANUEVA MD Unavailable         Unavailable

 

                    LEIGH VILLANUEVA MD Unavailable         Unavailable

 

                    LEIGH VILLANUEVA MD Unavailable         Unavailable

 

                    LEIGH VILLANUEVA MD Unavailable         Unavailable

 

                    Ting, A Cecille NP Unavailable         Unavailable

 

                    Ting, A Cecille NP Unavailable         Unavailable

 

                    Ting, A Cecille NP Unavailable         Unavailable

 

                    Ting, A Cecille NP Unavailable         Unavailable

 

                    Ting, A Cecille NP Unavailable         Unavailable

 

                    Ting, A Cecille NP Unavailable         Unavailable

 

                    Ting, A Cecille NP Unavailable         Unavailable

 

                    Ting, A Cecille NP Unavailable         Unavailable

 

                    Ting, A Cecille NP Unavailable         Unavailable

 

                    Ting, A Cecille NP Unavailable         Unavailable

 

                    Ting, A Cecille NP Unavailable         Unavailable

 

                    Ting, A Cecille NP Unavailable         Unavailable

 

                    Ting, A Cecille NP Unavailable         Unavailable

 

                    Ting, A Cecille NP Unavailable         Unavailable

 

                    Ting, A Cecille NP Unavailable         Unavailable

 

                    Ting, A Cecille NP Unavailable         Unavailable

 

                    Ting, A Cecille NP Unavailable         Unavailable

 

                    Ting, A Cecille NP Unavailable         Unavailable

 

                    Ting, A Cecille NP Unavailable         Unavailable

 

                    Ting, A Cecille NP Unavailable         Unavailable

 

                    Ting, A Cecille NP Unavailable         Unavailable

 

                    Ting, A Cecille NP Unavailable         Unavailable

 

                    Ting, A Cecille NP Unavailable         Unavailable

 

                    Ting, A Cecille NP Unavailable         Unavailable

 

                    Ting, A Cecille NP Unavailable         Unavailable

 

                    Ting, A Cecille NP Unavailable         Unavailable

 

                    Ting, A Cecille NP Unavailable         Unavailable

 

                    Ting, A Cecille NP Unavailable         Unavailable

 

                    Ting, A Cecille NP Unavailable         Unavailable

 

                    Ting, A Cecille NP Unavailable         Unavailable

 

                    Ting, A Cecille NP Unavailable         Unavailable

 

                    Ting, A Cecille NP Unavailable         Unavailable

 

                    Ting, A Cecille NP Unavailable         Unavailable

 

                    Ting, A Cecille NP Unavailable         Unavailable

 

                    Ting, A Cecille NP Unavailable         Unavailable

 

                    Ting, A Cecille NP Unavailable         Unavailable

 

                    Ting, A Cecille NP Unavailable         Unavailable

 

                    Ting, A Cecille NP Unavailable         Unavailable

 

                    Ting, A Cecille NP Unavailable         Unavailable

 

                    Ting, A Cecille NP Unavailable         Unavailable

 

                    Ting, A Cecille NP Unavailable         Unavailable

 

                    Ting, A Cecille NP Unavailable         Unavailable

 

                    Ting, A Cecille NP Unavailable         Unavailable

 

                    Ting, A Cecille NP Unavailable         Unavailable

 

                    Ting, A Cecille NP Unavailable         Unavailable

 

                    Ting, A Cecille NP Unavailable         Unavailable

 

                    Ting, A Cecille NP Unavailable         Unavailable

 

                    Ting, A Cecille NP Unavailable         Unavailable

 

                    GANN, JUDAH CLEMENTE MD Unavailable         Unavailable

 

                    GANN, JUDAH CLEMENTE MD Unavailable         Unavailable

 

                    GANN, A BRYN MD Unavailable         Unavailable

 

                    GANN, A BRYN MD Unavailable         Unavailable

 

                    GANN, A BRYN MD Unavailable         Unavailable

 

                    GANN, A BRYN MD Unavailable         Unavailable

 

                    GANN, A BRYN MD Unavailable         Unavailable

 

                    GANN, A BRYN MD Unavailable         Unavailable

 

                    GANN, A BRYN MD Unavailable         Unavailable

 

                    GANN, A BRYN MD Unavailable         Unavailable

 

                    GANN, A BRYN MD Unavailable         Unavailable

 

                    GANN, A BRYN MD Unavailable         Unavailable

 

                    GANN, A BRYN MD Unavailable         Unavailable

 

                    GANN, A BRYN MD Unavailable         Unavailable

 

                    GANN, A BRYN MD Unavailable         Unavailable

 

                    GANN, A BRYN MD Unavailable         Unavailable

 

                    GANN, A BRYN MD Unavailable         Unavailable

 

                    GANN, A BRYN MD Unavailable         Unavailable

 

                    GANN, A BRYN MD Unavailable         Unavailable

 

                    GANN, A BRYN MD Unavailable         Unavailable

 

                    GANN, A BRYN MD Unavailable         Unavailable

 

                    GANN, A BRYN MD Unavailable         Unavailable

 

                    GANN, A BRYN MD Unavailable         Unavailable

 

                    GANN, A BRYN MD Unavailable         Unavailable

 

                    GANN, A BRYN MD Unavailable         Unavailable

 

                    GANN, A BRYN MD Unavailable         Unavailable

 

                    GANN, A BRYN MD Unavailable         Unavailable

 

                    GANN, A BRYN MD Unavailable         Unavailable

 

                    GANN, A BRYN MD Unavailable         Unavailable

 

                    GANN, A BRYN MD Unavailable         Unavailable

 

                    KEERTHI GIRON       Unavailable         Unavailable

 

                    Yazdanyar,  Amirfarbod Unavailable         Unavailable

 

                    Yazdanyar,  Amirfarbod Unavailable         Unavailable

 

                    Yazdanyar,  Amirfarbod Unavailable         Unavailable

 

                    Yazdanyar,  Amirfarbod Unavailable         Unavailable

 

                    Yazdanyar,  Amirfarbod Unavailable         Unavailable

 

                    Yazdanyar,  Amirfarbod Unavailable         Unavailable

 

                    Yazdanyar,  Amirfarbod Unavailable         Unavailable

 

                    Yazdanyar,  Amirfarbod Unavailable         Unavailable

 

                    Yazdanyar,  Amirfarbod Unavailable         Unavailable

 

                    Yazdanyar,  Amirfarbod Unavailable         Unavailable

 

                    Yazdanyar,  Amirfarbod Unavailable         Unavailable

 

                    Yazdanyar,  Amirfarbod Unavailable         Unavailable

 

                    Yazdanyar,  Amirfarbod Unavailable         Unavailable

 

                    JADA BLANTON MD         Unavailable         Unavailable

 

                    JADA BLANTON MD         Unavailable         Unavailable

 

                    Ting, A Cecille NP Unavailable         Unavailable

 

                    Ting, A Cecille NP Unavailable         Unavailable

 

                    Ting, A Cecille NP Unavailable         Unavailable

 

                    Ting, A Cecille NP Unavailable         Unavailable

 

                    Ting, A Cecille NP Unavailable         Unavailable

 

                    Ting, A Cecille NP Unavailable         Unavailable

 

                    Ting, A Cecille NP Unavailable         Unavailable

 

                    Ting, A Cecille NP Unavailable         Unavailable

 

                    Ting, A Cecille NP Unavailable         Unavailable

 

                    Ting, A Cecille NP Unavailable         Unavailable

 

                    Ting, A Cecille NP Unavailable         Unavailable

 

                    Ting, A Cecille NP Unavailable         Unavailable

 

                    Ting, A Cecille NP Unavailable         Unavailable

 

                    Ting, A Cecille NP Unavailable         Unavailable

 

                    Ting, A Cecille NP Unavailable         Unavailable

 

                    Ting, A Cecille NP Unavailable         Unavailable

 

                    Ting, A Cecille NP Unavailable         Unavailable

 

                    Ting, A Cecille NP Unavailable         Unavailable

 

                    Ting, A Cecille NP Unavailable         Unavailable

 

                    Ting, A Cecille NP Unavailable         Unavailable

 

                    Ting, A Cecille NP Unavailable         Unavailable

 

                    Ting, A Cecille NP Unavailable         Unavailable

 

                    Ting, A Cecille NP Unavailable         Unavailable

 

                    Ting, A Cecille NP Unavailable         Unavailable

 

                    Ting, A Cecille NP Unavailable         Unavailable

 

                    Ting, A Cecille NP Unavailable         Unavailable

 

                    Ting, A Cecille NP Unavailable         Unavailable

 

                    Ting, A Cecille NP Unavailable         Unavailable

 

                    Ting, A Cecille NP Unavailable         Unavailable

 

                    Ting, A Cecille NP Unavailable         Unavailable

 

                    Ting, A Cecille NP Unavailable         Unavailable

 

                    Ting, A Cecille NP Unavailable         Unavailable

 

                    Ting, A Cecille NP Unavailable         Unavailable

 

                    Ting, A Cecille NP Unavailable         Unavailable

 

                    Ting, A Cecille NP Unavailable         Unavailable

 

                    Ting, A Cecille NP Unavailable         Unavailable

 

                    Ting, A Cecille NP Unavailable         Unavailable

 

                    Ting, A Cecille NP Unavailable         Unavailable

 

                    Ting, A Cecille NP Unavailable         Unavailable

 

                    Ting, A Cecille NP Unavailable         Unavailable

 

                    Ting, A Cecille NP Unavailable         Unavailable

 

                    Ting, A Cecille NP Unavailable         Unavailable

 

                    Ting, A Cecille NP Unavailable         Unavailable

 

                    Ting, A Cecille NP Unavailable         Unavailable

 

                    Ting, A Cecille NP Unavailable         Unavailable

 

                    Ting, A Cecille NP Unavailable         Unavailable

 

                    Ting, A Cecille NP Unavailable         Unavailable

 

                    Ting, A Cecille NP Unavailable         Unavailable

 

                    DE LEON, M FAISAL PA  Unavailable         Unavailable

 

                    DE LEON, M FAISAL PA  Unavailable         Unavailable

 

                    DE LEON, M FAISAL PA  Unavailable         Unavailable

 

                    DE LEON, M FAISAL PA  Unavailable         Unavailable

 

                    DE LEON, M FAISAL PA  Unavailable         Unavailable

 

                    DE LEON, M FAISAL PA  Unavailable         Unavailable

 

                    DE LEON, M FAISAL PA  Unavailable         Unavailable

 

                    DE LEON, M FAISAL PA  Unavailable         Unavailable

 

                    DE LEON, M FAISAL PA  Unavailable         Unavailable

 

                    DE LEON, M FAISAL PA  Unavailable         Unavailable

 

                    DE LEON, M FAISAL PA  Unavailable         Unavailable

 

                    DE LEON, M FAISAL PA  Unavailable         Unavailable

 

                    DE LEON, M FAISAL PA  Unavailable         Unavailable

 

                    DE LEON, M FAISAL PA  Unavailable         Unavailable

 

                    DEL EON, M FAISAL PA  Unavailable         Unavailable

 

                    DE LEON, M FAISAL PA  Unavailable         Unavailable

 

                    DE LEON, M FAISAL PA  Unavailable         Unavailable

 

                    DE LEON, M FAISAL PA  Unavailable         Unavailable

 

                    DE LEON, M FAISAL PA  Unavailable         Unavailable

 

                    DE LEON, M FAISAL PA  Unavailable         Unavailable

 

                    DE LEON, M FAISAL PA  Unavailable         Unavailable

 

                    DE LEON, M FAISAL PA  Unavailable         Unavailable

 

                    DE LEON, M FAISAL PA  Unavailable         Unavailable

 

                    DE LEON, M FAISAL PA  Unavailable         Unavailable

 

                    DE LEON, M FAISAL PA  Unavailable         Unavailable

 

                    DE LEON, M FAISAL PA  Unavailable         Unavailable

 

                    DE LEON, M FAISAL PA  Unavailable         Unavailable

 

                    DE LEON, M FAISAL PA  Unavailable         Unavailable

 

                    DE LEON, M FAISAL PA  Unavailable         Unavailable

 

                    DE LEON, M FAISAL PA  Unavailable         Unavailable

 

                    DE LEON, M FAISAL PA  Unavailable         Unavailable

 

                    DE LEON, M FAISAL PA  Unavailable         Unavailable

 

                    DE LEON, M FAISAL PA  Unavailable         Unavailable

 

                    DE LEON, M FAISAL PA  Unavailable         Unavailable

 

                    DE LEON, M FAISAL PA  Unavailable         Unavailable

 

                    KEENE, B JULES    Unavailable         Unavailable

 

                    GEOFF GARCIA MD    Unavailable         Unavailable

 

                    GEOFF GARCIA MD    Unavailable         Unavailable

 

                    GEOFF GARCIA MD    Unavailable         Unavailable

 

                    GEOFF GARCIA MD    Unavailable         Unavailable

 

                    GEOFF GARCIA MD    Unavailable         Unavailable

 

                    GEOFF GARCIA MD    Unavailable         Unavailable

 

                    GEOFF GARCIA MD    Unavailable         Unavailable

 

                    GEOFF GARCIA MD    Unavailable         Unavailable

 

                    CELENA SENA MD    Unavailable         Unavailable

 

                    NICK, B TAY NP Unavailable         Unavailable

 

                    NICK, B TAY NP Unavailable         Unavailable

 

                    NICK, B TAY NP Unavailable         Unavailable

 

                    NICK, B TAY NP Unavailable         Unavailable

 

                    NICK, B TAY NP Unavailable         Unavailable

 

                    NICK, B TAY NP Unavailable         Unavailable

 

                    NICK, B TAY NP Unavailable         Unavailable

 

                    NICK, B TAY NP Unavailable         Unavailable

 

                    NICK, B TAY NP Unavailable         Unavailable

 

                    NICK, B TAY NP Unavailable         Unavailable

 

                    NICK, B TAY NP Unavailable         Unavailable

 

                    NICK, B TAY NP Unavailable         Unavailable

 

                    NICK, B TAY NP Unavailable         Unavailable

 

                    NICK, B TAY NP Unavailable         Unavailable

 

                    NICK, B TAY NP Unavailable         Unavailable

 

                    NICK, B TAY NP Unavailable         Unavailable

 

                    NICK, B TAY NP Unavailable         Unavailable

 

                    NICK, B TAY NP Unavailable         Unavailable

 

                    NICK, B TAY NP Unavailable         Unavailable

 

                    NICK, B TAY NP Unavailable         Unavailable

 

                    NICK, B TAY NP Unavailable         Unavailable

 

                    NICK, B TAY NP Unavailable         Unavailable

 

                    NICK, B TAY NP Unavailable         Unavailable

 

                    NICK, B TAY NP Unavailable         Unavailable

 

                    NICK, B TAY NP Unavailable         Unavailable

 

                    NICK, B TAY NP Unavailable         Unavailable

 

                    NICK, B TAY NP Unavailable         Unavailable

 

                    NICK, B TAY NP Unavailable         Unavailable

 

                    NICK, B TAY NP Unavailable         Unavailable

 

                    NICK, B TAY NP Unavailable         Unavailable

 

                    NICK, B TAY NP Unavailable         Unavailable

 

                    NICK, B TAY NP Unavailable         Unavailable

 

                    NICK, B TAY NP Unavailable         Unavailable

 

                    NICK, B TAY NP Unavailable         Unavailable

 

                    NICK, B TAY NP Unavailable         Unavailable

 

                    NICK, B TAY NP Unavailable         Unavailable

 

                    NICK, B TAY NP Unavailable         Unavailable

 

                    NICK, B TAY NP Unavailable         Unavailable

 

                    NICK, B TAY NP Unavailable         Unavailable

 

                    NICK, B TAY NP Unavailable         Unavailable

 

                    NICK, B TAY NP Unavailable         Unavailable

 

                    NICK, B TAY NP Unavailable         Unavailable

 

                    NICK, B TAY NP Unavailable         Unavailable

 

                    NICK, B TAY NP Unavailable         Unavailable

 

                    NICK, B TAY NP Unavailable         Unavailable

 

                    NICK, B TAY NP Unavailable         Unavailable

 

                    NICK, B TAY NP Unavailable         Unavailable

 

                    NICK, B TAY NP Unavailable         Unavailable

 

                    NICK, B TAY NP Unavailable         Unavailable

 

                    NICK, B TAY NP Unavailable         Unavailable

 

                    NICK, B TAY NP Unavailable         Unavailable

 

                    NICK, B TAY NP Unavailable         Unavailable

 

                    NICK, B TAY NP Unavailable         Unavailable

 

                    NICK, B TAY NP Unavailable         Unavailable

 

                    NICK, B TAY NP Unavailable         Unavailable

 

                    NICK, B TAY NP Unavailable         Unavailable

 

                    NICK, B TAY NP Unavailable         Unavailable

 

                    NICK, B TAY NP Unavailable         Unavailable

 

                    NICK, B TAY NP Unavailable         Unavailable

 

                    NICK, B TAY NP Unavailable         Unavailable

 

                    NICK, B TAY NP Unavailable         Unavailable

 

                    NICK, B TAY NP Unavailable         Unavailable

 

                    NICK, B TAY NP Unavailable         Unavailable

 

                    NICK, B TAY NP Unavailable         Unavailable

 

                    NICK, B TAY NP Unavailable         Unavailable

 

                    NICK, B TAY NP Unavailable         Unavailable

 

                    NICK, B TAY NP Unavailable         Unavailable

 

                    NICK, B TAY NP Unavailable         Unavailable

 

                    NICK, B TAY NP Unavailable         Unavailable

 

                    NICK, B TAY NP Unavailable         Unavailable

 

                    NICK, B TAY NP Unavailable         Unavailable

 

                    NICK, B TAY NP Unavailable         Unavailable

 

                    NICK, B TAY NP Unavailable         Unavailable

 

                    NICK, B TAY NP Unavailable         Unavailable

 

                    NICK, B TAY NP Unavailable         Unavailable

 

                    NICK, B TAY NP Unavailable         Unavailable

 

                    NICK, B TAY NP Unavailable         Unavailable

 

                    NICK, B TAY NP Unavailable         Unavailable

 

                    NICK, B TAY NP Unavailable         Unavailable

 

                    NICK, B TAY NP Unavailable         Unavailable

 

                    NICK, B TAY NP Unavailable         Unavailable

 

                    NICK, B TAY NP Unavailable         Unavailable

 

                    NICK, B TAY NP Unavailable         Unavailable

 

                    NICK, B TAY NP Unavailable         Unavailable

 

                    NICK, B TAY NP Unavailable         Unavailable

 

                    NICK, B TAY NP Unavailable         Unavailable

 

                    NICK, B TAY NP Unavailable         Unavailable

 

                    NICK, B TAY NP Unavailable         Unavailable

 

                    NICK, B TAY NP Unavailable         Unavailable

 

                    NICK, B TAY NP Unavailable         Unavailable

 

                    NICK, B TAY NP Unavailable         Unavailable

 

                    NICK, B TAY NP Unavailable         Unavailable

 

                    NICK, B TAY NP Unavailable         Unavailable

 

                    NICK, B TAY NP Unavailable         Unavailable

 

                    NICK, B TAY NP Unavailable         Unavailable

 

                    NICK, B TAY NP Unavailable         Unavailable

 

                    NICK, B TAY NP Unavailable         Unavailable

 

                    NICK, B TAY NP Unavailable         Unavailable

 

                    NICK, B TAY NP Unavailable         Unavailable

 

                    NICK, B TAY NP Unavailable         Unavailable

 

                    NICK, B TAY NP Unavailable         Unavailable

 

                    NICK, B TAY NP Unavailable         Unavailable

 

                    NICK, B TAY NP Unavailable         Unavailable

 

                    NICK, B TAY NP Unavailable         Unavailable

 

                    NICK, B TAY NP Unavailable         Unavailable

 

                    NICK, B TAY NP Unavailable         Unavailable

 

                    NICK, B TAY NP Unavailable         Unavailable

 

                    NICK, B TAY NP Unavailable         Unavailable

 

                    NICK, B TAY NP Unavailable         Unavailable

 

                    NICK, B TAY NP Unavailable         Unavailable

 

                    NICK, B TAY NP Unavailable         Unavailable

 

                    NICK, B TAY NP Unavailable         Unavailable

 

                    NICK, B TAY NP Unavailable         Unavailable

 

                    NICK, B TAY NP Unavailable         Unavailable

 

                    NICK, B TAY NP Unavailable         Unavailable

 

                    NICK, B TAY NP Unavailable         Unavailable

 

                    NICK, B TAY NP Unavailable         Unavailable

 

                    NICK, B TAY NP Unavailable         Unavailable

 

                    NICK, B TAY NP Unavailable         Unavailable

 

                    NICK, B TAY NP Unavailable         Unavailable

 

                    NICK, B TAY NP Unavailable         Unavailable

 

                    NICK, B TAY NP Unavailable         Unavailable

 

                    NICK, B TAY NP Unavailable         Unavailable

 

                    NICK, B TAY NP Unavailable         Unavailable

 

                    PEDRO Bustillos MD Unavailable         Unavailable

 

                    PEDRO Bustillos MD Unavailable         Unavailable

 

                    Bustillos E Delfina MD Unavailable         Unavailable

 

                    Bustillos, E Delfina JOSEPH Unavailable         Unavailable

 

                    Bustillos, E Delfina JOSEPH Unavailable         Unavailable

 

                    PEDRO Bustillos MD Unavailable         Unavailable

 

                    PEDRO Bustillos MD Unavailable         Unavailable

 

                    PEDRO Bustillos MD Unavailable         Unavailable

 

                    PEDRO Bustillos MD Unavailable         Unavailable

 

                    Bustillos, E Delfina JOSEPH Unavailable         Unavailable

 

                    Apollo E Delfina JOSEPH Unavailable         Unavailable

 

                    Bustillos, E Delfina JOSEPH Unavailable         Unavailable

 

                    PEDRO Bustillos MD Unavailable         Unavailable

 

                    PEDRO Bustillos MD Unavailable         Unavailable

 

                    PEDRO Bustillos MD Unavailable         Unavailable

 

                    PEDRO Bustillos MD Unavailable         Unavailable

 

                    PEDRO Bustillos MD Unavailable         Unavailable

 

                    PEDRO Bustillos MD Unavailable         Unavailable

 

                    PEDRO Bustillos MD Unavailable         Unavailable

 

                    PEDRO Bustillos MD Unavailable         Unavailable

 

                    PEDRO Bustillos MD Unavailable         Unavailable

 

                    PEDRO Bustillos MD Unavailable         Unavailable

 

                    PEDRO Bustillos MD Unavailable         Unavailable

 

                    PEDRO Bustillos MD Unavailable         Unavailable

 

                    PEDRO Bustillos MD Unavailable         Unavailable

 

                    PEDRO Bustillos MD Unavailable         Unavailable

 

                    PEDRO Bustillos MD Unavailable         Unavailable

 

                    PEDRO Bustillos MD Unavailable         Unavailable

 

                    PEDRO Bustillos MD Unavailable         Unavailable

 

                    PEDRO Bustillos MD Unavailable         Unavailable

 

                    PEDRO Bustillos MD Unavailable         Unavailable

 

                    PEDRO Bustillos MD Unavailable         Unavailable

 

                    PEDRO Bustillos MD Unavailable         Unavailable

 

                    PEDRO Bustillos MD Unavailable         Unavailable

 

                    PEDRO Bustillos MD Unavailable         Unavailable

 

                    PEDRO Bustillos MD Unavailable         Unavailable

 

                    PEDRO Bustillos MD Unavailable         Unavailable

 

                    PEDRO Bustillos MD Unavailable         Unavailable

 

                    PEDRO Bustillos MD Unavailable         Unavailable

 

                    PEDRO Bustillos MD Unavailable         Unavailable

 

                    PEDRO Bustillos MD Unavailable         Unavailable

 

                    PEDRO Bustillos MD Unavailable         Unavailable

 

                    PEDRO Bustillos MD Unavailable         Unavailable

 

                    PEDRO Bustillos MD Unavailable         Unavailable

 

                    Bustillos, E Delfina JOSEPH Unavailable         Unavailable

 

                    [facility]  [place]       Unavailable         Unavailable



                                  



Re-disclosure Warning

          The records that you are about to access may contain information from 
federally-assisted alcohol or drug abuse programs. If such information is 
present, then the following federally mandated warning applies: This information
has been disclosed to you from records protected by federal confidentiality 
rules (42 CFR part 2). The federal rules prohibit you from making any further 
disclosure of this information unless further disclosure is expressly permitted 
by the written consent of the person to whom it pertains or as otherwise 
permitted by 42 CFR part 2. A general authorization for the release of medical 
or other information is NOT sufficient for this purpose. The Federal rules 
restrict any use of the information to criminally investigate or prosecute any 
alcohol or drug abuse patient.The records that you are about to access may 
contain highly sensitive health information, the redisclosure of which is 
protected by Article 27-F of the [facility] Public Health law. If you 
continue you may have access to information: Regarding HIV / AIDS; Provided by 
facilities licensed or operated by the [facility] Office of Mental Health; 
or Provided by the [facility] Office for People With Developmental 
Disabilities. If such information is present, then the following [place] 
mandated warning applies: This information has been disclosed to you from 
confidential records which are protected by state law. State law prohibits you 
from making any further disclosure of this information without the specific 
written consent of the person to whom it pertains, or as otherwise permitted by 
law. Any unauthorized further disclosure in violation of state law may result in
a fine or FDC sentence or both. A general authorization for the release of 
medical or other information is NOT sufficient authorization for further disc
losure.                                                                         
    



Allergies and Adverse Reactions

          



           Type       Description Substance  Reaction   Status     Data Source(s

)

 

           Food allergy No Known Food Allergies No Known Food Allergies         

              [facility]

 

           Drug allergy No Known Drug Allergies No Known Drug Allergies         

              [facility]

 

           Propensity to adverse reactions NO KNOWN ALLERGIES NO KNOWN ALLERGIES

                       

[facility]



                                                                                
                           



Encounters

          



           Encounter  Providers  Location   Date       Indications Data Source(s

)

 

                Unknown                         [address]

[place] [address] 2021 12:00:00 AM 

EDT                                                 eCW1 ([facility])

 

           Outpatient Attender: Cecille Maguire NP            10/21/2021 02:48:00 

PM EDT L03.90     [facility]

 

                                        L03.90 

 

                Outpatient      Attender: Cecille Maguire NPReferrer: Cecille shin NP                 10/21/2021 

02:03:00 PM EDT - 10/21/2021 02:47:00 PM EDT                           [facility]

 

                Outpatient      Attender: Cecille Maguire NPReferrer: Cecille shin NP                 10/11/2021 

02:04:00 PM EDT - 10/11/2021 02:49:00 PM EDT                           [facility]

 

           Outpatient Attender: Cecille Maguire NP            2021 04:55:00 

PM EDT E10.65     [facility]

 

                                        E10.65 

 

                          Inpatient                 Attender: GEOFF Philip

nder: BRYN GANN MDAdmitter: GEOFF GARCIA MDConsultant: Kaylene Rosales NP                           2021 04:0

2:00 PM EDT - 10/04/2021

02:10:00 PM EDT           [facility] FOOT CE[facility]ULITIS     [facility]

l

 

                                        RIGHT FOOT CELLULITIS 

 

                                        Patient discharged. 

 

                Outpatient      Attender: Cecille Maguire NPReferrer: Cecille shin NP                 2021 

11:26:00 AM EDT - 2021 12:09:00 PM EDT                           [facility]

 

                Emergency       Attender: IVONNE VILLANUEVA MD                  07:40:00 AM EDT - 2021 

09:24:00 AM EDT           SORE THROAT, FEVER, R/O COVID [facility]

pital

 

                                        SORE THROAT, FEVER, R/O COVID 

 

                                        Patient discharged. 

 

           Outpatient Referrer: Cecille Maguire NP            06/10/2021 12:00:00 

AM EDT            [facility]

 

                Unknown                         [address]

[place] [address] 2021 12:00:00 AM 

EDT                                                 eCW1 ([facility])

 

           Outpatient                       2021 12:00:00 AM [facility]

 

           Outpatient Attender: Delfina Bustillos MD            2021 09:00:00

 AM EDT            [facility]

 

                Outpatient      Attender: Cecille Maguire NPReferrer: Cecille shin NP                 2021 

09:52:00 AM EDT - 2021 10:12:00 AM EDT                           [facility]

 

                Outpatient      Attender: KEERTHI GIRON [id number]     2021

 12:00:00 AM EDT - 

2021 11:27:25 AM EDT Retinal edema             [facility]

 

                                        Retinal edema 

 

                Outpatient      Attender: TAY ROMERO NP Physical Therapy  12:15:00 PM 

EST                                                 [facility] ([facility]

[facility])

 

           Outpatient Attender: TAY ROMERO NP            2021 08:22:0

0 AM EST E11.65     

[facility]

 

                                        E11.65 

 

                Emergency       Attender: IVONNE VILLANUEVA MD                 2021 07:07:00 AM EST - 2021 

07:49:00 AM EST           BURN                      [facility]

l

 

                                        BURN 

 

                                        Patient discharged. 

 

                Outpatient      Attender: Amy Dykes [id number]     0

2021 12:00:00 AM EST

- 2021 11:23:03 AM EST            Type 2 diabetes mellitus with stable pro

liferative 

diabetic retinopathy, bilateral         [facility]

 

                                        Type 2 diabetes mellitus with stable pro

liferative diabetic retinopathy, 

bilateral 

 

                Outpatient                      [address]

[place] [address] 2021 12:00:00 AM

EST                                                 eCW1 ([facility])

 

                Unknown                         [address]

[place] [address] 2021 12:00:00 AM 

EST                                                 eCW1 ([facility])

 

                Unknown                         [address], 

[place] [address] 2021 12:00:00 AM 

EST                                                 eCW1 ([facility])

 

           Outpatient Attender: CELENA SENA MD            2021 10:06:00 AM

 EST E55.9,L02.91 

[facility]

 

                                        E55.9,L02.91 

 

                Outpatient      Attender: Cecille Maguire NPReferrer: Cecille shin NP                 2021 

09:34:00 AM EST - 2021 10:02:00 AM EST                           [facility]

 

           Outpatient Attender: Amy Dykes            2021 12:00

:00 AM [facility]

 

                Emergency       Attender: JADA BLANTON MD                 2021 1

0:06:00 AM EST - 2021 02:38:00

PM EST                    CYST                      [facility]

l

 

                                        CYST 

 

                                        Patient discharged. 

 

                Outpatient      Attender: FAISAL Abbott/Cheryl/Lawrence/Veronique brown 01/15/2021 

12:00:00 PM EST                                     [facility] ([facility]

[facility]

 

                Outpatient      Attender: Amy Lemusztravis [id number]     0

2021 12:00:00 AM EST

- 2021 11:36:56 AM EST            Type 2 diabetes mellitus with stable pro

liferative 

diabetic retinopathy, bilateral         [facility]

 

                                        Type 2 diabetes mellitus with stable pro

liferative diabetic retinopathy, 

bilateral 

 

                Outpatient      Attender: TAY ROMERO NP Physical Therapy  12:00:00 PM 

EST                                                 [facility] ([facility]

[facility])

 

                Outpatient      Attender: Amy Dykes [id number]     1

2020 12:00:00 AM EST

- 2020 09:30:14 AM EST Retinal edema             [facility]

[facility]

 

                                        Retinal edema 

 

                Outpatient      Attender: Amy Lemuszdaavila [id number]     1

2020 12:00:00 AM EST

- 2020 11:17:07 AM EST            Type 2 diabetes mellitus with stable pro

liferative 

diabetic retinopathy, bilateral         [facility]

 

                                        Type 2 diabetes mellitus with stable pro

liferative diabetic retinopathy, 

bilateral 

 

                Outpatient      Attender: TAY ROMERO NP Physical Therapy  02:30:00 PM 

EST                                                 [facility] ([facility]

[facility])

 

             Outpatient   Attender: CELENA SENA MD              10/23/2020 12:28

:00 PM EDT 

M25.559/M62.89,M65.81,R70.0,R79.82      [facility]

 

                                        M25.559/M62.89,M65.81,R70.0,R79.82 

 

                Outpatient      Attender: JULES KEENE [id number]     10/22/20

20 12:00:00 AM EDT - 

10/22/2020 11:22:54 AM EDT              Type 2 diabetes mellitus with stable pro

liferative 

diabetic retinopathy, bilateral         [facility]

 

                                        Type 2 diabetes mellitus with stable pro

liferative diabetic retinopathy, 

bilateral 

 

                Outpatient      Attender: TAY ROMERO NP Physical Therapy  03:45:00 PM 

EDT                                                 [facility] ([facility]

[facility])

 

           Outpatient Attender: HENRIQUE [facility]      2020 02:47:00 PM ED

T            [facility]

 

           Outpatient Attender: HENRIQUE [facility]      2020 01:12:00 PM ED

T            [facility]

 

           Outpatient Attender: HENRIQUE [facility]      2020 12:03:01 PM ED

T            [facility]

 

             Outpatient   Attender: TAY ROMERO NP              2020 1

0:22:00 AM EDT 

E11.65,E10.65                           [facility]

 

                                        E11.65,E10.65 

 

           Outpatient Attender: HENRIQUE [facility]      2020 02:16:01 PM ED

T            [facility]



                                                                                
                                                                                
                                                                                
                                                                                
                                                                                
                                                               



Immunizations

          



             Vaccine      Date         Status       Description  Data Source(s)

 

             COVID-19 Moderna 2021 12:00:00 AM EDT completed              

   [facility]

 

             COVID-19 VACCINE Moderna 2021 12:00:00 AM EDT completed      

           [facility]

 

                                        Vaccine Series Complete: YESThis Data wa

s Submitted to [facility]. 

 

             COVID-19 Moderna 2021 12:00:00 AM EST completed              

   [facility]

 

             COVID-19 VACCINE Moderna 2021 12:00:00 AM EST completed      

           [facility]

 

                                        Vaccine Series Complete: NOThis Data was

 Submitted to [facility]. 



                                                                                
                                     



Medications

          



          Medication Brand Name Start Date Product Form Dose      Route     Admi

nistrative 

Instructions Pharmacy Instructions Status     Indications Reaction   Description

 Data 

Source(s)

 

                    doxycycline hyclate 100 MG Oral Tablet DOXYCYCLINE HYCLATE 1

 12:00:00 

AM EDT       tablet       20                        TAKE ONE TABLET BY MOUTH TWI

CE A DAY TAKE ONE TABLET BY MOUTH

TWICE A DAY  SOLD: 10/22/2021                                        [facility]

s

 

           Cephalexin 500 MG Oral Capsule CEPHALEXIN 10/11/2021 12:00:00 AM EDT 

capsule    28         

                                        TAKE ONE CAPSULE BY MOUTH FOUR TIMES A D

AY FOR 7 DAYS FOR CELLULITIS OF RIGHT 

FOOT                                    TAKE ONE CAPSULE BY MOUTH FOUR TIMES A D

AY FOR 7 DAYS FOR CELLULITIS OF 

RIGHT FOOT   SOLD: 10/14/2021                                        [facility]

[facility]

 

          Glipizide 5 MG Oral Tablet GLIPIZIDE 10/11/2021 12:00:00 AM EDT tablet

    60                  TAKE

ONE TABLET BY MOUTH TWICE A DAY TAKE ONE TABLET BY MOUTH TWICE A DAY SOLD: 

10/14/2021                                                      [facility]

 

        0.5 mL 30 gauge x 1/2"         10/11/2021 12:00:00 AM EDT syringe 30    

          USE 1 DAILY USE

1 DAILY      SOLD: 10/14/2021                                        [facility]

[facility]

 

          0.3 mL 31 gauge x 5/16"           10/04/2021 12:00:00 AM EDT syringe  

 30                  USE AS 

DIRECTED   USE AS DIRECTED SOLD: 10/04/2021                                  [facility]

 

        28 gauge         10/04/2021 12:00:00 AM EDT misc    30              TEST

 ONCE DAILY TEST ONCE DAILY 

SOLD: 10/04/2021                                                 [facility]

 

                          Insulin Glargine 100 UNT/ML Injectable Solution [Lantu

s] INSULIN 

GLARGINE,HUM.REC.ANLOG 10/04/2021 12:00:00 AM EDT solution     10               

         INJECT 30 UNITS 

UNDER THE SKIN DAILY INJECT 30 UNITS UNDER THE SKIN DAILY SOLD: 10/04/2021      

                      

                                        [facility]

 

           Cephalexin 500 MG Oral Capsule CEPHALEXIN 10/04/2021 12:00:00 AM EDT 

capsule    28         

                          TAKE ONE CAPSULE BY MOUTH FOUR TIMES A DAY FOR CELLULI

TIS RIGHT FOOT TAKE ONE 

CAPSULE BY MOUTH FOUR TIMES A DAY FOR CELLULITIS RIGHT FOOT SOLD: 10/04/2021    

                     

                                                    [facility]

 

          BLOOD SUGAR DIAGNOSTIC           10/04/2021 12:00:00 AM EDT strip     

50                  USE TO TEST BLOOD

 SUGAR ONCE ONCE DAILY USE TO TEST BLOOD SUGAR ONCE ONCE DAILY SOLD: 10/04/2021 

   

                                                            [facility]

 

          BLOOD-GLUCOSE METER           10/04/2021 12:00:00 AM EDT kit       1  

                 USE TO TEST BLOOD EVERY

 MORNING ( FASTING) USE TO TEST BLOOD EVERY MORNING ( FASTING) SOLD: 10/04/2021 

   

                                                            [facility]

 

                    Ondansetron 4 MG Disintegrating Oral Tablet Ondansetron     

    2021 10:10:49 AM 

EDT           4 MG                        active                      [facility]

 

                    Ondansetron 4 MG Disintegrating Oral Tablet Ondansetron     

    2021 10:10:49 AM 

EDT           4 MG                        active                      [facility]

 

        Ertugliflozin (Steglatro) 5 mg tablet         2021 09:59:32 AM EDT

         5 MG                            

active                                                          Health system

 

        Ertugliflozin (Steglatro) 5 mg tablet         2021 09:59:32 AM EDT

         5 MG                            

active                                                          Health system

 

          4 mg                2021 12:00:00 AM EDT tablet,disintegrating 2

1                  PLACE ONE TABLET BY

 MOUTH THREE TIMES A DAY AS NEEDED FOR NAUSEA AND VOMITING FOR 7 DAYS PL[address] ONE 

TABLET BY MOUTH THREE TIMES A DAY AS NEEDED FOR NAUSEA AND VOMITING FOR 7 DAYS 

SOLD: 2021                                                 [facility]

 

                          bevacizumab-awwb (MVASI) 3 mg/0.12 mL intravitreal inj

ection 1.25 mg 

[id number]&* 2021 11:00:00 AM EDT         1.25 mg Intravitreal          

       completed 

Retinal edema of left eye                           1.25 mg, Intravitreal, Once,

 u 3/18/21 at 1100, 

For 1 dose                              [facility]

 

                                        Retinal edema of left eye 

 

                                        Medication administered onsite 

 

                                        Proparacaine hydrochloride 5 MG/ML Ophth

almic Solution proparacaine (ALCAINE) 

0.5 % ophthalmic solution 1 drop        proparacaine (ALCAINE) 0.5 % ophthalmic 

solution 1 drop 2021 10:44:04 AM EDT         1 [drp] Left Eye             

    completed 

Retinal edema of left eye                           1 drop, Left Eye, Every 2 mi

n PRN, Other, Starting 

u 3/18/21 at 1044, For 3 doses<br>1 drop every 2-3 min x3<br> [facility]

 

                                        Retinal edema of left eye 

 

                                        Medication administered onsite 

 

                                        Lidocaine Hydrochloride 0.035 MG/MG Opht

halmic Gel lidocaine (XYLOCAINE) 3.5 % 

ophthalmic gel 1 mL       lidocaine (XYLOCAINE) 3.5 % ophthalmic gel 1 mL 2021 

10:44:04 AM EDT         1 mL    Left Eye                 completed Retinal edema

 of left eye         1 mL, 

Left Eye, PRN, Pain, Starting u 3/18/21 at 1044, For 1 dose [facility]

 

                                        Retinal edema of left eye 

 

                                        Medication administered onsite 

 

                                        Proparacaine hydrochloride 5 MG/ML Ophth

almic Solution proparacaine (ALCAINE) 

0.5 % ophthalmic solution 1 drop        proparacaine (ALCAINE) 0.5 % ophthalmic 

solution 1 drop 2021 09:45:00 AM EDT         1 [drp] Both Eyes            

     completed          

                          1 drop, Both Eyes, Once, Thu 3/18/21 at 0945, For 1 do

[facility]

 

                                        Medication administered onsite 

 

                                        Tropicamide 10 MG/ML Ophthalmic Solution

 tropicamide (MYDRIACYL) 1 % ophthalmic 

solution 1 drop           tropicamide (MYDRIACYL) 1 % ophthalmic solution 1 drop

 

2021 09:45:00 AM EDT         1 [drp] Both Eyes                 completed  

               1 drop, Both 

Eyes, Once, Thu 3/18/21 at 0945, For 1 [facility]

 

                                        Medication administered onsite 

 

                                        Phenylephrine Hydrochloride 25 MG/ML Oph

thalmic Solution phenylephrine (MYDFRIN)

 2.5 % ophthalmic solution 1 drop       phenylephrine (MYDFRIN) 2.5 % ophthalmic

 

solution 1 drop 2021 09:45:00 AM EDT         1 [drp] Both Eyes            

     completed          

                          1 drop, Both Eyes, Once, Thu 3/18/21 at 0945, For 1 do

[facility]

 

                                        Medication administered onsite 

 

        3 mg/0.5 mL         03/10/2021 12:00:00 AM EST pen injector 2           

    INJECT ONCE WEEKLY 

INJECT ONCE WEEKLY SOLD: 03/10/2021                                        [facility]

[facility]

 

     Trulicity Trulicity 2021 12:00:00 AM EST                          act

[place]                [facility] 

([facility])

 

                                        silver sulfadiazine 10 MG/ML Topical Cre

am Silver Sulfadiazine (Silvadene) 1 % 

cream           Silver Sulfadiazine (Silvadene) 1 % cream 2021 07:36:02 AM

 EST                 1 

APPLIC                          completed                         [facility]

 

                                        silver sulfadiazine 10 MG/ML Topical Cre

am Silver Sulfadiazine (Silvadene) 1 % 

cream           Silver Sulfadiazine (Silvadene) 1 % cream 2021 07:36:02 AM

 EST                 1 

APPLIC                          active                          Health system

 

                                        silver sulfadiazine 10 MG/ML Topical Cre

am Silver Sulfadiazine (Silvadene) 1 % 

cream           Silver Sulfadiazine (Silvadene) 1 % cream 2021 07:36:02 AM

 EST                 1 

APPLIC                          completed                         [facility]

 

          1 %                 2021 12:00:00 AM EST cream     400          

       APPLY TOPICALLY 1.5MM THICKNESS 

ONCE DAILY APPLY TOPICALLY 1.5MM THICKNESS ONCE DAILY SOLD: 2021          

                        

[facility]

 

          50 mcg (2,000 unit)           2021 12:00:00 AM EST tablet    30 

                 TAKE ONE TABLET BY 

MOUTH EVERY DAY TAKE ONE TABLET BY MOUTH EVERY DAY SOLD: 2021             

                     [facility]

 

                          bevacizumab-awwb (MVASI) 3 mg/0.12 mL intravitreal inj

ection 1.25 mg 

[id number] 2021 10:45:00 AM EST         1.25 mg Intravitreal          

       completed 

Stable proliferative diabetic retinopathy of both eyes associated with type 2 
diabetes mellitus                       1.25 mg, Intravitreal, Once, 21 

at 1045, For 1 dose 

[facility]

 

                                        Stable proliferative diabetic retinopath

y of both eyes associated with type 2 

diabetes mellitus 

 

                                        Medication administered onsite 

 

                                        Tetracaine hydrochloride 5 MG/ML Ophthal

pacheco Solution tetracaine (PONTOCAINE) 0.5

 % ophthalmic solution 1 drop           tetracaine (PONTOCAINE) 0.5 % ophthalmic

 solution 

1 drop  2021 10:45:00 AM EST         1 [drp] Left Eye                 comp

leted Stable 

proliferative diabetic retinopathy of both eyes associated with type 2 diabetes 
mellitus                                            1 drop, Left Eye, Every 5 mi

n, First dose on 21 at 1045, For 

3 doses<br>One drop 5 minutes apart x3<br> [facility]

 

                                        Stable proliferative diabetic retinopath

y of both eyes associated with type 2 

diabetes mellitus 

 

                                        Medication administered onsite 

 

                                        Phenylephrine Hydrochloride 25 MG/ML Oph

thalmic Solution phenylephrine (MYDFRIN)

 2.5 % ophthalmic solution 1 drop       phenylephrine (MYDFRIN) 2.5 % ophthalmic

 

solution 1 drop 2021 10:00:00 AM EST       1 [drp] Both Eyes             a

[facility]

 

                                        Proparacaine hydrochloride 5 MG/ML Ophth

almic Solution proparacaine (ALCAINE) 

0.5 % ophthalmic solution 1 drop        proparacaine (ALCAINE) 0.5 % ophthalmic 

solution 1 drop 2021 10:00:00 AM EST       1 [drp] Both Eyes             a

[facility]

 

                                        Tropicamide 10 MG/ML Ophthalmic Solution

 tropicamide (MYDRIACYL) 1 % ophthalmic 

solution 1 drop           tropicamide (MYDRIACYL) 1 % ophthalmic solution 1 drop

 

2021 10:00:00 AM EST        1 [drp] Both Eyes               active        

              [facility]

 

                          Cholecalciferol 2000 UNT Oral Capsule Cholecalciferol 

(Vitamin D3) 

Cholecalciferol (Vitamin D3) 2021 09:44:28 AM EST           2000 UNIT     

                          active

                                                                Health system

 

                          Cholecalciferol 2000 UNT Oral Capsule Cholecalciferol 

(Vitamin D3) 

Cholecalciferol (Vitamin D3) 2021 09:44:28 AM EST           2000 UNIT     

                          active

                                                                Health system

 

                          Cholecalciferol 2000 UNT Oral Capsule Cholecalciferol 

(Vitamin D3) 

Cholecalciferol (Vitamin D3) 2021 09:44:28 AM EST           2000 UNIT     

                          active

                                                                Health system

 

                          Cholecalciferol 2000 UNT Oral Capsule Cholecalciferol 

(Vitamin D3) 

Cholecalciferol (Vitamin D3) 2021 09:44:28 AM EST           2000 UNIT     

                          active

                                                                Health system

 

                          Ergocalciferol 20270 UNT Oral Capsule Ergocalciferol (

Vitamin D2) Ergocalciferol

 (Vitamin D2) 2021 09:44:09 AM EST       1250 MCG                   active

                   [facility]

 

                          Ergocalciferol 56748 UNT Oral Capsule Ergocalciferol (

Vitamin D2) Ergocalciferol

 (Vitamin D2) 2021 09:44:09 AM EST       1250 MCG                   active

                   [facility]

 

                          Ergocalciferol 79244 UNT Oral Capsule Ergocalciferol (

Vitamin D2) Ergocalciferol

 (Vitamin D2) 2021 09:44:09 AM EST       1250 MCG                   active

                   [facility]

 

                          Ergocalciferol 03518 UNT Oral Capsule Ergocalciferol (

Vitamin D2) Ergocalciferol

 (Vitamin D2) 2021 09:44:09 AM EST       1250 MCG                   [facility]

 

                    Clindamycin 300 MG Oral Capsule Clindamycin Hcl Clindamycin 

Hcl     2021 

01:43:41 PM EST        300 MG                      active                      L

[facility]

 

                    Clindamycin 300 MG Oral Capsule Clindamycin Hcl Clindamycin 

Hcl     2021 

01:43:41 PM EST        300 MG                      active                      L

[facility]

 

                    Clindamycin 300 MG Oral Capsule Clindamycin Hcl Clindamycin 

Hcl     2021 

01:43:41 PM EST        300 MG                      completed                    

  [facility]

 

                    Clindamycin 300 MG Oral Capsule Clindamycin Hcl Clindamycin 

Hcl     2021 

01:43:41 PM EST        300 MG                      completed                    

  [facility]

 

                    Clindamycin 300 MG Oral Capsule Clindamycin Hcl Clindamycin 

Hcl     2021 

01:43:41 PM EST        300 MG                      active                      L

[facility]

 

          300 mg              2021 12:00:00 AM EST capsule   40           

       TAKE ONE CAPSULE BY MOUTH FOUR 

TIMES A DAY TAKE ONE CAPSULE BY MOUTH FOUR TIMES A DAY SOLD: 2021         

                         

[facility]

 

          5 mg                2021 12:00:00 AM EST tablet    30           

       TAKE ONE TABLET BY MOUTH EVERY DAY

           TAKE ONE TABLET BY MOUTH EVERY DAY SOLD: 2021                  

                [facility]

 

                                        Tetracaine hydrochloride 5 MG/ML Ophthal

pacheco Solution tetracaine (PONTOCAINE) 0.5

 % ophthalmic solution 1 drop           tetracaine (PONTOCAINE) 0.5 % ophthalmic

 solution 

1 drop  2021 11:15:00 AM EST         1 [drp] Left Eye                 comp

leted Stable 

proliferative diabetic retinopathy of both eyes associated with type 2 diabetes 
mellitus                                                    [facility]

ospital

 

                                        Stable proliferative diabetic retinopath

y of both eyes associated with type 2 

diabetes mellitus 

 

                                        Phenylephrine Hydrochloride 25 MG/ML Oph

thalmic Solution phenylephrine (MYDFRIN)

 2.5 % ophthalmic solution 1 drop       phenylephrine (MYDFRIN) 2.5 % ophthalmic

 

solution 1 drop 2021 10:15:00 AM EST         1 [drp] Both Eyes            

     completed          

                          1 drop, Both Eyes, Once, 21 at 1015, For 1 dos

e [facility]

 

                                        Medication administered onsite 

 

                                        Tropicamide 10 MG/ML Ophthalmic Solution

 tropicamide (MYDRIACYL) 1 % ophthalmic 

solution 1 drop           tropicamide (MYDRIACYL) 1 % ophthalmic solution 1 drop

 

2021 10:15:00 AM EST         1 [drp] Both Eyes                 completed  

               1 drop, Both 

Eyes, Once, 21 at 1015, For 1 dose [facility]

 

                                        Medication administered onsite 

 

                                        Proparacaine hydrochloride 5 MG/ML Ophth

almic Solution proparacaine (ALCAINE) 

0.5 % ophthalmic solution 1 drop        proparacaine (ALCAINE) 0.5 % ophthalmic 

solution 1 drop 2021 10:15:00 AM EST         1 [drp] Both Eyes            

     completed          

                          1 drop, Both Eyes, Once, 21 at 1015, For 1 dos

e [facility]

 

                                        Medication administered onsite 

 

           dapagliflozin 5 MG Oral Tablet [Farxiga] Farxiga    2020 12:00:

00 AM EST                       

ORAL                          active                                  [facility] ([facility]

 

        1.5 mg/0.5 mL         2020 12:00:00 AM EST pen injector 2         

      INJECT ONCE WEEKLY 

INJECT ONCE WEEKLY SOLD: 2021                                        [facility]

 

        1.5 mg/0.5 mL         2020 12:00:00 AM EST pen injector 2         

      INJECT ONCE WEEKLY 

INJECT ONCE WEEKLY SOLD: 2020                                        [facility]

 

                                        Tetracaine hydrochloride 5 MG/ML Ophthal

pacheco Solution tetracaine (PONTOCAINE) 0.5

 % ophthalmic solution 1 drop           tetracaine (PONTOCAINE) 0.5 % ophthalmic

 solution 

1 drop  2020 09:30:00 AM EST         1 [drp] Left Eye                 acti

ve  Retinal edema of

 left eye                                                   [facility]

ospital

 

                                        Retinal edema of left eye 

 

                          bevacizumab-awwb (MVASI) 3 mg/0.12 mL intravitreal inj

ection 1.25 mg 

[id number] 2020 09:30:00 AM EST         1.25 mg Intravitreal          

       active  

Retinal edema of left eye                                         [facility]

 

                                        Retinal edema of left eye 

 

                                        Proparacaine hydrochloride 5 MG/ML Ophth

almic Solution proparacaine (ALCAINE) 

0.5 % ophthalmic solution 1 drop        proparacaine (ALCAINE) 0.5 % ophthalmic 

solution 1 drop 2020 08:15:00 AM EST         1 [drp] Both Eyes            

     completed          

                          1 drop, Both Eyes, Once, 20 at 0815, [facility] 1 d

[facility]

 

                                        [facility] on[facility] 

 

                                        Phenylephrine Hydrochloride 25 MG/ML Oph

thalmic Solution phenylephrine (MYDFRIN)

 2.5 % ophthalmic solution 1 drop       phenylephrine (MYDFRIN) 2.5 % ophthalmic

 

solution 1 drop 2020 10:15:00 AM EST         1 [drp] Both Eyes            

     completed          

                          1 drop, Both Eyes, Once, Mon 20 at 1015, For 1 d

[facility]

 

                                        Medication administered onsite 

 

                                        Tropicamide 10 MG/ML Ophthalmic Solution

 tropicamide (MYDRIACYL) 1 % ophthalmic 

solution 1 drop           tropicamide (MYDRIACYL) 1 % ophthalmic solution 1 drop

 

2020 10:15:00 AM EST         1 [drp] Both Eyes                 completed  

               1 drop, Both 

Eyes, Once, 20 at 1015, For 1 dose [facility]

 

                                        Medication administered onsite 

 

                                        Proparacaine hydrochloride 5 MG/ML Ophth

almic Solution proparacaine (ALCAINE) 

0.5 % ophthalmic solution 1 drop        proparacaine (ALCAINE) 0.5 % ophthalmic 

solution 1 drop 2020 10:15:00 AM EST         1 [drp] Both Eyes            

     completed          

                          1 drop, Both Eyes, Once, 20 at 1015, For 1 d

ose [facility]

 

                                        Medication administered onsite 

 

     Steglatro Steglatro 2020 12:00:00 AM EST           ORAL           com

pleted                

[facility] ([facility])

 

          1.5 mg/0.5 mL           10/29/2020 12:00:00 AM EDT pen injector 2     

              INJECT UNDER THE 

SKIN ONCE WEEKLY INJECT UNDER THE SKIN ONCE WEEKLY SOLD: 10/31/2020             

                     [facility]

 

          1.5 mg/0.5 mL           10/29/2020 12:00:00 AM EDT pen injector 2     

              INJECT UNDER THE 

SKIN ONCE WEEKLY INJECT UNDER THE SKIN ONCE WEEKLY SOLD: 2020             

                     [facility]

 

          1,250 mcg (50,000 unit)           10/27/2020 12:00:00 AM EDT capsule  

 4                   TAKE ONE 

CAPSULE BY MOUTH ONCE WEEKLY TAKE ONE CAPSULE BY MOUTH ONCE WEEKLY SOLD: 

10/28/2020                                                      [facility]

 

          1,250 mcg (50,000 unit)           10/27/2020 12:00:00 AM EDT capsule  

 4                   TAKE ONE 

CAPSULE BY MOUTH ONCE WEEKLY TAKE ONE CAPSULE BY MOUTH ONCE WEEKLY SOLD: 

2020                                                      [facility]

 

          50 mcg (2,000 unit)           10/27/2020 12:00:00 AM EDT capsule   30 

                 TAKE ONE CAPSULE 

BY MOUTH EVERY DAY TAKE ONE CAPSULE BY MOUTH EVERY DAY SOLD: 10/28/2020         

                         

[facility]

 

                          Vitamin D (Cholecalciferol) 50 MCG ( UT) Vitamin D

 (Cholecalciferol) 50 MCG 

( UT) 10/27/2020 12:00:00 AM EDT        1.0 {capsule}                      a

ctive               Vitamin D 

(Cholecalciferol) 50 MCG ( UT)      eCW1 ([facility])

 

                                        Ergocalciferol 18386 UNT Oral Capsule Vi

tamin D (Ergocalciferol) 1.25 MG (80347 

[place])             Vitamin D (Ergocalciferol) 1.25 MG (72631 UT) 10/27/2020 12:00:0

0 AM EDT                 

1.0 {capsule}                         active                  Vitamin D (Ergocal

ciferol) 1.25 MG (05226 UT) 

[facility] ([facility])

 

                                        Ergocalciferol 34510 UNT Oral Capsule Vi

tamin D (Ergocalciferol) 1.25 MG (95806 

[place])             Vitamin D (Ergocalciferol) 1.25 MG (36809 UT) 10/27/2020 12:00:0

0 AM EDT                 

1.0 {capsule}                         active                  Vitamin D (Ergocal

ciferol) 1.25 MG (57617 UT) 

[facility] ([facility])

 

                                        Ergocalciferol 36261 UNT Oral Capsule Vi

tamin D (Ergocalciferol) 1.25 MG (09524 

[place])             Vitamin D (Ergocalciferol) 1.25 MG (56107 UT) 10/27/2020 12:00:0

0 AM EDT                 

1.0 {capsule}                         active                  Vitamin D (Ergocal

ciferol) 1.25 MG (20517 UT) 

[facility] ([facility])

 

                                        Ergocalciferol 94320 UNT Oral Capsule Vi

tamin D (Ergocalciferol) 1.25 MG (44218 

[place])             Vitamin D (Ergocalciferol) 1.25 MG (75737 UT) 10/27/2020 12:00:0

0 AM EDT                 

1.0 {capsule}                         suspended                 Vitamin D (Ergoc

alciferol) 1.25 MG (49034 UT) 

[facility] ([facility])

 

          1,250 mcg (50,000 unit)           10/27/2020 12:00:00 AM EDT capsule  

 4                   TAKE ONE 

CAPSULE BY MOUTH ONCE WEEKLY TAKE ONE CAPSULE BY MOUTH ONCE WEEKLY SOLD: 

2020                                                      [facility]

 

                                        Ergocalciferol 97071 UNT Oral Capsule Vi

tamin D (Ergocalciferol) 1.25 MG (31092 

[place])             Vitamin D (Ergocalciferol) 1.25 MG (54342 UT) 10/27/2020 12:00:0

0 AM EDT                 

1.0 {capsule}                         active                          eCW1 ([facility])

 

          50 mcg (2,000 unit)           10/27/2020 12:00:00 AM EDT capsule   30 

                 TAKE ONE CAPSULE 

BY MOUTH EVERY DAY TAKE ONE CAPSULE BY MOUTH EVERY DAY SOLD: 2020         

                         

[facility]

 

          50 mcg (2,000 unit)           10/27/2020 12:00:00 AM EDT capsule   30 

                 TAKE ONE CAPSULE 

BY MOUTH EVERY DAY TAKE ONE CAPSULE BY MOUTH EVERY DAY SOLD: 2021         

                         

[facility]

 

                                        Tropicamide 10 MG/ML Ophthalmic Solution

 tropicamide (MYDRIACYL) 1 % ophthalmic 

solution 1 drop           tropicamide (MYDRIACYL) 1 % ophthalmic solution 1 drop

 

10/22/2020 10:15:00 AM EDT         1 [drp] Both Eyes                 completed  

               1 drop, Both 

Eyes, Once, Thu 10/22/20 at 1015, For 1 dose [facility]

 

                                        Medication administered onsite 

 

                                        Phenylephrine Hydrochloride 25 MG/ML Oph

thalmic Solution phenylephrine (MYDFRIN)

 2.5 % ophthalmic solution 1 drop       phenylephrine (MYDFRIN) 2.5 % ophthalmic

 

solution 1 drop 10/22/2020 10:15:00 AM EDT         1 [drp] Both Eyes            

     completed          

                          1 drop, Both Eyes, Once, Thu 10/22/20 at 1015, For 1 d

[facility]

 

                                        Medication administered onsite 

 

                                        Proparacaine hydrochloride 5 MG/ML Ophth

almic Solution proparacaine (ALCAINE) 

0.5 % ophthalmic solution 1 drop        proparacaine (ALCAINE) 0.5 % ophthalmic 

solution 1 drop 10/22/2020 10:15:00 AM EDT         1 [drp] Both Eyes            

     completed          

                          1 drop, Both Eyes, Once, Thu 10/22/20 at 1015, For 1 d

[facility]

 

                                        Medication administered onsite 

 

                    8 HR Acetaminophen 650 MG Extended Release Oral Tablet [Tyle

nol] Tylenol 8 Hour      

10/12/2020 12:00:00 AM EDT                                    active            

          [facility] ([facility]

 

                    0.5 ML dulaglutide 3 MG/ML Auto-Injector [Trulicity] Trulici

ty           2020 

12:00:00 AM EDT                                    completed                    

  [facility] ([facility])

 

          BLOOD SUGAR DIAGNOSTIC           2020 12:00:00 AM EDT strip     

150                 USE AS DIRECTED 

FOUR TIMES A DAY USE AS DIRECTED FOUR TIMES A DAY SOLD: 2020              

                    [facility]

 

                    Blood Sugar Diagnostic (Onetouch Verio Test Strips) strip   

                  2020 09:16:40 

AM EDT        0                           active                      [facility]

 

                    Blood Sugar Diagnostic (Onetouch Verio Test Strips) strip   

                  2020 09:16:40 

AM EDT        0                           active                      [facility]

 

                    Blood Sugar Diagnostic (Onetouch Verio Test Strips) strip   

                  2020 09:16:40 

AM EDT        0                           active                      [facility]

 

                    Blood Sugar Diagnostic (Onetouch Verio Test Strips) strip   

                  2020 09:16:40 

AM EDT        0                           active                      [facility]

 

                    Blood Sugar Diagnostic (Onetouch Verio Test Strips) strip   

                  2020 09:16:40 

AM EDT        0                           active                      [facility]

 

        0.75 mg/0.5 mL         2020 12:00:00 AM EDT pen injector 2        

       USE ONCE WEEKLY USE

 ONCE WEEKLY SOLD: 2020                                        [facility]

s

 

                    0.5 ML dulaglutide 1.5 MG/ML Auto-Injector [Trulicity] Truli

city           2020 

12:00:00 AM EDT                                    completed                    

  [facility] ([facility] [facility] )

 

                    Blood Sugar Diagnostic (Onetouch Verio Test Strips) strip   

                  2020 12:31:02 

PM EDT        0                           completed                      [facility]

 

                    Blood Sugar Diagnostic (Onetouch Verio Test Strips) strip   

                  2020 12:31:02 

PM EDT        0                           completed                      [facility]

 

                    Blood Sugar Diagnostic (Onetouch Verio Test Strips) strip   

                  2020 12:31:02 

PM EDT        0                           completed                      [facility]

 

                    Blood Sugar Diagnostic (Onetouch Verio Test Strips) strip   

                  2020 12:31:02 

PM EDT        0                           completed                      [facility]

 

                    Blood Sugar Diagnostic (Onetouch Verio Test Strips) strip   

                  2020 12:31:02 

PM EDT        0                           completed                      [facility]

 

        Ertugliflozin (Steglatro) 15 mg tablet         2020 09:59:57 AM ED

T         15 MG                   

             completed                                           [facility]

 

        Ertugliflozin (Steglatro) 15 mg tablet         2020 09:59:57 AM ED

T         15 MG                   

             completed                                           [facility]



                                                                                
                                                                                
                                                                                
                                                                                
                                                                                
                                                                                
                                                                                
                                                                                
                                                                                
                                                                                
                                                                                
                                                                                
                                                          



Insurance Providers

          



             Payer name   Policy type / Coverage type Policy ID    Covered party

 ID Covered 

party's relationship to tao Policy Tao             Plan Information

 

          Medicaid  S         [id number]            S                   [id number]

 

          Managed Care [place] P         [id number]           S                

   [id number]

 

          [facility]       I         [id number]           Self                [id number]

 

          Medicaid  O         [id number]            S                   [id number]

 

          [employer] [facility] [facility]           [id number]           SP           

       [id number]

 

          [facility]                      SP                  



 

          [facility] Other     0         [id number] Self                0

 

          [facility] [place] [facility]           [id number]           18

                  [id number]

 

          [employer] COMMUNITY [employer]AN [facility]           [id number]           SP           

       [id number]

 

          [facility] [employer]           [id number]           [id number]     

             [id number]

 

          [facility]           [id number]           SP         

         [id number]

 

          Managed Care - [facility] Community Plan P         UNAVAILABLE           S   

                UNAVAILABLE

 

          Managed Care [place] S         [id number]           S                

   [id number]

 

          [facility]           [id number]           SP                  [id number]

[id number]



                                                                                
                                                                                
                                                          



Problems, Conditions, and Diagnoses

          



           Code       Display Name Description Problem Type Effective Dates Data

 Source(s)

 

           H35.81     Retinal edema Retinal edema Diagnosis  2021 09:00:09

 AM [facility]

 

                          E11.3553                  Type 2 diabetes mellitus wit

h stable proliferative diabetic 

retinopathy, bilateral                  Type 2 diabetes mellitus with stable pro

liferative 

diabetic retinopathy, bilateral Diagnosis           2021 09:41:50 AM EST [facility]

 

                    L97.412             [id number]           Non-pressure chronic

 ulcer of right heel and midfoot with fat 

layer exposed       Problem             10/26/2021 12:00:00 AM EDT eCW1 ([facility])

 

             E10.621      [id number]    Type 1 diabetes mellitus with foot ulcer 

Problem      10/26/2021 

12:00:00 AM EDT                         eCW1 ([facility])

 

             M62.81       [id number]     Muscle weakness (generalized) Problem    

  10/28/2020 12:00:00 AM EDT

                                        eCW1 ([facility])

 

           G47.9      [id number]   Sleep disorder, unspecified Problem    10/27/20

20 12:00:00 AM EDT 

eCW1 ([facility])

 

           R20.2      [id number]   Paresthesia of skin Problem    10/27/2020 12:00

:00 AM EDT eCW1 

([facility])

 

             R79.82       [id number] Elevated C-reactive protein (CRP) Prob

dylon      10/27/2020 

12:00:00 AM EDT                         eCW1 ([facility])

 

           E55.9      [id number]   Vitamin D deficiency Problem    10/27/2020 12:0

0:00 AM EDT eCW1 

([facility])

 

           G62.9      [id number]   Sensorimotor neuropathy Problem    10/27/2020 1

2:00:00 AM EDT eCW1 

([facility])

 

             [id number]      Lumbosacral radiculopathy Lumbosacral radiculopathy Pr

oblem      10/12/2020 

12:00:00 AM EDT                         [facility] ([facility], )

 

                [id number]        Idiopathic peripheral neuropathy Idiopathic rosalba

pheral neuropathy 

Problem                   10/12/2020 12:00:00 AM EDT [facility] ([facility]

logy, )

 

             [id number]    Numbness of lower limb Numbness of lower limb Problem 

     10/12/2020 

12:00:00 AM EDT                         [facility] ([facility], )



                                                                                
                                                                                
                                                          



Surgeries/Procedures

          



             Procedure    Description  Date         Indications  Data Source(s)

 

             Plain chest X-ray (procedure)              2021 08:59:00 AM E

[facility]

 

             SARS-CoV-2 Rapid RNA (RT-PCR)              2021 12:00:00 AM [facility]

 

             Viral antigen assay (procedure)              2021 12:00:00 AM

 EDT              [facility]

 

                          MVASI INTRAVITREAL INJECTION - OS - LEFT <td>MVASI INT

RAVITREAL INJECTION - OS -

 LEFT</td><td>Routine</td><td>2021 11:59 AM EDT</td><td>



Retinal edema of left eye</td><td>



Results for this procedure are in the results section.</td> 2021 11:59:23 

AM EDT                    Retinal edema of left eye [facility]

 

                                        Retinal edema of left eye 

 

                          OCT RETINA                <td>OCT RETINA</td><td>Routi

ne</td><td>2021  9:58 AM 

EDT</td><td>



Retinal edema of left eye</td><td>



Results for this procedure are in the results section.</td> 2021 09:58:23 

AM EDT                    Retinal edema of left eye [facility]

 

                                        Retinal edema of left eye 

 

                          MVASI INTRAVITREAL INJECTION - OS - LEFT <td>MVASI INT

RAVITREAL INJECTION - OS -

 LEFT</td><td>Routine</td><td>2021  1:19 PM EST</td><td>



Stable proliferative diabetic retinopathy of both eyes associated with type 2 
diabetes mellitus</td><td>



Results for this procedure are in the results section.</td> 2021 01:19:16 

PM EST                                  Stable proliferative diabetic retinopath

y of both eyes associated with 

type 2 diabetes mellitus                [facility]

 

                                        Stable proliferative diabetic retinopath

y of both eyes associated with type 2 

diabetes mellitus 

 

                          OCT RETINA                <td>OCT RETINA</td><td>Routi

ne</td><td>2021 10:15 AM 

EST</td><td>



Stable proliferative diabetic retinopathy of both eyes associated with type 2 
diabetes mellitus</td><td>



Results for this procedure are in the results section.</td> 2021 10:15:17 

AM EST                                  Stable proliferative diabetic retinopath

y of both eyes associated with 

type 2 diabetes mellitus                [facility]

 

                                        Stable proliferative diabetic retinopath

y of both eyes associated with type 2 

diabetes mellitus 

 

             Aerobic microbial culture (procedure)              2021 12:00

:00 AM [facility]

 

                    Blood culture for bacteria, including anaerobic screen (proc

edure)                     2021 

12:00:00 AM [facility]

 

             Aerobic microbial culture (procedure)              2021 12:00

:00 AM [facility]

 

                    Blood culture for bacteria, including anaerobic screen (proc

edure)                     2021 

12:00:00 AM [facility]

 

             Aerobic microbial culture (procedure)              2021 12:00

:00 AM [facility]

 

                    Blood culture for bacteria, including anaerobic screen (proc

edure)                     2021 

12:00:00 AM [facility]

 

             Aerobic microbial culture (procedure)              2021 12:00

:00 AM EST              [facility]

 

                    Blood culture for bacteria, including anaerobic screen (proc

edure)                     2021 

12:00:00 AM EST                                     [facility]

l

 

             Blood Culture              2021 12:00:00 AM EST              

[facility]

 

             Wound Culture              2021 12:00:00 AM EST              

[facility]

 

                          FOCAL ARGON LASER FOR DIABETIC MACULAR EDEMA <td>FOCAL

 ARGON LASER FOR DIABETIC 

MACULAR EDEMA</td><td>Routine</td><td>2021  5:13 PM EST</td><td>



Stable proliferative diabetic retinopathy of both eyes associated with type 2 
diabetes mellitus</td><td>



Results for this procedure are in the results section.</td> 2021 05:13:57 

PM EST                                  Stable proliferative diabetic retinopath

y of both eyes associated with 

type 2 diabetes mellitus                [facility]

 

                                        Stable proliferative diabetic retinopath

y of both eyes associated with type 2 

diabetes mellitus 

 

                          OCT RETINA                <td>OCT RETINA</td><td>Routi

ne</td><td>2021 10:18 AM 

EST</td><td>



Stable proliferative diabetic retinopathy of both eyes associated with type 2 
diabetes mellitus</td><td>



Results for this procedure are in the results section.</td> 2021 10:18:01 

AM EST                                  Stable proliferative diabetic retinopath

y of both eyes associated with 

type 2 diabetes mellitus                [facility]

 

                                        Stable proliferative diabetic retinopath

y of both eyes associated with type 2 

diabetes mellitus 

 

                          MVASI INTRAVITREAL INJECTION - OS - LEFT <td>MVASI INT

RAVITREAL INJECTION - OS -

 LEFT</td><td>Routine</td><td>2020  9:28 AM EST</td><td>



Retinal edema of left eye</td><td>



Results for this procedure are in the results section.</td> 2020 09:28:10 

AM EST                    Retinal edema of left eye [facility]

 

                                        Retinal edema of left eye 

 

                          OCT RETINA                <td>OCT RETINA</td><td>Routi

ne</td><td>2020 10:27 AM 

EST</td><td>



Stable proliferative diabetic retinopathy of both eyes associated with type 2 
diabetes mellitus</td><td>



Results for this procedure are in the results section.</td> 2020 10:27:41 

AM EST                                  Stable proliferative diabetic retinopath

y of both eyes associated with 

type 2 diabetes mellitus                [facility]

 

                                        Stable proliferative diabetic retinopath

y of both eyes associated with type 2 

diabetes mellitus 

 

                    Plain x-ray of pelvis and lower extremity (procedure)       

              10/23/2020 12:55:00 PM 

EDT                                                 [facility]

l

 

             XRAY HIP RT 2-3 VIEW              10/23/2020 12:55:00 PM EDT       

       [facility]

 

                    Plain x-ray of pelvis and lower extremity (procedure)       

              10/23/2020 12:55:00 PM 

EDT                                                 [facility]

l

 

             XRAY HIP RT 2-3 VIEW              10/23/2020 12:55:00 PM EDT       

       [facility]

 

                    Plain x-ray of pelvis and lower extremity (procedure)       

              10/23/2020 12:55:00 PM 

EDT                                                 [facility]

l

 

             XRAY HIP RT 2-3 VIEW              10/23/2020 12:55:00 PM EDT       

       [facility]

 

                    Plain x-ray of pelvis and lower extremity (procedure)       

              10/23/2020 12:55:00 PM 

EDT                                                 [facility]

l

 

             XRAY HIP RT 2-3 VIEW              10/23/2020 12:55:00 PM EDT       

       [facility]

 

                    Plain x-ray of pelvis and lower extremity (procedure)       

              10/23/2020 12:55:00 PM 

EDT                                                 [facility]

l

 

             XRAY HIP RT 2-3 VIEW              10/23/2020 12:55:00 PM EDT       

       [facility]

 

                          OCT RETINA                <td>OCT RETINA</td><td>Ayannai

ne</td><td>10/22/2020 10:56 AM 

EDT</td><td>



Stable proliferative diabetic retinopathy of both eyes associated with type 2 
diabetes mellitus</td><td>



Results for this procedure are in the results section.</td> 10/22/2020 10:56:58 

AM EDT                                  Stable proliferative diabetic retinopath

y of both eyes associated with 

type 2 diabetes mellitus                [facility]

 

                                        Stable proliferative diabetic retinopath

y of both eyes associated with type 2 

diabetes mellitus 



                                                                                
                                                                                
                                                                                
                                                                                
                                                                              



Results

          



                    ID                  Date                Data Source

 

                    [id number]        10/21/2021 04:24:00 PM EDT [facility] [address] [place] [address]                

                                  [phone]  NAME                          
                    SEX    PT STATUS         ACCOUNT NUMBER  ROMELIA CORADO   REG REF              [id number]    ORDERING
 PHYSICIAN                                LOCATION                 MEDICAL 
RECORD NO.  Cecille Maguire                                 RAD               
       [id number]    ATTENDING PHYSICIAN                               DATE OF 
BIRTH            DATE OF EXAM/TIME  Cecille Maguire NP                            
      1980               10/21/21 / 1451    TYPE / EXAM  Xray Foot 
Complete RT    REASON FOR EXAM  Cellulitis     CLINICAL HISTORY: [facility]    
Cellulitis      TECHNIQUE: Frontal, oblique, and lateral views of the right 
foot.      COMPARISON: None available.      FINDINGS:       Overall 
mineralization pattern is normal. Vascular calcification is seen in the dorsalis
 pedis artery crossing the ankle into the midfoot. Joint spaces are preserved. 
No bony erosive changes seen. No soft tissue gas or opaque foreign body noted.  
    IMPRESSION:       Vascular calcification. Otherwise unremarkable right foot 
radiographs.                Reported By Jose Vidal MD on 10/21/21 1624      
Signed By Jose Vidal MD on 10/21/21 1626                          
______________________________________________   _______  _______  Date        
Time  CC:  Cecille Maguire; Jose Vidal M.D.  Techn: [facility]             
Trans Dt/Tm:             Trans by: DT             Prt Dt/Tm:    1433-7343: Total
 DLP =    0.00 mGy-cm  Fluoroscopy Time (in secs):    









          Name      Value     Range     Interpretation Code Description Data Gregoria

rce(s) Supporting 

Document(s)

 

                                                                       









                    ID                  Date                Data Source

 

                    [id number]           10/21/2021 02:10:00 PM EDT [facility]

 

                                          Patient Name: ROMELIA CORADO      

 : 1980  Sex: F  Pt Unit #: 

[id number]  Location:[facility]  Provider:   Visit Date/Time:  10/21/21  Primary 
Insurance: [facility]  Secondary Insurance: Self Pay  Intake  
Vital Signs                                                    10/21/21         
                                         14:16     Current Weight               
                  168 lb     Weight Measurement Method                  Standing
 Scale     BP                                             106/60     Blood 
Pressure Location                      Lt brachial     Position                 
                      Sitting     Respiration                                   
   20     Pulse                                           104 H     Pulse Source
                               Pulse Oximeter     Temp                          
                 98.1 F     Temp Source                                     Oral
     Pulse Oximetry (%)                               98     Oxygen Delivery 
Method                        room air      Intake  Visit Reasons: Cellulitis 
Follow-up  Nurse Note:   Cellulitis F/U of top/side (R) foot. Drg lg amt yellow.
 Wound yellow center red edges. Healing slowly. peeling. But pain is now going 
up (R) leg again. Bg 358. prior to coming here. Continues to take her medication
 properly. 2 days of antibiotic left.   Accompanied by:   Is patient in 
pain?: Yes Pain scale (1-10): 4  Allergies    No Known Drug Allergies Allergy 
(Verified 21 08:09)       No Known Food Allergies Allergy (Unverified 
21 11:37)           Medications     - Last Reconciled 10/21/21 by Cecille Maguire NP    blood sugar diagnostic (Accu-Chek Guide test strips) As directed  
blood-glucose meter (Accu-Chek Guide Glucose Meter) As directed, once daily  
cephalexin 500 mg PO QID 7 days  glipizide 5 mg PO BID  insulin glargine (Lantus
 U-100 Insulin) 35 units (0.35 mL) subcut DAILY  insulin syr/ndl U100 half sylvia 
Daily  lancets As directed        Coronavirus Screening  Screening  Are you 
currently positive or on isolation for COVID ?: No  Do you have any NEW signs of
 one or more of the following?: no symptoms  Do you have NEW signs of at least 
two of the following?: no symptoms    HPI  Additional HPI  HPI  Details:   
Romelia presents to the clinic for follow up on right foot cellulitis. She 
reports that it is painful, seems to be plateaued in the healing department. 
Pain with ambulation on that foot, rubbingon shoe, sore.       PFSH  Medical 
History (Updated 10/12/21 @ 13:14 by Cecille Maguire NP)    Anxiety  Asthma  
Chlamydia  Depression  Diabetes mellitus  Herpes genitalis  Retinal detachment  
    Surgical History (Updated 21 @ 16:48 by Geoff Garcia MD)    Hx of 
tonsillectomy  Status post tonsillectomy                           Social 
History (Updated 10/11/21 @ 14:10 by Priyanka Kuo)  Does the Patient have a 
Healthcare Proxy:  No   Does Patient have a DNR?:  No   Does Patient have a 
Living Will?:  No   Advance Directives on File or in chart?:  No   Hx Recent 
Travel (where):  No   Smoking Status:  Never smoker         Review of Systems  
Const  All systems reviewed   are unremarkable except as noted in HPI and below 
 Reports as per HPI and Reports fatigue  Skin/Breast  Reports skin ulcer and 
Reports sores  Details:   Right foot with healing wound, cellulitis. 
Painful/sore.  Thinks it is getting worse rather than better.   Endo  Reports 
fatigue and Reports other (BG's still running quite high. )    Exam  Const  
General: cooperative, healthy appearing and comfortable  Nutritional Appearance:
 average body habitus and well nourished  Orientation: alert, awake and oriented
 x3  Skin  Other:   Right foot wound still open, not much improvement from 
previous visit. Tenderness extending up the ankle, but no redness.     
Assessment   Plan ***  Assessment   Plan  (1) Cellulitis:         Code(s):  
L03.90 - Cellulitis, unspecified        Plan:   Not healing as well as I would 
like it to be.   I will do a STAT referral to Wound.   Changed abx to doxy
cycline.  Out of work until seen by wound and cleared by them to return.   (2) 
Uncontrolled type 2 diabetes mellitus:         Status: Acute        Code(s):  
E11.65 - Type 2 diabetes mellitus with hyperglycemia        SNOMED Code(s): 
[id number]        Category: Medical        Plan:   Kayley DM is very poorly 
controlled.  I will increase the insulin to 40units daily.  Placing referral to 
Endo as well.         Orders:   Orders      Xray Foot Complete RT 10/21/21 
L03.90 - Cellulitis, unspecified     Referrals      Wound Care Referral  E11.65 
- Type 2 diabetes mellitus with hyperglycemia, L03.90 - Cellulitis, unspecified 
     Endocrinology Referral  E11.65 - Type 2 diabetes mellitus with 
hyperglycemia             Medications:   New      doxycycline hyclate 100 mg PO 
BID 10 days 20 tabs 0RF      Changed      From insulin glargine (Lantus U-100 
Insulin) 35 units (0.35 mL) subcut DAILY 10 mL 0RF      To insulin glargine 
(Lantus U-100 Insulin) 40 units (0.4 mL) subcut DAILY 10 mL 0RF      Discontinue
d      cephalexin     Discontinued Reason:  Clinically Indicated 500 mg  PO QID 
7 days 28 caps 0RF Cellulitis right foot          Coding  Level of Care Code  
21302 Est Pt Intermediate Comp    Diagnoses  Cellulitis  L03.90  Uncontrolled 
type 2 diabetes mellitus  E11.65    Additional Codes  Intake - Is patient in 
pain?: Yes (1125F)      <Electronically signed by Cecille Maguire NP> 10/25/21 
1250          









          Name      Value     Range     Interpretation Code Description Data Gregoria

rce(s) Supporting 

Document(s)

 

                                                                       









                    ID                  Date                Data Source

 

                    [id number]           10/11/2021 02:06:00 PM EDT [facility]

 

                                          Patient Name: ROMELIA CORADO      

 : 1980  Sex: F  Pt Unit #: 

[id number]  Location:[facility]  Provider:   Visit Date/Time:  10/11/21  Primary 
Insurance: [facility]  Secondary Insurance: Self Pay  Intake  
Vital Signs                                                    10/11/21         
                                         14:13     Current Height               
                 5 ft 5 in     Current Weight                                 
165 lb     Weight Measurement Method                  Standing Scale     BMI    
                                         27.4     BP                            
                 102/70     Blood Pressure Location                      Lt 
brachial     Position                                       Sitting     
Respiration                                      20     Pulse                   
                         100     Pulse Source                               
Pulse Oximeter     Temp                                            98 F     Temp
 Source                                     Oral     Pulse Oximetry (%)         
                      98     Oxygen Delivery Method                        room 
air      Intake  Visit Reasons: Hospital Discharge Follow-up  Nurse Note:   F/U 
[facility] D/C on the 4th.  Cellulitis and diabetes. Low HB.  Bg this am was 275.
 Not so good at doing BG everyday but is taking her Lantus everyday. Continues 
on Keflex for 2 more days. Ambulationis getting alot better.   Accompanied by: 
  Is patient in pain?: No  Allergies    No Known Drug Allergies Allergy 
(Verified 21 08:09)       No Known Food Allergies Allergy (Unverified 
21 11:37)           Medications     - Last Reconciled 10/11/21 by Cecille Maguire NP    blood sugar diagnostic (Accu-Chek Guide test strips) As directed  
blood-glucose meter (Accu-Chek Guide Glucose Meter) As directed, once daily  
cephalexin 500 mg PO QID  insulin glargine (Lantus U-100 Insulin) 30 units (0.3 
mL) subcut DAILY  insulin syringe,safetyneedle As directed  lancets As directed 
       Coronavirus Screening  Screening  Are you currently positive or on 
isolation for COVID ?: No  Do you have any NEW signs of one or more of the 
following?: no symptoms  Do you have NEW signs of at least two of the 
following?: no symptoms    PFSH  Medical History (Updated 10/12/21 @ 13:14 by 
Cecille Maguire NP)    Anxiety  Asthma  Chlamydia  Depression  Diabetes mellitus 
 Herpes genitalis  Retinal detachment      Surgical History (Updated 21 @ 
16:48 by Geoff Garcia MD)    Hx of tonsillectomy  Status post tonsillectomy     
                      Social History (Updated 10/11/21 @ 14:10 by Priyanka Kuo)
  Does the Patient have a Healthcare Proxy:  No   Does Patient have a DNR?:  No 
  Does Patient have a Living Will?:  No   Advance Directives on File or in 
chart?:  No   Hx Recent Travel (where):  No   Smoking Status:  Never smoker     
    Review of Systems  Const  All systems reviewed   are unremarkable except as 
noted in HPI and below  Reports system reviewed and no additional complaints, 
except as documented  Card  Reports system reviewed and no additional 
complaints, except as documented  Resp  Reports system reviewed and no 
additional complaints, except as documented  Skin/Breast  Reports erythema and 
Reports wounds    Exam  Const  General: cooperative, healthy appearing and 
comfortable  Nutritional Appearance: average body habitus and well nourished  
Orientation: alert, awake and oriented x3  Skin  Other:   Right lateral foot 
with open area and yellow slough, and some peeling/bubbled up skin blister 
appearing.  Redness surrounds and extends to the ankle.  Swelling is minimal.  
No swelling noted in the leg at all (patient reports that initially there was 
swelling into the lower leg).     Assessment   Plan ***  Assessment   Plan  (1) 
Hospital discharge follow-up:         Code(s):  Z09 - Encounter for follow-up 
examination after completed treatment for conditions other than malignant 
neoplasm  (2) Abscess:         Status: Acute        Code(s):  L02.91 - Cutaneous
 abscess, unspecified        SNOMED Code(s): 795485200        Category: Medical 
 (3) Uncontrolled type 2 diabetes mellitus:         Status: Acute        
Code(s):  E11.65 - Type 2 diabetes mellitus with hyperglycemia        SNOMED 
Code(s): 299019555        Category: Medical        Plan:   Increase lantus to 35
 units daily.  New syringes sent in as well. Start on glipizide BID, daily first
 to ensure well-tolerated, then increase to BID.  Continue to improve diet.   
Added another week of abx.  Foot is still quite red up to the ankle.  Open area 
on the right lateralaspect of the foot with some yellow slough, and bubbled up 
skin. She is healing slow, which is expected d/t her diabetes.   I would like to
 see back in 1 week.   If not improving, consider wound clinic referral will be 
placed.         Medications:   New      insulin syr/ndl U100 half sylvia Daily 100
 ea 3RF       glipizide 5 mg  PO BID 60 tabs 2RF      Changed      From insulin 
glargine (Lantus U-100 Insulin) 30 units (0.3 mL) subcut DAILY 10 mL 0RF      To
 insulin glargine (Lantus U-100 Insulin) 35 units (0.35 mL) subcut DAILY 10 mL 
0RF       From cephalexin 500 mg  PO QID 28 caps 0RF Cellulitis right foot      
To cephalexin 500 mg  PO QID 7 days 28 caps 0RF Cellulitis right foot      
Discontinued      insulin syringe,safetyneedle     Discontinued Reason:  
Clinically Indicated As directed 100 ea 0RF          Coding  Level of Care Code 
 41030 Est Pt Intermediate Comp  Exam  Problem Focused    Diagnoses  Hospital 
discharge follow-up  Z09  Abscess  L02.91  Uncontrolled type 2 diabetes mellitus
  E11.65    Additional Codes  Intake - Is patient in pain?: No (1126F)      
<Electronically signed by Cecille Maguire NP> 10/12/21 1320          









          Name      Value     Range     Interpretation Code Description Data Gregoria

rce(s) Supporting 

Document(s)

 

                                                                       









                    ID                  Date                Data Source

 

                    [id number]           10/04/2021 08:40:00 AM EDT [facility]

 

                                        Name:                 ROMELIA CORADO

                      :              

    1980   Account#:             [id number]                      Age:   
               40           MR#:                  [id number]                    
   Admit Date:           21     Provider:                   
Kaylene Rosales NP                                            Room #:          
      290                             Dictation Date:             10/04/21      
                                                                                
                 Discharge Summary  Discharge Summary  Admit 
Info/Diagnoses/Course  Admission Information:   Patient, ROMELIA CORADO, a 
40 year old F, admitted on 21 16:02 by Geoff Garcia MD for RIGHT FOOT 
CELLULITIS   Family Cecille Gonzalez         Most Recent Lab Results:           
                                                                                
         10/04/21 04:50                                                10/04/21 
04:50                                       Laboratory Results Last 24 hours    
10/03/21 11:29: POC Glucose 200 H  10/03/21 20:31: POC Glucose 307 H  10/04/21 
04:50: WBC 8.2, RBC 3.06 L, Hgb 8.8 L, Hct 27.0 L, MCV 88, MCH 29, MCHC 33, RDW 
13, Plt Count 323, MPV 10.1, Immature Gran % (Auto) 0.6, Neut % (Auto) 63.0, 
Lymph % (Auto) 29.3, Mono % (Auto) 5.7, Eos % (Auto) 1.0, Baso % (Auto) 0.4, 
Lymph # (Auto) 2.4, Abs Immat Gran (auto) 0.1, Add Manual Diff No, Absolute 
Neutrophils 5.2, Monocytes # 0.5, Absolute Eosinophils 0.1, Absolute Basophils 
0.0  10/04/21 04:50: Sodium 140, Potassium 4.2, Chloride 107, Carbon Dioxide 27,
 Anion Gap 10, BUN 9, Creatinine 0.6, GFR Calculation Greater than 60, Glucose 
117 H, Calcium 8.3 L  10/04/21 07:26: POC Glucose 120 H                         
                       Microbiology    10/01/21 18:25   Urine,voided   Urine 
Culture - Final  21 15:00   Venous blood   Blood Culture - Preliminary    
                          NO GROWTH AFTER 48 HOURS  21 15:00   Venous 
blood   Blood Culture - Preliminary                              NO GROWTH AFTER
 48 HOURS  21 16:30   Abscess   Abscess Culture - Final                   
           Strep Agalactiae Group B  21 16:05   Nasopharyngeal   
SARS-CoV-2 Rapid RNA (RT-PCR) - Final                              Sars-Cov-2 
Not Detected                              Influenza A Not Detected              
                Influenza B Not Detected                              RSV Not 
Detected        Hospital Course:   40-year-old female admitted on 21 with 
cellulitis of right foot.  She has a history of Type 2 DM for which she stopped 
taking insulin 5 months ago.  She was referred to the ER by primary care after 
being seen for foot pain with complaints also of episodes of nausea and vomiting
 the previous two weeks.  In the ER DKA was ruled out, but she was initiated on 
Vancomycin and Zosyn for cellulitis of the right foot.      Culture of the 
abscess grew Strep Agalactiae Group B.  She was switched to Cefazolin per 
sensitivityresults.    Review of Systems  ROS (Free Text/Narrative)::   Reports 
pain to right foot has improved.    General: Reports No Symptoms/Complaints; 
Denies Fever, Chills or Night Sweats  HEENT: Reports No Symptoms Complaints  
Endocrine: Reports No Symptoms/Complaints; Denies Loss of appetite, Intolerance 
to cold, Intoleranceto heat, Polydipsia or Polyuria  Cardiovascular: Reports No 
Symptoms/Complaints; Denies Chest Pain or Palpitations  Pulmonary: Reports No 
Symptoms/Complaints; Denies Dyspnea or Cough  Gastrointestinal: Reports No 
Symptoms/Complaints; Denies nausea, vomiting, abdominal pain, diarrhea or 
constipation  Genitourinary: Reports No Symptoms/Complaints; Denies Dysuria or 
Frequency  Musculoskeletal: Reports No Symptoms/Complaints  Neurological: 
Reports No Symptoms/Complaints; Denies Weakness, Numbness or Confusion  Psych: 
Reports No Symptoms/Complaints  Other:   Right foot with erythema, but not hot 
to touch or acutely tender.  Bandage intact over open area.  Exam   Condition  
Vital Signs - Most Recent:                                             Last 
Vital Signs        Temp  98.3 F   10/03/21 20:30     Pulse  83   10/03/21 20:30 
    Resp  16   10/03/21 20:30     BP  131/71   10/03/21 20:30     Pulse Ox  97  
 10/03/21 20:30                                                    Ht Wt BMI    
    Current Height                 5 ft 5 in                                    
          Current Weight                 165 lb 5 oz                            
                Body Mass Index (BMI)          27.5                             
                      Body Mass Index (BMI)          Overweight                 
                           Classification                            General: 
positive Alert, positive Oriented x3, positive Cooperative and positive No acute
 distress  HEENT: Atraumatic, PERRLA and EOMI  Neck: Supple  Lungs: Clear to 
auscultation  Cardiovascular: Regular rate, Normal S1 and Normal S2  Abdomen: 
Normal bowel sounds and Soft; negative for Tenderness  Extremities: No clubbing,
 No cyanosis and Other (Right foot with erythema, but not hot to touch or 
acutely tender.  2x2 absorbant bandage intact over open area on lateral aspect.)
  Skin: Skin warm and dry, Mucus membranes moist and Color normal for race; 
negative for Rash  Neurological: Normal speech, Strength at 5/5 X4 ext, Normal 
tone and Cranial nerves 3-12 NL  Psych/Mental Status: Mental status NL  
Discharge Plan  Discharge Education Printouts:      Cellulitis (DC)      
Cellulitis (GEN)      Foot Care for People with Diabetes (GEN)      Type 2 
Diabetes in Adults: New Diagnosis (DC)  Free Text/Narrative::   1. Right foot 
cellulitis  improving  will discharge pt home on Keflex 500mg QID  s/p 
vancomycin and zosyn    2. DM type 2  medication compliance was reinforced  
Lantus 30 units daily with daily fasting glucose  Glucometer, supplies, syringes
 ordered    3. Low Hb  No sign and symptoms suggestive of active bleeding  Will 
need to be followed up on as outpatient    4. nausea/vomiting  resolved    5. 
Ambulatory dysfunction due to right foot pain  PT joanne finds she ambulates well 
without assistive device      Follow up with primary care provider within one 
week.  Medications and testing supplies ordered as per outlined above.      Dr. Garcia is the attending.  Discharge plan made in coordination with him.    
Medications                                            Home Medications    blood
 sugar diagnostic (Accu-Chek Guide test strips) #100 ea 10/04/21 [Rx]  blood-
glucose meter (Accu-Chek Guide Glucose Meter) #1 ea 10/04/21 [Rx]  cephalexin 
500 mg capsule 500 mg PO QID #28 cap 10/04/21 [Rx]  insulin glargine 100 unit/mL
 subcutaneous solution (Lantus U-100 Insulin) 30 unit (0.3 mL) SUBCUT DAILY #10 
ml 10/04/21 [Rx]  insulin syringe,safetyneedle 0.3 mL 31 gauge x 15/64" #100 ea 
10/04/21 [Rx]  lancets #100 ea 10/04/21 [Rx]      Time Spent on Discharge: > 30 
Minutes    Quality Measures  Tobacco Use  Smoking Status: Never smoker  
Influenza Immunization  Hx/Date of Influenza Vaccination (from nursing Hx): No  
Safety  Two or more falls in last year? (Future fall risk): No  Diabetes Care 
Measures  HgAic/Microalbumin:                                                   
HgA1c        Hemoglobin A1c  12.1 % (3.8-5.6)  H  21  15:00              
Glucose/POC Glucose:                                          POC Glucose last 
48 hrs          10/02/21 10/02/21 10/02/21      11:36 16:53 20:56     POC 
Glucose  251 H  173 H  254 H            10/03/21 10/03/21 10/04/21      11:29 
20:31 07:26     POC Glucose  200 H  307 H  120 H                                
               Glucose last 48 hrs          10/03/21 10/04/21      06:58 04:50  
   Glucose  101  117 H            Discharge Plan  Admission/Discharge Dx  
Primary (Admit) Diagnosis: Type 2 diabetes poorly controlled, cellulitis right 
foot    Primary DC Diagnosis: Type 2 diabetes poorly controlled, cellulitis 
right foot    Condition  Condition: Stable    Discharge Detail  Disposition: 
Home, Self-Care    Med Rec   New Prescriptions:  New    Lantus U-100 Insulin 100
 unit/mL Solution      30 unit subcut DAILY Qty: 10 0RF    (DME) lancets  Misc  
    See Rx Instructions  .Route  Qty: 100 0RF     Rx Instructions:     As 
directed    (DME) insulin syringe,safetyneedle 0.3 mL 31 gauge x 15/64" syringe 
     See Rx Instructions  .Route  Qty: 100 0RF     Rx Instructions:     As 
directed    cephalexin 500 mg capsule      500 mg PO QID Qty: 28 0RF    (DME) 
blood-glucose meter [Accu-Chek Guide Glucose Meter]  Misc      See Rx 
Instructions  .Route  Qty: 1 0RF     Rx Instructions:     As directed, once 
daily    (DME) Accu-Chek Guide test strips  Strip      See Rx Instructions  
.Route  Qty: 100 0RF     Rx Instructions:     As directed    Discharge Education
Printouts:  Cellulitis (DC), Cellulitis (GEN), Foot Care for People with 
Diabetes (GEN), Type 2 Diabetes in Adults: New Diagnosis (DC)    Follow Up 
Visit/Referrals:  Cecille Maguire NP [Primary Care Provider] - 1 Week    Diet:: 
Consistent carbohydrate    Medications  Medication reconciliation performed by 
provider at discharge: Yes    Follow Up Care/Instructions  Diet/Activity/Wound 
Care..:  Follow up with primary care provider in 1 week.  Check fingerstick 
glucose every morning before breakfast and keep record of these.  Take insulin 
daily as prescribed.  No concentrated sweets diet.    Take all antibiotic doses 
spaced out every six hours, as evenly as possible.  Take all doses unless 
advised otherwise by your primary care provider.      If fever, worsening pain, 
worsening appearance of right foot call your primary care provider or return to 
ER.      Quality Indicators  Conditions Present During Course of 
Hospitalization: None Apply    *Discharge Patient*  Discharge Orders:  Discharge
Order  (Routine); Ordered 10/04/21     Ordered By:  Kaylene Rosales        
Dictated by:  <Electronically signed by Kaylene Rosales NP>      Kaylene Rosales NP      10/04/21 1332     
_________________________________________________         Kaylene Rosales NP   
    SIGNATURE   DA    Report Cosigners:  <<Signature on File>>      Geoff Garcia MD      10/04/21 142  <Electronically signed by Geoff Garcia MD>       Geoff Garcia MD      10/04/21 1426                  D:  LUIS  10/04/21  0840 T:  LUIS
   10/04/21  0840  CC:           









          Name      Value     Range     Interpretation Code Description Data Gregoria

rce(s) Supporting 

Document(s)

 

                                                                       









                    ID                  Date                Data Source

 

                    [id number]            10/04/2021 05:34:00 AM EDT [facility]









          Name      Value     Range     Interpretation Code Description Data Gregoria

rce(s) Supporting 

Document(s)

 

           Leukocytes [#/volume] in Blood by Automated count 8.2 10*3/uL 4.45-10

.71 N                     

[facility]            

 

                Erythrocytes [#/volume] in Blood by Automated count 3.06 10*6/uL

    4.20-5.40       Below

 low normal                             [facility]  

 

           Hemoglobin [Moles/volume] in Blood 8.8 g/dL   10.7-15.4  Below low no

rmal            [facility]                  

 

                Hematocrit [Volume Fraction] of Blood by Automated count 27.0 % 

         37-47           Below low 

normal                                  [facility]  

 

                                        Erythrocyte mean corpuscular volume [Ent

itic volume] in Cord blood by Automated 

count      88 fL      80-96      N                     [facility]

ital  

 

                    Erythrocyte mean corpuscular hemoglobin [Entitic mass] by Au

tomated count 29 pg               

27-31           N                               [facility]  

 

                                        Erythrocyte mean corpuscular hemoglobin 

concentration [Mass/volume] in Cord 

blood      33 g/dL    33-37      N                     [facility]

ital  

 

             Erythrocyte distribution width [Entitic volume] by Automated count 

13 %         11-15        N            

                          [facility]  

 

           Platelets [#/volume] in Blood by Automated count 323 10*3/uL 130-472 

   N                     [facility]                  

 

           Platelet mean volume [Entitic volume] in Blood 10.1 fL    9.1-13.1   

N                     [facility]                         

 

           Neutrophils/100 leukocytes in Blood by Automated count 63.0 %     41-

77      N                     [facility]                  

 

           Neutrophils [#/volume] in Blood by Automated count 5.2 U      1.7-7.6

    N                     [facility]                  

 

           Lymphocytes/100 leukocytes in Blood by Automated count 29.3 %     14-

46      N                     [facility]                  

 

           Lymphocytes [#/volume] in Blood by Automated count 2.4 U      0.6-4.6

    N                     [facility]                  

 

           Monocytes/100 leukocytes in Blood by Automated count 5.7 %      4-12 

      N                     [facility]                        

 

           Monocytes [#/volume] in Blood by Automated count 0.5 U      0.2-1.2  

  N                     [facility]                         

 

           Eosinophils/100 leukocytes in Blood by Automated count 1.0 %      0-7

        N                     [facility]                  

 

           Eosinophils [#/volume] in Blood by Automated count 0.1 U      0.0-0.5

    N                     [facility]                  

 

           Basophils/100 leukocytes in Blood by Automated count 0.4 %      0.4-1

.3    N                     [facility]                  

 

           Basophils [#/volume] in Blood by Automated count 0.0 U      0.0-0.2  

  N                     [facility]                         

 

          NUCLEATED RED BLOOD CELL 0 %                                     [facility]  

 

          NUCLEATED RED BLOOD CELL# 0 U                                     [facility]

[facility]  

 

           Immature granulocytes [Presence] in Blood by Automated count         

   0-2        N                     [facility]                  

 

           Immature granulocytes [#/volume] in Blood by Automated count 0.1 U   

   0-0.1      N                     

[facility]            

 

          Manual Differential panel - Blood NO                                  

    [facility]  









                    ID                  Date                Data Source

 

                    [id number]            10/04/2021 06:06:00 AM EDT [facility]









          Name      Value     Range     Interpretation Code Description Data Gregoria

rce(s) Supporting 

Document(s)

 

           Urea nitrogen [Mass/volume] in Serum or Plasma 9 mg/dL    9-23       

N                     [facility]                         

 

           Sodium [Moles/volume] in Serum or Plasma 140 mmol/L 132-146    [facility]                         

 

           Potassium [Moles/volume] in Serum or Plasma 4.2 mmol/L 3.5-5.5    [facility]                         

 

           Chloride [Moles/volume] in Serum or Plasma 107 mmol/L      [facility]                         

 

           Carbon dioxide, total [Moles/volume] in Serum or Plasma 27 mmol/L  20

-31      N                     

[facility]            

 

          Anion gap in Serum or Plasma 10 mmol/L 8-16      [facility]  

 

           Glucose [Mass/volume] in Serum or Plasma 117 mg/dL       Above 

high normal            

[facility]            

 

          Creatinine 0.6 mg/dL 0.5-1.1   [facility]  

 

                                        Glomerular filtration rate/1.73 sq M.pre

dicted [Volume Rate/Area] in Serum or 

Plasma     Greater Than 60 ABOVE 60                         [facility]  

 

           Calcium [Mass/volume] in Serum or Plasma 8.3 mg/dL  8.5-10.1   Below 

low normal            

[facility]            









                    ID                  Date                Data Source

 

                    [id number]           10/03/2021 09:15:00 AM EDT [facility]

 

                                        Name:                 ROMELIA CORADO

                      :              

    1980   Account#:             [id number]                      Age:   
               40           MR#:                  [id number]                    
   Admit Date:           21     Provider:             Geoff Garcia MD     
                 Room #:               290            Consulting Provider:      
                                      Dictation Date:           10/03/21        
                                                                                
                                                           Progress Note  
Subjective-ROS  Date of service  Date of service:: 10/03/21  Review of Systems  
Attestation/Length Of Stay:                                                     
    21 16:02  Admit to Inpatient, Acute [STATUS] Routine      Location: 
[facility]     Primary diagnosis: right foot cellulitis     Isolation: Standard 
precautions     Status: Inpatient Admission     Anticipated Length of stay:: 2-3
 Midnights          Vital Signs and I O  Vitals and I O:                        
               Intake   Output Last 24 Hours         10/01/21 10/02/21 10/03/21 
    23:59 23:59 23:59     Intake Total 2420 / 2420 2980 / 2980 590 / 590     
Output Total 1700 / 1700       Balance 720 / 720 2980 / 2980 590 / 590        
Results  Results:                                                               
                                     10/03/21 06:58                             
                   10/03/21 06:58                                       
Laboratory Results Last 24 hours    10/02/21 11:36: POC Glucose 251 H  10/02/21 
16:53: POC Glucose 173 H  10/02/21 20:56: POC Glucose 254 H  10/03/21 06:58: WBC
 8.3, RBC 2.92 L, Hgb 8.4 L, Hct 25.9 L, MCV 89, MCH 29, MCHC 32 L, RDW 13, Plt 
Count 287, MPV 10.0, Immature Gran % (Auto) 0.2, Neut % (Auto) 66.7, Lymph % 
(Auto) 28.1, Mono % (Auto) 4.1, Eos % (Auto) 0.7, Baso % (Auto) 0.2 L, Lymph # 
(Auto) 2.3, Abs Immat Gran (auto) 0.0, Add Manual Diff No, Absolute Neutrophils 
5.5, Monocytes # 0.3, Absolute Eosinophils 0.1, Absolute Basophils 0.0  10/03/21
 06:58: Sodium 141, Potassium 4.0, Chloride 110 H, Carbon Dioxide 24, Anion Gap 
11, BUN 10, Creatinine 0.5, GFR Calculation Greater than 60, Glucose 101, 
Calcium 7.8 L, Total Bilirubin 0.3, AST 30, ALT 44, Alkaline Phosphatase 135 H, 
Serum Total Protein 5.4 L, Albumin 2.1 L                                        
        Microbiology    10/01/21 18:25   Urine,voided   Urine Culture - Final  
21 15:00   Venous blood   Blood Culture - Preliminary                     
         NO GROWTH AFTER 48 HOURS  21 15:00   Venous blood   Blood Culture
 - Preliminary                              NO GROWTH AFTER 48 HOURS  21 
16:30   Abscess   Abscess Culture - Final                              Strep 
Agalactiae Group B  21 16:05   Nasopharyngeal   SARS-CoV-2 Rapid RNA (RT-
PCR) - Final                              Sars-Cov-2 Not Detected               
               Influenza A Not Detected                              Influenza B
 Not Detected                              RSV Not Detected      Exam  
Orientation: Alert, Oriented x3, Cooperative and No acute distress  HEENT: Muco
us membr. moist/pink  Lungs: normal lung sounds bilaterally  Cardiovascular 
Exam: regular rate and normal rhythm  Abdomen: Normal bowel sounds and Soft; 
negative for Tenderness  Extremities: other (redness/warmth/tenderness over 
right foot significantly improved )  Skin: Skin warm and dry, Mucus membranes 
moist  Neurological: Normal speech  Psych/Mental Status: normal mood    
Assessment/Plan  A P Free Text/Narrative  ::   39 y/o F c/o right foot pain, 
redness; was admitted for right foot cellulitis.  Labs and imaging studies 
reviewed    Pt was seen and examined at bedside.   c/o mild right foot pain but 
stated that it has significantly improved.       1. Right foot cellulitis  
improving  will change antibiotics to Ancef   will discharge pt home on Keflex  
s/p vancomycin and zosyn  MRI foot reviewed    2. DM type 2  uncontrolled  
medication compliance was reinforced  Lantus 30 units  insulin sliding scale.   
 3. Low Hb  will f/u repeat labs  No sign and symptoms suggestive of active 
bleeding    4. nausea/vomiting  resolved    5. Ambulatory dysfunction due to 
right foot pain  PT eval   Care plan: Plan of care discussed with patient and or
 family, Patient encouraged to ask questions about plan, Patient agrees with 
plan of care and Discharge plan and instructions discussed with patient and or 
family      Dictated by:  <Electronically signed by Geoff Garcia MD>      Geoff Garcia MD      10/03/21 0945     _________________________________________________
         Geoff Garcia MD        SIGNATURE   DA    Report Cosigners:              
                                D:  KVNG  10/03/21  0915 T:  KVNG    10/03/21 
 0915  CC:           









          Name      Value     Range     Interpretation Code Description Data Gregoria

rce(s) Supporting 

Document(s)

 

                                                                       









                    ID                  Date                Data Source

 

                    [id number]            10/03/2021 07:32:00 AM EDT [facility]









          Name      Value     Range     Interpretation Code Description Data Gregoria

rce(s) Supporting 

Document(s)

 

           Leukocytes [#/volume] in Blood by Automated count 8.3 10*3/uL 4.45-10

.71 N                     

[facility]            

 

                Erythrocytes [#/volume] in Blood by Automated count 2.92 10*6/uL

    4.20-5.40       Below

 low normal                             [facility]  

 

           Hemoglobin [Moles/volume] in Blood 8.4 g/dL   10.7-15.4  Below low no

rmal            [facility]                  

 

                Hematocrit [Volume Fraction] of Blood by Automated count 25.9 % 

         37-47           Below low 

normal                                  [facility]  

 

                                        Erythrocyte mean corpuscular volume [Ent

itic volume] in Cord blood by Automated 

count      89 fL      80-96      N                     [facility]

ital  

 

                    Erythrocyte mean corpuscular hemoglobin [Entitic mass] by Au

tomated count 29 pg               

27-31           N                               [facility]  

 

                                        Erythrocyte mean corpuscular hemoglobin 

concentration [Mass/volume] in Cord 

blood      32 g/dL    33-37      Below low normal            [facility]  

 

             Erythrocyte distribution width [Entitic volume] by Automated count 

13 %         11-15        N            

                          [facility]  

 

           Platelets [#/volume] in Blood by Automated count 287 10*3/uL 130-472 

   N                     [facility]                  

 

           Platelet mean volume [Entitic volume] in Blood 10.0 fL    9.1-13.1   

N                     [facility]                         

 

           Neutrophils/100 leukocytes in Blood by Automated count 66.7 %     41-

77      N                     [facility]                  

 

           Neutrophils [#/volume] in Blood by Automated count 5.5 U      1.7-7.6

    N                     [facility]                  

 

           Lymphocytes/100 leukocytes in Blood by Automated count 28.1 %     14-

46      N                     [facility]                  

 

           Lymphocytes [#/volume] in Blood by Automated count 2.3 U      0.6-4.6

    N                     [facility]                  

 

           Monocytes/100 leukocytes in Blood by Automated count 4.1 %      4-12 

      N                     [facility]                        

 

           Monocytes [#/volume] in Blood by Automated count 0.3 U      0.2-1.2  

  N                     [facility]                         

 

           Eosinophils/100 leukocytes in Blood by Automated count 0.7 %      0-7

        N                     [facility]                  

 

           Eosinophils [#/volume] in Blood by Automated count 0.1 U      0.0-0.5

    N                     [facility]                  

 

                Basophils/100 leukocytes in Blood by Automated count 0.2 %      

     0.4-1.3         Below low 

normal                                  [facility]  

 

           Basophils [#/volume] in Blood by Automated count 0.0 U      0.0-0.2  

  N                     [facility]                         

 

          NUCLEATED RED BLOOD CELL 0 %                                     [facility]  

 

          NUCLEATED RED BLOOD CELL# 0 U                                     [facility]  

 

           Immature granulocytes [Presence] in Blood by Automated count         

   0-2        N                     [facility]                  

 

           Immature granulocytes [#/volume] in Blood by Automated count 0.0 U   

   0-0.1      [facility]            

 

          Manual Differential panel - Blood NO                                  

    [facility]  









                    ID                  Date                Data Source

 

                    [id number]            10/03/2021 08:05:00 AM EDT [facility]









          Name      Value     Range     Interpretation Code Description Data Gregoria

rce(s) Supporting 

Document(s)

 

           Urea nitrogen [Mass/volume] in Serum or Plasma 10 mg/dL   9-23       

[facility]                         

 

           Sodium [Moles/volume] in Serum or Plasma 141 mmol/L 132-146    [facility]                         

 

           Potassium [Moles/volume] in Serum or Plasma 4.0 mmol/L 3.5-5.5    [facility]                         

 

             Chloride [Moles/volume] in Serum or Plasma 110 mmol/L        

  Above high normal  

                          [facility]  

 

           Carbon dioxide, total [Moles/volume] in Serum or Plasma 24 mmol/L  20

-31      [facility]            

 

          Anion gap in Serum or Plasma 11 mmol/L 8-16      [facility]  

 

           Glucose [Mass/volume] in Serum or Plasma 101 mg/dL       [facility]                         

 

          Creatinine 0.5 mg/dL 0.5-1.1   [facility]  

 

                                        Glomerular filtration rate/1.73 sq M.pre

dicted [Volume Rate/Area] in Serum or 

Plasma     Greater Than 60 ABOVE 60                         [facility]  

 

                                        Alanine aminotransferase [Enzymatic acti

vity/volume] in Serum or Plasma by With 

P-5'-P     44 U/L     10-49      [facility]

ital  

 

                                        Aspartate aminotransferase [Enzymatic ac

tivity/volume] in Serum or Plasma by 

With P-5'-P 30 U/L     0-33       [facility]

pital  

 

                    Alkaline phosphatase [Enzymatic activity/volume] in Serum or

 Plasma 135 U/L             

          Above high normal                 [facility] 

 

 

           Calcium [Mass/volume] in Serum or Plasma 7.8 mg/dL  8.5-10.1   Below 

low normal            

[facility]            

 

           Bilirubin.total [Mass/volume] in Serum or Plasma 0.3 mg/dL  0.3-1.2  

  N                     [facility]                  

 

                                        Albumin [Mass/volume] in Serum or Plasma

 by Bromocresol purple (BCP) dye binding

 method    2.1 g/dL   3.2-4.8    Below low normal            [facility]  

 

           Protein [Mass/volume] in Serum or Plasma 5.4 g/dL   5.7-8.2    Below 

low normal            

[facility]            









                    ID                  Date                Data Source

 

                    [id number]           10/02/2021 09:43:00 AM EDT [facility]

 

                                        Pharmacy Department    ROMELIA CORADO : 1980                       

                          Date:  10/02/21  [id number]  [id number]         
Vancomycin Web Calculator    - General Information  Hx VRE (Vancomycin-resistant
 enterococci): No  Hx MRSA: (Methicillin-resistant Staphylococcus aureus): Yes -
 11    - Labratory/Microbiology  Labratory Values:                        
                      Creatinine/BUN        BUN  16 mg/dL (9-23)   21  
15:00         Creatinine  0.9 mg/dL (0.5-1.1)   21  15:00                 
                                                                                
          21 15:00                                                21
 15:00       Microbiology:                                         Preliminary 
Micro Results         21 15:00 Blood Culture - Preliminary     Venous 
blood    NO GROWTH AFTER 24 HOURS      21 15:00 Blood Culture - 
Preliminary     Venous blood    NO GROWTH AFTER 24 HOURS        - Infection  
Infection  (Type/Site/Source): Skin and Soft Tissue Infection    - Concurrently 
Taking  Concurrently Taking:: Zosyn - 3.375G IV Q6H    - Dosing  Current 
Maintenance dose:: Vancomycin 1g IV q12h  Vancomycin Peak Result::   22.6  
Vancomycin Trough Result::   9.9    - Additional Comments  Narrative:   Ms. Corado's labs were within goals for peak and trough. At this time no dose 
adjustments will be made to her regimen.    - General Information  
Allergies/Adverse Reactions:                                                 
Allergies        Allergy/AdvReac Type Severity Reaction Status Date / Time     
No Known Drug Allergies Allergy   Verified 21 08:09     No Known Food 
Allergies Allergy   Unverified 21 11:37                          Pharmacy 
         Jazmine Quezada           10/02/21 0943                                  
             _________________________________________________   ________  
________      Date      Time   LAST EDIT:    









          Name      Value     Range     Interpretation Code Description Data Gregoria

rce(s) Supporting 

Document(s)

 

                                                                       









                    ID                  Date                Data Source

 

                    [id number]           10/02/2021 09:19:00 AM EDT [facility]

 

                                        Name:                 ROMELIA CORADO

                      :              

    1980   Account#:             [id number]                      Age:   
               40           MR#:                  [id number]                    
   Admit Date:           21     Provider:             Geoff Garcia MD     
                 Room #:               290            Consulting Provider:      
                                      Dictation Date:           10/02/21        
                                                                                
                                                           Progress Note  
Subjective-ROS  Date of service  Date of service:: 10/02/21  Review of Systems  
Attestation/Length Of Stay:                                                     
    21 16:02  Admit to Inpatient, Acute [STATUS] Routine      Location: 
[facility]     Primary diagnosis: right foot cellulitis     Isolation: Standard 
precautions     Status: Inpatient Admission     Anticipated Length of stay:: 2-3
 Midnights          Vital Signs and I O  Vitals and I O:                        
               Intake   Output Last 24 Hours         09/30/21 10/01/21 10/02/21 
    23:59 23:59 23:59     Intake Total 1552.337 / 5988.001 3768 / 2420 1740 / 
1740     Output Total  1700 / 1700      Balance 1552.337 / 1552.337 720 / 720 
1740 / 1740     Current Weight 165 lb 5 oz          Results  Results:           
                                                                                
         10/02/21 03:15                                                10/02/21 
03:15                                       Laboratory Results Last 24 hours    
21 17:07: POC Glucose 203 H  10/01/21 11:43: POC Glucose 249 H  10/01/21 
16:46: POC Glucose 228 H  10/01/21 18:25: Urine Color Yellow, Urine Appearance 
Cloudy A, Urine pH 5.0, Ur Specific Gravity 1.038 A, Urine Protein Trace, Urine 
Ketones Negative, Urine Blood Small H, Urine Nitrate Negative, Urine Bilirubin 
Negative, Urine Urobilinogen 0.2 eu/dl, Ur Leukocyte Esterase Small A, Add Ur 
Microanalysis Microscopic added, Urine RBC 3-5, Urine WBC 12-16 H, Ur Squamous 
Epith Cells Few, Urine Bacteria Small amount H, Urine Glucose > 1000 mg/dl A  
10/01/21 20:15: Vancomycin Peak 22.6  10/01/21 20:30: POC Glucose 287 H  
10/02/21 03:15: WBC 9.1, RBC 3.11 L, Hgb 9.0 L, Hct 27.7 L, MCV 89, MCH 29, MCHC
 33, RDW 13, Plt Count 271, MPV 9.4, Immature Gran % (Auto) 0.4, Neut % (Auto) 
63.8, Lymph % (Auto) 30.4, Mono % (Auto) 4.5, Eos % (Auto) 0.6, Baso % (Auto) 
0.3 L, Lymph # (Auto) 2.8, Abs Immat Gran (auto) 0.0, Add Manual Diff No, Absol
Sun'aq Neutrophils 5.8, Monocytes # 0.4, Absolute Eosinophils 0.1, Absolute 
Basophils 0.0  10/02/21 03:15: Sodium 140, Potassium 4.0, Chloride 110 H, Carbon
 Dioxide 25, Anion Gap 9, BUN 15, Creatinine 0.6, GFR Calculation Greater than 
60, Glucose 142 H, Calcium 7.9 L, Total Bilirubin 0.1 L, AST 48 H, ALT 46, 
Alkaline Phosphatase 129, Serum Total Protein 6.1, Albumin 2.4 L  10/02/21 
03:15: Vancomycin Trough 9.9 L  10/02/21 07:48: POC Glucose 146 H               
                                 Microbiology    21 15:00   Venous blood  
 Blood Culture - Preliminary                              NO GROWTH AFTER 24 
HOURS  21 15:00   Venous blood   Blood Culture - Preliminary              
                NO GROWTH AFTER 24 HOURS  21 16:30   Abscess   Abscess 
Culture - Preliminary                              Strep Agalactiae Group B  
21 16:05   Nasopharyngeal   SARS-CoV-2 Rapid RNA (RT-PCR) - Final         
                     Sars-Cov-2 Not Detected                              Inf
luenza A Not Detected                              Influenza B Not Detected     
                         RSV Not Detected      Exam  Orientation: Alert, 
Oriented x3, Cooperative and No acute distress  HEENT: Mucous membr. moist/pink 
 Lungs: normal lung sounds bilaterally  Cardiovascular Exam: regular rate and 
normal rhythm  Abdomen: Normal bowel sounds and Soft; negative for Tenderness  
Extremities: negative for pedal edema  Skin: Other (redness/warmth/tenderness 
over  right foot improving )  Neurological: Normal speech and Strength at 5/5 X4
 ext  Psych/Mental Status: normal mood    Assessment/Plan  A P Free 
Text/Narrative  ::   39 y/o F c/o right foot pain, redness; was admitted for 
right foot cellulitis.  Labs and imaging studies reviewed    Pt was having 
breakfast.   Pt stated that her right foot has significantly improved.     Plan 
 1. Right foot cellulitis  improving   iv vancomycin and zosyn  will f/u culture
 from right foot infection site and blood culture  MRI foot reviewed    2. DM ty
pe 2  uncontrolled  medication compliance was reinforced  increased Lantus 30 
units  insulin sliding scale.    3. Low Hb  will f/u repeat labs  No sign and 
symptoms suggestive of active bleeding     4. nausea/vomiting  resolved  Care 
plan: Plan of care discussed with patient and or family and Patient encouraged 
to ask questions about plan      Dictated by:  <Electronically signed by Geoff Garcia MD>      Geoff Garcia MD      10/02/21 0920     
_________________________________________________         Geoff Garcia MD        
SIGNATURE   DA    Report Cosigners:                                             
 D:  KVNG  10/02/21  0919 T:  KVNG    10/02/21  0919  CC:           









          Name      Value     Range     Interpretation Code Description Data Gregoria

rce(s) Supporting 

Document(s)

 

                                                                       









                    ID                  Date                Data Source

 

                    [id number]            10/02/2021 03:45:00 AM EDT [facility]

 

                                        Special Instructions: DRAW 1 HOUR PRIOR 

TO 0400 DOSE 









          Name      Value     Range     Interpretation Code Description Data Gregoria

rce(s) Supporting 

Document(s)

 

                Vancomycin [Mass/volume] in Serum or Plasma --trough 9.9 ug/mL  

     10-20           Below low 

normal                                  [facility]  

 

                                        ****************************************

******************                   As 

of 10/02/21 0345Last administered patient dose of VANCOMYCIN HCLprior to 
specimen collection:10/01/21 1739  (9.6 hours).**
******************************************************** 









                    ID                  Date                Data Source

 

                    [id number]            10/02/2021 03:25:00 AM EDT [facility]









          Name      Value     Range     Interpretation Code Description Data Gregoria

rce(s) Supporting 

Document(s)

 

           Leukocytes [#/volume] in Blood by Automated count 9.1 10*3/uL 4.45-10

.71 N                     

[facility]            

 

                Erythrocytes [#/volume] in Blood by Automated count 3.11 10*6/uL

    4.20-5.40       Below

 low normal                             [facility]  

 

           Hemoglobin [Moles/volume] in Blood 9.0 g/dL   10.7-15.4  Below low no

rmal            [facility]                  

 

                Hematocrit [Volume Fraction] of Blood by Automated count 27.7 % 

         37-47           Below low 

normal                                  [facility]  

 

                                        Erythrocyte mean corpuscular volume [Ent

itic volume] in Cord blood by Automated 

count      89 fL      80-96      N                     [facility]

ital  

 

                    Erythrocyte mean corpuscular hemoglobin [Entitic mass] by Au

tomated count 29 pg               

27-31           N                               [facility]  

 

                                        Erythrocyte mean corpuscular hemoglobin 

concentration [Mass/volume] in Cord 

blood      33 g/dL    33-37      N                     [facility]

ital  

 

             Erythrocyte distribution width [Entitic volume] by Automated count 

13 %         11-15        N            

                          [facility]  

 

           Platelets [#/volume] in Blood by Automated count 271 10*3/uL 130-472 

   N                     [facility]                  

 

           Platelet mean volume [Entitic volume] in Blood 9.4 fL     9.1-13.1   

N                     [facility]                         

 

           Neutrophils/100 leukocytes in Blood by Automated count 63.8 %     41-

77      N                     [facility]                  

 

           Neutrophils [#/volume] in Blood by Automated count 5.8 U      1.7-7.6

    N                     [facility]                  

 

           Lymphocytes/100 leukocytes in Blood by Automated count 30.4 %     14-

46      N                     [facility]                  

 

           Lymphocytes [#/volume] in Blood by Automated count 2.8 U      0.6-4.6

    N                     [facility]                  

 

           Monocytes/100 leukocytes in Blood by Automated count 4.5 %      4-12 

      N                     [facility]                        

 

           Monocytes [#/volume] in Blood by Automated count 0.4 U      0.2-1.2  

  N                     [facility]                         

 

           Eosinophils/100 leukocytes in Blood by Automated count 0.6 %      0-7

        N                     [facility]                  

 

           Eosinophils [#/volume] in Blood by Automated count 0.1 U      0.0-0.5

    N                     [facility]                  

 

                Basophils/100 leukocytes in Blood by Automated count 0.3 %      

     0.4-1.3         Below low 

normal                                  [facility]  

 

           Basophils [#/volume] in Blood by Automated count 0.0 U      0.0-0.2  

  N                     [facility]                         

 

          NUCLEATED RED BLOOD CELL 0 %                                     [facility]  

 

          NUCLEATED RED BLOOD CELL# 0 U                                     [facility]  

 

           Immature granulocytes [Presence] in Blood by Automated count         

   0-2        N                     [facility]                  

 

           Immature granulocytes [#/volume] in Blood by Automated count 0.0 U   

   0-0.1      N                     

[facility]            

 

          Manual Differential panel - Blood NO                                  

    [facility]  









                    ID                  Date                Data Source

 

                    [id number]            10/02/2021 03:44:00 AM EDT [facility]









          Name      Value     Range     Interpretation Code Description Data Gregoria

rce(s) Supporting 

Document(s)

 

           Urea nitrogen [Mass/volume] in Serum or Plasma 15 mg/dL   9-23       

N                     [facility]                         

 

           Sodium [Moles/volume] in Serum or Plasma 140 mmol/L 132-146    N     

                [facility]                         

 

           Potassium [Moles/volume] in Serum or Plasma 4.0 mmol/L 3.5-5.5    [facility]                         

 

             Chloride [Moles/volume] in Serum or Plasma 110 mmol/L        

  Above high normal  

                          [facility]  

 

           Carbon dioxide, total [Moles/volume] in Serum or Plasma 25 mmol/L  20

-31      [facility]            

 

          Anion gap in Serum or Plasma 9 mmol/L  8-16      N                   [facility]  

 

           Glucose [Mass/volume] in Serum or Plasma 142 mg/dL       Above 

high normal            

[facility]            

 

          Creatinine 0.6 mg/dL 0.5-1.1   [facility]  

 

                                        Glomerular filtration rate/1.73 sq M.pre

dicted [Volume Rate/Area] in Serum or 

Plasma     Greater Than 60 ABOVE 60                         [facility]  

 

                                        Alanine aminotransferase [Enzymatic acti

vity/volume] in Serum or Plasma by With 

P-5'-P     46 U/L     10-49      N                     [facility]

ital  

 

                                        Aspartate aminotransferase [Enzymatic ac

tivity/volume] in Serum or Plasma by 

With P-5'-P 48 U/L     0-33       Above high normal            [facility]  

 

                    Alkaline phosphatase [Enzymatic activity/volume] in Serum or

 Plasma 129 U/L             

          N                               [facility]  

 

           Calcium [Mass/volume] in Serum or Plasma 7.9 mg/dL  8.5-10.1   Below 

low normal            

[facility]            

 

                Bilirubin.total [Mass/volume] in Serum or Plasma 0.1 mg/dL      

 0.3-1.2         Below low 

normal                                  [facility]  

 

                                        Albumin [Mass/volume] in Serum or Plasma

 by Bromocresol purple (BCP) dye binding

 method    2.4 g/dL   3.2-4.8    Below low normal            [facility]  

 

           Protein [Mass/volume] in Serum or Plasma 6.1 g/dL   5.7-8.2    [facility]                         









                    ID                  Date                Data Source

 

                    [id number]            10/01/2021 09:07:00 PM EDT [facility]

 

                                        Special Instructions: 2.5 HOURS AFTER 16

00 DOSE STARTED 









          Name      Value     Range     Interpretation Code Description Data Gregoria

rce(s) Supporting 

Document(s)

 

           Vancomycin [Mass/volume] in Serum or Plasma --peak 22.6 ug/mL 20-40  

    N                     [facility]                  









                    ID                  Date                Data Source

 

                    [id number]            10/01/2021 08:41:00 PM EDT [facility]

 

                                        Reason for ordering culture: Abnormal fi

ndings UA@10/01/21 2019: UA W/ MICRO 

added. RFLXG = UMIC CIF.Method of Collection:: Voided 

 

                                        @10/01/21 2041: Urine culture added. RFL

XG = CULT.ADD.25,000 CFU/MLYeast like 

organisms no senst done 

 

                                        Reason for ordering culture: Abnormal fi

ndings UA@10/01/21 2019: UA W/ MICRO 

added. RFLXG = UMIC CIF.Method of Collection:: Voided 









          Name      Value     Range     Interpretation Code Description Data Gregoria

rce(s) Supporting 

Document(s)

 

          Color of Urine                                         [facility]  

 

           Appearance of Urine            CLEAR      Abnormal (applies to non-nu

meric results)            [facility]                  

 

          pH of Urine by Test strip 5.0       5-8                           [facility]  

 

                Specific gravity of Urine by Refractometry 1.038           1.005

-1.030     Abnormal (applies 

to non-numeric results)                     [facility]  

 

                Leukocyte esterase [Presence] in Urine by Test strip            

     NEGATIVE        Abnormal 

(applies to non-numeric results)                     [facility]

al  

 

                                        @DO MICRO!!!!A Culture has been added to

 this specimen     per established 

criteria 

 

           Nitrite [Presence] in Urine by Test strip            NEGATIVE        

                 [facility]                                 

 

           Protein [Presence] in Urine by Test strip            NEGATIVE        

                 [facility]                                 

 

                Glucose [Mass/volume] in Urine by Automated test strip > 1000 mg

/dl    NEGATIVE        

Abnormal (applies to non-numeric results)                     [facility]  

 

           Ketones [Presence] in Urine by Test strip            NEGATIVE        

                 [facility]                                 

 

           Urobilinogen [Presence] in Urine            0.2-1 EU/dl              

         [facility]

                                         

 

           Bilirubin.total [Presence] in Urine by Automated test strip          

  NEGATIVE                         [facility]                 

 

             Erythrocytes [#/volume] in Urine by Test strip SMALL        NEGATIV

E     Above high normal 

                          [facility]  

 

                                        @DO MICRO!!!! 

 

          URINE MICROSCOPIC? (CIF) Microscopic Added                            

   [facility]  









                    ID                  Date                Data Source

 

                    [id number]           10/01/2021 11:26:00 AM EDT [facility]

 

                                        Pharmacy Department    ROMELIA CORADO : 1980                       

                          Date:  10/01/21  [id number]  [id number]         
Vancomycin Initiation PK Note    - General Information  Hx VRE (Vancomycin-
resistant enterococci): No  Hx MRSA: (Methicillin-resistant Staphylococcus 
aureus): Yes - 11    - Labratory/Microbiology  Labratory Values:          
                                    Creatinine/BUN        BUN  16 mg/dL (9-23)  
 21  15:00         Creatinine  0.9 mg/dL (0.5-1.1)   21  15:00      
                                  Abnormal Laboratory Results past 24 hours     
     21      13:40 15:00 15:00     WBC    14.6 H     RBC
    3.70 L     Hct    31.4 L     Neut % (Auto)    80.3 H     Baso % (Auto)    
0.2 L     Neutrophils (Manual)    85 H     Absolute Neutrophils    11.7 H     
Monocytes (Manual)    1 L     Glucose        POC Glucose  370 H       Hemoglobin
 A1c        C-Reactive Protein        Albumin        HCG, Quant   Less than 1 L 
            21      15:00 15:00     WBC       RBC       Hct      
 Neut % (Auto)       Baso % (Auto)       Neutrophils (Manual)       Absolute 
Neutrophils       Monocytes (Manual)       Glucose  312 H      POC Glucose      
 Hemoglobin A1c   12.1 H     C-Reactive Protein  117.0 H      Albumin  3.1 L    
  HCG, Quant                                                                    
                                     21 15:00                             
                   21 15:00       Microbiology:                           
            Pending Microbiology Results         21 15:00 Blood Culture - 
Pending     Venous blood       21 15:00 Blood Culture - Pending     Venous
 blood         - Infection  Infection  (Type/Site/Source): Skin and Soft Tissue 
Infection    - Concurrently Taking  Concurrently Taking:: Zosyn - 3.375G IV Q6H 
   - Dosing  Loading Dose:: Not indicated  Initial Maintenance dose (15-20 
mg/kg): VANCOMYCIN 1G IV Q12H    - Monitoring  Vancomycin Peak ordered for:: 
10/1 @ 1830  Vancomycin Trough ordered for:: 10/2 @0300    - General Information
  Allergies/Adverse Reactions:                                                 
Allergies        Allergy/AdvReac Type Severity Reaction Status Date / Time     
No Known Drug Allergies Allergy   Verified 21 08:09     No Known Food 
Allergies Allergy   Unverified 21 11:37                          Pharmacy 
         Jazmine Quezada           10/01/21 1126                                  
             _________________________________________________   ________  
________      Date      Time   LAST EDIT:    









          Name      Value     Range     Interpretation Code Description Data Gregoria

rce(s) Supporting 

Document(s)

 

                                                                       









                    ID                  Date                Data Source

 

                    [id number]           10/01/2021 11:00:00 AM EDT [facility]

 

                                        Therapy Department     ROMELIA CORADO : 1980                       

                          Date:  10/01/21  [id number]  [id number]      
Attending: Geoff Garcia MD        Physical Therapy Inpatient Jessica    - Therapy 
Evaluation  Date PT Order Received:: 10/01/21  Date Started:: 10/01/21  Time 
Started:: 10:35  Diagnosis:: R foot cellulitis  Reason for Evaluation: New 
Admission  Rehab Diagnosis:: difficulty walking  PMH and Social Hx. Reviewed per
 MD, Nursing, and ER Assess.: Yes    - Subjective/History  Subjective::   Pt 
states she has had some balance problems in the past due to her DM but that has 
not been a problem now for about a year. Pain in R foot is decreasing her franklin. 
for walking on the foot.     Hx pertinent to PT concerns:: DM, anxiety, asthma, 
depression  Prior Level of Function:: I for all activity  Type of Dwelling: Home
  Number of Floors: 2  Able to negotiate inside stairs?: Yes  Physical Barriers 
in Home Environment: One railing  Home Environment:: Will need to go upstairs  
Does the patient have pain?: Yes    - Pain Detail  Pain Location: R foot  Pain 
Description: Aching  Pain Intensity: 4  Pain Scale Used: Numeric Scale    - 
Objective  Hearing Ability: Normal Hearing  Visual Assistive Devices: None  
Observations: Alert and oriented x 3    - Transfer  Sit<>Stand:: Supervision  
Bed<>Chair:: Supervision  Supine<>Sit:: Independent  Bed Mobility:: Independent 
   - Ambulation  Ambulation Assistance:: Supervision  # Required to Assist:: 1 -
 for IV pole  Assistive Devices: Front Wheeled Walker  Distance (ft):: 20  S
tairs:: NT  Ambulation Comments: Pt was able to walk with RW and PWB on the R 
LE. Pain limits her from putting all her weight through that leg. She felt much 
better with the walker and can at least get around now. She says she will be 
able to do the stairs fine as well.    - Balance  Balance comments: Good with RW
 on level surfaces    - Assessment  Physical Therapy Impressions/Assessment:   
Pt has decreased mobility secondary to R foot pain. However, with the RW, she is
 able to perform functional mobility as needed. She is safe for D/C home when 
medically cleared. She would need a walker however unless her pain improves to 
where she can bear normal weight.       - Plan  Treatment Plan: Transfer 
training, Gait training on level surfaces    - Plan of Care  Short Term Goal #1:
 Safe mobility with RW  Rehab Potential: Good    - End  Date Ended:: 10/01/21  
Time Ended:: 10:50  Elapsed Time (minutes): 15  Elapsed Time Minutes: 15    - PT
 Orders  Current Equipment: Walker                   Therapist      Mahendra Erwin
           10/01/21 1100                           I certify this plan of care  
                       Geoff Ma MD            10/01/21 1244    
                                                                                
                                                    
_________________________________________________   ________  ________      Date
      Time   LAST EDIT:    









          Name      Value     Range     Interpretation Code Description Data Gregoria

rce(s) Supporting 

Document(s)

 

                                                                       









                    ID                  Date                Data Source

 

                    [id number]        10/01/2021 09:36:00 AM EDT [facility] [address] [place] [address]                

                                  [phone]  NAME                          
                    SEX    PT STATUS         ACCOUNT NUMBER  ROMELIA CORADO   ADM IN               [id number]    ORDERING
 PHYSICIAN                                LOCATION                 MEDICAL 
RECORD NO.   Geoff Garcia MD                                                  
       [id number]    ATTENDING PHYSICIAN                               DATE OF 
BIRTH            DATE OF EXAM/TIME  Cecille Maguire NP                            
      1980               10/01/21 / 0928    TYPE / EXAM  MRI Foot Right w/
    REASON FOR EXAM  right foot swelling, eschar  Clinical History/Indication 
for Exam:   right foot swelling, eschar      MR RIGHT LOWER EXTREMITY WITHOUT 
AND WITH INTRAVENOUS CONTRAST FOOT      INDICATION:  Right foot swelling, eschar
      TECHNIQUE:  Multiplanar magnetic resonance images of the right foot 
without and with 7 mL  Gadavistintravenous contrast.      COMPARISON:  None of 
the right foot      FINDINGS:   LIGAMENTS:      Medial collateral:  
Unremarkable.      Lateral collateral:  Unremarkable.      Lisfranc:  
Unremarkable.      TENDONS:      Flexor:  Unremarkable.      Extensor:  
Unremarkable.      Peroneal:  Unremarkable.      Tibialis anterior:  
Unremarkable.      Tibialis posterior:  Unremarkable.      Muscles:  
Unremarkable.      Fluid:  Small effusion first metatarsophalangeal joint.      
Sinus tarsi:  Unremarkable as visualized.      Tarsal tunnel:  Unremarkable.    
  Plantar fascia:  Unremarkable.      Cartilage:  Unremarkable.      
Bones/joints:  Os Trigonum.      Soft tissues:  Subcutaneous edema plantar and 
medial aspect first metatarsophalangeal joint with mild contrast enhancement.  
Subcutaneous edema dorsal lateral aspect midfoot and forefoot.      Other 
findings:  7 mL Gadavist IV contrast.      IMPRESSION:        1.  Small effusion
 first metatarsophalangeal joint.   2.  Subcutaneous edema plantar and medial 
aspect first metatarsophalangeal joint with mild contrastenhancement.   3.  
Subcutaneous edema dorsal lateral aspect midfoot and forefoot.   4.  Os Trig
onum.         Contrast Type: Gadavist. Contrast Volume: 7mL      ** REPORT 
SIGNATURE ON FILE  10/01/2021 (09:36 Eastern Time ) **   Signed by: Stephani Beasley M.D., [facility]         Reported By Stephani Beasley MD on 10/01/21 0936       Signed By 
Stephani Beasley MD on 10/01/21 0936                                                   
 ______________________________________________   _______  _______              
                                                                           Date 
       Time  CC:  Cecille Maguire; Stephani Beasley MD  Techn: HANJU             
Trans Dt/Tm:             Trans by: DT             Prt Dt/Tm:    1034-2283: Total
 DLP =    0.00 mGy-cm     : Total Radiation Dose =    0.0000 mSv    
Lifetime Dose: 0 mSv   









          Name      Value     Range     Interpretation Code Description Data Gregoria

rce(s) Supporting 

Document(s)

 

                                                                       









                    ID                  Date                Data Source

 

                    [id number]           10/01/2021 08:24:00 AM EDT [facility]

 

                                        Name:                 ROMELIA CORADO

                      :              

    1980   Account#:             [id number]                      Age:   
               40           MR#:                  [id number]                    
   Admit Date:           21     Provider:             Geoff Garcia MD     
                 Room #:               290            Consulting Provider:      
                                      Dictation Date:           10/01/21        
                                                                                
                                                           Progress Note  
Subjective-ROS  Date of service  Date of service:: 10/01/21  Review of Systems  
Attestation/Length Of Stay:                                                     
    21 16:02  Admit to Inpatient, Acute [STATUS] Routine      Location: 
[facility]     Primary diagnosis: right foot cellulitis     Isolation: Standard 
precautions     Status: Inpatient Admission     Anticipated Length of stay:: 2-3
 Midnights          Vital Signs and I O  Vitals and I O:                        
                 Vital Signs last 12 hours          Temp Pulse Resp BP Pulse Ox 
     10/01/21 07:44  97.9 F  102 H  18  149/65  99      10/01/21 05:00  98.9 F  
98  18  123/67  97      21 21:04  98.1 F  85  18  110/62                  
                         Intake   Output Last 24 Hours         09/29/21 09/30/21
 10/01/21     23:59 23:59 23:59     Intake Total  1552.337 / 1552.337 640 / 640 
    Output Total   700 / 700     Balance  1552.337 / 1552.337 -60 / -60     
Current Weight  165 lb 5 oz         Results  Results:                           
                                                                         
10/01/21 05:05                                                10/01/21 05:05    
                                   Laboratory Results Last 24 hours    21 
13:40: POC Glucose 370 H  21 15:00: Magnesium 2.3, Folate 7.3, TSH 0.73, 
HCG, Quant Less than 1 L, Acetone, Qual Negative  21 15:00: Vitamin B12 
420  21 15:00: WBC 14.6 H, RBC 3.70 L, Hgb 10.7, Hct 31.4 L, MCV 85, MCH 
29, MCHC 34, RDW 13, Plt Count 321, MPV 9.3, Immature Gran % (Auto) 0.5, Neut % 
(Auto) 80.3 H, Lymph % (Auto) 14.4, Mono % (Auto) 4.5, Eos % (Auto) 0.1, Baso % 
(Auto) 0.2 L, Lymph # (Auto) 2.1, Abs Immat Gran (auto) 0.1, Add Manual Diff 
Manual diff added, Total Counted 100, Neutrophils (Manual) 85 H, Absolute 
Neutrophils 11.7 H, Lymphocytes (Manual) 14, Monocytes (Manual) 1 L, Monocytes #
 0.7, Absolute Eosinophils 0.0, Absolute Basophils 0.0, Platelet Estimate 
Appears normal, RBC Morphology Appears normal  21 15:00: Sodium 133, 
Potassium 4.0, Chloride 101, Carbon Dioxide 27, Anion Gap 9, BUN 16, Creatinine 
0.9, GFR Calculation Greater than 60, Glucose 312 H, Calcium 8.9, Total 
Bilirubin 0.4, AST 8, ALT 13, Alkaline Phosphatase 78, C-Reactive Protein 117.0 
H, Serum Total Protein 7.5, Albumin 3.1 L  21 15:00: Lactic Acid 1.0  
21 15:00: Hemoglobin A1c 12.1 H, Estim Average Glucose 301  21 
15:00: Phosphorus 3.7  21 18:27: POC Glucose 180 H  21 21:02: POC 
Glucose 257 H  10/01/21 05:05: WBC 10.9 H, RBC 3.37 L, Hgb 9.7 L, Hct 29.2 L, 
MCV 87, MCH 29, MCHC 33, RDW 13, Plt Count 279, MPV 10.0, Immature Gran % (Auto)
 0.5, Neut % (Auto) 70.7, Lymph % (Auto) 23.2, Mono % (Auto) 4.9, Eos % (Auto) 
0.4, Baso % (Auto) 0.3 L, Lymph # (Auto) 2.5, Abs Immat Gran (auto) 0.1, Add 
Manual Diff No, Absolute Neutrophils 7.7 H, Monocytes # 0.5, Absolute 
Eosinophils 0.0, Absolute Basophils 0.0  10/01/21 05:05: Sodium 141, Potassium 
4.0, Chloride 110 H, Carbon Dioxide 25, Anion Gap 10, BUN 15, Creatinine 0.7, 
GFR Calculation Greater than 60, Glucose 117 H, Calcium 8.0 L, Total Bilirubin 
0.3, AST 22, ALT 21, Alkaline Phosphatase 81, Serum Total Protein 6.1, Albumin 
2.5 L  10/01/21 07:14: POC Glucose 135 H                                        
        Microbiology    21 16:05   Nasopharyngeal   SARS-CoV-2 Rapid RNA 
(RT-PCR) - Final                              Sars-Cov-2 Not Detected           
                   Influenza A Not Detected                              
Influenza B Not Detected                              RSV Not Detected      Exam
  Orientation: Alert, Oriented x3 and Cooperative  HEENT: Mucous membr. 
moist/pink  Lungs: normal lung sounds bilaterally  Cardiovascular Exam: regular 
rate and normal rhythm  Abdomen: Normal bowel sounds and Soft; negative for 
Tenderness  Extremities: normal inspection  Skin: Other 
(redness/warmth/tenderness over lateral aspect of right dorsal foot )  
Neurological: Normal speech and Strength at 5/5 X4 ext  Psych/Mental Status: 
normal mood    Assessment/Plan  A P Free Text/Narrative  ::   39 y/o F c/o right
 foot pain, redness; was admitted for right foot cellulitis.   Labs and imaging 
studies reviewed    c/o mild foot pain.     Plan  1. Right foot cellulitis  iv 
vancomycin and zosyn  will f/u culture from right foot infection site and blood 
culture  MRI foot reviewed     2. DM type 2  uncontrolled  medication compliance
 was reinforced  increased Lantus 30 units  insulin sliding scale.    3. 
nausea/vomiting  resolved  could be related to uncontrolled DM      Dictated by:
  <Electronically signed by Geoff Garcia MD>      Geoff Garcia MD      10/01/21 
1743     _________________________________________________         Geoff Garcia MD        SIGNATURE   DA    Report Cosigners:                                   
           D:  ARYVI  10/01/21  0824 T:  ARYVI    10/01/21  0824  CC:           









          Name      Value     Range     Interpretation Code Description Data Gregoria

rce(s) Supporting 

Document(s)

 

                                                                       









                    ID                  Date                Data Source

 

                    [id number]            10/01/2021 05:57:00 AM EDT [facility]









          Name      Value     Range     Interpretation Code Description Data Gregoria

rce(s) Supporting 

Document(s)

 

                Leukocytes [#/volume] in Blood by Automated count 10.9 10*3/uL  

  4.45-10.71      Above 

high normal                             [facility]  

 

                Erythrocytes [#/volume] in Blood by Automated count 3.37 10*6/uL

    4.20-5.40       Below

 low normal                             [facility]  

 

           Hemoglobin [Moles/volume] in Blood 9.7 g/dL   10.7-15.4  Below low no

rmal            [facility]                  

 

                Hematocrit [Volume Fraction] of Blood by Automated count 29.2 % 

         37-47           Below low 

normal                                  [facility]  

 

                                        Erythrocyte mean corpuscular volume [Ent

itic volume] in Cord blood by Automated 

count      87 fL      80-96      N                     [facility]

ital  

 

                    Erythrocyte mean corpuscular hemoglobin [Entitic mass] by Au

tomated count 29 pg               

27-31           N                               [facility]  

 

                                        Erythrocyte mean corpuscular hemoglobin 

concentration [Mass/volume] in Cord 

blood      33 g/dL    33-37      N                     [facility]

ital  

 

             Erythrocyte distribution width [Entitic volume] by Automated count 

13 %         11-15        N            

                          [facility]  

 

           Platelets [#/volume] in Blood by Automated count 279 10*3/uL 130-472 

   N                     [facility]                  

 

           Platelet mean volume [Entitic volume] in Blood 10.0 fL    9.1-13.1   

N                     [facility]                         

 

           Neutrophils/100 leukocytes in Blood by Automated count 70.7 %     41-

77      N                     [facility]                  

 

                Neutrophils [#/volume] in Blood by Automated count 7.7 U        

   1.7-7.6         Above high 

normal                                  [facility]  

 

           Lymphocytes/100 leukocytes in Blood by Automated count 23.2 %     14-

46      N                     [facility]                  

 

           Lymphocytes [#/volume] in Blood by Automated count 2.5 U      0.6-4.6

    N                     [facility]                  

 

           Monocytes/100 leukocytes in Blood by Automated count 4.9 %      4-12 

      N                     [facility]                        

 

           Monocytes [#/volume] in Blood by Automated count 0.5 U      0.2-1.2  

  N                     [facility]                         

 

           Eosinophils/100 leukocytes in Blood by Automated count 0.4 %      0-7

        N                     [facility]                  

 

           Eosinophils [#/volume] in Blood by Automated count 0.0 U      0.0-0.5

    N                     [facility]                  

 

                Basophils/100 leukocytes in Blood by Automated count 0.3 %      

     0.4-1.3         Below low 

normal                                  [facility]  

 

           Basophils [#/volume] in Blood by Automated count 0.0 U      0.0-0.2  

  N                     [facility]                         

 

          NUCLEATED RED BLOOD CELL 0 %                                     [facility]  

 

          NUCLEATED RED BLOOD CELL# 0 U                                     [facility]  

 

           Immature granulocytes [Presence] in Blood by Automated count         

   0-2        N                     [facility]                  

 

           Immature granulocytes [#/volume] in Blood by Automated count 0.1 U   

   0-0.1      N                     

[facility]            

 

          Manual Differential panel - Blood NO                                  

    [facility]  









                    ID                  Date                Data Source

 

                    [id number]            10/01/2021 06:37:00 AM EDT [facility]









          Name      Value     Range     Interpretation Code Description Data Gregoria

rce(s) Supporting 

Document(s)

 

           Urea nitrogen [Mass/volume] in Serum or Plasma 15 mg/dL   9-23       

N                     [facility]                         

 

           Sodium [Moles/volume] in Serum or Plasma 141 mmol/L 132-146    [facility]                         

 

           Potassium [Moles/volume] in Serum or Plasma 4.0 mmol/L 3.5-5.5    [facility]                         

 

             Chloride [Moles/volume] in Serum or Plasma 110 mmol/L        

  Above high normal  

                          [facility]  

 

           Carbon dioxide, total [Moles/volume] in Serum or Plasma 25 mmol/L  20

-31      N                     

[facility]            

 

          Anion gap in Serum or Plasma 10 mmol/L 8-16      [facility]  

 

           Glucose [Mass/volume] in Serum or Plasma 117 mg/dL       Above 

high normal            

[facility]            

 

          Creatinine 0.7 mg/dL 0.5-1.1   [facility]  

 

                                        Glomerular filtration rate/1.73 sq M.pre

dicted [Volume Rate/Area] in Serum or 

Plasma     Greater Than 60 ABOVE 60                         [facility]  

 

                                        Alanine aminotransferase [Enzymatic acti

vity/volume] in Serum or Plasma by With 

P-5'-P     21 U/L     10-49      N                     [facility]

ital  

 

                                        Aspartate aminotransferase [Enzymatic ac

tivity/volume] in Serum or Plasma by 

With P-5'-P 22 U/L     0-33       N                     [facility]

pital  

 

                    Alkaline phosphatase [Enzymatic activity/volume] in Serum or

 Plasma 81 U/L              

          N                               [facility]  

 

           Calcium [Mass/volume] in Serum or Plasma 8.0 mg/dL  8.5-10.1   Below 

low normal            

[facility]            

 

           Bilirubin.total [Mass/volume] in Serum or Plasma 0.3 mg/dL  0.3-1.2  

  [facility]                  

 

                                        Albumin [Mass/volume] in Serum or Plasma

 by Bromocresol purple (BCP) dye binding

 method    2.5 g/dL   3.2-4.8    Below low normal            [facility]  

 

           Protein [Mass/volume] in Serum or Plasma 6.1 g/dL   5.7-8.2    [facility]                         









                    ID                  Date                Data Source

 

                    [id number]           2021 04:40:00 PM EDT [facility]

 

                                        Name:                 ROMELIA CORADO

                      :              

    1980   Account#:             [id number]                      Age:   
               40           MR#:                  [id number]                    
   Admit Date:           21     Provider:             Geoff Garcia MD     
                 Room #:               290          Consulting Provider:        
                                    Dictation Date:           21          
                                  History   Physical  HPI  Date of service  Date
 of service:: 21  History of Present Illness  Nurse screening for 
coronavirus:                                                             Recent 
Travel outside the                                                          
country (where)                         Chief Complaint: right foof pain  
Emergency Room stated complaint: Multi system (Adult)  Admitted From: Emergency 
Dept  Source of information: Patient and Family Member  HPI Free T
ext/Narrative::   39 y/o F with h/o DM type 2(stopped taking insulin 5 months 
ago) went to see her PMD for c/o worsening right foot pain for past few days, no
 specific aggravating or relieving factor. Pt also had fewepisodes of 
nausea/vomiting for past 2 weeks. Pt was sent to ER due to concern of DKA.  In 
ER pt was found with vitals of /74, , RR 18, Temp 98.3, SpO2- 96% on
 RA; Pt was initially started on iv insulin gtt that was stopped after DKA was 
ruled out. On physical examination there was area of skin 
induration/redness/tenderness/warmth over lateral aspect of rightdorsal foot. Pt
 was given iv vancomycin/zosyn for right foot cellulitis.   Hospitalist service 
was consulted to admit the patient for further management.   Pt was seen and 
examined at bedside in ER.    at bedside.   Pt c/o pain over lateral 
aspect of right foot dorsum.   Allergies/Home Meds                              
                  Allergies        Allergy/AdvReac Type Severity Reaction Status
 Date / Time     No Known Drug Allergies Allergy   Verified 21 08:09     
No Known Food Allergies Allergy   Unverified 21 11:37                     
                             Home Medications         Medication  Instructions  
Recorded  Confirmed  Last Taken  Type     No Known Home Medications  21  
Unknown History        Medication list updated and reviewed:: Yes    PFSH  
Medical History (Updated 21 @ 16:48 by Geoff Garcia MD)    Anxiety  Asthma
  Chlamydia  Depression  Diabetes mellitus  Herpes genitalis  Retinal detachment
      Surgical History (Updated 21 @ 16:48 by Geoff Garcia MD)    Hx of 
tonsillectomy  Status post tonsillectomy                          Social History
 (Reviewed 21 @ 16:45 by Geoff Garcia MD)  Does the Patient have a 
Healthcare Proxy:  No   Does Patient have a DNR?:  No   Does Patient have a 
Living Will?:  No   Advance Directives on File or in chart?:  No   Hx Recent 
Travel (where):  No   Smoking Status:  Never smoker       Sickle cell  Sickle 
Cell Screening:: Not indicated    ROS***  Const  All systems reviewed   are 
unremarkable except as noted in HPI and below  Denies excessive sweating, Denies
 fatigue and Denies fever(s)  Eyes  Denies diplopia  ENT  Denies vertigo and 
Denies throat swelling  Card  Denies chest pain  Resp  Denies cough and Denies 
wheezing  GI  Denies nausea    Denies difficulty voiding  Musc  Denies 
arthralgias  Skin/Breast  Denies rash  Neuro  Denies confusion and Denies 
vertigo  Psych  Denies confusion and Denies depression  Endo  Denies excessive 
sweating and Denies fatigue  Hema/Lymph  Denies easy bleeding  Aller/Immun  
Denies throat swelling and Denies wheezing    Vital Signs and I O  Vitals and I 
O:                                         Vital Signs last 12 hours          
Temp Pulse Resp BP Pulse Ox      21 13:53  98.3 F  103 H  18  128/74  96  
                                        Intake   Output Last 24 Hours         
21     23:59 23:59 23:59     Current Weight   160 lb    
    Height,Weight   BMI:                                                 Ht Wt 
BMI        Current Height                 5 ft 6 in                             
                 Current Weight                 160 lb                          
                            Results  Results:                                   
                                                                 21 15:00 
                                               21 15:00                   
                    Laboratory Results Last 24 hours    21 13:40: POC 
Glucose 370 H  21 15:00: Magnesium 2.3, Folate 7.3, TSH 0.73, HCG, Quant 
Less than 1 L, Acetone, Qual Negative  21 15:00: Vitamin B12 420  21
 15:00: WBC 14.6 H, RBC 3.70 L, Hgb 10.7, Hct 31.4 L, MCV 85, MCH 29, MCHC 34, 
RDW 13, Plt Count 321, MPV 9.3, Immature Gran % (Auto) 0.5, Neut % (Auto) 80.3 
H, Lymph % (Auto) 14.4, Mono % (Auto) 4.5, Eos % (Auto) 0.1, Baso % (Auto) 0.2 
L, Lymph # (Auto) 2.1, Abs Immat Gran (auto) 0.1, Add Manual Diff Manual diff 
added, Total Counted 100, Neutrophils (Manual) 85 H, Absolute Neutrophils 11.7 
H, Lymphocytes (Manual) 14, Monocytes (Manual) 1 L, Monocytes # 0.7, Absolute 
Eosinophils 0.0, Absolute Basophils 0.0, Platelet Estimate Appears normal, RBC 
Morphology Appears normal  21 15:00: Sodium 133, Potassium 4.0, Chloride 
101, Carbon Dioxide 27, Anion Gap 9, BUN 16, Creatinine 0.9, GFR Calculation G
reater than 60, Glucose 312 H, Calcium 8.9, Total Bilirubin 0.4, AST 8, ALT 13, 
Alkaline Phosphatase 78, C-Reactive Protein 117.0 H, Serum Total Protein 7.5, 
Albumin3.1 L  21 15:00: Lactic Acid 1.0  21 15:00: Hemoglobin A1c 
12.1 H, Estim Average Glucose 301  21 15:00: Phosphorus 3.7        Exam  
Const  General: cooperative, healthy appearing, comfortable and no acute 
distress  Orientation: alert, awake and oriented x3  HENMT  Mouth: moist mucous 
membranes  Eyes  Conjunctivae: conjunctivae normal  Neck  Neck: supple  Resp  
Auscultation: clear to auscultation bilaterally  Cardio  Rate: regular rate  
Rhythm: regular rhythm  Heart Sounds: S1 normal and S2 normal  GI  Palpation: 
soft  Auscultation: normal bowel sounds  Skin  Other:   eschar over dorsal later
 aspect of right foot, surrounding erythema, warmth/tenderness   Neuro  General:
 patient alert, patient awake and patient oriented x3  Cranial Nerves: CN's II-
XII intact bilaterally  Cognition: normal cognition  Speech: speech normal  
Motor: strength 5/5 throughout  Sensory Exam: no sensory deficits noted  Extrem 
 General: no pedal edema  Psych  Mood: congruent mood    Assessment/Plan  A P 
Free Text/Narrative  ::   39 y/o F c/o right foot pain, redness.   Labs and 
imaging studies reviewed     Impression- right foot cellulitis    Plan   1. 
Right foot cellulitis   iv vancomycin and zosyn   will f/u culture sent by ER 
team   will f/u blood culture   will f/u MRI foot     2. DM type 2   
uncontrolled   medication compliance was reinforced  started on Lantus 20 units 
  insulin sliding scale.     3. nausea/vomiting   resolved   could be related to
 uncontrolled DM             Dictated by:  <Electronically signed by Geoff Garcia MD>      Geoff Garcia MD      10/01/21 0824     
_________________________________________________         Geoff Garcia MD        
SIGNATURE   DA    Report Cosigners:                                             
 D:  KVNG  21  1640 T:  KVNG    21  1640  CC:           









          Name      Value     Range     Interpretation Code Description Data Gregoria

rce(s) Supporting 

Document(s)

 

                                                                       









                    ID                  Date                Data Source

 

                    [id number]            10/02/2021 10:34:00 AM EDT [facility]

 

                                        Special Instructions: right foot@10/02/2

1 1034: Aerobic ID Rafalea added. RFLXG = 

CHGAERID.SP DESC:right footTHIS ISOLATE IS PRESUMED TO BE RESISTANT TO 
CLINDAMYCINBASED ON THE DETECTION OF INDUCIBLE CLINDAMYCIN 
RESISTANCE.CLINDAMYCIN MAY STILL BE EFFECTIVE IN SOME PATIENTS.Gp B Strep Spec 
Ql Cult 









          Name      Value     Range     Interpretation Code Description Data Gregoria

rce(s) Supporting 

Document(s)

 

          Quantiy of growth NUMEROUS                                [facility]  









                    ID                  Date                Data Source

 

                    [id number]            10/02/2021 10:34:00 AM EDT [facility]

 

                                        Special Instructions: right foot@10/02/2

1 1034: Aerobic ID Rafaela added. RFLXG = 

CHGAERID.SP DESC:right footTHIS ISOLATE IS PRESUMED TO BE RESISTANT TO 
CLINDAMYCINBASED ON THE DETECTION OF INDUCIBLE CLINDAMYCIN 
RESISTANCE.CLINDAMYCIN MAY STILL BE EFFECTIVE IN SOME PATIENTS.Gp B Strep Spec 
Ql Cult 









          Name      Value     Range     Interpretation Code Description Data Gregoria

rce(s) Supporting 

Document(s)

 

                Ampicillin [Susceptibility] by Minimum inhibitory concentration 

(PACHECO) <0.06                           

Susceptible. Indicates for microbiology susceptibilities only.                  

         [facility]                         

 

                Cefotaxime [Susceptibility] by Minimum inhibitory concentration 

(PACHECO) <0.25                           

Susceptible. Indicates for microbiology susceptibilities only.                  

         [facility]                         

 

                Ceftriaxone [Susceptibility] by Minimum inhibitory concentration

 (PACHECO) <0.25                           

Susceptible. Indicates for microbiology susceptibilities only.                  

         [facility]                         

 

                Clindamycin [Susceptibility] by Minimum inhibitory concentration

 (PACHECO) >0.5                            

Resistant. Indicates for microbiology susceptibilities only.                    

       [facility]                         

 

                Erythromycin [Susceptibility] by Minimum inhibitory concentratio

n (PACHECO) >0.5                            

Resistant. Indicates for microbiology susceptibilities only.                    

       [facility]                         

 

                Penicillin [Susceptibility] by Minimum inhibitory concentration 

(PACHECO) <0.03                           

Susceptible. Indicates for microbiology susceptibilities only.                  

         [facility]                         

 

                Tetracycline [Susceptibility] by Minimum inhibitory concentratio

n (PACHECO) <0.5                            

Susceptible. Indicates for microbiology susceptibilities only.                  

         [facility]                         

 

                Vancomycin [Susceptibility] by Minimum inhibitory concentration 

(PACHECO) 0.5                             

Susceptible. Indicates for microbiology susceptibilities only.                  

         [facility]                         

 

                Levofloxacin [Susceptibility] by Minimum inhibitory concentratio

n (PACHECO) 0.5                             

Susceptible. Indicates for microbiology susceptibilities only.                  

         [facility]                         

 

                Cefepime [Susceptibility] by Minimum inhibitory concentration (M

IC) <0.25                           

Susceptible. Indicates for microbiology susceptibilities only.                  

         [facility]                         









                    ID                  Date                Data Source

 

                    [id number]        2021 04:17:00 PM EDT [facility] [address] [place]

[place] [address]                

                                  [phone]  NAME                          
                    SEX    PT STATUS         ACCOUNT NUMBER  DALLINJONASDELANOROMELIA BURCH   [facility]               [id number]    ORDERING
 PHYSICIAN                                LOCATION                 MEDICAL 
RECORD NO.  Bryn Gann MD                                                
       [id number]    ATTENDING PHYSICIAN                               DATE OF 
BIRTH            DATE OF EXAM/TIME  Cecille Maguire NP                            
      1980               09/30/21 / 0    TYPE / EXAM  Xray Chest One 
View    REASON FOR EXAM  fever  CLINICAL HISTORY: 40-year-old female with fever 
     TECHNIQUE: AP upright portable chest radiograph      COMPARISON: AP 
semiupright portable chest radiograph         FINDINGS:   LINES, TUBES AND 
HARDWARE: EKG leads overlie the chest.      PULMONARY PARENCHYMA AND PLEURA: No 
pneumothorax, focal infiltrate or effusion. There are low lung volumes.      
HEART AND MEDIASTINUM: Heart is normal in size and configuration. No significant
 abnormality in themediastinum.      BONES: No visible acute fracture or 
dislocation.      SOFT TISSUES: No subcutaneous emphysema.      VISIBLE ABDOMEN:
 No radiographic evidence of acute pathology         IMPRESSION:   1. No 
radiographic evidence of acute cardiopulmonary pathology in the chest.      END 
IMPRESSION           Reported By Lia Moya DO on 21      
Signed By Lia Moya DO on 21                          
______________________________________________   _______  _______  Date        
Time  CC:  Cecille Moya DO  Techn: MORSA             
Trans Dt/Tm:             Trans by: DT             Prt Dt/Tm:    : Total
 DLP =    0.00 mGy-cm  Fluoroscopy Time (in secs):    









          Name      Value     Range     Interpretation Code Description Data Gregoria

rce(s) Supporting 

Document(s)

 

                                                                       









                    ID                  Date                Data Source

 

                    [id number]           2021 04:08:00 PM EDT [facility]

 

                                                                             ED 

Physician Documentation      NAME:      

           ROMELIA CORADO                      :                  
1980   ACCT#:                [id number]                      AGE:       
           40           MR#:                  [id number]                       
SERVICE DATE:           21     EMERGENCY DR:           Bryn Gann MD  
                                                       PRIMARY CARE DR:         
  Cecille Maguire                      ROOM#:                [id number]          HPI 
(Adult, General)  General  Chief Complaint: Multi system (Adult)  Stated 
Complaint: INSULIN  Time Seen by Provider: 21 13:15  History of Present 
Illness Narrative:   Patient is a 40-year-old white female who has been diabetic
 on and off since her original pregnancy which she developed gestational 
diabetes.  Since then she has been notoriously noncompliant and thisis 
documented well [facility] 2021 endocrinology note which is contained 
contained within our our computer system.  It is very informative.  She is lost 
from 275 pounds to 163 she states and haslost about 15 pounds in the last month.
  She states her blood sugars oftentimes between 3 and 500.  Her last A1c in 
February was 10.9.  Although her problem list states uncontrolled type 1 
diabetes the endocrinology note stated that she has a C-peptide which is 
positive indicating that she really is type II.      Today she is sent here by 
her primary care physician because of mildly elevated blood sugars and also an 
infection which seems to be brewing on the lateral aspect of the right foot 
which has been present for 2 days.  Her doctor was worried she might be in DKA. 
 She has not been eating much over the last 48 hours.  Her past medical history 
is otherwise unremarkable she is not allergic to anything and her surgical 
history is significant for tonsillectomy and retinal detachment surgery.  She 
denies any possibility of pregnancy.  Normally she has been quite resistant to 
care as has been documented on multiple times that I can read today.  
Allergies/Home Meds                                                Allergies    
    Allergy/AdvReac Type Severity Reaction Status Date / Time     No Known Drug 
Allergies Allergy   Verified 21 08:09     No Known Food Allergies Allergy 
  Unverified 21 11:37                                                  
Home Medications         Medication  Instructions  Recorded  Confirmed  Last 
Taken  Type     No Known Home Medications  21  Unknown History          
PMH (from Triage)  Patient Medical History  PMH Reviewed/Updated as Needed: Yes 
 PMH/PSH from Triage:                       Medical History (Updated 21 @ 
16:48 by Geoff Garcia MD)    Anxiety (Medical)   Asthma (Medical)   Chlamydia 
(Medical)   Depression (Medical)   Diabetes mellitus (Medical)   Herpes 
genitalis (Medical)   Hx of tonsillectomy (Medical) Z90.89  Retinal detachment 
(Medical) H33.20                       Surgical History (Updated 21 @ 
16:48 by Geoff Garcia MD)    Hx of tonsillectomy (Surgical) Z90.89  Status post 
tonsillectomy (Surgical)         Female History  Pregnant: No  Hx Drug Resistant
 Infections  Hx MRSA: (Methicillin-resistant Staphylococcus aureus): Yes 
(11)  Hx VRE (Vancomycin-resistant enterococci): No  Hx C.Diff: No  Hx 
CRKP: No  Hx Other Resistant Infection?: No  Isolation: Standard precautions  Hx
 Recent Travel  Out of the country within 10 days (where): No  Hx Fever: No  Hx 
Fever with a rash?: No  Nurse screening for coronavirus:                        
                                     Recent Travel outside the      No          
                                          country (where)                       
    Social History  Does patient have suicidal/homicidal thoughts or ideation?: 
No  Are you in a relationship with/Does anyone hit you, yell/swear at you, steal
 from you?: No  Substance Use  Hx Alcohol Use: No  Hx Substance Use: No  Hx 
Substance Use Treatment: No  Smoking Status: Never smoker  Tobacco Use  Hx C
hewing Tobacco Use: No  Vaccination History  Hx/Date of Tetanus, Diphtheria 
Vaccination: No  Hx/Date of Influenza Vaccination: No  Hx/Date of Pneumococcal 
Vaccination: No    PFSH  Medical History (Updated 21 @ 16:48 by Geoff Garcia MD)    Anxiety  Asthma  Chlamydia  Depression  Diabetes mellitus  Herpes 
genitalis  Retinal detachment      Surgical History (Updated 21 @ 16:48 by
 Geoff Garcia MD)    Hx of tonsillectomy  Status post tonsillectomy             
             Social History (Reviewed 21 @ 16:45 by Geoff Garcia MD)  Does
 the Patient have a Healthcare Proxy:  No   Does Patient have a DNR?:  No   Does
 Patient have a Living Will?:  No   Hx Recent Travel (where):  No   Smoking 
Status:  Never smoker       Sickle cell  Sickle Cell Screening:: Not indicated  
  ROS  Review of Systems  ROS Narrative:   Lesion lateral right foot elevated 
blood sugars low-grade fever  Constitutional: Denies fever  Eyes: Denies vision 
change, eye discharge/drng or redness  ENT: Denies mouth pain or mouth swelling 
 Respiratory: Denies cough, sputum, SOB w/exertion   rest, SOB with excertion, 
SOB at rest or wheezing  Cardiovascular: Denies chest pain or palpitations  
Gastrointestinal: Reports No Symptoms/Complaints; Denies nausea, vomiting or 
abdominal pain  Musculoskeletal: Denies neck pain, shoulder pain or muscle 
weakness  Skin/Breasts: Reports rash and lesions  Neurologic: Denies weakness, n
umbness or headache  Psychiatric: Reports No Symptoms/Complaints  Endocrine: 
Reports No Symptoms/Complaints  Hematological/Lymphatic: Reports No 
Symptoms/Complaints  Allergic/Immunologic: Reports No Symptoms/Complaints    
Physical Exam  General  Physical Exam Narrative:   No acetone notable in the 
room.  HEENT is unremarkable.  Chest heart lungs and abdomen are nontenderand 
normal.  Her right lateral foot shows a subcutaneous patch of white material 
which is undoubtedly purulent and it shows some lymphangitic spread above the 
forefoot which is actually petechial.  She has small burns on the anterior p
ortion of her right calf but otherwise is unremarkable.  Limitations: no 
limitations  Head  Head exam: Present atraumatic and normocephalic  Eye  Eye 
exam: Present normal apperance, PERRL and EOMI; Absent scleral icterus or 
conjunctival injection  Pupils: Present normal accommodation  ENT  ENT exam: 
Present normal exam and normal orophraynx  Neck  Neck exam: Present normal 
inspection and full ROM; Absent tenderness or meningismus  Respiratory  
Respiratory exam: Present normal lung sounds bilaterally; Absent respiratory 
distress, wheezes, rales, rhonchi or chest wall tenderness  Cardiovascular  Car
diovascular Exam: Present regular rate, normal rhythm, normal heart sounds and 
no murmur  GI/Abdominal  GI/Abdominal exam: Present Abd soft, bowel sounds 
present all quadrents, soft and normal bowel sounds; Absent distended or 
tenderness  Rectal  Rectal exam: Present deferred  Extremities Exam  Extremities
 exam: Present normal inspection, Full ROM without tenderness, capillary refill 
brisk, full ROM and capillary refill brisk; Absent tenderness, pedal edema or 
calf tenderness  Back Exam  Back exam: Present normal inspection and full ROM  
Neurological Exam  Neurological exam: Present alert, oriented X3, CN II-XII 
intact and normal gait  Psychiatric  Psychiatric exam: Present normal affect  
Skin  Skin exam: Present warm, dry and intact; Absent petechiae  Vital Signs  
Vital Signs:                                                Vital Signs         
21  13:53     Temperature 98.3 F     Pulse Rate 103 H     Respiratory Rate
 18     Blood Pressure 128/74     O2 Sat by Pulse Oximetry 96            MDM 
(comprehensive)  Lab Data  Labs:                                                
                                                    21 15:00              
                                  21 15:00                                
       Laboratory Results Last 24 hours    21 13:40: POC Glucose 370 H  
21 15:00: Magnesium 2.3, Folate 7.3, TSH 0.73, HCG, Quant Less than 1 L, 
Acetone, Qual Negative  21 15:00: Vitamin B12 420  21 15:00: WBC 
14.6 H, RBC 3.70 L, Hgb 10.7, Hct 31.4 L, MCV 85, MCH 29, MCHC 34, RDW 13, Plt 
Count 321, MPV 9.3, Immature Gran % (Auto) 0.5, Neut % (Auto) 80.3 H, Lymph % 
(Auto) 14.4, Mono % (Auto) 4.5, Eos % (Auto) 0.1, Baso % (Auto) 0.2 L, Lymph # 
(Auto) 2.1, Abs Immat Gran (auto) 0.1, Add Manual Diff Manual diff added, Total 
Counted 100, Neutrophils (Manual) 85 H, Absolute Neutrophils 11.7 H, Lymphocytes
 (Manual) 14, Monocytes (Manual) 1 L, Monocytes # 0.7, Absolute Eosinophils 0.0,
 Absolute Basophils 0.0, Platelet Estimate Appears normal, RBC Morphology 
Appears normal  21 15:00: Sodium 133, Potassium 4.0, Chloride 101, Carbon 
Dioxide 27, Anion Gap 9, BUN 16, Creatinine 0.9, GFR Calculation Greater than 
60, Glucose 312 H, Calcium 8.9, Total Bilirubin 0.4, AST 8, ALT 13, Alkaline 
Phosphatase 78, C-Reactive Protein 117.0 H, Serum Total Protein 7.5, Albumin3.1 
L  21 15:00: Lactic Acid 1.0  21 15:00: Hemoglobin A1c 12.1 H, Estim
 Average Glucose 301  21 15:00: Phosphorus 3.7                            
                    Microbiology    21 16:05   Nasopharyngeal   SARS-CoV-2
 Rapid RNA (RT-PCR) - Final                              Sars-Cov-2 Not Detected
                              Influenza A Not Detected                          
    Influenza B Not Detected                              RSV Not Detected      
  Medical Decision Making  Free Text/Narative::   I took her temperature myself 
and it is 99.1.  Her A1c is actually 12.  I think she is trying to become septic
 and is undoubtedly dehydrated.  She will be hydrated initially I started her on
 a insulin drip until I saw her laboratory studies come back and then I spoke to
 the hospitalist and wechanged her to Lantus and sliding scale and admission.  I
 started her on Zosyn and vancomycin as shetells me now that she is MRSA 
positive in the past.    Procedure: Informed consent was obtained.  Patient is 
essentially numb from the mid calf down and I used a 15 blade scalpel to make a 
small incision in the greenish-yellow well circumcised area just near the base 
of the fifth metatarsal and was surprised that grossly purulent material was not
 obtained.  The subcutaneous area has all been dried out.  Cultures still were 
obtained.  I was reluctant to unroofed the entire process for fear of leaving 
her with a large ulcer.  I scrubbed with Betadine and left it open for the 
nurses to dress.    Hopefully this will be a bridge to better self-care and 
control of her diabetes.  She is admitted [facility].    Plan  
Visit Medications  Administered ED medications::                                
                Medications          Discontinued Medications          Generic 
Name Dose Route Start Last Admin      Trade Name Freq  PRN Reason Stop Dose 
Admin     Acetaminophen  1,000 mg  21 14:40  21 15:48      
Acetaminophen 500 Mg Tab  PO  21 14:41  1,000 mg      1T ONE   
Administration     Sodium Chloride  1,000 mls @ 999 mls/hr  21 14:43  
21 16:50      Ns 0.9%  IV  21 15:43  Infused      .Q1H1M ONE   
Infusion     Vancomycin HCl  1.75 gm in 350 mls @ 116.6666 mls/hr  21 
14:40  21 17:02      Vancomycin/Water For Inj (Peg)  0.025 gm/kg (1.75 gm)
  21 17:39  116.7 mls/hr      IV   Administration      1T ONE       
Piperacillin/Tazobactam/Sod  100 mls @ 200 mls/hr  21 14:40  21 
16:19    Chloride 4.5 gm/ Sodium  IV  21 15:09  Infused    Chloride  1T 
ONE   Infusion     Insulin Human (Reg)/Sodium Chloride  100 unit in 100 mls @ 2 
mls/hr  21 14:44  21 17:01      Myxredlin 100 Unit/100 Ml Bag  IVC  
10/02/21 16:43  Infused      TITR ONE   Titration      Protocol        2 
UNITS/HR       Insulin Glargine  20 units  21 16:01  21 17:08      
Insulin Glargine,Hum.Rec.Anlog 100 Units/Ml Inj Mdv  SQ  21 16:02  20 
units      1T ONE   Administration          Discharge Plan  Admission/Discharge 
Dx  Primary (Admit) Diagnosis: Type 2 diabetes poorly controlled, cellulitis 
right foot    Primary DC Diagnosis: Type 2 diabetes poorly controlled, 
cellulitis right foot    ED Provider: Bryn Gann    ED Status: Discharge to 
ADM    Time Seen by Provider: 21 13:15    Triaged At: 21 12:13    
Discharge Detail  Disposition: [facility] to [facility]    *Discharge 
Patient*  Discharge Date/Time: 21 18:06    Interventions  Interventions:  
ED Admission/Handoff   Last Done: 21 18:06  ED General Adult   Last Done: 
21 14:13          Report Signers:  <Electronically signed by Bryn Gann MD>      Bryn Gann MD      21 1851     
_________________________________________________         Bryn Gann MD     
   SIGNATURE   DA    Report Cosigners:                                          
    D:  CHAYITO  21  1608 T:  CHAYITO    21  1608  CC:  Cecille Maguire         









          Name      Value     Range     Interpretation Code Description Data Gregoria

rce(s) Supporting 

Document(s)

 

                                                                       









                    ID                  Date                Data Source

 

                    [id number] 05:35:00 PM EDT [facility]

 

                                        NORMAL RESULT IS "Not Detected"Cepheid S

ARS-CoV-2,FLU/RSV is Multiplex real time

 RT-PCRNegative results do not preclude SARS-COV-2, influenza orRSV infection 
and should not be used as the sole basis fortreatment or other patient 
management decisions.False negative results may occur if virus is present 
atlevels below the analytical limit of detection.This test has been authorized 
by FDA under an EUA for use byauthorized laboratoriesSARS-rel CoV RNA Resp Ql 
SARAH+probeFLUAV RNA Resp Ql SARAH+probeFLUBV RNA Resp Ql SARAH+probeRSV RNA Resp Ql 
SARAH+probe 









          Name      Value     Range     Interpretation Code Description Data Gregoria

rce(s) Supporting 

Document(s)

 

                                                                       









                    ID                  Date                Data Source

 

                    [id number]             2021 04:05:00 PM EDT [facility]









          Name      Value     Range     Interpretation Code Description Data Gregoria

rce(s) Supporting 

Document(s)

 

                                        Influenza virus A and B and SARS-CoV-2 (

COVID-19) and Respiratory syncytial 

virus RNA panel - Respir SARS-COV-2 NOT DETECTED                                

  [facility]      

 

                                        This lab was ordered by [facility] 

and reported by [facility]. 









                    ID                  Date                Data Source

 

                    [id number] 03:31:00 PM EDT [facility]

 

                                        @21 1530: MANUAL DIFF added. RFLXG

 = DIFF. 

 

                                        Special Instructions: Lab may order repe

at test if initial                      

test elevatedPhysician If elevated, reflex second test in 4-6 hrs 

 

                                        @21 1530: MANUAL DIFF added. RFLXG

 = DIFF. 









          Name      Value     Range     Interpretation Code Description Data Gregoria

rce(s) Supporting 

Document(s)

 

                Leukocytes [#/volume] in Blood by Automated count 14.6 10*3/uL  

  4.45-10.71      Above 

high normal                             [facility]  

 

                Erythrocytes [#/volume] in Blood by Automated count 3.70 10*6/uL

    4.20-5.40       Below

 low normal                             [facility]  

 

           Hemoglobin [Moles/volume] in Blood 10.7 g/dL  10.7-15.4  N           

          [facility]                                 

 

                Hematocrit [Volume Fraction] of Blood by Automated count 31.4 % 

         37-47           Below low 

normal                                  [facility]  

 

                                        Erythrocyte mean corpuscular volume [Ent

itic volume] in Cord blood by Automated 

count      85 fL      80-96      N                     [facility]

ital  

 

                    Erythrocyte mean corpuscular hemoglobin [Entitic mass] by Au

tomated count 29 pg               

27-31           N                               [facility]  

 

                                        Erythrocyte mean corpuscular hemoglobin 

concentration [Mass/volume] in Cord 

blood      34 g/dL    33-37      N                     [facility]

ital  

 

             Erythrocyte distribution width [Entitic volume] by Automated count 

13 %         11-15        N            

                          [facility]  

 

           Platelets [#/volume] in Blood by Automated count 321 10*3/uL 130-472 

   N                     [facility]                  

 

           Platelet mean volume [Entitic volume] in Blood 9.3 fL     9.1-13.1   

N                     [facility]                         

 

                Neutrophils/100 leukocytes in Blood by Automated count 80.3 %   

       41-77           Above high 

normal                                  [facility]  

 

                Neutrophils [#/volume] in Blood by Automated count 11.7 U       

   1.7-7.6         Above high 

normal                                  [facility]  

 

           Lymphocytes/100 leukocytes in Blood by Automated count 14.4 %     14-

46      N                     [facility]                  

 

           Lymphocytes [#/volume] in Blood by Automated count 2.1 U      0.6-4.6

    N                     [facility]                  

 

           Monocytes/100 leukocytes in Blood by Automated count 4.5 %      4-12 

      N                     [facility]                        

 

           Monocytes [#/volume] in Blood by Automated count 0.7 U      0.2-1.2  

  N                     [facility]                         

 

           Eosinophils/100 leukocytes in Blood by Automated count 0.1 %      0-7

        N                     [facility]                  

 

           Eosinophils [#/volume] in Blood by Automated count 0.0 U      0.0-0.5

    N                     [facility]                  

 

                Basophils/100 leukocytes in Blood by Automated count 0.2 %      

     0.4-1.3         Below low 

normal                                  [facility]  

 

           Basophils [#/volume] in Blood by Automated count 0.0 U      0.0-0.2  

  N                     [facility]                         

 

          NUCLEATED RED BLOOD CELL 0 %                                     [facility]  

 

          NUCLEATED RED BLOOD CELL# 0 U                                     [facility]  

 

           Immature granulocytes [Presence] in Blood by Automated count         

   0-2        N                     [facility]                  

 

           Immature granulocytes [#/volume] in Blood by Automated count 0.1 U   

   0-0.1      N                     

[facility]            

 

           Manual Differential panel - Blood Manual Diff Added                  

                [facility]                                 









                    ID                  Date                Data Source

 

                    [id number]            2021 03:33:00 PM EDT [facility]

 

                                        @21 1530: MANUAL DIFF added. RFLXG

 = DIFF. 

 

                                        Special Instructions: Lab may order repe

at test if initial                      

test elevatedPhysician If elevated, reflex second test in 4-6 hrs 

 

                                        @21 1530: MANUAL DIFF added. RFLXG

 = DIFF. 









          Name      Value     Range     Interpretation Code Description Data Gregoria

rce(s) Supporting 

Document(s)

 

           Urea nitrogen [Mass/volume] in Serum or Plasma 16 mg/dL   9-23       

N                     [facility]                         

 

           Sodium [Moles/volume] in Serum or Plasma 133 mmol/L 132-146    N     

                [facility]                         

 

           Potassium [Moles/volume] in Serum or Plasma 4.0 mmol/L 3.5-5.5    [facility]                         

 

           Chloride [Moles/volume] in Serum or Plasma 101 mmol/L      N   

                  [facility]                         

 

           Carbon dioxide, total [Moles/volume] in Serum or Plasma 27 mmol/L  20

-31      N                     

[facility]            

 

          Anion gap in Serum or Plasma 9 mmol/L  8-16      N                   [facility]  

 

           Glucose [Mass/volume] in Serum or Plasma 312 mg/dL       Above 

high normal            

[facility]            

 

          Creatinine 0.9 mg/dL 0.5-1.1   [facility]  

 

                                        Glomerular filtration rate/1.73 sq M.pre

dicted [Volume Rate/Area] in Serum or 

Plasma     Greater Than 60 ABOVE 60                         [facility]  

 

                                        Alanine aminotransferase [Enzymatic acti

vity/volume] in Serum or Plasma by With 

P-5'-P     13 U/L     10-49      N                     [facility]

ital  

 

                                        Aspartate aminotransferase [Enzymatic ac

tivity/volume] in Serum or Plasma by 

With P-5'-P 8 U/L      0-33       [facility]

pital  

 

                    Alkaline phosphatase [Enzymatic activity/volume] in Serum or

 Plasma 78 U/L              

          [facility]  

 

           Calcium [Mass/volume] in Serum or Plasma 8.9 mg/dL  8.5-10.1   [facility]                         

 

           Bilirubin.total [Mass/volume] in Serum or Plasma 0.4 mg/dL  0.3-1.2  

  [facility]                  

 

                                        Albumin [Mass/volume] in Serum or Plasma

 by Bromocresol purple (BCP) dye binding

 method    3.1 g/dL   3.2-4.8    Below low normal            [facility]  

 

           Protein [Mass/volume] in Serum or Plasma 7.5 g/dL   5.7-8.2    [facility]                         









                    ID                  Date                Data Source

 

                    [id number]            2021 03:40:00 PM EDT [facility]

 

                                        @21 1530: MANUAL DIFF added. RFLXG

 = DIFF. 

 

                                        Special Instructions: Lab may order repe

at test if initial                      

test elevatedPhysician If elevated, reflex second test in 4-6 hrs 

 

                                        @21 1530: MANUAL DIFF added. RFLXG

 = DIFF. 









          Name      Value     Range     Interpretation Code Description Data Gregoria

rce(s) Supporting 

Document(s)

 

          Lactic w Rfx (if elevated) 1.0 mmol/L 0.5-2.0   N                   [facility] 

 









                    ID                  Date                Data Source

 

                    [id number]            10/05/2021 03:08:00 PM EDT [facility]

 

                                        @21 1530: MANUAL DIFF added. RFLXG

 = DIFF. 

 

                                        Special Instructions: Lab may order repe

at test if initial                      

test elevatedPhysician If elevated, reflex second test in 4-6 hrs 

 

                                        @21 1530: MANUAL DIFF added. RFLXG

 = DIFF. 









          Name      Value     Range     Interpretation Code Description Data Gregoria

rce(s) Supporting 

Document(s)

 

          Bacteria identified in Blood by Culture                               

          [facility]  

 

                                        NO GROWTH AFTER 5 DAYS 









                    ID                  Date                Data Source

 

                    [id number] 03:31:00 PM EDT [facility]

 

                                        @21 1530: MANUAL DIFF added. RFLXG

 = DIFF. 

 

                                        Special Instructions: Lab may order repe

at test if initial                      

test elevatedPhysician If elevated, reflex second test in 4-6 hrs 

 

                                        @21 1530: MANUAL DIFF added. RFLXG

 = DIFF. 









          Name      Value     Range     Interpretation Code Description Data Gregoria

rce(s) Supporting 

Document(s)

 

          Cells counted [#] 100                                     [facility]  

 

           Neutrophils [#/volume] in Blood by Manual count 85 %       41-77     

 Above high normal            

[facility]            

 

           Lymphocytes [#/volume] in Blood by Manual count 14 %       14-46     

 N                     [facility]                         

 

           Monocytes [#/volume] in Blood by Manual count 1 %        4-12       B

elow low normal            [facility]                  

 

           Platelets [#/volume] in Blood by Estimate APPEARS NORMAL NORMAL      

                     [facility]                        

 

           Morphology [Interpretation] in Blood Narrative APPEARS NORMAL NORMAL 

                          [facility]                  









                    ID                  Date                Data Source

 

                    [id number] 03:33:00 PM EDT [facility]

 

                                        @21 1530: MANUAL DIFF added. RFLXG

 = DIFF. 

 

                                        Special Instructions: Lab may order repe

at test if initial                      

test elevatedPhysician If elevated, reflex second test in 4-6 hrs 

 

                                        @21 1530: MANUAL DIFF added. RFLXG

 = DIFF. 









          Name      Value     Range     Interpretation Code Description Data Gregoria

rce(s) Supporting 

Document(s)

 

                C reactive protein [Mass/volume] in Serum or Plasma 117.0 mg/L  

    0.0-5.0         Above 

high normal                             [facility]  









                    ID                  Date                Data Source

 

                    [id number]            2021 03:54:00 PM EDT [facility]









          Name      Value     Range     Interpretation Code Description Data Gregoria

rce(s) Supporting 

Document(s)

 

           Magnesium [Mass/volume] in Serum or Plasma 2.3 mg/dL  1.3-2.7    N   

                  [facility]                         









                    ID                  Date                Data Source

 

                    [id number]            2021 04:13:00 PM EDT [facility]









          Name      Value     Range     Interpretation Code Description Data Gregoria

rce(s) Supporting 

Document(s)

 

          Vitamin B12 420 pg/mL -911   N                   [facility]  









                    ID                  Date                Data Source

 

                    [id number] 03:54:00 PM EDT [facility]









          Name      Value     Range     Interpretation Code Description Data Gregoria

rce(s) Supporting 

Document(s)

 

           Acetone [Presence] in Serum or Plasma            NEGATIVE            

             [facility]                                 

 

                                        @Reenter manual test result: NEGATIVE@by

 Stacey Knapp at 21 1526.@Enter 

lot# for AimTab tablets [id number],2022 









                    ID                  Date                Data Source

 

                    [id number] 03:54:00 PM EDT [facility]









          Name      Value     Range     Interpretation Code Description Data Gregoria

rce(s) Supporting 

Document(s)

 

           Folate [Mass/volume] in Serum or Plasma 7.3 ng/mL                    

               [facility]                                 

 

                                                          FOLATE INTERPRETATION 

               NORMAL:  GREATER THAN 

5.38                INDETERMINATE:  3.38 - 5.38                DEFICIENT:  LESS 
THAN 3.37 









                    ID                  Date                Data Source

 

                    [id number] 03:54:00 PM EDT [facility] [facility]









          Name      Value     Range     Interpretation Code Description Data Gregoria

rce(s) Supporting 

Document(s)

 

                    Choriogonadotropin.beta subunit [Units/volume] in Serum or P

lasma Less Than 1         

1-3             Below low normal                 [facility]  

 

                                        @Report as less than lower limitApproxim

ate Gestational Age      Approximate HCG

 Range 0.2-1 Week                           5 - 50 1-2 Weeks                    
       50 - 500 2-3 Weeks                         100 - 5,000 3-4 Weeks         
                 500 - 10,000 4-5 Weeks                        1,000 - 50,000 5-
6 Weeks                       10,000 - 100,000 6-8 Weeks                       
15,000 - 100,000 2-3 Months                      10,000 - 100,000 









                    ID                  Date                Data Source

 

                    [id number] 03:54:00 PM [facility]









          Name      Value     Range     Interpretation Code Description Data Gregoria

rce(s) Supporting 

Document(s)

 

                          Thyrotropin [Units/volume] in Serum or Plasma by Detec

tion limit <= 0.005 mIU/L 

0.73 u[iU]/mL 0.35-5.50    N                         [facility]  









                    ID                  Date                Data Source

 

                    [id number] 03:40:00 PM [facility]









          Name      Value     Range     Interpretation Code Description Data Gregoria

rce(s) Supporting 

Document(s)

 

           Hemoglobin A1c [Mass/volume] in Blood 12.1 %     3.8-5.6    Above hig

h normal            [facility]                  

 

                                        @A repeat was not needed due to historic

al results.@Review & document.          

 The following ranges may be used for                interpretation of results: 
             HGBA1C degree of glucose control:              Greater than 8%: 
Action Suggested *              Less than 7%: Goal of Diabetic Therapy **       
       Less than 5.6%:  NormalFactors such as duration of diabetes, adherence to
 therapyand the age of the patient should also be considered inassessing the 
degree of blood glucose control.* High risk of developing long term 
complications such asretinopathy, nephropathy, neuropathy, cardiopathy, etc.** 
Some danger of hypoglycemic reaction in Type I diabetics.Some glucose intolerant
 individuals and "Sub Clinical"diabetics may demonstrate HGBA1C levels in this 
area. 

 

                          Glucose mean value [Moles/volume] in Blood Estimated f

rom glycated hemoglobin 

301 mg/dL                                           [facility]

l  

 

                                        An A1C of 7% - the goal of diabetic ther

apy - is equivalentto an EAG of 154 

mg/dl. 









                    ID                  Date                Data Source

 

                    [id number]            2021 03:38:00 PM [facility]









          Name      Value     Range     Interpretation Code Description Data Gregoria

rce(s) Supporting 

Document(s)

 

           Phosphate [Mass/volume] in Serum or Plasma 3.7 mg/dL  2.4-5.1    N   

                  [facility]                         









                    ID                  Date                Data Source

 

                    [id number]           2021 11:29:00 AM [facility]

 

                                          Patient Name: ROMELIA CORADO      

 : 1980  Sex: F  Pt Unit #: 

[id number]  Location:[facility]  Provider:   Visit Date/Time:  21  Primary 
Insurance: [facility]  Secondary Insurance: Self Pay  Intake  
Vital Signs                                                    21         
                                         11:35     Current Height               
                5 ft 6.3 in     Current Weight                                 
160 lb     Weight Measurement Method                  Standing Scale     BMI    
                                         25.5     BP                            
                 120/70     Blood Pressure Location                      Lt 
brachial     Position                                       Sitting     
Respiration                                      20     Pulse                   
                        121 H     Pulse Source                               
Pulse Oximeter     Temp                                           98.7 F     
Temp Source                                     Oral     Pulse Oximetry (%)     
                          96      Intake  Visit Reasons: Foot pain  Nurse Note: 
  (R) foot- has a lg purplish red area on top and lateral side. Pinky toe red. 
Pt stated she woke up with it. very uncomfortbale. Bilateral leg cramps. Unable 
to keep any drink or food down. gets to mid upper abdomen and it comes back up. 
Pt stated she stopped caring about her self long time ago and stopped 
everything. Pt stated if there was a pill to make her care about her self again.
   Accompanied by:   Is patient in pain?: Yes Pain scale (1-10): 6  
Allergies    No Known Drug Allergies Allergy (Verified 21 08:09)       No 
Known Food Allergies Allergy (Unverified 21 11:37)           Medications  
   - Last Reconciled 21 by Cecille Maguire NP    No Known Home Medications 
        Coronavirus Screening  Screening  Are you currently positive or on 
isolation for COVID ?: No  Do you have any NEW signs of one or more of the 
following?: no symptoms  Do you have NEW signs of at least two of the 
following?: no symptoms    HPI  Foot Injury/Condition  Romelia presents to the 
clinic for right foot pain.  In addition to this she has not been able to keep 
food or drink down in days.  She is vomiting often.  Has "severe pain" in the 
abdomen.  She hasnot taken any medication for her diabetes (type 1) in months pe
r patient.  She reports "I just don'tcare, anymore".  She wants a pill that will
 make her feel like caring again.      [facility]  Medical History (Updated 21 @
 12:23 by Cecille Maguire NP)    Anxiety  Asthma  Chlamydia  Depression  Diabetes
 mellitus  Herpes genitalis      Surgical History (Reviewed 21 @ 12:07 by 
Che Duron)    Status post tonsillectomy                           Social 
History (Updated 21 @ 11:39 by Priyanka Kuo)  Does the Patient have a 
Healthcare Proxy:  No   Does Patient have a DNR?:  No   Does Patient have a 
Living Will?:  No   Hx Recent Travel (where):  No   Smoking Status:  Never 
smoker         Review of Systems  Const  All systems reviewed   are unremarkable
 except as noted in HPI and below  Reports body aches, Reports lethargy and 
Reports weight loss  Eyes  Reports change in vision  ENT  Reports disequilibrium
  Card  Reports system reviewed and no additional complaints, except as 
documented  Resp  Reports system reviewed and no additional complaints, except 
as documented  GI  Reports abdominal pain, Reports nausea and Reports vomiting  
Musc  Reports myalgias and Reports arthralgias (Right foot pain)  Neuro  Reports
 lack of coordination, Reports localized weakness and Reports disequilibrium    
Exam  Const  General: cooperative  Orientation: alert, awake and oriented x3  
Skin  Other:   Linear bruise to the dorsal aspect of the right foot, mild 
redness.  No open area.    Neuro  General: patient alert, patient awake and 
patient oriented x3  Cognition: normal cognition  Speech: speech normal  Gait: 
staggering (weak)  Psych  Appearance: disheveled  Mental Status: mental status 
grossly normal  Speech and Movement: speech and movement normal and slowed 
movement  Mood: dysthymic mood  Affect: indifferent  Attitude: cooperative  
Thought Process: normal    Results    Urinalysis (DipStick)       Urine Specific
 Gravity                   1.010     Last Edit by Cecille Maguire NP on 21 
12:20      Urine PH                                     5     Last Edit by 
Cecille Maguire NP on 21 12:20      Urine Leukocytes                      
MODERATE     Last Edit by Cecille Maguire NP on 21 12:20      Urine 
Nitrates                        POSITIVE     Last Edit by Cecille Maguire NP on 
21 12:20      Urine Protein                  ++    100 mg/dl     Last Edit
 by Cecille Maguire NP on 21 12:20      Urine Ketones                      
   NEG  mg/dl   Last Edit by Cecille Maguire NP on 21 12:20      Urine 
Urobilinogen                      NORMAL     Last Edit by Cecille Maguire NP on 
21 12:20      Urine Blood                  about 5-10 Stanley/ul     Last Edit
 by Cecille Maguire NP on 21 12:20      Urine Hemoglobin                   
                Last Edit by Cecille Maguire NP on 21 12:20      Urine 
Glucose                  1000 mg/dl   1+     Last Edit by Cecille Maguire NP on 
21 12:20      Urine Bilirubin                                    Last Edit
 by Cecille Maguire NP on 21 12:20      Assessment   Plan ***  Assessment  
 Plan  (1) Foot pain:         Code(s):  M79.673 - Pain in unspecified foot      
  Plan:   Right foot is bruised with a small white area to the lateral aspect of
 the foot ? infected.  Appearsthat she scrapped the foot along something.  She 
has little to no feeling in her feet, thus she likely injured without knowing.  
I am sending to the ER today, she will likely need antibiotics.  (Given other dx
 she will  likely be on IV abx and this should resolve)  (2) Uncontrolled type 1
 diabetes mellitus:         Status: Acute        Code(s):  E10.65 - Type 1 
diabetes mellitus with hyperglycemia        SNOMED Code(s): 46059634        
Category: Medical        Plan:   Office BG is 514 when checked.  UA collected in
 office and is POS nitrate as well as a pH of 5.  No ketones.    Given her 
severe abdominal pain; body pain; problems with balance and recent loss of about
 20lbs, I am sending her to the ER for evaluation for likely DKA.  Her  
is with her and will escort herto the ER today.  Urine culture is sent. SENT TO 
THE ER.         Orders:   Orders      Urine culture Today E10.65 - Type 1 
diabetes mellitus with hyperglycemia      URINALYSIS W/O MICROSCOPIC Today R30.0
 - Dysuria         Coding  Level of Care Code  28221 Est Pt Limited Comp  Exam  
Problem Focused    Diagnoses  Foot pain  M79.673  Uncontrolled type 1 diabetes 
mellitus  E10.65    Additional Codes  Intake - Is patient in pain?: Yes (1125F) 
     <Electronically signed by Cecille Maguire NP> 21 1232          









          Name      Value     Range     Interpretation Code Description Data Gregoria

rce(s) Supporting 

Document(s)

 

                                                                       









                    ID                  Date                Data Source

 

                    [id number]        2021 09:52:00 AM EDT [facility] [address], [place] [id number] [phone]  NAME                          
                    SEX    PT STATUS         ACCOUNT NUMBER  ROMELIA CORADO   [facility] ER               [id number]    ORDERING
 PHYSICIAN                                LOCATION                 MEDICAL 
RECORD NO.  Ivonne Villanueva MD                                ER                
       [id number]    ATTENDING PHYSICIAN                               DATE OF 
BIRTH            DATE OF EXAM/TIME  Cecille Maguire NP                            
      1980               08/21/21 / 0859    TYPE / EXAM  Xray Chest One 
View    REASON FOR EXAM  cough  Clinical History/Indication for Exam:   cough   
   RADIOGRAPH OF THE CHEST 1 VIEW      INDICATION:  cough      COMPARISON:  No 
old films available.      FINDINGS:      Lungs:  Unremarkable.  No 
consolidation.      Pleural space:  Unremarkable.  No pneumothorax.      Heart: 
 Unremarkable.  No cardiomegaly.      Mediastinum:  Unremarkable.      
Bones/joints:  Unremarkable.      IMPRESSION:        Normal chest x-ray.        
    ** REPORT SIGNATURE ON FILE  2021 (09:52 Eastern Time ) **   Signed 
by: Bam Gutierres M.D.        Reported By Bam Gutierres MD on 21    
  Signed By Bam Gutierres MD on 21                          
______________________________________________   _______  _______  Date        
Time  CC:  Bam Gutierres MD; Cecille Maguire  Techn: FROSA             Trans 
Dt/Tm:             Trans by: DT             Prt Dt/Tm:    : Total DLP =
    0.00 mGy-cm  Fluoroscopy Time (in secs):    









          Name      Value     Range     Interpretation Code Description Data Gregoria

rce(s) Supporting 

Document(s)

 

                                                                       









                    ID                  Date                Data Source

 

                    [id number]           2021 08:16:00 AM EDT [facility]

 

                                                                             ED 

Physician Documentation      NAME:      

           ROMELIA CORADO                      :                  
1980   ACCT#:                [id number]                      AGE:       
           40           MR#:                  [id number]                       
SERVICE DATE:           21     EMERGENCY DR:           Ivonne Villanueva MD  
                                                       PRIMARY CARE DR:         
  Cecille Maguire                      ROOM#:                             HPI 
(Adult, General)  General  Chief Complaint: Multi system (Adult)  Stated 
Complaint: SORE THROAT, FEVER, R/O COVID  Resident LT, travel outisde home, 
exposure to hot tubs:: No  Time Seen by Provider: 21 07:43  Source: 
patient  Exam Limitations: no limitations  History of Present Illness Narrative:
 39 yo woman with DM, presents with c/o sore throat, ear pain, body aches, and 
congestion since yesterday.  She is concerned about possible COVID infection.  
She has no cough and denies having fever despite the cc above.  Allergies/Home 
Meds                                                Allergies        
Allergy/AdvReac Type Severity Reaction Status Date / Time     No Known Drug 
Allergies Allergy   Verified 21 08:09                                     
             Home Medications         Medication  Instructions  Recorded  
Confirmed  Last Taken  Type     alcohol swabs 1 pad TOP QID 90 Days #200 each 
20 Unknown Rx     blood-glucose meter #1 each 20 
Unknown Rx     lancets #100 each 20 Unknown Rx     dulaglutide 
0.75 mg/0.5 mL mg SQ 20 Unknown History    subcutaneous pen 
injector          OneTouch Verio test strips See Rx Instructions .ROUTE 20 Unknown Rx     .COMPLEX 30 Days #150 each NS         cholecalciferol 
(vitamin D3) 50 2,000 unit PO QDAY  cap 21 Unknown History    mcg
 (2,000 unit) capsule          ergocalciferol (vitamin D2) 1,250 1,250 mcg PO Q
WEEK  cap 21 Unknown History    mcg (50,000 unit) capsule        
  ertugliflozin 5 mg tablet 5 mg PO QAM 21 Unknown History     
ondansetron 4 mg disintegrating 4 mg PO TID PRN 7 Days #21 tab 21
 Unknown Rx    tablet               PMH (from Triage)  Patient Medical History  
PMH Reviewed/Updated as Needed: Yes  PMH/PSH from Triage: Medical History 
(Updated 21 @ 10:11 by Cecille Maguire NP)    Anxiety (Medical)   Asthma 
(Medical)   Chlamydia (Medical)   Depression (Medical)   Diabetes mellitus 
(Medical)   Herpes genitalis (Medical)                        Surgical History 
(Updated 18 @ 15:08 by Blanca Gomez)    Status post tonsillectomy 
(Surgical)       Female History  Pregnant: No  Hx Drug Resistant Infections  Hx 
MRSA: (Methicillin-resistant Staphylococcus aureus): Yes (11)  Hx VRE 
(Vancomycin-resistant enterococci): No  Hx C.Diff: No  Hx CRKP: No  Hx Other 
Resistant Infection?: No  Isolation: Standard precautions  Hx Recent Travel  Out
 of the country within 10 days (where): No  Hx Fever: No  Hx Fever with a rash?:
 No  Nurse screening for coronavirus:                          Recent Travel 
outside the      No                                                    country 
(where)                    Has patient experienced        No                    
                                coronavirus symptoms                    Social 
History  Does patient have suicidal/homicidal thoughts or ideation?: No  Are you
 in a relationship with/Does anyone hit you, yell/swear at you, steal from you?:
 No  Substance Use  Hx Alcohol Use: No  Hx Substance Use: No  Hx Substance Use 
Treatment: No  Second Hand Smoke Exposure: No  Smoking Status: Never smoker  
Tobacco Use  Hx Chewing Tobacco Use: No  Vaccination History  Hx/Date of 
Tetanus, Diphtheria Vaccination: No  Hx/Date of Influenza Vaccination: No  
Hx/Date of Pneumococcal Vaccination: No    ROS  Review of Systems  
Constitutional: Denies fever  Eyes: Denies eye discharge/drng  ENT: Reports ear 
pain, nasal congestion and throat pain  Respiratory: Denies cough  
Cardiovascular: Denies chest pain  Gastrointestinal: Denies nausea, vomiting, 
abdominal pain or diarrhea  Genitourinary-Female: Denies dysuria or frequency  
Musculoskeletal: Denies muscle pain or joint pain  Skin/Breasts: Denies rash  
Neurologic: Denies headache  Endocrine: Denies Loss of appetite  
Allergic/Immunologic: Denies rash    [facility]  Medical History (Updated 21 @ 
10:11 by Cecille aMguire NP)    Anxiety  Asthma  Chlamydia  Depression  Diabetes 
mellitus  Herpes genitalis      Surgical History (Reviewed 21 @ 12:07 by 
Che Duron)    Status post tonsillectomy                           Social 
History (Updated 21 @ 09:55 by Priyanka Kuo)  Does the Patient have a 
Healthcare Proxy:  No   Does Patient have a DNR?:  No   Does Patient have a 
Living Will?:  No   Hx Recent Travel (where):  No   Smoking Status:  Never 
smoker         Physical Exam  General  Physical Exam Narrative: wd woman, awake 
and alert, appears fatigued  Limitations: no limitations  General appearance: 
alert and in no apparent distress  Head  Head exam: Present atraumatic, 
normocephalic and normal inspection  Eye  Eye exam: Present normal apperance and
 EOMI; Absent scleral icterus or conjunctival injection  ENT  ENT exam: Present 
normal orophraynx and TM's normal bilaterally; Absent normal exam (increased 
nasalmucus)  Expanded ENT Exam  Expanded:         Ear exam: Present normal 
external inspection        Mouth exam: Present normal external inspection       
 Teeth exam: Present normal inspection        Throat exam: normal inspection; 
negative for tonsillar erythema or tonsillomegaly  Neck  Neck exam: Present 
normal inspection; Absent lymphadenopathy  Respiratory  Respiratory exam: 
Present normal lung sounds bilaterally; Absent respiratory distress  
Cardiovascular  Cardiovascular Exam: Present regular rate and normal rhythm  
GI/Abdominal  GI/Abdominal exam: Present Abd soft, bowel sounds present all 
quadrents; Absent tenderness  Extremities Exam  Extremities exam: Present normal
 inspection and full ROM; Absent tenderness  Back Exam  Back exam: Present 
normal inspection and full ROM; Absent tenderness  Neurological Exam  
Neurological exam: Present alert and oriented X3; Absent motor sensory deficit  
Psychiatric  Psychiatric exam: Present normal affect and normal mood  Skin  Skin
 exam: Present warm, dry, intact and normal color  Vital Signs  Vital Signs:    
                             Vital Signs         21  07:41     Temperature
 98.6 F     Pulse Rate 100     Respiratory Rate 16     Blood Pressure 142/78    
 O2 Sat by Pulse Oximetry 99          MDM (comprehensive)  Lab Data  Labs:      
                                 Microbiology    21 07:42   Nasopharyngeal
   SARS-CoV-2 Rapid RNA (RT-PCR) - Final                              Sars-Cov-2
 Not Detected                              Influenza A Not Detected             
                 Influenza B Not Detected                              RSV Not 
Detected  21 07:42   Throat   Streptococcus Rapid Screen - Final      
Radiology Data  Radiology results: report reviewed and image reviewed  Medical 
Decision Making  Free Text/Narative:: The patient was evaluated for URI 
symptoms.  The PE was significant for nasal congestion and normal temperature.  
COVID, flu, RSV and strep were negative.  The CXR was ALANIS.  The patient has a 
viral URI.    Plan  Plan  Plan: d/c home  Plan of care: Plan of care discussed 
with patient and or family, Patient encouraged to ask questionsabout plan and 
Patient agrees with plan of care    Discharge Plan  Admission/Discharge Dx  
Primary DC Diagnosis: Viral URI    ED Provider: Ivonne Villanueva    ED Status: 
Discharged    Time Seen by Provider: 21 07:43    Triaged At: 21 
07:41    Condition  Condition: Good    Discharge Detail  Disposition: Home, 
Self-Care    Med Rec   New Prescriptions:  No Action    alcohol swabs [Alcohol 
Wipes]  Pads, Medicated      1 pad TOP QID 90 Days Qty: 200 RF: 3    (DME) 
lancets [Accu-Chek Softclix Lancets]  Misc      See Rx Instructions  .ROUTE 
.MEDSUPPLY Qty: 100 RF: 5    (DME) blood-glucose meter [OneTouch Verio Flex 
Start]  Kit      See Rx Instructions  .ROUTE .MEDSUPPLY Qty: 1 RF: 0    
Trulicity 0.75 mg/0.5 mL pen injector        SQ  RF: 0    ergocalciferol 
(vitamin D2) 1,250 mcg (50,000 unit) capsule      1,250 mcg PO QWEEK RF: 0    
cholecalciferol (vitamin D3) 50 mcg (2,000 unit) capsule      2,000 unit PO QDAY
RF: 0    Steglatro 5 mg tablet      5 mg PO QAM RF: 0    ondansetron 4 mg 
tablet,disintegrating      4 mg PO TID PRN (Reason: nausea and vomiting) 7 Days 
Qty: 21 RF: 0    OneTouch Verio test strips  Strip      See Rx Instructions  
.ROUTE .COMPLEX 30 Days Qty: 150 RF: 5    Medications  Medication reconciliation
performed by provider at discharge: Yes    Forms  Forms   Work Release:  
Work/School/Activ/Gym Release    Follow Up Care/Instructions  
Diet/Activity/Wound Care..:  continue with increased fluids, rest, motrin for 
fever or body aches    *Discharge Patient*  Discharge Orders:  Discharge Order  
(Routine); Ordered 21     Ordered By:  Ivonne Villanueva    Discharge 
Date/Time: 21 09:24    Interventions  Interventions:  ED Discharge 
Instructions   Last Done: 21 09:23  ED General Adult   Last Done: 21
07:43          Report Signers:  <Electronically signed by Ivonne Villanueva MD>    
 Ivonne Villanueva MD      21     __________________________________
_______________         Ivonne Villanueva MD        SIGNATURE   DA    Report 
Cosigners:                                              D:  DAVE  21 T:  DAVE    21  CC:  Cecille Maguire         









          Name      Value     Range     Interpretation Code Description Data Gregoria

rce(s) Supporting 

Document(s)

 

                                                                       









                    ID                  Date                Data Source

 

                    [id number] 09:28:00 AM EDT [facility]

 

                                        @21 0821: Throat strep sc added. R

FLXG = THSS. 

 

                                        @21 08: Throat strep sc added. R

FLXG = THSS. 









          Name      Value     Range     Interpretation Code Description Data Gregoria

rce(s) Supporting 

Document(s)

 

          Throat culture strep No Beta Strep isolated                           

    [facility] 











                    ID                  Date                Data Source

 

                    [id number] 09:28:00 AM EDT [facility]

 

                                        @21 0821: Throat strep sc added. R

FLXG = THSS. 

 

                                        @21 08: Throat strep sc added. R

FLXG = THSS. 









          Name      Value     Range     Interpretation Code Description Data Gregoria

rce(s) Supporting 

Document(s)

 

           Strep Antigen throat Negative by antigen detection methods           

                       [facility]                         









                    ID                  Date                Data Source

 

                    [id number]            2021 08:46:00 AM EDT [facility]

 

                                        NORMAL RESULT IS "Not Detected"Cepheid S

ARS-CoV-2,FLU/RSV is Multiplex real time

RT-PCRNegative results do not preclude SARS-COV-2, influenza orRSV infection and
should not be used as the sole basis fortreatment or other patient management 
decisions.False negative results may occur if virus is present atlevels below 
the analytical limit of detection.This test has been authorized by FDA under an 
EUA for use byauthorized laboratoriesSARS-rel CoV RNA Resp Ql SARAH+probeFLUAV RNA
Resp Ql SARAH+probeFLUBV RNA Resp Ql SARAH+probeRSV RNA Resp Ql SARAH+probe 









          Name      Value     Range     Interpretation Code Description Data Gregoria

rce(s) Supporting 

Document(s)

 

                                                                       









                    ID                  Date                Data Source

 

                    [id number]             2021 07:42:00 AM EDT [facility]









          Name      Value     Range     Interpretation Code Description Data Gregoria

rce(s) Supporting 

Document(s)

 

          Cepheid SARS/FLU/RSV RT-PCR SARS-COV-2 NOT DETECTED                   

            [facility]     

 

                                        This lab was ordered by [facility] 

and reported by [facility]. 









                    ID                  Date                Data Source

 

                    [id number]           2021 03:59:53 PM EDT [facility]









          Name      Value     Range     Interpretation Code Description Data Gregoria

rce(s) Supporting 

Document(s)

 

          Progress Note                                         [facility] 

BQHGKp2lPtBNPrDc48/HRKygDOZxq2TgBCoxYYy0OIkcQWLkE6GhMHK1aH3qBEO9PVaWFnKaHjShAiJe

m
VzGopOOeKmWKReMexCObRjVHqcEpoqnFUpOP6VyGV4XJThZ13hYUNnICNzP2HvDDG7BOX+Ta0EZXNevT

YmNR9HXruF2A2Zm1cWBG8UvM/HUVQDN2cX+kBgKXLUfgMniFyqBWajZTH9sJddbaU6xgsfcnDe7dlgo+

MNQpy7WQopiLi6CUMxu116dos8h+Qylv/7he1p3/M8
lv5X/rvploBl6xp/fXzYATNp2j34TYxiaJJO+Q3Jn5J/rgKog6D/5fQJY7z+LC0xqaBW3HroUog47kKi

fju+m8Xrp+i1oHfLiwG7hb5/Rml7T6TB/Sh4/8MP7Po/8Gjsnj7Xr01Z4RG5DsKhC0vXWV1wxLseLNa6

ZS+1hPHrxX2XHbgiFg2WImuh/vmIW7odUbzaHN5TJV
w6Zjd2+Rk4CXById3kMeAbJtujN2rJ3FAcSh4MctiYdgknwisbez6a66PM9ZD1rhkSAWs/dgBc3z6KvD

17QartSBIS60tLbIx8EinEUvEploOygSvrNrZYDygLLi8Tp5XWO7NyFASbOynGtg1GvfMrhG/i+WhmyV

OPKaOCMKzMd/KpmB5wQXmyH+7tC3/jYsr85F9llkU2
ITSm34nAUikZ2ga248VBgUdD4qWV9PB6/uffBgIkgM3BB0RJidqQZa0rl7++t3K8wfpMf6NjlxV3HOWS

FH2qRzsjQcugH4RqfJJr/7wUb5zUBFpZuCfemaPasCpSI8EQEIeNJ6PUN8gvSsiXsGvP0N0MYseZNrCg

wJ8EC3xkFi2R73BdIuP5ini93ytCt1wTHVw9O39zyz
jhjHEtUp3iclN7qwOQJeN4zO5DLsTDglu/IPwk3XujwOy8flsk6SADxd2OHlSQav+H+8ITnLHoQHrscJ

UZSCg5kF8+ni2ppk2iOEe7vHaBOwDH36wfwRI1YQRMViyuPNn3GvSrKYvNETTLXN2JtiDSmi4YiQ0E0A

ex8NXg4Jz2TjjKv69rXVOwO1BrSf7Iv2tT/YBA3s5U
Pt0CAz/AoaSHESYSF/Qa6AKqB819gRtwW6X8FImxakLjKymf20qtyamyWoWuGttZz0iMIQM1bafJedAX

PVYrowKaQGe0wTn52y3TMQRUEg7KNvyOK9G3c7UJ3l0sAKvRl9LC1zqXC30t+xZ5YIIw++AihxdDaqyA

Tc8Blzxf8phbBhs8ROwkRONF1SXo1MMzwoD8d0Owmf
8R8TGMgfxfcS+5R65TjP2zqGKe+P+/IWi36C/sdgUzLlniSCuOyVp+0KB+Vp2XCX9oJ6ELj0/93fM+iH

/8m12/dme/QBZKpyV7yruDDkQ6SuoWKLhuTsahfcQl7DGHGuPBF8yEifU4nbdWEqmXnF2X2oZq+BG7cL

w8yaO+BB4guXr2x860HbxOeSkEXO+jCqdchLlVLg/J
AmStW4fik6meHKzHQ+M/rG1Hw2MgST1KrJ1rJVeOk6hIFTMxFSGG8I1rBGRM4+FVlOhgDSBrCbyIZFhB

vuIJCRUeoYpRKoTRqoTHKfE5B9CTv16QZiWk3+9sDNvgdflbJLMINPUy0u/5ZbJwcpu3oSB+FCsXFUmd

+fZuGt+qZ4c3mhmcnn/Ue5FM/qGfCktHiSlEduTiyx
7pRa2Ghcc9dWWzJa2xjgTVURcfDXer5e9meyHRrcNSmlTDaZaWKMvQHyIvRkO57UulgteEFU+7wx1NgI

p/mFR+I5Bg0ThWV+hbME7FyHM2IFlx2I2oBG3yz/er1VbyT4Rn8G35zDgKc+noQ7yN+53DVLNcRmkVhT

/FTAhUkBWKlxev+otwmlAbWDmc8w9p9fTXG3zQczN/
9nl2U0zh2ckCzJOPe0N4KMrNpT+x1PxVB2PNFSUJNPPg7kNoO8v6dvOcLwrxmT+kZPceF3GZf1xuO4Rd

GnP4hxlZavTPazlScuU5EJqcd6J0uaJpxSqaQXof82OaSWKZypsf+XAYfn6jRHE7Qw7yJ3IrZulQOFR/

vh5KxTAzVWiV+gsn88jCMoOaWV8YDZzNBCtGEa2V2Z
iOYhqJQpSCqcMrgxhpSKL8k6IApBeTBmfIqN2oVobELHMmYvIc86LRuMf9aWeaISRIZtkJixhPY3VJWL

dATNoIyLYoirrPlzahAKslvIYS/w667ZzFpzAo5ysN1RrUr2ZSgBliTYPY5aCRtG2+ZZcTWN/J9swJi8

6GMCiW/xpbGQLw8XiqBfkRlopHDRfL7+wLyvAYKQ4g
BWtp7O0JXdwE5zA44TBmlyWFfR28oQWUTInX7FhcO6QuCYBXZK5ZaHANwLefwJS12cSDl8cxD5HGQshb

wrgWcj0la/R/yy3OaUWLL/xTIfMivYQNZutIFyHUBteJk6P64UVpPFlVi9pNFhtpqyv1c9PwS5AALnz1

5ioOGX5Qs5DABz67EsQRrEpdE2eUku6RP07I8VIS6J
snbY/Iv+YwzUaBAlJrQrI/+elcoGxMafh6PticPkSWzXw1FeYvYbqJ10gV2MAClL4unzpNmWt5cStSVX

E/0rvjynOVl9FF1ZfkbG+LDDgB9Vey/NDWFBPFksn9ASugjPsOmBki5izkvOdbYM5YrsKUJ3QEjajBSn

1ELLKX3jjv50J8aSKgStV30Vz4dBQCFduUGzoI8MCN
93V+U/59LJPB6vgcZ2QURX6SOgbsIxZWRrLTZJNRrBzpKnMd2rj3WYS0Cx0N9blcEjkn7S+pEDuHIUbW

OMCBmoHRWBX8TzucGVcPaaSJHoyBiM+IsN5ugRNrCLacz8TDLbR7yGW2hNo2joRCVCoeurpVg3cDdXM8

8yvKxKdNPcw7RbHzQqSwR6RHybX3Gtt3hbeKL74rT5
rYvPmp/2a/o/9Hgf2C89IePxgEh+VXMEMzmUzwwgVgYpww6aQ3oSy31C9SElPmlgUh9gSZotDoEHu4a5

eqmD7V426i+n/GeEdndD4c3J+F8DIgXojWm7RM9gGcJiEZtD4pIhSIEcbxsOAcPIZGMxXAOLEiU0IqyJ

W2oOfUdNt3L8jdXuubYzLTsGli5QMizqUH3TOMwiYH
o3fUMgrZJ+CsJZ3Kx7js9B594jXRbxCJL3wNqsxTpztJBIl26cgEBEDpwb0fqa7ns7elavyYayI1n9C1

7qbxVJofWxFrDylMAB6nJiiG+Mv7Lhcz0dR8XGy+GGx7aKxhn4bBtEtlAUEuj87G6xPYCZrnnzNjyq7D

TG2FYm00cUCqcanRW/LvJTrd1MDrZ+8UQL79ov3qBG
yvkOV7VO1d0q9eDxR404vyEtPhYI6IUA5YHsYpBIXTCBnRPttoxYTOpMnh+jUWBZtLBhpVCOE0u7ts4p

PSDpHQcQeAgijnkO0+yIoreCRunVJCD8EudkitGzenEWB5mehSFkTm8CZKgqbU06/B1lPbiJ++xpMsnU

8p5QwDm08roZGx4poAyK2HL86mImpg/cP14C8thNy/
Eywr+QyhrOnO1qH3SJzTyLU7+qmHX5S+K51+nikki+RrKwfhrIUzZ7MgCyMB9bVNDxgGrEGakCWnAWSK8

jpwrpDzOawpdTm/wnxa16ZrP6p4PSiZMpXLaVxV4RiWR83wx57f/ZW/DeKKMgCiWTon3ZByjlT5lTw3Q

2/bTTk4O7QWpJeTU016RJF7Bn8SoAR4086Jvpveehn
2+O6uAoOUJdTYKTInK+obyhxqC6oGLO+v2TMfQswRVDhDA0HwKFB7zTxVrvsGE4oQXd+/t4IT75Naita

mq6y62EnpjbUoi6VVBVysfCS8AQCExWl06+auZNmu9psPTOc0ApMCYh2AuiUPRCv3B7vOhbtuSB5Mm1u

Y5K81kNlPRAPyrWNXpwf7ns2UKlF/tX5VbZ0LU333S
709G1i0HF+5oh5xsyRKcHP7Z1S1U7ZmH38OVcNjqC3ELsInzSn4TOrP0/VpIgtxyd7F61tK0ISqKXIN1

Gj4QvkcZ9a1m4Bd4BCsmOgNoDjEjKRSSJ+juTc8vL62NeFK9Gqpp1tsJg0YUpl/i+Kklt8MsE/Op9LLq

UHDv/rZ5toOmmR8Ei6t/6G72tgfTU86hgS+wSbmv8c
we/6uZygcbyvmrTNsxaOnGMe8E92RDw8JV+438jY517JD26uFA20MI7f4EZWrws4j32fhORumfRq0xTd

ddxaEChlbs1Oo+0+zAE74JjyB5lyP2vFisVVC7RCKlbYclqysIsBDlI8zRvYT4HDNvdRISLCbnFwnMRA

PIapY2gzk5tx35BHSPz3c/mUU13owrZv6YKT09oK5G
0QJy3J8zN1f1AHd7N+uyag6EmR0C3ZwH3aMfdaSKJKnEwZ9ROtb1292dKHdjRPC9SrnDCMP1zFn3m4+3

mQwi/xGWGLvJZ6jSosOEuYC9W+2GgRtsLhjnFXjuvDewOcFkq+Vug1PoncGDDouivstBDSdUe7CwVuJr

eRxT3E7tyABPFrW2vjy6o9iTc2u4nT0ccZDCyCT2T9
D7Zgv6xAgSmMTNcW+WlccbJOgyN0aiOzDi1hm0ljyFsRGSi1SJdPzeYjOxcQSmhBncAHDbzqm0a8qRlI

iibIbfzAP5grGkAF2ExJ+3nlw5vA3Xop6CsMe92c5CaAa1dRB4yFxfMGWta1w8YocFG9yg3I6tR0S35t

b5f9cgYfejQtT15FtSOs4m7b2gh+sKVXjfjTnT/ksA
e2q3+q1kIsSLIaB4dQnDwpE183IGx5dSwkiT3CGQcChscMPjw1WUAcgTyh4lgBODFtN9Lk4K3lY7HgjO

nJvYfO7Oz3oxTflw1giTyJQZ7HoiT+DbGt+34M/jGJ5DIMxTHsOoCYC3wrQtuS7VEK6ms0JiHPa5JSEn

q9EqPKocLWw2EQyfIGUdT1Y6jAAkOMOiKQ1PKUMqZW
0RETVkepRfDhUgXOKYOoUaLLHsQgPmy9QtQ1EtYDZoYXQZPSgzYBUlK84pYCkdId58DGhqCXHwVxGzOK

w6Nw7DPkVtEFNoE08vkNKosYArGSZwAROHTxKwZOCuJ0FcgFHeHOypR5TiH4GxHK6dtGFrHQ4eeGRlP6

GhP5LpbinyEOEBCeXhFZDfEKahTKQlHsRvMTiiPFY+
Et9JGKF+Rk8UAT8rk3AuENo7HHEry7KkFBucISe3J4ZypBUbewYuXoxlcILEQCSbFYCuY0tcook3eCAy

YYyvSo7DQzQyw3KnBXJrHPwSyc5qzZBaoPY+3pn+C0ftRB9BvDBCXYz1w5j2zImta7fCgN51xrGllZv8

jISbw3LprU++UPYLXAeA4boR2moAXtDbHJ1QI5+ABS
USvr76p6iZtZRSqg/3XhD8BWkkc/tGhK8gGt1t44a9eJf/FD2Ze1sspLmfwd19Ak+M6EIfgR6Gb8VU/x

ckII5vf/fJ/BkHvQyIuwW7p0gw/+gizCRR3t5+ID+lh7Shb7PGWDLdPCa/Bv/xhgzfUFKMjNzcFRfvSR

B36OBEA7TlnSLacIpZJP7J8El9bSjUUoUss69Wra3J
GFXzIs835c36+HTv2BAEF+CqWltlMi1vubRP6GiMyBfh1wcJU632zOTj71URLKWp1UHPGXh+4TSOqPGS

O80caGru7NXH19tJZXa2YQTtiSZ69EcGdZgbjFlICinS7qkqAmAClUQe3EVt93WgMvUV43GUjmiU4DEC

bll72rfrKzD4GbEiJq85rZUX15kJAcKcGp7PAKMGxf
26y7bhbomTYJKphYWIV3alLlPSC8n5BxzVzRmnTAiSOzzHb11HdLEfXAL2Iw68OhZ6YZ25JoWinZzA1Z

LysJEIoMo4JSDG6vd6Vyen5B9gSLTFnIbImgLJCRm0zoAEZwdZD23NFzugt+ikoghtbvdUIRDJ9itwfN

rR3BWWTx+LadqO6VJJHrEBs6kfBYDYe/xyJDk2dei7
MHAlLBchA+mpLxp+YraYkQyR0RgxSWRd9uaHMNZngzJINQk1TsaleeIaDdd2DMwhLCasKF058kOCn763

9jQYHSRM5zxM5TrPFTHWCYVSjjCtqT5yoyu2vJ1jx2xxjC3JzCc1P35zie6qxIOGkEclAPI/4EWjBBth

Sux7FuDqJuoE3TSjt8p5/9fhUiHmghr1F+abtSAssa
yo5U15HEMQQYa+JTfq83UgNwppSnepRx7DKfETX9PhZkn9Xgr725qdQQJjMExRz5s5gHoRNlAV33Yyer

Kx/6P2tE2UaX1nb5jUzWavZG2uM9OItAgzUrqFtEJXzMym6yo14Eo9brU7Zds25looZbQcOubUJZYB4m

kHDs+c5BXIILZmpprOh5FCUL1wQq1z+pFpfAk6F0IB
xuXNPVh8B8fR2XEDqBP0FOMEo9sd2MljHMw2tunP7WPUtsFy9U9/QK3BQiJero9JGHad1+pRmR+UeL7w

RmrP9oT6fAGiRe3aLsdEoXuudsjtYPJOMoFkyFMkElrYF8VAt/sj5pRMMI+Wm/kplXjGvBs9z3T8ctqw

ctd+1d+NNMSYLguWjtR4z1J79Qopk5SUluKqp4NPhE
NxEdPMCLDkuehCUFUEY8z1UPLrzx6g1TOxBi96Jc6afWvMhexg+xtakr3S3PoBd3BZ0uacGHaiXA7OvU

1m5B3m+G8rj86fDKiOZO9pZEoy2Ts7Ussvb31u9G/VsRMb2JcQZaE5qGZ6HrdVzscvbNqDeEPXufXIVQ

QnF8BdXaUKH7LVtR5oiC24hEfwQ1sRR7l6vCAATLs/
CsDd4o/TYmGGt/iTdBwu/qCes1FR9oCkpoBr3rwwRzNl/XW2+VOF5jRUpMY8MfGchFty/tGsN70cgmDP

u0w3+RE8hsF1uxBSirEG4BH0JHxXZ+E+W6QPHq0QF01me/2mWzxcOVcBSDe2uqc9pyLUInv3/N4gvXg9

xux+oeHZKdC+swiOZsOD4BXHqi95gYRuw+hARLMXiV
ss8GPGwdCxUjqArjinw1gQLNgMUzJNiuqQ2uA2A0/Y5gFH1Nrce8eq0NSBn+kh4XyWzAJEwJsutZYKUB

wAVAov/fpMmxjY7qdK+B989g4z3LMRD3YgbnStv7vH9pDNn6XkeZ0tRmybRHx40behGOV15KJUhZzKZv

BdtwnMJ4iA42fyM7ccDaQugNqARbkkdNHe93nUXOqL
+npJJkEc7ObfYbunkSDmeeNFkCpfNvcSzMwKeRxK+26ftP4+26jLO1Lf6G4XqN7yi4XjD+j5muKc9LPy

7KDynxp6fhoXe4GYqCStBxi3arj1JedysdYI8vEgd9ERV0NO94Qjbzb70RqgCh2NMsM+WuOmtGkInNKG

wp5vpcqJ2Pm7DZXhs3QJo0mtm3OXmUM084sffaO0/V
JxyU5p6llIANMs7rbmP/FDKSZgT0hcX3DYCfN8yFoUj+D8yNx1zB6xViBHtMKhW1d1cHFaMcf0PzIge4

hJgsyb0J58Y7y9J6fI8+70/pU/Rnvg7R6Q3dGC2V4HTUlfUgLeuOjvmxYIONSMUMY5nmV+2XBEAdzy+F

fDbB+cGI6q2St48qe2Fjhd8X6W6jOgxRUi9wBg2f66
X1lXD/YFguGyrnMwWjI+ubQ5+pWY5TGjA3dkN7W/2hfDvs/Nadya/l3ss4HPGgUoRkYYXELmW9h/JoE2zC7

1eB0Zxlv2Rq08BEfR44G89uU2mWtmgpMZat4OsmpsYNY1QV8OAEHVh/sopRLvmidoS1QdN1ODyd08QgN

+wxaX6KIvOmJbQCa9HHu7FTzIzGybE9gf4frqZssbg
Vg1vl4RhY6I0WBgod2D4RemXvRiSJzE+a8chpkXmwAjepl4gbqcFUC6j8XT0t0Ry9/uwem8KkkuutX2R

ckWugupokF65lAZ4z/UqLtPHT/21CnbTEi2KYyvtgXl8CKwtNfo5NfIOuEBNLUKqkHBv5AWdm/+Jd2tj

IXq/bKLhg9rj77m/V9rgLgpYRka4slewg1E9XuLRqY
CNqp4Nlh+awA5UARfOEgxqPmyq4vnyyKVQ3Onhlrl/TUeAJgvkcJINPGZcwjq3AFkh+srEIW2RpG9nNd

AadkU5kU2peYrMKuKJ1Ez0V2ShJbbOFXe5OvTi2KfVBhkl9Ch4wNCdsKfKDjGh0cwP7krqMreFt6bIQK

5ONkkpLPs/EKRFlBuhDBV3Tfo4uvUy1gRk67JkDWcD
0YJKVC4iTtlXq3WpAUt3tyzObi76pBoWCcUUZHexrsRH206BD3VgvaXA7I7PlWB0vgaWO/ncsjIU90ez

8G/TTND/bzctKc3wOkQQn/3V0jqip+IjykyHbVmEUpIwNSIh+N2dgvizm/Fe0a+Vzc+5L7NdUqvJMzU9

gRFbyxFNrv4yP9+HEHvlqDqRYGXcD8lHBE3yim805P
Mroar051GBYjvo9SpBZ9CVWqIUZHImQsnDiaJQ6pLWx1uBEzegJWvRgDYXCDh3BKv4s8BC93IUWHHeK5

HgWAHjGtrqMJQ5AIuOOsmcpUKUpuimG7o1/uotI85iE3W9r0ZlYvkyvV+UW/B1ALOy6awyddoQSvvEOS

yPJneMZeVu6Qn40JDJPDwsBfaKAIpjGKMDNVPYIDhH
WyAqvOqqOBtfRrr+GBtfTXAbk/1xAnaEgeV9UROaD8BcLohiNumpo4zigf3Nqk+alBQ/OxJ8FYBeSyiW

pwHFRwvMRTdt7grKORGCvq2xB7MkxRf/PgVgzLas7L+5BrG2K3kiLtZFD9PCBXZE5XCaniw9MFPCLdQ6

hQRK03OZaiGvSeKFTS6NNEY+KUNmClgM+shYwfkNjM
OaQ6K7rkmISmODk1hstVxipm3S+FRd8HUfvRNug0LXvG9BBaBcjVfq9DMzbI1pTqdlE4Zv/IDxuwUsBn

5gLZE2fcAy4W/bnjFc45APpGoccRkKKRBvjMdTL3fRHhluXL0ISAh9xEJF/etNjaXGmFrv5469Jw5ip2

x+Auf7PezZPKyWYMJ0FPcP9v+xedliMzXWJ7q3gik7
5Ip+Xtlt8hlRWdqIiabnq6M8bUWTve7NbO64otHoGajKQ9Yql5O8qRIhOr+Am16MJHPREgrbgh0UoP3O

IrWEAcCLb3SqBx3P9/vO3KSW/3mgYtr7PKdIWLX0HvaLvSqFLiUgOhg0uaPXpuCPbpD3t69uJ2/F4mGV

c2wNxcZsyG0HSVBNIgDkdWjlNKdOeluQlQav2GYuDs
uJiWLCHeE64LFIRehoxWHdC8A0HBrbNhvTQpQ9w5Ez6aM4woYNKereuUvGVR8GqrIhWYck3P6STmEN18

DHLYpbpxWqw0Yx1DaetCTk9QLxpB7Vz0VPEFxZCeZSdh90j2EJ66OtkPifg2egA/tPR+AG//CxtnJcgN

NlLuYVQ4bdYcxS9RAG9un6CdWUi4TKDwd0AwVSrnSW
v7ZKvcHEGuM8N8tKGdTCCzDG2BKVVkWA8XGXBjrmFmOdAcZOUOIcAtIIBkPvMwa1LhH3ZdUTFeWRKJTN

hlIZBbL32kAYxwMo98OMzwXEQxCfQyPNp7Ow8BLoNhVXAbE69plFFimTCzHoSxRGLWLfZpAJVgZ1HxzA

BpNMrcM4SuS4PtVL8lvXNzNZ9wxPKbD3NvN1Dxbwzq
ZVJHQiAvSSBmYWxzZSAvSyBmYWxzZSA+Yb5QWEG+Kv9UES0mm8AkBHm4MZVka6BqALexCJe4W3ZwtRGu

idKdBthrhIZUFSAnLLZcM6xqndr5gCXnZlVcAe5QMaBdu0NnSCBdXLrZjj6soC2tBpH+Xqn/MQ2uYwVm

i69ndxDWc2UwFWEPsqw+GMQOeYpJgQ0Qe2/7zi/MsH
rzuEn8bOWBckTc9uaQ397VzYkm1z4DeYJXcpZfyb/2K7L66dwI+pJXqReshvBra36m5fPzpcxK1ssV+V

D1zs6qMtcE+VV+yo4htMw1gsCD+4n66Da30gjLyw8a11tpvOHa/Dq7GOxJB8/mtPh9ilE462HKFIKgb/

T0Y+OaU3N687ppWPcvegoeQMc8vblzPmMulAqNmamA
/MbppI1qm5urawqn8vIB0nzwlrO9YGAHB6+h4RQcjzg5Bj2auL8jRa9sG4vQS2u09HmKvxERuaEQj7ZB

8gzRJmDiBez4sF0bpKJ0VunkK9Wf6IY05PB5RG0qusW5xrCwMQJ7kY7HVatlxAqF0hf7Fbz7Pd3CgVQw

EJbQYTno+4phtnYLk6VA5XV1qMW5TsWQ76f2SsVE6/
4ApY980MWwnxqyizRkT/glUNloh2UZ9gUCHS6dSFPb7fizfojdh0iHskpqPuaiRkKPeVSXnEZXuQR35w

DjrSCgKSF2QT6t+vqZjZyf7gPmkMfsCPXOit0wbJQvqZtP5kb3/jM7BFKwoBdek5qaV9/8jp97gMwaVb

UOzFGxwObyBot2w9eB3jZ8T+fNwDH5O4T4tsAmgSC1
p5gX6OYxLPPwSI5yXSR9yKYXbiiJ8r0F+nouaGWj0QoVs4ZA2d9x+zuIEO4y2IvQqChttf8i773YiNer

v+YKR0YVWfe7e2eTLoh/4JjxJjnsA7+RXgms5gnUA8MOcHrYD3RTF55Ke1Rd8fh5BM0yzOMKGupQZjyM

LvbCMwTsOgSkEmcW3eaY0A81nyAVQvwf64ljXYXIJC
2S+QKEQSop5ntrnMeQoJ+GitNvLQeA84sPoCAKQEGvqgGXrU0Rk1tmnBNOJyP+VBalktDz1B9Of4lR4L

U2PT6Mg+FlLXQ5XBUVBkdehtkHuhbRlp1lk2Vd6diGW2B+pEJ/UOGyjTFdRIxmWsLo6/w/eQbDbTOL2j

BjFoEfIKQE0DU8kocMFdvPaYgtBaYrN/CSvQWhy6dM
5lA2utHvju3CWuBLJUhoQyWVfU8FY22BWlMHg1d4xuFUs9LE+NMoF0thkiiEsLF7vLl9jTtJU3+ffA5u

dwOcvTLePxntldRMsevOmRhHsY4qoUZpjqwH1UbZte/n1/xc10f2tPb4+aq6V1cMb06LOYhcGxBWo8AX

D7uYHY3EUDFQoQEwi/VHCLqsBfZE3JRFt0JVwlNuaS
QnMJpRXRumNLDRLSGevZ7UE8Gdb97qi0fc2gAtIrbtWwoW0+p2Te9TK5aw96ARCxPeN2gI8P8++NqLce

Y6YxSXmrA71PSyiQ218CYpkxhoL3e2b4UjaHQfYjJJ6oqgeUYELdzDJGhRU9T5yTfePshBOlwvEMtuCu

vZCTGOny3tWVXlvTURY3xsACmU3ugPwtB7mMLVscQH
ExbdSHTxgyDY5qJ9B5Y0wLFjQfKpx+cKOtfO4FqparIBZ9zS14L1AdxVpQZIsVj1egvkq9CXXMHcElpq

TrkxdcdtdVKZvrq3bc9vEDff3ptmkvxRRRBI4z43gg9Fvfa6As/3ofUutWfRQeSGC/zWXUWcOMFkYVtJ

KXypNjssVSFRuKEN4SMrKFz6dzg/VfcLfqhH0Fgtj9
ggymV4DUu4fknfIv0E26BKQ2llMnWjHP6teGofb7loZPrRcbrWjyAiv8tx4A6FoWQb8nD0NWEzJeCHDV

aJwCKfLK18VaVcfZZG7DUQbJOnQMZGkQBeSNSg3hKrTG5PC7cj59tTuCXqGfqOBZ1zDLvsQ28YeJ1KiN

cjz8CMNpqXqB9oalwDHOLitFeQ14yIQu5nKgkEonF8
GyNTjy4cTGvM2lisOYDr/lR6jS8zeWVzdduj35jUzzdoA5lRb7ff9TxnDTsd0AO3MpfXOYe+4hMni81G

GciqtJl2RsYNQwIZlHWM7XUjTd+jniYjb4HKRfDZPlqja399RutIgPjMnqd9WUS5HrERJc+qlaDVoz5f

kLqTFJ1JLqb1ssDJzDvFgUIrujUigO5DLpthuie4ND
1vatdR6N0P9/5vbGvwKkNOVKkFxMGanJvzo7d2dhYF61mJpUP5uICBsFJj+JVJf5QM3/kY5lWsDLtD59

Jyf2cIGMvaXa+exZhcxZKkhwY0OxnKpQAokHCI1mVCjfUfYjAS6YNEl0TdW5YwiSsrsPVWOL6gqjAG4C

k88sG3kmlLkPArX61EOXEpXrpNWKZHhiPLMdAdVdSm
kgT2vN5ibCu+/DvJaMFtHReioGygf+5lWyiM3Ivl1Jvo7GyPUT9E/iqJyeEiqL0BEjJRvHyuwLFl+5Hh

YSYMottPLlaaJWV2erl+NUtgCorSJ4tvGsFHSs4H5lLuMLP9nmhRqi957EOrHnJJ3+I6zvwIY0ZImF8Q

ily5n6UJpsDX595q9z4t8EjXIJFHY3B4Q/t2GNbfmh
W9YJJ8nMS3MwRvaVOmiRHRBo/ZESlHgGJlvk4BCgY2ahS27KRAiI+YAnkkChwEB/UT5MIo8YOMubfrRp

l1PlbdYxJ4SymEHRGVpU2dGtQo/BoYlDJL9ENLX00hJAtlvM/GjH9Gd1twOZp2LiAkg8gRlMyAd9AdnA

NIwgp+cjcDcA54Uqvgg8ixtmiQJ27wWuvVYU/yVM9e
1nk/FDlmfFOpXnFGR/DPE4P4YyQ4oxvvT0uE2bP2E0Gq4nvLx+EQhPJPu0xS/BWNGZKcwt0PtkjSYW+Y

qsG+v2WEfi65Q29L0eIiQGtPUKmJHnJ6ANLS6pX3XcvYVNLaWZcEZbDQh9ZOgXviHW09xL4/7xU8V2kR

Qtj3+ti3Yd+OdsAWGzh2atTo0n5Ipf1a/Or/k/dqjP
W7aM6F8GiPG9C6ggXjV338+tHN2IN5f3wKynLUscw+WYdpimEckCQwZM5MSiGbQM8uxt2BCOPhDI9gxd

3LAVV0KX0GFJWxNU8SkZAqO6JpQ4MHOxTyJVWqDGWlEA81XUItVPPWGCbxLCHlB1Hjj170hbEhkwMxQV

HbGs5SOHVrQG1IJAJkKDVlzDLiYCTfANTuKnI2NQQb
RWxwHKSiK0ZfxeXreiIvAXqzKAPCBQovCGAlK2bac3CdMXc4JX4VAE6LbdYgh2HhewEtU4krR7DPOZ7I

SLEzR3ATB4KcP9efFqVyl0PfZ9urOnHix5ZmGe3GWqRvKw3WNcNvPJ4upv9MBISsGRInHmuPVuQaBYlr

GgkzqVMyBQ3BtQA4HXNqJ72vGAUgYPFcK3HzZVJ2ME
U+Zb0TZCEuyHKaFM6OQejN2U9jwge7Ji0/3G0TvlKKSyVXivjlkY7ToFJhQQEjLfYi9AO4S8OEfiGq6I

crybkP/Ux3dP8pGwKhme8Fm3cgju3lRXZjryiDl/lLMko1//1HrXmXZZma/ep91LquFo8F6/+mlsOsuR

m2/DuASY7KGa2h91IiaUQ1++1gYzodHpyMDtU/B/vn
F/7g2J7ogVS9q+KpMPfwx53Ror/eT2f/6/vR8HB0+k869dhbAOrdIJVjHaHzx4JdC7+2rwYvMlVRpvaP

wk0bHKyraB5ehbk/fDU7DAvaner9hCa+GF1djQ+Bdtz8s9I2+3UPU5dDaUflN9D/u8uqB99973NHomce

lIQ3ItyccF6WavE6gxbA+CW/1JlASDOoRrDrtiGCF3
CoCsvDtp14jW+71rmpuV/i3pJUjPqyRt73qOdaY47KGH9f4dGkC75x2g6gOaOn8hvdqt5eXlU7Wyh0zt

Z+ZZPZ2rrQ2OqzMoGx3OlJbaHP+5E2x+GNW0uTqd3OxQuWP0mYSAvQ7vK1/8l604qbXptm8PRHkc3Zo4

avQK47iDMcAaxh7OlXXmdZxkA5JWjDHzvhOZitaw3z
jfdFlSilkIzoVcKl2xNgYh/DWdN5HgHzyoY2+F994FlfxgP10p9AwC/xtqwF+OyHgvvysu1rqu0nd4/q

HHHmKDhLqGhVkVBqQSWqSfFVXK7nC2fYgeA0Xfpzjqbwz3jYATeehj2s+6c3NjyuzCWrwL4Y3jiu2UN6

K/R8jcofpV6cbWnbJW6YqR6syD2ldS3U6bvK+Q1ok7
sZ9V/BpqnYczSuN88olyMMRM8ol7Hy7dGb49hK9YYzO2FpDaXKeleiAY+X4fvUADlgejE0Y7XihiEAze

uHZlkF1IZC9JpTl7L0sZWZU0RahARu0M8PMLlqlZS+k5dFwgHlEMAKsqzYnvoSGQjQTIQlxDeaMAI3A8

NhkkK/pARg+ysQVoRkAhl/SsZSlIdwUShOzATGWyZF
xd8KOXHeJYZTkhfqRcUrnCUefzyqMno+Mw87d+jmGdiaOBvQvB53lYEW7Zb+Fyw+WkTKvlXPuS2KMLRZ

Ou8X9pCS2z7tPzfquSCXe1kbZaBs9Dqt6Q2rZFzstu1jTttPZ6Hs7Y/L4AAqY03nC0Q0CvBSJ7mfm0Kz

VKtEQ3Ew0FYSDnvhGjN7AIy5EK0ijtResDpaLdupKE
VixvsEujHPsnxG+YYwBuBZefPuCj6zw8P7+TOMMYj5lVVlMq30Fa4YHdEXEEOK7DgzICibz7Y5U6zKwt

CcncJh5oi8fEeIH3iKdzT4YiXm1Lwp1zwk27Kk4ezgOBp9ubehn1sW02g47tCYrzFrA1FAcy32ulo2qk

XlDotYkrsGwkA02Q8qdRn6fhGKbM9SLIariIeLJnDH
j2AM+wqEndLLUn26MLqRsPlJ5jwv2OZRV0ptrVcAdWySoAieA4qm1mvUd4XFNC7Jg35JPyYgTAzZh3pv

YEb+ELkpBZ2onxeG0+NPWUwgI+qN4tusDPpxGuA+YFnWDqAEvwoZQMrsa9jV5nQ+S/cTmxbrleSVqGd5

xo93HzHuIK35yNCDFMNxZBAI9onjzZEJhn7KlSaghI
4s6jig6lAn9gFKjKfg3mpEBA1/xMq7an+7ky9N9S+ziL10NIYIoter4Ne8KFPxGR+YemDNnxHcbWpldt

FALvc5ddEUa7VRnPWbfABpomh/U3MFboynpXuhSmXrfMJHceYxmgHvdIDSGqQZ8BvY91yZophVyQiuPM

Tk0+0yk674tH8JEM9B/XQPSt8MXU5T7ok2ShTRfOne
9ZmRwEasealVb0ehN3nJRR+SLX65i6Z/TQoNVGL8nar94E3K6PtdyydkYZVXKG4spaxxFXMAn8kGLrrk

K0naYhJ7kPOPDZnHb3IB19j6FMHhVAyG+ldAbaSvh2L5h0LASVjCJxhKJHSXZQ5vtZCWE5g8UoK1dcZG

e1+u1PHmkpjRmU8gU/dmvWKDXy7ER1Zeu5IUJBehoF
pIViTzcYgkvXmJ801KIVfPOKWQACM8UJvuw+wIesngcFUsBGVac6JU7TbSbY/iJQU1qVSKNE9HkOC5aB

D1aRLo0Hb3ZieaeMCwlBDNMZYGmj1u78s1vpv7MGRQosPQoJGUC34D4919kaDX7fO2U5tUpvX14/2ME1

ZoCI0zMnXS98Zoem8XCiTnSJN6k/tBDa8p2DsUphti
4ziWDR9WBScCatoOQGN1+UFDQqrtAy8ujB5ERv85tcI/xvDmuQiA78v+BmmVjc9JK5so32IydRJ22ceZ

jvQ5RUT581zygo+TbZbCef9TpsCbYslQZqHKZUaHIIL1p5JN6jO3ExdE2OAkc0L5ZAJx3AJb9rbp1Auk

bTRMEdIe4uIIQzGF2BvEJA9alcyxM3B1Z99LNBtLzw
gljTKww5FNsIq0K1uTWS7EeB5rs9LKVT7jdDmo6XoHrYNaYtkM6VUA44XEFJ0cNtWbZaYplw0Tvms8tX

HHMkLEe5EvK9qAZJ6nfJZkMItYseXKbXaCsuM/k3UlpUChyskWVYrDOyJPHow76xbyA+cnnNp1ceZmF6

jhA0dxa1reZTXpC3JZqCLYVsiG1j+b2xMcRbc8CClG
lCs+7AEZJCFDnFPoTUQUXL5xL3nnZjfwhen9ZAe0cHS3+2eHrAc4Df9CCxCIzXwmPQyRklodUQu2xyX7

nXKWFVSb6uu9qyZrZBY2HSIb8QRmRhkf2ZEjkV/RmZhqM0lYuWRb9HdfoXFwBlIt/qMiIjvlONUEi5he

C85j4yM9LQnUjWi25R6w8AGNJJkFZkJiQkqQ8Ja8VG
xlGpugIuAtd2RzrwRiVSgt+RLMP0GS5HvA4Z4wvUFo3OXwGc53MrUjmOoFFl3FxtKPZT6lkn1lvDdH4Q

ndj5mGaETv6cSD/cBNV+paOG9nA4ixZHXOSdFdIXgmKjReUGDvI4SsWVPOWzHej9N/OYvAKmvjiob2ob

LNn+ZlLpKg6GAq9QIeyV6JttibXuhRglx2F6WbHlOf
HT3iRP/b8t2Xy6+qpaq2ADEQMxGUEgWvo6YM2M1SIIqtPwYDahMBfW522LOPVLD0KWooA/qqnA2lxhKp

rucwDOOyn4UkkkJkjhWiIyb6zt0XK06hVzWPtDwP1KV/8XIayYpFB6SQBKSE4ZhlLOnwN9pZMXHPdcN4

bb3JRJn7GSdR0yEyuaPHbOda1OQBryNrdUZlQWOOxr
tjVTNx6K4IKAMbF7vaZFRG37ugaAr88MGzuMlIbFXngfl1vQSzei5xkMDi6B1wWEXbnM8YS3p62ynECZ

6dNi4VfuFoRYmtj71H596J3K0lUrJ+9AB4iXT064LEB5RSfAoumi+Y3gXmsOWPv1A0XLiuFPMlcLX/rC

fowiBTAYtY9/Be3SgU9S/VHktWmlVi0KZWGWRBiWOW
HKgPV1A1Kp9sXJtXlVEKM3Qlkt1QSZgXSXuDS6eD/T5aAL9A3s81wBol5FWDWebKCiNnH57Rj4Xfw9kG

dgW5r75YhGdBphuqAFC42SpF1FdHzSxaLawAMAXU4cd3Q0HI9+YhUqf6r0iQBiQJG0HpNzCspHg//i3B

ycbi4/4C69/70KOpGvrR8KY8eoPlYs4L8uqUjiOp5b
yw45GXmOOo6b1zJj8Ee01RxQkIyKMEkn5JwkKCsccJiB5lzgkFAnkmiD34AATJRH71OQem5hTgWwL993

sx6oNJJsAwLFpzPh4o70Lgezt4WeHU61ZVllg5fI3NbMoOj0qG86DZ0SOzDrz1a4985gXds0OoVy4xKq

6kaKjmoh1hmHNg7ujIv7c2vc2o98pgSgwbJUlNi6Of
+vmrNy+hnr8W8L+2XqvNrqha/doO7ZxGfAbT3UpJc7jJsvxWt9Lu7X6Jz2hFJaPbT/m4LHujgtBhpFUi

KL8LWeAhYS9lzb4GGHCyNEZrBcmZYgBeYRlYLsBsAEPfRQhnBD7DQMaeZDixRWFuL9BjbqPhpZVcGKSq

Wz4CIKIvGU9ZFRUzkFVgNVVpVuRtCUKUHpMjFCFkVB
DzqCZEl9huRvNyBDF6OWVdQmjvEY8ULXPcCI8Pf425ZY21ozCvXFGlYFUFQvWiFNMzC7ZxdGVcGGaoE4

UyG4ZdDD2siGUgGP0vnDZkO6CwL2PsmpdcHYXBVyBzHPScNPkdYSUlNxHoCDtdRWB+Uj4DDGO+Pg0KZW

5kw6EyWNemCbEmZR8cjx1NRCJ8AA2RxTz4SNCbC4Mm
ZDBlHMPio7QiBF7OHF0opBteLYM2Yn2+GQybACK1ndOewH2AUCUzYdinKnhWhK/Qf+FuCUW24w1PCWCq

5HSLtKE22sa7S4zvkosMI9WUBq63Yd3JOa5wr3XJGEOkeqeNV6Vdo9L3hpsQ8+iolQ9Msxevfc/VzyTz

1ehW/fiDaqcF/mpa+2JKdq31p9IzynAOBuKkjVHQdk
opokqu866k/eYwilG0kAH+51wWn0xt0X578/LAbaLtgpT1jOz2tccv7J8ReG+dhcRHe27A2n+Fvxsq58

JGoSElgRYMPjm9GvBOyWhi2KzcJH0AI1Ix5aBphzwImGiQF6YaXvw4bFZtkkV70dDdKrkLCX/G7fAzAW

cOvZXkapuFssjc8+JgxTyyydhPWrzU/ZVVpaPIltLe
C+S9fL4GG/bxVk0795K+tX8bxmpSngqIjkbcFTwlwgc1/St5TyJvYw6j7mHyXbBd1JN6mS53o8JupBoB

QCm7FYN8Q4BIr3K2mK0yO0AEpp8FxeFCTlTgoThT3pQTh7RBchROlxx/xir2PYYCnwgNV2obvQJMhlrs

6+Muz4VO7vtBGpGf0CXDE0AOHeLPTWLWvArZUpPHdE
b7Bvk9O7mFDboznvZURm8YcpJyVbWgFBdaHjugGIQi14Ka7zAnrbKAvrFYu7HbZ9e75vTiYwmoqfMw6M

L/RaHXQRW2GZIMc5FCofNWlbOqOGsUMcCi3PMxGOMxX5mWbKsEfjcQ3YJJgKANUl3DrSPieamZ8ZKFKn

j9xsKAaTW9EqxVnWekOXoCuyrsTNlecQOqY+C/J2NC
IX1toDfeQqfBFsFLRpXET9byYOJpUbEHfnfMrrqbtjilA8J1WcKFy7TSR+vUvBBG0jEC+l4PRQ3f0H9E

aLFIgKbpEdLUGv4IH+bYzS0dilM8CyU2ixFfJmaXpJFYo6rAPeVOZ5FGEvZp6QFWL3VF3AZrWZFc441w

bjPhBbvU6LGcPZHIyY7LEUfOaIBeKbsw1FVVvbvCzB
eGpzo4tzB1YTTOaWBAqHHkHKG+05X9l9nwUincr6pilp72atEAOhe38Y3Kr2jmPy5IvG4P8+PENC38XV

EA0tbH1OJrYgkbxG4L0eQ8EN8bkDuT0z4GwuLOrc0sBIo6X+1jmNx0izxJ86VaIVArYG+trZVT7Ea39T

VrqoLhqbPOB6uRazK8HFN+CI/eYucmaUd/EavfzubG
/d4NUchMKqoJC6KJkA0XFV9Miu2gBBwOfPWDdXmFgIXQg+HKi0IeRqqYeCyOM0HkWbab3xW8L/Ra0agF

I88ECXogZ12fW9rzdNNVuQNIXi5jlOSzaSHs07yuLbfmNUOXz6n9I2EZGrJrtCQZHcPWzInORrfHXbQ3

qVzTQZHJ2wePoL6aR6N5J2MClALlhChdpMa5cJNmZO
WTegM5babdoEpT7shUkIdDrdiYQBUcurFgXa1dIjiqnaBTcVE00CIN6kD7tNV3mcmNMISPR9iU7LKLA9

hf24ARf4ixlc3Tqm3/CxSnSKWIsCPgpkdOrYw+aERrDbMXxFtxrK6uNt8qNCWnNJj5sDh/9alwfg82w0

SC88dCq5cwUyg1hCgLCWRW2fL6h2E9pYdDe27JWl0J
E8qv/PJ1rQuTQc4+9xErI6X9qNYzT0WxmUUUQNLg2DnIO1zz7eU6/GOUWlB9raL+a6a+5Hxhr+LtV9zs

6EigL4vhBGYVWI6+OEK+SPaSqSckiaT4YpvNaLjsdqSn/QF8GeZzIG8uGCmMlesFl6ceLntMge6V7LeK

vCyUzUgbhWnL/0x4uHh02Qc5fE3Lz6mYaxwrN6YxNM
H1Z/SuKRJ1G9zq7q0avpZwTZW0exT+KE43Y2CsPwPal3tzWt8VpmyK8iK+kmP03p9dCAdtesHieGGfOA

2MOkPbZS7iaj2NZBWxTTUnPcdBOqLnAYfQFrUdINSvRCsvAU7WIFwsITqeEGVgI0CqpsAkcYBhNAXeSy

0TKPMgHH1XAPMtiRPwEPYmBuKqNGNAYzWaIBMbODUe
uEEAx3neHoJtQTT9OMZeBknsNL9AZYDrUK9Kw440HQ89xzHaXkHyWLOJEqFnAMTtY0CkgOYfBDxwX7Vv

S8NpZF0cqGCrQV2caSYnK2IiW1StrcjfDNVDEnGiZKGjBJooWSIySiYcKBriTZB+Wy8GDQX+Pq9YDV8l

r1QlBOybIYBzOP9bcy3ZXMIdLId0QJM0SAZsAzd2RN
D9OWPcOJBnWIA7AAT8OpT0BGreWyR2XLW9WAKhIdGbGbUxHWS5MNN9CVGnDdm8GBRnNsTqTeibTga1FT

L7FkQ8KWSsQRF9MMY3UeO3IDDwDWN0VME9EjL6GPXhWHW8IAR7LlJeHbrzEzy3NAM5SKDKIyNnPNb9BW

D4NGF3JCKbTKYnPUA0XrnsDvV4UEcuRvU6PsSzUuW6
UKDbMDS7PaqiOtMkZRJ4SLA0OGMkSwL0PNL9TdL4GcJzUfUnWEn7FKI6OkouYni6FWmsNvZ6SdgyJrSb

AUvuGhP8WlnnXHG6ABW6JtI7OzouAtHmDX8QTXKoQnnoGxg3DVG8YSN3MbufLGO0IVTmKqQ2YMPiGHF9

YAPmZKJ5SNMsQLB4WGZ4KPJ7FXPjTHD6LLSrAnOzOg
EaSYUjQDDcNvW4QsFbPXA0YYS4ZtJ9LUUqNJW2ZQCsWjF6HXUjLdg3JXG2JkW2ZDChLaEbHUSiHHZGKl

QgIXQaWNOuUQYaLlNuMqOyMTSrLYK6NOXwKmIlKCu9EMT8XOSdJeQzHCk1FPN3SGLhAiMaPKs6IUW5IO

HaVpZmOFv5SQI6NAGdPyBpRFg8OLB1RGJfPdUxWNo5
SBS3CXJqUhPeYGp7RUV9MAFsRuFlOJz2TKA2DIGwQSgeHNo9KUJ4NUNaVbQhNFl5AXB9RVUvXaIfTVp7

ORY1PCFnOhLpQHX5GMWkMvTqWQE4FSS9FeP4HRUuTOH9CEO9VMJ5NYMhKdZgPMhmQiMaRpJdZQZ7BIT9

GLXnUjYzQwJ6RJV1AaS7UIQfHZS4MZClUcFtAtFkUj
ZhEW9AAYR9DtByTSI1RCDvUsYiKiGeXyRsLJK3QNS3CYFfURI1FPvbYHD2BlVjSvRvZHK8SnJ9XqpbHr

B1GNJ8KyO7AzghEkV6XHIqRTSrXmAaSKL9YwT5HtciRtP3ENZ7AiZpWtriXnt0SHQ2IGWgJkyoSgZmGI

zkAjT9PrbgGAjcFJy9TCZ1FoheUvx5LQn3MJE8YUQl
Vvq5OQoeVaB4LiSuCgKpIFiqKzW5QrlyFeZ9BKXmLDY6OTGjHUK2YSV5RtD0QUVtITP8XFS2ZvF7SKkh

JWIbHPC1QpR9IWCpKGJ2BIK8UyJfIsdsMuq3SPI4QNPbQyyuJHU3RD0OGLY8DBOeCOL2WQJ5MjW8NJHm

QWP7AWN3PfX8VFrgTgGsGTO8VwV4NYWsQNP6XDU0Yc
Y0XYOtIGM3QUMvPUYdFK9VZG0dh0FtSPleSNUqZK7plx7SRSO4DJ7FQQEuUG5BtTEtY6AsgnKOTDKqkn

rydK0jULyvDVLxR5EygjUWFP7uZ1BjwRCkZHieVAKsA0McK3XqpNS1BHNcE7EtTFHyW0p4REyrPX4ZDX

GnMP06ZM3mLRMQObFaYPPsJtrrS6HqCuMBXpFgHMNl
Dw8zuNVBp3czTcAyYKPeFcXdOFX5WAjaGD7DFfAwIYFwCDStlGjqHB3wzNQtCJ8CuRTfYvKfEDbzST1+

CCblvjRoKvhYRxI5IJZib5PqTOlaUAs7QUiwUEWvG0Q1uTWxDb1vkM0BePB3tWZgQ5KpdSRBsVZqU4Nz

x3WTw429L8FooZGvN3JiS26reF2cA1xignGwk6fXpv
IoGDlfVk0MEDNnWX3PmKYrgNDgKXKcErBaOUUyeEAbNVAdIqP9HSopPCUzP3khJEYukuUlZBNyYIGWQy

QsWJQsLd1ocJMfz6OitFB3o9PzKPOrMNVYYCakLI9+FIunbxVwIqgJIcD8NPOvg6GgIDmjWUilGrVuIT

c8SQPsTbanRaVnTRP3JEX2OCQlDFU5FXa4PEE2MfVm
DfM7WPIpMtHsTjIrCff5QBK2MRHtUffnYoBvPLB7HWUyIzqlIXV5GYP1WqY3IYZlCZG7ALT9NlF9JGSa

DKJ5KZW1GoY9GFCrWJL0DXJwHzRwHbYzCCa4SM1XHOT4BEMjEFj4UTRyKGH6AcOeXzXzZVxjAgM0OoHf

CyEsFCW7VxD9CZRtYof0MKuvHrSsAznlLKL7GKycJl
A7CCXuKEHgHPpgMpF0PjusQzN2GYe0SAJ3OgYrBoT1ZIHaBRB1XqCkVqY7UVn7COI5LkxfQoX1IQGdYZ

ITJaZiMxRlDAE3QYVpCbYrSBb7UTB1LiPvXfMsIVU8ZPBnNHA7LZDvUfEgMQI9AeEuHbFuWfDbMWSmJG

YeQwlxQul4LNL4AdOqGtrlPOk3NWGdZWR1TAJoNlUg
ATOfYZQrLNvuBYE1ADJrZoS6JLOfTES5QJi1ZCI2TWLkPMovTII1BzQ4NDTvDtt5ROQ9VLBhEGabNJi3

OIu3WZZ6LAIkPnJiWUz4FYK5BVHvEvRlTNj2JQP1OFHbAaBgEFg1ZLS0LXEkAtEkFLu4CQV3SCVyTtNf

LWr8UGI6EJFwSnBkTIe8OVQ1WAWuQwBpUHt1PKL2TJ
WiMfHlDY0YNPL5AOUhDoQfHDm4NAR1ZLYcItZxBIu0OJY9JRUkDtNzFWy3KIVyJrvvZkEvBKZ9RsL4JA

LaGJS9QMU0HjOzXWNzLEG6CPBkUsA2UihpDjvjCYK8FzP8EXJtNhNiUIemVaV9ORJpZHDbGNF7IKIeAj

AcChLpTNEsWjEOCvSkWYe5EZB0OnOsMsWqXlKvSRJh
VhDzVaRiZPV7QKfwHQN3WzSoGXD7EVMyUTW3BxRlBxVlBJbpArS3PxOwYrGdKBniFrSeVXIpYTmbTeM2

XgmqQeC3UKT3ZvD4EqzxAyh9WAW8ULIyTnueJum8UMhxTbI7VeEmEzd0ML9WIWL7McmaBbw6SHh4VTG3

VqhcWXm5JTi3FUH5MeDvUaPePCqtJpS4RvGnVuE2UC
X2VbP6RLHqEGP7ZRZ3SoW6NIHgLMY0WQK4JdA8DBXpFGo2AGO9OuB6KCTdYGR1ZWB3MwO7XUQuJpx9UO

C9INAfKahoYlm8ELPpDBISMxUkDdHyXBAcZAA8LIKxZaHzTVQzHWJ4SWMwRSM0TNCaSRU4LDGkVsGbJB

SzRVU2IJRyNDB8AQXsVIT6KYRqFNQDYrBwBR4yfg6J
MYqlKFQxEnqFAgEzYTqSBkExYCNvOAcrCG5Ph457GDKsX3VlkFXhlk2WOCGvZF1Un808GxEqJU4Rxaxy

jGmPj4jyBUacGLUfA0RnC6GjrCY7QBDeY8ZoSIDqI0l8YVxcNP9QKPYwBB62FX2sYOMXYsLkTDHhGnvx

Q9MjIpFCBkDpNCVqNt2ilBYVi4asGhRrZVPsBzUnSA
PgOKmwUU1NAiJpPDSoRYSrgYtzAC3ohULdBV8QqSNzWsJnEKwmBN1+UQewunOaPuwFIjP6AAOjq0JpWJ

stWWg9ZWzvKWDqD6E5zKRvBd6xpO1FhYL9aKGrX8KvoODJfICgP7Hzg4QRs949I3DyyIQkDTThuOYdWW

6ai9LxhgezW3btXN8dgGPsJ27vgL0cHCcrVOPsO2Wj
fqU4Q9dcslGbAE2TTKX4O9lxfaWnSRZDEtRbVOQkD0cygRwnMPK5MHCvMp8HSKRgII2Ky493PJRhR0Lm

mUPdmhFoEEOxBLIOReIoNa5MHeAzDB5pfg1DIpPoKRRvJyqNYmRzUVccKohwpLYfEW2TpKQ4XKAwB77l

YITuOBLpW5EaHKGkNaMkGI6JIG6smNkvMIF9LRmpXa
8IWuLhf4UhXGAxTFtHpe87HTvZJyq8wblXq6UVKYbxp+8TEWqGWjDxFAzqKCYXsWBWflROQSNMGAcDf8

uN2SVXCMQKILSiLkNZHw7a4cJKMS6ewSmp9lUxXgpxJmeygc+hh52rMlY0I//zP///aU3590pgZVA2nQ

s3hDKFPIMrRWE5tnW4dXxcQc2/Mwney2HUfoLgPsKT
M7i/CivSesZm7ozwrmjWAPSoU/Xnlk31nd31PS4zHYxXlFjQq7fhgh1ZRW46iuusb5+3ozrW5861zut7

Manny/fvx0iURr/k6pXKXvmdaHKfSRM0MLC+9qVLOsWsNVol6NOK8TpeDiH5d4K698+ym106ldBBFUDp8Q

RpYo2uOFtG+QReVAdpYEfj6pohAyVltCphr/rTfZq2
I3fNK+O2A25m7m+33gRf7nD81u9UlRGaxWRA2B1eXYLzw3fYAW24BVDs//oDeJiWo0TBTm/eQhSbGUSS

VE+p4kb2JMFGDJIxJpVR6WwnkhQynylFdA59BPf4X9K/dV6fo9SYCFliBo7VgtDS+YT6q6mHbPNznTSi

MvVMS08z94mRedrMBmGRDjG+W2cdInZ7z/mDTgAHgn
9BWtAX2ZsjQ50GlKtI2n9XOiYl3pSO8fPiZXSTru9cT3DL0BfIq1GMrbCjKIdV2f8T62Ch/ZUa5C2iL1

ljSjst5H85/Oc1k5lEpE8Opy3JAco7LtRJQnzsL2nP9qhwfjfjShk89RUYw6HTQEsHIoNJawQaBxHjmI

qhrVq5Bya8BM9o/HtZHSCMunfT3pcwtvT48S2gxjXX
EEVMdR1bszrr032sRa0Z9Z6UdkALDhpCLqKHOaNjoayAyr5G74uP5rkugCLCXtIahSW/7AZ4YyPeMpb4

UChsjGMVDfo66iPxMbmaGo47b0U/Z4KKbljkjBas7HZD9GB5bnciBG5IEMXqpbpIlcS6l9vd19XQhDLr

v0o3iE0xg5kf1RQZswhxL4rP6U03oqGHoWTtRoxfWL
4N57bF1LQqSdvuInqlKuIw/AHWwX5i1/9r0No2Ss8UnKK02Es7rltvgwZOnkIzuTy1A6rRBkWQiHUrEJ

vW56CboRvINNSQ0Pr5dgq6Zo5R2LQ/NGdnLkGeZgxvS287p8xj23B2muOnSUW31Q3VuXGbjXQmcG9+D7

XM7fVESACTNPA2FOkfvH31HpzfakAngepRaP9uj0HA
tfljvhskGGBHkyTzpxkle6DfWR2ak4y8hURrU7jusA+KJPIltnMGg7an5yoFMQMn1K6xB1Ys8vDMj/pF

mq5dlwzoFCrmISpYsucKFol92zQd4jS/61MUmTS6VB7pS3MLG2fqhyjnjgaTuTDsYs6MnnBhwUOKyjIf

/jLc9TeST7UeKPPQHUt0Dtg6miOvXFVBtbXKyvZwL/
QQvrid7JmnKjV5uaZD6sj9mkUOibQ+R7nSP2C34go7c64H+iE4ldeQptJhcqlRhHWz7sw72hjsDR7vcc

n7WPte+0n/H75Jytw6hB0/UMfbA+WZ+hu0IM0zjLHspnyxgxlWawAFGFug/4RByO4JpkGvS7nNVCF6nt

WU5emrimnehtWaqR6tv9g3zcKMK5os97pcarD9V/T6
YrzWEQY9KgpXE6y1gw6roBrR9jwpcPD/O16LKY1xS7rcdgsRCYuhKlqs61dtUEvHFDp73wkoXwtnxfXI

9XpPUucXkoPkQvQnXHtyuV36BrjO/Uy8xHjtH/TO/p0aKV+Eo+ro1GL/ifBnCafbWlKHslb5ysKR/IPK

ocq69X5NyjlNqfuAKhH/yIc6aNX0ZWbvnn9PHoJx7V
K9LrM8H/SkY99MD25EY13vC9UGArp0Nd7xgJT/xsPE2JRqsnTLbN4U91vTONU0vEtjEWhm18RB0C6+mQ

Rh4uvU4T+nAobH1AejVTD1xnOo6xUZOGnKwaUjEib6hSHSGgMA7/Ee13r0wry7OxEA7kTGprX0x2GvfC

FveLrHWp1inXykrXlllS04kdSN83cWpC+EIdaZK0iN
wna+lgn2tP2vR3ieNiKN57FcQ57cE4rMK8dOV+DIeucQa96xk4I09Mm+tuoLnUBiPnAzHcyJeHjHyri6

aX36rPzbsS1kAdd1Od+wLfp+EZYDxHVfrbVEgDrRXWYfTuDtCwa+plQzAU7CB/EdnTcX6Av11inHbhjd

i63i7yiUP26DXUVO2jAfKvjm6oCLnGLwEvLHVg7E8q
hRuviXFZk8G2Z/YaL2bopWqkR/5kTU0XX9Z/QARaWX2DQdTJkwEY5IVm9IQO4tS56NazywEau65jPh9P

d6aw5XpviT66Zu17M9T9Bvtxmq5weFqWGxibACTwl+aDREimow265lQieO3KMoS3UhuQnqIkeIBBHssR

DigLFoj32b/ZTS3ZxmIX7tpBnvV6Zhg95vVGIvAPVv
JwXliNT8TyRmqQ9tjHFPLOY5QLPOLvnjfw9SQeA9COpIP9QgG829RhbNxkH2LgsQhxzYwN4A57joWqtU

Ji5nIrzXa67Aebj5T0v2ZW1xio2/oemAwJjaAZIBnA9f4PYqYEqHqNZBjgjsR+igXGNFokk7YCOUGx3E

IHwgfh5bHcCbeu3GF6eWfTTN5aflYbnmVOAvGfWGdf
4sgn/ya/ldZrG0yoXoy1eLSe+OJzFCWXfizF06/kK4BfKUW7djiA1RZiK6LORF8R8ip+H+C0tydHJvEy

BDKEYIoRMdM2X5kXc/wurcWumjSX1MSlMiOf09btVd0J55mDnlb1Kgdfaynm2n2wedRB1oLgKFdFf5gq

TKaiS666xaz88t9tKpgxL3QogqFmjfDg8eColoQfMy
eO0flO44+Gr1Z+svIa5Ru/pJo1z6SQxNGsqgL3yE+GBvmImMDP96mtqQOIgF2DfeNSfEcdOHz45N9dDu

Ux8qAHdkDJS34nm8Mk9zLf0ClliSYWzc/DAPLJA/IVRV+LVgo7dddD0R8eeWR2e5dq1aruIUVNZ2NPaf

oKWnZrzQ5vdNdncbK9q0cLsv37T2QCiVksYyGLAEai
oj5BvL1ObPhW+2Fb+pOXtp1pW2HYC3YRqyR6Ioyems/pfS+CC4+H/A+xe0C3giKk8gH72xof4rp9vSlq

2lo87qwItV/4X39Trp/xELLm3wo6rGda6MG65Od50IsY659u001Pi5Cf3A0SG11OCM7NEvcSnHZb2BhW

8aUfdJbt/uoLoSPuy7OSIGd9IvhIeu0powvs6ESlu9
aBewQvBsUVE5VcTfchLJQZsSuoGgSk5rxA6CWjVMAyUc3+gADNjF5JDjNwF7LtmlaTW9BeKFSg0PhVdy

BunD1KvWPz8iZiwGRMWDK0iIExtSIN0MoR2LZpuK3KWYUMkzqq0fTFnBN85pIBP39/716HkvP46kArZq

PJDwwB5JOIs9WV8jMIjnrzEk+IiZApG7TxgPSK2J1k
+U4tEXUnRwEJbFpxMIgYJFKPhPAYVGvYVDhyAbnUEXjXeDl8+ESqrVwXu11yX5VvAhedf7IIjCNsMMhu

ufyHJJbekzyj9o3BG0le/06KQ04rFaMQbjPs7m9D3PMUdOtKbX2Y6VQ4RgmkeaRr8n+ojY6eJ/pQukgD

7U0Vyt+CNxhLU7WAjMk5tcMpEUOK4DN27o0EIUvEoi
6IMSt7z4xWSpc/U5kN22f/Nxa7qqDZ9aeh03wCsd++bOmsEyll0jaDjC5PzlQgt++p/Tg1NhMafnAzGX

Kvl0JBLN+pSLRttX55tXY+PLbmnXlM4iHic5fkqS4InXr6+lqgRCkol1C39HBqcq7yGoms6OXeG5rfAn

KcIORGsUgNoxKrX0Pz7GGMBknQa7Mu5vOpaPRcl6w5
10fm6vfbqx3br6xnjpDueH1CrtT9HzrtAZhDBv9USC1pdr6xcEKr6w3SRzQIsGvJrjRBAMKFJScAy2XO

wWX+4JpAfBxBEFKlhPynn2BgWJQLzs40tvkw6QowO59rUyNk2NkAJcvbrreUF8BCqbYbfeQP9yMWqsE1

hLHpMEYsvMeHwBZDjEBqvAUqSsYlWOMetAW97mLG1F
n5rwwbPnhouRqGWqZNGmo5qU2JHsPEuw0VPOca4X+ieGtW2MR2aCbD26hLMO/LIjkw34WpaJIry3KxwU

eYG3sgC48LT3iA5xCgO9Ie95Rg19zxT5aylOV2+GnfUL5ZTPUclqTqLOQiU+mqctzGl6fmjw7grmVfUu

FD6zsgnbO/YGjrsIaiZBHhVhj3L8EfE4qBEjyCAYpo
ZZCsOD5wuqJPzBgCa7aElKpF45TbGiXa2pJRmr7W7RcO2+QIeDJ+0TattewN9HgeVSxqvSaMzUDF0jHh

NHTbNeUJocrL/E4snINg7P3m0jAMUE3lDtKywOVgFQt8SJKak+061YhWckd0MkC9cpxujDmbfmZhimxN

U4cmKtJfk8Hy9AUY9LxvbBP3AI3w+hyZjebMLuCyBv
o9L8Ggub2KEwGnvayoZYKTr1PBfKi77VYtS6RO2Rpq8EbGIUp7aKC8lNV1NnleXkqd8nxBBVx7CvlQTv

nZGIZaCZgDPOE1ovdpUyB1SxRLj6nfZRpGTbxOOmC4a7guAXOnbIivqaPcyl+KBgg4X4Vpbbu8UPKcAu

e4/jgiTD5XyFkr7TIrlelLQoNKdZYijQOmImg8ZYrk
0VcA3EWKTlBRd8L98AVnQbhEBaSxtaKH+dKZSGKHcBl4i7ugXz+CSvncVhFsI+ZRRYkWalMwTkIVjJuI

x091uAvw3KgyGryW1XMUIPkEdYc6KvousQ1cyxhE1+W9Lthl5pxBYqinAQ7S7G+3nrAbo9uoqsJPNaVo

HJMQQtHEZw5fj8RxsMsbjPfHxtyQk0gX6uFAHpq0uy
XXvi8ItqXJcJKcFWBz0ct+p/qPjP2m/4hT/Wkg3LE/3mJ1O9dj9BOhlTNg7r2+cq2JUTYTxc/fwwpztn

yjqvYi3Bf3W3NmIsd3uENq60H21NL7uIs3zcte0jJnxzM7N/QGFndR8YO+lFpfvg80oyXT3034c/w+eX

UWdvaIrYzaWdX9MZ8MbGKTNTStPKMAShvC12JJ/0ti
+5kgeiKMpqlU8155xLt4ixp8RfvgNS+mS/PCoytUtccThdH6TfDIjpFhUFywcEBD4C+oq2A1Nr6OwLz8

LfiLgA+B/jP3BYjZVgTMUSeBM11acYV5nNG9OM3myO9ghZQSG3gR6kavKEhGlEZ4w/0FeMF09Ry9Q6P5

8HmPpXoYpUcSi2p5gs9Y/NEEpWNrgh6ncXfaGJsWIn
ZBYTeJYHMhMWGDjd28pblmypx1OrrjoXkY8wlAOZTAk9JzlKIpIDeKChwOg8oRzErrkFtAOuRuc4W2hR

gJJxpcIbXIVu95+NbMyWV6fZBHWZRMAgSZ9uUs7U6SWTx/BSk3vXebgY+5/a2sryMI2gKoROgZlqelBM

V3bL0Zg1Yqm1PfLYrNujOq4MxsRMqoE4WwiCgE5Sza
28GKYQOveL3+ZZ2Bu7gx/BVfBtzqpA43vEUYThPz/TSn6i5r9kg29rpC5tdLnIpWnzZh53clKzyTglln

ucj/SeSc5wsH3QopEkzvt7Z63H1TfCjBmEFlYOapjiqI/mhGvuVV1l+9c2m4kcLciIjtvOJDo/Izuc65

9hY1du5cILunVUyMd1XYz4Ag5NLeGvcvOa4ov0qKL4
SaKfV5GRSO1pvJE0mdYPGwU1viGHNY4jUW6UZwBbUC9TsEDF9AMeVqVoMI1Bgxd3i/wY9XJ7UCIMsnr/

S0jJGidX+Ln817kNnK07wi6XAC8+Idmy0BqjA9tmMX3r5iUkH/Z6l+/zd+T+1H7GVqY/+SBrMMagyib7

wCeb2n5jNlOssLJkWsuGToQ0i+f3W2aM3OkRCg0kR+
Ye/vOG4TBQ5j5PV4LP8511UmlgFieEALyIl2MK2E6jbOYT0tzeu/QblaR+xfcQKN4pd5PiwzjxEfjN5e

m2Ocp6iankqrq1XkkMbwiSv8eXbqWB3ciU+VgoKGdiX5Y44vQKXKd+KC6A3ERbZgsvDWDxYdzl46zeD8

xbCQSuJc5NVe3EVhDslwoZW6LnmJpinguSuqZLp8I8
Qc5usW2ethGZm6pmGz0WIynjMxG5qffQe44SxSCmPZmQ7jq2mKnulWyPvWJMjdk0MJF22L0OaqIAJe27

KKBD1wirSD+qJAmY0xY5dR7x+RlHW+49n3jXpQ40VGCw73Z1qSwoGAPxhQpmEoEuM/ZOwBU6q+Winsome+f+

0hC7Ykk/tnRD+dGdE/G7w3h2yb4shup4qxtrai8nev
yaPtnjzI3FhdRIu/jqcpq5Bh4D4QIl+odp71phkbVLX80Fu10LTh9qlJLqLdt3vdoC7aiSKMyBQ8X4Ii

ZQ53SWO/kpDixb398oEoY+1pcgYTq8BUbs5uq5eYdkea8lWk0NvzD3T9oN37IyGluHZDdWvkOLr2mQqm

9FLU+UrqmgnU7SNzCRVkaUX0aqjRHGZfGC0ShdhFls
zPkhRF3ehmSndrI0ii9pfYMMJg66AueXTCluzsaYbHU5YZXlHgw5yOuV87EUgqxgxcbQewlCnaXUfn4B

fAE0/P0Fop7tSCw7b+cEgxpqYSCCrmdmn2dnB5CV+IYCkubQt92WzyL6Ykyof3Xr2T4WyT07anL9yFQj

kONGPs8CJCEH1lCeNAifBU7CSo0wFaTKwKyDwy++hi
rP0v0R2NoJVL6z+I5wwaP82oVxQ6gSCpeaXVasOEMuet8m2Xu8XnNf5/8oTX6h5e8dj3iCvKwRZnhll1

Q6SNjL0UrcrM6t8CeLnzlyqb2X7Pdv+djldkW3u9wp1PonjwTDfbMEZwfzFgU8LHODxmcEcxk3rVY3vO

1X0Ikv7TEXenZiJAFrcIco75RJmxbMLTZ9M2lf8tMN
FACZ1pD9qtp4LUfRuzkv9wwrwIN/0PGKs/8B2rCX5O96R0qw4aOT/enDpoIbz/BkxMGT5VTcJ0mSH+1T

RR74/0y+uCtSDVpSLmk44PeU9rfX72wy1yK+2Q/pdf14E2YjM90JsaE+D+AfMg+KQI26qpPW9BqIKtvf

ZzjRJavryEOa9Ek4Szxr1aZ8Snr2JA3KbiclibjFEE
efT7qT/c2J4axuZ8M+WE3eugy+5NlwyZhiHnpC6s0KgKEVk49o1AxZTUsPm2I3iZh9kO/ni36I6Bye5v

IHOa7m3LowXXOVbFUNbuCtz1MDOqpvtMBn8mNCEkZMGwdCj9h9PgD6sjhY2PQT8B69Xc35V1XffnI90p

BAA2bTw3srKRWxsPp8B1VA+RnBF2UYKIoGc+IZl+Dv
h8X1mLAMn6mg9P5uNN1A6j6qcfbH7/smz/KtC+zYBuqn7/QeT0D7Mh1k4vumJr1m5E7afcEnNva3W/uV

ufbjsh/B4VHtF+6KfEuC2YVeiNeWiW/RRePW6M7q/q+8PdNIChDwQ/KBd93tDFWTTAmD25gq6/vAFMpE

7wIrQMcaC45P0cNGHmsDjNKJaCITpX0y4I7OGW92j5
DPyru5cnY42Wvm1zg+5LwTycyh7U09Hdh1z00dA7K6E7JR4w+fBbINwP7ua8EVRAQ3FHqNN0hCrarma8

bHHUUzgj9hb/XbM29hSzRv/0fz7YXUssWIsN26ZTXWb87vBjz9trHrLy3OIhEhUPtCwLti9DSPueuU4Q

FwM7X3lfuD9SgwyQ9bpMflcLMbEyUr/YlYkI2+6k9T
v2SsniM26V9KP5fOB1/clNwEy4/w5gPj/7F3MjHWoJ/gslimO6FOm+4ZA8vmZ3xb3t/TcBlJMbXOqY2d

loDsQh/AzB9iMszAR/4/Rv438tZzHiWWMtFh9tVu4zfd9hb5hwbi6UvQZY1/7P3pR6whdw0rNWMZ/D7g

uFr31+au8eFpClDSi3PwnOlCuGjHyjYdp4l2RgBafZ
r0iGOcpQWZcQkfCon3m3x/0K+eAiSzRUZOR3jpfrDdNVxN8E90S8EAUKtjrp5Uyp76kYuDpy7L+/xdro

IIv80YB21rP4XYIKGDk32wZ400SrtT056GdbjPwj2zW6zPXm8c/a/3makW5cLu2zIYzX1rk23ewGQOos

SOst9yVl/S/L5xaOl9Ki4Y/V429dFgTEZNrSO5Y6Ba
Mx5bSgrUb9Yds8s5BntCa1cf+GAfS3H5DAfXJgy+6MQFevY1B9+7XgtYX+vQhr66JhrwgVkwfQgocSFx

x6ODpxUb4J/FV2ybUZZih9E/6P7d3ml/sUZcG/ZEO4wO5YqfhF4LOwgce7kDOu/wb+LJ1M7j16HZLRg2

2jp7bfE/oRmcw82J6vO2FY/WG3nWiT1aE2hoOY6yPR
UAaEvmhN07p5IGPyIlUZn8GEdSLqp5PwQ32tvruJiU1rg8g/LgVwOWz1brigIJ5itGg28ssg6wpk4WWj

+Ft8h8f8Yo2d1idZVInkgIiLHXI81Uzn80Gdj1pXMb/gm6f8bk0lvhj19jFCiM+pcQ5IjoHKhkMjejxl

p6E2oHzF8+dbs6I4bte/dwryqysFxg0B3F8nS6PeD2
BbUGZam6HO6v7V7vx5e1bT++7aSNrlnG+TwdoSonxHfX77sNT0H1gkjnLzR7NBKN28hwcWjHg6xtTsud

n7U86w42/3IwDn8pZte134QVL9w8j316nOt+um8+FaNp4r6FLX+3xG6VvQ26qKEli0ktoe1Dud2zeNna

p3N1jyVz/nR+8UH0Ch9nNd+0j02vzY67KK0WLDR9bP
velrjL9Xrq8Dje6GtYbqyY42lTu2yAkCohKjw5p+ZLZTQE1qgxcTiPb4o1GJz5/qmcLB2DnDiB9J9G4J

jA6Xk3XAUMuC+QrbvH5AbIE+qN+biqLteGOg5iHE0tj50ZhjQRVShofDr6kVkakM7lUpbbpWWDSe8pLh

DiXYbSX4hxhtKiAWdm0LP/FYUG7xcK0+U6xGF2g19v
09ESHdD2fYray64x9XsJUUckmamr5S4+k7Op4K0Xi3cC9Zh6quOPK7wexmsLtl7YmceDqxK/KN67Gn2H

dsVXBtbxXrmbI376JjXRNtFC11BZiANzolYj1R+Easd6OMQ16n50NtqGf7P69iGjdILoRet2kAOQueKI

Xgx2DEn34szbFvJ73FiWCLKY+ZMGeCl0ESxG/KzqYy
XVrg8iZVqWILYvtIMCldu1NMXKwzPgRk2OyeNVGjaYUVOoEU6lUddMgAKzNBrjHF1JpvyGH+hsFMtt9x

5EvjBY7/DPy12k0IBmLIi5DnIOJJO/gVcpyvnttFAuHtfkaOkZFA+WsEZfGimF0wnbqf9ooJ/+TM95+D

VdgG44y2Fy9tX7Bm/84pee4FyU420mLIL7lAMvFdEN
7WRvN1G4O4qNOxRYq2clyRo+StFpW1VXxbiP8Ep453px4ngSheffmm/pIIyX9J+EoKf7ATO1Bj1j9LKn

DWOu+8aHn3GXdtd86x/roU+a9AfzYoFRqxVrk0xNYzGiiWKVpk7GSI63y+0PzJ4nRTprQ9V1m1hkEO9K

O+TegjlSzyxWfBQojiV4IatUPQjNWScAF94ZFroCBx
Kx5Zs9CPByIBLnXmvurOvebBWT3aVlblYlX9IwnH5XvNdPUjpEMhP6ssP3p52erZcXKob9QJBljK6ep1

8wnBrnLWUX1RheNfEneZURquhgo6NFB3GaFgvEvZ0bkjeKrKDGRzCC1V+O/QwO2pJWGvpGd7g3a4gAVx

SnvxeR170rSbD/cQjswuY9T0XWkJRKh89Fxjgyv7Z8
wT+fW/ZTiNLxl2n4Sp2Nmuo+x9h6dUzYS3V/b+iup/rC2WaCAcU+XRMoPocz7YsJylEYL8ECUdyBQfNt

+DhnA6Ojpcbd+uKz8f9C0DhMhvgrxs5+P0NSiqHxfimtoS8145/BvpWZA2rbQ0m6V6q9Ttf9U6V/k8zt

2QgQ63gN2n5BU6so0b1BtHAjrJMw+eiNB2SpWVoeDp
wNdfoudgjD6bmO2IvNqV+zr1yTf5Hx1i7QY7Nn1NaM8s5kDziDUTYukkg62x6JDc4OYDn9A+RtPNJVRo

sYL9CSmNt70Z1mqyGLvRYY9b577zVma0ZoAkLtLydUDQzDoxwrq8dHJdgGUnh0tmrSHDyGbXnwtPoULg

EcKLnLSIt+ul4imSl+bjSR61f2OWfqOsA/3X0Y5mlX
/oIztsMdHZb4Tkp3ZTbsSwn+65/+Q3SH/jQvgRXu4MdM2b/ni7L2o137vt5qMI6B4Y8crT285YnMl79z

/Veah0QRiyG0/GfL7sxVxzzT/lfPdZfsaOtceZS8+89+SWAl6zCs0oqcYktv/mNLUsx9/0l+7dOXtuSS

49x/0De0/uNDXPWj+WU8WitCi2VTlmz2G7J4/N+RnU
3+U3TlpNcEwK6PdO59ZKoYGmhaCYYDv+fGDujOSG8naK9o1UoUrUYvr+Q6671QJRHBnNjT9PPWl/KJz7

fl2u+NDIKpaydLw47KO2/88AdUAezKTeOEVNngt/ChVtXV5jZs8vLOsMg45pcsJlSp/0J7j7rv3OmutK

vv9uO/677fKfeu+ufi0tgt57m/OdSrJwptC4TZ9uZ6
7u0WEHEWbp7+eAzcB+B/cwMFaS+F6SJi78iHv6k4dy8nl+wVEfQ5syk+9rfXTaB7+KVB295p6KNxkI/X

hi7a8D467jT4Htf3x7VlN9LKJf5B00bJ+4jOU3vWQR8jc6ZiJgl/Nu+vM0/Xfglna060RCMvqLYqfhJq

MoX75q/ub9CjdhiYLMmLMqYKPt6crof3S1nk9mtCYj
YOzkXqPzopVlBVc8ITBGAQ1ybdp7IwbnM7gsI9aYd6sP1b4sW/DrXyuI9T2ArqN0lZ7P6lNiURB3GwMa

F/m8WVlFb4hD9E3CyyE8beD541W35+EDUBtNUC+dyD2a35se9/J1KxQhzfM4fmyoLGV/pNK76/sOan9p

FvT4e+GUr90Ob+70IA++12K1vIHTtQNX/WIMeMdYf9
YNfa3v3L/sMnz7kmtZK2uDWo6o4sdt52Cm/MebLslRiPFVWiZWhI01U+En0U9+BD+g6fC/LuxnhR4r9Z

iWB4oxaUibg0ah3VRlpB/aP9vF9BGyCOjqeeuJqjCt+WPPAcvfVt5zoQgDV4i6x+31O+8rML+Ri0hbVg

Q6zbK16FuCig9NkZDyNiauFL6p/pbLuhA2cOT0TQrs
bEYUkV32ZXH2xWBb81nB4rPdu6NQRi/FOEPn4oZoxq9eAmtVHhpspGb9QmPiuzje79tPuUC0hVQ+T6zu

Eh+5Vdd13c0OCxgAtjLZtu9H99+4VG4m+/dPRWo+mz2dmtRLeowiEBtz1nLn0AnFNy4JFK9Qget6hkxa

78ZaPVZ1zXlamnd5OVzKhCU7jtuNkEn7HkesY+r3Xp
ypnSrfF01ymCuqh/CQVbtYhuEnxlaObJq3J3oxSvr6c+8FeM/tfsXpn8JQ/8bcKdPzZYp128IC7o6v5Q

1LWc8tlgSVpJdndbywPqyzkyozOg8TS8CsRY/gji5yt0JD/gcAo0CeO1RhTPill2Y072FwmYD109PGRn

TaOXuivE/mTxxexm8S2xenayLc8zKP6plvoXCD8scJ
tZ/Uf6Mzs1dA7nlGZz2jPso/Yc7+rfrdRP0+7QVUxLqWdaqaM/uuV3Xv4CmwsQfzFdajr5P0cIziPQiC

0Q1ySmULJzX0QBpGq9GSpQzJq7sMjpUw3UU8qiysufIZdjLqknF/8diomQKx0a/4pm2T2XtsndKQ4XNj

1qB3Cv3nkK13dUmfvhM0bVVEhK3Ae/o9ir1+iuExyL
+JljxsfjlHzUT5WFw0PQu0nCK2ir4ek95aSYj5D2Jfo4IZ2/xMc9mLzqz68T8P99DkwFuu0UUl7+63q3

ZCG9ln+ePlBsPvOjz59aiz8dqzokPPPvps3lbat9Mng0Qe2r9GDZWPplHfKxyiJ61dlkDcr47Km/Zz13

WdLlOzw09LXkUwur9Aam2eket+U3v75aEJolOupj4H
w9wVGTTLbUNwdSRACU2BgbN/RL2YbTOcU506r354jEAWos27bG6I8S4r9er6bXQC2fETiU339FQeio2q

fXEG/78PjW192QNM21gCa6U2Kv4yEez2McXBdpMOv1xt528MBbluc0AkB/n0Bqyh7w7Wphy5qie9MxXq

x929PkEQD0H5hQ82NVxD5RfXr6dj0bwlT0RH6CAGFC
5IU41/0mNRw+mv3Am2Gx3GFuqnI/SrKZ/ChsZwrldntRsahXIsd8jikNgeXYfk1YPyNijPDN4dHdp8nB

a7WruV0USvc7X/ST5PK8EceVCvJL9pc7e0uPo/L2f9eA162XbLmr31FPn+S1Lblul8pDNrdUud1e6Ucc

LP5N+fjODHn3u05lZP3LdVrQfP4mZvxDzc3zoZPegn
C+qplKA40DuoCXySXLp2uaHfFIGmpiPAFUJoUpvtbzJ5fd7MEHB+ItL7pDeHyALo4Uw3o78RdpXW69km

8AV40PYjAyTkjx4mXcN2QmV1f6T57oHe58UbbZJyJ3NSAn/UqkdE9Go8Ik1LntwgC3KwlBeFuK/0yeFK

3h9zrThpmPPHkWWrjHqpYlF79yG1jaenlsiw74M41a
XwmaSPhml8C9NUGdei+eFpQtrsbE5DtgbK9T2kx5TBU/BMpYAqm/ApEkpy2d2GYmQl6nsK+zBXfYowRk

s5NvbaeKm51Qg+JFuzEVjMndDJGY6aKnxwwNyubH2I1PCqY5dKhidf5OZsEm5K5Ncxc7qon6sUCqre/N

fsqBNczKriQ4kEbiRhUy1fbxX/UNlfLG69RIASDypE
DNNbWk+G+1l/FFQoMWCQtcfEWC6ll1y/NQTMY4+7+touYTFmHGkVVok7HnyX01Uu+nc9o8ka1z/oQYbZ

NEfOSEnbbDSQBOl1poxeZliKPTmiNjNBXOEnA+qqsMY6iv/QofEqym/rgGgLt6cGSxMr9zsonhW0edk/

EFp11eTcswWSKeiRu5Ru5gDIw1AZy++uy/j5tSscHb
/EWCFQEQQy8V5xtoCHy9ez+irrM/1b3YciPugRdwaF5jB9JqMsbR8kA05v7AsSdEeVEwpFeytP7/3/0v

xjy6AXnmjbp2r2Pa/IFF491N1Gprm0Ka/6C1Uw6iQ8wRyyzTUWtDYUz3MVeuEGCc6yr3zjeKwRNzZ5o3

SopphnDzpnHFjnG7+F/HDA8FE6S26yQie0Bb0GUhS6
i8NioYVlIFU29W0BJahTBo++CyilIy5NnQH4ZlzaY/EZPdutzm+b+TfMM8z+bBlQkHiA2qEnuzHxhlN/

ik1muuwPAyVL5mKUq7CkEZ2EDeclsJgMM+GAbi77h11X5Gt6khYKuggjXUqt15UscJqi06eCwYJM9Xma

t7e+Z3Cd/wt4JdfM1L7FK4j1B8atVxpIx/ISasI56g
jzF18c8DZpvEYQ4hncS53q+2Ug7j1ySjmF9SQ4zmx2PQHT3ucfP7IB6X5I6HUbRECn20EUJFG4721iwC

34b4/5i1OZxm+T+/svFSr46qBSZZ5Ue5M7qB9vHa1GvCgS53zw0zWELFE81NHoOgm6AQqXPesycEIRuL

eJhzq9MdlsTE7iL9x43V3+hCpYj4eQZvX/85Gb2Mvr
PwXJb/1k/MDbaTrtGSYywvYhu50q387vjEXirFNoP8n9c/cpIj/B/EUK25dCYBgEBIUX/359+/Tu1fOC

Ddnen4E70wL5o3ILPg1L73fP5iq1oN7eodKxDLr3govfzsnxg8cQqr7vW8yU4pfBV6zugM49bujTern1

FVWwT0AU7bA+kSKISDuQZA17YBS/Jk/IX6rY/GdyBs
B1HTFvxMEO7lsPmZ9/9e/O5Y6X4EtJ8Ng5zRTrdZN7G+ECWvsuv7A5xZKicnwK4L0PLLP/ThC4rn4ySy

86xwpkoehbBhIBBVYb7WeBpxAoFi7hSjoOoA9yWmIOoJ9ClPCO1FZc1xxWA/O1tXMpaRGvAiepuLoAiG

B8uXwxdtHpltCrq2XVV6p8ZdklbXFTFrO/aCRDsVCO
VfEr9qH8gzj/TS3cge6f2NiQ5BfvOMfvHRAhLqDgcjAkkObqpUtwagFpk5Zhys+acNqLzONyipeGxyZr

VXkJM/couqxx9h+mO6JmSpiFk3RA3KM8Vz6/dIwtQh2LEMnm/1FNdyXxfOLqYYN1+V56acx8c/9TS1rp

AF1gPvQdFJ+dJQizYohL4mnzRdmXkrA6vGQUxUeaCm
kwMY3IFM6ZPDtEaG2LGRDy62hK7GpzCoUgm9JiT4xjn9jOVH8ZGcZy0+ehhVshf0mxjB3BLwErTPDdOd

Ww7bwocTU3V95+VKgCNBD8zh8mQnVAfRI5vHaDEH8jMDpzEgz8DJHtEcSo+pbQAwJf/fXMkClOiJkW+y

2xk/aWmWiLnUdBBCBDHiQmIkCSUNhGluXU46LK27eh
QZSlN5LXsgll9nfjEFV3N1E9wprckEtYNvylqKYMX3gxs56lP39tEWPIHRnWxkohEau+gAZz0Ru46HWK

5IBt2ohZ4AIucy3fFJtX5pcniFLg8d1MU0VsScwTLWSbTahipcyain1Wg7mLxl8zJ+u9Lt8yluAx5ziz

mhEs9J1q92OFX3a8bFnql9NsgrlW57RlPEFRtb+KLR
4pA8avR5J/KgGh8qXqFfF1RxcCIA+MM/26lsGuKB1dCf5C7+myYNzEqqroM+FH2thWrzvCRG3MLcz6x4

QZyLAIau1ev86jWvspXBsgn6SV5S97jAEzoNxlkQak1a4SiBE+RB8UNI8Zt32WJaBf7zp4ZZJZAPGiaQ

/HR5lm5cM4FlwL5bcpigf/OAjfyjc4unJKrY5xKQiL
VAfFssKJxTtioeyXjSuukULmlGaXVLdDXPEOP/A2HhYnveAs2yFBXeCiozE1VX/nifTRdBdZNEAoO21O

sNJ4ctvhflURPuk5H8cTVFQVXdb9X9Hai81nwm1EvML1jId9gFmyqS0a/wPyIDrMfobyXBB3y6JrpSAb

JFK4deZTqNv5yezidJbzCk7ilDKCCC7XTAtgWa4a6X
OkUsYQUSid6CKCWKLTOcjj/EgSRNhT3rO5p6NR5eDlYLV/ghfpET9jTJX9TgDwfqxaUGym1to6vP6glm

EJmgq9jWVRy5TM2LDvLr7zBaW+6ARJnUbXrIYSGdUrhoLwYNzhVMlswWOi25BVpAKySh6KeA8Hp/hf/C

/Og9r/h/DF/yZ4vQsLCVagvkBkbHKnQD5FPiSxJB8x
uv3WUpCtYCQcFdwNEfGoWAozMueuuTGoKK5JfCH2CHLuS38uNOSzSTSkY4XyMLH1VJ5+COrkCFW6peLe

wU7TEWX0fr5NgZSOz++J3hEPnCWiDNxnCMPMeBy/lH9Cdgr1zE9Pelywa7+aXbZNV+cS8puZ/3wBnIzt

q9fKaA/xk2db4DYtmFTc6FeXvcYYm9elhzVoZEPUnp
vjV/bCLWjWrafBY1+R6n78WDW0s/HQ6uS0k2s6SwfB0tpY9DVHndzeptHsbPhah2XCLzEOFus6gIzxIf

XVeDHzhzY8OI6Ci9391S3UXBwdkRFhdVUeLjeP3j8CgYL06k+Eg6Au52xYmIsc+ZU56CqjugQp1hQLpC

+fDiu6esmJ8x/knKMZ5Nt1UKi/HlwbvBzXWZfobD6/
ZT+BvT2VRT0zu7CyIXZjLGdyglVeJwxIDlIfGWEno2VoITj9HL6EKUYsMMgwHL5Ll271AHQgC3UujHLw

sm3JQKHiAt3jyO4qbNVtGBTUWHBPA7TdiYWwNFqgIW9Cw7KiegBaEHF2S6EbgKlemCfboVI0MWXeOMMu

M0CbzDXnQnEcHHijGJ4KvZHfcvBpQb7EPEOsMs7znT
LHd7gnCmRhXGYxUfIdQKDvBDngUX5KPzOkZ6i8SMqyW3MkO4glFXDeS3RwpKTlMG6XBOFpOh6exPTavS

EyZDGiDLTyNo0HUo9ESaWxST0ppd0CWvRdDKAaNneHGic7YJjdXZ7NzZEpF7OotrDdY9MfcZvaNI8VHS

WKd080MAytHYPqAySxEORuigAwVZJPAZDDS2NdfHYe
J5UYGSMnP3cWHQKwQ1nfEQ70mDP9JVcnFS0RVJBCqNB3YS1MrdTjBNu2K3IzJ5zlyGQ3BYdZEA3qMLva

Y4YzPREfhumbCMimEH27wMH0JBKrT7IzaAmamQDdwDBfSg8pOYtsQX0Hw698ZVMrJ6NlpOJmkhOxVmMq

NKIXAmAyL1RKDBAmZTWnT3mcBUd5THAaLUXbFAKtNk
ToLJTlEmjqYsZqZRIsIZ3CWw9+FHfcifZkZjxPMhP8MPUbg2XsAYm2GB6FSQFjVNobYU6Jb664J1C8Ro

J7bYDsHObgSDVlArWdVVDuhbTqGXDAYGPPP0BcrGWoG0LiD19hyY3xO1seSB40qYI7AEhLUyOqU1Ynh3

LoxfKlhoDXg332vtOfIlQnRYQGWS1DEUWxVR3Jkbrw
u5BpGKOsEPUnJf7WQc5KQlXkYG1ept9LRgKtZMSiTuvSJpigOVyvtfMyHlzLOyTrIVBqWvwUZed2ITnc

TS1Giq7jG0W2CRmkXCHPX5KbfHHyPN2cM5FSA0pmFPxjMs5XIqPbE9GwolOzFAqdF4EgUAM1LCHpZu1R

FYQdZX3QUTQhTCLlPWMDEnWpZJFcPhJlAqVtWQRYEb
7AWvHmA8iRFxfzT2SsCJzpIe5EOsBeF6L3ePeQvSH1IFL1OC6DArOHQrEqHDn7N5N8oWPmZ9D7aGwSgD

U2PD4OYI8TKEXoJV2+IrNtI5SOZDfMMHQ3VD6XsZNzWB1IhKATZ1PznDWdVe0mCBFxvRuadAy+PiAvU1

XCKIQJFXP5BE3JeUCpBI2UfHJFK8ZqzGIrKt7qURoy
WpVeHL9cJE2+TX3KRDFTJqWRGiBtRPatXEgaBTOdOLs6R1V6AHTxM7DEB1A4P0a2q0irua7+PR7QJUWj

K5TYWMYWQxGiXOulCDqoURDdTZh9D9U1VAJxU5MZT1ciY7m0NL4+PiANCiAgID4+DQo+Rl7CVV0ku5Pg

QLbgFlIxES6piz4XAFxlTWAiK5UdBMSbWZneN5QqvD
kgWJ4RUDsdWGepUB7GIONgFCG2VV0+RRdvwAAdHQ9YPfd/yLYeA7laeQZdSMzzso3e72e/RlIoJM7fWb

ATLI9nS8XfdBq0hkPUfl0AK2xfCnfjOj1+ZYlkGQh7TvqaqB7lgAKkdLw5zZP2vn8lLe5nTIlaLFmwwG

9sawJ2dN8aYLPmEfM5smR6yHW8KR3wGt9NFPOaDBim
BDU7LlTJYSolgJ9lYdRaAw8miZL6bYrxE1n2nv49Pf0nakzjYGt1DP5kYo6aOk8nESEsg7egvFD9OJ6r

Iyc+VOfcPHRoKS1vMCF0VuQBIl5DSXC5A6i4bC1peAU9QT5XNxVvMLNmVYXxJTHcFKFhCHYzVAXnYPXw

ICAgICAgICAgICAgICAgICAgICAgICAgICAgICAgIC
AgICAgICAgICAgICAgICAgICAgICAgICAgICAgICAgICAgICAgICAgICAgICANCiAgICAgICAgICAgIC

AgICAgICAgICAgICAgICAgICAgICAgICAgICAgICAgICAgICAgICAgICAgICAgICAgICAgICAgICAgIC

AgICAgICAgICAgICAgICAgICAgICAgICAgICANCiAg
ICAgICAgICAgICAgICAgICAgICAgICAgICAgICAgICAgICAgICAgICAgICAgICAgICAgICAgICAgICAg

ICAgICAgICAgICAgICAgICAgICAgICAgICAgICAgICAgICAgICANCiAgICAgICAgICAgICAgICAgICAg

ICAgICAgICAgICAgICAgICAgICAgICAgICAgICAgIC
AgICAgICAgICAgICAgICAgICAgICAgICAgICAgICAgICAgICAgICAgICAgICAgICANCiAgICAgICAgIC

AgICAgICAgICAgICAgICAgICAgICAgICAgICAgICAgICAgICAgICAgICAgICAgICAgICAgICAgICAgIC

AgICAgICAgICAgICAgICAgICAgICAgICAgICAgICAN
CiAgICAgICAgICAgICAgICAgICAgICAgICAgICAgICAgICAgICAgICAgICAgICAgICAgICAgICAgICAg

ICAgICAgICAgICAgICAgICAgICAgICAgICAgICAgICAgICAgICAgICANCiAgICAgICAgICAgICAgICAg

ICAgICAgICAgICAgICAgICAgICAgICAgICAgICAgIC
AgICAgICAgICAgICAgICAgICAgICAgICAgICAgICAgICAgICAgICAgICAgICAgICAgICANCiAgICAgIC

AgICAgICAgICAgICAgICAgICAgICAgICAgICAgICAgICAgICAgICAgICAgICAgICAgICAgICAgICAgIC

AgICAgICAgICAgICAgICAgICAgICAgICAgICAgICAg
ICANCiAgICAgICAgICAgICAgICAgICAgICAgICAgICAgICAgICAgICAgICAgICAgICAgICAgICAgICAg

ICAgICAgICAgICAgICAgICAgICAgICAgICAgICAgICAgICAgICAgICAgICANCiAgICAgICAgICAgICAg

ICAgICAgICAgICAgICAgICAgICAgICAgICAgICAgIC
AgICAgICAgICAgICAgICAgICAgICAgICAgICAgICAgICAgICAgICAgICAgICAgICAgICAgICANCjw/eH

RcM5urgNHmevU9C2pnMy0CRt1HAR5tl9JqLEQrEEsfzuBkIlmJAkMqCBByHzzMFbf6MCezLK6EnIVvB4

RaY9AnXVriYT5NYOXmWIXocTQtTBQbFHVcOkC3GFWi
JSytJC1MfXNwFMlgXNCrRYZkHlUqYGYhZHPsZDBeYMOqHWAUHWHvHTNtBjUfJAtkUA8Xr0AemXT2HLt+

Mb5SWW2sw4SwGAlnOfKkFZ7urk3SPSuGRlErV5GveuQ6LTO0DYIgDb4AOSSfUQOzhVMrMJYeOJOBWwAv

U4YpuN31EQTVBx7+HRxpanQpTosBPlH8XDHsz8DfCP
a8ER1OQPKcEMc7qFGoKUAgT5Acs8ScHl31IQRkBsnxFZW5aAQaE1QdpWeiQKVRPVA2WUCuFUmeKtDpWV

VqXEfrKSJLTZdZLtOjC4Isw4JnSoU3RINiPgSeZWicXXDoKdP2VU88eZqxAG0GQVBoZXDhWS72YVW6EK

LiYb9TCp3EOkBuGH1zlf8YFhmwJOYwKeuANao3ADoq
CQ1VuYMxJ5CpgXFsa1rWLuVeL6RXBAI9HIRkCi3JZKFyAoVrBOOvWCtiSE3mIEEsYPJZjKmvgtQ0RT4G

AV7hicNmBE1CYwFwAw6sCa5KTiBfG3PyO1PdFTJxQXFZCAimRU6OCZtoZZ6zER5Uw0QWxVXvtP6qnb9R

MPQmWNEtYkxgld3LLrknH5M5bQjcPUHvJeclEPSBUO
raDV4MVYEcRXJ8XHFwVuEeYTEWLrYeW85xGM5SN4Vwk45qVhK1LSLtFgBnWFmoFX69aBroblOqyBDdxS

euZT0KMb1+QErfqrYmVghPPqesKVWLNmYhSzBVSeNdELPaCMGsBANcCyC1AtNpHn6XNKRmPITjIAHdNt

EwDRLaRGPzUClwGVGbJBA8JnPeLALcGPDoBU6LXtFa
SFUcNvD5KEejGXJqOQAcjq0ZPCOrMYUpKNL3PrPbWIDyMKQhBAuhZERwKACpLcRhSENeLWGqEL6MOnLz

HRYoQYC1KPKwXGBePMUbah5FDKIyXVQtGcVvQZMrEIGoMJAhVAlaKDUjKYA9SzKhGNJnSCXhIW4CHgDr

GRZvHYk3BNqcBENtOSOubo0YZCHyDDPuAXfaRIZvGY
SaVCHhALrgMJBqIHRqDai6ZTCpVEZbBU8EUgZxAVYdLSPoUIZtDGWyFBMgot8PTGWyFBRyDYB9LhXgXW

BaCHMkOVptUGLuECO0OvF6CHFbGKCtMM9XJiBiMBGfPFX3LhexBBCzAKAwlc6EAXKjXVAmDQbeNrWzIM

WwEECbIUicKPAbTOX3JVJ7RJGiJLNmIT9ZWlZsGUQj
NOU0AlskNVJrBVOnht5TELFnWOAwPaT0XELuZYZeCAQoCHxzOXRpUFY0GzO9ROXzQKJwRQ5FHgNiOPQv

Hlm0FHcyERVyHRFqpw1EBBFqTINmHLKnLcUpDVFxXFZcNIvnGVGeTIK3CtPyKUNiHZWlHI4XNvAfXJDr

Twl1IoSxXLTvLAHcnw2KZKOyPWJgYAtbNCZlYFDvYT
VxSVxgHUOwMWHrFAK7XSLyCFEvRR0ZQhJgCKIwPwO8MUJoJQUhRDBfaj7TVQQtDFXcNOE3LCQoYGDyTE

VuNJvyUGTaIVZxZSPuLOUcMHEbHV5VDpHkPQxuHFGSPht0RJplI2g0TXShKH9LP1Leu3QzPvfkTLQWYD

vhXZ8vtvWyUACrEb1AJ4kIMmd6HaI0AZRgVBI8ONWl
CzZwXIW9ZAW0MNh2MPW5HdLiLD8rHPe2TPDnWIRkHutqJmRoDrH8Onw3WIQ7MwVyZkDfCTMoSaZpFN8F

Xu7CCcD5DSK6dHAfLs7ONxUsNFLUOcDwAY1SRDk=









                    ID                  Date                Data Source

 

                    [id number]           2021 09:11:00 AM EDT [facility]









          Name      Value     Range     Interpretation Code Description Data Gregoria

rce(s) Supporting 

Document(s)

 

                                        SARS-CoV-2 (COVID-19) RNA [Presence] in 

Respiratory specimen by SARAH with probe 

detection  Not Detected                                  [facility]      

 

                                        This lab was ordered by [facility] and reported by 

[employer]. 









                    ID                  Date                Data Source

 

                    [id number]            2021 10:09:00 AM EDT [facility]









          Name      Value     Range     Interpretation Code Description Data Gregoria

rce(s) Supporting 

Document(s)

 

          [employer] Covid-19 Ag                                         [facility]  









                    ID                  Date                Data Source

 

                    [id number]             2021 09:00:00 AM EDT [facility]









          Name      Value     Range     Interpretation Code Description Data Gregoria

rce(s) Supporting 

Document(s)

 

                                        SARS-CoV-2 (COVID-19) Ag [Presence] in R

espiratory specimen by Rapid immunoassay

           [employer] Covid -19 Ag Negative                                  [facility]

H      

 

                                        This lab was ordered by [facility] 

and reported by [facility]. 









                    ID                  Date                Data Source

 

                    [id number]           2021 09:54:00 AM EDT [facility]

 

                                          Patient Name: ROMELIA CORADO      

 : 1980  Sex: F  Pt Unit #: 

[id number]  Location:[facility]  Provider:   Visit Date/Time:  21  Primary 
Insurance: [facility]  Secondary Insurance: Self Pay  Intake  
Vital Signs                                                    21         
                                         10:00     Current Weight               
                  173 lb     Weight Measurement Method                  Standing
 Scale     BP                                             100/60     Blood 
Pressure Location                      Rt brachial     Position                 
                      Sitting     Respiration                                   
   18     Pulse                                           104 H     Pulse Source
                               Pulse Oximeter     Temp                          
                 98.2 F     Temp Source                                     Oral
     Pulse Oximetry (%)                               98      Intake  Visit 
Reasons: Medication check  Nurse Note: Medication check. Had 2nd Covid shot on 
Friday and was vomiting, headache and feeling blah from it.   Is patient in 
pain?: No  Allergies    No Known Drug Allergies Allergy (Verified 21 
07:20)           Medications     - Last Reconciled 21 by Cecille Maguire NP
    alcohol swabs (Alcohol Wipes) 1 pad topical QID 90 days  blood-glucose meter
 (OneTouch Verio Flex Start) As directed  cholecalciferol (vitamin D3) 2,000 
units PO QDAY  dulaglutide mg subcut  ergocalciferol (vitamin D2) 1,250 mcg PO 
QWEEK  ertugliflozin (Steglatro) 5 mg PO QAM  lancets (Accu-Chek Softclix 
Lancets) As directed  OneTouch Verio test strips (blood sugar diagnostic) USE AS
 DIRECTED FOUR TIMES A DAY 30 days NS      Fall Risk  Medications:: No High Risk
 Medications  HIV Testing Offer - ages 13-64  HIV testing Offer: No    
Coronavirus Screening  Screening  Are you currently positive or on isolation for
 COVID ?: No  Do you have any NEW signs of one or more of the following?: no 
symptoms  Do you have NEW signs of at least two of the following?: no symptoms  
  HPI  Additional HPI  HPI  Details: Romelia presents to the clinic for 
medication check.  Also feeling "lousy" from her second COVID vaccine given on 
Friday.     [facility]  Medical History (Updated 21 @ 10:11 by Cecille Maguire NP)    Anxiety  Asthma  Chlamydia  Depression  Diabetes mellitus  Herpes 
genitalis      Surgical History (Reviewed 21 @ 12:07 by Che Duron)   
 Status post tonsillectomy                           Social History (Updated 
21 @ 09:55 by Priyanka Kuo)  Does the Patient have a Healthcare Proxy:  
No   Does Patient have a DNR?:  No   Does Patient have a Living Will?:  No   Hx 
Recent Travel (where):  No   Smoking Status:  Never smoker         Review of 
Systems  Const  All systems reviewed   are unremarkable except as noted in HPI 
and below  Reports as per HPI and Reports headache(s)  ENT  Reports headache(s) 
 GI  Reports nausea and Reports vomiting  Neuro  Reports headache(s)    Exam  
Const  General: cooperative and healthy appearing  Nutritional Appearance: 
average body habitus and well nourished  Orientation: alert, awake and oriented 
x3  Resp  Effort   Inspection: normal respiratory effort  Auscultation: clear to
 auscultation bilaterally  Cardio  Rate: regular rate  Rhythm: regular rhythm  
Heart Sounds: S1 normal and S2 normal  GI  Palpation: soft and no 
hepatosplenomegaly  Auscultation: normal bowel sounds    Assessment   Plan ***  
Assessment   Plan  (1) Encounter for medication adjustment:         Code(s):  
Z51.89 - Encounter for other specified aftercare  (2) Nausea and vomiting:      
   Status: Acute        Code(s):  R11.2 - Nausea with vomiting, unspecified     
   SNOMED Code(s): 83951356        Category: Medical  Additional Comments  
Additional Comments: Doing well with her Diabetes, sees endo regularly.  Numbers
 have improved.  Using trulicity and steglatro with good result.   She is having
 some side effects of the COVID vaccine #2, mostly nausea and vomiting.  I will 
order zofran prn for this.  See back in one week if she is not past this.  She 
expresses understanding.  Education regarding GI symptoms and maintaining BG's 
given.   Orders  Other Medications:        New:       ondansetron 4 mg  PO TID 7
 days PRN 21 tabs 0RF nausea and vomiting          Coding  Level of Care Code  
54848 Est Pt Intermediate Comp  Exam  Expanded Problem Focused    Diagnoses  
Encounter for medication adjustment  Z51.89  Nausea and vomiting  R11.2      
<Electronically signed by Cecille Maguire NP> 21 1013          









          Name      Value     Range     Interpretation Code Description Data Gregoria

rce(s) Supporting 

Document(s)

 

                                                                       









                    ID                  Date                Data Source

 

                    [id number]             2021 01:31:00 PM EST [facility] ([facility])









          Name      Value     Range     Interpretation Code Description Data Gregoria

rce(s) Supporting 

Document(s)

 

           Glucose [Mass/volume] in Serum or Plasma 185                         

                [facility] ([facility])                          

 

           Hemoglobin A1c/Hemoglobin.total in Blood 7.9                         

                [facility] ([facility])                          









                    ID                  Date                Data Source

 

                    [id number]            2021 10:01:00 AM [facility]









          Name      Value     Range     Interpretation Code Description Data Gregoria

rce(s) Supporting 

Document(s)

 

           Urea nitrogen [Mass/volume] in Serum or Plasma 20 mg/dL   9-23       

N                     [facility]                         

 

           Sodium [Moles/volume] in Serum or Plasma 138 mmol/L 132-146    [facility]                         

 

           Potassium [Moles/volume] in Serum or Plasma 4.2 mmol/L 3.5-5.5    [facility]                         

 

           Chloride [Moles/volume] in Serum or Plasma 105 mmol/L      [facility]                         

 

           Carbon dioxide, total [Moles/volume] in Serum or Plasma 27 mmol/L  20

-31      N                     

[facility]            

 

          Anion gap in Serum or Plasma 10 mmol/L 8-16      [facility]  

 

           Glucose [Mass/volume] in Serum or Plasma 193 mg/dL       Above 

high normal            

[facility]            

 

          Creatinine 0.8 mg/dL 0.5-1.1   [facility]  

 

                                        Glomerular filtration rate/1.73 sq M.pre

dicted [Volume Rate/Area] in Serum or 

Plasma     Greater Than 60 ABOVE 60                         [facility]  

 

           Calcium [Mass/volume] in Serum or Plasma 9.4 mg/dL  8.5-10.1   [facility]                         









                    ID                  Date                Data Source

 

                    [id number]             2021 08:35:00 AM [facility]









          Name      Value     Range     Interpretation Code Description Data Gregoria

rce(s) Supporting 

Document(s)

 

           Urea nitrogen [Mass/volume] in Serum or Plasma 20 mg/dL   9-23       

                      [facility] ([facility])                  

 

                                        E11.65 

 

           Sodium [Moles/volume] in Serum or Plasma 138 mmol/L 132-146          

                [facility] ([facility])                  

 

                                        E11.65 

 

           Potassium [Moles/volume] in Serum or Plasma 4.2 mmol/L 3.5-5.5       

                   [facility] ([facility])                 

 

                                        E11.65 

 

           Chloride [Moles/volume] in Serum or Plasma 105 mmol/L          

                  [facility] ([facility])                  

 

                                        E11.65 

 

           Carbon dioxide, total [Moles/volume] in Serum or Plasma 27 mmol/L  20

-31                            

[facility] ([facility])    

 

                                        E11.65 

 

           Anion gap in Serum or Plasma 10 mmol/L  8-16                         

    [facility] ([facility])                          

 

                                        E11.65 

 

           Glucose [Mass/volume] in Serum or Plasma 193 mg/dL             

                [facility] ([facility])                  

 

                                        E11.65 

 

                                        Glomerular filtration rate/1.73 sq M.pre

dicted [Volume Rate/Area] in Serum or 

Plasma     Laboratory test result                                  [facility] ([facility])  

 

                                        E11.65 

 

          Creatinine 0.8 mg/dL 0.5-1.1                       [facility] ([facility])  

 

                                        E11.65 

 

           Calcium [Mass/volume] in Serum or Plasma 9.4 mg/dL  8.5-10.1         

                [facility] ([facility])                  

 

                                        E11.65 









                    ID                  Date                Data Source

 

                    [id number]           2021 07:27:00 AM [facility]

 

                                                                             ED 

Physician Documentation      NAME:      

           ROMELIA CORADO                      :                  
1980   ACCT#:                [id number]                      AGE:       
           40           MR#:                  [id number]                       
SERVICE DATE:           21     EMERGENCY DR:           Ivonne Villanueva MD  
                                                       PRIMARY CARE DR:         
  Cecille Maguire                      ROOM#:                             HPI 
(Adult, General)  General  Chief Complaint: ED Burn  Stated Complaint: BURN  
Resident LTC, travel outisde home, exposure to hot tubs:: No  Time Seen by 
Provider: 21 07:10  Source: patient  Exam Limitations: no limitations  
History of Present Illness Narrative: 39 yo woman with asthma, DM, presents 
after an accident at work this morning.  She was making Donuts at the local 
[employer] and hot oil spilled onto her R lower leg.  She has blistered skin 
and c/o increased pain.  Allergies/Home Meds                                    
            Allergies        Allergy/AdvReac Type Severity Reaction Status Date 
/ Time     No Known Drug Allergies Allergy   Verified 21 07:20            
                                      Home Medications         Medication  
Instructions  Recorded  Confirmed  Last Taken  Type     alcohol swabs 1 pad TOP 
QID 90 Days #200 each 20 Unknown Rx     blood-glucose meter #1 
each 20 Unknown Rx     lancets #100 each 20 
Unknown Rx     ertugliflozin 15 mg tablet 15 mg PO QAM #30 tab 20
 Unknown Rx     dulaglutide 0.75 mg/0.5 mL mg SQ 20 Unknown 
History    subcutaneous pen injector          OneTouch Verio test strips See Rx 
Instructions .ROUTE 20 Unknown Rx     .COMPLEX 30 Days #150 each 
NS         clindamycin HCl 300 mg PO QID #40 cap 21 Unknown Rx   
  cholecalciferol (vitamin D3) 50 2,000 unit PO QDAY  cap 21 
Unknown History    mcg (2,000 unit) capsule          ergocalciferol (vitamin D2)
 1,250 1,250 mcg PO QWEEK  cap 21 Unknown History    mcg (50,000 
unit) capsule          silver sulfadiazine [Silvadene] 1 applic TOPICAL DAILY 
#400 gm 21  Unknown Rx          PMH (from Triage)  Patient Medical History
  PMH Reviewed/Updated as Needed: Yes  PMH/PSH from Triage: Medical History (
Updated 21 @ 09:52 by Cecille Maguire NP)    Anxiety (Medical)   Asthma 
(Medical)   Chlamydia (Medical)   Depression (Medical)   Diabetes mellitus 
(Medical)   Herpes genitalis (Medical)                        Surgical History 
(Updated 18 @ 15:08 by Blanca Gomez)    Status post tonsillectomy 
(Surgical)       Female History  Pregnant: No  Hx Drug Resistant Infections  Hx 
MRSA: (Methicillin-resistant Staphylococcus aureus): Yes (11)  Hx VRE 
(Vancomycin-resistant enterococci): No  Hx C.Diff: No  Hx CRKP: No  Hx Other 
Resistant Infection?: No  Isolation: Standard precautions  Hx Recent Travel  Out
 of the country within 10 days (where): No  Hx Fever: No  Hx Fever with a rash?:
 No  Nurse screening for coronavirus:                          Recent Travel 
outside the      No                                                    country 
(where)                         Social History  Does patient have 
suicidal/homicidal thoughts or ideation?: No  Are you in a relationship 
with/Does anyone hit you, yell/swear at you, steal from you?: No  Substance Use 
 Hx Alcohol Use: No  Hx Substance Use: No  Hx Substance Use Treatment: No  
Smoking Status: Never smoker  Tobacco Use  Hx Chewing Tobacco Use: No  
Vaccination History  Hx/Date of Tetanus, Diphtheria Vaccination: No  Hx/Date of 
Influenza Vaccination: No  Hx/Date of Pneumococcal Vaccination: No    PFSH  
Medical History (Updated 21 @ 09:52 by Cecille Maguire NP)    Anxiety  
Asthma  Chlamydia  Depression  Diabetes mellitus  Herpes genitalis      Surgical
 History (Reviewed 21 @ 12:07 by Che Duron)    Status post 
tonsillectomy                           Social History (Updated 20 @ 10:50
 by Priyanka Kuo)  Does the Patient have a Healthcare Proxy:  No   Does Patient
 have a DNR?:  No   Does Patient have a Living Will?:  No   Hx Recent Travel 
(where):  No   Smoking Status:  Never smoker         ROS  Review of Systems  
Constitutional: Denies fever  Eyes: Denies eye discharge/drng  ENT: Denies nasal
 discharge and throat pain  Respiratory: Reports wheezing; Denies cough  
Cardiovascular: Denies chest pain  Gastrointestinal: Denies nausea, vomiting and
 abdominal pain  Musculoskeletal: Reports leg pain  Skin/Breasts: Reports rash 
and change in color  Neurologic: Denies weakness and numbness  
Allergic/Immunologic: Denies hives    Physical Exam  General  Physical Exam 
Narrative: wd woman, awake and alert, appears comfortable  Limitations: no 
limitations  General appearance: alert and in no apparent distress  Head  Head 
exam: Present atraumatic, normocephalic and normal inspection  Eye  Eye exam: 
Present normal apperance and EOMI; Absent scleral icterus and conjunctival 
injection  ENT  ENT exam: Present normal exam, normal orophraynx and mucous 
membranes moist  Neck  Neck exam: Present normal inspection and full ROM; Absent
 tenderness  Respiratory  Respiratory exam: Present wheezes (b/l inspiratory 
wheezing); Absent normal lung sounds bilaterally, respiratory distress, 
accessory muscle use, decreased breath sounds and prolonged expiratory  
Cardiovascular  Cardiovascular Exam: Present regular rate and normal rhythm  
GI/Abdominal  GI/Abdominal exam: Present Abd soft, bowel sounds present all 
quadrents; Absent tenderness  Extremities Exam  Extremities exam: Present full 
ROM and tenderness; Absent normal inspection (2nd degree burns over RLE)  Back 
Exam  Back exam: Present full ROM  Neurological Exam  Neurological exam: Present
 alert, oriented X3 and normal gait; Absent motor sensory deficit  Psychiatric  
Psychiatric exam: Present normal affect and normal mood  Skin  Skin exam: 
Present warm, dry, normal color and other (2nd degree burns over RLE); Absent 
intact  Expanded Skin Exam  Expanded:         Type of lesion: Present other 
(burn)        Distribution of rash: RLE        Description of rash: Present size
 (multiple discrete areas of blistered skin with rupture of blisters, skight 
erythema: 3 cm to 5 cm diameter lesions), tenderness, erythematous and blisters 
       Body image:      1. 2nd degree burn  2. 2nd degree burn    3. 2nd degree 
burn        Vital Signs  Vital Signs:                                 Vital 
Signs         21  07:16     Temperature 96.9 F L     Pulse Rate 96     
Respiratory Rate 18     Blood Pressure 140/80     O2 Sat by Pulse Oximetry 99   
       MDM (comprehensive)  Medical Decision Making  Free Text/Narative:: The 
patient was evaluated for a burn to the R lower leg.  She was found to have 
discrete areas of 2nd degree burns over the anterior aspect of the R lower leg. 
 Silvadene was applied to the affected areas.    Plan  Plan  Plan: d/c home  
Plan of care: Follow up appointments discussed, Plan of care discussed with 
patient and or family, Patient encouraged to ask questions about plan and 
Patient agrees with plan of care  Visit Medications  Administered ED 
medications::                Medications          Discontinued Medications      
    Generic Name Dose Route Start Last Admin      Trade Name Nasim  PRN Reason 
Stop Dose Admin     Acetaminophen/Codeine Phosphate  2 tab  21 07:38  
21 07:48      Acetaminophen/Codeine #3 300/30mg Tab  PO  21 07:39  
Not Given      TO GO ONE       Silver Sulfadiazine  1 applic  21 07:10  
21 07:15      Silver Sulfadiazine 50 Applic/Jar  TP  21 07:11  1 
applic      1T ONE   Administration      Other Medications:        New:       
silver sulfadiazine 1% (Silvadene)     apply a 1.5 mm thickness 1 applic topical
 DAILY 400 grams 0RF          Discharge Plan  Admission/Discharge Dx  Primary DC
 Diagnosis: 2nd degree burns to R lower leg      ED Provider: Ivonne Villanueva    
ED Status: Discharged    Time Seen by Provider: 21 07:10    Triaged At: 
21 07:07    Condition  Condition: Improved    Discharge Detail  
Disposition: Home, Self-Care    Med Rec   New Prescriptions:  New    silver 
sulfadiazine [Silvadene] 1 % cream      1 applic topical DAILY Qty: 400 RF: 0   
No Action    alcohol swabs [Alcohol Wipes]  Pads, Medicated      1 pad TOP QID 
90 Days Qty: 200 RF: 3    (DME) lancets [Accu-Chek Softclix Lancets]  Misc      
See Rx Instructions  .ROUTE .MEDSUPPLY Qty: 100 RF: 5    (DME) blood-glucose 
meter [OneTouch Verio Flex Start]  Kit      See Rx Instructions  .ROUTE 
.MEDSUPPLY Qty: 1 RF: 0    Steglatro 15 mg tablet      15 mg PO QAM Qty: 30 RF: 
2    Trulicity 0.75 mg/0.5 mL pen injector        SQ  RF: 0    ergocalciferol 
(vitamin D2) 1,250 mcg (50,000 unit) capsule      1,250 mcg PO QWEEK RF: 0    
cholecalciferol (vitamin D3) 50 mcg (2,000 unit) capsule      2,000 unit PO QDAY
RF: 0    clindamycin HCl 300 mg capsule      300 mg PO QID Qty: 40 RF: 0    
OneTouch Verio test strips  Strip      See Rx Instructions  .ROUTE .COMPLEX 30 
Days Qty: 150 RF: 5    Medications  Medication reconciliation performed by 
provider at discharge: Yes      Forms  Forms   Work Release:  
Work/School/Activ/Gym Release    Follow Up Care/Instructions  
Diet/Activity/Wound Care..:  Continue with daily dressing changes with 
Silvadene, return to ER for progressive redness, pain and swelling    *Discharge
Patient*  Discharge Orders:  Discharge Order  (Routine); Ordered 21     
Ordered By:  Ivonne Villanueva    Discharge Date/Time: 21 07:49    
Interventions  Interventions:  ED Discharge Instructions   Last Done: 21 
07:49  ED Burn Assessment   Last Done: 21 07:17          Report Signers:  
<Electronically signed by Ivonne Villanueva MD>      Ivonne Villanueva MD      
21 0758     _________________________________________________         
Ivonne Villanueva MD        SIGNATURE   DA    Report Cosigners:                    
                         D:  SKEMI  21 T:  SKEMI    21  
CC:  Cecille Maguire         









          Name      Value     Range     Interpretation Code Description Data Gregoria

rce(s) Supporting 

Document(s)

 

                                                                       









                    ID                  Date                Data Source

 

                    [id number]           2021 09:49:39 AM EST [facility]









          Name      Value     Range     Interpretation Code Description Data Gregoria

rce(s) Supporting 

Document(s)

 

          Progress Note                                         [facility] 

HFNUIf8lOnEBKgXb39/WSNeiWJIkk6QeVIwnTQd6PNlqDSRhZ3WoZBR6vF8dQPU1OSuPKwLdSkWuTqPu

lbm
AlCclTXtNmRWPaMkzEAaHpJEonKfrseRAsTE1SaGX7REIrB10wKKXkZQOsL7OaQDMhPwj+Bb5RJUTdkL

LwGH2PWfjB4M8xlqz5Cx9iyB+KBHU9wcWM80n3BVsUTfuLAJlJEHmurjEdGgHkLh+xFNaJPo9Vx4+4y2

KOE4qK8nPX0c8yP+PWTaUdqlxzbFfG1u+/5EBFlFJS
/Ku3nPYg2xc67k0B597lhrh6l6JTgIUqGvT2Kx8xHC+/5YCxsA2uefuK79a62PSklVReQV0D0hGLpjS9

fDfLls/Ru8k5jg2GiaH+K4f5g2aNxCL9/vzDD+T6P+CywgvpylmnOPqByQNiHfEyi5Yw6LZsQgge9ptQ

CK65Iw7FovMWnyiZzLK5uf2HF2Uj7dIMnsPZqtVtS4
Ljl3/QkxxHcxK2WNiSIAEvK8X3Ry7Rl6kZd/gkPx0e7+R6kb2in+sR7DG51ltyDEajhqS10LbM0BKykJ

ccMT0DI39DrMO2UUrMP9byAjbHAT1RukyOOHcBqF/ZL/90f7yNea5y81RuqC9cxZRnwnFCIU985P+Zmn

WhMJMzJx64JiQotBlR1fsjZW6INrcj3KWBDKh3wh0a
ibd0tVTrXBl8LTHEqhYyE+Nnee33LfXpyEjn+dt/gll6bd8XFj3RLiBklryS3cNLdaS5v4pSgiKTslST

Av3PMZbqVusZI5FhmMtaDRKOMRGJPxYRaEitdT/yobdDF6ZVWqsyovYkNAL3JoscjdyOX/PSoqtxXVKx

lrbV6l9D6l8fm0rB7rLHV1SeBJIotgo53AfDDZfTVL
GGZJzgAtVZUJ44QIEgIZSaLKKxKaS/JtdT/5bHAMrA4SCfbviUNoQqwtlaCYMYnO5Zs0EW2RVk+k6ORr

aj1Ff1u9PEaelxtkbeqjMyAX0Fj66BeStVUsUIq49hRN1Y88qExkuh3bd1li1d+Q2+iyrwHMgEJbyAJj

fN6f9hhRtj/lpEofwK65kZg2/UCLV//TbrU0hDyER9
j6/Wagvs5BTbZrmugTLzdMDMozNpv4Dss4bB+JDS1SVF9l209cViHYlGEspHycXJrawOib9vbAQpJVQX

7K1zWmpr2IOEncyzZCoV69pO6jme4i1jlCm5L6YcfKUwMMHpz31BLBPSC8lJxFkRBM+ehN1bfjEY1Vj1

MqGVD6FqdxZi9lu1MuvPbPER4vzsbNHQyrhWBO/0jT
C+F6ZZbjpRNxAA6t4+Yeowrcv9kKPH9d1ieehzJyyJWcv/Cs6bf2pVqz4F+4pv3QVhwg6NtJf4W6nkaG

UeotUH6pJ9Lh49tJS9XiIknOtapYk6//obpV/43/9Nrt+Es1/JIhh0R3e+5hB05LWuqUoO7lvdkMU5IQ

mjUo51EgAG10MefFaELocz7ksDy5TH2VgVV308OP5m
wE/ux4vH+2K3YIQrm9mQTSd5xBK2iZrhJ+WQBvPDOl0/snzkcZO7jhoIu++lmTYhUMNqtKpGoqkX3Dcu

2fL//nazfIzrE9E1+Gnv31FIu6kN3PxulEGbofl/F3lej2r8UWdYQ1UrbHklneQgT65mzWyK/GGWTveP

6Qda5P+yY8uunD3ZYf5NPAFDBWPnYwdQEnTbDTpHjw
cUVtgUDnCMiXumi9sJOM+dqyvRydYse+vhtI2qLStje6OJw6gL+hy2qM69PgDdwX3EuFNsjky8LnV29O

rDT0KpkAk5zJj1yktVe03T5P4c1wacosG+5e3ozXh+lwNTu3EXdjKCCVLi+DClGQWmrSXPqp08nVxKDk

oi74MsrPvhBbLKBCWQ6qpXDM7LCidcxSqzc09mcSgk
Y/ANMGFz9wfQz8mIN9DAKnPZC12lCZTrfDOpPBX+sdRTPC3NVTy64qFWetCQYldd8ibAYwe3CjGR+75L

ig0S/5h66AgzkhN8ZIk3U+tJo1rS0D3mgmYHTDRI2qtxn6OAi66lMYcsVG8W3kM57TF4xF5Wr6HNj6yi

824R6N6TUElr5ajkuJ+xEnF9cAIdSVODA3LFMZMcTT
j4rhbqY77lC2dFKUMSAjfKIxuMJWqyOvHIvL+DRj3jgq6lAyzabc2UKm9PdZg35BftjXH2uAsroUWWw/

wK/oQuaDG2cJ/rJanlsCaClc1YnKpjKLMfR4rgFZZc0lJUOgkIVFruO/a15FFSLh/UT4kovTb83wbN3y

DCgIZ9zXzP4RsFICbVIJrMa8IKmfckOVT3B42AdHOf
8h9pfHlGAPzzcEtcl8NsqEVcguGYCaDu9t2I819iZomjhXa86jB7BeDX5SUsuEAYvAtJAJSygNn+C6yv

65jQ07iRrJTPvaweuXI4B+fP15WyK+E4dUnQrY8ESN4xhFSVqyGkiW/qhhzkC6ouG2VzGdlB6LHKQLi0

lGOWq94tBbrpPUsG696aIE5tfXMd1oYOLPfKeWFUIn
P1pDqFl1TqIB1Y4Q1F6aviUrswZXP/ovq3KHo/3vBfQL79I7Flz+x7/4Z5k5wBin/v1KwccUcxwDEmxW

MulhHTLaG3EtMLI1uH6Niwvyd3XEHasAgRbQO9MkxLNp34oYjfC0LTlM1BTfaqz4q6cQFpxyLDQviPQ1

P1HWb4/QPc/dTwq5wUNsC2m5Yj4CFQ9raHHW6ZDNvq
Dd5LQDgCxT+yJXbD0oH4GtUlYbS7MwjKhO95xALBwzsftfBLUH4A178ejV6sqACu1woEQdpnSKkrFjx0

k/omJOSl4VzMLggRpzacfZJA1r9JzM2EkeM+uXuALUQAAxhzwWVVbWDe299T4LnDMniwC3rIzYEB+fCg

sabewBcdPiuUnAJOjrgpJCGqYwbY+Mq1QxVMsvTHtY
FQdPTF9aMUpS58ejFr5eYCLyADTDXFnbmSoeCD1OWHY0BkTs06YaGVuOQJfwXDdVJ/mmbcDDZCPqewa6

e4/KzhAzMF2aUwm9Sbx0LfqnAXXn738vmzXJBreU0LCWilwY9hUWcv2GX7m3HCJBN0NsHPjMBtNbOqLc

10CsPcCOzUrCQp75ilJG3kd7pR07ZfP54xEF1Re99E
18afdgKUoTrEOA2uPm+FTRdWkLhISKvfo9nFUqSIMmTnHGurYArjJIr/vb3rcc+wWymlaOMHIklYcKVy

i5PTwgYexkb4+In3cfr8CC+7ao3wWaeaiGlzztfG1qRSe6/VJjwdb58WOeEHYzPJ35+cUF8xnbEfJ8SY

uyCGq+2qTqq6xWsB1ap7W3tm0zc1p+fdj9K936yThD
5MfdfxV6ieJ+NMyfSja6Kijm612U4hth57z4v0+FAXX8JfosQj+qCOrnCngZJpTlm9r33+kZVLhYbMyk

gniAw2WomCrSbUCxnr20bJJuUS8RSzswsBp1EFRYF8U9srSZojiwtK6XIrNx483rZPvAe3o4TIdDkNqK

r6uNfiFyMyJJsFLBS6CGBDbFdQETuXwRFNwQBRP3B3
Cw7JPfpi6lfpZC7KhoK83VCahUjMKhg0Yaqx/YPsrSKaiIXsnRe+t3MJrhMEkW37YzIDdjY75kMaxqGh

VFJtbfjLNbaddHVJcu/SS664R24Fl84avQ8v/lIr2pqveAIDsLxhuGn/HDBqL6fTExK/GXEdNvxXCAOW

nRKGDxKaNFGlheZ4UeZFlI5YsFJIX9y9EhyF7gghXe
O255bxq2WP0/rSB/POjyC9/0cuP/k57/qiYnsfPrR0hVfABFOlkFVcNKFt28YCXPyoyhwShiCDLIBggP

yCldReYBFl+ewxTUGKxCiojGZT59OEYHt2P0Db5NYZk5PT4r5+bVQ8O6VJycqbe56xeNLmnVjtgrVd47

lNV4cJfCFNi3QXsFlp2nQ+AxgS1o1vsFoitJffrbzo
052uVEoZaSHDQFLiE0pKPqUBqficplJFpqj29QI6rNLqPUZPqJvBsz5IkdurkWnamuxiIkbs7ZBJwPVB

JXC4KSM00jSupAI923jiDnF9Ps3edGmGE10gLdAZHJOHUEwOn3LYjID4BTc2FWerrwbjJN8liC0m+rnL

nkBMMV2mhDnEAAEmDXjLGnLN7BCy4xJfUOuPYPdyR9
gdkYGfeueFpWzVtMN9gQZ7uFrWOfFsjLSI+7thlYL4pgJeOVruWiTf6lheGIBxaEIB9UHsAdQwMBh1nh

p3HiFYqgvT7UL4bNXfBPJKlAzKap7GtysbREZ/u5jFsbFWBbHFeXz7FFqlSsPUrTSqtKN7bo4pn1p5uX

/6evRWuNnak6XYQWi63Wvmsx594kWx4I9XRf8WT1zf
TKoXj4jl9xr1ecd6v+878Of/EKVRHjuRCwJiEPV7irMpqM6CCO8sg3WvQGn2DPNna6RuZKilKTz9FFzw

CVAeR2W0tHBoGLMeEJ1PQUGkPA8OVVXkgzLhFjSlMQFQIsGqTHYyYxUkv1AnU8ItXQMfBUNDEBdxXEGr

X03wLIyfKo36FGenUBKzTtLyXMx7Ti7HCdYjUIVrN5
2mvKXfmAKcGGCaHVMSAfPfIIPtN5VjvTOhLEfxA9JlV4JoPJ1aeBHcLU4hwMFhQ1LzO9IbqrqfULGOQf

AvSSBmYWxzZSAvSyBmYWxzZSA+Rh9GIQA+Ye1AQE4ak3GuEQh3WXBjx2TnCOsyLTc5O7IvzIHspuJjAk

nxrCADOUXxSSFdP1bbgpf8vGPbFQU3Cw3WXlUje0Mb
FBLcOWsFpz9zrD/bRhL+fsD9h/2YXANfr1pcR0DQcO3zQZARdbCKOMkv90TMmIb3kwsyEnza8v+5L1zO

tx5NJghVnSNW3JruiPBqfpelCEdyZI36XpzwFoTHEK/V3/LEI5Ml+etfiLnN9+jylSgPUkf86ZusoU/T

SUF++zn0JcUsNMVOuv1Y6/Evo8vxn8bh0V29eR/SYr
4xlV19a0J9U98q1DONl/8IvCv7a+EqhJlL0SW0Ejqr+PpyUz6apApTKg+GZm0OSNYmIAHwNp5+uNoWk9

kiy0/Oh4vlO0+n5Zb8kNT1x58zSelB8WD929v3z2A5Us4CtvA7BhwAQYdMqU6ALvp1xQPhS4GzU/4i6/

fwKe9QCnBiEQlCIevc9rDs3dzyoQ6doSWtS3sK60mc
dXzOecaCIewLuffURbkWgkC3pAFpmclxwQ2SCzdcUmcLQuQShWMdMuS69jYKP4AuKSe9wSTfOVN2yRJC

hASTdbm6F0GzmhThPJfdMC0ZUkvbMP/Vq7+BoQLyd03vGd/qRvRLnsue0MGEbsBU9aQov4u0Swzaopc6

2R6AClN5EK/9+MBCGMSbe4lLAKG/YESaPy59EECwsc
s4DxxjpskXz7pwr4j934JqkONzmwXBjgWao06ATEVPRtQnZz7Rz+avpiUsZWf4PE92F8F6AO3ooswDn+

zIPRCXG9s6q3qHGF8YvdobE3Jp5zkGELQmNLgVWPIwn7ncdj5n6gmDAiCPSjNQztSGFZYWecYP2H6Wx1

JN9m5aIjuFY0KFjawSqHCo/3/y1hhyKuxluguPuEbl
6j3XJSHP7R5OiT00wJjxLsMz0so8iUqfS6eu0TXmreUbfKC0KkzEVWhxexXPH23PqUq2Y8YgqLiCMCtc

8xX48QxLH35Goh392TSxHARt89pGdgK05SYj43ajS5PXizdVc93AVrtlyNhAw6lBY7FZhbthJryzeThD

Ppt1d+1gxzTv2K6O3/RC+7hXuIMhfpSbAZExDeMTS5
UUwrd6JCCCjWxNfjnbCSOFRMzm26z6hccnK1SvzPY3bptAyJibtHJ9QhEXYqXtHDH7khS3yIZ0b9JXL1

STH/TIxOg4LL2Fl/kLM0VN5boudXZieAQKOH0eVkzxpuSoQbzGOAaH4pgdPhAvdOY45oN8cNx/uT7rj/

Ds0fQQoL7O1n/xKSnqBEy5QegbDUHJZSJvjrUvzujp
NPe9CaQINCfVBuqcYQhyJBc9s3Gwye7u/qpJaGL27mS3ZKTABkYaETsfevvM85ev1ctpjot+HKfr6dJh

bIp9WcuJFgOsPyX1dn5kWVYDp9cJdo3YKRRs8WjoBzvi287H1nyxZkiqbGCZUj0LdUpfl/sesyWgfmWA

E6Smlk0cBa6z1nImMEpyxosZym/Cu862+Ar4lyE9mx
K53YgQqZD9azDDuDoDIk/LxzomF9zKGpRxvH/4bpvAEy1ubLgo74btL3kxWck//WKuYFR0rheCA73Rep

k3ZsZZnkeoAg/a2em8EPwvIyDR1LdWX329AcjAiAJR1rDUWFos7rza0bg/i1ic+Eb/BIdu1qdzSu1Oi9

/+zcHOXYQeaeTUyxusiMIUqo9NjvGHBBvPNSgkwxpR
WWln3OuoGzu+u4UIKdG0O6b+eJPV6fLhiTRIXHs0h+FWr+ydkPfSEqE6d+Y0uhUqghP5Q+2Qhr6AfhBk

I5zP1XGthJg6BTaxrqBZrRm5PHjTpRrJNMjrTmA3Cb3M83UWKPSzjM8W5JIYXseLqgSxlUK5vDUYoJLh

P6JHJPzaaCrWuRUtqC0nQdvNXOoPs3Tc7+2T1t/nHs
g3EUjn4ATn2kNRH84pfP6tVfTkMdStwjDIw2DNJZjpVZ8WLhSCjaeASuSckDMS56d1xzm5ye2njf0UYL

Tr9fh3wTK4i31tmyABogQlGRyLhtCa8JPLSRi4BdsWGfk7fb/Nqe191cZ59fli6nYq3m39JOMYv7M9q2

E4N/cw6aE3MTbtx+pKsXUgzOolXK//hpr2hfxogAfk
nx8EdC2axv2e7LGVc3uh57nDXiTuWO/nq6x/7dhZ6R0Y2jc2lNgl/Cu+3l0EwZleqz8qxU7M45o4mEa0

GHw0swdE1wSPHTdc5eXPBQH5MvSzkxzArHbaqlc/zrq482ipJbPt72JeFIS2tpdQ3aZJBRSrL51id3zY

GNYpXhjN0HGILycBrWpr9P6xRd6V/+ZfDP+11oN3QL
PtxeqwfDGHcHhMyxaJnlLxN6+pg8dJlLy1hVVl/ZP30pkzT/DGFIrE20s7dXreOxd3hhyNngh5ti/Flx

Gixhgl8G9E4xSzU+070ZQyEIfngIuPpCgsaDbvl0naWpoxoZKozK7MMEGhA4NDmrpPn+JKRYcq2AVIt0

M5TDGnJAwM5tDOUIGGaeIWsE3ceydfqJddSJi6Tl8b
J6UjH/Ixt2zq2PpVSa+iOukvxMF1MNeNxau51qH9Z1K+R1b2vZEM3vVqOCjPfSzDfl36LPoSxX4+N/RJ

Io//5CS09OrG05cR8i52w/PleNYbFjSmiJYu4yKHtbcZlOPCriU/dpLm+Iw1iIQqa80ROCuMAhoaUkNP

kQefAYGczLMIsVTqEv4mcGX5Na/flLh9NYyEXrbjt3
guyvq7Fx/LlmXhul+kBVmlm/QxovfItOIyPrZA6jMoe19HoA62WrpH0XZcvm1pX9PZDMCcus5IFBVJrY

Yn2lIbuAk8U5tjVynyc+eobFIM6jfOyULzIk8iZ4I6nzW7fliM71H9d9OJFMTsT3RaJtJ7XDUAmE5dqo

KuWiBSFGYK6kzVxmldZqyanOJhsaUidIT2gIvkLK5b
Y88uD0Xh9shEgCKpqkAHjlfhSQ5bz0+tQTNAuX+QPBO/HbkcQUSlm6B5S8LFTTvJI7kyN8ucFe8PQ0vB

5U4PLqrf9cw6Z7La99TaUSMrlDNrOVSJE1c6ON9FZEq8k916IU7yoATN4w9gZuIoqntv6/bbcG7+Xna0

4MX968/r76LKjjYIs7eNP1N5F2cVa+ZQ6YjPMG61v0
Eyk1b1QMJNyGN/GF0bOenA/6S7dGzlYv1A4TH2KCN2vOYy4CkkXRxBfKzSKKX+KQo7RoHFNbUUEPALdw

BGnAwaWtQGdWRpDWdNT/FABCZhCQIN6dbGEDFEIktO8DVESCYLnl72VKXB/pqQ+5K5JyP4uyEQNWsM/U

NJs2uv34OA7SDhrJSH0lkJNAZA1qMQEAAQoCFiWXIi
NKGv4JoBdIVDX3ZES6ik4wFf+bUD5VIs+mtC7o/SrZj8TOrGrMSFLscM3T4pYELuAQSmEWPjHYuRT4EX

d8BkYToHemNiyuY+3AviGkwI+SJ5aQMMvR9Wb3CsHF0dTTnvu2zG5iv5SaiaXf/L/5csiZBbZMMVv6UU

lj0H+7QTEJbhSOJUBYrUsDh3RS42MolpMoF1xIFKP6
hsy56F/QNyFj10JQwVHoTr1Mssua3kvSaLVoU2tiiZ+HMSzqm01F+TzyRSzEBJ8ApzIHtJi3S/rn55Qw

5SSkgOYFOpPSug5XQkCzMJpt5GS5sMSDYJz2IO8b8rUzanLUeZDLYM+kMPaeCYuCVZQ4Hp7oMoOfkbo0

olxk2fiQ6bunVf46tnEsESOAtCj8VH20SCaAo4gsQz
bBg9m4Nsd9sgyAFwFdwo0gT9ZTEJ+MX2jZZhKuyrFST7fuHjNIdX6BjZWv3uAoahg4K/8ij8XTDQ+JY1

yMXEyH1Xl1SjBr9cSHfex7sgkj6ppHGOp4SvlMLSnHQam9XiyjRzurfYXH59SIu4qsxlYZ1x9QxiJO9N

76v5HPmqZreqgm3BGDGevvbEcST21gX4YiUtwI11zq
5nUC8IyH9bHpRTZ1Fz1NsCxsPDhUDddzswbvAX1mOkGUzsbs3Z9G7N33ij5c9Utxi85R3DVM0bk3LiWX

EoGFxigxYdIsoQAhvwKOThFywFDdEcQOtFLlQfNWTnFDooAH5GIQsfHArmIRFcK4XgltKqqVZlXXGbDn

0ECOYsKF6MEHCklYFcFHLfTlJwLEUCGlHqDQUbIOOk
vYRWn3hcSdRqLOW1DSSwLlqoCL2FWXKkOZ1Js764FQ58ilJ3YIOjKg6HOKFlBE7Fyz41rEM7LWViWyDs

FGDwhoTcNNMavkA0ZW9EHiTpWCP5cBJaSqgQDL3FGMGxeQPuVD7HCGTgtIBsVC9+DQogID4+DQplbmRv

EczSMzmoOOMgKqhYCoXsRQnqNukfhJUhEX1QjYZ5DL
LnJ45vRQUwJTTtH0NqRMCfPlB+Pt8NEQEngVQbKA3QNrmI8NflM4kOLA4+0v2H/Mcskwl9atILgcFuOr

IPiJBO1/tgEgfSUqAhtMe/A9iLBNPCV868LcpUOKVzu+ed8Yc5jrX5NKFGx3/DdrN7wuYFRo6rd4HT9Z

/q99/HzPzuTotdgISLVk8oy6WCjF35O+ll88kqqKY1
L7Ke+cvZxb75t9J4n8apg9Mgf5s7SKc+jy6iqC9E/7Rzus0h+O6der/8pXP9vgsDTyJ2A70/Jf6+o1of

IHZuosr8fYPdvqRn6dEQgVs7xzjegRinaU9kh/4e0miLUSGaaJ4fXs+pFDoDHWb82oBukHKsVTWYaC20

Ql8/TR7OrFjUz7voiv+SomSgwpJKaPY19zpNISPlQa
t2sA6YrI37BftuQ8RL2dC37BD1PA2jmefyyBFKDZY6nN1Pwd3fTx8wp33Muk6iy+AcmUuuP2JjhWq1+n

8q2PRICQM3MFt0C4Oy+7JcQSE8YCq4zCdNtKdcFsXSkbychSBdATfPxrUUjCqyJQFLBzpCsk+iVOlYuG

057GENP2VZf658uPNmbQqlJFf14xzeji1Z5LwyPREC
ceEt7sBKihsrWzoRlMemG92CDMDRzGowT5lijqRXcQYWJ8bFe8bkukjdBLR+gsDQyckkObGczPHg/GKk

tu+yeoesLVq0z4cfbGFhcIhp45p3Ke/4k1pwvsVqPhZRN8Yq5NSysI/ArjCrDRLNMwJjma0pbZjCjSnF

t99cIq10XEhpD3x6ILDs2pFhJ+ZCLWDF5FPul/k1aP
VGVgygopR2LqW9iD3YyLZrgaOpHIRX11MPpTTPuweHhEUtIGzZmTII5kjGrHkglOJL9EsAuAl1SDtzRt

pFgxWF37gpfbw2szq4tSKMyehxI2vOZZLbf3BABHiuXLx1c3XzZxBrtER9ftLHzClbpkcNGjzG7KxjMv

sPgoTVkGz1XusLHOnbsUdTMHP7HJGdumaGEx4dVHpQ
9tUDAQ60MVkiOOkfvnNS84/ULkZu3mdJPV9pXem7I5scwkv06Nv7rSw7E2Ju8xPSbm8eup3Nd4ws3+DG

wJmZFBjqj+DuozGV8QmMQuBKaxHG93u1iC0YizKr9E4htSZdITZL7dnbBDtIAyyzpqH4LoLjJ6ZJ9rO6

QirpfFuxKdHAdedugIwlBnTU+5XpxBho53xq/KZrg/
mT9ER0IQ356DvvkPGx3b0yqUsW5FXMuHNnO0EdiLIFeFWta9Yv5NzIo6+14nqy2UlQlhSMyBuzdbxUn+

DIXgzxuVwNo0EEw3NaMFJ1LGjTVDkFzhrDhAQROJeYuSIxN0nAFcxYwMxy32YATrkn6VILg1LRNTeylP

OLtUVM20RiLwtWYYuJS7s3vBD15UOXtaSMqn7W+Q7J
COIGVD/r/BqqdWwtlzYvs8Ybsh0kDKYNETPyOsuJULBmSa9wwG4Nf+CeWGvP7zCNQA+te4mrQ/ZnWioT

tk9FOe0qjo6yqp0PitDy2O3hcRrlFT/sb3/j5R44LK/Vc5I793L6s751mg6qR1eAbg7MFyzX2Nr61dTy

nNJkPLTWYAMSji7kuZSxGTTQ3hvj1YmpNTgBr9SRl2
BtdalWiO74ZcJ2syMrGt8PliiQZXezKfV2iACo/JD20HgQUTkTLIzJnVurpkdKBqSi8oHihkGUzMgDiA

rvHRrejv9Mi1jVFIAdCyFrXX1+oRQY+wxDO8SwQkZP65P0xHJ0m8YHDpGB7ZSRoEXt2RQ6fR+e394GjL

joyNBBTqUQzo2nlsqAKYH4rRlHw4qSCN4e3kudnkWQ
XCEUyD6+tJRJ6YGz5UKa/CxlEP8ulIEk23xoB0bj7Ay+x7Kp6l/E189LDQeWLm5TY6z6LDYs/e5zvvyx

ybhe/imHkgMAflTke+UNXCRHENV5a97Gc7Ke7lFDY6mJc0mih2ojhGEJLU3nQLgvsOiMYhl25MMYi5Tm

TO4DSKhiJO9ccf7ynei1hJBfrHBn6AkYIlPrbBkUfF
hyRDzFaKGmGUtQL4Pg8OAn67niYpObGCBXJBrRpm0aEu3PKEwQmYlBIRSmdlVONjfLLwQthi20V6IJog

DNuDmcYNYfU6M/tqdxfWDI57zBz7Fu4O90a9i2XRDu5L4nI2HYxJeTgPaEvnVFSlzDbnwZjnYFQxdUW3

7bde2oAF4QNVADYLquwirnnkJ0Y6MOznXM45vkayhw
jh92RGOF1ARd8kA/cVIGdqg4WrlfJqIwO9GXf9Ce8JsPPGuSi4R4sLpSCE4kVTWbBNhF9fic35EJ9nH9

KdTQr8vWYDH+giWl725ovLxT0LlffURrXqBUzdImXUkWkBPBSwN2rRKPG+KGhWNPz6B0akBa+wl7HWhW

iAJ1Bh2a/kTCg/VjaMz6v+bHMCDZDN0EKehm3dZux1
wSZ+AO5mC60SSFYWtO7uN8joCEy9ZJ0sRSVs8iWjGWaSOP02GUWKY5ViBtD2GeRYdDS7cCdpMXJp1teM

ZwX5uysuPi+YP1wQEXccjijRRqGe6XMlaWGtD9ePO9CZjLQG147SnMJSHmnWoz+UAU1COSDG8NdPFGlc

zFWABBXGxl598b/pGa4XAzm8LXKqVr3C+O75sBk7bR
Va1OmZpOFfr7xTbWCebOzR+kgl+Gn0ZmHwo8BaWNzb81v3jwirTau+DzIL5z0odW+rBVPFQxLRomZv5N

9xISyuaVf/vg8qlPQsghCz4kTyiDiyKjq+zahgbMlACoW6hGB6HZm1X2jDpjZ88tT82lwfG7Pb7pDlyO

9tj3Yc/LbXDVAdKAm5aoGmJRTOfiKaKCo/HZu/Lr1I
81PX5pr/nY/cn3qo/m1s2crfmClBz1YXpe4MnK3LQ/3RZICs0KOY0da8HvINPyWMfjyvCjIbwNMgvgOH

VgDhsPNnEeJVpXNyCjBBQjKLtiQA5SACnwFUuuTPHnK0HymkAzwCHmGVJqLs1UTJRdJF3TDODmkFNtPO

FwQxLfHWNUAaEgLAZuMBIqxTPXp8vaFsVvULP2IMIh
RksvTA2PLFVaFH3Xa902UV03gaO4CCOnMl8XYMCkLN7Jrg45dZG1IMQzAiXrUMPpgaOsCCPywzX5PH0X

PzJlJVP0nMPnXzsHKL9ACEUywYYdSB5VEUUxkFNkCB7+DQogID4+MBejdnJmNfoPKwNjJTHli2YaCDyq

NAh8C3RkrEYqgnDyWsvxdNMIYOGgDHJdV6ggsbz1lG
ZoFpWxXl3MSzOcu0IzCHYmDPoOvn9prRMgGrI+ftL3N7doalxYdFHRyTBklLJ9Ijuo6rJtRSunnJbleo

NpOjV4jU7Nsg526BgsPJl5CQ1eLW63M7/iYvS3i5zwiwBZSnF96s0i83ggoZd3bxlrMh5GOvEKgoUdWP

ZfDVv/QMMFk4qmt+XxsJznIO78Djsyx2ZS1/JQ/Sivan
W7z/82sd2i51sbu3G2oDk2JFxQK2XA+5+l8/rqJJ9le896ZkvOiPvWEhuaR57hChK0U/8rMlm8SXpGs6

m9hrmzA5Vi4cFP63Sv5BTetFwq/Jex3gL9a+PjuYiol4+7Yh15w3140Ic/7bZ7S7x3xhV66651Vjz1LM

0hlhfKxFBAusGKWLRtDQjg6lxYpOJmI2c7JzU5MkDb
UjfVOJ0R+zdtmVynTN/PlOLpjLAEXT3dkpTNaKhEddDK/LOu1pYj9YWl1w1UkYU/Du2rgegCUk3qLakA

1t0FC4+kZACoQJwB2paNS2lVpCjIJToNxaZCX7HUI+lZYvZWO0IrxMSYTfnBDpIu0AvtIphJ98cERCF3

IEzM77jqYE8gnZCvZW6JtVv2VaQInUdjM4FRs+fUjQ
THG4IGxXAhH+EcPeF3m4ZFO9oI5acqUx6s87TXtegE82TmXZgIVz2aUx+s/Ry5Pp/FFfCenAw3nw2ulB

Rb5Y5TYBCXZ2ATMDsRikwlbmqjs9oLNNhdRwKnJ3V6dfm0M9v0aP5wAkrQpEugPkdurG6gtS0jpEH+ib

RZFLz/74dFvx7pT8BUcZJcsn+8uTDnpNs4IpfvnP9T
+ge/m/dV0gD9kvubDgjA3z38XjP1OYPtZpuy1+WBtTZY6pA4OOczAukNkxEARZ1lg4AFPY2nWGB4Xhp9

3k1RNwlPjNLOs+d5cYhLGUrooTgarUu9WkT2EFz7E6GZxOQfkpgDcKCzhNkdTVCRJOj7TYCvK3PvJdVz

VEAqE9eaUJroQTFwEQeShC3rJjZcIXcvB5b4EvYa7J
EZZUxbJwUUkr3v5EZ4bhapt6wLjt2jAoi+1Tnw36uJtWpEKlVDZYkdA2HEfNFvQ1deYs9TMIsCXTpxgN

xDNSokn0xjQsIWfeSVUREVjksUbP9b9lHxPz+Wb2YuiaQ0Ayaq/M9BFbQ30wS2M0KuABs7hyV0P6hLcw

LqPb2CXORwfflpE2YWe8Mc5KnjBzjRVyigaL527CoX
NBnIpENfxf67VfFFHDxyCzc4oYSnLqlKsx6Jesc4JhmaEITlrwjN8iJQbDeplsDdsWnJHcsz/s5yt1A6

6Y8GiU2WzPNP/5THHN1nU43JRZ2nfyAwyFEfu2uUC7fpVbPdfvRjRVuOD52GpbKzJ47brijFjTOZVDTL

X3zzf7PQbL2NlT99GzKm5/N3QXLdca/9YAa3DdjVob
DCbF9GDFi5MW2I1emEuxqrPyPFPjMG5C2HMmeNSCNQlAsS3yaNUk/e4U913xY+lM1pvMHHrFVCChQOK3

SSpmmDhQy5Y6yhyYqAyWhBN6JHdS707NpaAN6w7cR5lGJZhXLJSBIglW6JSUrRv1mk9FMBPsvO3Pz4e8

Ymr5FfENNGIGSRzsJPHVq3yzK+MVVuDakE3FJA+uvF
X1yu/LixiMcZ6Cg3I7f4DRl32MmJcNhmmHQ8qi7p2gMFQep7wHTgIqa+7H3BaAFilcm0TvjHxSREesGP

Gnw+3IN2N7Rce0X/x7DkqQVZFTrFNunwmP9LIcZoml3gE0fdAmbRnqyHFBK+by+upy4PjyN0/Kq0e2b3

sKpuWVElZIHNNpFmQT8is7CDhZClSBrgS8nkoaGUEE
MtJRUVqJ53pTBMcEnxjf1YyObLn7F4iAPyLhUOAWgrGifQUBUyv4RlQmVVNQ21vfJSisbwi/t7CRBNLQ

qKnFdBtv4JTxPftJ0ghA7AEXzLTl5VM24Ih42izoI29VI8mN2ltZSJ8YsCzhHLxb6gHlGi/vkpIOsp3q

qfcH9Lh8B23ifE32MOvFt7EClt1ej1N0Z0Mfvu6mNV
0cE4r//3CKq5UBd4G81FozdMaGOVXKl24RkgwpYU0jVsCiuZpHEgz6q7CsK2AfZaPhxUwcN4AGi3Kdja

BYuEq/NZ0+bF1xaBflm+bRUSrncQMF2NJtFEF/Ue3RphJyDSvDXtAaQRGZ0geWcKQu2/efHMuNkdUKqg

Gj14oy6IA+Pd9IMaI7OYqGgmQopVcnIS89dBLPO4RL
HzmQXME108Kn0WVfzdCrZRZ2eYRp0+fptKJ5S+tCIhmOCK91tfkmA8BV490aE+FZuowoDwkCk9wWALtY

SGSt7LN5SV3eHbrnAvowmSBVgktGWCoInuA9a2IpsgcVkJTG/8tXWr2xWgPrkB/NkhXgUVtrU2WtgI1P

nBl0HemYd3jguZmovgt0nnsuTDcGR7XCsucI+sp7VF
YGjYE4tUGq/hA4VyVjJQN45jSi7e8LDjq9sr8NPwij7RTA2A9F8OMi8bb3vBScMnQRjbgczcP2zblpjz

7+TBkTkpO1g3umXDzJBmLKrspzgyisT0sD87LWhZdaWd3OEO1nnZr24xsBvaV1SpcFTpjvQhVEa3BsZQ

IHvb8EwV4bBVzjSuRZrZxXZ/7S3Of7XfYryrF5cM+7
J7fJs9+bVqiuy2FLShzYAYnvccflTfrqQoVM4ctDdSjfwNG4PJTz3EH1yZhWnzJcHHwD6TDE4jy55f8R

vFga6CcFUoyK7KTZPiiEMhBJcS4miLrOERVF6QRb31wVzFWOquKOv0uAJvOK8UA78RzhOu4rCpguHftP

3Dn7hP5sUI6y8oJsLsPWIG1z/7TDcKueRbGSa1c8Hs
uUFHejGZlQvTSFVH22zQcknIog4FlWTM1Zq5W1bOzxwJgy7nRKBsLDbkOQcuA5WipbPxYjZIIfhqZ2Ir

xGFbKz+7VCaWqQnYrgflvFSNiI6xWLFqvcYu2EWC+6KYQKBF/1ix8eiykrpeXvHUf0WZ3N7tGYqTPDB+

e0Ib75GjkdCSNdoU5mBNz7wyquHSGb93RIjg+jrEZU
XhJp71kYp9r/QJ1VOW8zZ+w9O0hGetPpQVCisawuzfxy2guAOeriC+G+4Tw1cjyG+Tai0zJ7914HTS46

89r33qud/YzgpjlpY+vVRzrrn4JZ1e8VtGnsvgGy+DnXBGPcTbNXQx0BcMVs97PJ73FjVWEOQKY445qL

0L3oO+jy8Vjj30nhkQqfN9lXdZpDbX7CZk4A2Np3c9
vn5+fl88QkIzlRLoMNi+sibndEvLQDh+AeRyZh00pp7iYvN2TrVyc0Fks9BJJxy5b+uU5ajA2feKY8O5

Rq0GPvWY1TwN41P4vLBwGpEUaNBIOknxLWLhhDEgpbiMEtHr8zBXyJAuhGmO0BwyjIxQLxQvKQHYmUoS

ejo5Nqj3jEaxt67ZHs96yleZ1DP/oPHWIp7AMrMPVy
PQ3lH0H8FH2obvyfYr+UyHzyKFqNeLz1vDFVOysogTxekV9pFMfU7FiMUTCoDeb8RZNO95ECc1PLW0ER

eyd4Apd/jzmYPtV9GElKSiulU96NRRAWtjwYMOK3+Y3aPFnzhWMQqsO0OYUKmc23cM1MpAZOn5Knd0Ea

hC3TlcWa5Tozr6T7M+IRZ3kYj95YB8AHbwiFhR/cNN
981bbgLhGWq0q76T/b5ScdFNkScQWLtNlMxO+64QTeD5nrumQ//fsC7tOnMUcfoyKirNZuNS0RKeUvKQ

0fqx2TGPXoALObGjcSBsFwVYsLJyTlRJJnQDlhTJ4RKDfxUNonFHJkQ7PbgxTmfKRqUOEkMl0DDNGrLB

4EKHZolCRxIUMgWtGkIMHBLgWoBNPlFGKaeFOMs7ox
WxIsWMC0TIQlIywaGE4MGJMiIW4Ie889BS64yxOyBIYhMXVMEiTjMGQvK8EolXNfLSdxP8IgD8WgPX8u

sXKoTA1kmMHcA4AsB3QharfnWBCKTcDtYXHiVDhxTSOiDgMfVWhxFHV+Fz0TUAB+Br9SRK2en0PcBBue

XeIxHC8ksx9LKGD0YK7LnDu4RHHfL7YvKMRfHAIkn5
BoJK5GXL3vnDbuULE8Ee6EGzNvz4MfIGMhWNlWza7LL68EXRR+N/E/pAKteZ0B+xpCUiyKJEUuGxMjPi

saczYhjo7Jmk/qbI8sucQvA5n6TcAOCQsP6CodflvJ/ceR4rVIrA4w/Uwe26Wa2hYch2QSeS2gfTpYFf

e16DZQAf+of0a6WPCtDCe0n3VXobqOeOQIsW3OwAEp
3/bv87I/cHvtxhps4JwnAr0xwbtOjFV1g167xhWG4zd74LnNqf2ivo13DZaKNvrm3fPIVoHuvSMJYkpw

ysqa1VCvZSbgXtK+O6cWXe+9Yd6odQUmvn18ngq9AthZPQaiL7iFjMtMrrXQchgqciehGe5qKwaaNi4b

2LXqdBjaclotFO/8vcs19u/wN6eYSmo55ix5eE9Mjd
dwXFhwNDOUpyW5Bp7yiuuOppnVRukL2JoAE56yvKPDDS0XsyNN2rOB9h0pVH+glWJ2pQh4Qq0IJC2aKN

1GFci/Y74xoq4PDz6oZznSZl5nmTKW7sr9VX7G8+mdRhr3IBLfekhoydQjZuybwxjtLe+VqRp1LkpPFN

g85CGLP3CvjrEpgwhw/MQECvQjP0IvmDbXUfrAMJXs
6DCKzwyctKkRc9lUWkeOzPe/mE+6asMTFpdGZglh0D5endTvCeSssakH4VMfTXRmke3oEGpsQ/SEwOZY

fvHA+9bOnN9LAVgzuvSYFZ4LS7V5cWqJXWgyi93O99ldU5b/lJkO2H3dz1ASP9MrjhAmEHUYXb6WAzbM

RWvOhXXO9qbegRpzhYY7XNj8JGfAXr27vsgfZJQSzQ
Y/FxjXIwXFXKUvYw6fyhuQ/Q/argrEwT8wR/UgYL7nBamxTKSVZGQjSQwXRN0x3ln0hlPLRA8jxc1yNq

dJR8Qjow/YXc7iTzhtBH8G2lEgbfesEAjeIJhULukZ9Xe19LyUUtDPRDbCG0JrDx7yPY/xZiHBKW0nK2

g4ztjDx4raruqzdLqbABlxM+q3vScSaqq1kmUbd4Iu
ML3oStB5SxxV5XqPFGvLd8v8gav4Q+wF2+8uQaCVger8Cs8fckd/d/M0i4mFsAau13d9k2VG++ncmI5p

8XJeggJhAIDvC/h7l/XY0nT+ilHnqe7CTSg8PRM3WQAWrW1IIPb28x2sad0D3M7g945AfSU4Rl6MwM5n

PEvQXYilLTTb9YDLuy3w77U9atDHzcONvIZimn/ew5
9whClVX8AhCkHYxKn30lPwV2fTJpM2wW2pSInUG3GghpDpIt8a5gDkL/6bPmxMpAxZ9tXHd0UMk1OstB

c//Gbc1F4DSE6ki4GoTRCeLAtvquDsMxkBByCmHLRvm1XgICkxQCa0GLpuTWGoI1S3pWNdRYMnSP2TEA

MfAX9AYXPjojShAhUoLBYRYgTsVIEwPeOiu7OfT3Ae
DXWeLWYLWNvnNHKeO76gNBzxWv18GApbTYPwNvJwNPj2Pn6QShKaNEEaI36zfTZznZPdBEEoRHAZQPog

QTKuR7jar5PtCZd3RQ2BKH9WegCze8OfgzVfI7lpA1SATZ1IHWGgS4SRH3WcE7jlJjRlv3IgN1rjDbKs

x1SdZt5MEwWoZa3QGlHnGP7qvn7UYWLbJDOeFuvDFi
RfJvJ7LWMgJuJoBWF7GEQiJdhnVaS1WVC5TkD7WUGeGWi4NOD3BgAgPUMmVpZxFOZjVfYnEDpmKBm1BY

Z2QXAaUpFoJvk0TGY7PDU2OEBdGBA6TWK3ChP4GSFnTWR6IQY3KbQ4JFFoPBQ5IEC7LhN5BGRbWgq9QW

M8NQD0KOYbRAduFAI0IMB4DPPyZAQ9ZXAnXZQpDyN3
SOO1EmV6JlKiNrYoHUZ0ZxK6XTXvGli4NSeiYoUrGzowKNBdFNV6RpG5PTReYWKmUUsrTkM2SdoqIxE1

KFa1LGF2NdKjSsC8UOHaZDZ2PgJfQcU0XGi1ISH2ChhyUlH9XPYgUCMLXkOjEmd1WFN3HFBaFtevNCL3

QJA0TdMjTpDeCON4IZH1AhC9JKMyHPU9MER9VoAyAn
hwSIB5NXG5MxTsUfXaWjQhFULxMVJvRvGoDFSwXLB2YaP6TUEsUSP6YYT8YnErQwCfRBRyFRP6LNV7CO

RoFKFeYAbtOjO0QHViNDdnJUFlDIA8MRKvZeV6PBY5ZDOnYhWhGIi3PXe7YGJ9BBSgFrAeWBq8CEN1EP

XuDgNqYIq8DPN8LCWzUxGeWKc7OUC3DRCvZrPoZVj2
LTB8DBEpIzWvIRv8OYZ9JSJoAnTuEDm2TBV5UQDhTsVdSIy3COX3GLQgKvSaIT2BTFK1YKXiWqPbYLj0

HEI4FYOcLuCdZIa4MKA3BFAlZpYcRYl3HRLaJlfwCeLuCQX9WsC2NRDvHFT3OLM8DsFpUgXlFEM2NXEn

FhX8VlcnVzsuLBX0EcX2GZFmMqCzEGibIdW8CWJqQI
UyPBG6HRFqYkSyXwLaXUJsYiFDWxNuNEp9OYRvInPoZnYuSnCoSBNgWwUuMxGiXTC4OAaxJOS4MuUrCW

N9WHWmNHW1NefqJiY0FMF0UtU9CvlyYsN9PXQ3JbVsNXIiGJvrQfN7YfkiYyW0TUZ2HaS6YigyEbm1OQ

W1BTZfCzoaAiv0BZthAdT3AcSgLhe9JO4PNNM4Deax
Nro6KSv9OJE8InuaQJb7JBp6QWR4BeQiKgMbYPxkJpC3MmInHhY6INV3GaS5CCCjEAL3DES5UdO2FAJj

NMN5VYJ4SmK8TKSdENi6DTYkUIM9SXWbGKT1CQJ0FnR3XGJwDuc7ZTO6DTWlOxkwTfd2YDM3HfRRMjEz

FTB4UYG9LkW5HCUwAUD2BLZ6HlN1IJDaZDK4XUNkNT
W4GLDiYPO9OXO5McG6AHNjJZTyOUN3AgE1CYBjWWXQUtXkYI2txt1ISRJhGMVgOwtGUbFxXLtMReMnES

DqXBveAY0Tt878XYYsT8IaeJOifc3TVBMjNU2Ea548NlJnDH4DsrlmeV5VJDAaFZ3Uw4SfhaFkBFK9X2

NohBsbdAotnAH1GOXxBYUxO2NqtVObLsEeXBxkWTGf
Y9FrZTpbSJLwDEdbCXEbZ6MnewJRWx92MGzgKW4wBIBeIQCiQLY4DVFxWNrvDWYkC6f4CXouM7ZwP1gl

WXHxW7KidVNiST7RZHS+Vy6BMA0ug9HyKXsdDcQzCD1ipa6JCKZ1RF0ZFDOtIZ3XvPBmO0JudgTdV5Wj

iTvlQW9MagYqXTeqDF1VHAFjXi3vhE2UkprcbV2Lmx
BmRGoyQy9AxG6YjrCbIW6ja2PdcbxBAgAsBFLkJrkur8GJoULmQCAdE4itu9YQkAWtJYD4PX8XVULmRB

9RtJB9nVWjXDAwXJQDXXgcMMSgM5VntsTFXXTpjkneeP7qWXQ7KJZeDq5RGCR+Oz0NGJ1ck0EfPVowZs

IlCU0apf8FPHPiYBi8LPV5YHQnEls4XYZqCjY2XqJf
IRX5RGG6VnH4PEesTnVcNLRuAFIoOlIuKtJhEBC8HAJ5NBUaMpl9LILbNgJwPjloQbw4VWV7OqD2DUUl

XZH3DOQ5MiY4ZEJuINE9RCD9QtA2VCTyMRW1THZ5McKhZfWiXhTrFWF3SPFDEeSnZIr2STE9QBT9JLQa

AYp9XHozDcE4RqGfZwCyGXhdTnR7TuokXcIdBSl3DB
W6SlDrYps0PZL8RgD8SpWkWwVnVXvmYmB9YySmIay8KKX6HiI2SxldObVuAAK7GuK3OQWaHfEhRWU3Og

L7LLQvGtM8ZSX6JnW5VHWvPPapESUoBpGpMsblMnNdUYD9AIY2ZQNqNbZaBHO8AlC6FRXfNOM0RYKsJY

L2JUZfYeHtODTvTTF4SGZaHbr8ZXV9XSA3VZHhCto3
FQr7VGZ8CEFvOpTnTEJwOSG1AIQqQfy1LJJ1NwLuCiZmRfDpVKD4DoP5ZxnaLRJ5LW1WCTN8EYYjTPAs

ZLP6XXDuCNLhCpi1YUU1QBZ8UZTnQdSaYHm7CTW9NOMaQqDkOOo2VSR4UIFnXcRlEQm4RYM0DWThXwFw

QZt2XQL2IUExFcSqAHe8NAO2ITJwJbSuDCc3YFZ1FL
YeUhKuKGm3ZMV6SGAeTnExATl3NTNSBrHhDeTiFOy8XWB1ZAXmOsXbLMq2CTG0RFZgZlYyVVy6BLR8II

VlKyc6OUWgPnS9BJDbHUD0SBD9NnS8HGIeGlwrVRB2LuNcGoPmSoA7NXP6VJG6XHZwOQr8JFOdJnR8Id

wqXIUtYGQsXMW0THofAhZtPFPjTzUaQgVgPDrvUJN7
HbK1PZGkYiSfSZDtUlXyZfEhShT1TZP1XxT6VjUeRVG8UWazHBE9OPFfCyZnUTiqHrG4QoIyNqLiWTwn

ZjS9QxIvPOEmDFH0AkDtLtD5CXC0EwA0IzjlUxC6RMK8XFMhDefnVcn6BRJ7HLK9ReJhLfDpJQa1QVGW

ZxPuZov5IEn4LSN6QmqsZay9AYC0PMI4LfyqZuFkCE
abSaF0IvUuDhUvNJP5DoN8WxrxAhSjDEL3IsK3BPMvNAK6MOF4PsQ2VGLgJCH6IIp9WSM5RQAcSVE6OK

H4VuR4ZVQiPTR5LUN4KSGdXeznSsr1IAL6GNT8RZAbJSfoOWUuHMW9PCKtPjEcSOXnJHX5BBSgPyQoLU

P8MNI6MPWqNqMjTMPqIQK1WZGdYjInGJE0XyI0IIIv
UTQ6IR2oUJdpnjCeUtgFNzG6UCJwc1LjHEzaYMn8VXmjPOBpZ7Z8kSZnYv3loKLpf7FrwCC1s2DQUbHw

GASsAs3fxE1knHUxDZVkXUqiPv6kSI3OSNFxWL6Jf7QcedPkTYZ7R0ApzCklxGpwjGZ3EFFsRQRcI8Kw

dGCcQlCsALwxNRUxX5TcOLvnSIQvOYfcNAPnB8Lmgu
PWIv48CIuzYA6rAGBbYYCjBAQ8HSAaTKqmLKTgO3j4NTkqU0AsU0fcQGDeU8IrdZXrQD1GGKF+Pg0KZW

5sd7EnAFbaKVKkIU9fqr4GOZA1DC0QAQOyVN4SfBPeM2AwmnQpB3ZswLcwKG2AwjKeZKyfPG4AKEFrDu

3ktH9AkuhbrXcSu7boF3OhU21aeU5qN2jeeuVgy5xH
txTuYGjhEp0VRWBaPP6KwWBqvTLuSDHyQuMrLZIdmJUeFXIcCuB7PIrbEXKpW9qaQKTlahLjZkGlVLFX

XwFxDHCfJy1krGFzi4KdsAO6u5FfPBweXWBXGYyuFV6+TIrkmcEiKstFUyYcENNfu4FyWBzvZTy4R4Uc

uISsdpTwSnjjvCRSNZFiBGGoX3oumqb3gLSfMVChBk
IqXWGrV3IkWVFhSJB9Kc7+TLoyREP5ueUebS3LVAQfdBw0RCTT9su3N2dDezRAJYNf9ZOrYMRVUJTenX

mbC0TWPHRTMUNRRiJ2uSQfKqmjR4YLjaUMlNCGFyFeXEWNWoaO0JjOexBqNd9P1cWX3P4syEnhsAwxAf

P/8z///3zdee+w9LAIfiEoXmG4PUAMisDR7C3T+bQp
BO0pJvtCKmuWI4uY5EImHaC8kqNroF2oCP62yMO4nZnY8g/1mnn+O44t+wz+5zJigFuBE7s83nsY3YNg

d1EunveflU92Bc+Nupqo+Y3g/7P56gyE5/7+8t3XLnrIbvhjRcHwzr/qHtK92nnIc68cWfMjKOSy8W/K

+U+fe/nsO9/jb20VaLI0k84mH+qUl4g/p77mfz4Ky2
5+1quZ5rw5XZ9m/6125njaN4gYgyXRC+83izdCM18tOxtnRyWJpS8mAsp2E+pGUk4s/bjlx+tjyQmv/8

R6VMhW+Qz1p/6tVHdroEqiXPew939zfCZ6GlUKFUvJqSd0fN4gZ1GnHLchHK3iIRX1QzDnsFY8sI9SG+

xe0w3hxDCAkZY78CE6lScNDs6ME8Q5pRMKwaaB3VQY
VgUv21sWgjsCw0q18FT1O3ET9ehkELT2Yu1Io4C/LoVtdsGhnHXUslg8Zfg37cIz+vDpCY3oDY0Is5Vx

FUgsch1kNBKB6u5qDleZGkXprbvmPgpucy+n75uKRptGg+hDcXfUMxREKZXgfJCuprXaJbqwLrd+hASp

dC1k0GkEHRB7ZAZyn0bHRIK4UctBLr+7OxlmpLHWk1
R1xjCiqkpVZTVTJgEF3dH3FMeBxSghhkUw+NbS7+bl0kk9LD7gceHP0cmW5O2d0ZBOu8XejcChGPfCDQ

56uT1LoQW/TS/W7xZysrzanKULpzC5szqliMnRkTtf7pEW+JergZtV002uC7m+mI9MZE88jX7In/SRSB

FMCuIMPoiOAWFynuS4VER2oSnvUpg9GXZ/TOWK1fWd
PKT9Vt+addGr7o4qimAK5fm+nnQsY36toc5VMN6q8fXkiTm1Fb7yN5sk0B/Q3/HFmVhpBzFdLW4s46Hu

6L7XoSsDjtvKMKE0cZPdzQxmfNMHsXSztkjutPvHeu/Ts/Vh7Rs9JcBD9hjtw2xblc/X/aHueyvLWkJj

8W2hmuS30Cl78L24VA/g+bM6MBbEIGNZ5d0EWXS7Bn
+KJuGNZMxJW4I6sOJ3+Fp2Ar9K96vZAr3WmpVBoeu6+Apm1xPsji06OP3W72B6M/zK6nhqq5XcViB+Wo

f0C9Qw9c6Q6hptRf2AU9KvcPbgC4BbmFFC8Bp9DIkdR3n+4yXvaz/99lSnUx/UGg7adnN8FMJbrN5yJp

4DgLaeCpNIgXt4pxR8q2VJ92X/PT9Ms4mekgjkanIq
RsrW2cxaWOZ5r9poSpMlNp09uUnfPoshwlVRtmRpMuxRlNdG07dtWWjYjROlYurGc+HYigpxuJZw8Gtd

nhE4sEZCn4zhq37LSvqu2q+1e9YpnJ/rotSm3ld08yG5XN1qEsoid5/Tj+pDcEoBv9huXrO402yu7gi/

65sgWdSY6QikqeRgb0v3486jSAoyZrzLaq6QKdJYnz
qCpe6Rg6qslsH3JV+iP0+kVd5BNQ+YD7MksBZWOalLS8p+ot4DSYc6tAI1C/Ih+U2xg39HEdXTOvfbrj

8dU62IAM66K51y05D6WDYUF3xSVzQ4vjkqYhJoL9l8FusmL5wi1hpap4MkF6tg0oMdifuXKn9JGu/4nT

5ZPUQW8TS6RrfM6OSRyLnu4gCiVwqAO0Ke5IEyEiu0
Z9FMqaOBm4nvu9StecW/RF9hHC+j/xJl+uV0O/TNX7BhnpajtxPnwfoYaXq76Sb8WkIZ93icD+Dt+oh2

OcMt3W7hJw2qvGd+Q/XuhH8KA8vXSsoV3sbeiB8FXObWEWMnikW3oUBAEFaG68+Ql7dT8vbAFxStacLI

eiuO6btuYww9hPoS078hlMOdWPKreDzWG58yrzIUc+
6a6JhxRYvSgutOdWTWuO2hS5kkjeLmfVdQDn30nsgFjT4rHgsmqMJ9H0UEKxry8OrlR7UtqShhOLpphc

Q65NSb9zAa4AkQs5i2y1rwhm/GGnoviRRFhmekYkoM66lhEKP8O2I953DKO89G4sY69o1XeUeyGvfIK/

ZJu2KEG2J41IVrcUu71uW7zW7UiSv5qFUM3LGcDJSi
MI8+6jFoiicDbRwSb+B3c8Ylyjb3G3sWFeB0xKucQDvfbx/9s1y/Sl+k/9B5eEYM4jchG2lp0zKF3LuV

FpSBNiCKgZ30Pdrx4Zn+44cyNttUCVPDPRmlwSw17shuzj5+YQ4U6ZqAvpBxqYdrxgaBoF9+DZvDH3Pj

bD5YpLITr2HvUsCP7XRxAi5GLAL8UlSWbJEcUa2wxI
ZaS6nx+5USrjcCpAXXImJGk6GRHMtDHIvtiIyJEmIDaZiGpiQM5CVATOBvLXohHcXHdSB4p/tP6I4fiz

Bb06stkqJ4tj7ntDxyvs3VMkNCQ4tfC0El27wlxn/EXk2ytcD1gN9se1JLmMO6PbedDJ55qyzFafQ2Jw

5C/y5hqYMFM3/HLdoEokrAQEC6jk2Equ1gojnpDBgM
TBU+y53ulyd1relCUP06dYBEU/0px8Pt0ObenmzAuusQYBjddKZ8M3s1YA+Y8Rl2qonKjL9yWEMqEFzn

CDgOmPIIvh/i+1R8sYXhleiSYUpNSd4HdfGFYQ/1J72y5g60PoHX4VPDMk/HM1a+i6h8O49Y+E6i57m9

Mcqqc5p1yvphTUlIfmFG5ryhIJzGtLOBcxQSyr17pu
uxw3XCc17X5nGO0sP2rKzxob2KKVL/rq6HneWLw+ReR8vbrZPNkVAte1BqU0nFeCKNjvbQUyambJ6Gjq

UscNtcGBPM7GnV+OV+4DXgLeoGBIHRgFe+OgRpgtEypxAi22FDFKzyyqUhrBTwTGYcvj0l+dp5KpP7lJ

7B10vMroa4hSWexNBRxMTTxtVg8dXOh52E35ARSXO4
UXc+QUZBddImRVFoZ2TP7tEtSEyuFos5N/thW/omUA0v5HX4tMiBom0uwc64gI1w8G1hxfcMo/wPpctg

+gp23MXh/fw42vHP+p8s5XTQ+pR7vDPV4PzozCnw9JFr2gSI+oBBeABncaM6zLIucckisemu48kWIKLm

XZIgNH8ds5t8QdO2SmmF6/OsT7IG0s4cYg4ZT0fQQG
07+Lv5atbnwiPyoFklSlxVnxEa1yPK9G8WCuiqW8ZpwluwXjpGdwL9aYDFYlRq5ngkXjCFDnloQG4lQq

wIYxseGGMD5JOmL/OV8uaJbrB7DYtBGStuXvXpCr8G5njTerbIAUR2HKzO2PedGjimV1QW86UhMkxZhV

9YLjRhu3d+MPKbgPEv0G32u551YLQGFVmHhyNdx13Y
SRgXrbWmUQ0nKXVSL4hbAjhs5goCCsWuEXiT1UnhR4xddlGD4pzSfI2IWZP61uJ4YQIVtcxz2ZGnO5Wx

N4StA4TWdKpfmDq5GyOif56nSV8jRwaKk+ZtViuabP++yT+/a2ZVp7Rt3hFCWp3wH9r8K46LFI3muB73

v17B4rW/dH7Ucgs7BJqUQ+6ADnmfx3P9DY+gFLkZNK
smZTySxdSkd/kvCL900UqgBjjRjH+f7Qi0M4v1cxe7b4CUSf6GDSk88yziisXycwfcU9X6wruNfK9+xb

vswxlmgx8Y6YBZdW8tUZKp5FmTslDboQIGAy6DeLSHzyMUe7b7RN5C1EcwfbivPLP1y/tIlwvf379r7z

J3960sGZDQ3qnUXRsnpMTTvINNQs3dTPwxs1mBe5rv
UaNSbecmS7Lp09C6FVpyzYK8cp6l9qckeA/Ys7a7s2Pk7rBTFlYG5CONG6TqjYbzu2Sm4TEIj2Iruyvu

lQtOD404JyyYhL4WWqbqJbQb/dC0gJTzFQ2iDmuGMUkLOTALcTdD1r2paNTLW2P3AmvJcbFRS5rHwVgE

OaVlMe9rwyW7qcIxszfjqCapJHQQh5Qsy5z7RLibyA
VORi4jSVZi9OJzsoOpn/OsjL4Qws8OC1gmv4XXKXuPQuLrUTPxs3ueDlX0z4Z/iHvQGPCZtky5sC+ZlB

Cq0BjHn/IIxmd5HOpcyJOUtYi44De/Dm32fP8LwxpUH/SF54zF5ayy04QkSpjwILRbMRLqWk21dVgOsb

mcz90up2jt7y8e4kassouSE9JIF33kBe64kKtUm5Jd
7Vc8+7kGMh4xvBABCIKWwtVHPsN3T3QFgtQOuq7wwAPVybmnAMmxmwU7A6gL1+HEp4CpAPlwgnipCwES

sbnyo5RzmMtthhwUEU85wIG31cMvIcRMe/z+6xbgGYWSbrbdh23kWoxyPxrcVZ99JMIV42/uN+kc0ZM4

wn5an0hs6R7rnx9ZgDLt9h/WWd7tVhhl8+DRF/Q6o6
wc0XOzUHwzuL0quP4untd3Czi2V57zfMX60xN9oOdYh1RYTfE0+Ocwhd5rt+OFdvqC8Aau/dS6KxDdj3

Fi4NFl7/TOTSrEjOAXy1XxhWLYbttJEhPFVHyHgY6NAZjgjqGtAqoJeBoH3I53rpwL7IOWNrqXNHzu9y

0jpE6EpAAs6h7WmYY7qe5SiS9WvFdnW1S5tnIZ7Ql9
MNAyBhDEtVGcCkYMdrwBFgYDIeU2wkl9ytj90uqNKZXVvpQ9oL2Qm/usDUNTb4a4+433zQ7Zgwk33GGR

sIPgCNJQCbmZMK9GNRwjhYnnUQI1V+qqbdWdJfRZUEyeYef1HNDhRGXtL54uA/XlLd5aTmC88L3ICJZf

ldR/xWEi2ikfykbEPDnYplE4lMKD5ORKIM+C9sjwzY
Slek3Y1J3mXY3SumsB9VeAQtfsqlJoLwrAVPqkA/a7/no2k7k4hc+IU/1pINyxP+LRvMloa2TulBwHP4

ZUsa3FQ3WJZcGm15OCz9N6PGMgMbkSgCjxHUgRQg0hF/NTN+jT+F1eTO34AvTPGL3RaCnxCm1tkbNezs

UZaJs++82rSjriQ3M7Ubcrca14pfBvOA8Xl5hjPRcR
hm4dj8fXWmc15vqYZ/7TLrddmSt4PH09gsPcm1UxpctMnQ2iTQWddaa2UpfLplpw29INjOeHS0vx3PA0

MrTWFNSfpKvBuwn+WgP1YX14g2TPui5ZuPOHIHxPDPTE0Q/eVUiYfitzbIRfqBpkR50xujaoGQ6v72Ft

WlgCp0J+CW00+9Bs+M6Eax9WTo7UP7mArPhuLbNkVQ
iNiO5ADWc/IsrkMA85ow7nGAsOXGHK5R1Ak20V+3uQjxzP3OrtV5zQIsRoAmlPCeLJ55ID3OQMlJiOlf

cdeVaM3hD2FFlJ8gaaEFUb7VYGClCuRlQiX/bOmU5MDKlDpEXHjA4mVvvpY4Nt5Kq1/hE4tJTHvHnxwz

6wPJ2a08vdWBpRJ5q2KSJThulQQyENXBwBmuqB7UtA
JegsIBkYIw/TvR41HmGUKfCD5vkyoswjCgJd4trdUVrFzcYy83qj+9QOYtFEJn6FBQ4+zk9Bq20jbcrv

F783W72AWzbQ0oFp2p6tZ6vO/iVvSyU9NUGOWzgY5vnS2yP/PL2fOdEAQT8036ybVa7r4S4lJzBHrUSG

4PDIFWCADXIKv9CnD3hMms7V+Lo+4O8ZptvAoAcVzQ
tzn+N0gF60eTk3hMIpFtmXvyZ5kvWJX+MGYrofzsbQ9JkDKeSmkyWLC+BTYAMwC+xNJYShWLrzHN85H/

YP0aqsq2PbNI+kEbXb3mT7H0iM3NmDeEkldOtjDEKWaeDzvkZz9mR4ZtVBw0lbvCK+84LKf4xtlf70et

/uU/UUY0//kgazDGoMom+6rOabJLesbBhE3BUUp5JV
9BlCXqdpyVCnK9UMXmAB/hAt0g4DTSCIYOC95NBwfaekmbqOD2oy6AvIDcL9MjKm5WWdSyaKB6F9Azhs

skDWRM0NbIR3RK691dKpuWnB5Z+DxPxWrtCkbtOJ89pL0NgIGcCcHzDpek22p6CqBedoMwQ1JXsqYlP+

y3HQ4QIrhVPQ458u+L8C+ZaxGTpzs/FAsrio3K8146
katHd9TX6KCajEsIK9cUWMwg1cMejuktwr+y4U5lkI4biPyPwnbpC6XHJ5goFRy1oNnHm5uiyju556ST

Lg4lD4yAARS+Qe8VNWUm+iLOpMc6pQbhuZNS7ogt+46V5meBD8suJndWMvii6NI2dfvZh0z44IhB4AcK

TE89z9A0PNC5oa/jqzvn/nb1yXf6F8F9O/nRnRPxtH
1jyErsHA0Vy1wZYt1o62q48dWzu0lmIq7buBS2UHqu+Aq9PKPW6lkWnTcM9pSJki/NgZ+8TOeQ5PsbJf

W4+u42kRKlg84qpW+0+XXQ6IhcmQW0cOL3SAjRqRjIcojYyakiPDW6nc9EVHuZ32yv6Q3+h4JV+UuYVu

Qt6b8eUPpozncUEA+oTcs/AT7HqlRmA1BETCPzxzRT
iKusEdxxwD3oNqK8K3HM82N18dWwi3wDd3uZ1glxhTsDXWub7ISEoAyILDVNY3dY0Dbi9YMgiUagge+D

0XDw6853bg7k8OlbgG2xYmrSBT5zPhb+ogSI+oklSaU6WQI979njQTnOWhvWm/r7IqEOJ68mJNzebMCu

WR38ZvL+EdcjHENK05RL6MVPRGA+roBbJ0jPKleYZV
VxEzIBEBhCpSo6Ji6/hdVbszJtROa5EkMJT/qdZObZCxVrT3FsTZft80G3AUvWblhq20K4y78DKpP8cd

Suc+Nd7FDzn5oX4BqtBBEzoLZoGhiZ3oXjDxqcEcHD33ZgqyX8hgs6xKAA8NLZefxJTI3us9SncFxv6O

LrcSlWwik0C06DY0NRuVk9CIH1oqJd2dny72uf9Xi5
5hAAvO19BDUszS6zE18H5J0IebuwH32EiVFM11LGorRBprNIj5WxblY7S3XfnK2acY/XotrWC/3pw6aC

G8/zbYlhx+iEZzuHwD/tU0Ue+D7JevMn1uIUeR1JmiEnU+WX3RmTcv7SspwZ6h6CbHnXJWhjHVD3xl/g

QxYJeKOyxadUt4MJVV0kJgKBCWWfRtwYwNF9cMWrgo
JE9epNnYygNb1gm66OdQxjs8+6k/4hq8FU1pvTCgLfsHsDfpVn40wg8SA79kkZF1ACqD8+v0eL0KB35v

MJxjVbyy1B9Iafx+0W877E8aXxNzA7yJ2vGBzU0XGjDMXqA96WqdQv1t00op8BXScV9w+J/JLBPPr5xt

jEh+1TJBKE5X/QXgRyR6mVmLuWtwzP5hTcl6xZvHvc
i/mLyi5Cv566z2tlTIcm89oSymj9H2Y9vgXqknD+F2ZOac6yiJ/7Js/nrCxb7Mdyn+/0bxdh+j2qTq60

4X1fNtxxGtfp0B6GnWr3kmm702JlklUQ1SeaoziSPqWR1dr2dUsvqoU14Ts7bB8pwD7SQWwY9GHyVo4+

1sFIryXqY1iATmo9kFCGML2kyCDX7j/dl+dsL2X6d0
qYQL11ZPJtN+A6UXOPFu9T7cGUZIIsWhh9yJgL/fTDyGeBc1iNwCI1pZt+3jtktk+0C+7vpBGgSaDtoH

yKK0hZmSr7qvaA7Mc1RDcBsqpip56+X5/xMwdvYDLwMv/R9oDfH9SqESzG/DDhkIO/Bc0DEUlIANFanG

yjWOs5PGtrR+mNAY9bEzaNLyK6JD3vOTEn40Uh2wNB
4EgeQ0OJkd3AYXWHaulGH1+RXa6U++VbNuP7EKS/5OIoMRsD8CUW3/+ZuN97TFv/7QjCbI4UT3/tRk+K

8BdsH/PohPPCAw7jqlI8gUlA2HKx0aIbbpT13B/+W43TvBX3azj3kHzD9e8sJ0LQMJ1Evg863P5Uz4vh

v+UnBrYWOJyxaF1octs+3Hpc12ey8C21Y6U8410ATy
FGqJsnrDva1N8pBgUm2p9n/VytU0XP1mxoCj417zW3S6Euuv1nDcyBiUhO1ZtmfxIktyLd/QOiAWSHbo

MAG5JXtuR4I0JrW/u8Yy2YXV/VsvZC3LuZhfGydjwc3Eg8+Radha/Rqe60vzw4trqtPiEj9t/9w9p09f/

TQ4ZguZ8LdqDu7NncAHP5Hl2m8XF8F1kq+XnmaPD5+
l/1+/S4q9fFkWFbnJ3xrSd3xJk7KPJ/A3Zub/WH2l3/Gp/hF3i+xHsTtqx80NrvHUDcjkKrMw15SIG/s

xp29+QWQEp1208f532nsiV6ZNFMj1aMDcJKdR9yEsWkdf+ws9iCFe2PRTPcdqLzDqMNQu9AkPHvRI1l6

1T8P8G/by6yPHqqgEPJI+M9Jebxki5QEBh3EUuWM4g
Ck2OWTBijL09Mx8sJnETdCkU09j3PMiq/CLACHA8UAwVViKG6IbSGwTh7RMkMa3euZq9Fe38JbAk+n3M

6T0+50W2hyVEnvoG95HB6Cxe0eK137E4Un8v6GUEi7J3S0yGQhSx7wLMP418Vy4/97yRfg6+UQt2MxkR

FNsEz5byPSab2YpXZV06mp+ls1CScBulRmys78V3q8
alZ9Emnul1sWS06T2mJ0+hUHVeri5EZuD+i/cuDnqFnwww4vjq6GprbmS62oufq4elg8grpVde01LO7v

Y7QQYSvX6uR4bFfz7A5N2uPUsuH++X5lOL8pq5pOtZqYkBkYDdSU/RnKunikXeGg98Lb2H6ovTQqp3uL

DiuLX0lQD98bp7jpIsep1ctTYOG1R/50fu00+iMdpC
rPvcPdnHQN+iS/OoyGGXsfP0HrZqTd9UFgDgT2rjxV5++8bP40Aix31K6cp2/iq742XcB2TIbuXNg9+1

B8pA59N7KwFGxSnoC+y9R3U+d3iFySX9FcyYMVmcwoKSNzgef23keaNNHdtbzc2H+3hDPJSJYu6oMBsY

y44m5QYFvL9gTYSjxGzDiB7hkqRP9Oi9lcgdzCHHFy
Z8KDSO1PuaAkDG9VwhzmfD+gnY5W2qJ1632n2YRXTIWPtNOawlciwvpoJtOCOw/Sr6LmqY8flyj0Gom3

7q48lQ3aH9oX/7TCvelW2DqQ0mjHK4SyCOx1RLlUeYUTZ+kXU2SZgYmFE/CYt3dZxJr3w/d3Fq5oewnY

1CIqP2d6ERvUZpypLH4HRJcQMIBkvl8jQnsT2oC9Jl
5ILCcOP70wqNKSzqhx7vScaKUnRRSbLnaPS0QOYnGaOCzYgxrV5QQ+qN1JWAgQkrVlWgQCNi0FQlJEBl

UOXicfZyy0rkWk6wiPF+1YUEHHn5EzPB+fINRALhgZ+RryASCC/2XGSgm89fBSLb4E7UtbBYPBfZUFoe

v9ZFwHqJe6ZaGb/nzEhgaw6OGc+r+u5cNcCRNu+fHL
7/Mervat+cqUDKSee3P3s5ehoFg0M/QaRdaN85dEv9iIk5AxahlfXYGQiKEmuAlkip64nF4Kzj00qpx6HU5

CvRPhbAn+xy1Nls+0yVCuX8yvkg3jnM2IGFmqMkq88URusOnpLuBbcGTqpasDEk9X8eE4tZ+jTAO+Lvu

fHwo21Yk9iNl/LeZ3WC/BdjdS9c9RMx5JDwzao7loW
bMYhZ87uY89D+sIN8jh1mBZw39+ljBkbWv3nuCLgQHej3tF/gOFK9ioNpMMcJekKW7tQU1AH93FhhN0L

gfe08LBPcF8LrdmOQc2oc3ct0YlAAKKvZlnkQX34QBBGa2qq+pMUHR91Y5K8De5Gue4AERoS5T6OW/jv

xLz+HA1zxyIrsgMve1gHlq7o6RbfyKGz7eC8fsn4y5
hqFnECjDXWFWN2bfP6VdC1Z/i1o7GC8AUDyb/MyTZOL0eh+lP1R9QY+GP2/iwam8VBImRbGIIdvF8zG1

5jsi57RLxZFnbO7nvYC79YaR40QzdJAYsNvG6gECriteVgDdICp8/zHElQdTyhNY13M/fd+QzQcgk9I0

ZHclx8U0+besbIsTb0BM8sK8Lv4bMkeqtLEXEt6euF
4FN6Su4mn9EDzQsy6n7u3lp3p0AOA6vmm5OtG0Uhm/m0s4kBeHBcZynLiFrBKIjUj/FXNihbuwnV31X+

2Nchdp28vKTAJatIpERNdNjUkZYbTu6yemICfwD1dcrbIvpM3zvx+XF5gUThz18kTQsjpAi7sXrN7xll

9dF0W8rXlCfdG1K8QPbdzfZSh9b2pUcf3+sMxcPnZ1
UCB2hOli9ExS/92eF/Dpwy5vEtyq8gU8bAjxgIA98sa2/uuf/kN0h/3F0up3L+U3WOM/2Ekl9htioGgv

yxchldBMnYKAuXvsW4ly5/7UpQEfB9mHB/f44Mcw6LUyn/0Fd1GN7WcwGBhCfXaBxz2siise4/L6cBGn

7E1yI4JXfx1Klj2Gd8tebxZzz9R5sL4iT6l4t/hVPV
CtQ2lnth01OGAX/ytdlO5L/h+v96ePzTm+S2ZJvOsXaW1edaEIg6/nss0bY5nPjLHbkbFLz9wv8cvwcz

OyfE6IHH6bZ+VySsfyic+31wllaugaH0CEq60alZ8j/JHANBGjgo6jnDQD0BjycoLbpt3w8kg3RPPPgg

5Xf41jXj4yf8s8g0cK70Qt4/bjv+u+7ul5ped3P8VV
1iCVxj4F2yqXAmnHY+FUPirngAzS5GtJduzN7Mstm3VQWEajzywvLZ/Ltney6Bx+pvohWBj6KJ29je53

biVIhNxc1S+Z5OGZc1R/82psq4u0Cncnt0t9i4nJ9NhcGr0okEx2Oft3zM5VUdH9nBjgUhKz/zbvrzNP

2Vn90neTxF9asXdiv3xqffHT++yi5NgY636IA9anEm
rJXHOr2yLT+M2fntX68f9xWj9h7pBlttZZayppHhqr9MRw418vxmxKvpwYmty4+Zy2C19y/xNmxeKh8l

Vot67Lh4Zdf5EgrN+kWkmQd6zWqzIlT3ipKdtR1Kx58IGWcnbi/hA1AbTVAvo7WNYeOb79fwvRpA3cnh

topImxGjp7ZFw+v7Dmp/aRb0+HvkU3d/RBpu9YZAht
sRieJGSIYwWl3zSUtTAR/YIjZPleQZ0UtES89tyHampjVb9+HXYe/EI/vmKX8tKbBnYDfeGSSUfHwfF7

FPfgQ/cAvdcwUjFH8wDArWPrnopNjsl0+mIxaNNrbB5ved3wbmcXs5wJww98COZ/xbKelqPBia0cDwvO

+OpAcu4DivDmR/aeH4en1pqC0y/WawtC0pTm8ADELL
2ik+p/34GYhLSl6G4IJcK8DCQ3mfUfVguwl69B+EzfbG2OGhizjU9QmbOssgzSB61V07rLMlw4gU9wtW

zQQ0GfTMb6E2+M0lvk+q5aKlaf26u54wiIARg6kBChQDmm5pySHvMzn4S7DtZwB/S49SSx5062M0e+2x

exwuCs9acssUZ7U9130u5+oxnIOp1xR6tnfR/VFqjZ
fdaNrdC52KJQ9LfNug721H0u962czXFqr29bWuPY5VUjKsj5nuEH5ajObq49JS17pnUIfI7jQz97tFwH

/z88zx1pmafYsSMs7VfSzeupu+D2gbZye7n6V5KU00J89lNkhnZ47sAriu63hfx9W2AD7htgyjSs3e6U

RZan/EvleQv54nfjkmLGuO0im2gvqN8tq9k52+Qu3p
L7Hzo+mSTdg65je3l/M4Q9JsgynqiOfdEgm0LNzH0xg0/2HO/q363UT9Pu0FVMS6lnWqmjP4bjfWadA3

Kiir1W5iEK+pwOS5xOJfs3I+LUVzmHFQkUUaE7diK1DrxKp5d0DXM+7KJzldziJ2Zz37oK6z/0r2zeNT

ZOsv+aatg+CF6NHpBpUMd4EdvphdnZtu4qQva0837s
MIzbrXEmy2DFs3yafbGaf/k3nBHmVko36I5VbRIdNKNga0Z4uI0pkQxeVCLWkf6leNcr/21q+5jRuae7

F/UeuVIYhnG+S0ne/au5e3ZpiDY/xcks2J5nrhyw5e9j80d6amnO97baOmnanzyFokNp9HB3OEd8bwjd

epH4yZVi8lik/eVr/0z0mm9twOuH++cs8TFqzm6B8h
TOq6ecdN+wVDcqdAaf7rKBzFMMV4DGuC8JoAjDFhXy6VbuC+Bulr41Qxucj/E9I1Xz3oxwqpK9HfDY4O

ug4RKt2nfXizzF5HrXJk7r5iKe+ugKF+O+ViE9+rUXeh+nZooHgPnK2FtdWjJe3HaJYDQKps4GES3Px1

jQX53aM+XZ5Atqz0rR76Ij5J/SamSEtEPZz+fVUWh+
zThJps5ejA9yaNulBnsQR+XFKEp2AmXpMbAaNCJFg9kGH72ay6cypf52aiS9dDvFvMMmna5qjugM9Gie

vZEcRzyCv5D8VOY49u7j6ZOsO4T/BiHsuE/wStFl/Fpu2B0HDffZl5G10c/asqu0GKsvu7EWlC/RALvj

/YLQj2po0N8Pj46YOoNQsGx+Cuu53M9hamB5A7U+Uc
w5y4l+3HRfyNRGArBzWF1xxiC0w3A+q58T7VzaogTgXZcmZsYB0ughzD6HU9DiFP4aC/TV1R/jyaw0Pd

kGkAsNTIObO+sfCm5hOhV+NEuvtI9tZAVUkrdkm1gC7dgUyCxpfSn4nkLyuNodVIAt8c6Z2V6pyi37ca

J6lU5PjHA5dH9g4/8IgxBm0/BMkV45r9G4aVdsq21X
L4VzqIrjqA83j1c7pUIpRYpGnZyl0ctIz1AGV9kIgIWA1H15c+zK6cqcaFterVJuecRZEBWuctIiOJa6

IHdW9QYrQ0mWvoL42LZICIO79W00qYbwga/nb43oIz1NAQhpfOAEns1CN51iG/DmwR8KbBbGoq98vz4b

hWV3Ek0FP41r/ZHfa6HpugdDBXN5ZQ32ddCe4bYdbd
778U/8A5LOpNYPFUYfUIcykYY0oIlzxCebTz1XtDB8Sh25E+rKNYPzUEzGOPu/trqsnQVZlSXQ8a4Dg8

5mLExVlMcs74hfGb5DUA8Lq0mFGriWsBz4B61Aw80y3oecZnQQeWISsSSamx0YAhD97e9BMcPxbs2nzn

ZDth4njPOueuU9kGYpe4wyJxPGZKp56OGW2HzFiNU5
Rv6A9KT8/vrsv5/CW8vIPcvHZl78u0pvR3fx3EWDWtH/wq3wZ2kZIRRcDuiffca0LLjCh1wkG+21NdZ9

HVL2ENQlrRLuy5G+/9/0F52htKioV9YN+0xC2hrdpwdMbVLgmCnJzt3psD72gmKXHA8NSpzfE2sg/TUs

8TFGW+W3+USUn2MWYiUAiHyNoPWt92JlpT0tb/hf01
R+2VNle/CYk5ec1t7eSFG4kEs1aRSEOZkqG+evmeU70Tro3j2j3+ffcLfeK1uTigTU3glb4bv/k3zDPM

/nEo7DsuId6zZ41RM3xOC7o0S946OefwUj8xj1jTHWlUtJhiR8JSZcod9M6PfeDgQLZt+7sJJ5AbfCW0

PbvjxkUigkOITnyINh4jsgp8hggsdz4e9Pbqz8O+RR
gRwJusS4ra7X/wDn4Dd+1bKBPYAvtm3SUaz/UK7PKyuCcZlnpWDhO7A+lg3rltILdVGjy8PTA6i4NRkX

Zi2guu2ikE4yQHx2s+57V9+G+P+LdDzpq/k/x6WR6DObOIIWYOA2Ne/PKnvj329P4hkPwqC1hmzRfMTt

8mHwhbkziqJTSy3gWOE0t8rr0m/QHrnTivIRuZuuSP
ukY6RMbnPM2lq/TUA8gRMY2IjZ/9LUeqepbu6ZyfskHgPLfWgnUA93Tgk2nCwZ+Lind8NZM/wfziCeOL

ui9ELbleMq6+jvn32vAxrx7O5xpxhc3HKrNEul5u61NkNocy+h5oXeemAdKsxQxX16C8KJPOlDPICtgO

iqW0RNLHcITNlRP6Ub6llPRrfroHHEhZ/sAUBEwJCy
gN+RGUfyZPyF+o9GpdfkmYKI5US3osGqt8Asm/P/Sy5lptE/CHDuQG/DMd7elpuAchBxx5iq+Gx3Rl78

mqGtKNVRXLz84Kgw5/IOh1ovL1n1tt0wgryD55b+dvDW402U1pbAd4UScL8Ep7SvfwSo6UQHRRhlH0Ff

dyXwRAdjTNtn4fTBEYPyM1cIfnKpOVW3dfkmMRM8n5
fD4FfAlPv3TL/9mgS0MPyqhsMTmXZIhZ/wx+B2jJW939V1JS5qv5kTOneIesfOyw8uU6eFYLpJmXsnON

ra4/osDpu9lcfayUkulbo4S7LCQ44K1kRzuDjW3IFV6wqq+PKbRKcJDdh7HhY8IsPFTG6P+gCcnFJcUh

sRhF+vlN+K3s97P/P7vy9Ve/KCqQHSivmlmKpkmqCt
NB80FqdvZHS0iwnRKrirqRXJqYLDyHgKsHOddwBLZba4khWISqkjvTiGA3wAV5XzrhmgpScd0LQhHe61

XDxwBvBUv5yNiDOEQLKNPvs+yIQFXCpGXvxhrzDCRDo6QQqzCDHXBnrAhKNVZAgOIwNf9a7JJtWPxpm/

gZ0BOHN/08dNFyLkzNXrrtoHN8ZP2JR0noHwUnrXw9
9u8tuSPgLkLOar+zS+pbvuEgNVvNZ8Qxi1Bl5qgljIMF/opp8BM44GssGjlKiZITes+fLkuuoWBmRklI

ksGIQmjxqzfvLp3L+yShNaGxjafWSqVyk3p3M8bes+O58I3PwmJFXE+p6jiVFaCgDea234dJtijjAKbD

z47vXR/dcLIGn4v3QZd3RFSnLi1/DDEmfTKhOPDc8t
3Ntmrp7qJHp7Aypd1mpg5oCx9XxvJHt5EdHztSw4NpjOXHuTBhkOP7/r4lvhJq4vmIJlnenyBafUxd0V

Zu4CBcSnh4HZ1amrAa+nNCVISRmYa/LcmPf4OJexzTBaqhOpD/F1wcwtMW4OcI3ERy3pSyBjAsPIL+wJ

8uHOc1xfyuvBvxqo4I6RTuwIgz1lqVmy8p1en3Y1Tb
w7PD5bhh4GTepEWtMRJDctOSckDHgvX0VeIrt48rrjRK6lFnd2J1D7qcTp/9psiFFVgF9LIuchhCFxET

ehV/9h9aOqUD2BBS3g+kOzXUpQ8VeTn45m7UqTkCIwXEJMORp5EKGfnIRZ6iVgjsy7M9eVvA6Xuu5h8T

bTz5w9qhK9qrpMnDV9zoSjCTR0NBQEOkWM5X4d3cCF
iz16BhIDnNLyeYfCwbtmJR16NSBMj2adJPSX3SW6bzqjb4OoN47JJD4pVxLNnW7KZT/33H0D3TIKnGwO

vvwOyMrSz45MkYh9Lx7JxOcw8cy0gC5EQBTdmC8lM+hd5xcqPJAPPDx1LwT8r9sFqfDBE0glXW0JpQf5

OUEpUDJFnNkpyeyq/4X/wvzoPa/0mclo7H/wNeWB8I
GU9de0MtZWMkDPjljvHxRkgAUvIgBRFfp8QlSAoxMYg3Q0NbsHJsaxJaNsmchPUDKDLvPZSiQ5jcjvk4

aCAyNDQ+Qb7XFRHieKOmAQ5UNwkIvEQOPmMsRJntop6mI1PE1qryaLKRCrHCL7N+GCtEl9SxWsnkxB9o

zw35YFrjEfGqwf59KAuS2pyAbrnQ9QdfhGQDf71bLQ
bCAnLKNttzl6Ew5DtWle1Z85f1ga3p0h4coQSkoqXmqWoFqXkvcpPDs5xR7y749jO6W+yIOCK72XTgAf

06Yo/EDcEhUEyfanj18P5Ms6JRhS4L/UwOgaeO/Sl9TJx2ZYJU3DBDwmwheJPkRTlpVYp/jMeTYNPKg6

Brofgk+LD3CN5vrcEJG1h/jUV5fxDbreOr70QmSrgN
SMqhew9qD3ef85zK8Pk+v2U/crsARvRrBGR5dcJocV1IQK3zk3ZlMHmoEqJtEF4jft8DWOtFHgUaV0I4

lTMsQg9luAEpy1MjtTZ5m9OZTgApD2BxinMLDU6fO0TZBPNOPdtLhgnftP1BUYFbXYVdOQ41BAkjUR7A

NXWBYHftqAJaBKW9H9Nhf9DersGrNUVcRz7MXKCqTz
tkS7IpOsNYFbEpQ9OstjUKWa58XCilFO6pEXPuWVOtKHQ8WGVeIDcjXB6LkDOoaYJTkrxoVZLnS5Q4FT

4AGZCDSuJsD3GlajLVwOktAmYlKHDpPELVWm8+HVjqzrXeAkjYIvMkHSFqr1TnDSn6UK5DAPQuIBujPS

9Dm023A5Z9QbE9bASkK6fIHf0pnCE5tHOvP3Ald5VX
q308F1YJONXPZmgKbzmexZ6XLIMGp0cASM1ltA3WIAZyrCq6gC5PKQErS1lUY2cseAQhVC0yspJ9PL9A

LTglt5SugJViMXHoEnSvI02dIARroP5xKDrSZVZtmXk2mLjmW9N1uQEbOI6fyyFcMZ9+YI0JPJIoCf4s

eGKab1EruWS8k2OlXhGwIFWOTAazMC7LKpBvBTPhM5
tvCXZgLhDtAJYdYnCgEkK0RD9pOYl7TGxcSHKlOXAoLJu+Yp8HNJ4mt4DwQVuoDGRrTW6see4WFUzOCo

DpX3S9sHUwTo9yaH4DxRC3bJHsY9H6uZEqQ6Hfl1LZc483I2WGKHTXKfsVrchlyL1KnhBtKRwpKc8JUL

JgyXe6sV1KJIqgDT9ZRZTiJL8jLL15Fy0uaVBuJdDm
BHWoOx9JOdVdE1NrSG9mR25cFCToXKMnDTMZAl0+UKotarLaFywUFoU1CVMpa4YwLCbpYR0VWU0yk2Za

ZMyjDQHaj5AkRSc4JQ0TSQXxSJVzE5YxsVVbD8JHTd3JPMt1C5nnSEzdLk9TcQWzSOYjSKtpHT0Cc268

MVk5FV6TQVVaOnFyGKOGEhStESPuMyUoIMdmTVFBBX
rnJJJrA1EvCVT9SWZfKd8+SPxiPV0SK6TiRWC9FWz2PM9+QXuxBI1FtFNHF8KyyLNxWOnxA7AXNW1PSV

A5MW3XwOTdKL4RgVAHI8SnhAFwXn5bUNOtn4QcXr9dI0VBYEEYLSAuVBsyJFinVJZbHEz5Z0Q5ANFhA6

HDZ260hDYjyWj0Ze5gG8AYSNzJMlWjHJtqSJgnXFGy
DRq8L8D5MNQrZ8YIY3CnBbUcomJjC5C+PiLdMLKJTI4LCEXFYAk6M0V9eSSoU9A5oNvToAC3EN8EAQ9M

dVTqkXSzq50+OkZPYjKkZ5KOB0OFST1PYWl6Z0E8tHKfS2F8oDjVgSK9AU5YDO8KoPrpyXBoXp6aURvy

ICA+Ho4RJw9BUtDrIO8eyj9OMvKtECScVqoEQmm8V7
cqzfk3sSGcUyU9N2N6ElI0yCRxCF7NA0J4jMYzIGX1NQBilJE+Uf2Lp7XqZUTgGHx1D4svWKBtEJZpNs

JjzM85O++4xcyweUC6E6t8XKABfBQejDhTylBjH1dVUAO0i0M5HJc/Lt1ACYT1gLk5lEJzARJzHBv1yS

9ibMy0LrAvXB27NATkELcvcO7wLbl5F5Vdp7QiZy2k
Yu2urPAmKw5DPqQpITQ8aaSfHhAKHtK0aPmpnmoqBWL1O2a3rWY9En13v5sgztNro6HvUgY2WEkfRLLf

HyJvwxAzZVU6szXmwR9rcoUhBd2SZZDeQPkchzXyAxPRFg7PNqDbEE97XuctmH4jbNI+DQogICAgICAg

ICAgICAgICAgICAgICAgICAgICAgICAgICAgICAgIC
AgICAgICAgICAgICAgICAgICAgICAgICAgICAgICAgICAgICAgICAgICAgICAgICAgICAgICAgICAgIC

AgDQogICAgICAgICAgICAgICAgICAgICAgICAgICAgICAgICAgICAgICAgICAgICAgICAgICAgICAgIC

AgICAgICAgICAgICAgICAgICAgICAgICAgICAgICAg
ICAgICAgICAgICAgDQogICAgICAgICAgICAgICAgICAgICAgICAgICAgICAgICAgICAgICAgICAgICAg

ICAgICAgICAgICAgICAgICAgICAgICAgICAgICAgICAgICAgICAgICAgICAgICAgICAgICAgDQogICAg

ICAgICAgICAgICAgICAgICAgICAgICAgICAgICAgIC
AgICAgICAgICAgICAgICAgICAgICAgICAgICAgICAgICAgICAgICAgICAgICAgICAgICAgICAgICAgIC

AgICAgDQogICAgICAgICAgICAgICAgICAgICAgICAgICAgICAgICAgICAgICAgICAgICAgICAgICAgIC

AgICAgICAgICAgICAgICAgICAgICAgICAgICAgICAg
ICAgICAgICAgICAgICAgDQogICAgICAgICAgICAgICAgICAgICAgICAgICAgICAgICAgICAgICAgICAg

ICAgICAgICAgICAgICAgICAgICAgICAgICAgICAgICAgICAgICAgICAgICAgICAgICAgICAgICAgDQog

ICAgICAgICAgICAgICAgICAgICAgICAgICAgICAgIC
AgICAgICAgICAgICAgICAgICAgICAgICAgICAgICAgICAgICAgICAgICAgICAgICAgICAgICAgICAgIC

AgICAgICAgDQogICAgICAgICAgICAgICAgICAgICAgICAgICAgICAgICAgICAgICAgICAgICAgICAgIC

AgICAgICAgICAgICAgICAgICAgICAgICAgICAgICAg
ICAgICAgICAgICAgICAgICAgDQogICAgICAgICAgICAgICAgICAgICAgICAgICAgICAgICAgICAgICAg

ICAgICAgICAgICAgICAgICAgICAgICAgICAgICAgICAgICAgICAgICAgICAgICAgICAgICAgICAgICAg

DQogICAgICAgICAgICAgICAgICAgICAgICAgICAgIC
AgICAgICAgICAgICAgICAgICAgICAgICAgICAgICAgICAgICAgICAgICAgICAgICAgICAgICAgICAgIC

OcHWXeMJNwILKkTWv9G5uiUYQaZNDiDK8hUIo6Fq5+LXvMVzUxHNW9hyBmpO3PXE6vy4BhTPvtGYOyy3

JfXIc0BI7EROSbZPtoVO8QVZqlue3IZJUjPTGomEOY
m2wnYqVlIGG4YRVuPfarVL7BKRAeK2skvtZpEHUoOZWLKKcoHGQSZGjyOCJTNHUvWOUmInPhPdTkJQCy

YO1MXYMpD087ndMvUZ9KJc9PDaVvTI5mnl8UHadtKZGvNctZMeu6BPxsHU4TfFUeaBPlJZZqBSWUFgQw

S2ouo8DeMojwFMLIUUjbMK5Do0UnnLKvURe+Pg0KZW
6sj4ZfLVuaTCNhIZ8kfj8WHAwXFvWbP2FuhVneFZHxt4ozCWArGF1ofKQwTGY3OC3xpAXimSHnICIfpd

scMJ8IAOH2SOMsKO7aLOIjURRoVqD8LXMLWJ2XPILqYVGjvAYoZXJpBOLEFK4AWLsfPWB4UZPyeuYliM

GfUJbuIV6WGOBrtpTyJahsBWFOQLp+Gw4RHK7su3Iw
HAejDORsCF0qke3LLVwOWkUzZ7G1jASxW9L0ERgwNz3VKGCiBNOjHlZsFEHOTPhdLP7KTU2zzyR0SI3N

kDSlWCInJVOerYVpLTu9P21heJQaPBazBG6JTEI+Nino+Xm4MQHYuJAUjDUQtPkQvJBLWUlMmW4TrL3BF

h1VhS6KoWM78cTdrtjXkOIxeRN8RPJ9sGZWiHSPRRQ
6ZeQSgsP5eabMiKlGbSNGKLjKjF51bqVKbFCAqZJR8OEGnCm1ENFHsT1ZwhuFofXuuxlXzGTOoNAQNID

1KYZdajqYxhXZgtStuLG36aHzfXI8SSj9OTtDgST6ijx6GvWKmNh0YGLXqFC9JRKLjWFRnNBUrAEG9DF

QvKlVxJZkxTDOkHMZdSLQ8PKZqCZCtFW6WBbTlXQKw
Cqb9OVAcXSNdMGCjjh3BRYQnTVHpEHQ9KvPnHHPpBEGcYDnsDRNwDSElVSR7MEFcOOMiCB9SRdFmKZVn

PEXfTeraPXLtEFGpdi1NZREnXTQnKgD9DGQxQCSwTHIyOOhsEAXnFVR6XKi0NDOtFMZsPR3XYgTaXDGb

KMA9ELOpJEIgCVLeak7BEACiTZYsUVd2NUOjWPWgLE
LgUSejLNXkVQY6PAp7XTHzDOIqHX0JOiTgTOLlJVOmXfOuXFPdVOEwda6JWSGmNXWiOoCjADXcEIKyPF

BzUAlgVSCySTBzRtA2IZUmNIFcXH3NZzWpDHLqLVA1BlSqYRYnDLSlcl1XHCPbRJIjETOySnMrQWCjCB

SlCHoqQYKuYDQ0RzUkCXAcTXGgKJ4MAbRaUCBfRLQ5
VISgKZCzZPXhsj7QTVHwCTAdAAv1KqFtRUDyPCVfRUdzMZWhXGF0RUo4HFSgUHZeAG2KTuYhZACkTMBb

FuRaZXWcHBTfyn6HJXXwUVRcWvV7PfFyQBTbRZSlETchDNNjKJL7ZNR1IRPaAVUkTJ4SHtVsTXDcSfkc

UjUqNTVnCTKxkc4CEXPzVHVjPIC0IlTiXBDcQOMqRO
fyYDUsDTX3WEUiXFOhOIYjUJ1ZBlMpCMCnHjd4ErUoFPFzWNPrjl3BPPYiLYEnPAL3BbNdSKIsKXEyVV

naKKVxOQAsCXS4AZPbLBQqKO1QAyIfILPtJyI2QOGqMOApLVAxgd1RrRRmaVfzhi3MQZzHMs0YtKifYC

OlASdzDj7gzEFkFJMdCYSJTk4JwvEaJKJbYMDDFTez
VGAeEFCoLAO6GYU6PRG0QwK4ZZS1ElC5HpS1XcRvXNmqNKZ0BuY0PnS2KvgsDyu4PKSyIRK1TQEzJDte

XbMaZyGxOYT1KCq+UC6oGCa+Dp9Km6BtmvY4yvDlGGgmTLk4XO3XNUAIS1AYXd==









                    ID                  Date                Data Source

 

                    [id number]            2021 06:51:00 AM [facility]









          Name      Value     Range     Interpretation Code Description Data Gregoria

rce(s) Supporting 

Document(s)

 

                          25-Hydroxyvitamin D2+25-Hydroxyvitamin D3 [Mass/volume

] in Serum or Plasma 39 

ng/mL                                         [facility]  

 

                                        Vitamin D Status         25-OH Vitamin D

:Deficiency:                    <20 

ng/mLInsufficiency:             20 - 29 ng/mLOptimal:                 > or = 30 
ng/mLFor 25-OH Vitamin D testing on patients onD2-supplementation and patients 
for whom quantitationof D2 and D3 fractions is required, the QuestAssureD(TM)25-
OH VIT D, (D2,D3), LC/MS/MS is recommended: ordercode 67822 (patients >2yrs).See
 Note 1Note 1For additional information, please refer 
tohttp://education.[employer]/faq/SGJ388(This link is being provided 
for informational/educational purposes only.)THIS TEST WAS PERFORMED AT:[facility]  [address]-
[address]RADHA MORIN MD 









                    ID                  Date                Data Source

 

                    [id number]            2021 10:34:00 AM [facility]









          Name      Value     Range     Interpretation Code Description Data Gregoria

rce(s) Supporting 

Document(s)

 

           Leukocytes [#/volume] in Blood by Automated count 8.8 10*3/uL 4.45-10

.71 N                     

[facility]            

 

                Erythrocytes [#/volume] in Blood by Automated count 4.00 10*6/uL

    4.20-5.40       Below

 low normal                             [facility]  

 

           Hemoglobin [Moles/volume] in Blood 11.1 g/dL  10.7-15.4  N           

          [facility]                                 

 

                Hematocrit [Volume Fraction] of Blood by Automated count 34.0 % 

         37-47           Below low 

normal                                  [facility]  

 

                                        Erythrocyte mean corpuscular volume [Ent

itic volume] in Cord blood by Automated 

count      85.0 fL    80-96      N                     [facility]  

 

                          Erythrocyte mean corpuscular hemoglobin [Entitic mass]

 by Automated count 27.8 

pg           27-31        N                         [facility]  

 

                                        Erythrocyte mean corpuscular hemoglobin 

concentration [Mass/volume] in Cord 

blood      32.6 g/dL  33-37      Below low normal            [facility]  

 

             Erythrocyte distribution width [Entitic volume] by Automated count 

13 %         11-15        N            

                          [facility]  

 

           Platelets [#/volume] in Blood by Automated count 405 10*3/uL 130-472 

   N                     [facility]                  

 

             Platelet mean volume [Entitic volume] in Blood 9.0 fL       9.1-13.

1     Below low normal 

                          [facility]  

 

           Neutrophils/100 leukocytes in Blood by Automated count 66.7 %     41-

77      N                     [facility]                  

 

           Neutrophils [#/volume] in Blood by Automated count 5.9 U      1.7-7.6

    N                     [facility]                  

 

           Lymphocytes/100 leukocytes in Blood by Automated count 28.3 %     14-

46      N                     [facility]                  

 

           Lymphocytes [#/volume] in Blood by Automated count 2.5 U      0.6-4.6

    N                     [facility]                  

 

                Monocytes/100 leukocytes in Blood by Automated count 3.1 %      

     4-12            Below low normal

                                        [facility]  

 

           Monocytes [#/volume] in Blood by Automated count 0.3 U      0.2-1.2  

  N                     [facility]                         

 

           Eosinophils/100 leukocytes in Blood by Automated count 0.6 %      0-7

        N                     [facility]                  

 

           Eosinophils [#/volume] in Blood by Automated count 0.1 U      0.0-0.5

    N                     [facility]                  

 

           Basophils/100 leukocytes in Blood by Automated count 0.5 %      0.4-1

.3    N                     [facility]                  

 

           Basophils [#/volume] in Blood by Automated count 0.0 U      0.0-0.2  

  N                     [facility]                         

 

          NUCLEATED RED BLOOD CELL 0 %                                     [facility]  

 

          [facility]CLE[facility] RED BLOOD CELL# 0 U                                     [facility]

[facility]  

 

           Immature granulocytes [Presence] in Blood by Automated count         

   0-2        N                     [facility]                  

 

           Immature granulocytes [#/volume] in Blood by Automated count 0.1 U   

   0-0.1      N                     

[facility]            

 

          Manual Differential panel - Blood NO                                  

    [facility]  









                    ID                  Date                Data Source

 

                    [id number]            2021 11:25:00 AM EST [facility]









          Name      Value     Range     Interpretation Code Description Data Gregoria

rce(s) Supporting 

Document(s)

 

             Urea nitrogen [Mass/volume] in Serum or Plasma 24 mg/dL     9-23   

      Above high normal  

                          [facility]  

 

           Sodium [Moles/volume] in Serum or Plasma 136 mmol/L 132-146    [facility]                         

 

           Potassium [Moles/volume] in Serum or Plasma 4.8 mmol/L 3.5-5.5    [facility]                         

 

           Chloride [Moles/volume] in Serum or Plasma 104 mmol/L      [facility]                         

 

           Carbon dioxide, total [Moles/volume] in Serum or Plasma 28 mmol/L  20

-31      N                     

[facility]            

 

          Anion gap in Serum or Plasma 9 mmol/L  8-16      [facility]  

 

           Glucose [Mass/volume] in Serum or Plasma 248 mg/dL       Above 

high normal            

[facility]            

 

          Creatinine 0.8 mg/dL 0.5-1.1   [facility]  

 

                                        Glomerular filtration rate/1.73 sq M.pre

dicted [Volume Rate/Area] in Serum or 

Plasma     Greater Than 60 ABOVE 60                         [facility]  

 

                                        Alanine aminotransferase [Enzymatic acti

vity/volume] in Serum or Plasma by With 

P-5'-P     33 U/L     10-49      [facility]

ital  

 

                                        Aspartate aminotransferase [Enzymatic ac

tivity/volume] in Serum or Plasma by 

With P-5'-P 10 U/L     0-33       [facility]

pital  

 

                    Alkaline phosphatase [Enzymatic activity/volume] in Serum or

 Plasma 103 U/L             

          N                               [facility]  

 

           Calcium [Mass/volume] in Serum or Plasma 9.2 mg/dL  8.5-10.1   [facility]                         

 

                Bilirubin.total [Mass/volume] in Serum or Plasma 0.2 mg/dL      

 0.3-1.2         Below low 

normal                                  [facility]  

 

                                        Albumin [Mass/volume] in Serum or Plasma

 by Bromocresol purple (BCP) dye binding

 method    3.5 g/dL   3.2-4.8    N                     [facility]

ital  

 

           Protein [Mass/volume] in Serum or Plasma 7.6 g/dL   5.7-8.2    N     

                [facility]                         









                    ID                  Date                Data Source

 

                    [id number]            2021 11:25:00 AM [facility]









          Name      Value     Range     Interpretation Code Description Data Gregoria

rce(s) Supporting 

Document(s)

 

                C reactive protein [Mass/volume] in Serum or Plasma 5.3 mg/L    

    0.0-5.0         Above high 

normal                                  [facility]  









                    ID                  Date                Data Source

 

                    [id number]           2021 09:37:00 AM [facility]

 

                                          Patient Name: ROMELIA CORADO      

 : 1980  Sex: F  Pt Unit #: 

[id number]  Location:[facility]  Provider:   Visit Date/Time:  21  Primary 
Insurance: [facility]  Secondary Insurance: Self Pay  Intake  
Vital Signs                                                    21         
                                         09:38     Current Height               
                 5 ft 5 in     Current Weight                                 
180 lb     Weight Measurement Method                  Standing Scale     BMI    
                                         29.9     BP                            
                 124/88     Blood Pressure Location                      Rt 
brachial     Position                                       Sitting     
Respiration                                      14     Pulse                   
                        110 H     Pulse Strength                                
 Normal     Pulse Source                               Pulse Oximeter     Pulse 
Oximetry (%)                               99     Oxygen Delivery Method        
                room air      Intake  Visit Reasons: ER Follow-up (Adult)  Nurse
 Note: ER follow up - 2021, she has two abscesses one under her left arm 
and one on the inside of her right thigh area, like in her inner butt cheek- 
Lanced the one on her leg and packed it, states this one is doing better, the on
e under her arm was not ready to be drained, it was too hard, and she is still 
feeling pain from that, States ER said that she should have repeat blood 
workdone, CBC, CMP and C-Reactive Protein, She is currently taking Clindamycin 
orally . States that she is also struggling with heart burn, has been taking OTC
 omeprazole, Rolaids , and an off brand Acid Reducer that did not help   
 Required: No  Accompanied by: Self / Same as Patient  Is patient in 
pain?: No  Allergies    No Known Drug Allergies Allergy (Verified 20 
12:14)           Medications     - Last Reconciled 21 by Cecille Maguire NP
    alcohol swabs (Alcohol Wipes) 1 pad topical QID 90 days  blood-glucose meter
 (OneTouch Verio Flex Start) As directed  cholecalciferol (vitamin D3) 2,000 
units PO QDAY  clindamycin HCl 300 mg PO QID  dulaglutide mg subcut  
ergocalciferol (vitamin D2) 1,250 mcg PO QWEEK  ertugliflozin (Steglatro) 15 mg 
PO QAM  lancets (Accu-Chek Softclix Lancets) As directed  OneTouch Verio test 
strips (blood sugar diagnostic) USE AS DIRECTED FOUR TIMES A DAY 30 days NS     
 Is last menstrual period known: Yes  Post menopausal: No  Patient pregnant: No 
 Vision  Wearing glasses?: No  Fall Risk  History of falls: No  Ambulatory Aid::
 None  Gait/Transferring:: Normal  Medications:: No High Risk Medications  PHQ-
2/9  Over the last 2 weeks, how often have you been bothered by any of the 
following problems?   1. Little interest or pleasure in doing things: not at all
  2. Feeling down, depressed, or hopeless: not at all  Total score: 0  HIV 
Testing Offer - ages 13-64  HIV testing Offer: Yes  Requirement for HIV testing 
offer been met?: Patient reports past refusal  SBIRT Annual Questionnaire  Are 
you currently in recovery for alcohol or substance use?: No  How many times in 
the past year have you had 4 or more drinks in a day?: None  How many times in 
the past year have you used a recreational drug or used a prescription 
medication for nonmedical reasons?: None  Do you need a note to return  Do you n
eed a note to return to /school/sports/work: No    Coronavirus Screening 
 Screening  Have you traveled outside of [facility] or [facility] in the last 14 days.: No  Has patient experienced coronavirus symptoms:
 No    [facility]  Medical History (Updated 21 @ 09:52 by Cecille Maguire NP)    
Anxiety  Asthma  Chlamydia  Depression  Diabetes mellitus  Herpes genitalis     
 Surgical History (Reviewed 21 @ 12:07 by Che Duron)    Status post 
tonsillectomy                           Social History (Updated 20 @ 10:50
 by Priyanka Kuo)  Does the Patient have a Healthcare Proxy:  No   Does Patient
 have a DNR?:  No   Does Patient have a Living Will?:  No   Hx Recent Travel 
(where):  No   Smoking Status:  Never smoker         Review of Systems  Const  
All systems reviewed   are unremarkable except as noted in HPI and below  
Reports as per HPI  Skin/Breast  Details: boils X2 left axilla.  Right upper 
medial thigh (was lanced in ER and packed)    Exam  Const  General: cooperative,
 healthy appearing and comfortable  Nutritional Appearance: average body habitus
 and well nourished  Orientation: alert, awake and oriented x3  Skin  Other: 
Left axilla, small lump under skin, redness has improved as has pain per 
patient. <1cm.  Right buttock healed, no induration appreciated, no redness.    
 Assessment   Plan ***  Assessment   Plan  (1) Abscess:         Status: Acute   
     Code(s):  L02.91 - Cutaneous abscess, unspecified        SNOMED Code(s): 
455323362        Category: Medical        Plan - Cecille Maguire NP:   Finish abx
 as ordered.  Doing very well, educated and encouraged warm compresses to the 
left axilla.   She is receptive.  Follow up in 1 week if not completely 
resolved.   3 months for annual PE.         Orders: Orders:       CMP Today     
   CRP, C-REACTIVE PROTEIN Today        CBC W AUTO DIFF Today          Coding  
Level of Care Code  51121 Est Pt Limited Comp  Exam  Problem Focused    
Diagnoses  Abscess  L02.91      <Electronically signed by Cecille Maguire NP> 
21 1210          









          Name      Value     Range     Interpretation Code Description Data Gregoria

rce(s) Supporting 

Document(s)

 

                                                                       









                    ID                  Date                Data Source

 

                    [id number]           2021 01:28:00 PM [facility]

 

                                                                             ED 

Physician Documentation      NAME:      

           ROMELIA CORADO                      :                  
1980   ACCT#:                [id number]                      AGE:       
           40           MR#:                  [id number]                       
SERVICE DATE:           21     EMERGENCY DR:           Jada Blanton MD          
                                                 PRIMARY CARE DR:           
Cecille Maguire RACQUEL                      ROOM#:                             [facility] 
(Adult, General)  General  Chief Complaint: Skin/integument  Stated Complaint: 
CYST  Resident LTC, travel outisde home, exposure to hot tubs:: No  Time Seen by
 Provider: 21 11:05  Source: patient  Exam Limitations: no limitations  
History of Present Illness Narrative: 40-year-old female patient underlying 
medical history of type 1 diabetes, presented with suspected abscess on right 
buttocks.  Tender, erythematous, present for the past 1 week.  Patient has 
similar symptom before requiring incision and drainage.  Denies fever. No 
diarrhea.  No urinary complaint.  Patient also reported questionable lump on 
left axilla.  Not erythematous, not tender at this time.  No shortness of 
breath, no cough,  History of Present Illness  Timing/Duration: 1 week  Past 
Medical History  Past Medical History: Nursing Past Medical History Has Been 
Reviewed  Allergies/Home Meds                                                
Allergies        Allergy/AdvReac Type Severity Reaction Status Date / Time     
No Known Drug Allergies Allergy   Verified 20 12:14                       
                           Home Medications         Medication  Instructions  
Recorded  Confirmed  Last Taken  Type     alcohol swabs 1 pad TOP QID 90 Days 
#200 each 20 Unknown Rx     blood-glucose meter #1 each 20 Unknown Rx     lancets #100 each 20 Unknown Rx     
ertugliflozin 15 mg tablet 15 mg PO QAM #30 tab 20 Unknown Rx    
 dulaglutide 0.75 mg/0.5 mL mg SQ 20  Unknown History    subcutaneous pen 
injector          OneTouch Verio test strips See Rx Instructions .ROUTE 20
  Unknown Rx     .COMPLEX 30 Days #150 each NS         clindamycin HCl 300 mg PO
 QID #40 cap 21  Unknown Rx        Medication list updated and reviewed:: 
Yes  Pain Assessment  Pain Location: Right buttocks  Pain Description: Throbbing
 and Acute  Pain Intensity: 6  Pain Scale Used: Numeric Scale  ER plan  Plan of 
care and ER treatment: An ER treatment plan was discussed with patient and 
family, Patient encouraged to ask questions about plan and ER treatments and 
Patient agrees with ER plan of care  Plan: Bedside point-of-care ultrasound 
evaluation showing right buttocks abscess, no left axilla abscess detected at 
this time.  We will perform incision and drainage of right buttocks abscess, 
wound culture, blood culture, CBC, CMP, C-reactive protein, lactic acid    PMH 
(from Triage)  Patient Medical History  PMH Reviewed/Updated as Needed: Yes  
PMH/PSH from Triage: Medical History (Updated 20 @ 14:53 by Cecille Maguire NP)    Anxiety (Medical)   Asthma (Medical)   Chlamydia (Medical)   Depression 
(Medical)   Diabetes mellitus (Medical)   Herpes genitalis (Medical)            
            Surgical History (Updated 18 @ 15:08 by Blanca Gomez)    
Status post tonsillectomy (Surgical)       Female History  LMP:: 4 weeks ago  
Pregnant: No  Hx Drug Resistant Infections  Hx MRSA: (Methicillin-resistant 
Staphylococcus aureus): Yes (11)  Hx VRE (Vancomycin-resistant 
enterococci): No  Hx C.Diff: No  Hx CRKP: No  Hx Other Resistant Infection?: No 
 Isolation: Standard precautions  Hx Recent Travel  Out of the country within 10
 days (where): No  Hx Fever: No  Hx Fever with a rash?: No  Nurse screening for 
coronavirus:                          Recent Travel outside the      No         
                                           country (where)                    
Has patient experienced        No                                               
     coronavirus symptoms                    Social History  Does patient have 
suicidal/homicidal thoughts or ideation?: No  Are you in a relationship 
with/Does anyone hit you, yell/swear at you, steal from you?: No  Substance Use 
 Hx Alcohol Use: No  Hx Substance Use: No  Hx Substance Use Treatment: No  
Second Hand Smoke Exposure: No  Smoking Status: Never smoker  Tobacco Use  Hx C
hewing Tobacco Use: No  Vaccination History  Hx/Date of Tetanus, Diphtheria 
Vaccination: No  Hx/Date of Influenza Vaccination: No  Hx/Date of Pneumococcal 
Vaccination: No  Immunizations Up to Date: No    [facility]  Medical History (Reviewed
 21 @ 12:07 by Che Duron)    Anxiety  Asthma  Chlamydia  Depression  
Diabetes mellitus  Herpes genitalis      Surgical History (Reviewed 21 @ 
12:07 by Che Duron)    Status post tonsillectomy                           
Social History (Updated 20 @ 10:50 by Priyanka Kuo)  Does the Patient 
have a Healthcare Proxy:  No   Does Patient have a DNR?:  No   Does Patient have
 a Living Will?:  No   Hx Recent Travel (where):  No   Smoking Status:  Never 
smoker         ROS  Review of Systems  ROS Narrative: Negative except for those 
mentioned in the HPI    Physical Exam  General  Limitations: no limitations  
General appearance: alert and in no apparent distress  Head  Head exam: Present 
atraumatic and normocephalic  Eye  Eye exam: Present normal apperance, PERRL and
 EOMI  ENT  ENT exam: Present normal exam and mucous membranes moist  Neck  Neck
 exam: Present normal inspection and full ROM  Respiratory  Respiratory exam: 
Present normal lung sounds bilaterally; Absent wheezes, rales and rhonchi  
Cardiovascular  Cardiovascular Exam: Present regular rate and normal rhythm  
GI/Abdominal  GI/Abdominal exam: Present Abd soft, bowel sounds present all quad
rents, soft and normal bowel sounds; Absent distended, tenderness, guarding and 
rebound  Extremities Exam  Extremities exam: Present Full ROM without 
tenderness, capillary refill brisk and full ROM  Back Exam  Back exam: Present 
normal inspection and full ROM  Neurological Exam  Neurological exam: Present 
alert, oriented X3 and CN II-XII intact  Skin  Skin exam: Present other (Right 
buttocks lump likely abscess, approximately 1.5 cm in diameter, tender to 
palpation, erythematous, left axilla tiny nodule, nontender, no erythematous,)  
Vital Signs  Vital Signs:                                 Vital Signs         
21  12:02     Temperature 98.2 F     Pulse Rate 109 H     Respiratory Rate
 16     Blood Pressure 139/76     O2 Sat by Pulse Oximetry 98          MDM 
(comprehensive)  Lab Data  Labs:                                                
                                            21 11:49                      
                          21 11:49                                       
Laboratory Results Last 24 hours    21 11:49: WBC 10.6, RBC 4.08 L, Hgb 
11.4, Hct 35.2 L, MCV 86.3, MCH 27.9, MCHC 32.4 L, RDW 13,Plt Count 331, MPV 
9.5, Immature Gran % (Auto) 0.4, Neut % (Auto) 73.7, Lymph % (Auto) 20.9, Mono %
 (Auto) 3.7 L, Eos % (Auto) 0.8, Baso % (Auto) 0.5, Lymph # (Auto) 2.2, Abs 
Immat Gran (auto) 0.0, Add Manual Diff No, Absolute Neutrophils 7.8 H, Monocytes
 # 0.4, Absolute Eosinophils 0.1, Absolute Basophils 0.1  21 11:49: Sodium
 137, Potassium 4.1, Chloride 105, Carbon Dioxide 25, Anion Gap 11, BUN 14, 
Creatinine 0.8, GFR Calculation Greater than 60, Glucose 238 H, Calcium 9.3, 
Magnesium 2.0, Total Bilirubin 0.4, AST 4, ALT 17, Alkaline Phosphatase 96, C-
Reactive Protein 27.1 H, Serum Total Protein 7.8, Albumin 3.2  21 11:49: 
Lactic Acid 2.2      Medical Decision Making  Free Text/Narative:: Procedure 
note  Patient's right buttocks cleaned with Betadine,  2% subcutaneous lidocaine
 infused  A half a centimeter incision made, bloody purulent drainage obtained, 
culture collected, site is packed with quarter inch iodoform  Dressing was 
placed, patient tolerated procedure with no complication.    Point-of-care 
ultrasound done for left axilla nodule negative for abscess  Left axilla nodule 
barely palpable, nontender, no erythema    Right buttocks abscess  Culture sent,
 minimal elevation lactic acid, 1 L IV fluids sent, patient afebrile, no 
leukocytosis, dynamically stable.  Already n.p.o., IV clindamycin, patient will 
complete oral course of clindamycin as outpatient.  Sitz bath, wound care, 
follow-up culture, tight glycemic control    Procedures (comprehensive)  I   D  
Site: Right buttocks  Betadine Prep: Yes  Blade Size: 11  Sterile Dressing 
Applied: Yes  Sterile Drapes: No  Wick Placed: No    Plan  Plan  Plan: Status 
post incision and drain, sitz bath twice daily, dressing change twice daily, 
return to the hospital if febrile, worsening pain, worsening drainage, or any 
other symptom, tight glycemic control.  Complete oral clindamycin.  Plan of 
care: Pain control discussed with patient, Activity limitations discussed with 
patient/family, Follow up appointments discussed, Plan of care discussed with 
patient and or family,Patient encouraged to ask questions about plan, Patient 
agrees with plan of care, Teach back used with patient/caregivers to build 
capacity, Person receiving instructions verbalizes understanding ofplan and 
Discharge plan and instructions discussed with patient and or family  Visit 
Medications  Administered ED medications::                Medications        
Generic Name Dose Route Start Last Admin      Trade Name Freq  PRN Reason Stop 
Dose Admin     Sodium Chloride  1,000 mls @ 999 mls/hr  21 12:33  21
 13:20      Ns 0.9%  IV  21 13:33  999 mls/hr      .Q1H1M ONE   
Administration          Discontinued Medications          Generic Name Dose 
Route Start Last Admin      Trade Name Freq  PRN Reason Stop Dose Admin     
Clindamycin HCl/Dextrose  600 mg in 50 mls @ 100 mls/hr  21 12:31  
21 13:20      Cleocin 600mg Piggyback  IV  21 13:00  100 mls/hr     
 ONCE ONE   Administration        Discharge Plan  Admission/Discharge Dx  
Primary DC Diagnosis: Right buttocks abscess      ED Provider: Jada Blanton    ED 
Status: Ready for Discharge    Time Seen by Provider: 21 11:05    Triaged 
At: 01/21/21 11:07    Condition  Condition: Improved    Discharge Detail  
Disposition: Home, Self-Care    Med Rec   New Prescriptions:  New    clindamycin
 HCl 300 mg capsule      300 mg PO QID Qty: 40 RF: 0    No Action    alcohol 
swabs [Alcohol Wipes]  Pads, Medicated      1 pad TOP QID 90 Days Qty: 200 RF: 3
   (DME) lancets [Accu-Chek Softclix Lancets]  Misc      See Rx Instructions  
.ROUTE .MEDSUPPLY Qty: 100 RF: 5    (DME) blood-glucose meter [OneTouch Verio 
Flex Start]  Kit      See Rx Instructions  .ROUTE .MEDSUPPLY Qty: 1 RF: 0    
Steglatro 15 mg tablet      15 mg PO QAM Qty: 30 RF: 2    OneTouch Verio test 
strips  Strip      See Rx Instructions  .ROUTE .COMPLEX 30 Days Qty: 150 RF: 5  
 Follow Up Visit/Referrals:  Cecille Maguire, NP [Primary Care Provider] -  (5 
days)    Diet:: diabetic diet      Medications  Medication reconciliation 
performed by provider at discharge: Yes      Follow Up Care/Instructions  
Diet/Activity/Wound Care..:  Dressing change twice a day, more frequently if 
soiled.  Sitz bath twice a day.  Follow-up with primary care provider in 5 days 
for wound care, close glycemic control, complete oral course of clindamycin, 
probiotics, check CBC, CMP, C-reactive protein with primary care provider.  
Follow-up wound culture.  Oral hydration encouraged.  Return to the hospital if 
develop worsening pain, worsening drainage, fever, nausea vomiting, increased 
redness, or any other symptoms.    *Discharge Patient*  Discharge Orders:  
Discharge Order  (Routine); Ordered 21     Ordered By:  Jada Blanton          
Report Signers:  <Electronically signed by Jada Blanton MD>      Jada Blanton MD      
21 1346     _________________________________________________         LEIGH Blanton MD        SIGNATURE   DA    Report Cosigners:                                
             D:  HARPAL  21  1328 T:  HARPAL    21  1328  CC:  Cecille Maguire         









          Name      Value     Range     Interpretation Code Description Data Gregoria

rce(s) Supporting 

Document(s)

 

                                                                       









                    ID                  Date                Data Source

 

                    [id number]            2021 09:16:00 AM EST [facility]

 

                                        @21 0916: Aerobic ID Rafaela added. R

FLXG = CHGAERID. 









          Name      Value     Range     Interpretation Code Description Data Gregoria

rce(s) Supporting 

Document(s)

 

           Bacteria identified in Wound by Aerobe culture                       

                      [facility]                                 

 

          Quantiy of growth NUMEROUS                                [facility]  









                    ID                  Date                Data Source

 

                    [id number]            2021 09:16:00 AM EST [facility]

 

                                        @21 0916: Aerobic ID Rafaela added. R

FLXG = CHGAERID. 









          Name      Value     Range     Interpretation Code Description Data Gregoria

rce(s) Supporting 

Document(s)

 

                Ampicillin [Susceptibility] by Minimum inhibitory concentration 

(PACHECO) <0.06                           

Susceptible. Indicates for microbiology susceptibilities only.                  

         [facility]                         

 

                Cefotaxime [Susceptibility] by Minimum inhibitory concentration 

(PACHECO) <0.25                           

Susceptible. Indicates for microbiology susceptibilities only.                  

         [facility]                         

 

                Ceftriaxone [Susceptibility] by Minimum inhibitory concentration

 (PACHECO) <0.25                           

Susceptible. Indicates for microbiology susceptibilities only.                  

         [facility]                         

 

                Clindamycin [Susceptibility] by Minimum inhibitory concentration

 (PACHECO) >0.5                            

Resistant. Indicates for microbiology susceptibilities only.                    

       [facility]                         

 

                Erythromycin [Susceptibility] by Minimum inhibitory concentratio

n (PACHECO) >0.5                            

Resistant. Indicates for microbiology susceptibilities only.                    

       [facility]                         

 

                Penicillin [Susceptibility] by Minimum inhibitory concentration 

(PACHECO) <0.03                           

Susceptible. Indicates for microbiology susceptibilities only.                  

         [facility]                         

 

                Tetracycline [Susceptibility] by Minimum inhibitory concentratio

n (PACHECO) >4                              

Resistant. Indicates for microbiology susceptibilities only.                    

       [facility]                         

 

                Vancomycin [Susceptibility] by Minimum inhibitory concentration 

(PACHECO) 0.5                             

Susceptible. Indicates for microbiology susceptibilities only.                  

         [facility]                         

 

                Levofloxacin [Susceptibility] by Minimum inhibitory concentratio

n (PACHECO) 1                               

Susceptible. Indicates for microbiology susceptibilities only.                  

         [facility]                         

 

                Cefepime [Susceptibility] by Minimum inhibitory concentration (M

IC) <0.25                           

Susceptible. Indicates for microbiology susceptibilities only.                  

         [facility]                         









                    ID                  Date                Data Source

 

                    [id number]            2021 11:57:00 AM EST [facility]

 

                                        Special Instructions: Lab may order repe

at test if initial                      

test elevatedPhysician If elevated, reflex second test in 4-6 hrs 









          Name      Value     Range     Interpretation Code Description Data Gregoria

rce(s) Supporting 

Document(s)

 

           Leukocytes [#/volume] in Blood by Automated count 10.6 10*3/uL 4.45-1

0.71 N                     

[facility]            

 

                Erythrocytes [#/volume] in Blood by Automated count 4.08 10*6/uL

    4.20-5.40       Below

 low normal                             [facility]  

 

           Hemoglobin [Moles/volume] in Blood 11.4 g/dL  10.7-15.4  N           

          [facility]                                 

 

                Hematocrit [Volume Fraction] of Blood by Automated count 35.2 % 

         37-47           Below low 

normal                                  [facility]  

 

                                        Erythrocyte mean corpuscular volume [Ent

itic volume] in Cord blood by Automated 

count      86.3 fL    80-96      N                     [facility]  

 

                          Erythrocyte mean corpuscular hemoglobin [Entitic mass]

 by Automated count 27.9 

pg           27-31        N                         [facility]  

 

                                        Erythrocyte mean corpuscular hemoglobin 

concentration [Mass/volume] in Cord 

blood      32.4 g/dL  33-37      Below low normal            [facility]  

 

             Erythrocyte distribution width [Entitic volume] by Automated count 

13 %         11-15        N            

                          [facility]  

 

           Platelets [#/volume] in Blood by Automated count 331 10*3/uL 130-472 

   N                     [facility]                  

 

           Platelet mean volume [Entitic volume] in Blood 9.5 fL     9.1-13.1   

N                     [facility]                         

 

           Neutrophils/100 leukocytes in Blood by Automated count 73.7 %     41-

77      N                     [facility]                  

 

                Neutrophils [#/volume] in Blood by Automated count 7.8 U        

   1.7-7.6         Above high 

normal                                  [facility]  

 

           Lymphocytes/100 leukocytes in Blood by Automated count 20.9 %     14-

46      N                     [facility]                  

 

           Lymphocytes [#/volume] in Blood by Automated count 2.2 U      0.6-4.6

    N                     [facility]                  

 

                Monocytes/100 leukocytes in Blood by Automated count 3.7 %      

     4-12            Below low normal

                                        [facility]  

 

           Monocytes [#/volume] in Blood by Automated count 0.4 U      0.2-1.2  

  N                     [facility]                         

 

           Eosinophils/100 leukocytes in Blood by Automated count 0.8 %      0-7

        N                     [facility]                  

 

           Eosinophils [#/volume] in Blood by Automated count 0.1 U      0.0-0.5

    [facility]                  

 

           Basophils/100 leukocytes in Blood by Automated count 0.5 %      0.4-1

.3    N                     [facility]                  

 

           Basophils [#/volume] in Blood by Automated count 0.1 U      0.0-0.2  

  N                     [facility]                         

 

          NUCLEATED RED BLOOD CELL 0 %                                     [facility]  

 

          NUCLEATED RED BLOOD CELL# 0 U                                     [facility]  

 

           Immature granulocytes [Presence] in Blood by Automated count         

   0-2        N                     [facility]                  

 

           Immature granulocytes [#/volume] in Blood by Automated count 0.0 U   

   0-0.1      N                     

[facility]            

 

          Manual Differential panel - Blood NO                                  

    [facility]  









                    ID                  Date                Data Source

 

                    [id number]            2021 12:18:00 PM EST [facility]

 

                                        Special Instructions: Lab may order repe

at test if initial                      

test elevatedPhysician If elevated, reflex second test in 4-6 hrs 









          Name      Value     Range     Interpretation Code Description Data Gregoria

rce(s) Supporting 

Document(s)

 

           Urea nitrogen [Mass/volume] in Serum or Plasma 14 mg/dL   9-23       

N                     [facility]                         

 

           Sodium [Moles/volume] in Serum or Plasma 137 mmol/L 132-146    [facility]                         

 

           Potassium [Moles/volume] in Serum or Plasma 4.1 mmol/L 3.5-5.5    [facility]                         

 

           Chloride [Moles/volume] in Serum or Plasma 105 mmol/L      [facility]                         

 

           Carbon dioxide, total [Moles/volume] in Serum or Plasma 25 mmol/L  20

-31      [facility]            

 

          Anion gap in Serum or Plasma 11 mmol/L 8-16      [facility]  

 

           Glucose [Mass/volume] in Serum or Plasma 238 mg/dL       Above 

high normal            

[facility]            

 

          Creatinine 0.8 mg/dL 0.5-1.1   [facility]  

 

                                        Glomerular filtration rate/1.73 sq M.pre

dicted [Volume Rate/Area] in Serum or 

Plasma     Greater Than 60 ABOVE 60                         [facility]  

 

                                        Alanine aminotransferase [Enzymatic acti

vity/volume] in Serum or Plasma by With 

P-5'-P     17 U/L     10-49      N                     [facility]

ital  

 

                                        Aspartate aminotransferase [Enzymatic ac

tivity/volume] in Serum or Plasma by 

With P-5'-P 4 U/L      0-33       [facility]

pital  

 

                    Alkaline phosphatase [Enzymatic activity/volume] in Serum or

 Plasma 96 U/L              

          [facility]  

 

           Calcium [Mass/volume] in Serum or Plasma 9.3 mg/dL  8.5-10.1   [facility]                         

 

           Bilirubin.total [Mass/volume] in Serum or Plasma 0.4 mg/dL  0.3-1.2  

  [facility]                  

 

                                        Albumin [Mass/volume] in Serum or Plasma

 by Bromocresol purple (BCP) dye binding

 method    3.2 g/dL   3.2-4.8    N                     [facility]

ital  

 

           Protein [Mass/volume] in Serum or Plasma 7.8 g/dL   5.7-8.2    [facility]                         









                    ID                  Date                Data Source

 

                    [id number]            2021 12:25:00 PM [facility]

 

                                        Special Instructions: Lab may order repe

at test if initial                      

test elevatedPhysician If elevated, reflex second test in 4-6 hrs 









          Name      Value     Range     Interpretation Code Description Data Gregoria

rce(s) Supporting 

Document(s)

 

          Lactic w Rfx (if elevated) 2.2 mmol/L 0.5-2.0   [facility] 

 

 

                                        Called to [facility] @ 1225 by Ameya Martino Results readback. 









                    ID                  Date                Data Source

 

                    [id number]            2021 11:53:00 AM [facility]

 

                                        Special Instructions: Lab may order repe

at test if initial                      

test elevatedPhysician If elevated, reflex second test in 4-6 hrs 









          Name      Value     Range     Interpretation Code Description Data Gregoria

rce(s) Supporting 

Document(s)

 

          Bacteria identified in Blood by Culture                               

          [facility]  

 

                                        NO GROWTH AFTER 5 DAYS 









                    ID                  Date                Data Source

 

                    [id number]            2021 12:18:00 PM [facility]

 

                                        Special Instructions: Lab may order repe

at test if initial                      

test elevatedPhysician If elevated, reflex second test in 4-6 hrs 









          Name      Value     Range     Interpretation Code Description Data Gregoria

rce(s) Supporting 

Document(s)

 

           Magnesium [Mass/volume] in Serum or Plasma 2.0 mg/dL  1.3-2.7    [facility]                         









                    ID                  Date                Data Source

 

                    [id number]            2021 12:18:00 PM [facility]

 

                                        Special Instructions: Lab may order repe

at test if initial                      

test elevatedPhysician If elevated, reflex second test in 4-6 hrs 









          Name      Value     Range     Interpretation Code Description Data Gregoria

rce(s) Supporting 

Document(s)

 

                C reactive protein [Mass/volume] in Serum or Plasma 27.1 mg/L   

    0.0-5.0         Above high

 normal                                 [facility]  









                    ID                  Date                Data Source

 

                    [id number]           2021 11:52:22 AM EST [facility]









          Name      Value     Range     Interpretation Code Description Data Gregoria

rce(s) Supporting 

Document(s)

 

          Progress Note                                         [facility] 

JCAPSv3uQeKOCoAd43/LCSwzLUIqg4KiHOnaRHr2SZjyMQAwA6TnQTF5eZ5wIVR3YGvDZxZpQrFqEKW9

lbm
FoOfsKAdAbFFLaGmbGVrQiEMxbHmutvAXdRS0VsAL9EHOwS71dYZOvFAGsW2NyCKH7PZk+Ca4HHMPzlP

RqID7DXdeV2SlqjvnMAZ6y9A7kKIEJSIolL2mwGkETduyEhfYKqjNHaDnzQdkF2ePCRVoSpG/fvcySPM

wVA5nuLPRocS8bw1k6grk2HRYvARO//2n9aTEhG8X/
Mr6JMaEYN/OoR0yRZQ5Q66L/zeltmRKTQ7cYNI2k+o3BE2uc7KKuAiqf7maYoMXv82leelCQn/He6X0+

ml5y1oxeSwCLgAJYwZkSg2mti9GXhJ1zm/bhM2I5q/PsDzuzFWs9kecRzT/sg0NTKN1gv3cGyC2VTmhc

F9MUOjh5mNCK/ibT9dMnKP45iFsDrzzE28LP0HpctN
NEXNvFX+FYITWAAilEbRTlU8LwiccYDwmndHapYibNmariCWeh3+xmHt/ynnVYfX1IQwDmlTdv4o6YwB

94rEkk5YrIefeGjYV5q2cEXJTgmKp2lo1UKVSSkYGVBR+S/0kCmHZ4va9IddYSmi95LqE9WliK0vndN5

vCGNRGGOkcKO/PLaXk9C4/Qh+MvtsQzN4pBfrZ4/G8
gIR77ia3Xuvhe67tvCbFUn4r+ChsOubKVa2PYYDpV6w176jWNlvinHlGTeQ7s8RSPNRhwD1Z4TSox9MO

5EaNWnpPQ+k/4RPfb8TnQXDLtRSFbsmKXpD5gx7un34kDjCNLDc1TWrvWjhJGppJDZnI61usibYhjLvi

pPnGF8vHqGFMHJbMlPQlfOgpOWIC2huDopfy9jZHWS
N+ASaHM3P3EiR1xq9k/woNPqFZxvTn2wlEmjSLrn3PvAOyxWHB3oXisHNbYmA8cbKGcKiIvx2SzjBoAI

72QFlZJEZnY2EDRvhLjZgWzsq8BO4ux8BoME0ISMydLXJoo1W/ZvkPKgeUKi7uVbFpKSwR48NEblMfGN

sbNUZZr7hJ56ysfqxSi1uoI8tz8J74FSlQXt6naCnC
fVaX3cpXvuMTpBSK88EXQVFu5djd/eeUvwRuOcUL+lGEmV9dC4/UulzhMhHPKvhVKCT10B2k0yUKURze

cMQWoL6iZjeRwqHQX3i9pGHMlBdFJbJop+krQNuzXCJLWAN1jVoMv0MvyaZXWbCiG2iIAjnWXbkT3Tg0

MKKCMv4+w6LQUNTnT+VZDlrFtnR3ogGCC1ckcuNVQJ
GcbNAAZXOArakLD5I775nCxH+yihqRBiKD34365lQgJKcGPLCAvd0a87t+aLNnFvpyhb/l6zob8zLPiS

yV3lpWgYa1sfojij3t2Fi2wWQ0rHMZ104ZYjzEhe33xKaIkcCOpzyhwcO3yDJ/YmfNTkcTH4PNum/+3X

Gu1D/+YS0aNWo/SHsgc7u0/TmE2XWKcSVVkqs5PZTf
O7oEquh5PzAxht+Lo/zYqucHjrtCLw+8+uJt7SVQsr47NNzHwyS+ChcE2wj8XfkjplDmhC7Wy4Av40Uq

sLLVBtnQvMjj/O9hH8NlhVu8/NwUJIkplXCNn834W+U7e0pVloww0l/qKE7RhJoUcjxax3TCVZAaDS1C

XzhH63G/v3DR3um64r0a/ScAIs1of4XO69q8eGqIg+
biSA60d+n7kS7+bG6JxGQxFXRlQcRE55zRto7irodNhhhbqpL0rz9jrPxOGS9NX08oZilQfXGJHQgzXv

YCNfSgKvBulVr8TU94IJxiJK5j49uIiYcPrDoURUvs/RrqBsj4Jo7HhYHmD4cCnqqxbptOgiP7/Vn5iY

7KnEU/ZvtxZKAsS2a+On/dN+KvSaOKeUvUvVPJuprK
oxDhJODpqZfBB9LJ1syX1ZUg+6JWRCEUDrR2r1ja8rLBVG9FIEiDW81f7whVXo9pUGvmPqEkQju8Zq3E

iWVNzD2UXCAzb6/EutdrZPJCRnDt94fPkKNDaS1uhVPX4dN9NTdSzLu7KKizEqB0L4VMABStOWHSmFrk

NdMYQzylbtB+nulcIOuMapdUzCjfiA3kHaD7joLhf4
phYE0ohAxYNOgyVnKHiag1taGAYeQ3TId4px0yO3cOCGGUa1DCopXxSZzLGUsR4PAi4wiIWcaxmQMP0c

CcLKU652DlOH0Na0HsUrJd4OvJog/OKttkfwAIzdO7oEX3X1oLzrFNDUojxQIjyCDOSmke7lazSTcYKm

5m8sIU6HYFsd/zCrO8hcWQUbIxqM/9IlxyMSrmqEP9
8EAuQxLNUNFBB3oRndYrFhWvWnlODM+I8HHJceuU87WqEEjub3CHIWHQVin2DMMWH2sl+u9BtQUz8wJB

kgacQoFWyLurCEI5A/zrX5sfukrgWuITSzcB1zTJBUgDl7SJ35sd5SYd2KxegiiG2aXtm6146OwX6nRW

MYtxbMhpZ8e1YxYy4Z1zjnIHGx70kpELCkt8O7Fzah
JXmsk4Us4N/CDpq347Tz6Xp6dA0OJE/lWAKJxrZZb0M8Tqco4s8TOU+eY7zIYwcA0WdT46zTdbcOhhfQ

uFIQgj9QHZ3jmEeW7gw5e5Q5pC6OQI4pOnkBhlJ+FBacvqbxNxER6QOFw/kP4h9HZtfBVkdxRyj6t8bi

t3fc8aeKJVwXhv2l0qGVCfW1NhtuwAC6pIsfLJD58s
XozE5NpkvCnK3QLQxfiF05PGqRJjaKZneiE3TZDIyd0p7l0ELyHd2FQdOzrk3+QKK5jJKUuv7qcqwTRM

42WwcwPKiUVt4tn+9XBb+lEiHjAZRI60k2kFdkliOmotXz/ZitS55H77DnSfh6aclk9pUtwErzNhwjy3

8v/2CVSZzUWErtBXTOn63xMhx62KsgdEylC8wNmP3e
j02hQXoQaGIAza15oR1ZoSniky6ftG16wQMm2YVyTAtsx1vQUTBCpzUsviyiOrABsLhRwle8R4lU0C9C

Vkwt7mb4N7a02qBlE7CQ8rwzrl+IOnNLei63ClX1ChRAoi40SLA7GlLiQ+En3aoMvLrhwr7D0TqT1ZgY

+eGyS/Lqxnxc1aYJ2lf7TLEU8XORdJ6L0uPeHbFkwA
sNGa34IkrerUNIcEp1IEB7AR1FeLRAJpVrtJrvMnUKP+9sOhs0blm5t6cnJ3vfFjrXN81df/o9IqikOQ

+RY3xBintZgXltJbI1hw5vil+TTyXI7r2SQUEQK/D4HA8iYANQLJeUH8/pBHPYOe6/Ee7i4+J6CKB4vu

12B/5JI75yTz3fpGEFPLmkbJY9LqzgE/Gc5Qa/T86S
B++EG3Hl4cdLBMXCMz1xnqonoASzUafvSWqp1gyEO4QY/rLdaxxw/qyJ8C27DdtoyU+KjKRulS+ybfbN

TkCWl6utKnO0e3JJvxfk/YdMtrai4R96Vt54CHM5eH/S0UIpsYvlWHY66+/6DqbPKr2fzD+bj1XONsGX

uK4q5sBTd0OK9TPPv8/u4VDa4XUrApq0Xg9OBEz7qn
9BpbUg6gOcsJwy0TqidsCuSAg9sAM2fP1fSH6bVg1+7AlMNA+NZQIfLp57D23gc2YJmYtifrZo9JSf2f

n7J5j+pLk78BjY0o1lwzyOS+hcvXs/ystmG4ZLl6DB1TBbJE9uhwhCwi1vhJg57TVbXitoMVGGcTJ2c9

Rd8D7Bbw9ckR5NumTlJ5XQQKvb2VtKmORyRnalUHiW
rAxKN58TU/9DYtGOT53rLnimVNlCYaHgdkvs+ylNSc55B8dYiVglEVLNbODutjeawL1GgJqpmvjG58S7

za+ME+LmbtruCMecgpemcJ8lA8eJuWu02/LlBe8XMg621hnmRwPv0y3YGywVgraOItScN3huLdyFRkcf

YYeieA3QGKpWB9X7PZwYa4W4G6htv1A5jNOjLACwXf
WMX1kxPslV2GVN4br3QiLMw6OFXin2BmGUirVJk3KSodRUXjK8F0wNHaNLCnOC1AOFTcMM9FPPJsskIh

FkZoACPJBsXqQGBoSgNgw5XlV3SiEXAkYDSOYWpoJGIrZ81uTYwwIe89JQylYYKuIwRbOKb7Dh5NDlSx

KPTpK46apMVtuVEuZZEfACCPRmFuWKNvJ1IewZYmAW
trG8QeO0FoLY3atSOlIL6bfBZxW3FeL5DzolhhPBXXAsAyBDXxLAxnNTKhZmOlNAboOWD+Jn0IQCW+Pg

0CKK3aj4ErQTx1CEKmh4BiGEskYFj9N4MtpKVxwcOzUbpdjLRVIEOmTUMuS7puaac6mUGaXgW9Bv4CLv

Kto2WrBSZtLFfXmx6yL3YpVlI+70k4L8qCZbypT8UE
TFVyrG0m5Rs7fPBgWJ3Sgrz37vLQeIHtR08tlKTDRP7UNgZfHyB6tQUB96H8SomvYjp/ficnWlFKSfav

+lbwIpEt1cm/vJ5mshrk83/TbX1377FBDw8ipQAe5gYbGAQMwoU2wzl0Mzi+/KJ4dTmGP59M22RGUOxs

K1e3e80Q3r0e9lYbaSl7xsb4ku0FQP8gplAOs8N6KF
81JKc/KXPDcSix6a05CpeOQDajPKg8m6nIWf2E6wH67V73La/TF7QDXu2nf5+TUIcqCj5+9x0Z/pNcDM

pYtsmQNpKMwGcpCXBFlOFIKAI9G0Ape+XCyywMC19MbjAUA5GwGEkewFBR67WGnC6fkN09LWPGFaSOTq

xzdeG0hfd7Dj+m29KMa2C8R4C7r165e8SlIU+9zzul
bERNTDPQ+3IChp0elvy2EkPhiCFzDmTjXbM8Dwke7iU+PUT5LcO8evJuRIGCDbSp2mw3guze1KjyCAmo

4GU+mEU9GNnzLDlYzMYUuXgDN56Qd1Q2GpalfIz6HoEVAXSWC0+VeKskVyoYVnlcP0Ed4XaSlPTEwfcy

2xaKH5sck0zdh0mMvO6dxdLpsT1lGzWw4kInvAJI7a
VMFOoURabBeHj76wtKQ/7nNvO945PDNGXMWY144pPpeDiAqAwIFxVRSUNaks4bjCM7ZZhtvcOPjL1gJF

Q0tahF7PjIi9IcpF143oNY6rexNY2fh+M+5G543Bv3m1rm8ai2ChOuPalG4FNxoUTdZpUwGBtRxuCK6o

iN3xKw7zdUCJxVPjAhLINEF+csUZqlgqL90hYFpf1Y
O0xlcLa4QEABsMn2IU7EnZhUt6ZM2fGbEW4Np7ryfCQp4AAjCTbSxyX76ERGtTTNvueFDSUwXZp+oU1a

tszjHrMWK+u6zENR9nZQAOjhGbid6v/xitzMl5/joIi8yqN90FAHaQImwBoU1K48EDM6dVYKkRpqrSFI

pkMOHX1jyIFjdqPVYDMzNG4/9a1RxjUhdas2TXQZpm
WAIrRTtMdiAr8ZFy4r2XS/vqgbSR3Be6djJuxDT4RnCAooEovTXy8jNYI+2TFc0WQbRp/Bp6A+OJuNl5

CgfPsQbBWcIYQvXfLartExcNgf0dtTAgr0HquoWyvprq92yWzDUDOTaPi7Oy5Bm2Bth4RoGDOQra2pLM

zOzZtohyLsYgbFKXl2XChu1pKxOcyDU3kujvxQ1xuN
MzZR5hAxQxvDykU9zL0JVq3PNfHGZmowxA6+xllCv0FW5MSMTls51ELssRa6qmb7CAitu0SCog0KWujT

8IQ22OreaDx/kAPriUmAnvyMwbCb8qWExJ+ZUJ5ODuhjglZUsyfdWgiM54AdIBGxv2PMsBSERUD7ZgiG

O2WJnZ2j8yG8HQc+dTraU8at7aka+rFU7wDiptM1au
ZGDm1UidVXlHYGu2P4YVUdGsLmagitAXtziZvdMuAhpvc4gRZCoEUUrHDovKjixADG0ZAqMUUKLX61dc

/p03WUvysN5IT0aDhJh/FuV2IdUNA8455vloXICq/RWdEdmsYFFbXAm4BskV78KkIYGjuyVPd9DaXtye

OQHf9Eb9vMGOSClSnlIeA5/TzhmJvp58JZTJfO9iii
szPofUWXkrXpy3L5BbYfc0GlciwOe0B0Ep+l2nSo/uICxne2eDDw8T6XEG1ljdp78CG+g87HINA5N2WW

xC1RAx6vvxLcgVC6TZKeRJvnaMldu8XcO6F2T/Tr2wzFPs22nsqX8iTmhlRi3FKXogUp9zLVfYtmJo/P

3PfaxD9irfN2dwV85GV2JMI41YnAC/Kd2RoazXwEPc
v04IAk5bRH8JwRdiGEvEw6LUV3rQgL7qaA4aBPiDGPrgMJXB4HyeigjAVPuw8+cLO2gCEHceAVlNS1eU

80WyOS7UKHHlxxJQKgaWj6WvOEWjAeVZySpUdn4FIg886l1jdHfqxU4dDAUa0OdGvfPQ+J+UC599KPML

s2JTrzdgr6KK2QPlMEvvXTl54O7t8dThJFazbocBbd
AlHsPOwcluJ8P0Td/c2sKB6KT5KyuM7q9+EIV1RWKhR+iaqxjUnS6pljGb/TeTolXQ31+dpJw8KKGEFO

ZO6h+7YapKw5k46nlILBgaVJAi7hTRbzIyRec0TA/3FMLkW2VvVMvfwA9/r3CWHS7dMWAlugqFOTNisK

5eM7YrYw77QTGh88iXNhAL5tAjpxz8814XW/FUM62u
wT0BGughKevF6owAQuCpWP1gmcnFDq9Yukz7EGs6ScrSe7U32SD+8w+CPYnihMrl7NVx5hGOjYRrWmiK

Ou7wESGp3/W5Ikam1QQkNnm4DLAssXp3gt5tYZG859LYyszC1EP+AXuOJYFB0L04tii2z7oMQIV0UN+f

q44Cb2abk6IWy/53jKTOT8UKQqCtoL8vQ8XYG0/a9G
45siDRIC4x2bIVLa0wbZBCLgl4QTVBpkNZmhG2apWVWBp6Z+Buf77LbQCQMV8s6xgAqF5CE4PzyBq1tt

z5BI/Q5fT85f6hwa8bjQwuZhV/TxlYtlrXqptQjlkuWXr3pC1f+zC0uphT1KMlOKpYANohVZxEH2hwOB

NaMM9LGnVKCGrDG89p1VyIeTLJml7g8F22dzVAc7QS
BZuEWEGc3YjUgX0c8R818052RtSMjHxe5mZ0G3sdqrDARsbE5JPDXdiBctN2piIErA2AHjSYi3LM70Hd

HfYlq9+O0ucVslBACmIHAngoceaW2neodfnGZ5gNYc6P4OqRGHsgj3bG442MSxEWeqozhZd4BH7Q/eyR

E4yAmqsOXrI+8KdjpMGsBQBfRSfaILbupvufv3J7Jp
zjQoWV6ogyBTz1lU5nbILSS68QWra32KrJI14VCwufY69uZxR43iAokjBQ4QylrGXAABsWW1nPZT0Q+D

zL2nHlWonGzT6zhyzWM/nuEc2pthh33Gkz0JrnFFrljQmfHtDU5VZn9gLvO8xFhr5W8T7oXcVnvh64XX

ycQTvJhJL2NmtW4hXG20zchdhLTACbwvOTLGVCzHom
LlvAWS8YaHwjSqusGUfbdvRauKvpsKE1yI1Nt2r0w/UrI++/yGcfFp4EZC3+sf0/HNqcVr6yMS9Ws7hr

WfqjUjN+2PYQJGNG5BohsswqEHUkzCvpMHU1ukrRfiy4hZboFDf9UXB9h3CNL0FDUqV+wE2RSwi/vCZR

hLJYhW8lL8Vmx3IKaSL1PKH8i/S0xkq9kxDHj7yiEs
GKYcN74RFAFZlSEniyq4Pulk8HrmunvDO5a1IQF0i6M9hA3tPp44B5jkct8+kIWXs7ZEvnezUzIGXffV

ShZxZ8C+baWurhqeN+50IS1dZv1adziJt83OP7bos4ZTiByJhqGgJZ97t1lNbXa4rGa6SvK33dT9a7gX

qMl3uzk2JbEttb2Z+gVdwKs0oqUnn9mwmCpES2p2nJ
2u2HN9y+fYE/bGf2BMlp9QJWVnp+dIjFoQGe1ZBSa3CJBZQdA5q61F2SjEKawwYPl4/hXVgtvKzn0ayP

+Ar/6X8Dsprh0MPK8bl0BhAUNqMOsgqdHmMegFEpkuGWGtWalWOzWmWWrFMaCtNYGoXNbpGN4EJAmyZM

goJOTlA7SoylUtdINhJFOiVm1IMUVnIX1NSHUcfHKx
TSJeEyHdAWWNNuJyTSUzBLJwmZQVa3fqXmUfJIC3OLXeRgiuMR9JBDBzZO4Po240QZ35wlE8FBWzQj5I

ASFcHA9Uen30aCX4KKPxBqFkKQQjgdJyBWVanhJ7SG7YDgPtVYV6oTXzIvqTAM9NPQXeoVZxBL9JKOBf

bHNlID4+DQogID4+DQplbmRvYmoNCjggMCBvYmoNCi
TdBIokNwywoHQhAU6XfEC7CCYkE25wXZFaCLTjW5PkECDqGKO+Gq3HDBHkuTDzFG7AKyqU8Dujn8f1DG

7gLX3HQjRgV+G7KXHibU8gfYtz5FSdmE4qDncD7MUAlLmbGrSjPGt//EMRXRqjiJdTU5uvlTMXTfOpG7

+5EecQDELiv/+gE9yYNIE2M3OYJzYvXli//BMIq2O7
cCd5eY8G12hu5kz+UBmi5ECRt5P6endUuav03+h6ef+a7lGBwTD4LW/fQojly8dWi43Ylivec9SasWl/

+YY5OC0qurlJYcQYqr9W/N/gL6/S1CcVsCKn+lSF7fT5F8BFTRYq2+hW0ZR2qBLDc1B+rVarelKiMfrw

Ydb4sJRjz6Q79ixz7yHz1Pbuue8vU1tFoqwgZtEFUl
YlkGZTMLQsMWTLG2r427UXhimVqc7wQFoBczLr4ul4kAjgC2wYBPwjOrWLHDFqz5p6K9SvnNzdFoblp+

q1oPmOKTdOrtYGNBrG4EEMOWIboHv+kTdbCUxtS6PNcvcVweGzUF0CqX/KttIDUYSt8u0kkyB/NYluXP

h9lXQccScx+6c6oz3yEW86UDulEqtAD4NdU609uvnX
GjSwSoEZxP56SeDA21MiEyqv4Iw3CBPCeQZy+qItuLzSaBjkwrerD2AduW5yZOMGYN2JM7Bq8pi5nLWO

IdUNqPmfbL5TFWeBTEt0CDx/9ytPowsa33Bdzjc0CCBiJlQE2N9Eh3Kwrx6NinMIiI9P171AyO7d2/tS

qQzyGN4BXrw7cBUEQvj5jWEcKh64X1x+nQvWOI9BVg
HZVhi6JicgFcGJPsrvFvHVeiAaLW5Hr46zA8Xm1+UKgql3yaTnDAbbG4kOfcjUOxS5Odi5RmX6ZhdeKE

TvFwMfHxaEAopPHbzvIp6K+EQOZeiigKVzQs3jQxKrcwE3LK3u2P6wX76Cz6ol/jezURjqKB7mA2V1dV

hfJFN30aaJAeLYDgacpzn/vLfqDiC6ybdnnQ/hNIli
JXxcL+n85adQE1JZqsrP4kTbVFUw2CLX1vFTDAmhQ91m24HumZD2JDGVbL4VofGqcTLpbonpV4HmPtjj

Q3FgH/y+63XCloi7Bjq+JHkVPqAj6DhDUXpz1FgOPuptsbgHiIbgmf1azZTfd9YNWnq3RMIoHgO9xg9T

0rzJE0DrusLcu/ef4cRuXvGaMJZI4KWqogyW0BEmBl
66M3ifWkeBoNctyS04+uIzb3ik4ufRlqEN3CMM0GV2E6Fcxvbf/2y9MAchW35S799i4pDvYcxuF1HN7v

qwayUWSFHG9QRpFDRgVcQuRyHWdfiTV49rv9ympuduZEtuecPX/W5OTntxIESVkKj/xk2tDlxqgE1gy2

73FI8qLqLej/q4uVbKXm5Rk+kAsLyOlz5JOWf89g5s
Izn69BivAAKtphR7jiJrjT2lQER9JN2sYYg64S/C0400TyDJslfWd5YBf/THrgJx72D/600hp1vcf5x0

z5MUwIvDE4FKz8OqgfkqF9Ka80s6E90z/tTGQuLuc23qSE0A9BFu1QTlK9J71g+HrF0YtDuqYXLfwOof

DGsmRxr615Aw8mTZnJIKkIshvWISgjV5JmSs9Hzu/i
zuN0fCch7w2LoWsrD19v+uF2my+QFvmcBSYiNbMzi5VW+2EqpFvSFcwpOkD7SjyEu4+7hf2oWZeX5+Ea

h4kZIQVorGvxk1guNK1VkgnTYhkzJstNvzlRMNmJSU0UyW7XlxXhxaBzQ+F4Z9UvOh5B1KPwo+t0yqno

KE0x1jvWxJe+iHqds0wxWCTbPAz3C8GKKsZoYh2khs
se1OJXga61AP8fQSPvSbJKqm6hqkWRwa3IQyYbVddjanlNaXA3+YlykrCrMaxIIWw1EgkjoxO7xaNgzm

sb5Vre9PS+H+iJrKmd9xfBj31XA84GaPvIirrkw2hIKzp/5vqjV8kvokq58jf4T3lc0DF3T9hPxwCTpn

RnEZtkNyjkGJxl0M8yG/aempEZsnvHvzatkuKv/em6
dCelOVrXej+tuu3hNZJB/VqQzLSjqjdqaGyJyrLP2WAo1HZWbmBERekBGv8d6TKWbpwye0xAvVt6POm2

1ua+/4xbMWyYxnkAl7XN2Dry877wygc+VhNtp8YGrQwbLVPL4hzgt3QF1hcjfpwG6u2v/pF0C/j8RLlh

HBJkv1x0kDRhs7FSQ3A6mnO9sh70QlBMy6eHrzp1aa
sJS1F752/+8fshBq3Gdaz/Rx/fkITTqeHp+71Umawic7Td7sXMvlzKxvBI6cu3x4uRrPBGvW1LcEwyA0

UI8Qrg+RR2hnYqraYSNUCbiPZBAWK6R9bRZ1cUJAfDUIVvAYm0+n9WoCm/XFTAxfRacj4gbx6v7lYkhc

ii7en/jDbrSbwqHiGr8QP09GR2spmm4lzsw4+S8KuS
9rUU4YAU/n7p7mekQn6pWT973ECdLGscAyvkr8fZY7IlJojJzyZK78tB4IcJ2wVF66dLpBpm9ojo8UNe

ueI7KgiQ5xlcm49EpvEhTvO5ghEgNs7jfNcguvN/SpXs+l7hFpLQL7bGbwzpsK6cL0EEAwbny0QdfcFm

1QE2vWERG7wKuPyc5zuD6MO+i8+nEcp1rmfr5YNP6h
0PboWxU0FyuOj7bOYGmUjr51GrZ0FBxbi/e686GbU8fRduhn6oTeLKLnHK1nMQFyrXhdyLVg48PztGsS

wWh3wudLd5gekiH1VI6OEMumw6HivunKFM/uYfnsxiTnMwdbaq3YSdj9ioPwug9zicX1ZvQvf6pQwizm

Y6m5dfR3JlDsE1fyQVSZR867Je+m7lepDusydEN4K5
Y2JitvldyYfHzMjC23sOyo+iPOnuX6SYFM4cbhI1gtTJeympfC6YvqFbNHTeidzYQ0IaVXC1mYdt6tBj

cindi/qmQqz+i4jP4Dkm+2r+V4u1h0yC/m2cS978ufSbwAVUkCanNS57NzsMQBbT/s6XuUNS3PvLX0n0qbK

buREnE42itJsVTxpUIlDDZNKVWsQ2UwbOPeRXIVTfO
SYkdqFUnuFEuJeHHwmDMpxBlZmKOOwhhwKOmrNUDYjSegwKkJzfZn1YOy/vn1muu8QZWWBy5Vc2jtFXz

+SU3vcsTuNT/Ri2qYhIT2AqE1GrdSg1xkuuSATgmJ5OvHR6FUsiZNHkCJWDqOELfdwhOxUDIUwgvo2UL

yN7QmTjkvhDILzdTEIKWECKdR8p/Vv0BmuhRMsBJ9+
ACLlv3dX1DbJqvA7BJVVlmneRLCDOyWWUS2GEmBPRoFuEKMpCDPO5iTltOgRDB74ZC9PAkOzNwvBE1UJ

LW8qJ405mlCFj1eTM8EJ7+7YPYF7mojPfnzyiYnHVb/JqBy2ppb2AZJ86RmMD1BIPAqHKezsHW8GVm5n

Odk3e6O6J3JPPVzoKSpKIUqvd5rjXA0TjwjWRd0erK
fBhmecqJW0UpbPiDKYb0bEawCIMUvebURQCjTzMqQKzBtad+JSpNUlBuz+H+BBRbxN2Ei9hZiwfByCdL

7g2KiWSqOBNkN3eWYUuGMRe+CZEPgRdPE8NV6F3NfaaggT2xprO0OEBp9kLsYiwoE7v/gfNsAOMDeGYa

AegVWyN9Iw/ughpmKLqz9kqkQr1iC3M2y1Xb1msAW4
Zj20WmTYBg3ZH4t418uKYHqaXyB3XdgZsSxf/n27jfdLkGZTHMJv1yzGo3f/lPg4q6a0TkIa4k1fF4Dt

H66yzZXKobsRF9QfyJ3JCSvhmQyZDrGZxawaX2FTsm7cN0jBixfGz8nXC11/EhpjmkXCvWCzQKQJWW3w

HO6hAczLCFBX+Az4iQ41JrU0bsqW5Jl4lZ5SnliMrL
AEXhPOdm2SmphLCP25CsV67JRJ5ORty5f61EqENdFNj21RJVVp2zpAZoK22PregyHVSXos1c9tazLGMK

WJOEeCUf24SQEBv9a7hLht42Mg4o7GnEm5d8eD7WMGaU8+fItHbRJWwWG8YBgLuTOBr68IYtyjiQOWGC

e+CuPXiSKDKeH8z+EfXoPGajWWSnoHEmrPMhX97TK4
o6uUo3ta2RgG7EbuO3u21ttaF/kJWvMNupxfGjdAHeAI1ZPnTqPC7tkk2WBTEiDU4xgn3WZQU8UY9HJZ

MvPP8XdRVdU0ZdJ1FPMfYwNMSiVSNtOZ61RFOzOIZZJAveWTWlI2Lqe443vjUzpzYzYXGwTg0UAZBdFL

1AXLIrMMFyyGXwWPFoBFUyMlZ6IQJbGGnsVYHeL0Xk
gwFwdaJaHZdxZCHDCZzxPJYkB7cti1AoIIf2WB1WHD6UdxMbi3NmpxYgW1xwK0LLSM6CCOEwQ4LGD4Nc

J5lxCvXnr3CmG6wmZwPes1RmIp4HRtFvIh4NMrReYW8uve9QMPTmHCQsEmeZIeKrVSefNglaxXUxHK1O

lTJ0HBViR94iQTNeFRJsO1DcVMAxWIN+Gj3LNTFnhD
AmPH4VRsiE2S3mcqtDLB2j8R0at0nSi571eaYOlnt4Xu8qX1BgivliCuxpfxOTbap+4870yfHbpV13rq

o4Pb8BtAvdw+Wgogw7uFjD/13J78MaXL3k/5m9KIEjohxVg/0aUlB957vk8FOkNPkbhE9Wbzv5BwczwS

d8e6P70txbrj1grz69K06N213C+Hic3u6RAi7/1zgb
nQ/H//Q+W6mG4P1eixP3Wvo0brjFvs6yc51+gsGwnje3n4HCbdmr7v+pHPs4hwagHcXwhdaOfepVS6bl

oaeKZMl8DcJJDa0qiXzELc6oYFx/v3ypOn+r/K34OEes5YN+wrg6N2mrAR7dbbr3Ax7tRQnHEruQ/Uur

UCaUdGfsbUJ4kLSwh2tov/42iPcqu21oeZ/irK7rMk
rlXafbJLeadesNwqdfBI8HtDf4ENff+LXDVlpDwAosmq7365ptjHvQ9MIm4PEK8ZMO1jMEIJZSS48zZ7

kh8k2Q2Un81Iu6z3/iQUwUy3tGG/VioW7x2Ddn3M7CnLHjGiJa2sRwg/imuCLrThjwZnYCHD4qRT821P

ShEp79EU/ZNgocNKFTXxPiGUUBWrZJWcQYSOd+fkG2
YSJlAmdSo+yFAmZqxSymS5/gcETizmNUkeyprx1N+kbx5yQw/ZkU0194R5t8pAzPhvumrOQippvW+0sb

8kPAiV17Sm1U6BvTLOgs4EubqoVikFxdIx0wwszo2mLFrQkkSdr8RsNwAiQTAyTJR7sznk0qUvhkwVcV

s50IltpDpSVf8r9RKOlQk+pnRTDWISq1FHIQFBzw04
ALA8Lyh8x3xe0ucwsKxOeXQveY6VEaBh92MNQT4uAlhqVsXD73MNhomXmhD0c7eBuSHvfkTbZd+NQCk4

AcFJkMZnhcm0KnX2dFIyJ358V1CZxRVeGHcIt3erAHnjhIBVXKXwigdHY0v63sYBOZgIgQKQ8remJNXd

Zd+QFgJGb463I21/AWJ5xjw9bz7Id+oZMnH9+rk92O
0wTb2n0bl6r+4nFQDc70Cl+RnOrcCYJhQYjxx2GBMUcqj5m2ASY5r1b1moxpRK7c4lQSMzKEBwdUN8Nf

Q+bOBZSlLGlFxE7wffMgRaZr5jHAv11Q9XRTz/M5pBeXYvhGVcUCe/AqPPAkBeE8m5pT8HlpSCZRC0ZN

EKfwn7n//9kuVCMh/DGOIcwRf7RUcsyDFA3Vy5YMFq
SZFa9CaOMpcmRGOV5a0WWCZFQAru4MrT1iSXYrZ/ZOAHdg40pCGWphQkGuWoMJDRO7iz9GIxR83/biEs

SmdK9mNiKFO35SeKJltm/HOiP2rC7G5pS4jE3Y98cnaytjhVecf2jgywh0l6I0blPvg3Ii+IATZ65dK7

2hWtr4GaoghWoSJaY3SeDp7nBR2SMwjiCIOrK+NTs3
iRCWZv6R9D1yqHfrWhhjqMqS1neFf9bGFYQcHZRzBLwZ4DacUNkR0c3eqHslsa2BQaVO6+fetDgTSxUY

7yW3AOrvHe1uTUjKSShfK7d9RQIkCjSoFfLUQVR0fSvgGM/WCZDRdaVtMZGwdSfn8DTP1gLoku6sf1JX

U9y5oejSEdjhYZDBtIyqgGeLbEamj+TOBc7zCYBGBH
s0MROsgy8JE3GMwVFpwhrOek4BHkIyVskIVTRVl4kovlUPrqMNZ+JQrt3ur/NG8XUWXrFqCecS5vyP/6

XLSPFSwryIGUgHCa8jVXR+U8sH3tuAJPBXbwW/Y+7/e9Pa1rthdfPsyQHcb8rQo7Hum9vjX3Hu2k/aIR

02BcRxVER/80yZnux6bKWErJ+uCZth4rm+O/+Xtto7
aL4uym38U83TokI/d23r0zl6+V0EGM1V3L5WbFBk6+QP5msiUqPiUevB7YBT08QgGauRoL892hmLaePN

fGxtHxdPs4fe4Sa96m1m1Nw7tbdNNhklbIQgQULNxES21xZOJogrpxdNhOOCy+F1bjOxJ1mV3PElJrMm

daaLPZlReoArWjfH8QKfmsv6ovBCcePAEX1qRk1aBe
4x7E9b3HrbVLl50PsMoAGPYiWXkRJdmBX8cOuZmRDMYFFCFzAUkKCYX+UJ4zhdw6JRLHi/cUSTfFHh1j

GviBuuST4iQGU8PS9E+FHZrjyahrFVfREnNx50kep+F/axmxGClnLzZRTBdr50+mNKA6BbvQMQwIWM2t

tULbqILEftAQe5iYicmHRtjX7TOhkJ4DOmij3KWIdX
Vpq7Syr+z759sBEqjCrB4AKOG3cEF1lChsp5Fc51l5xKXigHU83kidIdX0mnIlX5s5sTWBZAQxDSOQJC

WbGSMMq1PdLdT4tHrZpO2uQbPGJlanBlK/+eOQFVRCBjSzyTWI4jYKzTbAbdMTUDndAQ4CHro5Ja4WU3

ZNzKWcthK0gWEWSa01ZonXubB63FWcc5d4Udp9oPEg
GyASuuuH2gKa3bf2PNIFRz7qXRJXnvnC9thKv9/3VGvC6esF0KF9R9sgOXMEYfActbYujnUGVUJM7fhN

/WDLjYOmTbBnkmVSeqFn18/4x5nGSUOdGCtgU2iYRgBkgaHf6tMXYY2MIy5GXEScKV3XtkMzPYnmmR4Z

0QbLsVRvGOqYtds93n7bSojEA3MKktwju6Ii8aZXKu
GqzLwxJHeyQ9riNyhhdgHFHXY5aQ9bcreThbWwcv3cRCtyBRLpFAFKMkQZSfADvOmPXUopRO0uaGLlNL

VAzjIaUP+9DSrwt84qa155drPuFUEpcwQzkbd5f8OPzkEauWqsmumYj8ZJfqj1XPn/bTsh0xrrNUV20L

bMkx5KeEa1JRZDYORZgbWt3fe9nw5R3oTfn7be/B/L
kssvWgZdselMSuoOCgIdk1eEbppAxHco/oivaTqOgM/6wqRRcTKQbG0geKMM4V+M9eLq5Zn0YdyJgBYE

x2Ziqnkzmw0zB7yBp46TsDYlpG3bhhI5alJSfEEetGarx0O95/Q1y1umyL15f+33GcM4WGBAV0ru3e1V

06+36z0+/WPCUf/wOaz5OePBxzepNrvNPiPB7PPdUv
VZ6gwx8OOXFtQQHfPuyHSsOnRJjNIoLkTYPkCAhyNU9LGImoWOkuSLQiH9TudgFydOTpNVLdXz3UDAKy

LJ1TKEVqzAApDCPiAaYyUVYIUfRqEEUvJJYpeLFKk2faFvGmHMW9XXArRxsqDQ4JOIViHB1Bm487FZ09

hhJxZLCoGCGTSmXhMEGiR3GqnGXqZIbuP0MiF3XnLF
5ciFVxBW3cyPMjQ3NyA1XojyoiQVETRfIsYKRvWFofDNVcBbZqZPawYFR+Ki5APHF+Pt1HHR3uk6MsAO

ckTzNeAI4exj6KTRD8IR0BsKz0MACtS2IiCTPvOOAma0NeTI6NBX2kgGotZxZmQU8+RWpoMDM2noAozL

9BOSEdJZsr63PKgo8I/8NO+nCsNFceAdrxM0ngFMTP
wJvBGivvKcs4Wuo6svVT3QSoZ8ejx0S+uYJeqHOn9pPBB2/4wsgmQBMyS0SS+vMhsjPw4t2pouEiPB82

bRars88LatDVglm6u4W8QJub5NwqEm9n1HCakJdMowhVYkhi1Vvdx6AQhA9r78T2WeyGkcil/CRd/NWP

UlxTnd4lbus7J+nmjqWtsNg8++nan2/+wJt9lcrNDl
n2Q7efacYVll5NSiP3jHwX9/E9F3elZ8RRDQVorRizLX1/c7d0NbdD+1Hw/o445x4iVQXZd0q68ijg4h

mjAnKJJFmjkaT5XuoBSMuJw93qwOnw5b1/RjpoBdguAe+1TsIa1d/jXlr59Sa92/E3yMyIMKFJxF9XuJ

YmXBUKPapL1ASyKHlU7p0LKmljRRhPgdc5Jwf1fysk
+QYU02bJ5B1FGTsV6Yhs6cX+3JyyO1A7PRP21rY+BXu9ISggJ65gRzrMZyl+H+mjPFsO7h2dajVDq7Pl

int1ig3vF3X8xAxeSuym6TRCT806nRKoXffSmvZW2Qic7RDGV9y3LBxCvbgtjI20a71az8NFNZ9mYhwC

CdqGztkNq98H+hUm2UumTqaj58Sh49BfmAyBeLj54S
FntWYYGBPqHA0xKvSGzD5OZzGy5DC4uRs2L7VSSh3HxY2iE+y3elWBcHfjdFcqBYBRifQyXXVU4kfN7D

7rTfxbDiRouNDqWVjUmjEnDZWICi3IbuXTsm7QT7vVITlkUxmYoEJDLnCYadsN39Zz3BYKncXsFM4Y+7

uzyaxctiOp4sOG+5RUfrJE5Nu5/QO1jEKikqBehikg
lAvW+183YZ1pF77pgpC8TW5FUDR3d6UWY/JzYFFLfgBmQqygnYpSPALypB41gX9Ea4TTFVcflQkAGHI2

RqLC5qsIIz8W3jC9oJ0MqmQXf6C4LlvHtxZrmMpcBxPmVBsdPA7jN9SKwmcFfPN0DeqZVl4FU3pOroQp

DZntdEDVmsds0fvVNqunEDB7SrSe2MqkMUtz5WdL6C
Ja0VO8WFcuaDEP1RQnXycydCH5NYVvMAENzXHXPOlnWPwbnGsHbGY328ykzfQOJKQHHRgo3z8LaR9NDh

T7dOZoMLLI1f2Y5K2WhfOdcN1ASqJ7odAFzirQdznS3PsCBtAA4jiG1gnyRJA49wZQjxU/cL1hCkmgcP

9Q1UQQK7eiA4I61Fw2o2SC5cJAKVGZ9BHjYPbn9oAE
5G5afMpjF0A00gIJpsPMSonPXoWVNekL1Xo59dVpN+efcwXrylYuegWlrkxmXVGqaZ1x3olwYAYGGOwS

UW9zNyygkKllRyoB0CRjODi6cZw3VkRZrte4UgIq9SminN6S/ygmy/nAgSTyH2iELjywygVN4CtLYNnj

6h8Kn8mUQjnDtOpsMjgZYeTrH9FttrVazCv+Xcpy/g
zlCdvNh5FoNH6T7tAif5muHDEhTG9ffiqtBIEc1V12HMdrCThiqEid0WwRECWhEDfszmoK65ENk/rJ2G

2ZcAwA26oRU3D0fBmry3syzP7YS5ODS8tXE8EF0e513YPJpLqxwPLSP1Aq1GnKQfjvH4tc8PswO2w89e

/v3wQQwpfDkoSAD/FbhIpvJYoD5Yu+7+KlH9fx6D7Y
F+hvyeFRIJfxBDBr6nYCQjrQtwGfZnkWT8ZdnwsUU3RGyBEPzO+UtbQR9n2OG7m4dMtgrpWbpEh4qfQC

MdkC1FhRbwqSqdEa+Pg0h7FWdI/gDoVpfOGXt2ludlXjoYsXRQj9Y2CH8c4JNLIOwo5xUQc1SE4u228z

Hx/+ow1xj3b4GrU71z/PY6bHREjP278B/dz7p6hzO1
tX9j3hNsa35Q9KIGuh551SdLJbShJ1ZBrxIxcumtSr8x1JnSLFzglNbpRzmEHeOvJS9h6t1mPMUNhSjz

f+OmqOi4rQFOQ+aQ0nuY0VtQZSPkPJbhLFGik3S8x5CICWFyzpPorKb72KTXgLVmu6SYzGefojr8nMg6

uq2pVZ9YdYKwaFtQZYzYuWXkOFouu2+oL6xQFD6ZoG
9s+SX0BXgOYVebsqwfjDK9wGVkqSPsSR4PbO3GBTyw/FRRqUTA+VRoQFYx82LpqoGPkc73gprQeKGfQC

7cFDJsVs7Rnys/YK+48KrP7AUvP+xChmulWg0VElmI2JG3TjQ/P9WHMzQ0DYFCfVpuQYr5k8Bl9BQ66Q

+CdmDwc7pzao+IYsDwnRbw19Mp1vaIyY0Drw50LI8Y
wtEouDze+UCrrbgpORa+qG4edYQmn99hGktevs70IGQMMJF2c0C99HvfQ7aPuI3zyq83/aZAojfA2YH3

4iVu8ZceUHHHZiuVP7aZJdpEEdPPgrh/4JbWamt19YqGagcujBFyFJl04T85UtII6XskO8iCD56BAqI9

lvDzC9x6pNvxtDC1qT2muhVeJTvbu/R1ZkJrXYBNe8
lZ8VMhj1eWlOSyhXOULbXO9fAkUB9RFDOOX5o5YqvtPgR/y+Zx1BNaaR7aNb87z7eFLL3yZpwfped/sk

sEUCb+LVWYFAIC0c5LLewdZAOEP6xfes7XeZjRk6eKri2ttIAuSIJemiy9VfxEhZdxW4jG4N5O5AFCig

4os9MlwIaHcr+NNVyW3OC3d/0IermmNbN+sALYPpyO
jpNIVhxrw9spun0gRypY0NOumbgQZWjNzDCuFqZ+xhD9ssaRSNFsn485GLGeCxLTUsvae9B2Ji3RFrgM

ngVjSiO7P9fy+MNMRa65ief5GkviaKwoux9Ir+5/e1vsS3gu42rjhd6ahHscjbLJ6JtfajMP0IJhhKUO

/4Qzh8AldLVn2d6XWsfRxRA8pQgKv6XrYFFcXwwaEb
reDJXNzjwMciU3GRfWpAKCponnzdFFo9BPzS3H3EaZQYK9SmWw84LxZWdfCLCrjQDzIfsbhBTA9CNSl6

9jCa1ED9AVl0GIGsUtRq5OkmGpdfvIAgxZAknBFyX3NG+Zv2sJh4uAfIDEqDTa2GLHe+pUnBuAmis5Xt

Byb8QSOfUqBgx1s7Ui3m+T0OLtpferPe8njTT2oNMj
zHUBjJyKO5w6t0Sfww6vjdqVYvCdSEeFTfkAodHyNFQJKRpwIva+i5REEpDjWxzkQRTAh+GDtsF6jDad

nnnyzLU2vq3rnBdJEPC+/aN27PTh8a0GpCbcAhoyvNco3I0Pc+Yd6nw1PtqpDjRM1s7ndK7fk66

f3fIKiCkprAHUEu+4KjUz39tAqreUiTvYGqS4bB6Le
PoFwK9bUgeLx8KV1lcfUiJ0BFDgu2XKVd+W/c1SO9cMsVtKuGG8jrYzWeSvvXR+Qn/4StMHYiy8AJZ1s

j9GsSUOxQEribqWrIbcBObJdMHXec9FsYYwsRDv9VSuiBHQbL0H9aDXvNBSxQP5YDKGsIJ7UMJVifbOt

NuLbJXPPWvTqZHUePdPlf4LdC4ZxLVBbKBGWCAscIX
JcU16rEJlyAh57FQplWLTrMxNwFFt2Oc1HSfIiQJVtZ19ptHQhqDCnUUNaHMRCYYnuLQLvD9lxw6QpRO

i3HA6LUW5RjpDio4FlroXuC3tmD1DEDB2MWLTkX5UDQ5UkW4miOkRrj7WsK3bxGbMma0DoNi4XAtQsSi

8MQhNgRY4nmk0SIBZpBPYyPziMXgQxVKgoTcjfhHDv
TI5SqXA8STVzP53tNPJwYLDeW9RqIRUpFED+Go8GXCCmoONhHX0FXthX9FwbLwABLZ1JtC7kEB2uOBqL

/PAPIufUUlblz6BtVFBeWxRH8QCoSxBY1oeyzFpblLUP1kC304JJGbPJKW1Qtj/qGitV/vyptkLfsiz1

8Dv/AUhI1b3j486Xc3NzP3+2+YcQBT02hvSdrDg6As
JnXhHLJElxrPzF803j+PqPRvf+Uc8vQHeHYYYFb7JH8x5w2cOmg3Irp99ga2a8oD6I8VFoFtfE8f6r+y

ssyk9nyGUYbbrggmRB9ciCZBR2bzBN3Y+9m9Lh5VPfmFkD9w7ImGphikxYdKz6FUTIfvlwcVqqog/VXl

hz4nUeO8FzevNcv8eMry5uK5YvdwuzT7pECp1C+Mwo
9d0rHxq0szQKw9FEhp6Fahy8kz2xoM/Tua741bdeOrpmDPKswHpvICTilU0e7wLvsB3opRLDTsx4aGpc

n3Mqo8IoMlNmVVTM5qjPpkRxIm1Xd9sQOSAsEAM6c6FV572zhjJIR7zST7AFPcCjVUeG04OCOzKNoN8z

mmEJnmpuA1D3BI27vXPqKfxXpHAe7kLZGli30TYRh5
hQsJSyhMOwws3XBse8gSxxYMWsNP46ssSHPFFewgPaClOFQzQzJoaVs1Fs2OjRWxZQvbnO3407fuhqlF

uyvdwYL/FFxqPrAg78lHr4F0tHVRwbJqi2yBSYN7VTItbyt6JdW0QR04jkcEsJavKYBGYMCBRP6bOLI8

vYlId/SfEPEwRoepQPlv8EqsJTeFXVbvtL5S5W3iVn
x9diIK2EbG+y0nNUKHisHhwUhPYL0qARxxTB81s7LozqqCPCdwzKrIc8WjL5xp88eynoLrqj4aseaRk5

9WI9Sz6rVGtG3dfKbFITHl7JfkAc4N1NYHUotqLoP2kmwMr2gNA93FljCgNPS2KKMvhsyCBYZXxMrRkH

xIeCHr7zbjSwIgJgZEHCHYUbcLTJsH/G67SEi2ln0u
V8mM3midRnIc2H5v2y/HRb6AQsdBr9R0HP4MXE4G8ElbdT2FjjhGpoYjfzVNgHHZgiwcpUdraZODuHKf

pDFGa4McqzuH/5SlE8IqHsZa/IDQogoduSEBcxiUkNfJLl+kIl7wW2rWWeGNSMEvYTI5m7RprX7R2tO9

YCBaFgI9tlg6+BJhTb8NE8BFu41rsSz1WWvyi/hu6Q
aPxHIhI3K4+7GLvG92HZqGXUFiNzLAQcJE6FbPaeFCMkJKFlIcj8nj5AHpS9yV9MpVMvXPgytSImSdSO

Kp2jbIMTrhHdwRAfGrcdCZhOgmt3zMfrEUKD/UdKhOhjNRIMGjRbmDG9EwwD2iHYehmbZzYNHpZJDhlx

s0IiSFRYZmeL6vR3vmelBYnQcgTn5Pdu9DL3/lOEPD
YDmUQSF9b7O2EWOi70kuHLN9jBD1A9kp7qSWAnRKOQ3pcybPaclFPDHxHGcQ/qgMtGYiyg1st0A+U2NG

oXdIjMKjsXLOBCEywwTK/kM2BGjfimkrhMX16gmXB62tKE94zg8f70EGEbBX7Vv+2YhHAngauUJACooM

wUSriuFdfa4xXUTvJiUDB1WATZzRauQe779VebprTw
zHkXKw50f/fD63bkBFKqd0wP5A8yQ4eDLThyJznK9cmNmtS8YaL/yFoTZB67l49CRjrWmXG2iMkAYswo

OhWId7bA3VpX0fhm4TfOP2PXoPbRFUvjOxACLTqvSNn9uC/PB2UFbGjmodqPLhC0Q6wGWMs05hR2hHz3

Jo7HQnxzpK57wvNYuZQwiVGb+lO8Rwjk+xws7NmSY6
owSAYYQGgTfUBi4RKzp4InLVYVUb99sZJRHBa29lPOk5PfFTKOfdUowUj2BlAWTR2bbr8L67mHXa8W3P

3xb0lGvzudCz+voRUDV+YhHxPa2HGCFocbjwFZQUcJM6p9moXPnDi1WXkFuXh6p9Pe3R9oB4zBRrlZHv

yNhjUvxjokn8Eb3FDSD0QsxAyXATKra49B18XZ/vep
2A20rGzXTSOtCCJ1XFqhl+mk1/SLXJvs6IC1ZxS+VcIs9EXMWf+fIaThUJ3rdjlVqq0J++8wA+oFTt1h

JvdjudEyRhPyAzb/QucFkywY/eWyW1gi9JySARvHO34hrTPpBcZ4Og+VBowNhx4VWjEbThN1g47njkVp

m4yZC8pjBsNVJyM7N31ZlJgWcM5IPWDf/eOtNC7tLm
z/Y8K9IuQmJJRtzxpnlznRB25O3Yw5KOh7IqLrj2jtkMN19HHf9zkBQEChPwRJ+/H38jLAnICEGUKXZo

LiQaNof9WJc0SG4s4uMXrkTcKKXkkYBVmNtk77DwNdEkUh04bJkZQ+gsJhQT5slUJ15+RF4t4KSObs5D

NhRRwlTYAARpMjt2KChBIM4YB1iRUsnUiLcz/jGBhJ
r3MnArWxqcAVD+TtihoUySWWHvbIhFEEOhezIbNGSHCJTzK9oKzd2gcoQcMTLT0NCdu9TcLA2tws4yL1

Qa6Ny0MnEHMZxhleJQoZbCoblKcW0xlW5GiFfaaUOF14c5RdxJ71oQyBrzdM7zLutf8Da/0uvA5XFVdp

LxSr310ssb5WOrsoKF3qTNjMgxTscuCUt8dDL9tp/E
pSapOw4lctqdU2LPyGWLjC5NUgAYJrFAk9aCWvSu9+7CkPblqPtPPQzAOwmrHQgnKA6Htud6Epz+OvPj

B73xL7KNlRixlY8fYVByEJtUVFRqec90XTFsVgUIz7DkBzrHkg/KWEhQyLnwcZIzEMjWz5ZSeo8Q5E8L

O/pwt8AV0ZXY3nq6NvWEMhHGyruvVlXakNIiC5ZAEu
o8OiDRtyCDl1YTqdWINbB7N5zPIeSEHsQT9GLMQmZC4VYZKkuqJpOkUeZTTLVxWoRCEjNpLbj3VpR2Oj

NBWaDLWNDGnyYRAaB11nGDhmDh77DLysTJRoYrVhQZc9Od1FSwRmANMsF55zfLHonXCePAXlTCESBFbe

HXDaY6qcr4YpKYs4VQ3QVQ0FjcPlp9UvsxTxE5dlK8
HSXC4DUMIpF7QEM8YhJ2laHaNuw1PpL7liTsTsn5OkHh3QCkUkZy5DPvZkSI0iyu0JRHDcGWTfGqwOYt

AiIqR7QJHePrClXSU4JRBaUltuSfM1CBO7XyD5PEPjATa0DGD9DmOxGFPiBbBuJPTnVbIjXZkqHFe0RM

I8GBOcLvGfOts1GMB2BNP2VTFxTLS3FTP3RmN6HFMn
NQO9SWK5LaQ4OMIoNYO0KVN1RyC6BNQoEip3ATD4ICB3MSXqOAdqVRI9MHI7MZVpQMX1FYAaYQJyCuV0

QVJ7NoY2MwBiFbMsAHF0HiN9GSXuEyp6EMuwOtPaPtqqNFGuZLQ7WgU3LAAuBLBmHClkRrQ5HdgnSfA5

BVi6VXB1OnKlSsX4TJFlRRQ0HpJbIxR6EOw4IVB6Ql
vbQlM6SLXcNIKMQsMgXln1JFZ7UUStZwzzSCA0TLH8HdXwXoVmZZG3ITN3OfM1WYVdYOT1ARY9GfLgUh

ywXPZ4ZDZ1KyYmCeLxKcLmQZNaOVRkTjExVONuTTJ5WxW7EXYaKFM4XPD3ScUpTiKkYFMtWEW6KRU4NI

JqJTVhMFjhJmQ7RRHeVDtsNHGiVFH5GYKgKlR8NDH6
OTIbBlCxELi6QHb8DQF6QGWtNyQpQPy3OYB0YFPwSsLgZMa6TNL6XVYgBaNkKFh6PEQ7VDYpSmJqTSp7

ROO6OFHqBhKoWCs5CEV2NZSmHrCrAEi1JTS6FSVgBbLyXXv5LQA4UZOtYfVaGJ5CDCY2ZHBfUeBmLPu6

PFO8JNNsPuGuYAw8KZE2VWOuPmAuOIw5FILuMxdiTj
GlCHB9XuF2BHOoUYE1VOU2MiYzEcHbHFU7GQKbGjS2HsgaQjwvOLP5KwB3SLQuKqSeTUmdHmJ0PGIpEH

YaZWG7RJVlScKoHiWcBAYbLwBYSmNzYYy7CLNuMhIoYjKgDnFnHHWzFoNoHiZdGJO4BXnnPOQ9YkTvYP

Z9NCLpRKT8HepgMeH8DEP4IxD8AipyFmU2OPK4KsDg
OZIwWIbrGyH8JwbsYmZ0YOT0TbZ3WcizVmz9AQC1QVGgAijgOaj8RUcgOxX5WgFsSfs4YW5KTDM6Ybhl

Rsa3KJp4WWM3QckrVDl3FYf3WUE3LuApJxInZLztNcM2JkLiCmH7ZIE3GhM1STEmRLN9EYG9WvL8JIHo

TSN6GGT5ZbI1ICKwCXf7BSMjBGA2XNLfCPM6FPI1Ph
L4BGClIrt0ERP0UMLdHtxeHzl6RYI8VkQWEbNlXDB2JAU8GvU3CCVaQNS9ZRV6BwR1KWUzOCD2BBErPQ

V9AYPxFEV5YZV9HgT5FFFoNOTdCMU5ZtB7GDOlQTAHYtGqGS5jrp0MAWwkRDGcLzuPRqLwVMfGDzViSB

FuFPzyFQ7Jx246RZMrO8FbgEMzvh3IZCVgGL8Wl843
UaBgWJ6EqxylfI9LGLQyDS9Md7OxvzSqPAO8O0VftQquqXjblBD0FCCaOXIrW4MqcGUaYgEzLCymBRGc

C3OqFQpdQUTnEFkpYOTbN3HgpkIBDr58SBipDU7tOAUeXPXkGKH9KYZwDNdcAGCmM1i8NBqbA6JfO2ao

HKDcU8YgsEDyKQ6RIUB+La8DWL6wy5IrVZvzDCItEI
5yvp4PZUS0SH1EVNCoVD4CfBBfR7ZmvkIeT0QwdPfqDN2BxdWvUKisHX1WGNLrMv8nfY4GhefmiF0Mqt

LaRZmmKl6RcP3NenInRJ8co6EkcrbRQbTmOKEtNblwl8VPgYLpEGVhC7eug6MPsCHeXCY3EA7DUJUePY

0GvBC7lZReSHQbCNKKQDbkDQDcL9KruuYMKDAocyep
qX2hCXD6DPJqYa6SSGY+Jj9FBU3oe1ZvGFgpNBDuKT4nvn7XINUnVGl1DEK0ZYEkOfc6QEVkLeE8FqBx

LLO4PID0GmR8KUhuDqMaEKDjPCLwSaJkCsKlYVN8DZJ5OMZiHlp4TPAsBmNmNylrFid7MIC8XlF5HOZw

IXD1XBE9BuN5NANqUFH4FKY5UeC1QDWyAMW9FZQ6Jc
ZnOqPgBnLqOPT7ZUZUCwYeMFd5OOQ0RPS5UBFqTDt9LByiUjT0HzTpXhQpBJruKhX1LentVzFqEXx8NJ

S1NrPfYrx0RBC7JiM4XjNiNcWoFCxaEhP0EiZkDmm5GIS7KxD6JvbuVfWyANO8AdN0NCBkWfTzMPN4Up

Q4BNAnCuZ0NOA1KeO8TPJjUWysSMYbAiGvKmuyXtBv
LSJ2BHQ5QCTrHmOnMOD7JwT5QRDdSNV0CNNtUDG9VTSnQdUpDNGuDDM5COLnZvj4MYQ2EMP3QPCaTdq1

HPv1AZE6FBTzXqQnARAtHDA6SGAsByy3ESQ3VhRcYqPcLlEeXVK5HuD6RriqIFJ7XS1ZASI2TBCyICZo

NXZ3ARVlKHAdDxc2BAF9EYT0AHOnVaWjEDt1FIQ7JN
BzBzByDHe3DOU7MDBaUqNiLQa4LJS6INXlHiVrNDv7ERD4BYEsQmIuALd1WIS4MZIzPiNyFQn4UPY0WR

JcIxIkGEx4NDB5ZBLaOaIxGGj6JNDXJnRhXtTbVId9XVV0EZEePoPlPRs0CFU9QXUyLuPyPFb3URQ9BX

NzJxb6RLSeLrQ6YHZlKVQ1BKS4EoM2NIIdCfchRAH2
QoVvQsZnMrN2HIR3VFA3PIIaKXx3YOLkSwL7GujhUHXaWEKkSCT8QNcbZyErGFRjAkMoQcKaHHecUDO5

RoU3YOYwQtJtXQZpRvQySjLnHaQ4CPA6QtM5JuSaQVJ4YDatPQZ8SHRoTxPrRDlfYoP0VhVuVqCzZGdt

TkT4NoLxBXHpSLJ6BzMhNtL1ASN9XzC7WwgoLfL3ML
Q7KYMwRkqcTun1KQO8BQK0MoSpNhAhVRe0OZYLDbVhTzd9LOb9DIX8SfyhLza1KYG0ICO1DyimFbKdUQ

khQlH5SmWbLhHwXLS2DtX6EqsvIhQmHIY0RuT5DUOlZHW1DQM2IbR3DTFgUEW6GZw3QAE4TCMzTFO4HV

O9WiK6VJZqSKU1QJE5NCFwBgceSkt1LEO5EJA2YGGs
WBhvVVKnJIN3BWOkGlZkMIMwJGV1RPMqHlWnVVS4VLJ5KYGkBnXfOPAmOAB4URBbBwNnYRG4LqS1CXSm

LJW7WY6tJWzdusMaXzaGJyXpOVZil9HuYSpuGTv7CLrnWJEvS0D9nZPvGt9uaLQfa8YqfVL6q9CIUaJl

GKSaHw3brB3dgDIeOLOwUBrcBg0lQF1ZSIHzPJ1Kg6
XvmcVsXMH2Y0AedVynvLzlbYA0EQBsCCPeG0YpcORbAuBeREcsBBRaI6LgBOmhHZLySOaxOSGsL9Ilgn

KTDm06NDisMF9wVFSfLNFjEIQ2RUSiJYlxRSKcO6l0FZkfL6EaZ6wsCRUgK6XwdQZdVX3DAZZ+Pg0KZW

9qi3OhFDflVDPgIK3hnl7SRVZ4HP5SJGGgEX8NdOHn
G2AkpvQvA1CkcOajHT6AdpNgSPojXD3KPCMkEh2zlI5QbeujoDnFj3zgZ0QbY52lxV6pE9caxuGdb2lK

ljCtBPriFu4FYBXlRP4NbDDoqRSeCZGgVxHhQDEjaYKdXPOfKgU3VCnlKXNkC1bzTKLrciJsQBFrWOHZ

UnEcWOTaCj3gpUEao7FkmWF4u8LkZaUpZLJNJNvrUD
4+UNzhdrHfWhwBDxTqOZBxm8DqCUvgHQg4L9WodACoozHoNyfgfVLTRPAuBPPtV4velil8sONhSGKkOi

OoUWNxB3ZoCZLiARR0Hj5+NRsbUBA7tdZkiP7MGVQtqNs2FKDJ8kj1C8pRpaPKPWXd8WJfEOUTLEAmnY

kmJ0UDVNUCEGIHYiY5yDPkQthvN5ZVkbIXfYNCKcBc
YPNTKcnG1YsJpsIyZj9F1yKE8L9jlFofmJogXzE/8z///3zdee+q9WQIyuTmOnH7PTSHtdGP0C4U+bQp

HB8sJyoAGvzXS0qP8WXmPlW6ldQotR2lTN93qES1uKeP3w/1mnn+O44t+wz+7xBdwFeXB0x25doN7KOu

s6LufocjhN06Ff+Nupqo+Y3g/7K87cuB2/7+8k1ELc
qJgimjCjOzlk/hUgK68dnHo32pGrLjLOAm3I/K+U+fe/nsO9/ps94PqSI7c20uX+qUl4g/o68bxq1Pf4

5+1foB5po3HH0c/1026kcyZ5xDwyRZE+01nxdPO36sBqgtSoHHqW2uBpq2L+pGUk4s/bjlx+tjyQmv/8

R6VMhW+Qz1p/3lNBsjvHzsQJqy354ejZG9QoZTUHiB
rIy2pA8kC2PoZLtlIX7jUAF3LgGoeLQ2aE5VR+rh5n0jsXUNpHR72JS9vYwJPf8UF8L3pAOKyxfH5GMQ

FtMv91jIfjnQk0p86UA9L6TC5lqfJMZ6En3Gt0X/LoWlxwMouQTOwlr5Lay75lGe+uVlMA7lLT4Nl0Wu

WTbpvq4rUEMA0b2mSofTHnYiiamtFqrbit+z40yOVa
xHa+hDcXfUMxREKZXgfJCuprXaJbqwLrd+hASpdC1k0GkEHRB7ZAZyn0bHRIK4UctBLr+4CxxdbPEXe6

P9srHptmvTDONDGiGB3zR4TFfAwXyzzhAb+NbS7+bt4pf6KR8rcaNP9qpX6N0d6GGGz6JsvqRyVSxJAQ

07dY4PzGP/TS/S7cRchqvxmYCIhvA3qltkvKqHnKvs
7eNI+FqsfTwR194lG5c+jF1HOZ94mL8Yq/HXKPNRKqKQZvbFFUPkynM8XRB8jPwaJra3AEZ/QOCW7eLv

PKT9Vt+eonHl3k4scxIW6xm+hqNzO69lau5SCH2j0zSgyGx0Ng9qN3vh9M/Q3/CPhZeyVvBgAM4k09Kg

7R3GrGoFiqyWUGL2aPTsuYrhzYPFdNFfvmhkrYtXbm
/Ts/Vz8Cz1KaDE4vzmw3xavf/X/pSpsxyOTvFo1L9spxG13Sr41U26WA/g+fB9YXuPPORY0n0ETKN6Bh

+LYgCDAOsVL9E4jXT1+Se4Zz5F17mXQp8QknAQrry4+Xtv1yVxiu64EH9Y08O7W/uE0omca4AxRdP+Wo

b0A1Wb6q6O2yzjBh0NB8EzdRbwP2UcsLBA6Vi2BAip
G3t+4yXvaz/99lSnUx/VEq0qrgB4LJQbpF8nNf0HxMuuFfIXpPu8mcO6f1NT87H/RH8Cp1rkpuqjqyBi

RzkA8lanEYZ1n6lsArBpRc50lCrsPlieknUKwlDvFkkOjKrY71rnQLeEoDUlEcuTz+FKmhuqaUXl3Kot

fmV2hQKPq8cdz97SOxdc7i+0w7GsnX/jrzJs8vs42u
G6TC2kQksaw3/Tj+kZdOiJk5zvRoC316sa0mu/61lqLqOB7LalfzImz1j4134zVPvpSuqUxb3DDlATak

dSyz9Xb3xsnhO0ZE+iP0+sBe6FSN+AV5IheREGOqxIR1i+eh5HUEv7nOL6X/Ih+I0tz23VKhAHQaxpnr

4cF73NKG95N62n34D8OFGCM2nJHvH3itqpCxHlQ5u5
CobkJ4aj4rqho6CvQ7xi8sDupdxJTi2IVt/1pS1VRWPO5UE2DamS2GKGvUcn4sWtPsgBD8Oo8XGhQxp6

E6YYitXMf0yau0UgjqK/RF9hHC+j/xJl+uV0O/CRP3WclmrqpkDbfspBeAd68Pn2DwRN47peE+Dt+oh2

DdOz1D1zNv1qoLa+Q/KpuP7ZV2mKBfkN2taxgR3MLN
rOBGFancG6oLNEJMzT35+Ao8qL1qsIKuIetbNPdydA3acdNbp1yCqQ432dnFEcHYEezMwGF04yrxWOr+

6m1ZbwJIxEaodEaAJEvG2rU7qwgtHiyUbAYz50dolByF3lTpjemNQ2K4UWBexq0TcyW7IniUrnXMvuge

R12XWi3xWu3MuFw4g2j9rtiz/IQrnqzPZFbadhZvsC
93doYYA8N1I731LHC81P8rH56u3BtLtpFiiID/NGg8BGY8K70XDblBe40aM6fR3XlQt4bYXW8HBwBHVi

MI8+6jFoiicDbRwSb+E9z9Vrcui5E9vXMvC9tMerZJmkrm/9s1y/Sl+k/3D4iXWQ4navP5jw5kQR7BhM

BmMKHgZSeM49Sgnx8Vg+26ysWmqQCCWZSVppyPq00f
rrmn6+JB1T7QtFefQtpFrerbnAyZ4+GMkIZ5BwaW8XwYONq0NuEvQX7DPrBv3VREQ6HrXIdTLaRf9kfN

ZaS6nx+9WUnrqYvFOAVzKLa5SNIHrVWDhmhKnSVjAWaErDqzMQ4JRBOFJbAMgkKaDDiQC9s/gA2X4rzc

Fu44ahbvN9fi3syTfcmh8AFxOEA2ibW7Dj12ftxs/A
Wo5ofqM3sP7vg2NVmNX2ZgzlWX99agcQflO0Hz6B/k7odPZFG4/LKfdTzjdKNLG3bp0Gdt8nqepfSFoR

TBU+a30motm7jxjTVJ18cENFN/4xm5Yy9NllcrzKssrAQLerrZT4V9y4YK+F6Ry3rcxOiZ5eXXShUIvn

CDgOmPIIvh/i+5B1iBJebyjVYOyEBw9IozMXOB/8M5
6s1g60IbWG3GIDMu/HM1a+x3p9B00W+X5r36b4Gwxih6d4mtzxFBdRmaID7njnUReNlWNRynMLsj84us

uak9KUx18S8qIU5eC2yEmxmt2DVSY/qz5MouYNw+ElU8bdnXNArSCtf3MeD7lRuLBEetgUCufxnN6Rbz

OyzOtgQSST3JvS+OV+4DXgLeoGBIHRgFe+XoNiauWh
bwBo18BDVJibveCcoSZwSJUnui1g+pc8KeH7qJ0C43vRanf5aXFpzKFMaWTXwrTf7nIXv39N21PABHU5

UXc+UCGCziHwMFTkE6QA7zDuXBroMux2F/thW/fhTM5m9HD4oGbKzf0xde69vU1k3U2cnqxZl/wPpctg

+kp68ATf/mf98qYU+t6p2FHJ+tC2mFDV6OfitUvz8V
Uz4eJH+cRJbYMenxY4bLOjnrviofon03lKDLXxJKUlMU6ne6t7GvS0KnpX6/ZzM9TH6h0mSw2PQ8iIYG

07+Db5qbresbKoyRqcSzmBwgEv6oZC1V9VIniwT2ZirdqlHbjZiwE3nGAJYfBf6ewmLuLZNsmqNY3vUl

kEJayiNTBJ1BTlG/ZM4tjEzfK4BJgPPFgtShPoEx6P
4yrNwesUUFV8TGxK0MuhOydlC8TH76LmKroNfO6JQfHgw4s+SCTrlKMo2P56q781NFOBMToKzaTop05I

DBvFrwHsFE9tPHZSY9saDrdw6hvJSmJxVAmM9WdhU1knimHI7nwWuW7BWQY98gE8DSWFtluc9HCnV4Ol

P8UvN6ZEgGfrdPq9LmIfm80fOY1yAyoAk+ZtViuabP
++yT+/m9ICo2Vi5mDEJn7cY6c0Y69KTI6axM08r79T4wQ/uM9Vppd3ZFaRE+0ACjsgw5N4MZ+gFLkZNK

smZTySxdSkd/hjLZ280ZllZveUxK+b7Jc0U0d3ltx7j4AIUb1BJCm00cvsusPrklcpY1E3bbwXnB2+xb

xdjhfvis2G4SPEkW2uFQVr6TlCbfIcqVFQIv5EzBDI
agOIf6b3TH5X1QiaiekgCHK7v/gEpfwx409l7iF4742zLPXB5mkUWReptFSZoLFXYx6bGYvwu9aXn4uz

IkYArutxT5Nz75V9NKaxhZH4nr7w7makrU/Se4k9z0Zo7mAGOwEL4JKFL4ZyaTdns0Xb9OBEn4Wlnwhp

gGoHF093HfoLkP1MVjjqUuXz/sC7yAPzWD9yCdyOGA
iWIJFBhVpH5q6diRFLV0F9ZwaKhiUIQ7gSrHkZOlViCb3qefA7vhKcpiiszMlnUDFLo1Yfp6k3NDbnhZ

ZWRa8pUXOw6OKpiuDyx/XqxA4Fto5VA5zwp1AYOAjHPkYyXYNzh3rjQpV0o7Q/bJrQYLFRnbv9rU+ZlB

Ik4WxKx/ERszt8ZZcevEBAkKu56Ir/Mh44tV7AgjqH
M/YJ78dT0mlp37GdIycsEKFnGBFqPc83eSuAmbxjs04se4rj6y7u4yhcxvaLW3JET63fVy01vPmQp8Yi

7Vc8+5tZRa4sgVMNDAKIcrESUoG1J1ZOhaRZok1nmANSujsiAUkimpH6Z4zR1+HSv6KjACjakjiuJuDR

fqjtn3DxlAihabyPCZ86gIG24rHyXhSGi/z+6qklHO
WTjweky44dLaoaEfjyMW51APLE29/uN+cm2EB5bz1su7au0I7ljg3QhPRv2i/MOj5bVmfy2+DRF/Q6o6

de8THkIDgbnQ0esG8kmsx4Kdr6L30ufKN51cA9aQlXv9ACQaP3+Edyoy1kc+JAitqV7Nur/iW1UbAfc3

Dl2UCw9/KENRmWiIHHv0SqgKRRtxmEMoMFCNpGmZ0N
LYfqjzTuXsnKpDvI3I72tjeR5FTJFvbOZYmy1l7jwE0CdWDb5a7XiQB8ow0NcR3GyDfdE0A9bcRU5Da1

MGPxXsLZkUKqTwYOzrbEEaZEXoL0eir3cbg47dlRXACCsjV5yP6Ri/fpRLYHj8g3+773fL6Ljai76CXJ

dPYsWLBHUdtXWE8QSEuhoQynBGZ6C+wpniIqSvCCFV
szQbf6GYVyJXQtP28eO/EvVd7uKeF85S4HUMExljX/hATf9wgcdtpFECdKgbJ1nLGR7DVAXU+Y8hotdX

Owop3D7O3hAT1OqkkF5OsOYjyqaxSqCmtRRHehO/a7/pu3i0o7dw+IU/1pINyxP+XFgJhxc6BowSsIF3

BFdk7BE0CMCwLb94YDf7F8UIYmCeoMcWhcAVeWMw7a
X/NTN+jT+P2hDH81KfYOXJ7CvZkpSt4ynrDutnATxJf++03tDvpdY3U0Zwwelw69ucQuIL3Og3vzBUoY

nv6yy4jQNrs90pzJN/4TOhgusBj6BQ90gtBjs4FtczsHnD6vRLLdgfz9KkxQvamo04CKwAsFW5ul8UB5

MrTWFNSfpKvBuwn+NpX3FU07f9BQtv3DoOWHSPdMZC
EK2Q/hMSdTdbmwkBUgmPftN79nwicfQH2n95WgIfpMi7R+CW00+9Bs+U8Wrw1QPe9CX5aUbEebUpOeVU

rWxR7FBVc/GavrMX02ms6hQMhJHMUG3Z1Th91V+0rLwniP1GrqU8jACzLfQgkVStBK87MQ1MOUeHuSjm

xgsWhS4cU5DSeF7jzfSSQg9DGPXvNqMsTmW/gXwZ0E
CNmOmLUAwU8nPbytZ7Ge5Zz7/tF8rRSWwRfqai1xNA1d90ywUItWK8s8BINImdzTSuNZJCfSbcpW2JrH

JegsIBkYIw/WqF22EsXUAgNT7ccwqxqqQwPc4wmkQEvWgyHk73de+3MXWuTAMn5WNP0+fs2Mv75vpuyd

Z056N78XFjiB1oZv8m2nW8cV/bAmWkD3YKNCYtmS6e
uC9tA/KC3lUcBEPL0166wlZy2e6V1wRcPJgRCK2SUZXLGBSGYXr8QmE7vWsa7Q+Lo+1C4GoqtHkOxMtK

tzn+J8mM55rDy7dXCtFykZwoW0rkSGD+WABhuqllkR3GtSPmTjvqPMT+BTYAMwC+aEQVUjIPqyVV97B/

XW5zjpq6KiJW+hKcBz2tE2I2jV1EjHtCrpiXsrHFFW
erTgjlHf2wP2TdRFk7zwjCC+02UFw0lgta92cz/uU/UUY0//kgazDGoMom+0sFuqGHscmAtE2XNQx0IA

4JdJQpkffLRuS0TOJeIU/zVa4r1FXVACMGM42NWxdsrtndhXR3nn9NuFGcS0ZjQz7REcPokXN8E6Lfrr

heIMAY7HsLJ7RC967zFerTaN1Y+EwRlUmiIsysAF10
uP1QkJGoDmKyFmgh43v1MjIobrDmP2LQijXxX+j1SN1XZqvQQB271e+L8C+ZaxGTpzs/DUfnsc0K9635

ekgSt0TY6BQlyWdGN3tZDWpi0pAxxzyasd+x8G5elY2nqXqYzngbZ7QPT5nuGIi3yHeAg8qabbg324QT

Lu4vZ9sPKIT+Ov9HIWFj+mSCzQt9qAbfaKVI2ptg+5
1Z4mvNS9jkEamVQnhv7FP7eizBi0i64MdQ0FuMUY06n3I6FPD8at/jqzvn/ba1cEf6T2M9R/nRnRPxtH

7vmZklLH0Au1qFIm9u32e20nEdh3yrNg8blOS9UApz+Rf3KHCS3yeDhFjE2yHJzp/NgZ+0SAbY3QxvFd

W4+z70yEZwo39glV+0+FQX4QrpvFN1gRH9MNhZoCqY
ftsSsqntMUZ2de0UUBgE01oe9R4+h4JV+MhMYuGf0m0mIRvemqqZSB+oTcs/CX2WejRjA1KWQBWwznRY

qEotDlbqhA4rIyR4Y8IA79N38sJus4hSj7jU2oplmXfOQAmn8JZNlPdYJZOCG6fX1Gje3QNkbEzani+D

5LIo7747eo1u5QoppO8vRfrKMG8nOvr+ogSI+rfsFp
I6CDD298ecXZnYOpoQx/m2LfTNW51nAJwqkTUwXS67RjR+IcduESHR07GZ8SUXGSM+uqAtN3yWLgyOPI

VzDdLYPCoOiDr2Fm5/kqDzkvZtTUu0MwONA/yxBKdGRnZnC0AxMHui16T8WFxDsmnp42G3k33UDtX9bb

Suc+Em1SMed0lT5AjmIIGahVFmEmuD5cDbQsopYdUM
00MxyxU3ixl2uMKK7OIBpziNIX9cd8CyiRzz6VOnqQeLhua8Q39SL0VYpLa4HUS9fjUq8agi66ml7Cd4

1xAZxS97FADroD5sB85G5W3NdiorQ54EyOXE36TNzqLJshWGp2WwnsA9P6NdxY1gbZ/XotrWC/3pw6aC

G8/zbYlhx+iEZzuHwD/tU0Ue+T3DhqCb9cMMeA6Ubx
UrQ+EU3QzAeg1NygzL5b0BpHqXOAneRHT6lk/fNcFMjCKhvegSy9ZBYS0rBwWYDHEpTjoWoZZ3nGDdra

MD2etCsApuOq4vg59JnIkvl2+6k/0xb9EJ4mzRAtDtfXiIlmNg48yd0LZ45fhSN4BKvX4+b3qS1XF83m

GGksTumm9Y8Abbz+1W390T0pUyUuW7xC7pBSzG3ZZe
LQKgN00SfdSo6m72bd5ZFXyE6n+J/YGRTWu8dwgEn+1EOMJK4A/VOfOtO6zIwHcFfbwO0tLdk0kMdEvu

i/cZyp3Zc058f2tpQPqw80nLafm3L1P7wnHcriR+Y2EXzh5zqC/7Js/joXsr8Kdrn+/0bxdh+t6yIi07

7Y9bVhouNyqb2G6TiPn8moo213EqgyDS5JnjjuuBKj
XU3dp4qUsksaP06Xw4hZ9rwS5TIPkP1VLfAt0+1yFWalZgG9tIIbg7rTRGUD9kqSIH9l/dl+yiK8I0m2

zBOG55MSMfU+A8AXBDGz5C1bCRSMEsVig7qIbM/mVQzAiWk0hKpNR8rMj+3jtktk+0C+7vpBGgSaDtoH

uWU6jVmQu4olfG5Qt7ZIuYpsoxs15+X5/xMwdvYDLw
Mv/G5tBcN5ChCWyK/DDhkIO/Rv7VITwBFOXqzQzpJCk4XKcyJ+nAXH6fIznCQtO2XL8lXKLq04Gg2cWT

2XhrF0JSbh8ONUOYrdmRF3+RXa6U++SkBsS1KSV/2MGvVWwJ7XWW9/+VoV03BYd/1IpErF6JL0/tRk+K

8BdsH/ZjhLOHOz5vgyU8bNeA7QLd9uJhrhF60A/+P0
4AbYH2dyh2pWbZ7a6vC8MZLW6Rsw179A7Jf9zoj+VdZsUHKBerdT6rqwx+7Qae38lf4P83W8Y5518QLk

VTqRvjnOwk1Z0rVcVa4e8i/HhvD9BX7dyiMg901lY4L7Fqaw8dFcpXqAmB8FvfpaBvboOf/QOiAWSHbo

UEI9KTpkF2F5WeP/q7Oy0GHN/LexPX4JzQhgClkuxf
3Qu9+Radha/Rej28eto4xfbvDkGx1f/6m6c48q/TP7FvdX5NhhOb4MbsAKZ5Vb9d1KD4D0ho+XnmaPD5+

l/1+/M5j3gIvWFonQ2vdKy3pGx5WAX/A3Zub/WH2l3/Gp/hF3i+xLmNxsa96ZcxEAXybdJoOj03BTA/s

xp29+FXPIj9145g543hskR7CGFCq2pBAoWNnA0vZjR
uhv+my7rEIk1HGGXoygVhYxYEZf2ErBLpBU4w49D2P9N/jy8mLGgxtOCWC+X6Thqqfv1GUPr4GIkXU6j

Fo7HACCsoH85Bm4gVdHGbMkS64s5MGmt/FTQPAN9EPkDYfCA3WeLEzGq4DStKr3jeAj2Jf90JyOh+n3M

6T0+30O9sgBOzjjA96TC9Xyi0dC647L6Fg9h3BJGh8
N4X1tODnHf4zTCP926Sf1/91wSco6+TAu4CviZKPeBp6syETha6EcHIP99zo+kv3UPvRpkCkws31E5u5

mbR0Chxia4jGT25M4eC4+pVPRpka9JXgS+i/wyCqrHvydj8fzw6RfoovD42izzs3snq4yqsWpg68DF8e

Z2NKKOgQ4aH5aQoy2H9C4dPGfqX++A6bXQ6lh1lUfB
tAmUfJNoOO/LfNfcxzPlQt45Sg7J0dcXFvy7bAYzqAF2qJP01xj2mpMkdn3raIWXU6Y/50fu00+iMdpC

rPvcPdnHQN+iS/GrwBBQftE0DfKgQs8BEvEkU5pgjP6++4jH60Oog95D9ns9/cm751HfP7CIvlWWe2+1

Y5zF14G5ZqHBvRsfS+y9R3U+e8nEjSF7ZdkWPTefnh
BCPhhbq35ucyHBCjkufh8X+8mAPENXVf3zVPfOc67i6LEZjP9aXHPnyUgKqI0zanMD2Vn9fotelVUDXm

O6RIBZ3JojNfPY2YmnjjkZ+mlD0I5aK1479u1CPSWQBObNDtkpowydlrDhGDRm/Yg7HtsP3ckfb8Vjn4

2m76iA7gH7vQ/1RVgqfN2VkF9xnDU9KlDHy4DZtOlI
MUS+hKY8TOoPbWS/HIq6oDtRa0d/a8Qi1eshpI9QXjS9m3BRcQEuxoKD4EQNuQHBZrqz7cPuuD3aG6Sj

9YKPtPO38ubTRFxlzo7aPunJCmZSQlAdzGV9XLCcQnHZbTuyeL2MF+eS3GVLeCzsCxYqZHXp0ZOzWVQg

KVQvtgDia9buQx8mgAJ+8UWDDPf2YhYC+fINRALhgZ
+RryASCC/9HXAjj34hHQQn8F1PqzXTEMkUKGevu1QSoKvZt8XcSr/lhBtcwy6ZRn+r+s9zAyDHEw+fHL

7/Mervat+teFZNUez0N9q6dfeKw9B/MhXbaB27dCh8dGv6MjnjkeTIHNcKGfvMsvad13yB7Yfs31wja6DN0

CvRPhbAn+xy1Nls+4yAMaD7zsbw0aiH1CYSrnJke90
GBnyUscRzZgqACjrbfOJt1Y6lP0kJ+jTAO+PytnGjg46Yi9yVk/LeZ3WC/XzxcU7w2KLg7QWxjit6jrM

kWSoI14fO19V+cLX2hw4jRGr09+xoThmRo6hkFPvSKrc1xU/bFTM4tiZeCFlHsdLH0yAS9IP60XlaT7N

uig34SGHhI0EutqHQd2zi4qm4FwXJRHgLshxSX25FC
BLo7cp+lDQOH84Z7S7Aw7Bcs4UUOmW3D7LU/jvxLz+RA2csaFhypSvs6dSuk6v3VqclLCw7rM9efd4h7

nsDvQBmEGXJAM9yyD1XaB2O/n3q5BU3WCIub/KwFIFO0gw+vQ3B1KP+GP2/bpfs9MWLcKuAOCstL8yX0

5whn59DLkTLonK8dnOB02AmN91RflUZToPpH2aRQga
mbQsKoHWm9/vNZwQhKnpQA35S/fd+HnCvst9I5VObov1T6+iyvzLfXv4PW6pU7Dm5kZdxijDPFIv6mjQ

1ZG8Ww6xq4UBmJwv1z7v5za0e3OPZ9dvt2MiE0Ujh/s9m3rGmHMpMviEnVsIPMvJp/EVJkahzphQ01D+

6Jrzww84nYKKXohAhWKLbFzJzNTjTf5oruNOtrA0jz
fnPegX1eke+OA9fZAxn32pDSowkNg9mBzF8vna0bP6I0wTbFkfH4J3OYoqmhBXa6l4xInx8+sMxcPnZ1

PCO6tWav6MhD/92eF/Fzdw4aDeqo5xV4fWdtrBW51mv6/uuf/kN0h/9Z8sn2P+U3WOM/0Zyp7wmjaJcc

xchzxcXWoMDFaCuoX8oc2/7MhQBpJ0gPK/g79Giu5Y
Gnl/5Nl1BM5SbtKTaAjNnOkh4smqji9/P9iWAa1S7aE7LFdn9Ert0Ax7jesbXob4S6pK7qZ4a9c/hVPV

MkG2qtvf24WFNX/sxpcW5S/h+m34iYaSb+P7ZUnZpSgR9ijpLXc9/uju2fV1mIqCAvzaCGi7dw3mxype

RitN8HGZ5yK+VySsfyic+15ghtbvggH2JBk14cmE4u
/QVBIHMcyz7roZJG0QmjbdLllg8v9qa6XRSMga8Uf10aFv9ze6k3n8aH96Em4/bjv+u+7cu4zjo4N3EK

6iTVaw4E1vvUJdlTP+WURhlxlOkM8TuChjcG5Xtic8WLMHyheuxsWZ/Jrzxy7Gt+lgisEMh5WI50dl73

hiDYlNng7C+B9UHEh2L/14pxy1n0Nrywg2t5r4zT3B
fcQu3ruWw3Kqt9iR1BPxU4hNrfLgNq/njxzaDU1Yq62iuMyD7pkGdiy9xcdnQL++oo9ErC336VO9uwAw

cBBCSw9rWD+V8cjdL47e7mJx8j3jKvckCBtrktMzpi2ZEy476ulufPkhdFwyu9+Zv7B09o/uKcpgJs3y

Mew33Yb2Mja1GeeV+uXxpJu3eFefXjR6goJngE4Cf9
4WOPdlee/hA1AbTVAvo7WNYeOb79fwvRpA3cnhdnoJcwDzv6TSu+v7Dmp/aRb0+HvkU3d/MWwb3YKZhw

wAppNAYQLpGa4rDWeELG/FRqEYpjKJ2ZbLE00yaGfjuySv3+HXYe/EI/lcDK3yKlBmITdbLNNBeOlvE7

FPfgQ/pCbwnyIiEH8oTIjTQfrsjZicp9+nMtjEZcjP
3cfo8gctsDh2uJxh77ANO/ysLuxeNWrp2sIzfP+VpAuv6ZtaWoV/ysY1rm9spT7m/SajwJ4cOy0DOVVD

2ik+p/20IKeNKp7F6XCkI9VWM3ohOaIjlqn60D+EwiwA6KXijzmH7ReaOpgzxRC42T18xFEgs4lL7ucK

iNX5XaEYu5C9+M0lvk+p2cQzfs54e96gvHBEh5jVSd
WVgv0meYUjNsa3Y2XaJmV/M79SMw4075L1h+7naiqqPz4fuleDE6W4261q5+cgeRJw8hZ0hllR/VFqjZ

iseTpdD81IOK7XoUrn783U9f842wiKVhh54iDeSG2CPlAjn2prUU8skXnn89LZ71buVCqE1iCb73oPfF

/v36mh8agryIhTHi6FqHsxkha+T6ttRvz2a0V8EL60
G15eFkwcI91rLarb22trd3M8OD9cnefzRt1y4QQTsl/ItlqWa72frwtuKJqC2iy8ifiR6dp9n99+Qu3p

L7Hzo+eTZmr96xi1m/I9K9RjpijonQnuTqk9BWyM9if2/2HO/q363UT9Pu0FVMS6lnWqmjP4bjfWadA3

Meuy1L0xBC+hmJO0vLXre8U+BHUbiAKYfGBsQ2pxI5
BasVi7t1ALA+2KBmtqtbI7Af34eC2p/9e8otISXGul+aatg+TO9MIcWkTUj9UbfchjzEyv3cEvp6146v

QVxktATzb4RPh0kryyJdq/r7sIZkDbj06Q2WiRTwKJZwr9P2vY5iyJdsNJMWuu5svQfy/21q+4rZusa9

F/UeuVIYhnG+S0ne/wg6g2UgzQG/uojo7N8bykqs3r
8s15v1hedV52ypJpbxqitIobBh4OS0PEh3uoprrdD1eSQi8kbc/eVr/4z4ah5iaTzW++lv3WQgpn1H5l

DHm4twyB+aWBfhhTlf5aJUuKAIB1YChJ4QeKlZWrTn8TjxC+Ewdd36Cldrn/F8U7Ki4fyhceT7TdWD6W

qr5JCk6ecKvewD5CtSBx3e7eKo+ugKF+O+ViE9+rUX
eh+bUscOcZqE8DqyTuEh7HyOSTLDfa0EXQ7Lj0oIZ02vG+GS9Iuxx2wD70Eq7G/SamSEtEPZz+fVUWh+

wJhGhi2svN5vgBfwGhiZH+JYUCi0JpLsLdIlPSHPj4yLM11wy6pnyb08mrG8iApZwEUont7gepoU8Xgx

qWKtZaxSr0A4KNS46u4s2SRfH7H/BiHsuE/wStFl/Q
tj1J9GDoqGe3J82y/spef1GAbie9BWrI/RALvj/CQIl7vc4M6Hj37KZgZMgGq+Zzo48S1mzzI8M2V+Uc

w5y4l+5XPsaTUEQtPdPL9xbkQ9z8D+u22V8WbjirBfPAahRwTD1rphkW3EA8DbCV8sP/TV1R/nqzb3Cu

kGkAsNTIObO+kuOr2dJfV+MUaouX3bXITLqsvcr8uR
5owMvLhsjAv7ptXuoOoxRWUm6c1Y2A8erx50ysS4uX4BhWE2lE0n9/5KllFy6/QGuV59v2D8jErts05E

M3HgaFrapN33j9q3qHWaNLsNqNwu3flNz7PSF9wWlLIT6S49y+qG5czkkGywvQMwdwLHRBDoumReTXb4

EUfX7IPmT2hQbcB41ASPUIN05V35fWozjx/xn99lZl
3OPThsuHSJhz3KV25yN/VnxM3TuZkBny84gv8jjOH1Bz5BI49g/MCcx4ZzrhvHBEI7MW72reMm4gOdep

778U/6H4GWtFBLKYAbTJgyuNS4yKrdhJbnUh2AvSB2Vq42T+rKNYPzUEzGOPu/egdakPNPsQUI4o0Nx6

4rWAzEmYjy07mpUs0UYR9Qh2iLBnxUxKx1M71Bs22w
6aqzGbMRmPNBeAOvzh5BFoU13b1GXeDtqi4ytcTUpr2jkOFqfdQ2qFYyk5xeOcKUTLg45KQO4LkFjWW8

Aq9L4JD1/vrsv5/AQ2oLBndCTt38n1wxE8og0CPVEeC/jk4fB3cHCAMgPwbphpg4IMuAz8okN+21NdZ9

PNG3SBMnnFNta9T+/9/2X63emHjoC5FQ+0oC3bpejy
wFrDFllHeHmn0aiH16roOHVK6SRmvuC1wy/YVh3HEXK+W3+RDVo9THZoBFyPaIgOSk57SdaK8le/hf01

R+2VNle/YKo5kz1t3rCRN1vRp2xVNGPInxY+lctrN97Pai3f6s4+vbnZioY9xDgiYL8opx8te/k3zDPM

/wUb4CobNp9kB65SO3aZU9e5H402RbasKr5th7fCBU
uIoHxoP7STHjjw4F7LsnNzLMOo+2iQF3YjwIG2KdnaazIumjUJJbhBNh2qdud9ojvpby6q7Yzpe8U+RR

pSuOyeD3ju0V/wDn4Dd+1pNWFEUqvr1SJso/JT4JMtwBwVrfhDEtD8P+gw7rabWIiJYos5NIL9h2WLwG

Qt1rrq4ddI3qSFz8l+57V9+G+P+LdDzpq/k/v7ID0X
EqVTVAKZB4La/QNdnm376R5zeMepY8ukvCfADv0iWktniigvLISl6fFDG3t1ko8j/QHrnTivIRuZuuSP

jpN5UVsdKA8jl/HLO1lXGD3SsO/8QVddawup6TkhdmVzFNsYljFV91Ydf0sSrC+Huql2HRF/wfziCeOL

hy0PWonuMm6+qpc50qXtvz7B2mupkx2NVbTHzm5g40
PaBdqm+u5vBwctEtPzlEdD39Q3OUIWyLMUPqkUbgP4CXLLlUCBkWW8Jv5pxULvtozXUVcN/sAUBEwJCy

gN+RGUfyZPyF+y8XpxfqkSZQ7EC4aoTxz8Ste/P/Xq8nwtR/CHDuQG/SUw7aelqYyvJbq9zb+Kr1Nj62

dhQlYHQSSLi35Ked7/OLy7ynC3t3ek7umxmP47u+ic
MZ468Y4lpCq5JLfT5Um4TcqdWr3YWDDBraL6MqcxJgSRdlBEmq3wKCIDGdB1fMqlVqUEP0mqwjHGN4u8

pL3DbUiBs8AG/2gmP2GPtmlnHLxFNFsX/wx+U2oOB632W6JH5sb4gLJcwWglaBfj7eR8hLFBfJfIzhJY

ra4/kpUei9pjecuQbbjxm5T3UJJ09S6tDweLdB1XKW
6bys+TOhXEsPYin3FmM5NzHQUR2C+gCcnFJcUhsRhF+vlN+K3s97P/W8es6Ec/KCqQHSivmlmKpkmqCt

CY43HgijFMT3hxoAGpmelRUUhKHWqGnRhBRveuXEFkj2aqGXGlikvTcQN8aYK7ZhdybufLyz8BTkOl15

APhiWvEMm3wPmYMONYTDAve+qHJMUBvFNvfkzrTVVT
a1XDstVJPNIkxKnQBLKVgSUqHp3p7EVzAOgvg/oY4MWXR/53aWFfZrnISqhfsLE8ZS2NR4gcOyQkwPa7

4e6nkITkUgMYoy+zS+uzsfLzAWbBC3Dqw9Ke7lnomQRC/bwj5LG41GzaApnSzQVZvo+fLkuuoWBmRklI

djQDYfjzbbrtCu8G+uWkPwMctupAYdQsf7a9A5qos+
L14H6PclGLOW+h7vlULsZiQdb126tLalhmLGjKh37pIV/ywOEEa3e1EFd0KLNuYg7/MVPmaKCeQBAv2w

7Ttfre0uZKj2Whfo9nun8mWo6VnpOZf6RgKpvIb9BveZWGkVRikTK4/f1xdjTk9hhTTeryjtHooGtr6Z

Jh2VVoJhl8LZ6ehjHw+nNCVISRmYa/EqxRy7ZGygcG
KsthBvQ/Z3agkjYK3BaP2LDd0mVbIpLqDZW+tN3cRWb9cxtygUyzfq8A6QChaYmi1mnLlk1d5zl6P7Ox

g3NB4rpo8VBynYKhAXVBhgWOcdMVppU5SiDze48rszEN2qCwh6M5F1fqWe/8iqqZFVaF2FEnzmuTEcGW

ehV/5x5jNyTY6YOY0p+xJxZTrW8WuEl07p9MjDpOQd
GWCANSf6VUVvmOGW5oGdepk1Q4oFrK3Fld7c3BzOi3r3nzS3kyxBeXW5peKtOXC8RMFZPgRR3Q4y6nMA

la59QbBKvFSjkXgAinmtAQ67HVNHh7vqMTQM7AA0rgrko6GlF52OCD4mXpEGaX0KNY/43M9S4UDFeGzG

xzpTaIhVx29KoQi7Fy1HoQxu1zg6qK4KZNZhzG0fA+
tq2ekaSEAGCSa5KdA2d7nEcrVUW9wcFF1YmVy2AOGdKIEGnLpjuock/4X/wvzoPa/0vmsm1D/gApBC3M

AL7tv5DyXBYeGUfcskIsVuiCHwMrGGHnb3ReRKceOGb7A1ZjjTMtcmNdNlxrfCCOCWYkVASgA7udbwd8

aCAyNDQ+Cd4EMNDqaXPeBF0TGzjEcKQGAsMuGHthmn
0xB4MB0aqyoVUAOuGYK1X+BEkRc7NoFoxduQ8idm46YIqsSiHbdw59XJxZ5idXgccO6HbjrIBPp60eEB

lKRxSFBbkop8Za4DjXlk3W75h1uq2y6m4peKNevlCpgKoKnLpfrjRYq3oR1z401kY4I+bRFKP33GRuNn

06Yo/BWwAnNMzgjzj20X3Pi3KYgC4Q/UwOgaeO/Ny9
JJf8FQLF7AEVydxctAZxSKomANm/wIcDWMNOb4Eimgha+PD6OB0eggHNM7k/fNQ9kzGpywAe41NgHkcH

QScbsx6uC3px00iR6Cq+v2U/ujiJVaTlJMX1gjCnyU8ZYZ5eg5NuPXuuLRDfDC0ofo2RKMfOWvSxN7Q0

vRPcBy9esAQsm7KdnXF4l8NQIfPbD9EdspBBCF3wV7
XNAGVQKliRbclkdG8NAIOvPKKqFE25MTinMX7IDEDCRZfidDLhSXQ7G9Xco2LciuDaRJPsCz8QFZFxXe

awQ8DgJsJXUsMkW6InwrKMLh78THejTE4yVMTtUTDlULI2GZHaYDzqWA7QjZWchEVMmmcfEUWaS2W2CL

3KFYKYTxPrM9JelwFHdVrlCzQpJeOqWJEMZv9+DQpl
lfIgFikXFgK7MCZob3ZtXJs0MA3RISKyXMqnRR7Uq637H1M0IiF8wCRoZ9pKTr4zlNS2fNKvO5Rll5LS

n537T4CHLDDRWboVtykymB0OZXTZk9bRET5isX6AYVLpyFd3lM7KZIFeU0jXI5bcsBBgZV7nmcW0OY7C

HHchp8TpeVSrHJCgFpAsQ60pPCMfyE6cZBbFNBAxvN
z3iIemV3E4ePZmPY3bfeZrUQ4+PG3FOMFaAk8boZNnm5IbyWG4z3FvNoAwSYKTEKcfQE5OAnYnJEJlN7

voEJKqNvVbINUvPjFbKyJ4LG6pARu6FYktJZXyYNWeQYv+Yo7XRJ3me4CtRWfuCiFhQW2vqv7YKQjENh

PpQ2K8jUPnZu9lbQ3EqXS8yJRdZ4O1lQAvN7Bic6EZ
s985E8NWPKLFIsxFlrlnpQ2JptNyKSpxBt8PQWInhGw3rR6VMUxlGV1GLDXjXX4wQY79Xv2ooWWqFdF8

YPUsJp8UGbJgV1AzAG7rA79sUFNhBeNiBYQOMk0+PYocshZvHqmYWoL6AFCey5MnABfhTE4YUY5pt7Tc

LVdlYIFsv3OmGIz2NP9EMNSsFMNoP9OfkPRxT8KLEq
7QWLu5Q4vhVEmqAe6QwJMwYSYfKXwnPX5Cv969QMz8KX8CFJXwYVRwHQPWGnBzOPTgQjMsJdEkZEZGKM

liVBQyD8GzEWO3SRFqRb7+PBbxVU3SZ1SyAYC8AAp7JR2+ZKfiCG1OsACBW2ArgNXoKEoqA8VDMV5PRB

S0QV7AxLCpFJ8DpGQRK8ZqgRTgSi6yPHAew8DbTf0f
X6ZTTAOWCDIvSIazSXdeVUUuRTh9E4S6VITtX5CEX975rVMtfWp2Hp0lK5SXEPsWSdWeBPnzLGjyNVCb

BPh4A6R8PCOtG3LGG6IdYyQhwnFqP8X+FsSnSGXEVL0WUSCGCKd0U3O0mVFhL9F2kHlZjRM3SK4CTV4J

bRMkcERsk45+KrIUTcEzV3IVS6VZGU7SKQz0Y6M5fF
LqB0U5gLdGpMN4GE6PMA0HaQcnxCLtNj6qBWnjCHZ+Jf2FWa8IOmUpRG2mpz6PZhyuJIVqOtqSRrb9N3

sydyt7xWLwOtS1W3N9JtZ8pMPlKZ8PK6B8bXOpIIM2NGMqlWO+Ew2De2ShCXUaGOr5V9qqOXMxTYTkTy

OrtO84K++7zrizfID9H3w5VFWScFLxiPdPanVcI6uG
ZRC1n9N8CUj/Ac0TDTQ3hIj0pAEbGAWtGXc7fS4khGp9MuLtOH33HZXtWDrgbN9mBxb0E8Kmf9MiJp3c

Wk7mkPTuCd5SZrPaHOZ1thKqMaLJRkD3qTypcumoSBT1U5i4aIM2Zd28j9ypvqZqr8HcOoP7CZpiXHEf

JpOhhgPdXAJ4reTpcQ8ikeMkUt5ZOTCiHCkdnyMyGr
WNGo0IFsZjNQ83WkerdX5hfAI+DQogICAgICAgICAgICAgICAgICAgICAgICAgICAgICAgICAgICAgIC

AgICAgICAgICAgICAgICAgICAgICAgICAgICAgICAgICAgICAgICAgICAgICAgICAgICAgICAgICAgIC

AgDQogICAgICAgICAgICAgICAgICAgICAgICAgICAg
ICAgICAgICAgICAgICAgICAgICAgICAgICAgICAgICAgICAgICAgICAgICAgICAgICAgICAgICAgICAg

ICAgICAgICAgICAgDQogICAgICAgICAgICAgICAgICAgICAgICAgICAgICAgICAgICAgICAgICAgICAg

ICAgICAgICAgICAgICAgICAgICAgICAgICAgICAgIC
AgICAgICAgICAgICAgICAgICAgICAgDQogICAgICAgICAgICAgICAgICAgICAgICAgICAgICAgICAgIC

AgICAgICAgICAgICAgICAgICAgICAgICAgICAgICAgICAgICAgICAgICAgICAgICAgICAgICAgICAgIC

AgICAgDQogICAgICAgICAgICAgICAgICAgICAgICAg
ICAgICAgICAgICAgICAgICAgICAgICAgICAgICAgICAgICAgICAgICAgICAgICAgICAgICAgICAgICAg

ICAgICAgICAgICAgICAgDQogICAgICAgICAgICAgICAgICAgICAgICAgICAgICAgICAgICAgICAgICAg

ICAgICAgICAgICAgICAgICAgICAgICAgICAgICAgIC
AgICAgICAgICAgICAgICAgICAgICAgICAgDQogICAgICAgICAgICAgICAgICAgICAgICAgICAgICAgIC

AgICAgICAgICAgICAgICAgICAgICAgICAgICAgICAgICAgICAgICAgICAgICAgICAgICAgICAgICAgIC

AgICAgICAgDQogICAgICAgICAgICAgICAgICAgICAg
ICAgICAgICAgICAgICAgICAgICAgICAgICAgICAgICAgICAgICAgICAgICAgICAgICAgICAgICAgICAg

ICAgICAgICAgICAgICAgICAgDQogICAgICAgICAgICAgICAgICAgICAgICAgICAgICAgICAgICAgICAg

ICAgICAgICAgICAgICAgICAgICAgICAgICAgICAgIC
AgICAgICAgICAgICAgICAgICAgICAgICAgICAgDQogICAgICAgICAgICAgICAgICAgICAgICAgICAgIC

AgICAgICAgICAgICAgICAgICAgICAgICAgICAgICAgICAgICAgICAgICAgICAgICAgICAgICAgICAgIC

SdJSXvCEXqLFMpPYj8R7hpWCMvIAVnRG5lCYv3Zp2+
COvVTtJkXLS1cnMfeG1REP8vz2DzSOvyEZHir6McYFm0BB2MXVXoVSmcFA6RQNxdcy9QTHGpXBCsgKHW

w8gtKnVaCAH9HZNiTvpjWO2ZKFHuZ8tmgdZwANMtGEQHCOflGVQXTLyvDCZEYULfXITgJdDbRvJrMRSf

SOGfJHFWJG5RLdHkP9BnyG67JWFSBl8+DQplbmRvYm
xPWgP2VRJis8XoJPf7GW3PSDJdIhirn2XiKgYpPWEIDXgoSM1NFCX9JZRiBCJzAh4ZQMAfV321mwOqTT

7PFq5AZqJnII9bfr6BQbHvRTDxYeiLNie1DNqjQP8KhHDcBGbKoo7vxaPdfdYWo4UqoxUssRXTnSdqsK

NuH5Mjn6Wgf3ugAKVKKFUzcODvAgVhSiZkCXFjZBe0
RVSPFJaLPkMcR9Dyv4FtUhU8IWNiBuJeJZruFHWoFlY8QY94rAgbIB2XFWDxXRKrIR01SWQ4UTShTc8M

Kn0PKtFeQO6sov9XLuTxYZHrXvkIGxu9WIvxHN8OkEXlX4TpgAHib7aWIiWsZ8JMRYX6CBUqQw0NOMNm

UwBlJTOkPOvbMN8hLULyYYECnGmnmzD9LK0XZP9rwm
NlEX1CNhGdKh5nFy3KBiLyQ2GjG5NqPPGmFYFBSIdqZP2WRWpzDD3cHE3Ys8DMbVWbpK5icr7MUFXuUK

FfUhosrl1VSnotR1P8vFdaOEIkXhfnAZSAESgtBB6JEUEvRKH8JIHyZNHoMEFTLzEeY67oFR0XY3Ita1

2oAsL3EJQnUxObNUjlTW35sMmbuhCmkDXbxHiiNW8K
Cj4+RIljfmBzNfgZWdbcVMMLThJlYpVWPmRmYPZwEHYeQTUpDuV3GrDsRv4IDQEtBKPlVEZwFvPxKHLe

OQRmIUvkCLFuPLRpNsCjXXXpRIYpZS9CZqFvXKKeHbZ2KNfrPJOqXXSesm3VGPBiASWdZII0WuFxOBHf

JGUhMCxsHRUiOFToIGR8FLYdCBFuPL9VQzFdXDPiJF
QmOOSjULNlBXKmel4JEHXfCOZdCRp8RRRvKNXpOMJjGGbeEHSpIYT3YfHpBWMoKJJcPM8IFuLfKUGnEW

zqUPOcYENzHNDjcs6AFXLiXFRjARF0FGJlIEIvLPWhEMiaNIYtMGLaBORrYSUaHXKgJU8YSpVtWZTwYO

LuSfbuZHSpGYBhpf8YEOTuYEVaMVGqPKJjKQZfAFEc
AXllYKHgVET5OTA0FUHiSYCfHI7YLxGvAOXvEAJ6QsYzZLXnJITssk0KVBRrUDSiGpD4LNQfINHqSMDl

ZSzsSSRoORU2BUH4EYHrWGNfHX1UXpIxJXZbAFevFvCyVHUnMWKtcs0JRMXqDJWrZSHhSUByWTTxRROp

IMjoOFVoRMU2WUV1WVXyCXLeWW7JPzKlDVLxCOp8Kc
EnQHCvDBQeez6TNVVnAEDcLHr8WXLuAUFsHTRiBXpxSMVhPBRcTxp4EMEmXADeMU3GJcYgRBMyUcE1NZ

MzNQIsYDTzoq1LCDQoKOPfMTfsJzNkQHHcKKZtFUxsJKJhWALnTAroBDUcDTIjRX8NGuVfEYMlZxOxMV

egGPKoHORrad1TRBToZDTqClIqTLYhUHRcFEPuOGoo
KIDhOKI8EEybBFQnOQQtWX9CTtEqIYWyPnGtZcYfRJEsINRmit8KGTSyWHBlOXUeFYZqMMTwXBUsCHf6

gnVkwEZoRKd2LN8GZ3TsukFbVpMAVh3Ap603VCXhQFZxOn4ZQ8naGf1gYCZpLTNOPq7SQIs0VoVbSrFf

GDBiGYYsGHZ1BJR5ITHxLJClNGI1G2CeXTA+IDwyMD
LvKPZqSSJjCMVsDqm5Xrw1CBP6POF6Slq7Z7W8WT1bKAKAUy6+XRskpHFaxCdrYXNDFaF1UBR0SQdmLJ

VPRg0K









                    ID                  Date                Data Source

 

                    [id number]           2020 09:31:27 AM EST [facility]









          Name      Value     Range     Interpretation Code Description Data Gregoria

rce(s) Supporting 

Document(s)

 

          Progress Note                                         [facility] 

BFROUs7vCjCGCzJf59/DEKhrSWYrt3DpVAyqKIx5KIszDQOpM4MqCBC4yC0gPBT2BWnGLcGsQiLyFlYs

lbm
ZfQpyEKfKrBMNfWoeJYcDaBScqAdnikUOrCM8PbHK2GOUcX96pTRUzPZZzM9LmWWS8UoO+Ax3CEDIczG

YcWX7UBurB0Riktfi46e5C/4FAgdYLJDYvc+PMWZAwoeyVbhOJ5erXNL/S7cymhmDprODA8Uv9Y6UACX

5ltNJa1u3f5KQmEO5cl801JUm//Opltw4vpVL/l1/D
XGNieG1pP4z0LFbeohABlBwhyWeuiN0FoPSp4PZED+nbdOKRPld7HhahqS266yMvLmnJBSkm0+F4ksxf

sYvJDdRYEpDsvx5GUp+ZDrUfBZ9/+IHd/Hb1rEOeObNTSPUCFcRWeU2uq3TYGOMFqGXWJLFisqKwrliC

ix5z7w6O0SRzNuecz4w19OnRfehV6qlQpw9YtvcJkj
BmAbMO710bGKDJUvQDnughb08ppqAGNp/qICrm9PHGWkbj+GGcDhfuTkZGob/C3eiSF9fZ+ehjXqpBUA

cXS2CcGpuWs9wYMcimWJGL6TbgOouVwOMU87d/3RNxWIbBygZuDWAof45BsGZTX6OL221zq5tUpIpP/E

a4Ee7vvSLlHDR9DCj+ZjS16Hnkc2uW93Ap4GLzfYyc
+K4c5BkYlpS6yjq/dSbRYXDkQLwDKy9B+58+vV2knRTVRqRyGNV9A1fvdNEgDjYmhRyfUiSoSBrmu0Wz

rDbkXOjK1Y30GBblSXgTNWGEPegh26nWiDjBMzd1GvRuzlOQZQFsiqy82ZB6NCuxuHu2TbdTPNdaCYEb

67I6j4YUQS/goGBBMj2mKoui7fAufOER8lVyeGAyzH
HDsjRa4hJ5r5QsdBHuLangFukrv7fiO4iffGgRjUaSUOgxDAW0QWZ0acaMdSd3hzik1zFexs9pRFigQW

1SxuRMNjhefnYuyqXmrTXUPg4JyuipkySXZi6KlS3N8i/7xZiIzqMDi7ln+C6uNXu8OUrURX1myF913I

7A8+Wa/zbG8CuvHwkpH/0wGIx+/AgdA8WEgSKghrqz
6OHU8QOfbNGlEEH3WHF7gdQ7XWbfKV4NV7PDcB0phrUnXCEEguPHJI0KLEFh5H5VnfiNGQf11gcu4nXF

kSU0Y9F9lWXYezeywnpxh2IgRjRYRw9eWCDYGXstkfLqWNMqgdvMBSeRQz/VFeVNNLKRfNlhsCoGfZ47

n0oOXjt00rXG3FbbICZTYCtz4mMBnXIPkfIpNpPDy3
kVt7QCunRyopCJgVGVTqfthYLqlWNyDdpNVOAU1DT9pzoK0dwQGS0YJ2DZ/hdezSuQgwh8Db1ceWh4Dx

VDRn+UycCjHZJpznED+7tyKEG/oSyRLTf/+Cvnt/Jv/2Q37/mMd7NhjMoWok83KH8NCZDSzTClIt36MF

xd5Cc2lw9SP8UJ1PqpBENhpuKp5tx2puPEQViA+q4g
a8991M/MhtnjLE/bqUnLNvTONC07emagy7GCUdzSsKz/LutgjZOoq6BW42hCmV/yQJXn5AgP5hciRUpt

bwX/exTrJYptSEY/JiHfqQPlchJJ8kKMrkSp2Q7oIyt39gw9DU7/wU4tDSqvek5si0rcm13VZaJfchqc

4jBSxT/4bsEGAlBzWIOo2vxHJ163M211dlIspEykYT
I7NXlEGZVipE4LQKoSMH4DomnLPp8xkSGL8wTYOhFZN+yFJ4viUsZOi15W0YyU2MQy4XfHcTQR/+iwc/

7uAs9JENUT2PXYDS5C9vNp1nIDPjNKnbh3MCRjuURdCZY6g0Wk4uTclPYDxJgKoFDApexWA3/SD9QfPo

LZ11/C6FFT2Ca0a+0eS+ali2aJhC3YusTTRVKa40rT
oRn+YK5kksHvzGBPnHBFCtzGlOTbqHYlj8ToKoZAviWLIJuaHDnT/4EkhgCbjESrRjyQJjhCcJE/viex

hrcYrLpbHdXua9nYdlYBadR6t12AoTJvYlK/MDhffzW9SLMgEHTiD6KYp0kNdvi4duFn1dam6APpqoNF

QEbSYnLJsBzNVcC2axJ9ZufnkXip6WOmDgVkBK0XDW
5sGpPvNArB4NeNFAWTfxTkIXP701WAJI8EtfWz49immFNLZv/LBgQiCrjwIQjEENnSGx39WsOCKwOUHA

YzVG17UIqq5Fr0pO0dH40vLmptrPQ2wo5lWtTxCOQzuyCG/jEhz/1URH+dp88BirFerVBMrK0y6VXwMp

ZKxyijYO5P8jNrwI/jZVuBERstKb11DQE6WlGUHE+I
FkT0CqSVD0CUhhACZPZPPJnTRqk6ynDSfjfGN2kav5CwVvhaBJttdgJqi5JYnCHTXBiYjYjhTGK3sGpu

vsMYJusXoMjwHkgiSJ/cgXXyzGOSQrI/c5AMZ8s4vRwYwnStyRcFE3qkcgDWsRsHnTNYCR5hSDOjTG5S

J6Mn9h52vD4MGp4pBWmWyaMQCdXpwWcVy4gphgJRmz
dITQ9QH/Y2XeTZ/NvxrvjJaw+tpSi/7enWI+Oa4mzzaeO9XRvfW4pXWtSM+logGACFJ5m4iwJjwd1uoi

OOp3fLevWCm0qODmT62LCDv8gli0G1CMbdnEi+jUcl62FxWyUUpjggFBbD1mlOlxb0blmO1E4phvzFsl

pjI4pZaCYHobfHJSoGJ2qwNrAKtyNUlFqOv/rpZtNJ
Gyk/QI1h3jMxttnkiSe197wkRVy3zv1xFUk8c5+ZfPCGKaB70sDzoCSqZf6ils/SbKMjzF5ex67550m8

r5h199h72tsm1rUzK3Bti74X26KS4CmNKKFI9A8+KuNG9vNBypnCqnpvOkFpmd61x94nOJPFgdh2Zj1e

xVV8a2lBdkTxKXP5jwg0b1wbxYhSYt9ZGtEHk72hzO
2y1iabWjkMQymUwo/9CIRMGOknBPQFQQ/SNdzTUYdy+Z4gQBkvhADkjtETvwj47xqAAGleU1z7gC8H1M

Jy0AhiaCkjrKxQLjK6aN1tYiN0EIwFQshZANgfSrd0pn0tsbT48LGmCHeRfOFzVNm9AUZ4tXlQgg4zW/

0demBemGyvRvAmms8sBbvGpbbnvXVCtfFehqQt9pNT
w3T6FsuSJ3AiGMAnYwinQxSNsaTF4iy2q1EzGtHkpWu6h4lgQB6Tv22EhD3/tCXENlNHk6LM2i+2VzdV

JyHr8Hh+RSi/leXvQYJm5Cmq1fyql+j0yKXDp9c/p1aWTWNklmIP9jx+ylZtRZU867NO2B5vdCo1yQm4

xkjgNg0uisnpiDRla0OZ1gyCKYybj3OxeOX/vURdsn
RKU31tUZ/yjcW6Jwpr0DFmjoBs6Z2UwvlIlRIWPOnyzvobNK39IKLY8eeY9pInIX/qs/FmwB/cQNdARg

Tf5FrXM1Qa0pCiT7/S6FXgv0nyjro7/sE88/BtD1dpAY0TNNpwuNCHfFHkqndc/9wRqlpBsKPo4zVOSa

/7pZYkU4JUdoDW/ihdZEYvwFIGRu/fQzVzQG3CIXwW
I7KqlJjJERr/EEquSOaREK2EGqnELOaFjoGqprAfPmtzRd2VbIMPVGLzIDHyL7JP3PNLdXrFWUSMBCh2

1Vx+R9LCm1Nns5eDLnXpS9y2ptqqRpbrO0Q9+GvDbORYJt10R/ueokgom0olJtG89OmScOf8LIYYfMtV

y9vdDIJHCUHzV6fQo8TGlUlpNL/WYH4AX/8HGWhfuw
3POT5yv7SoTWEpCLpoqgQuZhzMAlKjFNKqJksELkRvTKlJFeLzVIJoEWfiOJ6HUNunIPixSGCaS8Wiem

DfbJWjJEMjLd8GXNGnZL8ZFHPhhCAoMUBhSkBiVZSLDtQoZCUqMGRruHEBh0woJoJqLHQ2ISOzUvcvKI

7GGURpNS9Aj277BU18nfO0XTJcAh2IZUXmIM4Nqc74
bAI6KHEeVfQyPNYesvBmAZNctyZ6VW8UStKuNWZ4aJBeMceVTZ3OFBTlwACqEQ7IZXDhsSEgZZ2+DQog

ID4+IMgfliIhIliCMlLvIZQdZezEViAyUHprBvchzJNuVI2RvQB4WTXyG90dQIDnGTDzT6AjEXG0YIC+

Do8QEFXzlJLiWM3KQcaD9Buyt2b1Cw2+bU0PesiOzB
DDlmiZWTB07V37xEJg5ksvza2DcuSNhsbsKpam9W9/YTOfMWSFNIsX02lcmmss4Us061NsPSY1nk/JqR

Vq38Azh/YObihZ4qBo/kShbZbW42CkoDpy74omzVUzF7nKvm116TkOhjPLsKq+PxssV3+eKE8L0my91V

60AEqS3tPailYchc9nDUD7x+TIC3lPl0uLxWjFmex4
/crpY/HQQ8B3z9lBuTDGCW12W3Mqv7evD7JX1mbiCpgNMfG2kFvR4vqhhFhgjUbAy1cujaFKGzHyvj/I

4N/f0ZA905iTrgZsCVFDBlOsKzj82IBgXdFki0b9bhNS1qUQ2X4wrADSN3Rj6WlRZ2jOaavn3Qnx/RyL

lr9UOvtuQmTJn5k8RyD3yS+DwB7iC0Snq2r3hCVhIq
323al2lcU9sBsZ1LkJ3r6CTW0osPb7rY3Rs9mhPpjcVOyAdR/B+gWkVOQgkPYW9Yw8oIu6MfeCiCg8YM

8acDNCFhEzQLe2VuORT1zaa9tB1hRRGA24VHDD1OOGiaP7TSChwQo2fNwQnETnZlDQZi02OjybIuenWy

dCG928dCQiAyIrWOjN/aIysmGbIqhekApsFRS5O2QV
xz1eQyEcjB10kIYq5bAxsZglx8P+V6jGoiB3bUvdimwievrtNDkDLKJIAJuIVuKvjZ4SbQJCYGSH4PgH

ApTCGmH0EQRgO+GiQymN6QUDbWXoB4uw8HZVzCfP7mL5niQRrkE0gqhSg9Ia29C6SyV/rQ/iX8zbxIKM

NnXTGZ0t9E4+7VPKnJNBSfo0Fu0SQuJFB1cvSmWQXA
dbAkc6PTfv28TuhiatHNdDF3IMSZjDhHvchIHm56oWPM1Tv3OV3J3Ch9SIcq9RL16SnW4N3z04XBUiQz

46FgNCAGoH8BkdZtOTELanSIsbJ9MEdvirzdpvHpS9EtznpQIY+sfdbAvTb37zTNS9vfGNzfWMmarIZr

07ugyXuu7Q7w+1zG2sthP9Zea5hHarxxCQ1pKISzXE
B3uEK4zAc0VGYz73a90Yy+EFyGnWhI3wUW5FSSg8jkGbniuvYutPiNJgleifZ/Kk3s3RIpS6XDkBANk+

pUdsefclEhxqMoan3F4rYCD1gaZ2rAY+nzmS+8HCnPo5RRrf0QGpw+rnUlCH/mQeq0iZQhWR0CgBG3u4

2nZzUmJoEuev7HWK99XhGX7PcHE1PHx+JHq5NnEgJU
4nPTgRx1dEeQiWA7SjARe29H61JiZ3gz+L7h1v+sBDbeQXCmBQ62d+KNNpQb2TtklafUT0m3AdcQ8ye0

NYxk7gEBK7v7WXjck0gVCQP5RzYRHklI+jKM4OgP0SS1xZ3rp5Dwkxx4C7KXxkIv3mPrrU6Bk8PJmjXM

HLmZToJRc1mIMqQePQdRR21Vhm34GOHqCLXJFrYYGR
BvK2bNaXGorrgbx7TD1z5fhAwdOxv7/0+sVwuTTFHlanrQ8oHs68qa0ERYf5/LyyKA29K5jwNq2gV3Ww

KRJ2Cbx6gH2VFlHiqogCa54EhWgnKDQyu+Rl9N6E8pw9q74GDb/eXAOPqQZzPbzRg/ujaAFFU76w0Yfq

d3klLxFwZ4kvZSIDbuj4EYfH2ptycAj5QICJrwHLD2
hZe7ET6LTUg1qiYr30+B1VcP+n6XPty3Alc67XmKmR7FVCUp1PstwuthbIa21hh9+hj7u+N6O6zAO0kz

d9RrHshXmj8qxK0oVnaDRnaV+QG7gEpA+REI3nCPtSGyv2awR+8jPgr/m2q8aanOtKRsfwwOoBnETLNL

YYQsqc8kD8AXU7pUsX0FZkaD4LR/y8M/ijxinbWFEL
TqxR0koZYT8knjlXbyqhDYHM5ou1ZnKZPM+UrYZxsohi+6La3PBcLrzWBExG1L7k08u6+93nd3i+RDu+

gUDQJJF6RdAFjrHq0LWHt9L1ceugiG/nhYjJw5QhmBtUROXrSdE85ml154WjMIK4azLVFbAiGyFY3m8N

4X9FTTk/zqRA2MPjpaYeNxU4FMUbmyr5SL/H9RzFpO
H6LsYSLpTZrxjxd76fp/LWqHWlBQtwUahnMqjfALi1Kz4KActzPYYNo4dnV7TkWTdki4N2h2AlFnP8Eu

MVcpp39/a3laPZ2RCvq9Ko4KXQDuuY6lD0Acgi5ArgDYYy8NXCjt28IJU/Mu97aIi+3s/jLN+z5t8SWD

0uj22PCmo4QNs9feymkMYw8RLxF6PHcrC9B5hk8r0J
rdPWMSY/CrtXWHQgGh1lGzVEO99WhkbJeeD1txImeZCeB2gpT6GJ3j5jrnKoOkNpL6KUJ1ankirZW5F3

aEF8J4XNS7qyuG3CFw41pg/hVqe0H7ruUo8F55y+hO4lfTFq/zWcP+sWImhVP/AE7bC99I2ccTc3U/By

wJ6MHpfdgTiBAO6/4utAoQQ6vcUXaQH5cu/6uSx/hd
WBAewnSUVs5X3v+EOxdkpjZczdRy5lDSrPeuMwaaPF+H8HQ0850Fd1q5+o1eIk1wPQ3iuTGO1SrmSE+K

iZn+SAvY7Bo4PIfRbqsKr9ANAC/zElquS12Zgn1OFuL96BSQJjJaUEpv3G0r7JdsiqAW6juM4pcFMmK+

KSOmgNnWZstsf4z9RL72Zd1jbnK5hmwcWpsl5DexGO
Uz1Oa/EZg9YhZj4WEBM2CAA5GeLZk37+gFnZdvnn76fZZX4sQ4qKcKXo10KnnG2guS/CI109CQmZbrlJ

qXD/R+EVR8lvNwH6szM1sjCOJwYDw4kbDAWZcZoYDV6sOV9zN+Qqy+DX6PtlUsHbOagQSF2CknFgbzkO

Tl9kwBfZByCcoUqbxbAgCKTgyh7yDvGAJ4aKL20yme
QuIE1EgBDTulEW1qIH2pHBixAvbnIMH61WTU/oz7XATufBjy3BmV+nX1MT0C3Iaa0N8HwbHZfjY4a7fJ

Q2imVoURC2ai8CUh0OnTnUbYhiIgrECSVsGirUxMK5+YQ+66kBdVL7EH2aC/QTc1ciow3LeOBh7+XZoa

PhUVlbzBC90mob9A/fBLbwvZue8ewFL1zufFbK3OYx
yL5w9elHlm1xrBPaNFd4U8Z5zQ1BwExN4UIedIa8w0rpA2e7pOou8JiXidvomb9T9M6wfuCrYdcSceI/

+KsdNMtXiU6poaVpzj+dQvRihedkayHx6JFqXcou0oDaK725lAFTGi5kqyBwtwVqKM0EMD+6IZ9y3kOB

3pZyql4BBYROQNq5kuWQl8LGkSztNtTnVB7lr+KuHz
t0zrBYEk3OsyR2GisMaSJWgfXahnvIPKToXN2QCe9hQBsFiITC1WYFXZdiiRKtD5zV50V5PJSPOvy6hs

ZE5gIsRU/kMoNSNYeAQpJvC0SfxyQybecrQ+43UY3eHm7gVskRb23E80ax7mqOsPHdmYCnRLbGZXcvZR

29nQmK3UXzEl/QWKxoIS60PeqT5UH494Xd93r+YFl5
+7G9pkmi9jKZA9iUmE8IWxN5bs4pV2kETkixuRvMXXS/a32n8lRoM4tHDKk2MPTTFaS3x25G1MwIHxnv

EWdWfzEgT+mWv2+UhKTv1+CyUQ/cVeDCERzMChV3/eW8mgJ18mz3C1sfJLxpE6Fk1GSHAuZOiaytpPtz

F5SFiIVc5l2hpsbRH8/Ci2i3cjQLjjvx6iONkm/RfQ
++/gblmwdKQUfcxbNeiIEbKZ2YAiJfZM0znd0GSgFiMS6iel6CEYI7HJ4DMNVpST6MtTMwD8KmK2RWHg

MtNUOrRQQfRN57IXNqMMETKXzzARQpT3Yat845liDpchIgFLZdBb2WMSDePL3ZIQWtETQyxIUkPTKsWZ

WhOiO4NBHoZFvaNKYkB2MqbxObfyZzZPWiFXMSUFww
NUWnV4axi9DgJKx0NT9TNX5YbgIcw1KgnlUdI4joG6FBLD9LULPrG6ACJ6JmU8ttPaUwr7CiF6czPrLx

d6TjCi2JZhToCa3DVlTuOF1smr3DRTEoMG5sep7RCDN9VZ9WyIl4IMClW0FrCWYwVSEsx0VgIJ5ASW0m

jGquDev9XD6+JBxfQYT7sxXzmV5SXFUpCBdv3aZEly
9g/jSic7FnDsSVNhYLh0xILCsJCZjKazQCbf5J5miFAaUr4C+/STsAGuvVcP1zTxAyPli8Q/p8/M5Nhx

SVEiL++TruMr9jDgY8y/4xiGwyWZB//qW8ura4Lzb3tZeduq6cHHE6X26H4bRRj8WdkRdyXSo+GI1Xj/

9VrN9lj9Bu+LPtnfO8Fa9m9qhR+Vf/ErLyfy0wX/n1
1PdZ98UwKOIf/ejQv4P/BhchL8arzSFeRbx1XTPoKx6wmsxe9kwQlGVMlRMolQgJ8bdifPUEjYy9sM9O

wiD0Iv/LDz+Q8Z/kdEw+74xLLd+yIYTscSLLeBGrU7EnN6qCfrEXn8kmF0677tXP4fJLUOVjR0mUHuj8

yCq3U+lc0OaislpJ32PCyCaLEUDPhLhfScsLNvayxL
nHLxTApeG1xe+nM4QNdZqUCa95WD2IuRqVNfjJbsC1zP8Pf4iNUNLOsPmL/O3ME1UUhvPLHFvVd1KU0/

sd+kmB+I0TuwlBpQmqaz5yTIBK7cDjdWc+NE9pJj9Nhqlb8ONxMETMnCaRlQixGr5ajSWGHj3TsYhAUE

VJTM9REL9IsLr49qpd6rQQZVkQ2bitKtU+qZAo9QkB
Me1kESbmMIG7XNrfDR5T3/Vkbyody4W9ZF1in/eYx2yItdINIoPTSX1OGro5VCHkPw0Ie+m2Y9sI1XKR

niVN2PkiogIn+B2QFTOe82Hsx7M3S1rGakxnCKlIN7A0loRARuSuoFnF2EvtFUJfzzZvzJQpaMYl7sSR

jrTwfheAbfsGNqdfE0LdP2OOD7od1hTNoYNtAUgt7s
Fmd9ngoPGXmEuDIIjXpQY1UAwApgHzEBGVckRzL/Ouu0HqLzuh2SjCamDfDzv9LHZhh99sEUwlZXLCeb

9opHEwyRtKRhjNgYWv9NQBSZuf+QRBYagoY/Nv055CN7EYKyK+5t0kt/RFB+mYErP1qc10WNrACFvWLR

HrMohxeymyW4LbVW6fjKazKeBt1CIT8IrN52zu6rfb
QBv5oCzKKj2bdiIabTkCx3Wbu6FbqMJ0HZ8B7pSszEXGrovSfeA62pTrFv+4+614IL7kxlNXONFh7p6T

P55fGtp9hnKYr8PXTwaY4VhhrA3OuOIz7j4gzMYxk/H10e35+Dw71ZSOV83Px+eyu7z63lCB/XFxejYe

Btf8ySHbFnrh9r8YdnSyTrWP7ccvomx5zrSqWQxZ4+
Iw8g1aBeSpMszkp/PE5uMk3TOQI/f7a63IGIx5Sv1B5HJUzCvofvin6HJBR/GCYLcI+UewGapo4rAGbO

0CdYqz9Pfw53GUbALWydRetBxVcUjpb1vHm+r1LqX3gy4ub8e4BBFB1SlK6zS3lX4v4qOmsBsUy8912A

7IK/cpvIM+l1xLuytYJAip/f3D8glR5Z93yfddMRNC
VSa7a5bxpgcxSm+z99pfu8qknfoV9GLENxV533xkdNIMdLm7hligMH/PfxyDA20cs6Rd6qtcQfzbcFLO

yvf3aScDBoyGham7l+aUt8aHT3MV0V6BjgKvzhpoLySv+chfGaXAibseMB3Ecw57UydYDZxs81HK0a5z

OCK9TSP19sToPVqNSNowCP+xw+/EKC362lxgeBbk6x
I5E30lPAL18Dd106WPQ+85o4H1ic2YCPn8XIki3QvDDMSdDyeJMV3liP0CWmEsv9HnR5BbjpDzPKL0S1

8JcZdiAsBcf+C4RzSIpOIa8L8ZFni6qgIpnj+QlIbrD/KR1np3bG0dA/ckUv+RmtpqDiM434em67T0De

oS3Uxst59QCTyMyx14H1c3gdLzU0d2A1Aw01L5Joaa
sLCZ3wOrq7MCOfFZ1/zI4+mZo5Lrua3mcABSn1eRTldZM3ezpPeMJihWh5mI1VIQ/hU2C2EZwSlbh8tM

0RmBaD2csAxdx69zK2+MDknHYqdEiG5Z99LhAZNdxiLyJWVhDXr3DD+TU0+fwDj0MHizl18JOn/yDhjp

5774wyi5FH8UH3+tGYQ54HxO5N7R5yhddg+v/BVbBT
1iHO7kTpWhepNtUPqBa9bZmZvyBOTCSJAOQMuHNVcx7hIhdoz2AckJn9p0/ZFfhPDE3txSnzq7vfqi6e

Rb1uFGiHwUNWrSTnhXem/1ZuVw2C8nIYXa8pX3A2ODw0FiZT308F/bF8O+r+XVA2nFXtxHvnzFdta5CM

Oqf5md9mE1HDoyOt34b9OeGBsaDMf0/9Di/9S92NfG
08tHj+UrdZ2vk1DgrBpjaVH4wfWnGTafs7KrnhjiDvBqvWN82nqNfYApX+A5OWyNRNO0OmUTbO2Ka2f5

YI5jrU7R2tVjDlCGcD6SUVie0oSxT+I2SznpWuChx3Jag4X7lzM5YJQEmIbRNW+w2Xc4GEzIW2hdfg4w

xWjGrwwMt1IvY65/WeDmpAMCupdszAscvfx3Er1yfI
vy5fih+1Cg5khTaesb9u93P4NTmr7UgeWKqpeyPX/X8183DE8PAqNcedBmYVJjD4tDD+T0+vKAJPPkif

8SA/6PFTKq0YuNvcuwghwIFCJjMijJEA+/lD4hMWL6+o2YtpC2AG81u/IhZVc5rbUwJjemrCMVSGw6D0

RfiDirfTGu+JPLo/yTjFQwUjX7fiZHhvp1hpHTsDQB
m0lkjD5im0GFMTtJWJ++PCKPSZY+xo/tdU3aCpw96hiz+IQwr9SCzmdt8a1dbYXJQG3gOpticeWkK8VJ

bRP6jwHdmdlzR30izwX1hyTwv5S16/xvT76k8xB0TNaPDd0MbKcR6r51S3AiHrcHzgArF8QoLZK2g9Xk

zhkNFoNp2enPY0ksWL7WIe8vwI7YBVds3wSO7rAchl
zEZbkO8IHJtDFjoFL/40cNg4BwdlpqFFCjKnQPftVgfHkhBzS9JcDkYhbCSKsxDN1yzePOr544mMb3rK

HX/8CVSoSAep4ExzrUvdmDGbYziM7uGyozQJt7mgtydScdD1ReQk82VEPBOWHR/oV8/QUNrKV/Id9MQQ

Nr6S+x4eL5JYnPQRnSt3POlDtRL1HoBS+ZRKZIYMBp
wsgFGYTDdzpjTdSobfPVBuMMmLyn7JmT+uKCA2AkvcDaGdWLTvUT+KOBtfTXXkMDa+xsPoVziO52pvha

/mWd0mNYTRrCWqwMCKOAxHqkOyRDEGGvdoqJ0Tea5GLkGLmKOLGSxd3G7bUieiOaDiOWbTSqX/toYC39

6fg5QA58qEQew6ldyQqHkIdPNZmLpjm2iZVadfi6EN
LgWYNYuNxjQeT1atm6CKZAR3iZMgGPfqNxF67SLQXDkFXZZIZyqpMucp3eTBgffc24NWoB49tGUzRXM2

d7ahHQdpTxMXhASt+NSS2IuNcFc66OiwyVfUwoM8oIqcfisY7ap0URHyv2jNcC+QIKkmzroq0pseXiF7

M9f0lTQnfZWJf+zx0eeOKKF7SWod8AxHUjXs7BlFDb
VhlWhl0r07Ie8df5y38qrBBJPuBPXr2CDkJ5d+gXTpNo+7RSckV7xVNjjZbZR3OMoeLmJy6zJdK+QGIz

f2ncu2fJbh1KMxkTMxrdzaHvQm+w/F84JWcg9YQeWnHLHEtjv5p+lqwGkI5Q8z6eTwqjA6Le+PteswvP

VHsaLhxIlMpOxCxMsYE0F6ySJYZaPEVeC0pxZIlakn
+Txut51IKjTN2KJZ/WVBNNO4feQL167od7SD42nRJgw3vflIpDhJhE+epJDVYK+PdriKl3Xt7eUlo4Tz

xuOnH4VYNAQsnZTcsm3vM1rSA+bNFproKURic2Aq0GlnR4ffhPkzOAemzSdr1bg0JTLU1CskSilljMPP

eAPUpZGyWi1GiaDkBn84k1gVRjGXNE8gSOu9cmjETT
JPUVpsT0uTMJhXfD9e3qgCWqhQdcu1Oro0CXHWiJK2Lc/m/npnzjFLLUv7QUXSRyEGFlavSkyz2Sp/Oe

MOYRJKaxwmctq5yaM/Dj/zTiqNDqPUqhztTdnVWjNK7FMuCxEL9fwx6IGPBqEJ5zdw9ZYCZ5AD9CZKIg

UK7DlZBfE9FwZ4KTFfSkMXGdYEWnAE24JGMzKNHQCI
osFCYtG6Nnk722ivLwpeBoYNGoQj7NOFFrZI8UUULvGXWduSWjTBLkENHmWgN6LYJgKKgfPOUbO5Rhny

VcepFiMSrzNSNUOQaiCVVwR3ufw5XhXTk9QX7SOO3YmjPby0SpwdUiO9btY2EYZP8MOTEpM0HHT1VsV7

fdQuMbd7YeE2scWoEzg6CvNk6YChNzXm2CFcCkSF5d
rk2GIQBuVWBcMcpNKtGoKZovEcxqnNZsUO5GjWS6DBSxG79qSGAsMSWkG1BpMOX9Snb+Lr9MEYHuoTFh

YZ8ZRozR4Q6gb0KQTW/vTL/UgcWWXhJIGOgUPoqhMgqoKGLS1qRiTn+cRI4N+EkacBQF689yUwXj8+by

l2WeB5q7JNHEoeb/2c8yfMMu7B+fzErqgiAwV3+uf6
ks9Wxbgu+/N3GzIKd5pQDFJVo4wy4hCSu58i8KUSkAEhlXb6u3jN/71qzeiK6N0iwg3m8c4oJrcWovmI

l1mj80v0d/9XPe3c/Zun83y2euS+IgjIOoqqq4ObbcoQ32QReSPLzLXYyPGV1VUShK17hNfU1+g82L4V

7/NC53WT1t8+cBmy0RVM4tqQ3/NlmauXby/uFD0/lg
Cihegxo8sWAH5mwkcNJbZZMyq5IBtQIXuDG4pZl10tpF7ayBiUtGPJwWyYLL4Ixc/tbwpf1eIJfpD+Ky

THVBSwHe9GWNjhIGIJnK0hQIMxgxxRu9uxuoe6TtSanAa1wFWTg4pA1GTOQPAAOj5oMIw8CxcHyLzyEo

beydyHvXLKmN3qE6dzQkliCHuYwxo5YO5pudC5hmd6
q9oKtXMY8MNpGRJMY4FLw5rtsJszmtFdRfPLJdBasX1gNjTfzWFwRuGEpIuCnJPePDlF7kKwmTIgEAKm

h57qc3LykqPMbY6OmJIoK+yRN22RYIxH1ad5AiMLPmjmxI2kRlqy3MN4C8ELqFvZKqTPyTOY0aktw4Sw

lsH50MlIO61J+RmIJpCcftu+SrQegwUg4UIHWcDRK/
QK2zRcG9oRbEFwVeafSd3AAvhZ6MTii1ZpQNyrCHvCiaCYR1rSjMtNJJr1NhRAJxD9pNrUw+19G2hcIE

QpVyTtn+JFvpenMhEo95t4wyr5jU+dH3WpBZiDSK/4nykV8rpp7G3xzfzvRIUBCIFCMIXuHPb5wpwL3T

dRGnacskTOHmmIZeSGUdnaGUOn0nnYIFAWy907IKR4
/ZH3hZSjogYDUqeshN5vMMyZQZp33hIjKJ1EECCiVeTQ9NSi+TlE/Uqdz5yYcHpnFHf0bpNmkvGdVDMU

DXsV0cMSjoKaPEyJOCyqIpckVlKkZ7T/IMHSHikicuXUCUcTZQE9z5z1bdbAuAaxfbqmgCIAalZ6IqD6

BnDNQKIOklmOtDybMzDtxbkDiZlmDk/Z0uVPqOUUkx
78JPadEYy4d6V8CJMVN4i8FykLoluTfkSdRDsQfwUl6Q1vNdegpuVx6gzXQhjfAF1SH6QvKU0UeUEkT4

rNIWYcDtYrsbt4mvZ5apOQ1nJatWHDLqT9dHPGGPPnW8Hgibd5SaSMPPIyyk1TEAiijydejXhRKEmcah

qJQF8IHbzbWsUQpQPLMzj07zelwAGFAQO3hnRI7/Qp
FgJR9QA3xrSGD6wezNSUFLaMlX9LWxQc5UE3juFMp1vwT0SJ3IFbNGHWQsZdICVBfONKWO9DM8sVWeKU

OUBHJvnJRJrULRIersqy9MZtim+dDalFagweZCGYdigMbylR7/3yBG6hCE+ixEX80t+FhNdbWT8l2qeG

5SCFDyK40mdQKS9HTcBl8Q839Vx/WZZ5PqQK1HLI8R
QDtlpnyMWavuVTUdkbeCsyHJkj6DunOIlfRJrIxBESwYmHVBiNl7gT0kqMlsCS5Yi1Hhlyw2sbNtiJ/l

4FKvEFyQjEXm7Y0vIskAHzZ1H8ydnwhccjv/um1CN44vDQlsGTgZ94FaTSkX4XPENcKoKXJrRcbtbdTI

KxAq9TLCOpM8J57jEFHa2ycLSSYQFYcr1duRhzOBrJ
cvpA2wfttCFJhEHFIwhaWHnyLhrevWXqTRqL6IhmA6w6AN6vKVWdoWYdquu7qPCZS7cf1NmwJRMV4pRg

X4WNpPJZVSE0nuKyaDHW4Ghi0Df2jHJhqrrV8QpQ6QXiOqIFCF1TjnO39pU4nix92ewsn/YrSfAQ1l4h

qwCJ8xk+5AVL4YDGhevw2jSOwq/8Z7KgOGY5zW4G2u
1WA6g/iWoVrcvKdP/VFtQgCI4QHZxqHrmTpfvu3d3VonNIJwHOtYN+NOMNGChdAFSRuGHMELXRgnAsvu

OV7Q0K/wuk0wr7udZSiq1D+y3UbviY5LC9nsPnjiRFWEBViP44SGQIuUyJeZVhYivESayuSEd/rvra3D

9ySd4cMhprfVbftJXvAjKSLICCAyRDXbQXstNI5zD4
oE5Oofnxpaua0Pd+9bPFiNhdxlict1NeuNbUejpN8z+/Si2Ahoi2qzZecC+/LbwIy6I7aXJjeg28n9BV

fUnX+qCyUX8Qm6moJeuleInQFJWfz53yIXGOisUqGa2S7Linbu6k08F+QWI2bfkUe84zgqs9KrcddztA

hy5oQnjtKKB1TXBIVZqs7EqNzP9Epw3NI1RveHFLVP
qOFXONyup9ZAOO4FDb3z7YamjKyRNZJ3+ryJ3ZHwTEfwzclCoRCIx+zf+bhhalHYvtMGaOoK+aQu5vJ4

f2n4MbTwbNNofTU+IXbNEpsUaafswkClRvjmSFEZaq0KW+Eu87VIMRKhs7K1bEhLiGl94UM9vaIEwbIr

2ZMFjpw65GjPiipTEvRBrcxAjv300H/ZahGoQH6w/2
z/ZArxfS5+dIhciNGrsiesj2r5C6LLe5NqGmaSADUknVTwfpGyZfII8NWadKCmL4NSMxtVP/GPgBmtzB

RfrgvaN+FLo0xNEf/8tft5erN4TV5VwZFXQRHyNSZMZk62aMbU6Odb/4UAanaY904bhDl0jgkLsem9wB

4WWQVMY5TiJSNWxiqAwAabRIiHja9TfPObP1bFhPDa
KiqLfS+DQsuErUswGpmSbQbG97OuSEwgf5ubJa8QJpc8ralwJKDcIB+Zvrub09PAhiqkz+LfXdb66ZPv

gLE4trPDtzKELsftzMD8tTjK5m6UVsQqXCvcCnScuSWer3IcOSsCb/78ewgiOtOvzvP8Ewclyj9yAcGT

+fedDqYvGj3MO0clul/6Bo6k+dVpl599rfW/af6SGS
q1oMrPNyZez/uUAWBo2VCpm4vfx7buJZPbV7eQFFsH5ePo7XUF7R/vUXGinorpt/Bd/jcbePr2seT1N0

Aedn3iLLsCLOsSzPhMH9dDrZwymESxjP9kudYF588aNUxxGCWh7korcpCc9usJCd0/Fs5Lt/Ah3zS1j7

aRpWWxkKWQbMtjwfVrrrCptbs1/bPxxCJMvVTTG5Kc
yhQhU0bD7KqI847wIS1YotEnahmrEOxjtf8s/ohBAtxLX4Gky16KCbTe4IqmagCktrtqOp4sK+AedEHv

Q4z0MF1coeEx6VozdOsG4b1/VcwLnFObPA8l4+iqK/J1kFZrO5Gi06Pw/eTy9exsRGjntoRtkWCjUJ3Y

YmWoZU4ywr8DEYKhPQFlUzmFKmDgEToLIuUmDWOzEH
osGP2EUAduAAahAVEfC5YqfzJauATqVKHgNh8GJTBvLF3SNKOnuAIgBOTpNiHqFXQGGcQtLHWiZIGlpW

XJr4ctYrFsDWL8HJUtUztaQI4CTMFbLY6Qi463FS88sdWvRDUsSFZLNbFmEOFeJ9UhlTUyQNupL2MeG6

XeQC6nyOVcFP8kwHGfU9UgQ8ZbubleTXJMWaSrGGVz
YWxzZSAvSyBmYWxzZSA+Ks2HRXB+Rd0ZOR2bo3KgFDlyCuQdFF6uiq0MHKZ6OC0TyQh0FTDmH4OkHORy

ZTExs9GbCR7BSN4kuHvhIQG4KZ3+DUofUCY7mqSksZ6LJGJDHgbgS9zFpu/U/vDHKKiTez4jFOUJuS7W

FNwBtPqyxHZQeOyja46h4Ru0RHy1fk9E9Dxmg4hJYW
YwZ8/6Znzjdr3/umGf5hsc1l/4J0YoMb1v/vxA0mFL/kbe0pvYXi3ix4mTi0n/DB5hxd5awWapvAY+e7

5mnEbjx7bEaY/S4ElHXsUpiUnhIpOL//z8qsj54EMu6evuIzb3YiDClfk6CDy3lO/3zUiYp7Fr8XdTr9

kqzJO8oNVt5TqdqQlJa6AAzb8P+/d8oqoQYCtsL0FH
Jez3OzNGqP5pgcp0de269FKyxE1VP4VyZKuXuzkWXoJDd+UHk8T2EGVnSC94ZjRMWKW2qUA5GLcVycQZ

Kk94Z8G/Kv3KMpGOFa6VmW2QDV7jtCUo+jA2dKBy6kKjAPU7O6POiFWdZM+gblC8js5NUJsmwiwjAhKb

QCZsby29pIJpX+CDcmPARcWHIF+YVdYpNOdqOcPX2I
vXBga749Yc6eqURI+jLonXdqXHRveoMvkvQEwWvprYQyyHGQ2c+yd7hNhRqQEdwkhHaCEySVA0NusLAF

IGHIbQx6kkA0Ymx0CIjEm/o/Ov6kLtXPQZkxYzipvKrNM6e7mzE6bbepy7RB9xvvdDSy6lbX6HpySL2g

La5p3WXEycbySXAw+wheVLo9+01dwCWYBImLtcvIoC
31WHUctGtwDUEVOcoubszRtr+RHicltg8XKslCZFVXIxOTF60d3IBhaAE1GI0nF4GHRgdq7xu4ZlOCQH

GAcwXwpmsxmgCguiQIhKs+ycr7mYM3wp0P26HN+nFs0EdrojGz7SdYj8ZQoDiQDNvnL8yoppmMzi2UHw

mhDCUlT4ixo287KjytoVVKrQdQgfe0KwWh8iPi+hRC
wN++nQ4ULNTftgOL6MsJoOgMKLpmiCpNUUrjKgfOvndMfZ9K61xO/2FBXDZ6AM3y5Z/m/RaEfhhylg59

zM8QuPyoeQZPEYdbARqCDiWLF5L69qSTPKFYuxNPwqAh986N9LYa24ygaS8YMlYy4fJXP/dqilNJYZVe

FF4XxFRZJp2zENOwh9OTX1UBsb1cni3DXWD9Q7uQR6
rUBAQmBhn7dc9w/B58Hc1J859WFR+44YTCgMw7QnXqBmJ0adwbCdBsRhhao5VNJnQDkHJ3xKi3E+JAjT

s9vQyMea7p6k1dQ9WauDvtiRsuedOkAUT6Npq2eP3r2PSZdnk8qD1yh7rDJmbg4SvRchVQ+aCJ1GaSOw

Dylon+1e31jLEGLhJuQ8ZPORzyfQPFJlmN8NlJmls/Ij
Fv1uRvjzI7c5AJ7JkrWsn4/x3SUPfx7JZgfZD5+NT36DLqsC2ZkAQB6wDzha6k9TpTCvh3MAsNnuix9d

8AfzAeiOti+qdN0Whgh9V3k5Nq1HTh7VVJuLT5dmzh8PRSb1jnAdXLKlBti6tNc1cn0ro3GOIzzqGIP7

arKBtfZUV+n93Jd37pITQsPZQ+Hs/UWXY/yCaLbKa6
UpYc0KmrXfm88fkkZ92EwVS0Fr0arLD02h86hWyudz1B/2Qo1ObB4irnuEPg5PFuPXH2jPOgtQzezfrr

0NaRsVVfIS+/hmAX+PPm7W26z2l/N0JuN3w57MSe//3dg4unf/ATFtEsmU37cYb3HcEi0tnp4Jmm2q2R

BU9imVGeTWfLdvPj+/fxuXuz1hN0qO3jtcRKmW4z1O
bUp04WKJgxEe6BRA2pp4ObSRUbOQlifzAaFwvDFvVlNCTcy8NxCSbvEZw5MBtuYDPiO6Z7mJMoOTPmKE

3IGHSzDV0YPESjhuWmQcEkMXKKOyPjVILcKlAhf8GfI8BrVOLhEYZFZWijNDWdQ34xCOaoIr01QWmpHS

FpIwRgXOv1Fk8CQlGqDAEmS59rnYWawOVzUHDrIDKW
INduPVXcH3edf2DqMOc5VO5TFN9FupPhr3BaxqZvK5yrT2ARVU7NPUAdZ3WVD3RaG8nsEvWqi0MdP6dq

TgSto0AdYd3VQjToHj7YVfBkXE5axf4VEEXwMGMqVboTCpJeTyA9SJCmZoHrDMF4BPHxMvtfYnF8DFW2

RyM7FCRfKBr6RSQ5EsOiCBIcUlZpOQZiYgYsIMffVA
l3VNK2RZDkDaOkQkr7NVV0EHW0CQOsLOV2BRQ0PyA0RTBlRRZ9GOR4UxN0YVIwBAK9ZYB5GmT0HJSwUc

o5GOU6DJU8XXMyJAfjUQL2NCD6QOBtVCU8JMJpAWBpGkO1NOF2JpU1ZbQpWuSxDWB4PuK4RVWbSlr8MN

stFiDoIrqmMMIiWLN4UhN6JDYrHFBdDOtpUaW5Nanq
HjG5HSm2RZS1ElAfVyY7FURyAOH6FeZjEzA2TSm5LPM8FsctFhG1FWUcJMQEZhQaYdz9WHW5PIQlJymw

YAW6CTG4XdBxTqBrVPW6UVR3ZaJ8LAUiEJH2ARS3IrNsOsnvGKQ2CBC7ZiZcDyTlHuPwNEEyUTWmJvYt

LTKeLNC9RoE8BGMkQOR1IUV3IdMmTeNgXVCkYGK5WD
D0WZQmJVWiYFtsSmD1DWOcGAifSJPuCLV8OZHzUzF8YKB7JQQpQpQsYHn6ULl7DTZ0RPDxZsEwLGz5BO

L5SIRsBuFsLZu4IYK9XCDiBzXjDKp8MTU1ZIXzCpUgZMj4IKG3KNTtVbDhTAs1QRN7FLKdUwSlZXr6MI

C6XGFpHnWmSRx8GWP9CMCnMlQpUF5KJLP5WTRmKfMj
CYf0RZT3UMRtIzWuMTl4TCP6IWHiUnAqCSr1FBHjKeqqCxIiXXK2NaT5XFNhEMY9FJW4RoTnUdZqZAT4

VKEtXtN6EhsdBnvcDGQ3OfK0VETpGoLkTUgfSkA8SMUePVDiZZC8VNKgPuEzBgMbCTPiXxSYXsJcMUb2

DBOqBnSwMaAaNkLcSRMoWaTyLhZcKFR3SDcoMVS7Fi
FmXNA1MIMkODO6IcpxQtW4MZU9MgU5VipvDuD4XTI7XuPtLSEoTGvbUlY4BhvrChJ9WVD2SjI4KzywJe

k6GMO6JHAkBjomHps3LPoiWnE4AsIoIvw1YD6TTDN3EezxJlw2SFf6WFW3RfjwYBv7ZBz6RMX4SyMcQs

BtDRtiXuP2MmNhTtQ5TUK8BpB6TCPsZJU8DUB6KfG9
TSHzKKH7DQR7CsQ7ZAJxHRg2MGJpPZX0FQYhYVC0KTW9KlD5TSGeAic2YNE3SOXkSdtgIdw2UPN2KyMV

IfChQOH3TTR7YaF5AEWsGFR3MCL3JlM2QDQwESM2TOCeIIR3ELFxHAA5DVG4IlS4VOQgVZWkEEU7WfD2

RJZtBIUYIbUdWZ3uwx8MRAZhMCNtKkhMZzBeMKkNFp
WjWFZhTTmtSL5Sv109AZUfH8GjbNAtxh3BKYWrGM2Ol024FrGyWZ2FvkiimC1JYOVjOS0Tf2MgvfHaVH

J8Y1EtwUqxpXlxjPI4UCTnAWWkS5OmfSMbSpNkXPrcPCBqA1OcZMfwMNGgZTbkOJZdB0ExvzSXSu76HM

xkUL3lDGKhNKGjUSK6VKRtKMedNTSmO2a7RAlqS6Oy
Q7meELHcA9BonAQzQH2QIYA+Pd0RQI2rb1UxDPjnQtDoMJ9rbf4BUID9OC7NZRXsWY7SiKBxY9VeiyAb

Y5ZfrIqfQX5VvjNoOUkuXK9MKZPiTu6tkD5LvelqoM0OoeCuRKpeCl4EuP7XcpCdRT5ph8SgpmqCObLp

ETRjUetwx1MKxPDrKSYcE5abl3MReHYcNZV0DX3HCZ
AqMA3PbZV8gXWqZKYpBUQJOVloSPTcM1SmpsHESUEzmpnqbK6cWMP6QQTxHu7CHMT+Zp0NHY0sp8LvYD

vuYlNpQQ5chu4YALZvSDz4UNW0PCYtGfr5QHCeBkY0HmTqDMP7UXI6EbQ4AErtRsRnCSSsGKIaMoAfRg

JvMJO4MBV0NPCfSer8KUEsIvMbZdcxPtz0MFJ8VtT8
VUQiUSZ0RYF9HcI9GGHcFBQ0IRK5GeO4BTUqPFD3ZZL3NuTsUvJjOsYiVCE2XEDUSxIkVNx1OKI5AHC6

QDKyNVb0SSazHtW4IdKkSeJwIDpcGtB2HjlhIgTnBRv8FMV0ZgNtOik1GZW7YuV8LkInMqAyRIxbKlD1

ByKmKnn0WBE8BxP9HingTwNqCIG7XmE9PAOiZqFhML
G1MhK0RCAuWeE8EYZ0SmT8KZIrZEqvFNXmAsTyOgkoFkQvVHC5TOG9ONVvLaXxTWT8FvD5ZDMxRIY7FL

DnLGI5DQKaMkVfOUYcEGF8ZBCdPfo9IGL9ZWK2EJDwAhe8PNx9ELI0ELYlKwCcVTKwHGW7AYHfIde7NN

C6QrKkSiHfSdFwOLP2ZvI7QwbtAWB8ST8PWZG3BFQr
WICfGCS4HXCtELTlSqz2MSZ9INI8HFYsUtKfBXg1TGI1UEFbHpCjNOv6VDM4QKGaTtPoOQa2UIY4HGRb

UdFxXSs4HLG9EFWlPsTtDVq2BRV5ZFWoFcIwPZm5DFN1OQZiSoCsWHn2XZP4KHWdGsSeFSv4RNGTRkEu

PqBqTDi4APT9DBQnXwHnJFb2CJO8ZGIcKhDhOQd2GU
W8EGDmTme8LAUdPgG8FSQxPLW1RBU8YeN0ZKJpFiosZIO0BoPeEdQsSmG4THF5PYE7ENKdVFh8QHEeNx

O8UchrRVDhWAQcENN4VDbfQaEuICAtLvMlDrWcBDcoSKB9XiE0NGCfCdRvQZWmPtWmWsYqTkF7HBS5Op

B2AbGhRHT2UChdUHL0JUWcZbBmLNzzZbT2BfKgYyJx
VMtaVsZ8KrNkQQKeFUP4GsWeUqY6BTW0CiM6QwdnRjP3KMK0BDAuFlpoMds0XDX4DMP1JtExLhWbIOe7

KOJIQsDpSye5QIh7DEP6RsdtTcu3VJF7BKD4NitqAlJmGRoqFlM1ZlNmHjJyJHH0KfW4TnfvTdYiNQK8

VjN7HJLyFRR3UCU1RtR7TIJwROY3ITx7QEG1VTWcRT
N3EUA0NfU8NQRjSDP4RUQ1KLQqByaxKoj9ZVH2LKF4RQSrTStlLHZxYWS6UVVxCqKrHIEbVBH0MKUjOl

HqCGH5XXI1XPUxInOdWXBcHFY4CSNaPzYlAMK0VxT6JTSmCRF5OD3nXGfdrfHgSbnQYhL6LXDdk3UgEX

ubFNn9UNxsLBBgP0Q0bKGwWo5bqQXic4BbdJU4k8PI
ZiKpTTWwZh8uaV3dcREuZGZqCCwvCs0jAZ1FWDXnEU8Cf9XutfNsPGB9Y5MhoYxbsBrqnSB3NEJvTRCj

K2HdqBDhCgYlEWtgJBOaU2OqQCavNKUgZMcuOMEeZ0FmueODOb18RCiyMM8sZXDbGBYoOOB4UCHiCWpj

RAEvK1y7BGodE9QlH8gpASLtK2ChqOFnPX0KSCP+Pg
2ROK8sg4IlSTfhNYKyFM1mem8CHIM0DH2HZZGfVG2PaLFoW4RuauWnE6MidEqqQU0IkaDgCLsuEW8ISC

BsXd1qhF0QbfpzrXnDz7fpW6WdE28vdK4nJ7tkibAai2bNkbEqUQjfAs2UJMXlYS8SmVWehHJnDTOuUe

RnMPOojKMiIFGxYaE3ICbhLOIrV2jhQGMhjrUoOwHz
PQPNDzWlVRBcZr7toLDim7NsrAO0c9EaNHpwKVLKMQufWE9+JCqmwnFxHbsLYjEkPCCtm0WpJKcyZVx6

E6BcuUKgirPxCuuexUKNHPRkHOGdC8ebdne3lYLeCCJaYvRsUFQzV9OxHMRgILN2Cd0+BZkkWPU7emVg

jT2FQWStaOb3NSUE0wx5Y8wOswEIBOXv4KPpXTOXRM
VzsUpdL7PLDGRTAGJYClL2uFMySpouT6YZdyGDsKDQVeOqXDVVWguH7NbFfiUpZl8O0jYS1G0hyUaskS

owOjP/8z///3zdee+m7BZCszLxXbA0SEQHdpGV4B0N+hTmJP2jNpvFCdfEE5uR3YDfPeN0jwHssE7qTN

53oBK9xCuJ1u/1mnn+O44t+wz+7cLazWlXZ7d21ewC
1MRwx9KaxinvmM10Ls+Nupqo+Y3g/6F67wlU9/7+8u1HTecDpbnkGcWrod/yYmD60kuKd91bRnXvECIx

4D/K+U+fe/nsO9/sl05GtBR7n24iD+qUl4g/d62vfa6Yh77+4wbD0ye9CA0z/0136ammV7iDbjDDK+69

swqQH23aYtmfOnXAtP1hGuq8T+pGUk4s/bjlx+tjyQ
mv/7G7YDdE+Qz1p/8jRDapqNsjNTqi301htYI2LkLFBLfPuLg3gP2qZ4EkHYnqPD7xNAR4JgIyoQO3aN

4SM+tf4f8ukFQXlLT54HH7rArUVd5AT4T6cLQYznkF8GCYZbPp99kWsimUu5z27VZ6H5VN4wsqLEX8Kz

1Mi7U/LzBwkoNioCZAuhm1Dhi87tGr+sHhQG3gGB7R
w8OeXMzukv8iKYZX3j6uLlrCGdXhotwjPtkpvi+u68bMBhzFo+hDcXfUMxREKZXgfJCuprXaJbqwLrd+

hASpdC1k0GkEHRB7ZAZyn0bHRIK4UctBLr+1HxeazSHQx3F5bzTpyjhZWBQNEvUB7wI3TYqVtSgvnjBe

+NbS7+ov0dy8NO1lwhSC5syS0T4o5AMOh9FzyeHwUX
rMAG69lR0BzOR/TS/Q6uOjqmuhbINFqmX9eanbpFuSwNvy6xSP+InopAmU778xZ4j+cL2SPT19lT9Mg/

URZYEEQbLDOspHHXSbqcX4EUN9kXrpGya5OOC/WMWL6aQmFTI9Tx+xxuXu4n4eifXD5ty+ixYmB70dib

5ESY4u9aIirQa0Yu2oR4tl1S/Q3/MQiZezSqRyUQ6d
43Ti3W8ZdLkQjzrOPDX0vBLdgKabqSSSwKBubguyfBcFes/Ts/Ox2Ym5BoNL7fchg8avpb/X/aHueyvL

XjQp5F2qdvK81Op71O24RI/g+uQ6QAgFPNRA8q3VFWP5Hw+XHhXSCTpAX4W6kRV4+Ai2Zq5X23aNYj1J

mjJZpsq2+Ixt8wMlue09KV9A23Y8H/rW5rmcv4DcBt
X+Fee6Z9Zk6u7R7kziSg5QE2PpoAreI4RydSUJ2Ol4LJziL3p+4yXvaz/99lSnUx/HDr6txpO1GYMltS

6xJj3ElBspLjJLaUh6saA6g0VW69D/IQ5Ax4jpcueideWhCuuR0zsvTAS4y1jyEtNmWe68uPflKvjcfy

SFfjYuAxaLsRwK25gdFPwNbORbTupXr+SPmkdrpMVo
3JanwcD0hLFUa0iau20LZhau7k+4e9GheI/psdBp5ao07dR7JJ1jQywmi0/Tj+rSjQmJk1zdFuD616ma

1pp/79xsUyAJ1ZiinhPrt7w5063pNAwsAspIgn2AJdBJigeTvw1Zn1clutX0VM+iP0+jHw1FDD+VG8Uy

hCQVUzpAU6t+rr1PZGl2tKP9J/Ih+U7xa43POrNMDx
fvqb0mS35LAJ06S03o05V8WZBCF9cJVoF0dqhhDpYlP0d5KewzA6ex3gvot2CrP7ay3iUmasmPGk2KRn

/8fB0ZLQLY9HG2AivN0FEWrAur8oSpLshYD8Hy1UGpGqd1T0THtyPPr6fkj7BoldY/RF9hHC+j/xJl+u

V0O/DIX0BheiaisgMlvyoOiMp49Py7OyHZ27hxW+Dt
+vx8MeEk7F7iCo0keLp+Q/ZkxU8DZ1hADfjV4xldhJ1ZSQoKMYMfteG7kWZAKSwY14+Ke9gX1amXXjYf

ukZChfaQ8rhyDew9cSiQ814nlKAtKBWsnBuQY24mjsBVe+1r4SibAMeEsijGiUZKqH9qP6yoxvXmnUjK

Ie72nmwNzS5kPnqoyKW1T7PPHyuz6BzqU7FddTzzWK
btfzZ95WGf0cRv1VwRz0p2e0ajot/FRdanjJSZpmmyGppU63wlPWR1Q8Y518EFW49H8oM46h8VoKcyHl

pGR/DZk8RXA5V38JNniHw85gZ5hK7MyJy9gKYH9FZhVIMjFY6+6jFoiicDbRwSb+Y9w3Qqpaz7O7dXLf

A4xWakRLgqqw/9s1y/Sl+k/6V9aNCA3yztO9ta7fLU
1BiYGrASEyQZyZ46Crbq7Ex+64qzYudDDXLIYTglyQr60mctrb0+RF3A7ZiPyeAowZjcweoZxF9+POcA

R5IoeQ4KcGXBw0FbLmIE5TRvQh7STDM4NjGVeSZfLy4ppMYtQ3qk+9NPdssTdSDPFaBQi0NOBXrLYHwh

iZsKAyJMvYqXcbLD3RINJKAhPIbsBeICfFG1s/eA7K
8brtXq06ndgfQ9ge0krFtfem1TAqNYG7nnL0Es48dvmo/HLg3kiqE2uE4by5BDkFC5UqgpPL13sqdNga

B8Lp5C/n8yuHVCW6/JHjfIgkqYAKX9hw1Zoq1mpuxuMEyUWNS+k49cada3ijpAGP39gUQTE/2ln4Cr3P

qigpyQrptQVCjogIA0L8d9GR+W0Ws2fodXhH3eEHKx
YDdwCDgOmPIIvh/i+9R1eDSpnzzADYkUSv1UfyKCMD/8P64x5w47XbPA4DKQJd/HM1a+q7j0V42F+S5c

61u2Qkell4h5jwcvWHfLgxUR9webLMkGqVPKtcBHmo22vbinc7ASu61S0rBY3xY7zArife0MGFV/hq9W

frLVn+NoP8pxhKMLaSXrf0CgX2pYgNHHplpHSgbjhY
5QbaJvlObbYUFF4GhW+OV+4DXgLeoGBIHRgFe+KzWrbzHjgkUb83YEXMtsrsXdoDUqNTPvnt3g+qp8Ud

S7oF8M76nXixn8vBAehXCGaDXSecGd8rIJy66N50XXOZQ6LVb+TCMBqfZmFNZyA1XM5bAbKTmyMaz4U/

thW/spCE2l4PT2mDyXxf0vbb85aT9f7V4zyqyMv/wP
pctg+sh19JIg/cl20xEX+q4f4QVN+bN6nOMT8JkgbNda9GAj8lIH+zZCcDNagcM8gOMnkmzcdzqg63rQ

RXSvGCScAC5hn9i3GrS5ZbuR3/ZpP9VD6z8nZe7EV7yMHY96+Ap8rsqnkeLauTtaUksHddAi0sDK7G6W

KswfS6YyyzryPuaLliT7gIENIyRk6tduIsUUJxjhWC
0iJpcJDakpBMTV2HEtH/TT8egTaoJ5ZGrLSUvrWwMiAp4W3qsSbkgRJHJ6KDtP6EgmJollB9VG63IaYy

gHcO8VRdBtg6u+AQHawIYl0B25m949LRJHVYiWumAxf81OQRiLptDrMJ3xGTZBV0mqZmnm1tgPPuVyCJ

vY6IwcN7biuhDU0ofRfS2REBU55yP9YMKKnkhw7BOl
Q7UdW2RfL8PDlOvqrIp3KjMop40vIN5yQnfPr+ZtViuabP++yT+/a6YYp2Ol8jRQSn8mW1j9V91KZD9t

bN52f61J9kB/sH9Cfok4NKaUP+1BWporf7W5MI+gFLkZNKsmZTySxdSkd/ntZM857SypOrwIpV+n1Eo4

G5b2ueu8z8UJXr2HVZn53fdkqfRbglsiR9B7xohQeU
1+irfydqfcdk5O5MNZwJ7sXKJv0KeXnhTksJTLSz5VxATJvbVUm9n8XT6V7UphnypsWVN7t/xJmqqn86

5i5tS5556aQGZO0keQJAuyaJDRmDSAYf4sRLezw2lQh9wmCqUSvoooC2Xh90S2MOkuxNW6mu6p2uuavK

/Ei2a4n6Wb3pRQLmME5KRKP7SxfUlqs4Nq0TLEl3Lp
jmnabEzXX140RdzPmD1FAsnoWdXz/gH4rXDlTX4lCdvHXDtABJPFcIcP1l3dgORWL9V6AyjRrzHHW7qX

uDfZGmByWf7ehwR2ibNpphabiUiuMPAXs7Zbo3s7BAgzhEJVDa9oUTBj7ELwooYdd/PweS7Jzo8WE1qn

s6JGODgMMeCfGURjo7fkGmN7v2G/cQhHDEUXrvr8qG
+ZfFRv2FjGg/KCotw0XZrfjUBZdRg90Oe/Df86eI9VitkIH/BQ66rJ6eaa25QiXossPYSmAJLoRr03uG

sFeutev29tk0yd1l6d9poghbbBY0YWU85xKp58xPeOw3Pk2Ex0+7dCBn3qrETMXPEIenKSNhH4T6MEun

ADmx6xnISNxogpJIpagcD0Z6sJ2+GQm4BiSVmwpilm
VpCWogrfs4LylRyytcnWDB11dYX20vQtIyGSi/z+9tvdXLGVxbjdr57gJtsgQhkrKG63EXFF53/uN+nadya

7SI5td8dm5gx8J6hyz5XfRWk1u/WRc0sZdrh7+DRF/N3m9to6CVpPPhidY6ufU2tfbx9Lmy3K68fiWW2

1gB9gVbDg3PNNnA4+Aebnr3vx+JZnqjN8Hep/bU0Ob
Imo1Zj6VPo9/JLXVfYpKGLd1LlrZVIjdgWHvBAHNnVpP4VNVvqehQxHyxGfQyA1W12lagJ3PGKWndCYZ

ue3o5dnG7OrTGf9m0VrNN3bn0RgK1SkFqdC6P9hvJB7Ni7RISvOuVZnYRvMgOOwunLNgPXCqG4nvh6rs

z67jnDLGMVdkG5xK0Mk/wvNAOGp1a8+747eJ7Clfl5
7FLArKUoKSGFPvfMML4VOFlxoNumNGC1T+ixibAqItFSTLbtZbk2ARJsDFDiF49gP/HpUk6aBnC21C3A

JBVuldR/gQPn5lpdbsrWRKoLnsY8oDQJ1OKULQ+N1ekfoGDaoo8R5I9dOJ9MbkmP7LiVPodwnwMvAfxN

XSkhI/a7/si5l6f4oq+IU/1pINyxP+MGlBrxd3EtkH
rJZ5IAik7PQ2DFNpAd05URp5E6PFKsYleKeViwXToRHz1wU/NTN+jT+B7xGS01VaAGEN2VeQihNp2pku

UyvoUZaJs++08zLmxuU4X2Kznitm42luEiWP4Oh8mtVUlAzl2wq1gYRdx68hwQJ/1VSmhumZx1XW26ze

Mdv1KtqnzDwN7kMGGldxm6TgaIxpwq70EYzGcLB1zg
7NC6TuIAYEQakUnUpzg+KhN4YK89a1MWku2YqJXFLCiSJGOG0R/hDTlYojeeyDJcvPgnK94vlcpvVX8c

62DhDmcLw3A+CW00+9Bs+K9Kuq0URk8BJ0dMrAhyTeDpXYeXsL7TZNb/VgebSS61zc8iADqSRWSH9G1C

z23O+0jXpptK5JpkR1jRXuCsWtxXGxNM07KC2FXAgV
iRnmcdiUfY9jY1LJiG2djsZSGd7VBQIlKyWfDkO/iJzG3IJAiNjWKPuU0kFzasR3Yy0Cx5/jU0qTLPxE

gwgz3tAM9t17ffUVvPH0z9AIUXgbrQIhKAJXlKwziF0ZwLZvruQHdWMg/XyB13QhWEEyFF6sqjrvblBd

Wq6lcwNMgXeiSq19kw+1DCEgUGYs1PSO6+ms0Sr78s
tbymS960L34ZUutI7aBa8o8lB3gP/fSmDzP8OCFXVguZ7weX7oB/IJ7lCsNAPH9077ozHl4d1H8iLqHU

qTKC8VMYELBWSZDCb7RkJ6oDvu2A+Lo+7D9QygsYcKcRyWxrx+B2zR80yRg2qVPkRbxMfzN3svJSI+JZ

BarqmlpL9KsKOrBpgzCPP+BTYAMwC+gLFAAdUDahXE
31V/SV0ixxp3TaNE+lJqNf6yT9S0vX9IdTcMeouFihIBQFrpNjoxDo7cN0VkUTo1hxnUZ+12MBy2qjum

83fk/uU/UUY0//kgazDGoMom+8sUwkRFhqgLfS7EUBb5KJ3ErCBuyukPOaY6IBCsUP/mTw6x6FNSYXDO

E38BVwjgtsmvlBX3cu5VmBWfP3JnCe0HHuIdzGS8Q5
BjuxitBWZE9VkUE1TX280aXoyVxI7N+ZhYgMxwEykmAZ28cX9GyMZyNkGvWrvy19w8WjJirkZuT1TZds

QuU+u9NP2RRcyXMJ464r+L8C+ZaxGTpzs/XZadhj3L6557vszAh5JP8WNueDdNM4gQUSgt9mEzpmzvut

+i1X0hpF6byBgMdjurK8TKV1iqCLk7mZqRg4qmitt7
28WNYe3dC8eBQLT+Nc4DKPOd+pNFaAx3aJozyBHA9luk+43S7ewXI4ycTsoUGrcq4MX0lbzVz7r04HzT

8HvNTV88t5Y9XBR1pe/jqzvn/lq1cRz7G2L0Z/dXhSFdsC9aiEhbHR6Og8hZAn7g33f93eNbd9pyUh8a

vFZ2PGgr+Rd9SDHX4nnNeFoC1cWOam/NgZ+4KUgR1S
lmXmW4+s86oKRzf37qaP+0+NRH0WpekDO2gHB5QFdVhOeLxtfGaldwUHJ0on8DLBiB73pi9X4+h4JV+U

hIMpWx3c2pKLwrtjiHBC+oTcs/AY7BjrCjJ2RSYHVsdqZUhNvnHuexfU6hZaW5V6QR73D53qUmk4lWy2

mN1xfefDtNZAil7CZJxSoDEMMMG3kY7Bzw4KObpKjc
it+Y0CBz1660ib3v1WxatP9kQgbYXF9rAbi+ogSI+oesZeO3VBU836zaILzUPccQt/c1XpIUM50vOZtk

bFWxWU44MmZ+AbtfPTLU00GK1XODSXA+qaQvP9lMClmZMECcTxTJJKlVjFn2Bw5/etAvmyIuYWz2IlJT

X/gnJBsIVkCqP9BeFUbg57G6GGiKjqxu69P2g71SRf
E1fhVtj+Rj6DCuy3pG9OixPFWrvCWtUbqP9zTfGdtiKzXT87XcaqL0eya8bDVL6SQNlnzHCT2re8JguG

ve2RLjfPjOfiv9V68ZX1SMgBa9NCL5jsYa9mhk92zs2Qw39cHZvD15JEVqhM8iI25X2X5EjnvzU66LfP

KA38AQzdVQzaIWr5MmuiG1X8DsvV0jzH/XotrWC/3p
w6aCG8/zbYlhx+iEZzuHwD/tU0Ue+I5KerCd0aEXeW8JhaUzB+GF3AhDsp3ZpfgT8y8MvGeRBYtmHON5

vg/zIsEHmPJknefGa1UIMC6iUyOUYXRiRcuLoLQ6jHHghvLO1eoEvEahWm2gf11ImJgge9+6k/6ul9IM

2cnKMqIkuIvWmhBn84um2JF49nkYR1PAxX4+s9xY1A
K32qRQsoTdpr9X4Zxwf+6O589T7tOvDnO7lP9lFXmE7KTcAWXuN64FvoNv3n22by0XFRhA7r+J/BXAOI

s3xxaMo+1BDYZS0C/XRtMmX9pOlHbPqxoJ8fSob8lXeYnzm/nKfj2Jg965t4lhBZhw78pUvdr7G1L9ix

AdryF+W0ANfb2vfF/7Js/bxDtq9Egdk+/0bxdh+i5u
Jr118O0qVjnbNhxe1M5ViCo3yas275VuxuHC4FadapwRCyLB5bq1hPimbkA44Fc0wP0jcK5BJBzA2BQw

Dp9+6lVDjxFsS3hEWba1iIMHBE8xbILG8e/dl+szK1Y3y9aRZE11GZKaH+M7IRBVQz2M9nVSBLXxLlu0

oNqI/nUGkEkKs8gWnFO4eVd+3jtktk+0C+7vpBGgSa
MijDlJA1oCaAq8xfoW8Iq6UFxRlflft41+X5/xMwdvYDLwMv/W4zCbE6XvHWiV/DDhkIO/Cq3DTSjYMO

ShePimCQu4GAxlB+rPVT9gOwdGRtU9DW4aXKCb90Un6xQB9XmkI2CDpf4CROGGictJB4+RXa6U++QvTj

N8AWO/1EFcBMlS5MDH0/+YtK74QLx/5QqBsW8DI8/t
Rk+K8BdsH/BxmNADLo3yopU7oSdM4TDb1kAqimQ74Y/+A46GtAR3wwz7uOqM5p5yD0EQUQ5Joy155Y4H

i1htv+XtEkWCLTbdaO8dqly+4Xmb60vm4C62M5T4488ZGpHTgOowfZsa7V8zPlOs6o9l/XxtI5CU7nxd

Id145jH7A9Dyjc2eNzdEbAhV1AgkhiXzltZy/QOiAW
OPtiOCL5UUptA0B6IoB/z4Or6ZKK/XdoFL6KeOdlLtdyzu9Tz2+Radha/Chs28otj4ukzeXfEu3y/2v9r

58h/XV1GxgD0EgeOn5JmrRZW8Hn9u6KN3Y5gp+XnmaPD5+l/1+/E4f1iTrBZtxC2pdHb8lVv8YTN/A3Z

ub/WH2l3/Gp/hF3i+xXcLybe89MebMGAbxqBgKk58A
WF/sxp29+MAEEj7582p912cbuI7TXESh0tOSoKSuM5cStJmpq+ct7zKWh8PGYJnbrHgNmPHZz9UmRCyW

X8y97S0J5G/qd7yAVlyaCFKK+J4Gvsbjk3IBFq3SEpVB8lZg8YLOKtyZ16Zm2mZcCXbRtR22i8ZQdf/S

NOCTK8MKkQLrYP9FiRFmRo2QSeVi5boWb6Zp25MrZb
+n3M6T0+81V7njATrrbR94HY8Nql3lA693N3So2u4YQFs5G8E3xPVlFy5sKEO773Ny7/66vLgw7+XKn5

OzeBNTkRc9bqUOxq5XwSKU42dq+et4QDoHdeRqat65U6t3lnP5Nzdet7yUU66I3iG5+eCGWqvi5EZnI+

i/xxGjcWkqrl4kde0DurgcV70chop0cqe0ekhPjc60
BX8pK9KIBAaG4oS2dZjk9L1Z0iVHrlQ++Y8sKT7ty1rAcIyLrYlPRmVH/JmSaxfbTvRs44Im8V7fwEVf

b6kTQdzGX4gYR42zx9ndSxof2yaSWYT6X/50fu00+iMdpCrPvcPdnHQN+iS/JlpEINejN8SgEaPr6PWd

PyP0ybwT3++1rO07Crj57L5qp4/eb480UyT4DAvoMR
t7+8T6yN32E2JtIIvEzmE+y9R3U+k2iMeQL5HsgIYKxecwNKBeqie90qmrYAMwxbsp2T+4gLYMKOOb4f

IAmUg75l5MJXyZ8nAHPeyLnTqC7gnwBR8Nz7gcxjtUYVBoG1GYEW6McvKcNA6PihfvrU+zbO1V8yA781

1r8HPURVGZnDIodfsihtfeOpTKGr/Pv8LecH3yunk1
Bzo96r02fY4iM3fN/0FJjzlM2OvA7trAS2FyCMs5GExIwOUDO+zZX5MIjArRU/SEk8qMlGf7v/b9Ol7r

araX9NAeH7g2MSuUGwdjBC6FDHeRUDGugy3gTioW9tW4Gs3AUErQD32ifJPQyedm1qAebNIgEGKlShuR

Q0FEJlInKBkYimhQ4BY+pD0RWTrXrxJlIdFOIs0LLu
SAHqJPAvsfCyf9ciVc4scGL+9PROAFi8PlZS+fINRALhgZ+RryASCC/1GAWeq19sIMDy0W8ZnsCNQGeA

FNnwa0GXdTbHz0QlOs/kdNupjy7GSz+r+k4vCyPGDr+fHL7/Mervat+osRAZXrm1A7z2lwmNw2K/SuAdbU1

4bFr1hOh8YufosxQSBLxGLwvOamfh75zQ4Jxn67wze
3GS9XgAEpdLi+xy1Nls+3kCNfU1aufp1oxT0PWOriRey44GKamGmeBjFloKKykpeQCn6S2tR0gR+jTAO

+SpfzIyg35Dd8jFv/LeZ3WC/UzmjG1d0QDf3UZaocn4rfTlXIaM67mP94Z+wUF9xa6xLKs65+yzCawQx

4vjDBhMCbl5wD/zITQ6frYhLBtYjcZY6gZY9HF22Lj
iY3Yejp02GDTiV7MdfqOWs4wm8hd1KaIEQFlGppeEP32FDXJc2zd+kVMBP93Q7M6Sl4Qcp4XWGlL4R8K

V/jvxLz+HR3jsdYvhxHou3rUdi7p2LbiaLJs3wH7vnk3y6zyUbFTpKRCRSM1xqJ4KbV7K/n8m8IL3PVR

da/BwAEXK0js+aR0B5OW+GP2/rizc0AZSaIwOSSdtX
8vT56jed52VNgPUnpK6afWZ86PfP15PqlJZJmNpD7qHFqeucFiEzXUm3/uWGsWfHxtPV28N/fd+PzJcz

s9H6JXnxj4T0+gbihCfJw8ZH2yW3Qo2hHsuneEHOVb3lxB7SG5Bw4ox3KZnXrd3d7l4yt5q0XHK1xhw7

NjW0Ywy/v4m9hOoPCoErtCcRwJMWwVb/ZEIqkjkzrH
24O+1Jacee34mJCGGnjJsPMUtJkNhDNhJb5mpiCBmdZ8wnanKxxK3xnd+MG9aKMwk20mRGrhdBr4wFaR

1owo0kC5D3qRsKpmY3H3SSwnbsGQx4v4yUjk4+sDqiTgG8USW4vEat6OrV/92eF/Fmja7rKrcj4oN4dO

biyPD16hm5/uuf/kN0h/4O4rq8Z+U3WOM/2Oac7dcq
tMxkgsehdnQQnGYZkYbmM6pl0/7EnWMhE8kCW/z29Pdr3BNrj/6Uz7TV7ByxMOpClSaSys3dzuoo4/L6

qWWw6O7rP6LKuw2Qnj1Fd7unobBit1F7gU5aC6b6n/pGFHEgT2rokz38XUQD/bgtyM6J/h+m63zXyNw+

Q0WEqVqKcW0rhaSHs6/zmg8sG9cJzIBpnhHSv3cl1d
ncqjRgtZ4KMW1qB+VySsfyic+44oujtwtaU9VHy17vpZ8n/FAFFHDkfs8tpOLC7AoyhzUvzb2v7zy6RG

ZZhd7Xm00zSz5xy7c9x7oW31Lu9/bjv+u+8qv3vzg4Q0SH0zGIvh7R9lmEOcsAS+DZIjcsnKfW7YoKhu

sQ8Pofc4KMZTxysaezRM/Mhwgo1Rb+hcoeYNk3CW79
hg47jdJTqFgp8S+U5JDLc2D/80zzd2h8Dsxmz2g7j0cX4ZbkUc1riEj9Sxu3zY8KRqJ9yQouIwTo/zbv

jkJZ5Sf07vhNtB6yeGumm6ictsQK++sa3PrT068TW5uyHbsYULTs3uGI+Z0psyC22x2rXo8t5uUhwrKN

dfnoVrea7GHd831pyawBmjtTmbz3+Cr7E33j/xHicp
Hc0gMwl84Fx6Tdx7MyaA+qWcoWn5wRraTqH6oaPtwQ0Gg69QYCqvva/hA1AbTVAvo7WNYeOb79fwvRpA

6phdsiyFjcAbv2QAd+v7Dmp/aRb0+HvkU3d/ZJzn7OIKlzcAouTDSPYlEj5wJQqTLG/ULqAIveDQ9KhK

P46cwXlrxaSj9+HXYe/EI/phZA1xMsHvZRefZXAHkP
fvT6HUwyA/nPjosxFjES9iQIwLAbfkiEbcu3+wQblISxjL2ohw8bhcsBc1vBrh64WGB/omInneLPpp7j

GbiF+MjZls2GusAnD/bdE4ec7lyA5q/UxkzG3hVp9SOYQY4ge+p/23LWoHLk0S0HDzK0IDS6nkYtKfsm

x18O+FpvzS3IKddecW3QiuJhnftPN08L18hJSlj4mD
3bjPxZF4GvBOa5L8+M0lvk+m9pJcnv90c99hwNOIo3bOQuYXsb6vcGDrHoy0J6QaSbX/L61YSf6562F4

p+7gqxwoZu9tlqtXA0B5557z4+lacPBx6vU3uyxZ/SPsgKgyiBxwY43WPQ9DbFvt721E9v877eaGNpl6

2eFdYF5KJvNkc4dvMQ8auFng02JL63aoKQsJ1yMp56
dVjP/h44ds4qwrzFeBTg0FcQxezla+J7qiYib4z4K5BO72Z04oNtsiR26ySisy19ito2M3AP3mthvrLf

7j5DPVwl/NpvhXm60dkrshQLgI0vc9fraD6hm0l24+Vt5uV4Dqy+pDRbu67jl9v/K4H6FhjwmtvEjsXp

l8XOgH7iw3/2HO/h809YA0Yt3PVHY5tnYelfQ6wsgY
fiF0Apjo0N5sQJ+ytNY8dZPyy6S+OMEtrLVAqIRkF2esE1IieFa3u2GFZ+9RExypobO3Wz56oN5u/9r6

qrWWZOsv+aatg+VB2CPpItBMh4XobenxnHaa7rShq8174tFLoaxIZzb9SOx1oxmhNlv/p9nNMxJsz28F

4PlCWdWOJgd6J8fO9hdEzoHCTNwi7efAwy/21q+7iH
qou9F/UeuVIYhnG+S0ne/rv5d3JrzKK/ggca2N6exein6c2e57c2oxuA16ujZbprpfmMlvCz1UT4GFi1

ewoccnL9zOMc6qwk/eVr/8t9kg7wbYaV++en6ZZkvl7O0kTNh9hlxE+aZLommRhz3yHApEJXT5NEdM0W

aArGUcIa0PzhL+Xdui61Cphej/I1C1Gx2llcqfR9Yv
PO3Ryi4HNv6vzXhrrP9BcYRx9l8yMn+ugKF+O+ViE9+rUXeh+tUubZlMgZ4GuuOwOl0BwHJOVTqy2OML

6Vb4yDD91jX+CF2Hqzh2hJ62Zw4Y/SamSEtEPZz+fVUWh+hRwJld6glA3uqEhdWyfKC+JUGXf0UrNwUv

XtYCDQi3yPQ78ln0jyjw09btT5qPbJkVFjgu3nvswI
9WfiqZOlBbnUb7C4CEC52b8q5CAyU1H/BiHsuE/wStFl/Are4E2MFxrNv2G39y/csxq3XOllg4DGlA/R

ALvj/WDLl5oe2H4Ji18TRlTCqOj+Vdl15R5oexE0K3C+Uvc1c9m+8HMhaNDBOyKcRK6guoM0k2F+h24F

1GngfsMaHDxkPxLG5oeclU7KT7CsCZ0lP/TV1R/irg
z2VqiOzOzUBSLtU+tbXn3qCiI+RJjpuJ9kDFLXfzaly3bB3rnFcQekuEa5twWgyAenADNi6l5X2N5xbj

42tkJ1vZ0XeHU2wQ1p8/2DsqRs3/WApJ38o8G0cAqah55QW9JsvAhnzZ41v5s1dGXhMZpUbMjl7sjRf8

DEK4eIeQGO5F48r+nK0xcujPhpiTCxxqKQBJKanbHv
ZBc6FYtW9XZfG1pFgpM45EEIZZY08V88hHgkrd/gt33sTs8GLNnzoWMFkq5DK86sK/SnhF6IeOxDpi83

uw8fcAH4Su8EN28i/BZrh6SjcioIGMI1UY88dbOl6pNvww043P/2X3EEuJNDPRThOZymeOV8fTvexMcy

My3KiNC7Bw59M+rKNYPzUEzGOPu/efipfBOKwNLR4r
8Sn02zZCmBoDkt77mrKn6SMC1Jd6sWBapKiGc4L04Zq46z0fltYwIFyDAFpBRlsg4HExR41v0BJdWxzx

0csjNVsg3lkZRrznH0sXRrf4jcKdKKFYc75YBB6EfLmEP6Vn8B3QS7/vrsv5/PW8nZDwaSDa83p7kbW1

gl8JJZJgM/kn3eL9bYBSMmVfpwneo6WLpGe3svX+21
UoO7OWW3SVDqnHOti9R+/9/3G50xvKrfL2XS+5tS3pspzfnPhWFliFuNud0dnP45foKZXC3IYnakN3au

/XJn9FYNS+W3+HRJt5EXGcIAgBlIeLLr78ZtrI6le/hf01R+2VNle/RLd4fw9w7nRIL1mLz0uPGDHIwt

V+oytwP16Xjd4t7j8+ebdNphE4oUcnFB7ukb5mo/k3
zDPM/nGi6GkhWj5pK81FD8mEH5o6K529ZvnvMb8up2vZWGnAeIdaB2QMQoff4O0PszLpDGXg+2bUB6Lz

sIY2OpmgkyAvpdTWYngJPe5hsih9ngnhoq9y0Cghg1Q+TBkNxEolX8dx1D/wDn4Dd+5zWRURGzhw5VJe

i/BF5MTkcLjNihiOYoK4O+lx8hmwCAzTZtc3EBT4b6
GUsUJh5xmw8coX9dIDe4c+57V9+G+P+LdDzpq/k/p4BW3VGiGFOWZDX2Ko/EXanl518R2osAhdR4ouyB

qWYf5xZdejsqifCTNd9uZFP3f8yd5f/BGqiNhbANbZeaEPknR1JWybFJ5ux/BFP2qYUM6PiV/9ZPyxbo

yj2OsbnfPlZFeGlvJR54Mgu3mHrM+Purt5FRF/wfzi
NgZIeo9VIgotPe9+jyk50rCrmz6V6afvlr2XSqKHig2v13LpNqzd+d4tJrpfHuCycZwN52L7MKGRdQXG

NmxWapB7KYJQwNCSqCF2Ro1gjQNryptVQZiE/sAUBEwJCygN+RGUfyZPyF+k2MzpyuqXKP7WW2nzQpw5

Ray/P/Lk9leiM/CHDuQG/ZGw2jigqVcxNxt1nx+Ue4
Oa95gxGrTCASBUy76Dvl4/WZo8qeQ4y1wi1fsnbC86g+lcVM537F5asLf0KEgB3Ay1JjutEf3JTFFEad

K6EovjMnQJvtQOfj9gWIKUMjS1uPotVkIKT0mtsbYNE3a8kK0NxQcLg3MX/8vgP8VUmebpWWcQCDcY/w

x+H8fMH025U1SA5od0pSZtgVphiSdl1tL0kWTLfRfJ
cnNDra4/nnPfr4zvcuvKzgbvt7D8SSZ62E3jFqsXnF2UED4jah+GRlAMzKUqr9TaZ7TcGQBV7Q+gCcnF

JcUhsRhF+vlN+K3s97P/Q2kn3Ma/DHhLVDqnntvUwyjfWaNG32ZvylLEH6nlsGEsscaLDFkJWBiKcSyD

BwzzRIUnh3otGXBqpgiNlJU0bZW7GvnmfaaXsx5TDz
Sk31OUhtTeURb1zWjEIMQRCCLaz+oFJEPLwOKziafjOPOPo5GQgiCSLSLgsMtONFRDfRTwWp2k5NNkYF

bgr/wO6ZUEF/47cKDuJduNWhgsiFE1KU3WW8rcHkOktAn61l3owSVmNjOHjm+zS+eiujNaQHgIC1Osy6

Hk0mmmzDLJ/sjn8YS51UpeRiaTbRYAhc+fLkuuoWBm
YipYdxFUBjgcivlxTq8T+rKySrKjkzuRViFuf6b4O8ffu+D12G1KgiODHA+e0rdDOyIzHmm888fMrjlr

WZbEf34bAH/auRURk2v7ZIl7MAMbWv1/AZDabDFpTAHa3q2Igrup1tMDn0Fvkh5cwz1hVk2LtoTVk4Ca

NxpKf1QhyEBFoRSbeYJ7/t4fwaGv5rzJDmhckkJmrT
is3RGv3AXsEzo8TN3jonCz+nNCVISRmYa/WbpVj1OKiyxZUhgtOtP/O1bveuZM3NcC2SIk1sZeFcMvLE

V+nN7fOEz5gjyovWrper8S1NFpwZsr0anVrg4a6qt3B9Gvo2XE5kmz3JPafEPbWMUFguGGgxTLtrM0Ps

Nxu32qzxBR0oKij7R1O8qiZa/9ltjCJXvA1KSwrjaM
XrJGehV/0d2yQtYC1FRU0l+zRgYMbQ2ZxIt41d7NwSqRPvFPTFKRs8MDWcaQUN2jFgcil8C3pQpZ6Hsh

3m5GoWp0m0ftW3ittCsBP4rtTwTNC5PWHOYhZG7G5f2aAQda55JeSQaMInbFoCnxlxWW76GBCCc1kjCD

MP1JY1idqsc2DvG68ZWC5qXaFShE6YBS/75K5E2MWZ
oRpOprdNkVvCb53CmQv3Ln8TpKdt1za7aV8XTNMmeH1bX+gb5kfoALIBDGc7ApM1g9mBlpFDZ2hbJQ2K

sVj4YMClLUGGoAjywxrv/4X/wvzoPa/7uyqd8H/rTkBF7DQK8ui0QgKNTmLBmebzIbZpmFIoBdOIMmm4

GuIUthFSw4C4UcfVEydfWfMnqqyTNRKUDmIUHeE4tr
fhw5zBCrKVN+Sb4LRYKojLOqOP4GIkdQpWUNCcKmOCoeky4mO7NS3hcyeBCCAbHEZ3U+CHbDd9AfItge

zA3hxb21RMjmKgJdsy09NYmF7voMoisR6JmmmQNXa10pVKpMGaFFJvcpi0Kn4RuXvu6J73t3yy5k1d0t

hWAaxrSmuTxEbOgutkHWn1tG6f922qL0A+mGHAT01X
QkAn13Xc/OSsYxWKeusun90B4Yh7TLxC6D/UwOgaeO/Yi5BEx4ETWY5DFRvgooxHOtOCqmDEl/jMeTYN

FIn9Dxclkr+LM8KC4rlrBDF6l/fTW4kyZpxkJz27GlCflSYOybah3xV4wz64hL4Ac+v2U/iokNCmVuZH

A0hzBwlR2JTX0ee1BnZSivByRnUV6xxj2YHIrAJcAn
Z2F5rMFtSu8pqDUsu3CfkJA6k7PGDvGiM3LvipVSLJ0tX7TNJOCUYkeBiizopO1SNWIyWGIgHH72HJyl

WK3GKHXBOOxbuEFvQMJ9B2Pcn9ObbfQpUZGfHx0VQXXlAtbwN7AfZsHSLeZjY0KtdvUWVw90XKujWS2g

LWSyKOZcVSU0WBMeTBhqRI2CwLRlnKMJdwceTHQoG0
X5KI3LCNEXUrDeU0EfiqKYmOjxShBtMMGiMPUZLf4+NDeijjZvIrwQUuPrFAZff7RpSWj6QT0ZMLEiAH

ceIW5Zq319E0N2LsH3jTWiY6yHBo2haPK0bBDiL0Joq0VTh700H1LHGITTVpcBekgflP2RLBXHc9eQNJ

4atK5ZYTJkoNg8bF1EUCOqN0gFO4nemGFzFN8zhpK6
EJ9WMPfpu5IxrNXaXFLnYhWpB13rQLPtiK6cTFyBMDWypMe9oIvvL2S3rHZbBP9wsnVrGY4+KZ6TCTMi

Od4snOScz7IniMC9o5MsHiZsCZMCKSbmBB7BPmJkUTJrK0fgUEJaWpPbUOXaBhHbViH6BV0wGVd4TFyz

MDAwXSBdDQo+Ia4SGF8mt5JwSCnlCLCfJB9dmt9CMU
fBLoKcM4C9oFEuCr5nxP1SjZN8kOGrO1O5sRKyG9Iph1PBg119Y3HICYJVBimGrymsxU0YaiGzNIavWo

2UGSUjpAl3yP3TSCudFR2ZYQGmLQ3jOY39Nc3kkFNlBuTmSMThPf3WTlOmF7MqLB4zA77nMNIiJKBhYA

INCj4+VTqtnpDdJixQTrV7JDTje0RuNIfeHH9JXC2x
u3YiJJmcDLAtq9AdBGt9TV9SWZFkVLPtH8NdrGGfI9CGVx7SIYj2L6ixHIvtZo3GcKIpXHJpPLsdCZ2N

j944DKj0LM2TZEGxSuXjFMSMMwExLRMiBhIiIUrbDTMAVUxgGNTyQ5NkPBG0VIBgGg2+JEvlVX5PZ2Na

ZCF8VEi4AO3+YReuIZ3LqTBUG8VvrQNnRDbvV7HAKN
6BUYC6NW9AcTBeVB9CeXCMF0XxyGAsHw0aJQUzr2MbFv7aF1KXUSUHGWAfKAsfSBuyBLMnYPn1O3R3FD

XjU9UWT866lTEuvRa6Pz0hZ2ICEBnMFcPuCGvnFXzxAPAyXEa7W5J0RLPuK0OAQ2AyAgDxzsVzD3S+Pi

BhCKAMWD8VZTKYDZq2Y0R4zSSkZ5X9mXvBaNH7OB8I
ES1DpWIilDMvg39+YvRGNbKcV4BFB1PWSL1NWRr4W3J1bVCpT0U7zQpTcJO5NB6DTX3FiSbbeFSiMg5t

DQogICA+Kp6CUm4HQsIxWC0cak0PUrJxPDZtApuLYnf2T2ccasc4oNBwOfY9T1K5KnY5aFZhOD1FI1J3

tBMmQHJ3QKGgiKY+Tw1Af6ZuQJFcRBg7Z0vaYPSsKH
LfViHumJ01U++6bhlblDK5K0z0NLPEdCGviLoQwuBcN2rBLVS9j4E0HJu/Yd5SUWR4oEu2nSAzFDSpHI

b6eZ0ifOs5XsBpYC75EPGmDQeyoC4oEuk7J5Vzr7ToGw2vJh4dvGYySg8VXeRfYIQ8nbAkZyNGYzL4rA

duowdhQWH3P6a1yIS6Vy75l9qgvhUpb8CyZkK1XXdh
GHSqJaVgztNjGWV8bnQukS6blkPtDh2CWMUeKTvlueJjNmUWKs3VWbMlYK31KmyrjL2olUC+DQogICAg

ICAgICAgICAgICAgICAgICAgICAgICAgICAgICAgICAgICAgICAgICAgICAgICAgICAgICAgICAgICAg

ICAgICAgICAgICAgICAgICAgICAgICAgICAgICAgIC
AgICAgDQogICAgICAgICAgICAgICAgICAgICAgICAgICAgICAgICAgICAgICAgICAgICAgICAgICAgIC

AgICAgICAgICAgICAgICAgICAgICAgICAgICAgICAgICAgICAgICAgICAgICAgDQogICAgICAgICAgIC

AgICAgICAgICAgICAgICAgICAgICAgICAgICAgICAg
ICAgICAgICAgICAgICAgICAgICAgICAgICAgICAgICAgICAgICAgICAgICAgICAgICAgICAgICAgDQog

ICAgICAgICAgICAgICAgICAgICAgICAgICAgICAgICAgICAgICAgICAgICAgICAgICAgICAgICAgICAg

ICAgICAgICAgICAgICAgICAgICAgICAgICAgICAgIC
AgICAgICAgDQogICAgICAgICAgICAgICAgICAgICAgICAgICAgICAgICAgICAgICAgICAgICAgICAgIC

AgICAgICAgICAgICAgICAgICAgICAgICAgICAgICAgICAgICAgICAgICAgICAgICAgDQogICAgICAgIC

AgICAgICAgICAgICAgICAgICAgICAgICAgICAgICAg
ICAgICAgICAgICAgICAgICAgICAgICAgICAgICAgICAgICAgICAgICAgICAgICAgICAgICAgICAgICAg

DQogICAgICAgICAgICAgICAgICAgICAgICAgICAgICAgICAgICAgICAgICAgICAgICAgICAgICAgICAg

ICAgICAgICAgICAgICAgICAgICAgICAgICAgICAgIC
AgICAgICAgICAgDQogICAgICAgICAgICAgICAgICAgICAgICAgICAgICAgICAgICAgICAgICAgICAgIC

AgICAgICAgICAgICAgICAgICAgICAgICAgICAgICAgICAgICAgICAgICAgICAgICAgICAgDQogICAgIC

AgICAgICAgICAgICAgICAgICAgICAgICAgICAgICAg
ICAgICAgICAgICAgICAgICAgICAgICAgICAgICAgICAgICAgICAgICAgICAgICAgICAgICAgICAgICAg

ICAgDQogICAgICAgICAgICAgICAgICAgICAgICAgICAgICAgICAgICAgICAgICAgICAgICAgICAgICAg

ICAgICAgICAgICAgICAgICAgICAgICAgICAgICAgIC
WoKDMaJJPeTMShUNLcNNl7I7pnVKGnUMVwHR8cVHo6Nw8+IZsCOqAcCAC1cgDnsR5OLQ0vv9XnZIuqMC

Mor7TiSFu3GJ3OVYZxYFgsUC9XVKunnn3SDMBkDIDdnOGNu8jsFgMtRNF3LYHaLrsmBK2WMEVtY3gvrw

BbIDUgMCBSIDcgMCBSIDkgMCBSIDExIDAgUiAxMyAw
LYCkHI8MENNzZ957nfMrXN7XKm1BXjHfWF4yyo4VXznnHQNmEcjYNmx7FBgtIP2JnYFirKZqINCpIIAX

BfDkG1rly6GcPrfdZKYMCUezTR8Vy9FvxDXvQFg+Mm5FZS1bx5JfVRpyWIMsAD8yrc3SHZbIVqWyT8Xz

uVquSOUrv9bkTUAdYE5miRFbZXX0OTLjrEHiFTKjc8
PgXYH5NJFemMEiGSMSLVBoqWOsYn4gYJ5oPLUdALL8WySyLZBLBZ0KHQGfFUOdnIJuVDEjIPZUVT7PQW

vgHTQ9OBUxxwIopHPuCEicKC4FEAGveaSzRbwvDQTHVKn+Fg6NKD4dh3FoTQaoOBByWX1ykl8YKRgKZx

GtM5L5mOHkL6P5TBysJa6FUBRjLIMkFiFpMVNNCNys
SX1DOA5nhnG7PK1JcSPdARZqZPXwkHOtAJx3W84cmUCfBAokKN8RGUW+Nino+Tc1GQVHeXQVeQYNbUvQm

YGAXPfXbJ7XeR4YMx7MiI1QwOW90pSououMhEZkgAE8LUR2vKHPrZKRNGO5JcTUwgZ9ymxCfCsJqHZNY

NiOtT57flXGzGOFiQFM7DOCrWg3VRLArN6FidzKvyO
zuppKsZQMdGVACUB9YCWqmprSxgMHstBlbSW01fIpqMD0RSq5WTlMqJW8znl4UfYZlLm2UJQAcVH9TNT

TlAOApYSHsWTN4SOHbJnYjTSwiJTJxSIGkKIQ5JHEzFJIxSG7BCkRrRJDzVtg4LSNeJXAnBRAewo8QED

WcYIChGDDuPGEgOBMqNEEnYZggVSGtDLNpSVV1BPDc
WPClQX7TWqFoINIwXRX6IYypSNSjTJXqnf6WRAOfUIHeWfDtUyWvMUVvSILfMXtyPCAvBZY1FGA6SSPn

VNSbRB8NRmOnRQAcBHUmYRKpPFZsJODegr0XXXRrDBCpPXD8GySnUXZzKQOaYEdjHJZfTUC0Siv6ISTt

IWGlQQ4BIbFyDLYlBIQ9WWFfYKQbNBKmnb2BEQTuAK
WaAgW8HoTiDWSoKGErJPyoWDBwQFLuVUQgQSYfPTDdHW5FJbVhDSCeSBY1GJqjSBKoHWWiij3XDQIrML

GsITM7SDIpPILjUAOkPOomDADzAXX7BOi3UHWhBMErFC7OItLwJELaBFTjRTcwEDEqREUauq4PNTVnYU

VzDgEqOZNiFXZiDGYnHItpUGAvMLL7DzHyVOTwGEXf
HV5EMgHuVFXbVHX2IMplBQToCFGosp4YAXYpBPSgFtqwCZByQPAtJVItDHxqNJOlXDC5VZQpSIEfABZx

HI3EIbZdAXBvYdwvVrGnDDUkFUGfiq2MXQOiLJVuSEBmAuMpMEIyUPLiXXikARRoVOU3JuC2ZFKeUPHz

SA2YLbAaJWAzCvsxLkvrRUDjNLVsos8CHWBvSFBsSP
WaSpPiCALzLQSiNCiuZFMmVLZtVzD2HOGnGGRiWT2CCcJrHGRuPmE7MlKeYLNmTNJilx1TcXYeiFtqpp

1HYUsOEn5WsJldPDQxBFdnMx7cdTUmXPJhWYYAEw4IjoCjFNMeBBXWHQdaRMRcUZY7T2M6XRQ2EcP8Dc

DiZNX4ATZ6GtQ8RxA2UoX2UhtpMdT3HXfpMOq7DSlm
GVMbMOXvNmTnAQt0FAZ8SZvcCpq2Q0J+NN8jJOj+Lj8Gn4WiyuF6gjZzXSroIFDoWV2OTWXAO7JFWs==









                    ID                  Date                Data Source

 

                    [id number]           12/15/2020 09:59:34 AM [facility]









          Name      Value     Range     Interpretation Code Description Data Gregoria

rce(s) Supporting 

Document(s)

 

          Progress Note                                         [facility] 

SPQUJn6eCnQNMaMt28/INZfjVWLkn1MuJTpbCCb4NPqkNZQkC4GzDRA6dS9pTWT1DGvBCsEnVpOfSlM4

lbm
LyTpeBFyBxPQAoXisZTuWaPGucUmgvoHDzCY1XyPD7GHBjJ86sBKBcXCHcK8CwDLV8UgN+Dr1OWUCumZ

OrJP7PCtvT6Goey6zDYR8o6T4vYNRSJon8rOQ0UGdC5sw9UXqKhl89PDC3eFTsHlVIkmAjp/768irpHF

XwWURtAi90PjVHwnlgkIyOKGBMe/9KT9ZxbYKKc+XX
INRiNBV//MGQLZY7bkgtHsoDJoomKBDAmZ570jjU81/O1Fdbcdqg+jZ/aVzB59yZxoFW5tvv/AZyKf3q

+gL8Zs0LtJiI4+UfkvdyN4WP83P+5+++E9f/FK+chddjmGL3IzCeiYblbpRxgsF3F1vj2GEuq1S+RHpr

fKsXRgWz1Ab6EVRU0yPCgmCPxoc9y6KvY9ZAs6jw2g
7+HFszCSnmy1MGhsC2snqiFJvf+0tPRkvw3K+FdOKQwHbu3s91xqxza9S3juKy3K4nJFjup3PpCkoCQ8

rrX2tYfNEPho6Y32fpqLM5pRPE9FaJ0HN5uCiKm+J/25QvPw3L7ifwKifS1OZNTHz0WeIXhd/f7eKyWr

0Lo+Jo1q2rQiTldipV1NIys9EN3fItrs97Ci0aa0Ut
TY2+IjofOf11xtdRUxhG/Xf8pW5RNj0cUucs9rqFwp+2Pte3VO4+Ecpx/FJZJIK0CBuYUWT9CgFgOwqt

Isn9J3gbK/pPECcs0DuhbkvCsHhAzLAZ1Lz8Tz27lqtfcPuwwsHHs7hh+IBDKueE0CAgiHMMrXBvf4hW

qP6FpERC4fOoVtNNtyPzaoQWMmeFKZCakSu7hGMF6d
V5bYynyDZWfnC39/S1y6JfHDLGtEaOCxVcfO6Ed9uX28JhwF2N3gMEyhcfDpEwBduBJIrOS+rx47Jjn6

6ix3m9K5alcY3IQ0sHlmxd5+kXfYynMGv5gr4DIAirIFZbRkl/jMx/LcIoo72uBqJnULvV44KAKiiOkV

WqrxPLq2sF97t8eq5yd8kgTsSr4Z572WSq+/ixkq4x
IjIBgz0zeXXgmHZHKQuTSFQIpMEf3f4F/tI87GRu8B3Viwqnhx+wCLe1jATqaBZLgCFszz1T4Cl02CrC

LXUCjP8uRrcH3zXpL3j8dSYCuMSDOhQdxlhpS8g2CZJulWNc8QF62pEDXcb0ETGPKTLH47Ov5I0GBexW

G0Vcjo7XiQq3CiYUZ6OsdxY0PT/RWBeNZYgkJCPRUi
u81cCPaoQ7HI7t0hSg2mEL2Pznbx/fy06Cn9R/6BDbv4JtF0p0A3KML/fie9KtQj7Az4pMzGYEnU7qYy

3i8JD531aHE7lKpNtmTCUcYVma3meeYc2k2Tj49dy5x3Em1E/9Q1y/H05/IgbdN7j9/eIC2dOS9MHXkv

dxuNCB5kIUL0h1mmWmlHy7Hrg8puNIfTtcm7yOe5vN
BQ247xCeXsG0PcrMk8K9IPcecOznAdpq+drMLAwxSnuPeqvlW2dMSIX7OftUVVbcwlCU3wtzvB/WWBT4

eHiPc8qlND7Wo2X//36rMX4zbzIw4yEIp1nxXRWHBjSy9BMpoy/Nva7f5GUT7Cb9uMhBV9JQxbooDVWq

eJlOH+8qZBw5qwT0+R/9BKYUwfpUypd63OqvU1ghD1
c8VIr64BONagDXjqBxMBB5f2XmkG6GJMjqitusOzZVznH2U7CPPM+/Vg+6oF33zphUf/1Ko1FT7JDJ93

ty35X4T7Nf5g5WaHGChuedN0g4g4y2SE/ewejV0UFG3dQrcNJ0Lb2Oe6jsI7/6Gcrg1vr04BS655frnw

mhbRR99vgQMR89MJdOtvBG+h+hdSRXMrX7dU12Nvx5
qq6fHL1nOzil9klC3MuK+/9Wqh+yh3BXycFAUWVnupzQWbWrRAP3FEi/EnWDDEzn8szHTB7oLRKYRYpC

MoAbqPWhKke1kUKYMgEoCY8v+2NqVxo1bhYz9eFaX8CfdAIKBUPTJ2BW5sgDC2ux6GXwjFVESQRUav/E

i1xxajvG4q2gObzvB62s5YFbcuPHjG8JDXPh7+PcJh
CJuuWyLghIjgarCu41vXF3IqUVAuLLfc5EpCNeQZQOFYrdT6ZR4x5pO5gDZ3KwnCY7pN+xHpj2x/ApNt

Gs9aQRZ0dZnwRXYSN83NDpBukjRNv9qrZ92H/zvC77L5bbzplT+/PH7I2vWv8IUUuiLnNu9nDtZFROm3

wCU/E9AoOP3oSmG9Oh8XNPmkqRMAzuCXZr7l3g3G2p
m6Wj3cm92cTYx0I1xJAkYUAhiDjCFHPHkLY+7WcrQ1zENMvG09ymskl5b9ZqW+MUfFkI8S7aFkVR8DOq

ZM1BcgQilmhkL8ABmGIqkQsceq6SIXp4L5mwIyis7lFpqvtyqAiIEdMTozWkdsJHS+McTrNjl7rZHppi

QujMer+9XnH07DLovMH/gPmEWWj/w3PegRga8Ilg2V
tj/aYhpf8m3Y+oMy8LL2M9MQEZ2uXtrgeg/H6LcaGFCC0r5CZ7U7VbmmNzo5snAzdnpJNzZpXwZM0bSr

FR47CN1djtvjZkoN6N1L6JgUpKndvUi9l4opB/d2UkaFSEUW5dBwlwsxN0ZhNPltMSfQqRc2mVIkm4Ix

l0zTw4sdh7wD2bDK9KTJ8ZzKAJ2FTC9rbUh+JC6aA8
xgiishJh9PqOVF+QLN6KYGRqbuV4OLKXh7AtlmlUHoGPYke4OjlRkXqGq0ycvfrlk2QBni+c5NfyChIT

KVBDf2l3ckBJtycfT5nuJsam9WnxvBHHrNtybHKxz4ajpdeHDBJ9Cob8nBRuyRwk7ot8x0OZr9X0tHiM

yDrzOpRDMl+qS3vzNa9uwAkN4/eU2sucD0aFPl25kp
SjQt/vQFs74v3zYFffirJZCdPnChYZweUTC8C8jYzq+hzjzMR5OX3fbUU0EeyNsTR8AcZuYl+CL19zjd

R1w312Cx+dxa5HcbJG1YTco39qIRBA2lD5U/FErfrxD+VI3bUm/S03rL+TNlEsx87aenwvQYIkPrFw8i

3lFbc43Tj61CzwOChnmHa+PpxNq9qlm4EL7I1AikL1
7Rjtb8oVo0lFzi08+oGxeH6teg0mcmzN4EI+BjbT6mtJ/m+SwNvaPkk5Q7mzPnaaLS+xq4AZatsR85bX

2N0vSt5nsRIgdXQa6jaJ9N/YrPBFepliSe0tzmDSyZ8WdizseXk7Ac5MsajRF1f1rs+foToyGLJPrdE3

v7N91Y1fUJt2Hss/Wg/vhe1k1nF3lzrfP7ag1feicJ
6atgSp9dV7Q7//wgddu9/y3KPlnuce+TMCAzMQVsW0wxsrhhubAahpOc+lnVToCzW1X+oirQ3qsEGRym

asfB9Tfn1dpACrhJXzWOXVaXF84725NIY51a8pQAVLv85wnqRRF+toTw8FZgCJGFO1rb42WLeGkLmB5v

/UfGgHulu23Pr282/LrhZu4tXOZpJdH7nvaPptacLr
dZWN9G7Lxv9KNSbMqRqkuGNt4Afwy6XaC6G+yuinK3i95J6X/ecAo1j54WsRTrmiRa1VmGyHebcRdclU

fmqszFHgwRbU6mHoq/0MYJK46bz6/S60rkNlbw0WQLQa61Gcz5DJGDCxFNEwDcGgUP789R/wQQJLLK91

VRjO4Vm60Vrintbb1/OrgjdlZV42JpJRWvvV3BG0sY
iG83ggwRc7ZrMTgKlycGx2LluIpfv9F/xX4haAs7zfx6kl1/b7mJ9+ELlL/ZqPfb+sAbWYFr/LtITuxZ

M1VubAR4eaPmFifTXW5PTGXxHWDw3LGpu5U8xCHDY0uB6aY/W6a5FNt/CMVeTkQZU7nbZsxC0XRQ0vn0

VdZTy0BYXgk8GnEQvsIIe2YPbxORKcY9T7fNAcQANo
NL8VVZQyCN5YYWQktsLcAmMtSYTIRtLuCTLnMeOix9CjT0JiFVJiSOIYRYhiFZKjC42kDOagBr63QQrh

PXBxMsIgWOw7Bl0IMoTeRUXrH92xuAUkdJRzNVDhTVZZDnSlJSArC9TlmGQjZDceI6LmP6PyMZ5brHRh

VN0adIVyC8PlX5AijojcEBHSQtGyCGIeTIfdQWQxDn
BmYWxzZSA+Wd7UKQS+No5KIL7bn7ChZRw2CNMzs4JcDRoiICn6T2DahZZzhvWfWvmkpFOUDEPtYLHdR9

ztxwe7yPNqPNs4Mm5OAtWsw5ExBFWhHRnMbp7nD1VbjrE+DzM0P8qRAtLhSM4qS5sEZAgkMOL2hu71qh

eXCoQUEnFPA7ADnr++QXfWu99xRHzeD+I0BkEugS/s
fbEAxVj+2k52tqECTea+BN8JGSzard49J1tP3gag8bUfrrB1w8y9FCroqWZbg4+fnqXpYDhObtmvp/35

4rfT/wOcUq8h9Z9bq9Mvz91baI+l2X+4PAb6maZBE/ix8TzSZudMEF9t71pvAbfSAau5iNA1VhLd6T/v

KOJ82YbBU0v/7V2s0+G8kywnmSaZfuYQLnkF+9i7uL
3jvDtRKVn1/fes/8994hpsVtVZUANLjezKWDjC8bNSTaRd0jAw7HGznW4qxsbz0Sf2BZtqQGipkFwFST

pZFmoXwpdG+xRFoC3bsHpIF2VNJiwkN6yQM+/tIN9N6t+KtFizksyUWqkk0LL+rh86laCxIVzcVMIhO3

HPQy8ykbKb22FhCf8ZKFbICiN4RYLP5yekfSMK4MzA
4VtKTE/AVLiii/iT2hCOAIhdOtiQVBKGZJln6dIHheeUasGiVsWpKmTOgJbV1KhmFyKiEcaRML9829UO

rPXqCAlNfBMBN8eZleokZ8uDZi1qjL4FEWIdu7wDwQr/4Fv8iT5DHMIUOR1SqNef9BakqSJjlD/PDmVk

g+ENFuOHgmYSTuGJJAKuKBspyuAfA8BnWF6SQYbHRP
ofAZ8I2tUKniz++cwaPD92jtTk3EDkCXYFNf/9q1ungmSdvLCjHPEpdLG8nTX/DVSXySH2GsJyzOyzyG

3T4SOleqpAkQfFl+8VVorKAcNgfey6mQ2nOInnx1n7BULZkqroFyyis/sIqBrlXQllA6HW/4UNra0ACw

Q9QvSj5+13ygarlLTfUL97vx987ZeX11jtdh7CMo5U
zls1lGK6lVuyuy5XB2W4ALH7Fwoq94RWq0KLmfGXHn2n12frkJYU6IxRZQNjmUL7ZuvZpUv0CPkk6v2+

AFvm9jIlH1h8rfmnfDoUCFxovAkS9PPrWgSnReYlPFMcUKQ1zLlA5duVRip5UNwUf8UN0regjt7omjy4

gSKA5sUo7icqyYBivsQIujMw0FDMPhP1VX3qXFgaPR
9ZdzAhhwSpVMF9YsNC+AZJf7IU8+KZYkWtdBcQutZ9gZ0e13Gl2YnDoXi1fg6ci3auin4rM6dMhkMGnv

y724v2tKpBKZKpAXyJiY8gcypLTHH4uiT2fHu1BNjqFidbx5Co32oIwE5tQZ9CqcGyduDFPuaB4LaD5h

eCO/QlpCbwtpjIjrQM8MRPZm4C7jHZQmMQ91ws5Ib+
Yrwj9TZTEA+S0AL7Um9IghzLb1gk8SZ/BCKwO8iJL0AYrIf3lNythdOeue2Hwh0wkSVa3lCujqpoyE/L

aZ1Hj7WUSdtZEtThMegDLvi1MafTK70VeYOPtDRzZpLOdRG71Fwlow2UpRtQzQFG3DpqXzsiaX05pHs3

KqRUkdEZwtvhQeBbglwJL0t/GKT1ZIKHeulcgTGMUz
dyySIvapSTjdRAUoQlMADAAc6JqDzLZuEvVHrVbm3yz/k5FKC8GMvhqwb6X3dQpxIPskOPx600B7Z5ji

8ZhnwtbnYAWzf3LBxcZUVaClnPtGGGPixA8XWYSnaMoax0Nwrx4AAwK4P7knKD5mZvj3dRo3vf5+ghe9

bokAXNRZ+itnqLXRaS/MvZK3WPZcfd72WFNraxzTsM
/m1YOD8GCsdVec8OhBHwAqr2Yk8x6+gdVcnY+7ttQn23LXUT6gv8EAYnsEmlc4KRdRRe2ugeqelf/0Af

e72ViuQL54c6QNa9vzIjjUarITngARzZkuGme+QaUoyo72Bava9ywKcYh8kS4WdkYO/C19tvsIm5gsEE

mmmSQbaALNsS0m9ZGgwSKMzwI/TT2J9RDxrQhdrbzt
N3N50OldMTZkP9ne9SFKa0kBEOnYH2xuQG2YR69GRrHxFJVIgWydlkCBiKprFqgsgHSOumBhwrbE4sw8

v2mjnhJ5MvH3aVNEv2WKMl8lPXqqTpx/rvVGu20B8e7kArRh0UY5K6QTOpsGtQZi99xUkkVX/feDyhGl

ItrXGcnnn7f5YR7a9kbgGdVUDbTIEMolxBRlppv/Oj
kv8R11EQHj5Mb3fZ3luDsOPm02rrfJJd0YwUcjsF5qhaJo/l9SI8QacUe8Yc8pA1EA5c3n5cfZ4jrC7c

Jh+pTp+MKfef828d5rgPI2qLNCaux3VZzjlmtTPSG1JqxdYFU3zQu8FwcdB5n7ghotgQ9bfU2jvaxWP1

v9OSjhr3mQ8ANAitsbJqYHk2J7yEdXWT0BGdCrK77B
S3J7X87Mql5GxxkHSFFJQmunHQiJL0k1VdPL/gDgDgBTRxUi8f/4veTLphtJ0NjT5CzxbARU4uVnkgWl

tSRMBqeFrUOaLSOBdLGMFN9MK0rV9/vz+k3rZMjtTKL0715k9u481U1i3wDjSFXHK4rUNoxxuFDDmZqQ

o4YYcEthNMpmhBoWBJur3DHIN7jNtOsOQdIvqatOLI
BZ9Hpt6Hc1pRdoq2A1Wzfj+auYlRtYnepTR7wchghQjI4rv+6eWRvn/4b/aB8vHvV/dFDLA1tiGxi4LA

A7Nwv2Blp2r5AkWac5DsAJ4AVNajg2A9ggvBm/zAACp+WFDrwlNBrFLKSfk9joYEpejhiW88K3y4sRMM

7DReGOXQajDP4hDj0qxygThiscfqxgMu7nnbDpxXoC
IBFHE6qfZw8vXy1cqLT6BD0UWWJoBIrWTHA4xocoi6VQxi47vZu6+pe3qprKNX7h3JsYzC/UcPfdCbdv

OlluRP7fNE6XrFaT2EYt3NA/cbJ8DGuNVCz0D5aAlu9EMTVK8N10CJGm6jki/1i4u0wfk1LXaGr0a7F+

gwT+hPPrfeB5yQ16bsHsW1yXDZKZ0e5LLiEkndTS2x
hxP0LV8u08GYA5sHsASpaPXXNn56bsz/QjVwR2G4iUuufJGn5/gPVzE51mo5wCGXN2//qOMY+0mckzd7

9Xp8n/GPtldzvl8VhQAmCrZBZsnr1GHY4Ic1f9wcPn0FeJ/jYGwR0LXpc9rgNDalOYYxZP8ArtcQvJrK

6rtX01bqV9Ar1UT8tXDkvVO1quxi1fylLV2ewpupze
ElgDyVW/7+HsqejmpwX8wz7DL2OlgkSYcWw+AkL6fIeOAMVtNeNBvqqyV7Z1021joamSnelk78BoQBln

edbNAgpXBeQQ1w6b91mDsUgfl4x1ephUqvo6w3u/f4oThXINoBzRQPK1IInzvOvBgktvkkrxwJil4Ru2

jAL4ajymlctWa8U7l1B6JhKAaBKtDnREC2smWbkP3I
PP3dp7HsXXc0CJMwp4XgGOdrOIb4TYomURNmP8Y2zTYpQFHmYT0POQFaCS3SJPZefkPnXuUjKWZXAnHv

GQFzGsWxm6HuM8XcLKIxSSGRQWjsJAUyJ87fSHybMb56IWtxLJOxFsQgMAa9Lb9PGtOkDGJxO41jiWBf

yFBySbKtUIQUAcHzXXGaT9HspPMzEIkkS9RsM9KmQV
8odLMpYS3uhJBhG2ZsU4OimirgQJCVKzOaTMYpNUdrWGHnPdCcEDbuJBM+Nm7BLAU+Xx2GBK3eu8EiQC

v9FBFay0PgFVppIVw0Y9VvuJNpniDiGforiZFZRHGfSFVjG7pbwhx6cOIjDWq6Iw7PSgGnl2OpZMDvZW

jTof0zH26rtDW+L9D/mQmPDrZqdHjBv7qN3Gv43qio
39ATN8RXa5L6qtlunyzOwgzvl9iyVtxFJSiEAjKkMdMuWsq7/D8EKNwRLRR/+H52uPxEOPU/+m9onuPm

RO59O6wIH3R+bGiBW1X+Hh21q+a2Gq/Qzz+HikiuquikI0X/RleLh3+Zdb0z1aG0ixlQu0QscT7n4gdo

/r/+PyuBbt9gI/Ld0VbVYZSJQyoo/ZmS/wf/9QqN3m
AZrOcb0OSC0rSgOaZ8yER0kek7Do/J61M7LPB/84qcL57HNhk43gXfLZXP9dI9+XXG7KhEjCBDAivSZ7

GDaT94i5cktNrDEoTuvAfBsMYJuzXhMkft35mANh6IDzPEN3KpCnd3dJpqtJFLqD6MBoxb5caZAyzWYI

dtCnbJIL3r8Y2ht7mVZBV4QIlM9UWCitEDYCvKbkAe
FysEfzKfcMmagZkTNCPzm5KBmqx9FxxfhHIlf9BA726MW1tLA7dsXM+GzHTgDHVndCq/jUkjy7F4d3yv

sSIhhhLxgjvndYcuYsFFD9GKYfLOM2vd7X8avx0JuEbdzJkFd17a6N+kMMfwYUxD21YTwx6ePs6ET579

wANRS6LmwzRuPFb1bWzUE3fXB3t8arxonoEioOD+dF
0eALJ06NHetSL0dkaCvjfAtD6JW+hgUnzOxOzqxvQRG3SfH/ns6wSfe23RfK+YvbqL4olYXI3aou1v1h

XC4hz0MPJkACEQ8dDPJoG1kfc9X9Rr02HnBetpr8BWza3KPm1FEhsX5Sez5d+EZpQzTkMyYjwYgBnlZZ

4YRu+bu/uQkIgQDbScJBAxQfpcm7RN/zDDfIPvfpgQ
qdkamFx3CBTDwqCPE+FHRWPE8eiRhe3Q8rcQwRTLg6xGaCetYYoIpTC6iVS9wUdNwVG8ixBl8XgsJ6Pk

RNy1m5IjDOmQwKNVd1b7VfgPV3ZtGBfFxqDqdFwP6NMf34MBEMivPNfqasSToUXTZPEGRgruArddpVES

JcO/smrKzHHw8Az5CRVYrlcwXsqPD6rEa73OKqNbx4
aKjJaabFEEOG62nXyDo8p6DYsdGYS7zloNe2JaLhSuX2EiBxgStvtrrluFvf8ht56GKCBhmoMQDdzIQD

0bSzeCfnJBa/ikiX0xp3TaSxmniJq7lBiNYeSwS4pY1QKrFkGA9wDGexAZJ+bHVFJbvx7MdmvRJhntbx

2mFHZ/XDliayANNObZ3h0huPkPyaf66ln7YKozFy8b
Q+3wEbY3cV5u1bi2KUvBJDB3t3dc1pcEuTUq/Za1Iao2f7g2PDScY01ehtkyw1NINpRMAQxtcdDWH+tA

+/GwtJ9F1WryaUOtrLNiZU6vwbm+W+yc4UAFpWUhyEBn3bbcd6zO4wJaTTMKBHthHG1zuaOcIdcBlMDh

0Y8kztvIGhu2x+m8gzh4Fw1OQUNo3Ik6E2g0W+FCJe
r3Lz0RyFJ6xCVvc/MsLplXVAsc9OB135QyGYK+aDXwEU7Z6iyY8q8I/uvG3l6Z0SyPbjhd24K14K1N+8

Q4tUloga1552HK89VJpnv91CEqZg0PEwXakRD2ORBYs70Z68J5AXqY25WHoJmCAqLWM47KySyppzPcxN

A8Rj7AJLcWIxKUwdZQmRyopKwZkYBfpyY4MuB9wm7i
KnIlqujmZHHsrw6ocucgjjsbVihr4iqIpvfIAxFuysmmtyATGUh5vrv1jtQygYJB0rtvi0DWqQ8JkPsj

SY17z5SA18baJ9XsoHoQoH2QyvfDHQDTgF3yKW5rfxwW78vzeaqT5XD4VZKA5sB6T1pbnAN2gBY2e0yj

3Q4/d/XR7FhiI/8Xi2VOI4v1T+0cl5yFtxcaGNh2Vv
9lcM+/6bZCwAgpCRYW/Ykc4QLoLu+6vMsJgjn59/tk0CfNm9wh4RK8nAQwrxU8v9YSldCBb0lt5UibQz

P8NbXR7/FBchChwInFzMJug4c+bO5VPKxFIT18sZ6bxTXd+5i6BznbifrVjSjgax5a8vBC0JvFTmWa2i

GnrrpSjX3FbTimEvgW84uGROX5q2C01Jsr2L4gi25t
QXGg6DpI5pHzFfYu5V3HXU77X9LQVtGpoA8bT44GP/Rd7lLh1mCJUFWDOeilyNKmKUr2LGDQRrfzMbsJ

ADVnJ2l2Jeq2R7j83g8lmWNrdSnGk94oM7GYusEpPG9dgKoKA4294J0SrpyVYsJAKVxH5KWLDBt/gbxD

kOT8Vm+PAfUSAcFlBvsWgUSIdxpESFLCHJ/E9zNPVK
K6LX7G1WMLgTu1ZgPBfmScPTp+3xdjrUgMnJagRaN5fWeO+gMy0U1kR3b3UH6hE8dYoEq4dtU6RoV3c1

EKSuTpn529l9Z9BtYsSe86RMvAILs7Pnm1i5ckWqFV/NkT6YeMqxLRXetoVbawih8eZyybJvkcJNnwIs

ROq/M3cnceseOYcbVMjUsuW18hhG4A8CfD3SCtb/Un
7xQIkNhBhi1tTbTmqGffKtondWb57ZMKObKyT/mswO8Ikw7V3sjX74VShzdUbzWUe8y6bYbcShpuXQhb

BwchOEYtM5ff/zj6wKqP6cmxUx3zPdqZ8HCJh3nnXYl1tJz5aL+bz7zNZS0WoanqrcGq3nB2eL3c3ePx

C5XoMzHN/P0t6axNZaQtcwrWk4w4WX7oHS9e9tdWHQ
USVmKvO4jeu1qVws+h+ORH04H58ER3Q3+oYN7rq4VgyE8m3nWm4JGSl33W7sA54qGRUBag0ShDd7zl/h

xAm1/NQUeAMn+UPEtL+Tj0OQ/6+5j8qZOdAM6Z0KUi8qYYs4DjNufrowN4b12FKwT+0uTnIK4osKD9fR

fQ+wOtFLFqnKUjRBY7NehvzcyqGKASY+k47iziNX57
w9KxK43vftlvE/S5G/u7+sHECujY28h8uXxdeb1uu/hcZhbWSmQbnI4fDG39yViWJGF8F/pNdzAwS/9M

e9XBAsulv+lMp0SJEV81PaJ0EvfNVL4ZkJ61EPT5JQRxoNboKRHQZGMVoVxkVI8FWt4jsZkLrM3zILBA

m9UWG4O2H4bkx0yOJWmMKhKzZCWYlj2YEOCZaWQGBO
LCQe4iXiztfqQ9TsYmyVeOaN2C3rwesJWlHJbzYy1Uo0KUsFcQbc6+O493KzVaK1qwLKoZpJQNVOf1Yd

DALP0z/KIcKWEObmzSF0E3WorAHbsZXBIF8BXNuGwsoBzTeWE9cFpPQLbQJT5e8pN3TkOG5z1cMZb4O7

BsVyAzjLO+QAoBsEAqMBn+uSHQgk72WSNGob+Qz0fb
wfsGwkTpACHiUoYIGOMXyBJVDZyqHyZFlBRI6xKLD/EoENMnotgnPNjSfv3CXA91NDTXWxeCGSUIwLqX

wcAs/dprcGCW/j4gn4+4UZhVGA2EEPle2FuEGLOeEWoi1wozOfl5aAd+MmhgXotqSFUSjSXpTkGyXYFk

igyzKQ0qPqXwJIi8DjkttYm04semKQq+PnOwu1NsKF
lEbUsw/hvUDCuA0BcEtRT21XncRtd0tEbeKRi+GFKVRGNJCsrtBcK/JEXRgwkBnpmFDB+V6YszUyqE5h

TanhjSzq4+11RzGnn3m9ofBpRbyYRpfCEDYqjYJ2O/staJ6g3U5fxxoh19CvAZPmQROvVJRICSEA4kbD

h7BTXKh4x2/RDhlE8z35PzUvs7v0ga6fOWTVMAEI2t
vCy2SUORp1r3/JGpeL3h33BrUmv6R5v/D6GmZCyzr31FM3Az5F1iF3GlDItv6AHAea3qvKQlgtdDrPbA

giYCBQW4gCV5b7I9FeY8EIyorfbgsJmerycnGDUu29iIT52VYfiJhwldBWvK0t4fTem/KgQMzgbovQcH

FmZN7Zr+3palJRS9beH4++3ZdwaZ7GsfhXyzAWPQGB
pm6RHwjP5wf65Gpa1Bgz8PHWD/W68JvOhIFdc8ErKWf1sxKIIO1/B9sHhBtmc1Gz0afLFxHQj10ANNRN

M1dQTbUGq3PfhycLyn9hetOdMI9FFYiajTnMFiVwNISeW2VjfG8ONd39I9nRnk3yiSqC8srwdSg55MsS

UXhTjBXolNsg53eU3QYHNQqmvYM4x4JN4F5pAr84d0
K/g1WK8y8C3u/eyywNifFA3IZH0yg1CbGTApJHkretIzBkoTJttrBPErOtwNVvFtCLdYMcCqSQBaFOiw

SY8NFSnkATxqOMZfC3UrvqPyhZLtWWYhOr2BLEVlIO2VKRJggVHiSCFzPnFxUTBGGdDvCEHuMNJixABU

q9gtIuTvJQA2SYSaHbsbMX7VFEApBF4Qu574EQ80uc
V9QSWpYu2SFVVtWW7Lwn00fLI5DPGnEoYbSRXwstLtRFMminU4GW4AQbZsNXX6mBHwIraZYH2VAPNbwB

BlAL5SKMCodRUoBP7+DQogID4+EZevpjWrQwePSbDeTRSqr9DuYJugBFh5W5IhtNJmdoOsXjuzlMQDXD

BgHOCnW9dodcf6gPKqTOgbIe3MDpOtg2RsRDXbUJeM
he1cbW/bNhD+PmD/gcC+vQKsw7kegOMG0iupEfOyZ2iwxcPjTInFRNj9eiarcQ+auyjOsUxsZB0w8qF4

AjcfIpanu+T3I6eVWEPhW8NrEmkhNS0ayufnVPeIpL+eABE69aqr7ocTJg+baoaG1YfcRtZGD385lIPr

9rOe+L3VGV/+8pbWJ5EPn/D3IQzpa/Xv5n93kPY/9V
MY5kJwf2/Fs/7bfSssD3mA1fnSB5kPF+hY4bjrdw4M1sosTEobBPqKnRjCt/bq6YdN4M+rk1lleQN2Hx

az0vD4lMR5zuSvWHtR1YDhP9D5Wkp4tBDXjAEBVuTikJ7cOm6BUeV2a+GZV4YcWfcGjP+gLh3RM8hOyM

PcNoWZK6NPvm2HU1BuXVcz9MpmnmgmASncilfh5ZaE
YpDybLCmTihbUmdXTrDiIZQhLpLmHnNJIVXUpZAHE88DVIs9dBLpbcPO4vAtUHgkExjTAqkUV26cZPpj

T+cP8CMAGLWJNOTyUnSCghKuBkyS64j8MJhosDIzuYoWniKQAuYZaqPCFRXbcPoQcX0tl9QpHyZ2QuDC

8SfwKxcdPHh0WZUw5uBlC30PmZL7YD2DFQyzeClQVg
ho7WYGPy0B987aZh0hGypDgj5yr4RS/o1SfVG9u4LRZyvzWDT1UDMCRnJprDEWlK0VDHTZJhhqcqbC04

+k5IKvQWeWt3mBXuN6J5tPYgTPi7EjvffFZZiHToi5BUCa0UpviQePikFN+8d4OKNYg0vu34Qzs2y/rN

YUhfxgfimp9IBjkyEggtqHRMjetjkCAigKN3RgBZrp
oVFjoSMG+cHYu6YLzAarepL/q5nZGkgyvz0LBEXXCvPO0HWZHmdCA09s+QCl3xjZ3AywWaQ5UwZYHQTF

XnWSXwHWOqy0BdhscI/trPJnTcwYPmR92NSFCn7anJ9gWkArpa27slgoTzUfE9C9YGQR2HGSYee+lEKG

5YrEFY67kLtELjPJcxci6xc9y7cGXEIbm0/RdK6rea
Njzxf0UH0i6fmkGYfzzR7eZeFiTmERLChYtJ0MjQpuxc/6qArxLmBhM/tTlf3t+OinZpjn4vJlaAcn9D

buJGDl7Nf0cIyB2H5x61oLxFKsahJCNkZQt7ZHAJ9SgJhn4oXhseOgLQccpBcQt7LRnwTQ7QzVWlTpYR

jVMCKw/rC70qeRKE0f24UU2xNkmxxN/JyuDskMNtS4
D2LI58PKtyyLGZXEOHOQP1OV3kE6brbGKdC5vvBoYAOlSg1NMOcFRorE9vShArq68bXw6AapsdNEyPSo

EnXp7EFqxJQhkSL7YdYTEWI0kmPCilNgAqux0ZiOEbTidQT8oOZU6iUH3sOhqoOmQLN99kLMYDFMMr3P

hHRkx44oX4nfc2qfvbcWjl+uzg9uK15VPlQCMDP+bW
4jSnTM8RR5iq2y1boy09vx6hoo1lYw71j3xtzRHb1eVw+TVB3mu1yT3sUhQ8vHntSixUq2l3x1BF07Ng

J7S3iCxSit9LnHItgmKW2lR4JH96u0QubsyClmPdD/hzZAg5ouuOob5RXeQ3qpfzhjKTR1UKDI43NgL+

FFxlwAE01XcfE2iZj/onCJShAN3RrnC6zF7YElb2nF
vQ7pSI677JFDG++CqQQuu/7U7Heerx1jRyOLa0PqWEW10Gw/HzJXcRO6NykHDG/wC/bLEAW9rcsYQd4t

j7rUsXyBjkHT5bcVyFs6z2RPDoBe8l1R3HZvkGVOxLosOdnWzTmIGea+TevFNrpjeiEJYgHi9NR7FpyE

SzuYZFCjnugAWQiv223hmtvuCNMvFxtv9YNd8jWEkX
u4zwibjZSCs8JhlwEFH0mdqnmJGZAgimfi9MZk1yzmaMkWYijctFhb4Azeh9yqp6Z3/q+fz9O7tFnxjO

tELVFYfPtVfQ/nbeBx1FJWNfYpQofbMUEyVjS9TnMoYEg9sgdkOQfTZmlSa5HMh2KKsVdrRJuAZDP866

RR43ViryUjufOfaIn2B/hIZB8ehRICBgxjY7DSUcUE
2CtY38b9N+hBwKSukgLFRKxBqDfH47ZKpmC59xye3I3azicgxNjhSfe+e8jYu3JIKUngrBh6q3nekw2L

geZAZHtLLlNpIsKH73fRE4dOLc5sRXWdeInS6kR4MXoMaEn+1xoRUPvUMy5CSid/CaGU8fuTfQSpx2Sx

4bhCvpbi9sefyHPGf20QBb/LgWG8ZhgI28wfHq4C43
T7bIeJR6veaxr5wvTdSP1EBAdhTvrOzWQA9vwgoNhR9NgGGMwZpixvHmahy7dBVOU9LR1XfPywnZ3IP8

4JalF9EHaTtfh3dFoyf2+TzkTH25a43oOjEYbBwhf9Uc1MvyXzlnMuQWjXLZ2lcGTIhTaJq32UkqNkEH

UtMQt79a/KChrMqpa6iQtDyLp51CmomxJg3L6JthXp
oDcTjTPSoUjNIEJtH6Dg7ZdhD53yE1LAKSw8qObIaFdoCZIm+8F5r+YP2sqgkxoKnRGVo79W0ro6WmNF

h2g/QALlBUky8LFAq8/RjSqtGdw2bQhi1bWNWBi7vvEZDhCdXp7b71qhECWses0VtmkUSc2s0U4PwGns

gms34PPSWvVDJvtmJKXdNRmRc2fDgPP8za2wQLkylj
9S8+8COzdiZron7SyGh9qtxLQ1iFTi87cpcUHxFKwnyWQh7cU80V0rV6GKf4+q9RrbCFN9n+JHUbhkw5

9Q5zGCD3P6y10gXGqV8ZCvOWS7G2DS2maBKcWIPJBNuM9qDosPciotIf84iBSAQyxIaWx2flcOuei+RZ

Yd30J9+Kj872dx+mnTHHsX7TqOwzC7DeZzlVH398w0
4CDWW0Pu+732K/R0MQcrLuqxb9pvN4XEv52WotIu6QQsLSw+KpxpoSsjWKt1G6JAqGGVAfQoISI8otKu

nU2EOD9ii7YzCFslSJGmIJ3siq3ZFIO6NF1KCZYyRU5OkTEeI2OyR6OHInXmGBCdZQXsXI54RWIeTYCL

THmeSSSwS4Beu435njPrxfOcWGEwQg9UOSRbRU6UGP
AbOGYnqXPrLCSxRFSwEjM2HJHjHMyaUQVnK3FrgvWmvwDdJSRjZVQnQu4OQNApWM8Rew43aNL1ITIaQc

KmSSPdwqUmXPWtxjL0QJ3FMgKtBRE5hBScNitRRU1VLCDseVFzUU6AIZNxyJIhWK9+DQogID4+DQplbm

NoKwdVLdJaNOGea9GvYEaoZUd7A8XicINwcsOfIvzv
fGPEJJFoIARkX4xtcgb1jOFpNYi2Yt9AGiVvb5UqOQOjJIeMnj1yN84XKtN+r9T/xR6OxaJqanqEdyoV

NqgmEBuktPThYJ/EBsdObYc0/Pqeiy+HBoZYx26oHGsjgMetial2hr87Q5lMnT/+Dlenl2yDyj+uP4ax

o/xU7ggb4Vqh38jqvJ7hYOfLugBmVowH+gv1/HmztV
gk/SM5NG99j2Snr0e3+5u0+Iw2Ta2WfCk8sHDlSd5o//usFWwd5YRO0iZ5LSn9JjQNLaMer6a/y/xlTz

CSSRHYpwlQ1qGmRHTnxcXzojnOycIjS/1lEE084Vm4Jk/3B9BKN832lf+mSVm8SOzl2MlfnNWLFfB9Dx

/lGqs5qRguHfjJEgxilB3T452JotLBIyx7kDRm2gtm
cO6rhNAhkHZwt3oJlqzlrcnNK3sAaskLgccvQmcT2ZluXcUegewSa7PbWx148TQ3w8FPZy6aW0/Lh56n

4urkqdZ6DOmR2fHayA3MuSp817h464NG+4XPCmYqPyQtuVpSf2MXCZ4AUXnQWPHMEVoZn/ziamqbok/n

CbprCswNwzDbAgwmsL5ltkhPcW5KwvCVrz/G2qyoC9
CtKMMWfoHtcuqLOUUOhtlZKId8FLiHPHr/iTwJ6tk13AEkU2XTm0L8uWUH3cCv9BJvs1hLyys4H+If0u

0g+AfLpZ+TD1HiXC4f3+bh2Tzh7WMdxIHB+ZpxMfRiAv/cYuGa3EiZRCjV1DwIeVU5rF5BdoQYIImIPB

ILtyK6g2jnNLmHnoklj0BgueYE+DVf0zKcD5ujkR9j
tiCbsZK6L88zBbrNBbWj7phWUgLRxyLV0UQh4EB8z7K8JifaKtF7OgwR1qdA7MBHnZdwXOtEz+SeK3RC

FkmV1TQ9r3BC2a9OeAKGkwE7JBl6rNabWeWpfir1HmFPsp3YLS7QToFRUOSBq5NBP9fG0mY2pmfgwYeB

075Cg2JGG1iDeiKyNtgFRVBfzjJPw472gxSC05loyV
PHYYGAN1LqRVOQiYUp3IPBBK4RqX9x4m7B36HbYqQda8fEWl2IKh2Zz632P7Grs8CBtPMzAYYYXnjQkr

03cEjViekaC/jS6QNpL753ZTfVwAhdWkJY8OaLeIJa8RY+VjgJIpAb1IqIMsWA26Rkydb7T2SA1uC1L0

fyfpThuuZgUHQ/dh8a0/sR80SlyqSj9eza14INCJ8Q
39UR0Km5lFlSFA0Q15NscUIZKA+mVdBJTbKDyadPxvIjxM7t3xoefj8SClWWrO1oLf/KiJdy9SuQq9rl

Mw0elTOAm+wneA1pciwpKwSFbCjgcFxmM2oadE67B2X/rsmmULfwsvNV1iLUd8SX5dW6lGrE1n5rFj4o

oaSZSM2NTQWNIYt6jGiTANV9fBiuEcMC1M7GHjX+2T
cFmbTAUTCtgd/MHXqxD3u6EQuhH6sz8wTKMGkSDMaTnYb6hInhLLqqA8KC/qRF9xOeeMC2WJz16qkjUE

terence/QUV61jUeI1wRoSd8jqx3BrqLsKcEh3SzHSZ5hBeYiIj/AfJApOHn6O1TJisZsMq3vV6NEE3ImWns

54q5FhBLbSzTTk1SZovWqpDfSDgj5STbq2RD+FyLJI
ufG6qWnBchBJBCiFFtry/ccyo3OeYoJaOYlCGA7ha1d1JCQDYJE3ETqOPowLRtCLVry3cG1t0PXhQIRc

bUoQ+5jzVdAaxAEYd0aqVIDZOMEAdZaBw3dP1h/9n1hW8fjUFAHiCRPaMuFCVNBwdHgOcBiwRndl7PGT

/N2UAbD4jZBPa3aUJh5CNdFa5O4dMbN5i9SQoNUHym
KGyNv3Q1GUGLf2wJ9SSu9Fq1jRjS0tjH4HOKaBMwVcHRNOixjtoBPcNn+UHAskWF9BBFIARGvQazyb2i

jFn1gDg00IXaE3q2kBs5wuwujnzJIkuhG2N7vUkonKs3fjb6cI4tI3G4i3+oU82qZgrqo6Wkm47Fdh2J

cHFccx3Tt62lAa42l0OMGMR5qmtlRzibdOoIxSpJ65
FFFNkkUOgd/A9i0gUPpYV8hXGOVuy2fwvSFr91a4LSLhGZ8NESmOACw+WMQs9nhRhVVsG7a+JyGryh4J

ymqETTiHMMVY9RhkQbB3i/y4US9ryECX37c7ScyXxisC+qfgvSi+z7B8wNoMyjDtEXZ2lAMoDyaVXK49

4cp8OeHvIh0ECl2KN/Cr4DjMjCx08xGIdtmXX5AjDJ
DP21RPxhIkpD/lQ/ad1VJETdEq2OmJy92SQ5XkXCMO+z5G9bUZtdD+bpSUg+dj3QtoEHoRJO7uQIdIA1

q4dGjwUJhyWNMb/8CbX3QaEOo/wFyJvanshzdaeTy8uwzk996BZPZB93zkG041eUbnftoeBWydeScmE8

ERJtVD9rcvCUDXEZ/WWA4+rNnmgn4aSZhASgr38TsH
ee0ptJtS1ZdgW2XWnK6iol2E4Ur+YwypwSDr6uTcjFVW1nSSck1B32cWGUIhIaiCU3YDRTR4qb+3tyOh

i4QCUOpoisRuvwqMl0RWdhD5eSojL7lX+l+H1UxglsWAyWyK8NfGCqG6Rh31F1IEJ1o9bJjSWXhxcBzQ

3uziwEGG2+g3W8yzjxvjALE8MeOvDJzwcRAnoi7PtZ
Vg0+ou9NgydM6aqkSm4uqO/4fOWn3qsHAdoEy7c8mKy/RIRPllGGpM8OMQMo9re+GCGw5TlJvbHjhNP6

a7hGup+GBC5ExcKzLlmNgZvSemoBJ2H0uoYYVwp88kxfUAoK8l0p1aG4fdd318+39bq7nMrqd3+utfkm

00hnerEJHlWKabVlctJ7ixfCZwEWuElqK/V3Nt7VJu
zj7emB1woGaTdyWLvz+If2xTFk1M5krRuATsnCd0/+AlB0EvCUOU/M0Kljl9t7rlF1rtyxo/MVQG00N1

l1k7gOkchw2DrB6JOieom4xSzD1hY/7MR2Qtbv4CHI0zw5EtNTFiLVihwoFfLkhYMvNjVLXdu4HsMCdr

RPo5TOnqRFXzR9S1dHZcZFLvTE9DGXOpHC0RGAAbtt
SwReWhWEKQYqRoADCtGgWmo7EoK2JfFWTyIAWQVBlpNZApF92yYVbpZj73JVzdEWQdRdXdKBq4Vx1CFq

UdDEEtD13nwGLnfXFhGRSvZFKCFCvlTMEhC8wcj4YkYJn7MI1TMO0OguFli8KfiqJoZ4vgJ4BYUM3YCU

WeK0GOB4PtP7jwIfYzw2QkZ8xiYaEjd7HyPa6DDkLb
Ea4RYvOlCF8bwh1PNCMjHBUxLbqYBcJyTiJ1UJSjQuObMGF2LXGnEoouQrH4HPG3FsT4RPLnLQf1FLR5

ReBeDNFxMpRdMAZyXzWfQBhxWPy5OKW8URDqRhXeUuo6ADB4OPO9DBOyAIZ4YWC2QzS8ITAtJKP9HOT0

CoN9TSBiVTI6DAZ0ReD3PRGiIuu9SSL4WXM0QDWxWR
zwNSO1NQW4EXLaKXT9QLRzUOHxKmH2JAH3CpD8QeFqYkLwQMU7RqJ4JTCtUux7FWhtDpJiRckiFXXwQN

M2UlK3EZJkAJLrRBctAmB3UkmiVbN8PFm1NQW9FcAgHpF2YDQbOSE0GwRoJdS0PFe6GEF7GwtiPvW4QV

IvIBFDFfRlCvj9UQG7RYBnAjedFED6TIO1KkJeWxPq
LMW4TUS9KmH3VRTsECO9EID4KhUlHfizCIL0QUZ4KlQoVlMaUsAcCYBlBWNiGuUdBMHlADD7BtG1QFUd

JSV1MLY2GxSbFhGzANRbWBR2QKH6ZVYtBEDyDLspUrX3UQUgUJjaOLZyZZS0OHTfMzO8CNP5VAExJpZe

FMo7BHp2AIJ1JLIpXyAfTPt0PQL6FMVxKyQxCQg8UN
E5CRReQiWbZRt1BQE4NFVvPmGyNEy1OAH7EXSoYcSbKBw5SNO1WLZpKaIdOPz4EIH3TKBhLtOiCBk3QX

P0KQEsIwUiBW9NWQU1VLLtKxVtRTi4NJC6NRUzEeMcCBk6ZHJ2XISxScMySIz2LKRrVmmxDqVwYHT6Vf

T6CEOoCXR7EIP4ZqUnEhMgYQC1YPDbQdF0RlmuShti
VTO7DyU1KCAuPkLaOOzoJyE5FTWvPMIhWTP7ZSIpOoBsNuPzHRSePrXZNbOnVFz3CGWeMnMcQgKvXoFd

OHDfUcOqQzHuCWY3TVfnIQD2OvKxZBX9WBMzXWT4XzypWuK5HLA1LpM0WgbxWgK4FLH7KnIgCXMnLDyj

BrP7OynvIoD2LDR3LzH8NpyzXdw8ILO9EUPkCwvyRh
n8ENigAcI7CpSfTjj0LN7TYUZ9GidnXzs8UXk3SCK4GctbSLv1KHc5NLI6HwGwCxIyCTkpTmE7PjOtDa

Y3API8MaT3UYTnYEY5CFL9PiJ6QNExGLD7WHQ4FwO6WABlQPe6MNCcGYU4UUHcODD7UKD6VyC2XZCnOf

x8LNM7DBTxHlvxOer3YMI1XlVQYqLjBEC3YWK5UtR1
OERsOXO3CFE8SfM0LXMmIUA2GYOmSAC6QCOxCGZ0GNY2XyE8HAFmJGSeLDD9PeY1OEIpEOCDIoVjOY3q

ru7GWQLpLDVqWgtLReXtCHwUQtBxQRLkMLyoPQ9Cv171XXJgX5QsyGWagv5IGGJdIC3Ph027ZiHzAL4D

kqvjlF9AYIUvVH0Ao7WtomIbHUT9X5MssJdayPszyQ
Z7SWBwGZNiD4GckBYyXyLrSNrjMWByI7QrXLfzLGLcLNzuGDLpX9KurcVRGd77ALcfJD7yABLuVNIkOC

O7VZQoKIlpUHPhA6t8YImmH0DpM5gwZWIdG8WbbJMxVE1SZRH+Ob5OJO3rl5TuMRfyHsDaDK4mcd4GMG

Q7YT3EZEQlJL6MuJMfB8MumsNsN4WbcAeqKU2YjrFd
TEcpWE0BRQKvUh2efS7VzizvcP7ZkoJnNMpdAn2TlF5OcoAxZQ1ik3GdskkGAlJpBAAhXiklq2HIhMYx

QYFvN1tpz3LOvHEeTFK3ZE7VXBPaJA9JjYL2pLUjBXUsCLKMWYibOYPgM7SenfCBOMHnuvcioO1aANA1

WJTxUr6CWVQ+Ig0JXY2dc3MlSMscWdPyFH4dbm2AXU
KlJTe3EMP5TUZbSlm2BHRmDqG3ThNrPEY9WPJ7IfP2MOtmBtKiCYYlHSDtSmXxVbLdAGX9MCU9HUYsVa

z1HUMrAfTpBrfoVia0HWV0QuE7CMJmLQB6QFP8WpW5OYUsGCM3HIZ7RtV7FGLtXSF0PRK3JtXoLpHoYv

GpMJV8IFAGJrSvTFo8XEC7KTU9ESGxESr4SVvhXtU9
YgYzWaFvUYmoJdT8IwtgFmCkPQh1LSE9XmWsMxl9WOS5QvS2UtFqNuOkADxlWhD9NvFkZvr7QLS1DcX3

AgdvGyLnEIB1MkI1WDYvBbScXTY9RcQ2CJEfSnY6GTH6VvO3PEDcJHpzPHZbUaAwXomsIrNzXFN6SNE5

OSTvRqCcUEO9UqA3XGLvNKE3FKWxZBF6JQRtUsSnYE
KnYHY8LBEaKog6QCF5ZZX1QQKxYtc0UXc0ZUA7CLMnNzNbNGSnSNF5BHWbOue3ARR0JnAcFpTvIyQgWG

Y7KjI4IhxbDTN1BB3NVGT9XPYyFNVgJZI7VLMeUKLfPxj2PXE0ZUN5ZPCmVqLuBFk2JAU5FGDtAgBaMA

z4KSR5TKXnSlFfZKf3SJK9SMCoYwVkKQb5VNN0ZISn
GfPoRLq8BPV9NJYlFjPbZDf4RHQ1URXkRnEtPVn9IVL4JAPkQyJyBHv7FNVKIlGnGyYmRGs8ZZV9KVMh

QsYyJEh0TFY4BTNbBcSfZHc5CYJ6HHMiGug7AVPlRmV4JYTmNHU4EDE4VpN0NPEeWmyjQXB1TbWgZqMj

OeG4CHA1EJM2GOSdRLa6SMNhQiI8SbhpYBBzOEPsTW
D5EYatSdYuZTZeNuXvTxPxXNkkWTP1UfL4KNNpAhOnUZGbUgCbYzYiPjY9ZYE4GnE5IgXaFFI2EPdtKL

I8LNRxPnFxWSwgFkV3NeJmPmGyAProInW4WsFsPAIcYGU7MtLjTyY8GDA1AuF3ZcxxKmZ2OFM5CCVnKu

ciYaw3EJY0YGK9NpXhSySgNBj2XXJTIfMhYui7TCc8
DHA9QtkiOig6XNT7APR7HsvrAbEsXOhpAgV0PvAhEgUiYPW9WbF8HruoWsZmFZB0JxL3IKEgMNY2FWK6

AzH4KUMxBRC7YQg9ISH0BDBaSKT9ZES8BbU1GXJpSJV6AWZ3WSMkCoreLmm2YSW1ZHU4CUMdOJmfBTOd

SYZ0DUJhYqGqWVQbDLJ4CYMeUfTuFRP2FRZ4TBNdDg
GwKUPoVWB0JETiLlUaHNK9TmI1DRBiIGH4PR7dWOcwbtZrFlsWCaN2AKYko6TpKMlsNYw1WKptQIAiN9

M3bSKyWw3ppGLlb6CpyNR2h9YYJrIfHCBaHy5xiD5axIDsPRUrXNgzWf8zPN8ZFXGnUC3Ag6ZbzsHiYS

V2X3CgyNwamZfjhNF0RVGrAJAnY7QckZIfChHuXRwf
IGJwE4IeMYrbPSYdVXplHGOkN1DikoDDXn63CUqvVA2tKJHaFMEqICL5YEVaZKqxJPOmP4h1ALupF2Gz

L4tzJZAyF2GwhZAeCL6FPRE+Sg5CRR6uq2EhFOibFXNaHU9uhs0HBCZ2ZT6ZEAYfLN4UxWLwF2ZmyrTj

B2XnjUqeSE6GykSyUZnhDK8ROGOwCi5cnA9AnkathB
aUq2xpO4YpM96liX3hQ2geolWvs3wIxiBoYRwiCy4EGJCyAA2WgHCmsRFnBCMgOtTjNGUomJIdYWOfZw

U9MSvlRIJdJ0qcXPZgwuAeMvCbRVQKMiIoNQWrYy2gvYVnp0NweHP8p8VsRFhmDQWHQYkiTC8+DQplbm

HjQyxJAlGmTBHvf9DaZVhkMLm9V3NxrSDdbqWdMtds
oWGCQUEsXBMrU0ppzgx2kGDoLKVwEqGcDKZcC8UcIRCvYHP5Bn2+WDsbNZX7hvCwwB1CDHKtvNe6IXBV

2cy3D1fTcqLWHUOm8QPeLQPDBEKxmRzjW4QBTLWEOMFOMeA6vGKpSwtmF4HTrsOWoXRGTuLkDXNIKvqG

2XtZcpJdFs0P7cGV5L6gjLcsgAusRhM/8z///3zdee
+y3DRUhtWqQuJ2BPRAwtDD1D4F+bPoOD1iMtkCFjaYI0tK6VCqIlW5jiPxaO9yTV62wPJ1nZnS8b/1mn

n+O44t+wz+8vZfkQhQK4o19qmH6MQjt7LhinsciR86Vc+Nupqo+Y3g/1P77pmK5/7+9y2SQjsQmyjuEp

Ozlk/wFfQ77ncIa26uYgGnXTGb0H/K+U+fe/nsO9/c
i63PkAY2t04dK+qUl4g/l18nzl6Yr63+1uhW9bh0OB1p/5324rpnC9mVnjRIC+53vhaAA39qQtrzUfCJ

lK1kRqj3D+pGUk4s/bjlx+tjyQmv/5X0SZaJ+Qz1p/5jWVmxvRdvVHnt121dxWV7UoQFRAnSfOh1uY3h

O6HrAKeqLL3gXMM6ZxUqhGY9bQ6XJ+mh6l8awKDCvA
U89WD4hOnREl1PY5X6zCAWpzjW8FGGBvYm39dXlupGg7o35YV4K5UX2enpTFT6Nh5Ap7E/ItViakJtaR

UImxd0Smq34oPu+lAjRU8pBG9Yg3IiGOmixh5iTWGV4p2hOpaVMuViujxlQcovhh+v59nVHynCa+hDcX

fUMxREKZXgfJCuprXaJbqwLrd+hASpdC1k0GkEHRB7
DTKbp1yPDCP2GfsDTa+1UcukrWQEq1D1sxYghsdGMQYKNaFU7lK5EPpFlYmxkuZt+NbS7+mf0sb8KW0u

jiLA1baF5Z0r8ORGa3ItfsVdPAxRGG91zT6DgQH/TS/P5rJcmuhbyMUYbiA1pgcbuUzYcLxb2wOC+Rfx

tVvG495lA2n+qD0BNX49tZ4Ms/SRSBKZYpQYLzvKOX
IshvC9SOT2eGjuOul1JKW/BRLI7rHoVLZ7Hh+nfwJc5p8wmnEL3ob+nrCoS32afk0IPP3h4ySymLv7Ur

0tW8gc6O/Q3/VNtChdJiRoPL7a32Wx8R5YuRwFahnLUHA7xUVrrRwzaHSMkRWrunktcSgGds/Ts/Et1C

z9KjFP8uvon6icjo/X/tJldrkHRdYk7O5ogyV77Mp4
2Q46RO/g+yE2JGkLQZMZ0f9OXRE2Kt+PYfNSYFaPN2N3kKM9+Gv9Rx7Q82kEXp2PloXSngu7+Zbt9vHu

ja60QV8B85B1A/vR8daod3OfCzE+Hac3E3Wh7j1J1khyUt1YP7VojUxqS8JkkUZI7Xm4BDscQ0l+4yXv

az/99lSnUx/MMj3ccvG0RLRxfA8nMp2SbBdjTgCMdU
r1haM8m7FZ42I/KG9Dw8bvrimcncVbXhzK1ekxXWO5n5euAnCsTg32gDtyKhgtwdXAwzJgUndAfQnX97

vlNHmVvFPeLbfJt+BGjexqpWBy0XdlwnP6lCIEa1ajv57MSjsd2z+7r7EkiA/xkkDc1uj22tS1BH3zRt

mfr6/Tj+kExKlDc1lxHcZ986sx0bc/95yoOoSC5Zex
xkApq8p5225tKPefXtxElj8ZGlUAabzAgx1Ts1dyhrI4GK+iP0+zEk0ORB+OU0EgfKIIXtpYM9o+sp8Y

JJh0uTS0U/Ih+E9je03YKfLWNvbxji3pO98VNH39O79u58O3HLALA1lDJsE2xybpAeMdP8y5OzbaP1eb

7vgwg5MiS4vj5sBkhchOLs8NFg/1hY8EVZVS8VH1Fe
yJ8LVWoWxm5lNdThxZO7Xp1EUvTml3F2KZjvJZn1tht3HylzJ/RF9hHC+j/xJl+uV0O/QNS9Wzjptfrd

QdksoTnRo97Qi2PdWK45wuA+Dt+uo1DcMu9G0oGk0tuXb+Q/CqiL1TF8fUBxnG5gwfwW5CAZcAHQQecr

V4lUOKRRpI57+Ra9bW3coWRtEvtcXOherL1ikaZdz9
vCsH002lnIPwCXXnkGmZC71cwxPXp+1r4XntFQnYvzeYvWEFqZ0xK7sxifHfxPbKCt27hkuFaB6eBvvx

tCJ7N3KZGubo3VdpG9YgnAmwOJnvkzS97CZi9eJq7XrKl0j0k2hkxn/PCkvvvTVEvgehZjpA17thZZA3

F8V959UWM37Q8lU41g0FsVhnTovFV/QRq0UCL9P27U
WrfYv80uR1fX2YnJw5qLYA1JFbISAqCR1+6jFoiicDbRwSb+X7e4Akyvg9L9mCEzD1dDbuLCjdmz/9s1

y/Sl+k/5T3tATR5ogoS7vs9hGU3GcVXdQPWkRBsR46Orrx2Qd+21mbSzdXHUKLZIgjlBj38fzwjq9+YK

4W7KpBcqYafWbxoejEhB0+DXxKH6PoeN8YmCRWa9Th
UcUT5XZiXd8AEXQ6MaKTuEEhYc1twDJwU1hb+2FOalvOmDHJYjFKx0EMAYrXPClcwEkMSrJMnYbCrcNY

0ZTDJTYiAIahXkKAcEM8h/pX6K1vjvOu11snzlT2hg6vyNtbou1YBjEHF1gxZ7Hg13uyco/DLx9mffI3

aY3mz3CYpPP0PkyvWN43gxlUvdD4Vi2Y/k0cuWIJV3
/UUdrSfkjZFVE8fg7Ugq4rgkbxRDhISHL+v93ukcc9betTYL94dIAGZ/6rx4Id9ZmkxpwEyscKDVlbuO

A9H8n7EU+B1Ls8snlQaH0dMEHoLJzjJJeMeLEDtq/i+6H4kKOkmbkTXNsQEj6YrkHCEX/1E95g2n66El

MN5DLNCm/HM1a+e6n1S17L+G4t48a4Fbnpe9g9kezo
CRbKjgIN0jycWTaPxHZOxyTPva61ukipi7THw64S3lMA8oO2xMtmvc3CUCO/jf3OfwWPa+FpJ1qmbYEP

cQEpp2PtE0vZkJKLipnZCkxekE8PvmWkxLilIUBV7FlK+OV+4DXgLeoGBIHRgFe+EgNormRlvaXf30MA

BUeuooVnyZOkCXDrem6b+oq1FoR6cL0N61jRjbj7fW
TetLDNzRYZizSn0hNQm53E33LOUJE8KOc+FLSPxeUoLPIxZ1TG6dCzUNwmIcd2S/thW/dtVZ1b4VN9xI

qGjk4saj52vY4w7K5kurvSn/wPpctg+wg22FZg/ms93nRM+h0m1NSE+bI1uETB6FriiKqj3KYr3rLE+s

RJfKMmygW8iCMkqcumrxen36zZCAPlOHHrXW2hb7k1
XtZ0XblQ1/FvQ8ZW5f3eBt2RS7eWQW41+Dn7ybvhulXadNqgRnuQepHj2eKD3D1BHyltZ1BeuavbIgnF

lrD3yFFGEcAi0cmoIgMFVpkmBY0pEvfKSqiyBAUG0QYjX/UM4bsCjsA3XStWGRljTaUbWz8Z0hiYxixJ

ILG2VTyG6SlaGkgcF6EG71MqZniNtZ7NFaEvz8g+WX
LetLKr7U36v452YVEKDYsBxxTmb53HEIaMzlOkVB2mGATCF5doWfqy4xhBNeSxGVeH6CemM8jwdpKF3o

mIqT0GSWP11wC1SYSOzenq7FErL2WnX9UbU1RIyGpxpLt7OoVml65eGJ5aKofWh+ZtViuabP++yT+/b0

NVn7Aj7hUJTt3yR3c2B48HYZ0vnJ17e05R4tD/qI2O
sfz4CAyRP+3QUtycr4E9TN+gFLkZNKsmZTySxdSkd/gcME738HwxRocZhY+u7Qa1U3f1hpu3g2OHWr4O

VIr36rcgtbQargqyG8Q6tslBhQ6+lakauhvdjl0J9NVFmH8pPIVl3LqArpQsiVUSCg1CxOWOzuCCb0n9

VI4M0GlwbxswAZV6c/dQwewn396x3oN0096xXNFT3y
oDDIvnzCSLvXSQTi4gVHbqr7iVa1dmQtINyqbqB9Wo04Y0NXksyXF8is8g7bsemS/Ga0t0v2Pz3bOBEc

AE2ALDK8LmeUfpk6Tg1XHLu7SmhcszsWoUA033IxoIzU0MBochEdTf/fV1zSEiIP8wEwzZIKdMJMQOkG

jE0y2ltTZSV6P7JidHtmRKS5sBhOlPNiJxKp8eltZ4
ndJqswiciVxfZXXKe5Wuo6v1VHsbmHFHEm1oBSKm3NNcdvCtp/VgdX9Wsc0WK5jzn2WJQTbMGnDgJKMn

r8gqQnY2o8B/jLtQSSIBzcp6iW+FmNDt9KmOg/TEeda8GOwpuVPKlIl65Vd/Tw72pX0PmquXX/OR39vG

6lhz80KtEmsjPYXqAQJrRg26eFyBapzpe90co0vv5p
9q9lxzcztHX5EQN40uEj10mRpXg1Kg8At2+3dHWm5nmFAVLBZMwfXCZdL3W3UOccGVsb4cyGYHsvhuGI

ccgfL2V3lS7+TJh0CyCGmtxlxkSbDOmbfwp3GttRtzwbdJFG53uHD04hLpYrZHe/z+9sybFNAYcahps9

8mUdoaZxqoIB60CXRB72/uN+xp1HL8hi5zt1tb8U4l
ig0UeHKm9w/NFd4mDfkg6+DRF/H9h5zx7CTmKIjrpB3tnU0uaiz8Wtn2R85osDZ05nI2qVmSg9QYUwF6

+Nlofj4zh+HSrtbF7Asi/gB0ZmZxs3Nb5YXi3/DOLZiMuBBIl5LeqAVOxxfWToIICSiGxC5ZVOiaqpRy

PzjJhGaW1C76zchU2DQXZloPAShf8c9qnG5VoIWk6p
8GaOZ5nm1FbO5DsCxjW4Y8fnWX0Gl0ZTJjFmUNjPDrUbGCtehGYuZBUxZ6gyk5tuj31srRQRTEgbY5hG

6Rw/baYJVLt7i5+124tZ2Cvuv99ZEBdUDfZPBSCmnNKB5ILWehyTyjSMD7S+kvcpWiFiSXQFjcBgt0AG

VaPLMqI88pF/LjHb0gUrI99J1ALXHswkA/fKOt0mby
hxoCEGnXspK6nHBD8HGMUC+G1aucxIBwdj7T1P4jPO2LjwmI0HxBKngclpCfGnyMPGkzU/a7/hp9t5l4

pv+IU/1pINyxP+UZzMwsd3JsxInXA7SYpv6JC8GCSeXk72IDp3G2NYIaMddVcWuyKPwFZm8iE/NTN+jT

+V7uXO44SpOFBS6HtRunRn0pyxEdjbGUeUk++77yGf
izN3D4Sgnmdq78viXeRS7Di8jnGLqRzj0xo6fDJud95iyCH/7XFtjukCj9OT39sdAyq5ObofiKwT3kRK

Pxblx8FysExitm72TNqIzLW5hj7KI0EgDTNTMzwXuHzth+ZoH3WG53a9JFhp3HhDFUKZqGBVXX9I/eHZ

eHddowtYEnyEynS97tbbgmUS6p15LnGdyEp6T+CW00
+9Bs+S9Hob5GBj5LP0qDqWtnOwMzESfCtS5WGWo/BvykZH95we9aHCsHNKOZ7E1Uo60K+7bXbdmZ8Axu

S1iFJqPuPulQUiXN02NA1VYFdFtWgjzeeJkN7pB4TUzS2qydJLYm6RZILjVpFeZtO/nOnI2FAHaSxJJH

pC7rIlfyE4Mw8Bf3/oQ8dYNDlUcreu3pFJ9e62bhJJ
dTQ6t2MAOVisvAUhWAWDpHlfjE0XbWOuwnJAiETo/PoV21KkKDDeFH9oqhhichReOi1hwiVCrXrxVm74

gt+6ZYDpVHSo1VEE7+gr9We76dwtmoY336N47XUxuP9rBe9a0aP9fX/iPyOnJ8FTKFQwjA4qkL1fE/VX

5lEiBYPF1855boHs2b0D8pTrGAlDQF2YEJKXVJCKCL
k7MlU4eOqo5E+Lo+9K7LikaNaMmVqYydf+M1lQ29hZa6rZRmIvvUsdM0cxRFY+CDVezwbdvO6LdYEhNl

gdZAV+BTYAMwC+zFFGZlQHibOR22Y/AX5ymrs7SmDA+bOxEz3yL0X6vL4SbThEqbaBmlFEXNjtIkzbVy

4zA3OhTGn3cjrFI+53VRl4ugmm14bv/uU/UUY0//kg
azDGoMom+1kZxjVShlwCnV0PZVt2QJ2OxGYaugoHWmW3HDFiZV/sXf0y2GYXHXVKJ23ITanlkzhahON2

rm5MbBBfL1VnQs6GZyAliAF1X9KtawhpNVYT7HdQN9MG154rYhhDvG0J+VvBxMbtYldrZO00uV1EtMZe

XxNfRrij79w1SmItqeWbZ9GVrgRnO+x9EZ5FCwuVHC
435t+L8C+ZaxGTpzs/LGwmsy9C4733cwdNe1OT6CYroBbTF3xSOGsw0pNspqpsqb+o1Q3lyP6urFbQfn

diA4TXD4xwUUe2zQzDe9cpudo452YEIv5lS6nAMBF+Eg2YPIIa+qINwRs7uSqydJYY1izd+80N7imTA6

anUatZXkmj7WH4emsHs6a35FzR6PjMHX33a5U6BUV4
uc/jqzvn/tw5vMo4N3W6B/gCsUQzgC0szVzaWG0Nq3aCRx7t09q17eEpe9luHe9myGP0FPfw+Jp1CFAN

4yiZxXiR0jVJsp/NgZ+2BYjT4TozGhD7+o89uVBqh33cwT+0+PWO2KjcuRM0pVW2YWeEbZtBvdpExakp

GHS1nq4WMSnQ15qf3Q1+h4JV+HjPDrAd4d2gIAorqf
nTGI+oTcs/ZJ5DbyPhH0VLBZJyurDJeQjrRdammB3yYvY7I4UA72E64gOvh2kYx2pW0emmfEaOZNgs4H

RMjNdUHOCFB9lI5Bxl8SLemPguoi+S7LBl1316is9o2WfajQ3fVzoBCE6hItq+ogSI+ykpSjO0BTH126

ppIHfXYkeDi/r4KrTPD76nRKlgsVGrYQ80FoF+Rpxt
BLPC04AC0ABQKIF+fuYcO4kKVfgVSOSaAtXONIqYyHg1So9/paPykeXySSu9WlGAK/psCTvZCpHbG5It

OQyc47T1QQtWqqzk64R0u24LAxV7svKko+Vb4VYxg1iZ2AdcEFIwuSToVmhI3oYuCuhsXtAF74IskuY9

rzu7dYID3ATYpzuPJA5zs4FdnQsr7AQvdAhDcyy5L8
7CC7VUaSo1YPD9frEb6dnl59xu1Qo57hLIkL09DTMwpF4oO90V3M9HpssqV48UtXGG25LTyaMXgeHOi9

AywuM4R4DjoQ8ulA/XotrWC/2uq9vTI6/zbYlhx+iEZzuHwD/tU0Ue+A7PufCq2tNDoD7IvjZmH+HW2N

aExm3ZwvaE4s0UjMlJFJlnMDJ1qq/gHzIPhUGdusoQ
f3RHYT1kUfCCWXJwDkaQuKU6bWBlbdPQ9mkFbKmrEd1yl25FnXttn4+6k/4cp1FT0qbKAaQrcXsYpjYm

35gm3XC06gtOL2CCwC4+y0oK9GB42oHAisYazw7V0Xgkm+4E903R4lXdVeE4dQ3cMCdQ1LXkXRRlY83D

baKx3a79vj6ZTRpX2i+J/KCGOOs7kbuKq+8VTOCK6Z
/URbTiB9mVcMoNcbxV2xGax1lYoRlhz/rNrl2Rh171p9psWVot42zThzm5G9S0pySeakT+N5UKjf8neW

/7Js/vbRnv3Zeuc+/0bxdh+f6dKr736X1qCxobRxce9D1WeWo6huv031NgmkEM1SyswrmCUbDA1dr1tF

rqabD56Tt6gU1yaL0MSBhG9QZdNv1+2fVXskNdR2oB
Pou9sETJLV7znMAO3a/dl+mfL1Z6n1mEPP95DTCdA+F7YQAHEi3A9nYWJQTpQqd1tYwC/zZZyZbMx2oR

bLO8cWe+3jtktk+0C+2ckIZrHwElfDmUL6bIsOo1qasY6Fr9UTxBofmpu57+X5/xMwdvYDLwMv/Z8uSx

A2DbLLiG/DDhkIO/Qr4JGDtAEABboKlmIBn0VKckQ+
yWGE0qTgiYBeU2QM5bIWAj37Ma0pFE0JvsG8CWov0GAOVHmomFV3+RXa6U++JpQvZ0ILO/0ZSlQNmQ4J

YD4/+UxS96NOs/1TzThR0TW2/tRk+K8BdsH/OtqRKXAw7yxkK5qVpU6PZa2aGarcC68E/+O03JmYV9nq

p8iAtF9v9fG6LWZE3Kdq308X6Dn6ueh+DdGwPYMIat
rW0ujxo+4Dtv84tx9H40R6U4107NUrZDaHnqpSns1W4kLhSa1i6e/JlwT8RR0lnzXu508nY7N6Cyvh8t

MmpYqPcG3YnwfkFljdGp/XEiJEKBosQDX9ZQtxL4S5LcP/t6Xr8ZMN/DxjKQ9WiOivFcaybf1Vy1+CHo

C/Zom36nlx1aoxnWpBr2h/1h2z91x/IJ4DluX7JdhE
l4GmoVTA1Oz3j1OR4N7pn+XnmaPD5+l/1+/S2g6lMiXHbjB5ryAt7iBg4LYS/A3Zub/WH2l3/Gp/hF3i

+kHpVhiy04NztAQBsshDsTw64LWG/sxp29+WOFZe6660v704vrkV2NKMHe4hLKuRPfO1jCnAzxc+hd6f

WRj0WQCYeydTnPfCISy6JvKTnGH6z88R0R3K/lj9gO
ItdlDWUH+I9Zgvtfe6XVAb9EXgPZ0gMi7TXBFpdN26Av9jEmONeEgW36q2OTmi/SUMGPI7HDfMJfWB5N

oVNaJo6EEoZq1pyVu8Dm96QtMa+n3M6T0+28O1gpDCpdhW45EQ3Kdj9bR471B5Wt2b0UDOo0X9V8aWMn

On2tMEB328Uy3/37yYrb9+BTn7RzeCHVmRq9hbUSfl
3VdGHP46lx+sz2MIlRywUaxd19A3j2htS9Eldar2cKE23R6lF1+dAUVanq9APkY+i/ukKtoAxqil8nbu

1AywxlE14ynlo6yhv1vkaTcj41GM2kV6WWPMiD8mP9oCig1D1B2gTOcmF++F2lAX7pn9mBkRvMpUsPHw

OO/HuAjbthIrFd61Ys3Y6bgGVez4tMRmrEF4pKM58c
m7vfZrym5ltRGNT0O/50fu00+iMdpCrPvcPdnHQN+iS/IaqFXNgkH6BzAoFk0AWdTuE3ygaN9++7yH96

Fso58I6pl5/ht110BvT0QWyoHMz9+8I5uF57G5UfARiUklG+y9R3U+h8uGoNR7MwsMIFftnbDSTmgkq8

1vvkCTBevbth1E+1gIFSULEm9aWVmJd63b6XESlC0l
IHJijIaNxB5dhmEI5Zk2jluciOGXPxS6DOYK7QpbBrJZ6CpqddlW+rmQ0T4qE5280a3BYWZVJLmBMfrt

kffmuhJfGPBq/Kj4CfvA1gytc6Uzi47d10wB6iV0fJ/7ONxmcN1VmA7jnZI9WzTWf0TFfYdFYPD+dVB8

CQpLnSG/ERu8tBvTe2p/i8Le2yhwnY5CEyJ1k6VHwD
KfdmQM4ERVkQDMUcno4xEdwJ3zG8Xy2WTWnRI75eyPJKcqrr6iXmlNMcVKGkEyfQT6XPJkKuSIiOmymA

4BY+aK2ASKkMbmYeEgDZMy4OJsHXErJSUdcyDpq4odTr7wrQH+5NMFWSb5XaXZ+fINRALhgZ+RryASCC

/2TYDpz17lWMEy9D2ClkNFCTbCBUyjd7CUbHiHp0Bg
Qf/ssQzbhd0QQz+r+d0zWkKFWz+fHL7/Mervat+uhDDIXej7Q1r8nwgTg9W/LnWroE72iWb8jZh2YukfcqP

XNJzZSrmAlzlp90xV6Tws40whz4MJ8LhMCswGv+xy1Nls+8bADfN6hows9qoE6FSHsqRyx66FElbFwiO

oFjoIDabeaWNm2U4cS9gZ+jTAO+VbvcHxf69Di1mJm
/LeZ3WC/MbedZ3e8KHp2MOguku5fgIkXRlE38wC68G+lMD4gd9zKKw78+jxTbfUt3saEEcGFof1aW/bU

PN1hwMyQYgYbhMD6sRT5HN83KshW4Aule81DCHdP3UyndAIu3ul2gd2BeAOOKgLbxvGD22YXRBi6sy+y

LFEB59T3L7Wn9Qed6XQSvK5W8LB/jvxLz+IH9snfMp
kfXbp7dDcp7j5OzotHCv8jI2boj2c8grJcAAcSDJXNX0ksJ2ChR1J/p3i3UY5HFAto/BtUGQC4pv+rN7

D7VA+GP2/klyt2EQMfTwVTDxwR7rI80uzi78MUuNGwtF2zvJK31GcY74AspCMGwDdD9qSGpjbwUoUbAK

m9/mRRsEbXucFC96M/fd+DmLieg8B0XYmts9P3+ins
eVtNl8QU0yC8Qb1wYethcHZJDf9cuS2RY0Ge7jx5PDaDro8n7e4rj6n4KQE1efy8PcP4Rhk/s4z8oJaN

HvKeyBfRpPGXyOp/JKMkuuhiaV33E+5Dyvmq09kNZCVrcAeGAKsLvHrTYrPh4tihDIeaM7otfcHozJ6s

na+SA8qZOtg40mMVdbxBu3fLjY4oam9hQ0S8pQtXzu
E5Y4ZIajruKHq8c7wImk0+bYhlOyO4YRI4fVcv6HpT/92eF/Sigj0fNzmv9qF7lGtlvOK69dl6/uuf/k

N0h/2Q9if9W+U3WOM/7Tfb5ilbsNxdcejgynIMkGHSxBdxS2xz5/6BvIKtU5gYJ/d59Evi6FQux/5Xz3

FR1BbrLKuIfSkGmt6qstnt1/R8dOSj4X6bY3HQrl9U
ox4Jh2zxunIbs7K0uN6tZ9w0d/kZDYFjF6gujc98UODP/oilaA7O/h+c91pAxOw+U3LXaUiAkQ4cykUD

h2/asr4sW9nOuJGnbfNQz8wh1ztbrnUyaV4WHB3dY+VySsfyic+03ikenbnmE3XTo98tqR9a/PAHVAHs

xa0wzTVI9LqcxjXvak2b5fd9OMUQtg6Li17xOu7dp4
a7u8qT98Gq1/bjv+u+0hc2vls8J1QT0hNZug1V5ojUOcjAN+KAJovhlQxX7GeSyvsJ7Pupv1JRMUhhng

kjYN/Zvukf9Jm+xlinJXp2WR50kz94vaIMmFdf3G+K6JMUz2G/10oyz9p6Efbax5e6f1tS6LpwPl1ghK

n0Kzt9tF4GUnB1hSjyEkRp/qvsclEG7Vi34tnFcP6r
vOsup4irtkHQ++jm3SbN970OK2ykHnfZENYp7iUD+M7ralX88p3lJf2e7vAlnvJHfmmxTqmy6NBx790b

bbsVfbqDtmh5+Vu3T94y/aOlxhLc5sJvg82Gs1Omj6MkhM+zMenPj3uAfuPvO0luRthK2Pg92IRNkqni

/hA1AbTVAvo7WNYeOb79fwvRpA3cnhdnoJcwDzv6TS
u+v7Dmp/aRb0+HvkU3d/LWln6IKKltbKpgWWQFRtDe5hFXhZLT/ORyXWulZQ4HaSM73kfKzjbxBj5+HX

Ye/EI/iqXN5lNjNjNOopONRLtVdnO4TCptL/qGrindKyMM9hTGiWXmjanEqpn4+cOhvLVlbR8ktz0ygs

aOx8uLra76YOJ/ktSssnFNcq1mGfgE+CxZnn8QdzAn
C/gkH9sh6lhN4f/BddeN6zOf5GKTRT4fi+p/95ZYfPAv8G1KElR7SVR2ooVbVuezr98A+CuavT2DAuzt

hX3RreWjlelNQ61U26dLBtc1iI3zaNhWE0AwQCp1H7+M0lvk+z5hVvvx88n55brROJx8kRHbOTvj5dkU

WsRym8E8KeRkP/Y66QCz4246S8d+3tfuprLh1kskdH
L2Y2237m1+qrlPKb0lN4wmjN/IMtyRgvzDncO66QDG0VzTrr300M0o149tqFPgr92lQmYS3VFyFhj6db

ZZ7mzUjj08JX43wsZDuS5tKy43pRjZ/h56ik2yodkMvVCj2YlWjsldt+X4plMtb8v5T6ZO07W80lDfue

F53pBlav53gbg5X7IA6rxqgrXk6v8EHGan/UtteWa4
9yhrxnKXlL9bo2fhkJ0at2c27+Uh4wV9Frp+rOPry64ju5t/M8I7UnqgxzwHobSic6RIuN0ih4/2HO/q

363UT9Pu0FVMS6lnWqmjP4bjfWadA3Zaxb5D7qIX+nlIJ9pQCqo3K+AXFqtPBYmAImB5oyZ4DoxPh1l0

YWY+4KTdplnxI2Gm17tQ8y/3p4vwAWJYna+aatg+QH
0CIzHmRTd6HjvnvqxKkt0wDbh4537bNMycnNBad1RWf0xujgRwr/a4vJNqAsg28I0YkAJlSKOwf1Z7oZ

6bhQcqLHCKgc6vvQzv/21q+0eAmho6P/UeuVIYhnG+S0ne/aa6f1JcwNX/vqzk2J1mqtsw3x5l84f6ps

zV24xeBzvaprvSxlIt4BM2OUp2uzgwrlW8jDQt1rzt
/eVr/5e2ei9hdXpN++tf8KAfbq8E3oRZf3neaO+jOVglcYjx1zRHqZELO3BCwU7KwCpFOjJc8YemD+Br

ri58Rtnjv/I5Q9Im1meenxN8TyWI8Fvv5OWx2ccYdyqQ9OxUEl0d7tOh+ugKF+O+ViE9+rUXeh+gHdnT

kCaJ1BqyLzDz7MeIVENSrs9AOG4Yf7pYD20lS+SL8J
udr0pB93Hb5V/SamSEtEPZz+fVUWh+fGbYkx4hmR4nnLrlPasQB+FFLTq8NsCcFjEcZBDHy4qJA62vv1

wzxa04yiS5kUgLlGVxyv4pcrpC5JccjZVsAmnRu8Y2RPS77u9n9FSmX6G/BiHsuE/wStFl/Xer2C4QTq

eEt4U33j/fbeh2UFcqr1CHrA/RALvj/MMBe9op4U2Z
c30OZcKAkQu+Jto36E0znzD8M9D+Yrr0x8b+2XJujZVVCqKiNW9vdkO1v0L+n05Z9JkooaNxIEdsCdRN

3ftstX8UY3BnVE5xE/TV1R/ozat2ItwKtZnXPZVvK+xiFu3oEaC+OPybcI8lHIZCaalng6hD7xdRaJpt

zSw4uuNkfQoxCPJx0k1L3Z1qhu68tjN2mC9XnNJ1bY
0m1/7HdhUb2/RRiU60h0L2sZdun18RA9HujYwgrH58u4l2xVKyVHnNaOwd4dmYe3QYT1zMmBJD0W45b+

mE3xoenErhvUTnauSUGKLzcqOfRBm5PLaA4OWxX5iWlhY70KOUIGM98V63dHyhsx/xs88hNu7FSKpqlX

ZVlp0MH56hO/XjzX7BmYxTzu52ul2viLW4Hr7PW71n
/KWgq0YiypvDWRM0WI32odDe3mApyk522C/6C9NQwKVCUGQwWFcezZV7oLhcwGcoNx6ZlAF2Ue11N+rK

NYPzUEzGOPu/feybeSOTaWMG9y9Rr78pAGjGzKtx59xdAn9LEE2Cm7sCFbqMmSg8J79Or24m2nodHpQV

vIYHwPXutd7NCgB44f6SNvGmbp5wvgFKxy1qsZFulw
G1vMVtb1xaRzBLZIi10IKS5UwJdJK1Xr9W0BG2/vrsv5/RB6fJJqzFZe58b7noL9fs3QIFFdT/oq6zP9

vHWJHcAcdddnl7WHdAz3unP+27LcC7OEN2HSYdhCLwk1B+/9/3E15lhCnaZ6OJ+5oT6isvyiiCyFTbsP

mBes5bfJ44aeDMMG7MLcjlA4nl/SWe1NAAE+W3+HZY
l9IPGcKByWoTmLHj50RrpC6mm/hf01R+2VNle/EOh4ml2p7yBGU1hOk9bCSRJVstQ+ryhfJ59Pfn8b2g

7+pooUoiO5xJwoIG9dxg5sb/k3zDPM/pTg8RlzNv6mQ34NE9sYO1u3U055BddiYm5kq8gOLYrDnMwmL4

BEFoyt9M3TmyNiYQTz+7mWE1RiuII4SvebnzXapiCP
HskNLr5skfi9olofom2k6Nzhr4X+EPhCyCtrJ0qw8V/wDn4Dd+5iFKXBQsgp7RTuc/IZ3UZgeJzOdfgY

GgX6A+jl5xjhKBqWJvn4QVP6c8JUwPTm0iyz1hmD6rUHw8l+57V9+G+P+LdDzpq/k/q9ZJ2PHfMRZBWR

C3Nz/CYpzu301O5iwGxtS9fonPfIZg9hNyuqeaoqEC
Yz7aBES5p4jd4w/XZxeSzjCOdKkiMEgrH2OMrkGD4br/RXE6wFCT4ZrP/3LHvfjgsk4ZlgyaDcHHfXoi

KF40Eaf3bIqK+Hiui9VHT/xmtoEqEPjv1BNhtvKs6+lti89vAvab6L7hrykf7XYmOWur0p26JaRonf+n

0oWvsxFhFcrOyZ23Y1QYOKlFVNCnsXjeO1CUZItITY
rQW0Nr0faWIskrmWVJmJ/sAUBEwJCygN+RGUfyZPyF+h1AhrudrWIY1KW3akOze4Icw/P/Lh8hshX/CH

DuQG/UCw4pwgbVwjGul5ds+Jb2Pl36cuGvRUANEDy99Hte9/EBr2dpX4w7sq0btakO30j+llLO486L6p

mQr5DPpC8Rg7PtvbHh0OOVTNuaD2WmoyUqJVqvQAms
1hLTKOZrX8nUnhLkILX6hnvhMZY8h2cN1KqZrNm2VG/8xmZ0AYzgegOIiBVRlA/wx+L8sKF605V9JA7g

c0cLMndJolfGpx2tY7iGXRdIhFfbCXrf0/qeQdp0acrckSmvucw6Q6IRJ59G2xXzmUpX4CNT9qqe+SEu

AYjDOuk3YsS9QwVBCQ5C+gCcnFJcUhsRhF+vlN+K3s
97P/W1yd2Em/CAlFEQxaaumCbwtvHsUP45QrhkIMY7vqfYEteykMCYhODRrCvOrYRjhyKAJcl5wrQVXo

lzcEiIY9oFG1UtfynjhAhu3LFoLh60RYgdBgLMb9aZqENXNEAPNnh+iGOFHKkTPwclpqEWUTf0MOvyVG

VSNuzUxBPRNBtJUkQp4y7GUoUQadm/sP3MNEX/31zJ
EsZhlJBvtxsOR8EK5IM9hfFzLiiKd20l6utDGzDgTQiw+zS+ntmsBaUHlUO2Czj9Be7efjuTPF/amp3M

F03DeaDmkUrZHPrw+zWblqdZKvPknPdaLLOvporabsDu4M+tKjHaAppmiITuXzi9f2D7vgw+X10I1Zbu

XPRE+o7knIOtPnXsp923sOgupmAPhCw59wQI/nuELN
g0a1MXd9TAPgAm8/UCCpeTTbBWTy4j9Udypy6yRAc5Izyp9xut2eHi0HhuECl3PcYupIc2UscUUBgDRn

jDP9/q9cmyRa1bjSRvpmcjXgcMrh4GEq4YItIqu9HS2agyZj+nNCVISRmYa/FrhGy9LWwbbFPyilXuJ/

G2scdtRU5DkT7MBb2oXvKpZrAGA+wC0xTLc6eqazgO
uwvj1J9FMkfHrd5jwGbu8s5ri2X0Jol0SF2uyv2BAxfSXfXPEMinZKgzWWzaP6NhBuu13bwfBY2dBia6

K9M1cyVf/4gtxXWNnY9DQwygxQCpYTlhC/0y8lJhUP8NBD8d+jCwEXsY6HiYb19e1SyEtCCqJKPKWBb6

CBKfrTZK5yNonmv7R9cEkI9Euj2l1TqYz7j2pvN4yp
mDmUW2wkHuXJY9SRWRBxST0R6c4tBOom19IxUBbYLwqUbTkejrSN51WQMKe7hmGZOO9JY5hmqcb8XiO3

6NUO7bGzRFpW3HVM/50O3J8QJSlIyCrboSbUaFq01VfGl0Qw9AlAdq4bd7pB5RJDPpzB4nC+mb7lsyAJ

CUSLi0MtS5q5dCbdXRM3jcDE7KgFu1QWRbNIZPiKra
yeyq/4X/wvzoPa/5mfmf0U/eEoZO0LUQ9re3PyDWIoCViojhRxYvkEIiDxRWIrl2IgBVbzXUl3O1WptC

CnkfOkTrrpjALMUBHpVBDvQ9eekqp9hFAwSXE+Br7WDOYqsSTePC9JSxeDfFAQUvRvVDyzqu4rL4YD3y

xbkYNMUuYDI4S+ZBcQl8NgUqrplV2dze43RYxxPlHw
iw83QNjJ8mcCtlcL5HtuhAIWr82wKHhIAoEGGiqwi5Gl4ZlZxn2P75n3cl9t7c6vxYMtyiBerJrEmUsv

eaNEn0hL7v091vI2B+xSTGA31POsWm82Qs/XWaTuITkcvbe58F9Ea1TGkN1P/UwOgaeO/Ti4ENp9FOYI

6XDVbxashOItWLlgNOq/vHiMLFYOz1Dagezn+OU5YJ
5ituTYM7k/wYS3lyRgpcJw85JnUsaWZKxozw5jZ2fm58uA7Ls+v2U/ygpDRjQsCSO9rmAttI8UCD1ou3

UyVJiwQmImIE1isz4PPMvGPbFxI2M2oCZbHo2mjJUfk1KvhHI8o6VDWlIdU1WjadPFCC6uF4TWXLDQAe

sItjvtlS0HGISrWYRoEC62FIaxGJ0WEDHALCjxqHKz
RMU9N3Tcq9YtmtTqVSHxBf4JSZPtFddoW5QxVlIDLlHpI7LvmmTUOx36IOyvJL5lBTNcCCJvIMU7NCIu

KXrjZD1EdBDceOZHagfcCPElI7S4GG7MHUAUItVgR4KvqdTXqDxzKtEsYPDfYLAXMd7+DQplbmRvYmoN

ReEiKHSqz5AdIUj5VH3OPLXdYBmaMR0Ut669J1X5Gi
A1bDPvX4sQBa0mhYA2pYWqL3Rdg6PTw151Q7NNULXDQtlAfinrbB0FOPNLm2tAXW8qgA0BLLPwjXz1pH

8HFAZvH1mAI6oahVGoDU2xfyV4CI8IVFjvl6ExmWQrYSEaUkImQ53fOYPlzY1eCQwMIRCmnHa3gSgtZ2

B7bHWcSI9mkmSkXQ7+OI9MWBRsGd9ilVBaq7AraNB8
e8YqRsSqUSQWCSycKK5WHtTjTKQgX0izKEXxDjTlRZNuQmPlJgM6LG1vEWh9FQzqOJJtBNGxMGj+Pg0K

CD8qj4CfYTdiDLEyJZ2fqj4NDIuGRzSvH5O2tLDvUd2biF9FnDT8wRIoT4H8xJVrC5Dhy0LJo370H8AF

GPMWUawBhjscqM0IofXlTUzgMp4TWRNrjPs0pZ9VFQ
gcMX4XRZZiAM8eFS49Qy4kqHAmGdUuNGGtCt4SSdHwH3NwDN6gJ06eEDRgNYXrLKQBQt8+DQplbmRvYm

nTZpC0MIWlz6ZnQTswDA1QXF0ci3LnRZzyQSPzs1GoRMu9GY2FPCDwULRwB9FqeNPtN9LFOu4KTEs8N6

akEJmePg2EiYTdUWDvREniWX5Fh106DRt7NN7CCKMh
ZbThVSOYLsOpEOLdRlPrOQbvBEBAYOgwIZMkA2DtGIQ8BUVvDp3+XRpmZF1MN6AxZSQ7SIm6GG4+DQog

FH5VwSFML6HydAIgYQktY1SDLM1HHQO1GE6GuNTiBB4WyGBSL1VqyKMyBb1cLWZnu3WcVa8aN8XEREBI

QHGvJGumUPnaMZRgXSm9L9E2HTKdT5ZJQ708sICadB
b0Ft1yW6CBORjBHuAmIFfgEMvoNOKaJVs0M9A2DAHtB6DTO3IcZnGspmRcZ7B+UiErQWSZSA6ZJWZVWY

b0C0R6mFYlX9M1tJnVfRG6ES8OXV8AfZAxqOZsr86+DiTWVgYyG8IIL7CCLI6FETx5L6A6zQJmJ6O0tI

iEuRM5RT1DGS1ZeOvafDAeKo1tXRztVYU+Oc1DOp1O
LzOtRA1vwr1MDkYzXFUeJntKDio9V7djhxe8oPQhZlO0R2Y7AkQ0wRWkAG4IS6W8jFOxGZF4JSSqjNB+

Mf3Fb3AjIRWzYHx7X6qyWUBpSYHoNnGygT63L++7dwssrXA9Q1f8YVUZsKBshFaQtgRbM8qMMEP7c6D0

ZCc/Rn3GGXW0yFv0oBFfJPIyLRt9dN5vzRb6VrLaTD
84JXHzNLbnxI0iBex8H3Dyh1IiKp6pXz5amHQwNy9CHaFfNSP4glRuZlDDPkF6hJpyumuxJAS1W6y4vX

I0Pp43e6cxswLcx1IzIyX6RYmrUNWwYdKxmhLyIYN8xbJbwD4ftcIdUy0AHZEaKPjwioZdDmJJNm7FBm

LwTC08DqslsV3guQX+DQogICAgICAgICAgICAgICAg
ICAgICAgICAgICAgICAgICAgICAgICAgICAgICAgICAgICAgICAgICAgICAgICAgICAgICAgICAgICAg

ICAgICAgICAgICAgICAgICAgICAgICAgDQogICAgICAgICAgICAgICAgICAgICAgICAgICAgICAgICAg

ICAgICAgICAgICAgICAgICAgICAgICAgICAgICAgIC
AgICAgICAgICAgICAgICAgICAgICAgICAgICAgICAgICAgDQogICAgICAgICAgICAgICAgICAgICAgIC

AgICAgICAgICAgICAgICAgICAgICAgICAgICAgICAgICAgICAgICAgICAgICAgICAgICAgICAgICAgIC

AgICAgICAgICAgICAgICAgDQogICAgICAgICAgICAg
ICAgICAgICAgICAgICAgICAgICAgICAgICAgICAgICAgICAgICAgICAgICAgICAgICAgICAgICAgICAg

ICAgICAgICAgICAgICAgICAgICAgICAgICAgDQogICAgICAgICAgICAgICAgICAgICAgICAgICAgICAg

ICAgICAgICAgICAgICAgICAgICAgICAgICAgICAgIC
AgICAgICAgICAgICAgICAgICAgICAgICAgICAgICAgICAgICAgDQogICAgICAgICAgICAgICAgICAgIC

AgICAgICAgICAgICAgICAgICAgICAgICAgICAgICAgICAgICAgICAgICAgICAgICAgICAgICAgICAgIC

AgICAgICAgICAgICAgICAgICAgDQogICAgICAgICAg
ICAgICAgICAgICAgICAgICAgICAgICAgICAgICAgICAgICAgICAgICAgICAgICAgICAgICAgICAgICAg

ICAgICAgICAgICAgICAgICAgICAgICAgICAgICAgDQogICAgICAgICAgICAgICAgICAgICAgICAgICAg

ICAgICAgICAgICAgICAgICAgICAgICAgICAgICAgIC
AgICAgICAgICAgICAgICAgICAgICAgICAgICAgICAgICAgICAgICAgDQogICAgICAgICAgICAgICAgIC

AgICAgICAgICAgICAgICAgICAgICAgICAgICAgICAgICAgICAgICAgICAgICAgICAgICAgICAgICAgIC

AgICAgICAgICAgICAgICAgICAgICAgDQogICAgICAg
ICAgICAgICAgICAgICAgICAgICAgICAgICAgICAgICAgICAgICAgICAgICAgICAgICAgICAgICAgICAg

MCVnHTYqAFIlSYBpGIKuLJApVVNnFVFbCLQtFCQxMJFuJJw7D8fkQYBdPGKfRA5eSYy6Rd4+DQoNCmVu

PCB5pjJhfE7YVR8vx0VtADokSEZvm4JkSGo6DK0BVU
BjULgyXE7EPPienf6HNKRuLNHzzTTPy4unYlVpTRA3FARvBstcDN9YXDMrN3tfsiSmSMNzJGWPRZciCP

CBIZzyGCAVPPYcMFHfFmGbXoYyDHIkYG5NUDQtH954rhQrCN7ILb0FPvEjGL1cjl3SRhbsCVLlQgpPLf

a7EXubQQ1UePEzvPNoTXAxEDBRXjSuE3uax0JdSban
EFUXTVtnRU7Vs5LzcMEvYXw+Gd5EDK1gu7TqCRjlNSVmJP2mvg9DUHvFBwYwP1RhnIhsQLMlc0dqUUVl

YQ5dxTUjSRM9UTh6C8HfLFZigT7xuUI0QYVDYGFkaLVxZu9lYW1cLBVtVPAjMbC1DPIKOU3DTHLjSBAc

rTHjRMYmGITKKJ6MRAnxVEX3WWKpksYqmVHyNUvrSW
9QYXJlbnQgMjcgMCBSDQo+Hr3RXI6vh5GaCPbtSRScWF8ffd7XGRqSCgXmH3H5oITyO8Y5HPydDk7OFZ

KwREKsXeNfLKIZQSkvNB4AFB1qcoL3UY8SxZRgVKNqLSBorOSaIZt3G37fvVDwCEfvDI0ZQRI+Nino+Pg

2DKVXsOEUmTAFpEdPlFLKVWfHrK3ZwB7YOg9FkW8Mt
JJ59fQtwugDqBBuyLZ4EUT7bLXLfXLVHFC5NyVDhzU0nwoZvRgMoHICWTsTgZ46vjJUfSAQiWVF4UPGk

Cq0OKXDsF6OivrNxnVzxpqQhIRGkGRXNIE8MMJzcqlWvqGSifMviNP48qEdsXU3KYz3TEsVnHS7vex1C

aMUlWd8WFCXfBX4SXYKjPAOmRJCxYCK5CGUuAsZjPO
irDFVrZZXgZSD6JVBqHOUsBM9UOkDbLDPtYqseLtKhMCRxFTYcdh5AHKAaGRMrFMI0VyFzTIEbEOMgWD

ivIARfUVSjMJV3KMTgIREtZP6RKqVgEPAeEKJnUSTcWQChSNQsia8VSNRhERZeEoOzOqErDMEzHSAfZN

alCMVyGLJ3VcW8EMUxBGEdKB1AAwUzJSFsTGX9Deik
IGVxNDAket0CEZMlVPCyOBPvZaNmPHWiVIJkIAyxQRIzWHW8RmGaWXTdPNWxWA9ZXaAgAMGjNFY2YJTv

EEJbQDTtmh8FLLThHPGsXXe9JMZoRIWoEXYdGWmuJSWdOJQ1OVA2PYFwWQGcGS3DFfHgYILxFZHxHQOi

RFNlAXYkjf3HHCQqCAWzFQBnGqQnTSNfCWLyVGlyWD
GiWGK9ZAItSHGiALAsZK5OFeEsPWShVZP0ELYqVFCaNWCpri1AYMWvPGBeGgX4UxPpYCCtEGEoJHtpWS

DdYPZ1FjU5USTcLFWhMG7SQbOuPRGdPQN7ZKLxIEYhWYQbyh9NKWXbIJUgRQS7NiZmMTTgQYHgPAkuQB

OjAYX3Iiv8WWUxGSCgUI4JYcDnGNWhKyn3JXEjELZz
WOSaup4ADEAoBZGwZJD3EiSqHSOoQJSgGUbiYTOwWRO6EgDoEZVxCQPuLS4HOgSyDBBpQyu5KGTaKOLh

TFUniv9RENTuITHaWNF6FoBxDQNaVFAeJBizIGApCFAuLmQ1XIWfCCHkGB5DEjXtIDSbVxAvXQowEQJq

IVVggf8QwMKeyFhqaw1NFEaWDv9MlPlcAEIcIGbvUc
5avXFjATEqAHFHRm2EklQnQMDcPSJOJSjjYOKeOPK8AKRmLgnzGVrgKuEkYsHtQIrpXvOwT6FoWhbtYX

ZqZdD2FFflNHRgTWGqG3VyTDSwRmF3QQKhTIQ7H5WnV5JlXOH+UL1gLBu+Fw6Re1LyntZ7ztCnUJmaPS

CcDq8TVQCFR2ALCk==









                    ID                  Date                Data Source

 

                    [id number]             2020 03:39:00 PM [facility] ([facility])









          Name      Value     Range     Interpretation Code Description Data Gregoria

rce(s) Supporting 

Document(s)

 

           Hemoglobin A1c/Hemoglobin.total in Blood 8.1                         

                [facility] ([facility])                          

 

           Glucose [Mass/volume] in Serum or Plasma 205                         

                [facility] ([facility])                          









                    ID                  Date                Data Source

 

                    [id number]           10/26/2020 09:03:44 AM EDT [facility]









          Name      Value     Range     Interpretation Code Description Data Gregoria

rce(s) Supporting 

Document(s)

 

          Progress Note                                         [facility] 

WILJOh4mGyPSUtZu15/ESGygOHPlu9QjFWhvVWf9LYdeANGcJ2EjYIS7lI2uLAD3OUcYYbSmSgYcXBU8

lbm
EgMtxJAxEhWUDdGnnSVnXxSStqIvommANjTX5MfFU3RNCxW40vIEPpBGTnY9EaEKU4XVY+Ff7HFWLfwW

FhGV0DRekI6QypwkjW2h1O/8JAWCew6Agu1YNdYPLmIV2O6S6Vu6ABwqcUF7XOhsZ7IE13/folOaSoc0

WusJKS05NxDNqVoJm2SomPyGZp90/dmIf581n4v/7T
DySbLNgf/8CaZUqulzV/okVgg+Sa+2ZD/vlB1540YnNts64ayCu5t2/eM+057EXAPTaeHlw+ZpPZPEoP

2lvupDcuZjkkho1yF2te8Zr41P48/z0b/7X3UeJ3eaGT6jvqjdp0nU+XXeAPuEuYLRlIMY6LAka8y0l3

7nXIw9lMHjePS011mnMJBbFf0xnU+bZRdDRco9pk02
LrhCPmawrZPjAwJd0B1jUr6GDCf0qC7L7YJ6fBsGRM8B9XA4ekRUWrFUFqut6gF4r+KOtDYN3qiLYwRK

grT2Hm1TdoJGWTljIr2WldV6PAhQDs5nprVG+3x4lYxhQvVJuMn5uq59vZ83nnPFE56vZuWdVtrjsGl3

Q4jYNKVdFecuMefPkfsBsI62Qrb+z9alPYHlzV+EpY
rqMcdvKQMcmuw/IElm7SZiEObCADjn0s7A8g46oilUVGByNtyCfEJ4vnu8DzElEYAV3sAdNjmJnD+0IL

74WntMhTgdfJIrKZBFTIKjEiZJfJbTzvi+1bTIXaveoKfTwr7OkDgpLsFv9NaNkc3ooL15URdrdprJiN

vdJWGwMSDyD0PmsQTYpHI3+v2lTR1nGE4hRuxLOeaG
rMkhObHsW6u8EbhQAOVuhsIquSQ8n3E9RRxvO6CDwbhJhozpP+8tUxceZz7McSFyfJKHNbUaf6vj1ujs

O9rz6SvJETm7N2yRUln00tsd2DqH3RPQsx9F65YxfCMIC/ID0BB5nxRf67lxxwXyuuVk3AnG9UyDvR23

MHNMP1t0Cj1UqyVJ3E26LJbOmnCE4Nx93rL7fUovD7
LMNYxsXwMEHHNFA29hL6xjGU8KxoBm/yYajm8OQgSr+UtgpUUrHsxzFmCZfhSHQSi0vVFtYEAlXv/Rmu

ZmrdduTFzClMrqQ4BhyMn+abtLjKjZ5SeFTIeNcXeaf5qZ8ydzhkXQtXeUUR2DPKG+Hy3DCIg1cZpJxM

Y5K54pOvDMGgoFpylf7lmA9r4ryKWKIcCNPtzL9YpT
TqGmW62xMym7Y7YYXX/ghrSau7QRkYCERUnemZhr2ebpIHaMdZRUKu/P0vnN/Kv/8Ehi2PMt5axWwZe5

2/ZzQ2yMVkqN8v+WFn7egXLw0I690vKCdjUJ/GvqsM8vmJY67TgD4QfxFohSSwhhmJ+xr9q+UkeVxmUc

uWHkFnc92UMWRZFWmVxjuVwzK0k9VB21pmZ7l9vNo1
7h4nkw8RD5a9rvgosWcD7kW+0OSiT5OsBTIMSRn25AGw4p4qf8wvNkSYCbg0/gTpQ2j+P01cvyCFy4Ty

ZKTnQ4bgdE1YUCgM05DqmEQsWtZxuUk1z3MDa24WE+2b2MewcuhL2cijtRYJa+xlsniYh/Xdcb7aVRh/

0UMa7gCkSWB5NwKFq5mr/xYdOXHIazd4SaU1ypwgrH
7AmUY2jidtTAN85JgacOxxdhBJgkSVOpKDV1wjG5PpEzMY+uY/xltWrS0mjF5Efw8JG+56Yh9c5BHLKk

KNtbC6j3hUdAs+qzf12icrdCtFMNGsev+1Yfmblp4fkZ+v3/T1+gi9tZytQuBluDLxmEZSfRRlQDvJ5o

zLL+zs/ZVaxCE70gHWRbSd1I8jpxk01PMkCx42Rkq0
ghoFnecbvc6LEBJmGYVU9KkLIscBhUss4VcprSieLlP8WLWVnI7GVhhimKmn5j8DeBpB0pw5L5gnVLZz

md1tgqjpF53+XKzi0o02ZGTJpUFY5lgplFwrkGRnAqoXZSRXI9yeIN8/oG6eXG5T34aUdT5T6c6gz9vU

RBwiwVJaSe+EWNq7p+2mW/e8/NYTCckTmZJigXguqM
bTHVVYykWT9vEM/wvoTGH9FIMIF5gOwHURXC9HdVbDF4RTmEtlddihpBCI5y0mgMrkoUAlYDhZYAS2D7

dQoGfW3lwxwrya/8WFDiauPy0Bcftx+PQPlvdnVm9bwp7iBuVkSitArdQ42FP3PzIv0WeWKIpw3AkK7H

8hb76gQRLK+gm9vTeadYOZDU8KnVLUSGojWNRQEkLy
eOCfxHgSo8UuYPIBoE+oDlWCMp7rCfU2B2kj0uYz76OYvyYVgOLgjxFpnuZeOCu3vZjjSDzJivkkCncv

SJ/ow1DjeKCWFpIsloLQD2x7wHjxedHlwXnffaeD3PHRDg9vjGYTDX2sHAFgOH5GQ6Cdwb51lhBdvGn5

qEYlKYj1wZKsqgHu56u2vZ3BYvkErhFebNRD7HaP7r
+wFjECSlXtZ2V0eixEbbZJwlG8Tsu3n9RW1/xIfRtTgctrroQnh+vY6OntTIb3lfC5v61XUG/gWN23l6

JZCyjAMzSFdZq6B2wKTTnZE0G1xnZscXsWaJ9kz/2+99DCha+vNrwu4SlrMaE+F4nzKMhU9VivZd5Wuv

8vTOyhOA4J7wwAO+wfaesnr8sjCKtbBpaD+aHoZrcP
6Y44F59Cu/JN7Y2FAj+EvyXrHQd63siBHwReGlm9nnwUTBIWzkCn96ybUWff00rfG+//ifaAifbvuTny

O4VYeAriB3E/PZLae5XBefRcsZT9y+24bDZ16kyr0TY8SNdUToKMKafmKh5MqKl3s2nghyTXwC92YRle

xA9auwqi2dn7Zm3I5UXJRubG8cWt/3ttF8l5Fk6ksx
IYP5ovzF0rdBfm/LmFxyFCNLbS94DJfPu+j7pTstmmBTpkDPQl6qF6Mw0eu/UZRW72OqKzkUM7AMN+9H

U+tBNFon72G1DepRmG/AwVVYkBS/kafP1g/MtJOfHJm2uSINLPlpTH5Ii5IP8MloXFzk6n5vnFRvBPqD

rfQt16+rLnoNxzi0ZTJPA1BXEiPgSuY9p0aHZCJhDg
Yix2imaaQZO2YEeTO+o1CLha7l0qeuFs8lT+R7EY1z0b0NKH0N2QoHZPnJ1WSjIR7bwCCSKzXhqwxx4x

xNVBP3IOABRECby8L0imsvQgQKTA5i6TlbJIhE2MMH12Z/xCVkd9RiNvRzeM+3GxPsZnY+lbzUGr+vv1

g5iDAY/Ngf7dD/HuZZqYjXtwi7qPuEzGobTynRJsuE
00zn0P0p7UKqJops7+hMnpslUHPflge8jmhSlStzCRSdL8RiEamV0GPdlPcYkXT7owztRdEC8ypJnOao

QbqPCjb+txSrXhVW4tO5m2fyl00tlVe1lEDK/QXBo2J4RoSbQmYQQf0lDvwqD3PYv46THAH6+DBZ686T

LfA4O/MeHSh4WvdWMIM9uTtXyLaffFXhGIiSehBgJB
ye/oTy5YLymgskJKvo3HG/VqqZZ+6nXmtSIj9qH7qEZKGVkFZVyyzYS3k5t89HYQaDyPL1mhJC3ozx0M

mPJL6VsBNecwaNaopycMcF2/6Bjacdx1uC/08bs72tlaDaD/XQOlhphNWMFXYrG6aXxVOqJkaPyE6DPI

KBbVvxLKj5IZniHz5rvr12F/fwW+Z6/EDQplbmRzdH
SkBE2JVeZyVT2xhb3ISYQuHK3rzq6MRKT0AJ2GMHAnTF9RvMJvR8RwH8KBDnSaUBVaAWTqVS92FLRvNC

HVWUezMZYrD7Ieb153sgHzwwJbGNXfPh1BAVHmLB5XRNRrKNNdhFUbGLGwZPGsJqW2SQUmFBmzNSFwH7

LyrpVwflEyZHKpOYVXDDcwLNIgN5azk0SxMCv9VD4T
QH2SzqXhs6HylmVdM2mkE6PRYF0NVCUcB1OUZ7AfL0vzRjKfy4MkN5ogUnKpg3CvXo8XCoSzSw1TPcKh

SJ7ilg4VIwBaAN4enn3FQIN4BU4EeNg0OGNhA5RtLWHoGRYvt5VxOE0IRQ4whJypObC8TF4+DQogIHN0

hhCtjC3FOKBzXAgf6jYKlf+Z/O96yBlQu0uAKMJWvZ
ur15oupb0o7uF4dQFd2sIAB9fkhaa//SIEBgXG7MccEotlyv9TaBN0H64pGNLUtIg0iptHIBsUnr+rfs

qYkdGM/Q1wUPGTw5ht6yaGjkF83SIXSr9Lu1M2ySlxvjiE6Rn7Ah+qNTRZm61LBk+Sk6vh/WQ+TCeL+c

nN+Mary Grace/9fbZHibLF+9Iq/Y6wKsInyEHrMpB8nIoP8Y
aTePJiaWUiL04m43qoELhzyLEXz2NnaorCTlSwep2Q0kLt3TB0CTMy8tKX8EXCPFEi5/+IEM/kXOB+RD

H7jiXIYTqOpoUABAkHNMW1JDG2EAxjIQRepLIi3Bf4CBjEnMacTfT0XKDTvozREOFlCE10hG71wL8lhp

wyCrlEM8OMNCa5oTpVFep0BKPDNQM6myGa5Qf7LVUa
QQAoD4Ypsn+qxXuqQJXI0b+0/DasdxecItlPgJvtTSZ7TZ6H8RLIHKfYGERYuF4Ko0OEZoJZbZvmcHlT

jLbXKwMwn7MlV4RjjlwJO38Tcxm1i184ftPgXasIR4JBqLyk/s+/IPcDgzJfw1ofiTWYL9iu5g46PDW1

pZBmCQqYn0bjBbSP9d7W9E3UR2fnXvTUQRz4f3Zvlf
e5IoYTfGY1PjLZWeZZHeAkhfJYQV53b0JptbcemoYdSmflh37Yz0GsA06zphdqvODQf3CvoVpPjYIRvk

vJX9uaR7XhU+XUDFEx8jvJKhDYfbowEE8iklk800CckdHNEcR9fl3TC5g9sXgQJq34tkkQuqwQUwggbW

TRECCt+cmsJtDqPvN36SoeOdkiq7Em3dANdu2JCuVS
s+QYMwA3LxlkviO2UlY5DT74ToXR+ILQcdEs2O7B58AfsqB7TfkNvivuQPdRssEWviQgqeFnYs3wPZIg

eKUqjGKjLXv/SK6fiVsT+YxoSKGmxljSqmjSvPUSValkRaULrFHrXYsWD+sWGJf6EIIU/CnEKYESYXGD

m/CNJXNotnDfoVSnrRJzp90mi6jPucvjI4Efh8GlWd
uUjJm/VymczTqXMpxTHzKCVD/prlkd59C9QUVTrkv6PdZXG+TUCT48zR6JNl1ET0WWsMdH9a091GCuPu

xcFgYiy98rm8vYu9dAbz6JDCMi/+DImaTY5h47YrnZhD6nESOT1TSAjUuTqZ4TCngVu9Ho/ZJOlrknx2

buolysGY+lZxslCvNi/ygQ5gkD7qGfXjgzu1I+xrPV
qUEHnTkxbZVksFyBuuJ/mDJxn3jDpvbnRMVY06AJhE+tFNukxWK/jVFumajJLZVSpAjDMdC10UxJ8gDc

dPndL3Mk0cQi9ZAgbq5B/9Dmr70NGM+qtEVk0zwujETs23I0JP6BeFh+vbw0N2HQART7RPPpdWGyQsXJ

ANIMAUCo41QO376ECumBfBQgRmNeQ7bPHSjPpk0H4c
rGV6ngIQ5g4njS3eMH47KzhQWXq4/TcbQMm5E8yFb5gpO0Y9frDwMXS91SaqDlPyQRCUSGveZYP7ZVrK

d+Lg4GyLUuf4o10KSx/vLIIIvWKf0o/Uh5qblG8Uw2450jgtx2kpEmP/F1FtEGpqlcn2Dmag8phBS3Z7

m6iWU+MbBlCp/4ZiQTcg/9J1aWm/h++oIRd/WV7B/R
bkgvGPAw7hY/ao9F3HnCkFvlUZ7ECqbITm7RZ+TyCUnnDwLi/wT3qL7QbFKBgj40XYVDUOV3bW/2dOtp

MvKkx0glqpzte2rXoipC06J3So9/GYVddiAPOjIJwHjLrLMfBYzIiEagHIF29DEP824IMuxRwDBR4RPS

m0VtVXg8JDoXPHUxBCyhnNMVcRLS1c9Gp2x09WT+vO
ChzJrtaW7hEpffLm7Y7BPAk+qQeFMFtLxShmvJ2Xi5t1g/4rDChsee0XZsTgkwDp4UwlAh4SnMx5sr0x

wVVbawKBvSQpJrbVLHng8Qaf2ngo99l2hvHfy995VakzCkiulyQwK5r3A3oy/w+DJIwjYAVhDofqk0rk

u9cvgqrDScwT8jZbn+d1KYQI3fJw8jd7zObbwJtwnk
JYH+x2c0oZX0U0PwOdSnu5J2n1kspZxzsuu4pbldAJlpAOrucy9K8uzSBf3sejyWPqjMWAOt36xKqJdM

WTrjWfKOSvEJFcgZmOtKgWKrDvIWu04NkTW7mV+01GI7mvabwv2DSg6t5NFb7cEfHsY5+GnoYgz6OAEM

AcRcUTjsBPjh5INN592mLn80GWzOQcwzLj/au0AXVS
YG0s5ZNT9ctHfo7lEq7/Rrkng1kiX6KP62tsaUsU89YSc06qAhF8LNiwWaQcRlFV1uyhXdo6qP/W7e/3

w997sTESk7s+yll3kGtE/xzOXnQLEbSaP/OD6jq7fJ5ebbCiZWc7SHOqFv5l1f3aDBf0tWtVs4TjPtFy

SxCxWVG+XFutj3hp+iRMBt6ruQnLIGBX/kxDJDTyrz
IjrbVPR516DuKgPYQnGmXBbhWmkQFHfmyLRTrDphoAcB+F7JrAB10a2r/zFjy2KQSOuTzcyjPNde4Z1l

zWfR0pUiKBh2NUmMs1froXu7HBkJ+8oNiTxz2nRo0OU+dLFQuAiRFEUceAcHwpeKK5Lyrhe76GfrXo5W

QgBo6ubBpxzVJ0Qfftr7ouAprERKHBzHb4k8JJEjWD
hdjzCgTQkaZZjf5jCVsAHvfc0NJ0Bs8zKwgLWQzu47JKv8EdoNr/hQbzSouShpVluowQfzHQevYZGAUc

Q0CtH4r8jbK7toorbp6vtBw7zUS4vevZR5ippB/dPXf26g9kuTSsiWi4W21ozOSuy2tANDFf1zbV7j4j

pS70zmjt/TVB0fHvOl6YKAHD7f9MWaVt0VByjTAtJX
2GP3Onb8d+ZBjXyPuKaTzmod+BrwQquKghFB2R848z5eCnpcTHEZq0G0fP9h9Nhs2Cb7+ueQpVzkfBk9

V1O317KbOI4Fg95lDCX0l4LIfSXNEfUf4f2WJuPD0QRX1DITAKKbSTkTDaNSf1Mq0jtR9Io1kp2YwYoy

kdgm7MhX0LyrTGb78PAUOTA2nmpL8pmxnXHjptNXI0
0fkdy6IsKr3fdt8NGgTxhKnec7r2YWfrzdATceF2sFOPh/My8iLsUz96/LMYZOnLlNulFK8pXm8MsqSB

SREjqeysc2IEcYyU4pkMR6l7wNJCq6cekqUAcnMv7ent+eGenhi4euPhiEpdA0EiWQuR6Wt4oajChFMS

3/QjtHiUl1VGgTCN1E+nSnAtmy5cGXBzpRuu2VbhTP
H1QqqJatdgnFuVD2mKcUCFS3ZTknyRxD9pcdqefrpBie3Tj6iMV3xVzecwK+AUNFabwKlbPgNnAVfUIv

kD1ODaDSgSH6ewYPNu2P0Y6OUSO4JR+6VoEnbfpvWnr9RBFE1vzqx7oHi3wPlhkJZgoniFDr4UzMyhBQ

v27pjc52/eYPIoPuNabXNU0pB8FBiwN7bLzfyRi0cb
fnIBXbEYePBp4n3eu3FE3T0O69e1vwXPE1eXKJ7t/gKtd1hIk7c7diDtJRE4pNdD+EYB4aZ7+A9jraRO

SQsipiKtnXTdDL4YXhNzAE1zto8WBoGiZY2epl0MPHA0XL8NFQTmAK8IjCCvE4KqZ7GFCuJjJPRuEMTm

PC95HSRxOHRPBMytWYDkO0Kdc529jhSpqeUoCMSyIp
0RJBDfTM7SBWDwJNEcbKGgVYXvGOCmSfR7LSWjUOsuIETzD2HpghHwrlNdPQLqSKYHEQlvQBCjJ9pau8

AhYJc3OT3KTB5MlwCip7HvpwHqR5mzA9HRUG7OUMLzS4FJH4GbA5roOaPft4VeI9xgWtIcl1TaLy5ZOq

RlGk7WZpTaRK5ske8MYXNgBF4pyo7MWKH5RN4FpUz1
BEEvS6YgJOWlSYZgj0KcJF7FES6mkNrhPwj7RD0+FOgpSXT0koBvzA4OTXKgAK9n6eQW/n7A/Qd+KbAF

9du7d7DJqpiy34ojm9psBAa46/qw6YpezmppOUgb/PuTSFHUsFpGtjXMnbEtCpsVPQ+itObWGAFQGA8G

w6pk1FKOQx7a+zWIbDJdkD//lylfmb50t1+ZJaJ57D
ds69j8WqYspjfqxWN0sBpc3f/N3toaF4Xq87xauvAof1w2SSdQmvC7pvh/11+TeJbkP/5M3g2lS3Gewa

Cw1OGMf/oj+GzjFmdAPUGoxq1TKsoeja+SHyLqk+fFg1B1SQ9tKQ8Aoc6DBl3Y4Np9oyW5oY2VCENn//

J188Np80qwLja9M05FvxlfoCjyNVeMrAB1QudsXaWv
HUh/drOwfCU0FObktfoHpeZ27ZRyyRlN4yZhkibesB2wVB6Y9wwcVSzQCG+t+YPf7k579DA4Q73Daspx

0Sb66sdqb771HskoQW8Z2b+WrVP9r1FJP8/SqK/dO3YzRIowwjDs4mv0SSv9MuJLB3BNUB6Dcbe7zvXj

N6PRjrbtwuZ/L2nELBRxNAQTP7ypyXhMmR228u8mK1
X+IPj9wJmkDy09L4rC4RERu1Ckr06VmUUfJsFYSZ9jjG4AuB7bnuws1IrHxO5vItI5xy0bwq1jjxHomf

btTTxMcfntC5G5u24UYndRNMtGo/HigcTLGXm/Tk8IZMs6ykg0NjGaco2TEKk1447HQqed0gEsnFq8Bk

Nc/RiEUsSOw/pXIAxxFBio719SWyeGyzSdYNzRZC4H
tWx9nitxwg3waEmt4T46H7fkRS4a1L+Sy6ZRArSQT1eqiGrNh2tQrPkODNOCnPZBXFCwndKv52eamNOv

4SdcJGUvMMqr9AFE5Bw5uqO9LONsc8Hcv3HKkv3xYYZO7XB05YepHjywcQF9/uHfVoNDQtrQx5wJYSoe

lIvQrDK+QCaNZcn6jqRQlC9EFM6J75qzixCARzwSAa
iciTziis2XYXbOac9+bNwyfXOXlFHi2u1Klas/B8W4lJ9cl3k39r3vLpqQBcJCj+swHJ4+2ZUmdJh+dq

s7OpDygnvO4cXl5Ye8CaEC8CDvjlUgeDB5St4lGaUh8uX/fKmMjSXOv/PNpqWGCTxyd1X5hrR6SmMCwt

Xk9x3IUMQmYmuYRXV/JkN/hHXlBauwN+tEYfitZggj
ByDkJa8Q3haNPcThBD3IsO4AudfLi+EzkBGXQJUhGOytVa36+wNhvyqV+LqC3i4SudziKVqbxiqcc8uC

7mEVwMoQ/VpqsuHCMnkiS7ftZUgK0eQTlIaJYRHI618ASviB2Ra5O2krV17Xw8Qf1UiGDZOUcguyACaV

5r38m194eEWPZKGSMhitcZmC42ms4US1VgUwBIjSJY
spTzqxy7GlZQpGkXWS9/cK36XWIms7Z4m2rcBAUT/h40DwAUnDFXDpHamnA4GEW56nZb1QLF+semv8Wo

aZ7m+kDZOjGc4y19lZAcdphuXHP/m3BNo12Pchd1+B+hVhEWyJ3G907693M6sVjIQ9Ijhzkpl5Z/rhfJ

EpBjFD7XCAlTBG3jq4GZ8HdERCwjPcLCqb87Vc3+9g
1PneBa3UFkEzfqx34We4aQSv0xiF9XeRDMyCVFtbqlxyyl/HVdmH/B/smxRAkbhVbomIS8qZ/9h92gAZ

B2rBdjrJQWvHFMh10zDXwNXMaI4eCxb7g0R815CqCkkOtHqfAnxlIiDQqcNh6xxdJuJvcSuMuSARZ9B3

K0G+bjG5GI5hAO1HjQ7eMF6wTdcFn0ND40T7UgTapy
67llK4i2ktguAOduN9YsO1wdTp0cBZiTNFdrSUurBRaT+yUesaBzBX1/yy7c7Yqe0uiS4GjoipROJ0Mq

geNWUYNWK2tAUIBebzPpwvF+QcCrDTAGG4LDYA1kQgl+Uub40pt/xz1Ba6JAo0FzHyKtligK89A0y9wL

y741IEVwfcPDNGxONwqeR9MNDQUjEuW3+nnAVTlha6
p+xoKKvp6QYED82XwPWywUGsvwH4akY/zSaneK5l0058LmRUNOt99khCbkVyvqDMsd3+1Ah1RNScT+Zo

PB9983JAyapo2cRBtUfobGceLJkwibvz2P217SVSCrW/3rpvqd3lkj5U/aTlx22eBozzlx1vJrRBHpfl

HRCx4SYMW+9jYP9QMSM6uoZV+lD4KGt0XMXJlW23SO
vo3/nK9l0Q5cGrPb05OM4gB86blr4MZYp/4Klmg+rzVsWhdb2YE/LljiCkThX/Q8r+y2ynyMwoP7vzqM

V0gPUcIg8De1G7Bfd7FiustywzTbiiu2bg7C0KmzY0EaYxLoei2Bv8O2qGmCrEhqiHE2+D3wIuQjo6rW

LnsV7zGdklZcSkGTuBZQzv1k5AduehzYjgvlE7tICo
mRFlcU1FAEFi7bavC7f3NFaW0gpbNLrFeNy0Nyz1PXgz8eN2BGzhFiXLF264xdC9s0dBF0SHn6pJr9JN

IfDjP/OC1q8I5J13tEt3KepVxR+zsaQ7flgkdbq0SfS9rHwNIR6wkFd/KN1ben0VfqUY2FsJx+0UewyB

xQjfZ+ncRRiCRkiDRS1p1QM9pgWvCzfl82HR94xd8B
QAdYCPJ35HAdz6l+PZQFYjzq6sgmTHwHsDmdJjwW9MU+B57jSsY+rPQFdJxHAZHrXR3Plrby6PUwHUgW

LjB4M+SL9toZhyKt/gthOV0qmYeN/SCch63RdqikMRv8d7dbvI49R8laV7BLdWiuwUOiGWHWwM4jNxUI

d5A1dfyOij36HC9y4SaFF3AV3PVqYhMyoqWqBaMDb6
rmfC1j0DgW5iBgzjjumr+kZR7B8kwmTBljsB0Fxv57TDY5Cx68y+sCjl4b30xeBYSzyxOM1eOyrYdH0D

TAMIA+70+bTdL1nY+zndyz1W4UDp9qxUFPa3Wj0XSrB59Wdfst5HDzIP2Hl4jnl0a0/mLO5S/JbaB0acz+

2rz+Ba5xAg1636OXxjTB29x4PVG+gQYp64KujdnKkO
RPW3VUyQ1QmCPVNdalugWk1ARW74llk+uEK9KITRw5NAD4PKzfTq5tGD2GCpmGhJDdAKHIaIgTjrP8pS

IzdDGb1cpJFQL5DRbvEKMdEhXFMg6dnBsSZOVmnPJrVamOl91DKz4Pn55dzF2LmdPYzxqWR8bVF0wxTp

XVZCWjXfJdhUrZWWBmXURmzJIo2pyeROzOJxePATgW
6EBFcxizwsYMeQyjsO8sPNt+SaJ5aB90Rkbw3PxLuQNcRYLVpQucfHjWg5lIDaOSgL5fBFwoHH75Qekt

LFFJaBrRBVi7H1J/WVYGKIE38Hi1cFSBlp6uEmHvMI0z0Cufa3txud2gzHtoHfHtJDdk+Vk8fDmVgRi2

KurnQRNo++SHcwyKKRUGcNh5r1QMXaKVqBnOImUBEG
az11B/L2RTiE8e5d2uTzrhEPjD/jAMzkC0NT45K5Q7QoVKzlwuGaEIXV8OJSD6G47fHudNNKiWEkD//Z

fxAmY0pt+sj4lsYDaml3E+HjSBth+M7ImWlNTsJJiKw+bWSImVmLfgdQEHgkkTlHvXDDigY12uzUCLgU

T01VSerTk8ZC6vnoyBD9wNVyoY+PAsKCquMvSS49xV
UcMD69hvBbk92rAyMhK1gWD1yeoWrG7gM+sgI+L4qQZODSVJjWoTwE8uBDJTxGjVKB3qRC+R3byiRYuz

9MViSbBzLq4ok7KRNBdZxzs+0JHUv0WictIzUog7dpoVWy6A4IyfKzYNsd62ZvYDT+DbrOcDsjpqI8wy

5MKcxPwGYuAWBQGbjWCbDYx2XelTCV/k4M56GvDJ1A
NXp+FmxG7x6sB4IRw8KyeQ1IQxFojHZLNeFQbj4DW2fCSu1G3LlQ6u3xY53JD0NOs5E23DlDnQHhLkIK

Q6haGehI8UPX4qo8OxQZu1TJBur1HwIDlqANq6DWjrARJjP0H7pNCcLTDdEA7OCHLoMO5OZJJdunAaXm

FeHXXJSrAhCOIzOhVbz0BsO4WxHYNnODFDDMubGLEa
C60dZQbbGu21AQvlNVYzKdPrZVe4Ju6VLkEqOMLjB99arXPzqKPuKDArCGXXWsAfORUjE7TokEGqELnd

K4HwS7GdOT5elHZqFY6oxRPgZ7TjB6MqxwetZJBWXpTsCHFmTKhuEBKkTePwPHakLRJ+Ia8MSCN+Pg0K

LG6zg4OhZGtiABQjCA8hcn6HXEL3GE3CtEr0LTSmG0
VfGVFdJUKtp8VrPV7WAX9nuPuhNzA5DY3+RBxhBKW5emJuvJ8QAQUxGHsiJotXtQ/RkgTLf9SKUkIjmz

cS2CvhAbTkDcJlxIgQP7vzJLv1xYm2YteKfkXzLbzRAxYKgISYno1scwBoT59YDuV/+0tsxKGUUkx+Zh

/j1BOn5+Bln0B7kq/Nls0/rrPnk04bg8XZw95M8oIq
s9qjkhV+3hW/xOyjp12cdSdXpGMCuO4PMRAAj0cmi66Yw/96kWfd/JCYV2V9i5EXtpVPgVQedwSYn1i9

S1C2XOxXLjKfpRMeUkjJ1OBMils7P9Ogz2K+IS77VC0tF1tA02w1ihbP0MSAefCMcBbcathBkD6Ugg9h

iSZtTaBErRMXnOtLZRNlFv3IKNIM6Rqt0mEfrkJbx1
LZoPiLDVIW6sJuanw241HqyqENh9I+gHg7bamwLZ9PfyeAa0Gp1Fb2FDDAjprCv7yw8DWnejGVjX2a2L

etMGOwvvXVP7TCqv3XTm8rbSeDy0+qhdZE08TZ+1XtmJpLeq88h1KIPEyhrTxrX5ODprffXYu9QfrSts

wRvHaifjIGBC6y1kJF8un00zDDH+LD2ydHwLdOE0fP
h6hHmUdVfa64aXHaOpDXKEE+VZBl7uYdHfEY0bEyPnZ4MhQ7ucFrmAaEqghZzmenIgYv1tuU/ZpFuSBJ

WdyZg10Pv88ArOJkPdxoJc9LVp0brGcoM9SYE/09pgl7xCBUKZb6lBYqSjgZZz5TP/9L5Q1d1bmdHpdY

NvMak7hMihKtBfNOYfFI+6jDX7pZIYKxclkY9aYDJP
cyGpQX5IovP+IQbkk9y3CrMIUbxgnrGyb9RjWLyYk0UO07aELTmvDAus4NLsLGHSlXMAn7ABxf7iQhda

L8TcPqVuZYctC26YlohHZ6BzA+hHm69NVMAUPvJpGlAByOUdgI7E9MvUl/jQM46HwXQOSBlXW3RLxV4J

2aRPZC2xAXt7ornGuNLZqCtOxTBGTs/YMwbYTJiGVw
/oWHCx9uH8KLS4KovJVRr4iHwqRIJjcbkipaYMPYNQ5Ka7vH7fRwDl+lggR3xxqlANLdw/LeWsqMwJfB

Q2hbXDIEre1ztaLK8DTwsWSJ6zPOmDwj3jHkJDMcKtIQ1Bl2PdAl6Lb1k11G8v1S+JI6LPd5cW2UH05k

SFm7so52o/a5Hia+Uhfr0oNC9qNA9PRuGutjkCGFhd
geHZEyZYJuijmCOVGZizzwoXHdIDj2L0v8zMlCF0vxDTuZb2jJ83z+Oy3DIdy8zxr5mm5UxXgd/q0iNw

OOKzxMaGLQVrit4QOYu+6KYYmFbBFolhzRLpVk5C0ARoKk6oNEfPgvbBB0uA7CPpuwZIvmlQQeHEdIEt

KxIaoLgaoOscPU4HqtTdmXylLepfvaPnaMOuGSWIWx
HufVTClq17Joi8NnMFYWnrvsuSyasfTQjp+w3uWDYYmzCG8u1j5IDSNL0fi8DDdP3fXttFBgXtdQf+oa

iWtlN0cIEQ464j3jTeS66ZvdqmmJvDvnWjhLeCmi07MnN28m69xR47t+nJkVQtSYgtCCfV9D2oPZQsEs

KIDsEHstHToLr5CeU+YD3CxedUzTJWIGypXRn6VEcD
0z8w877Uw5tCBvHrBWql3XnxIxLcg2vqMoMB0XsR8L9ufd8AUix7kBmrpyoangW5Xvbjm2MlY6M+MNh+

QmnXXBIvlUvzE2y/c6JjE8EKASshLbM7Azl+nYNlM/ihX6n9ZLVzW/OKLBmHWEWGyGrvWGrqVoXAVum+

LXZs/XmFStmPNSPGVWebNKUHdmnCiRXa3niLhmkO71
Ju6S8IpizdW9p/SR4JuUvxqhtZe8jvW7Meepd+zGReu8ZbgvbkvjSciH54GVFw3Wo2MmucvQCgmp0ulq

TUBHaAaBhHrhUKL6cD2f2mjEJA57LfrYfE+Ty+aYgryzWoHoLvQxutvsyHXa3lnGohcq1lmzMAanNMHZ

loekSfyQl303pQPwksGgj1Hrb/tIOtsufQ7mlCIj2L
H2zm4ghzkfZsuCQp6xEHBjDR9ruS+bZrnTw90Fz8n1GE78X9oYQVetzg8uPcgoYDrMVytFSXUYIRv1ab

+eURqYYCkrDlfUR7fFvOHoqpqUXaZPy+FabGs/NgKgKBuzZNC5fdTb26x3LhRDjpF7+63YmGrwbToYXE

SRqNsPAFLcZ3AKwEjTGR4zR10LMGFawuFLbBexkMCM
V7tqe51ZRVUf+3Q+rJAnxQc7ra1gBVHMcKGjbhIkLXZLKEwvAaluatwvvvd5jEaKW1pZrIolWWX2y0tz

KJtYuEbNKINmoqDSDLsERYmYJjw/ZERB5OdT8rL78GwoTK7fZideZPd7BUSTXyjfbmyCB+jaXygaZr7/

VyKaTfYVvAY4HYtyxejDOHyOdNAY+zZYBwQSxWK+ZV
Z/swvJp+5QjML43W2NgTRsTLjgO5YkDnEAT08j9kO8FprPmEiZXjAYg9FrOk8dhAItYEXiD6jrZqGEW9

3+3G6MoNDDBTjb2ieRRJkxrPdbiVLzteTuQAXrjpXx96dkY6/a38pK6CEzp/Lr8GmSXK3ilcyvUXek3/

KcE+7DkaQNZVohtH7yCv3tgpUlZGf+CKsZ5PRGYLa1
fADNg1ek1zJwgQzZj1W8RKBp6VX4RcGASDtaLUkE7i5KhPqd7uBEwRZ3G0vth++lBEvFxZyIQy2eaHgK

yES4NTP3z6K6SGtJjcD915S4Zx8ns2jI7KGONLSkMLppErFn+hU7IZJyMcBCeEilYS2x8JWqRfVtAexm

m+eHzSMFlELzZ+PfDfRYQumh82z4gP4dTLUZE+2wq2
ePqlXNnBfFEooiS7AwD8+7J5mwfFNsYpynvAhTesPsIgxbY+cgsIYMxTwBu5Mp/W1OMrMqy4z80xUuOs

r6D5GN5yVPvxCu+IxmAqK7Ro0l3bMXw+Avp3ssIvGJCdRzKBN4kkSfgK2FRV0sb2WrPYenGKQeVO3ssp

2YEYV6OB7DYBVuWP1WcIAfY2QuH7MWOsTaQUTeDGRi
CP85NZJwYSBKEHxpALFdA9Rcb253vaNzysHnGZRnBs4WFAPsVI9HHCPrXFRkzUBfIOLfTVOyXpM7YDNr

WCjnBTLnC1RsfeYzciMsBCHyOFBaYs8FEZYxIO8Ovo53bHK7GTKdOaXlJZWpydYvMERhhxE9TH9UYhLk

SMR4sJAsGbrFPQ2ENPKphTXsUD6PDIEefUOrBW7+DQ
ogID4+GRkpfiMcRqpUQxJwJKXwh6AoGCpgDVr9J3AwqTBgfwCsEpvrxLPZWCNjQTBvS3mgayh8bMBdDn

YhWp7QCyTgm2XxBPGxNEhKex5jV3RiAZH+b9X+c4aKHGyOTVrxNkGWMQdgLPZlgZQsfaN5bR9PZGmcKI

krN9d0vIeYEoptPSfjXFGoM837/B9iqZBDt4xCZK1t
tsXid/0xbEkxuBO//xB253n5tov7pBVrZncjBEa+jgep+PjRaqdpPBglQ/GH1Z/e/2n1n+4T6yS+/i

dDydWGcPV+elB69c7WSGncdd4gm3dWf9ExaijArlEbyX6yh4BK1lWyQJ1Y+clZrAMJ716IZE4Z/udB/S

lbn1aLKP58K+Ef8h9SQQU52vdIgOlL4Wx+/fi/5f4q
JqMexAoavYb8fRn99omvBcnqgfZMQwXGDFhfKvrmaBtcL4V2u1H8BO221LVfxgP3qnjCaLVj+m3oskcp

dJAmhYIyQ9IDjeZYGuGkJ8KJZnYOM2b9TmZe4bvEZo8IHXXTVhLYZrYDhmR2duvHZeqiovFH0gY9oLCq

8hOZjMRWID2tkUEgDexFIdedefJcmHVUdjI7gdnysX
VXSrqDnyTc731jkqZmVPGxq02emoHThW2GYF2A4OqWyxGuYbhXSikshSUrFXfORoT5VQc426kGPppmEc

SJblX4pqH2JVVOPCdfRDwrOWiYEu3YyE9H+488xQKvkSZr1WP6tVbnqE20tt1PCXPBFCFop2TAx4YevQ

1W7+N+OSMm1p9dtWc1cIboDKLOy1XFmwFeTZasZVPG
BH2Pla5Dz6sPvP1rzrzfQV6XGEbUVfyRChVGm1KHfOtfZVfuHh5zh4rwXyCylW4igcSildNemdwFqyO7

LcOF6WmagN/XaQdTL+mOgTRJMiGiDRHywzUKhUE/FTnlKykzObe8yUnLQsOZ37ZWw640DIQa6jtFVQOI

FNPP1KavHuPpQYdvVzXWnRYUVRqnEfdrYs8+haI87Q
yBviHFq7sk2M6lPxB3B41fZsuIkfYbo4IUPAzzAx4E5hFOMNHAqjlMAb6do5hqvYOMqbiuAuqNArje6N

OYfTAlTI8CYhv+doEJ+CqQuEJVTmLhIbNsLPaPlHkumai4qUKjgTqdHprqlMn9mCN/sqXxTpVTPnUl2A

vWr9TyPtxgG4Ra6kWP6Ge4j3duEt1583EuYITGtyJx
jJDVvtbjIyJtD4oW1+rsXKrUbLhlapv9BgOTv2GnQChrW4VogHPiTZRO8T1IDsKNzkoLNPy4FlEsYkaB

xUFwqdn7vHlq681ZT9R3wcqrFKxVSDzr2TKXJ72RUa938lDndvETkZukYdXhRSuopfihogk1ie99Jyvc

Si/F2g2UV4j1XIW2ak+LqbHKlSq6M0Ow5X33IWMqo6
i9Vjszj5c8/nnWIng465r+1l0puD4M633g3xFDhtv9YrMbIGQl3gMG9ZdC3ByqO8Q27+ig5Vws7MxqHi

niTLHBpxD/s/wfQuCSakh5RTkT4RZrp4H4++qTrEH92BS7iRisrwY85gbKpDlgLO8n1P8xcor+HbcRcX

yBotAAlziNiAivo5rxRXIxxpcUbVciMzoBSYRJrcHP
fSajxBsThYPyjNPhYf58eXdV01JCopnipggqtJ9OAOZ92xsouSHWIbLwgh99bYDxhFDTtPKpJ+QwwT6x

2kA12q+gmSmTdVKrz/bQgsuyn6ngPO5le8U7PIGeogsw/6Beh6JZSImwnMv2C22e4B17edQFEFaHVeqX

ZA6zwEM4AwlMMSc8efgM3wmu/j4ajw3U5dXeDg0lK2
aIP/KWye5sneRgDSbW81KL8IokIg8XrD7Lsap+C1vrAVLMS9yVfL+trd20JioX8nX3cXPs/W97YbJdo3

I24+nMCbfoMHoh2N0q9fdfXcaGFStX5U1717vBSexSSMEQb+dJLGAMxhzKRksuWAGcju6bsNVjDK/cHG

FCuwUOjkj4zFA6MkRNDwxIWhhUjazyS9LyfYgOMAGU
lMS+fIZD06OkOtStcTIBCCHtQGEvM3tgM3fiipHSrCECQzqmKAPPOqg2yFFXXUJW/2LNw5zAzl46ojE1

U7x2+U0TQgV1u2hgJ6ApxoftLkvo1yyS0rfqBfuSEJFZJfg7zuBEjywpuGW7wwlE8cqL+Z7fq5M4ZYKG

JlANERwQFlZRyBtsvSHkA8dhR6wlTBmatmtlKs3Qz9
sICmjLxYsockjg2lnjTE/dAAunsOih9t2aqyWg9h5zCFK/lzl3pB4egyqSo+1J5stYHc9Rp+IatFl0Gf

gKut2mP1aLA4I+wrIDWNZYk5wBup/2reSeFLGaXb8ZrTtdCYDcWgzjo/ou27PCE2IzB8u2xDqAz05vDQ

XSujVcv9A9w3PEcp+bndWBYtiV4LWQ0rl0YaMSNwNV
igvgToYceCLnEwXQUxn6HgCSfkEMn4RHpgFIZtG8D9bSAoSZKrYG5KRXKhEF0AWHOddzZnToVoCJUPVk

WlOLGqTgNlc1IdE3ZoDUPoMIZZPOmaPVWfU82bZQtpIz58NEizNWSxWzFvXXd5Iz6BXnPxGIBgY91gwS

EfdPXeREShNKXUXQenDQTxB5uhw8DgUXj0KD0SUA5K
oiQkz2RfbiRkQ7muQ9JUEH6YQQTmO5CJX7WiC3ksIiDva4IbS1yhBaAho1LwFn6CEoYiIq6DPvPvVX7u

be7ZEPPoBVHiMtvJKsHtUwD4ELJcGbWdCPG1JTXtUriiQkI1NIH6QcY7CBDoDHl4GHA9IrBjCKCzWtSb

MYYhLbUtZXgkMTm9BOP8YYGyClDxKxv2NHE6TAQ9QG
VvWJX6XVQ2OiW2WQDhERT8JSM3JdD2DYKlZTQ9ZFX1OgL5ERUtZbc6MPJ3DPH2JZYdGObkXSJ9DGQ4YO

UoJAT6NFDgXPUlNuY8SQT6NeX6AsGdVlJeLJA5IwX8RQMrBly7EJqlLvZsUeygXHAvAKT5UiB0XRNoVZ

IiPUrrElQ2FlnpFbK3PHq3QXY5AbXhNiR2KCXqTXR0
SgRqHaG6CKp0OWG6DxsxQwN7XZGcISDNUeRiPsd1LIL0NCJgBvfdKNJ3NGN2MtAmMuTnPUH2SOM3RpO3

IGZfROY4JCI8GgCnValpKNK5FQT5XuIgVoTsZsXhCCLbIOPlWmJyYFLiJCB8WdV0JWJvVEN5JMU3JaLx

MfWsIKWsFOW2AYB8AZOrJPPeVAcgBwU4EGQhHNknSY
VuJHZ7DKWbYiV5PVO8QDQtZoDfUBh6XPi4BIZ6CYUaPbZcTCy4CPA0DDZjGxFyCLm9DMH3BWVnEsQyZH

k7EXS9YLAmFvEbWTx9MLH8RBYpNhLlQIy6IUK0MZGiUyYoRQv6VZK9AVYqNyDpKXw1MSV8QRObOkJcNC

8HRAU1QKZkQwPnSHe9KUK3CKLdHuBbUOl1XVO8BJBk
DhXjXUk2QWLlPwshGgOfCAV7MqI4WSXeOOK9IUX2FzUoXqVmGAA8CBUiRcE1KtsiAycpCVL2SsS5ZQVl

NnHxFZhhIbJ5IEDvTUWhOXJ0VXRhWeOhKvHgBWUvDrHABdRgELm0NZQgMbEaMiPfShCrRKYaUxRmElCk

IMM9FAeyPHJ2CfBlKXY8DNJsSBE3FjcdThI5MUS0Ab
Q3TwfkZyA6SRD3JwMhKNTpRAhfPxP6ScejFiU1IRQ9CgT6GhjhDys6LIO9SXXdKbooXgu2NMeaJmM2Dl

JcJyo4TH9OZDY2TmbtSbk4BQl4RLX0HyuoPRv0PXq8BUZ0DpQdGgVgPWvuFrL8IaNxFxZ2SOV3SdD9IO

AoABL1YQP9FtG6NMZsRSI7JZM0PgC7SZRjEKv1GKLp
QYE1YBRgGTA7MWY6BhT2CLBgAfa5FPZ9GFSbVjkwIlr6ZNF3KmITUnItOFH5JKS5TjZ7HLYpJAX7MWQ8

DeS9IHVrFBI2DWDkALY6IHHoGAC0VYW2EgS2MAEvVCFkKHK6DiE4RVUeOXOGOnUoJI3txz6ZKQPyOBZn

WraCAxGrCQdONePdHMLnNKzuYC0Jm894FOKyM6CmyR
Dlal5FQXCaNK2Qm376DyVkXE5MbwuwmP8CTNPnZA5Tq7MmtaGcAQO7E2MyrPwhcUbjoPR7VHNeKGWdE2

TftPMyXyCmBVjzUEQeE3HlFIksZRAdSAzwVVDbD8OimhXQTo00GLqsGC4uXOVtYYEuZLT6OJUeNFelKQ

ZaI8k5JLjrT8XxN8eiSEAwT1AyfHIyZC1YKPI+Pg0K
KO6wr6ZxUGxiMzWpBG2hdd8VKGF6AV5CTVVfHF3QzMPpF8VyrsWfR9EuaXpxZE9OhwVtCGgyWN0QBGMs

An9tbZ0EokpxkT6EdiDsIOinIu4EaT5WdtQlHY8ds3UqnerVIaWtCDPxXghof0HIzRZhKCTmB8puc6ME

gAYpOSA0GK8OUBYpOL6FfDI1pQIcNSOfOSRUKAeaXR
PmV7AcgcPFXYAsqdwvrK4wNPD7RMFiCo6YACL+Py8ZET3ha4QgFOxiZoUnHG9pez5TFWExAZl0AQO1OK

NeCpm4BMUkNmZ4CoTnGDH1MOX0CoN0JGdwLjZsHWFlSNExGqHzClTaGPL4HSU6CSXhAwq3WKWmNgKkRy

cwOxd7PDQ2RiZ6OXXvQFG9EVQ2YeH4PCNdNHE9ZWP7
UiF4TVOnCEB0JKM6UcJuVkZbEsTgDRZ3XLOQCvXeUFl3VNT9DTL2ETNeOKl3PZlsKoB0UySeIpZcHRve

LcB4JynfXfWdVKy8EEJ5BfBdAku7DSG1BxR5ToSxFfZiOOneAgV0WyYlGey9KME4TnL4OwauZuZzCXT5

EuS2RVZzZvUmSHG8FxM0AEXnWjR2ITY4SuY4IXGwAT
ulEKWqMrMaHrvwGyIzYUF8IME3LXOwReRhQZM4PgL3RRRwEVC9EOAvTII1UUVrFsCzQYQtBOM7ZXOyZw

f8SWL7VKD6XCOoUcn6ZSb3CXC9BTKkQuKlQKSwUMH8LXUbUhi8XMS4MlRaQeYtGgApMGU8AgW5InxtXK

T4BI7QDDS2JTRgLJJuMTP9QYXyYPCwMlt1XQP1RQI2
RJRlRrIvFAm0NES6QRPmWwCyJBd4GYQ5JSHfPvOwHMl6VVJ4HAOcLfSmCNt6IFK8CTDeHbFoKQn1UFS5

LRFpNbMfFJt0KYZ9WAYhRmPdLUx6SRL6CLSzDyScTDq7VSHFKoJyRbNjQTx0SBX8ISWjNoMiKPn4POM8

SGNqUnUnNEv2XOT6RTAjAdv2CQUfYnW7YZJxIFE4UC
A8HoP0OQVuTfjmKWK4ApMsVoAxCfU2XLH3VBG9SJKdHMr4NUWkSjE7XenmHUCuUGWfOMI7JJabOtZiNX

IwEmRhNgUxROlwIDD6YyK0FXGySpYbJKUhXkLhRgFxGoH6NXS1SbS3FdAoPNW6RVecFBW1BHMuQwFwDI

azWqJ4QtQhWyQbNSuqUvO7JyVrBRPhSME3YkIhRlE1
RLE0YvQ3ZjzcUaY8OKJ3UNGzKreaIth3QFK1JKQ0ZjBuPuEyUYy9GGZMJkXhFat1BYr1HKS4QlfrZbh1

FZD6OPG0YivwJtPcAKqfThH7RwImXoRaCEQ1WpR0LscwGzIdUIY0VoP4WXPnTRI3LPF1CjQ9VFLgJVT7

QDs6FVM0CBYgAID4IAZ2QhO5WTOfCWS9OGT8IHBnHn
juLio8KXC7MHG9UUPtSLnnWQWaPON6NISbLkGbYXEbUTG8FDEtMaAeUEX6YYY4OMEbScMvOZLoLAE4IE

GiIiNzBMR5PaA1TJTcKKA2ZO4xSJufxaMdRcfELpD5YKIhp1BxHTqgXXt8STwvGLEyQ6Q8ySZdOu9htO

Ahf4VbcFC4r5IHIyVeZTVeRh1qdJ0azBVyEDSnTKhf
Wz1wBF5FKGTkOU0Zu3DqroZoCNM2H4KeyRpgaIleaWR6YGGgDSRuF7RthNGkJpHpWNseGYElU5YeWGox

SFMxANfqDCOdE6ZeflBXEe92QLelVK3mBTRhMLSsIBL4ZOOsIYlwFUElU3a9NHefS6EuU3qfLAErN5Le

lSHoOG2KHKK+Ju8OZH1fs9LzLJfdIXKgYQ1trm3EEK
A7RE3XMYQnXP2MhVHcO1GulaIaO1TnxHdyZN7VsuAfMEquTS3CSETlRs6coR3OxrbfgVlVv7klW4JqF8

5nkS0kF7uzrlGtd8oEetMuVDbkYp2NJHCdXN3TuZJpoSAyBURzCbBvCNRhdDIuZDRdCnP1RXahTQYnY3

cjCYIeoxKzTeGdKYGNKkMkAULzUe5qfSZky3BarQA1
u0FiSJjuPUGTLZrlMV7+MGsnuhXeCkcCZiIwKULhe7ZiAXwwXBp4G8GftDMdyjBrMnzlkRQBVSOvWWYx

F0vrnxn4pUMhXKUiXnZrOHNyJ7YdIWNgWIP0By3+TPivBKO5ndJaoE3BMIXaxFr7HXTL4zn2F6lEomTW

ILWm6CDrIPORUUSztNufP0YDLJGOQXDFWdG7hSCcSa
fwW1IWbmBBkUPYFoDuZLDOYbpH1FaDqoHnVm3Y6bNJ3H7xrFofbQymFnC/8z///3zdee+q2AWBsaJxRh

P7QWOXkeDQ6U8H+yOhZR3eYioMJmxSQ0kC4NRxHfP9ynNalF1rVV60uWW1nIoH1e/1mnn+O44t+wz+3k

OabYjCO7l05naC1XMba6KjgwqxtF88Xb+Nupqo+Y3g
/6U16fvH2/7+1e6XTzhLvqfoSsPbao/cQqP95cwSg17dMoRlIARt4N/K+U+fe/nsO9/xq92ZoQG2z43g

Z+qUl4g/m14ijj3Ce20+3beJ3er7OP0y/6748rwoL4nYgvZGL+50vjzIY97mQftiSwCVlY9fTcz5R+pG

Uk4s/bjlx+tjyQmv/6K3QDzQ+Qz1p/3kIUmxlEklRP
pp914isUA2RyOABKjPuKq7rX3sG9TyDRicPW5sGST2QdVouFB5dP0KE+ph0h4ynXEHpGX31TV6aYxROd

4CX3Z7yJXCuiqV7WJEWnRc68jYxgwEr4g05GS6P6RY1rxsZWS4Uz6Od3H/BmHlwcFbuCPPuzj0Ayj45a

Mk+jOkZY1hFG0Tc4HnAQkels6vHZMM4h0jLhrLOlRl
morvAetvxp+i02eXDkkCs+hDcXfUMxREKZXgfJCuprXaJbqwLrd+hASpdC1k0GkEHRB7ZAZyn0bHRIK4

UctBLr+2NdjcfLJXr3R6bvYyhgtGRGQDVeCS5hK5MOsObRddrbMm+NbS7+tk7ye7BW9tkcJG9oxP2L9n

2BXKx7NixjIhZOlNJQ74vR2YzTD/TS/E4gYdetlxsA
AZmqF7jpgldCmElBgo2sSU+PlbcKwD698lZ1f+eA4PKQ62rT7Bc/FEWNSDShRPSvxUXZOlxyW4UVP9oK

puHlt5XRF/HQRP0jNyNGQ6Rh+vegVs8j0hemKV5rz+mrLsE46fui2BPS3w8tWvsJj3Xg9wV5ym3S/Q3/

PQeNaeMmBxHI0i70Xk1C8SfJdFhuaERCA0wHEocUfj
fCYHyXFyljyulWtXab/Ts/Xw8Ie0AiIX9xezt7gicj/X/aTvrkaNNtQf1U1fciU15Uh68Q95XN/g+yF9

PBaPYJLK0m8PXYO7Oj+QZpAEKZaTA6K6hYR4+Ns2Gw2Y64fJIc4MukAWbbp5+Eqi1yXvse82IC5U95R6

V/mG8nlzm4CsJhV+Xoe9N7Yp3f8O3iaqMs7HS7YbzO
lyW7XnqZGS5Sl9PBgaN8m+4yXvaz/99lSnUx/XAa3iunQ5KDIjzX4eJk0WmCvtIdSJrVr2imJ9q7GO74

J/XX1Wo6blvlbvrpQcCvoP2bljZRH7i5kiFmCiAr52wGczUqcwslJTlyYoGtpRdBdH72ewAMqTbOScCm

fJt+UWnyuzkOTb1VslizL0kDAAy3obm70GEcuf4b+1
j7XvtJ/bjwAz4dl72sI7ZM9eLwdmd2/Tj+gJlFgHz3keNpQ378bg4yy/93lrOhAF9TapfyTcc4u8630z

INzoTxtSon4JBcIFlxmIjz5Ym3ytmlM6IJ+iP0+lGs8JSF+JQ5GhrUUUHezGS3c+oh1IRTk7gHH2I/Ih

+Q3lr97OFvSSJbsqvv0dQ46IRL81Y23w25Z3USOQL8
zIXcO1iknaCrXxL3g2MusiG4pk9iffl8BqY8ld3bDqicbCKs0XKw/9bL7PHVZG6MH4PldW9ZBIzYvr7x

WjScdBI1Cy4LYpOms3O7VMtzRLf5vkc0SrmeS/RF9hHC+j/xJl+uV0O/KUP1ZpwemhwqMmhcsAeIx24M

a5IpUS51tsC+Dt+lk3IwVa8Z3rDc7erNn+Q/PpkB5N
L3xOLxdX1eumbG4GGRkQDFBzstJ8wFUDVScA21+Kg5eW0mmTYqFzqsYKfqtG7awhAnj7eTnO930cjFFr

AOMdxWfCC23cdkPMt+1o1YvbBVdFuoqWdTWJgW6mP1utrcCbnEsUSg79sakPcK0vPoawoUL4B4WXBgof

8NzlN9BfwPzsTYeuatD07OKm8eMy5WzFa6g4u7roii
/CXtlazZIWsrdsTgkK33orUCB3H5P490ZZM64E1kM40b8DcNpaCoaSJ/KHg1RAC6E31NZktNb27jV1zJ

1VzJf5hYEV4SAtJVAgRE3+6jFoiicDbRwSb+S9b4Ldjnw3O9fUCfW1sLmpHSyjaj/9s1y/Sl+k/0A0qD

BX7ogoY9ad1nSJ3LxWAoWMTeZAeO49Pgkm9Tb+13dp
KhaMSLZALZgwiUt85zutdb2+ZC2Q4KzQxdIuaKqlmdwUmE3+ZGxQQ7EvrZ6EiOWPw1AmJsIL5UMjIs8W

TGI2FvYEuVZwQp3kqINiD0wn+9IRmhuKyLUNToPLo3XSXOkVCPnsxEsIHsTNyUzVghJN5JWZBSPwNCvm

AgTUsFD7r/mJ5Z7kbeXw98obhbK0if8qpQfyjk0MHv
FEN3lpE9Se48sbop/QHd3vrcB9wG6yi9LAcQM1MwtqVP63wxqLaaI9Mk7H/x6ssTZWM8/FUwiQmumEBL

Z1ae3Iha8kmsofFOlGTLL+m01dfvp2yzsZZE63yVRFY/1mt2Et6OfroubZexrREYjocIL9Q8g9FG+P5E

a9ypzXfR0pPMYuQGpeIWhBiBJOgi/i+2Y5zTOpfutP
JObBCm7UfnATBE/6F42a5n04YuLR9WSSJm/HM1a+h0q9F52D+W2y89q9Qiddk5i5vchnVGxYwlLD6pme

DMrWhXTTmgFIic02oifpf6OLd30G0hJY3uM6dSrscs2BPSB/qo8EjzAWx+McO1pipXGRlXDvn5TsC8jW

rKEHvrmIUjqppE3IrkMddRljWWBU2MqE+OV+4DXgLe
oGBIHRgFe+YyFzduQkiaHu42HUQDfzzbTcmIEnGZIpam5s+xg0AxU4nD4K18iHfaf6pDCujMWUaNBMkh

Sq6xTOi79L05FFPUD5LAb+SJAYjlDcRZWeW3SR0iMtXJvxGof4L/thW/asBS0e4DV4gToMwm3jjy20hA

7d8S7zkhwXo/wPpctg+hy39SXz/mh33gGB+j9s2SGB
+gE0wYFH5TgcpNgv8ETo5uDB+iDOuWNfjkO7jLTzimgcqmmu60lEVZNeZTVrNV4cl9o5YzH9AsnH9/Gl

U2ZH5c2dUv0MM4dBOV36+Gb3fdpfspHjbVzcKzvVsnIa1wEN6X0EEtjgH3OysfozKadBxaF7oHUAJuOh

8raoUxSTVbguPO5rYvmKDpsgBMUB5HMwY/UV8apRba
Z5QKcKCMlbHvBnBg9Z0fdQeadKTLX7PUdS9BgwImpkO6BV12YwPpkNeE7QYaBfy3w+TJNacIEf7G41z6

48SDKYZZsColOwq43XHVaKzyGgPA9tQEFZE6edMptg7tyMRzQpBBmR6OagU9vmknHT8byGvM0WAQZ96m

Z3JUYMyylc5APnS2VkS1GwC9JLfSukmEt4PaMwl36v
HO3dOeaMq+ZtViuabP++yT+/n4RHq0Nq4xEBUa9cW3n4H18DNF9uxI16m35V7jZ/iR6Uryu0ONdOA+1N

Qgcdy3U4MN+gFLkZNKsmZTySxdSkd/kbMY318PtyBiyLqZ+l6Uz3F1i8ykx2z3JXLy3DUDp84yxohsWk

jclgT8P8azjNkH0+ydzzkvxqfq7H7WCThS8nBLXd3H
xEukUysHYFQf0XmLBDenSXp7q8QG8M1FlslxnhGYR8q/nSezoe804c2cQ1540oSEHX0nwVJStmpRKFvZ

QYHr1bRAfrd7xPc5zwDlPDiipuI4Rj59Z7VVabdAO4kg1d1smgoS/Gg8y2z0Yd4lJBDgBN2XAOG1FpmE

jkb0Bt6IEPp8PnlgtmxDnDY736HmzUjV0RToawKhQh
/aK5uLZqWG5uVnbJARaBOTSIhWkX9u2bcKTMT4F4HfrJtqVHZ4jZdJuWOcIaIk5vtyD2vhMcojzfxUok

AQRUt0Eht2r9NQtuqZXSOe4gYXDp4EKiewLos/DrzD7Sni6JZ1dse0YDNCmAZkZxKEKkv7leBiT1q0Y/

dYfXALAShpo9vM+WwNSr3IjMg/OBdbs7MTbchUUFxR
d85Bd/Ab04wK8DcsyVM/SK30lZ7qsf24BqBclpLKUyBIMtRx83fXsEvulnv08pq4rc3v7m0urtbevZR8

DUN59hNj22jCmRg7Rx9Fi7+6tGHw6clGSCRDFKadJTVaM0S3SRnhPZhe0pvWXQetsbWMoujnE5N2yV7+

SGc7QcPLvedrvxPmMDtrbag7VkcAooyinNEA95aCE5
2zXlCaHKy/z+9vlhUIPGpwoqr20iCemwAlteMX33KFNG36/uN+kd8CK6pb3rf2sm9E0doa0WrZVu4a/Y

Nf9iRtdb5+DRF/G1x1me5XErQIdkmS9wzG2rpqs4Wfi3D75kvUD06kM8dNqBd4JHYvM5+Pkeae0ha+RM

unwA8Tet/vC3SzQvz7Kp2NAb6/PIBPaVvCXIk5LnsZ
YLhaeYDaGNPEzYiG4LEBwuitQyDbuFhCwL6A14sqpE1BBCGwnTZAtb0m0yfE5KtHWu6w9TrRS9nd9DpN

4JwDrdS1O5jcII8Tg1ZFPuAmNKvVBnPqAKzseOAaJSKuG5eka9czg57zgTARAPvlH8nG6Zi/pzWFSUw3

h2+549rY1Kehy92AIZtVZgCQUBAvgLOT2PNOnqwFso
NLF4J+ynbcSlGlSBRJlcDtc7SLRrVLYvW50aA/UpNp2nGpZ82S5SVWVjfwS/dMLz0xvskheLSOlOjhU8

aTHM0STMVC+P4kjfkBJhcq6T1B5tLS8QghrA0HfANlsrcbGuOsmZCKueT/a7/go9i4w7gq+IU/1pINyx

P+THlVfsm0LziWoSW2VVif1WG0HVHeFp60BXs6B1TJ
MlNsiLtJjeTWwQQi1rN/NTN+jT+E4mDS36BeDLBA2JeWxlHh9sozNfieTVuGr++47qZnguP6X3Umkfeq

53lkMbDL0Sj3ifAQcTit9pf7pEDxx23buHI/4THfdvoPv4NE91rhAfg9CnizvTgD0fIANzitr5FexLbr

dl92FQjRtSN2fn2UI0SkXSGBVjhGiKhcn+GjZ6XV96
i1FSbp6PnOARTPjSRXAI0W/gJKtNxcyzrZKrlPydI64bagweSU3x08GxEkiCp2S+CW00+9Bs+H9Mfq2J

Ms5MW3bOsCjlEzYeURzEpC2SFYv/WmjwJF82fi6mSNkGIWLL9K4By21U+7jPnoyG3IewE8hSNvIqGxoT

VpKC90BJ5UXTgWiNbwutbAmR8cO9KCuW2ddbZYOj0I
HIZzHiByJdO/tDgP9OUQgEeBEFnK3cXyweT4Jt9Yn6/aL0aOROePmiyq8lYK3y11yhNOhTQ0a4NBMEuj

vSGmWOFFsKlmqE8RrUUxsaYVsSSv/AhH38IlTNCzDP5imisjddClXb5fuvZLcEjnYa21wg+4ERChXWKf

0BLN2+le9Oy35ozrosT049W06TQrqH4tLf7j9lD1uP
/tHaZhD7YHQWIyeF4glE7sW/ZW6gUhZZZM8746clKe4e9M5jXzGByGSC1DVPNDBVHDUGr1HeE1lDai5S

+Lo+6S5XxtkZdLqMzBfpm+M5yH37kXr7cEPyMdnSdkL7ngIJE+VJJvnwamrI6AvQGoNwpfUWX+BTYAMw

C+wENGHzDAecDT76T/GY0bxok6UyRH+oXwYs7rS6D5
bN6GsEmBpbgIlgWUPOcbKqfpYq7yE8WsFDi7xrfMD+30EZo8qysq51fi/uU/UUY0//kgazDGoMom+5lM

tmJAqbxCdZ2VDPm8SZ6DbVUcuofTPcI8UTRtNF/iDa2h5NPHDUEWG67ZEmylicbhwUE4gu0SwDNiV6Gk

Zl4UHaHulFT5Y5ZscvrbAJNC5RaCE2SQ544lMghRgL
8K+XyPtZmzKzuaYO73rV7JlPDiTsTeJqtp91d5JmRncpEfC5IExpIpU+z5WD0HTcrGDZ150h+L8C+Zax

GTpzs/IJamvs3J3880poiVw9MP7YTjeCrNB4eHHClr8rKhplpxvf+r1Y9tyX3psPcCxikiY9DGW7grKA

l7lAaLp4uucwi937MLLu4xI4pPTWH+Vg8KRAHw+iRQ
tFh2kLcquDCO0uvk+06Q4omJC8xuZvfMUkrb3WK2gmtYs0p92XgI8UlIOE18i5U7JTE7ij/jqzvn/uo0

tRr4U2U3B/cTiFYieR5jhWhpPV7Rj8zQYj8e69t70zXdv8dbKm5evKV1UPbg+Qd6DWEQ5kpKtEkD4iVE

hk/NgZ+5GUgT6VazUoM6+d57wUOnj98eeS+0+AYT2O
vikDF1fPF4ICpYxRmEkmeUebnxNFJ5at2XVNfT17ft0L9+h4JV+YcSUwOk3l0jESgnwsaLSO+oTcs/RS

5RzaEjU1KWLPAlkbGJgWqzVndwfN7gIeU6D7RX90G36cNqt3iPa4wW7bnonMsIQUrx0SGMhLuNCHRWJ8

cF7Sal5DCwwTepct+C9ILs5105xq3v9LexeO5cTcbH
NB9lMhy+ogSI+tonAtO2LWM781cdCVoSOsfKs/i0LgHHS20yERhtuRVvXC75GoT+JnsqDEGZ08PA2GCM

QBY+zgIaW1gCNnhDFGVkXlCHNSwEoZp1Uh3/ksYklpAoEEw8QsHMS/qlTBpPFdCrM4VqTCkd33E1XQrD

wuqu47D7m35RRtN6feJhf+Yq7VPlt8iY7KizZGVroR
EjUzbB3bJoStadXiZP55OjfsN3cwf3hDJH8PGTdzkNUT5sh5NwmTdy6UKsjDdPwgr4Y11NC5HHuDx4RB

I3dnUi8ucs67ak9Hh05uPKeG60LLHfuJ6tE93E3V1EtgusA98YjHBK53FVfnEKznYQs6AockB3L8MbkL

1vfT/XotrWC/2ii3eTW4/zbYlhx+iEZzuHwD/tU0Ue
+J9RaePm2pCIhU0KjlUfN+FF9LwVdm3LcddF2r4ArIhTMLbpTOT3iy/rXuQPgNJtbuaJn6BALB6lQpXJ

AEWwNhaAlYY6nVGjaiBR0whRsKtfHg7nh23IgZnbc5+6k/6xk1GO1sbWGrQsmYhStrIo09db7MN94juH

X6KNjW6+k6lS1XX55oXFniEodv7B8Kdms+5X698F0s
QbKrP8wQ3gZVyV3KPtSBRfR67WrbGz9w79oe9UAMqX2x+J/UXBGXe7iynSt+1IHTYZ6T/YCuEiT8vAmX

mXlozX5iUys5iNcTeyi/vCmn3Ju571r6lcWDml93gFnnm8Z4U5nmAqmbT+D4QKig5pkY/7Js/qoCya3G

bqp+/0bxdh+o8xTq649G2eKuvxAggm0T3TbUe9rot6
08DkkwUH5YbshsrUYlWG8zt1jCzmtmS50Ow0bI4dgE9ELKrM9KVzZd0+6lAOjuVyJ8vXMsc8dBJXJZ2r

qWCX2w/dl+qhQ9G8w9cJWN06NCSzH+L2XJXGVd0H4zKSHOYqEcd3oQkI/zPSyXkYh7jGyGN6fKz+3jtk

tk+0C+0xuKXkFyFixUbWA0rAgEw8ghnK8Ym6ARyIsy
xka38+X5/xMwdvYDLwMv/Q7vDdA4ZoLHrR/DDhkIO/Ye4NUOxYJXRynEhkXKq2RYdtR+sBKC4bFyaYFv

X5NU1yJGCk37Xo2hLL0PfoK9ZDfx3WBUITihzJZ3+RXa6U++BiSeW0FQO/9VBqVSrM5QAG8/+HxQ99IO

g/8ItLuY8KA9/tRk+K8BdsH/WouKDJWt9gvsJ7fYeT
6HGf2jJoskZ02V/+U70DzXJ6scj7gQfZ2z7zC2PEDY1Ihg864L9Xy5htm+BsBpLLIZtqqQ2drao+4Lha

68cq7Z33T2Y2251HQuBQtQyznBnh6V0lIrPd0z5l/KjjD2FY4xeoMs233zR8L9Ayed1fNnaPpPaF7Fzq

nzRrhuFd/KCwIQBUzfMGZ6LItjB9P2BfQ/q0Mw6JNV
/ZocJU9AtKcpLxvxpc3Wt9+Radha/Gem24cuk3xxnlSsFg6p/1k9f88a/CS7HgyJ5PslRv0ZajAIU8Nl7

t0UE6O5bh+XnmaPD5+l/1+/E7f1cHkPNaqE8laOj6tNu6NQW/A3Zub/WH2l3/Gp/hF3i+zIiImqc88Tf

pLVLtckOxQm30EMQ/sxp29+KQQQu7757q290inaM9A
ANHq6dYHbHKnF6lFgSbtv+he4zLMl6MCXUuuhAjVaQZYu4FuIZpPH8j60H3B0U/xv1qQRksiUAQT+O7L

ynruy5EPCv7CVwWT1bKu3GDZRhyY73Bk3yStXIcGbN15u9DIgp/GOKOWB7RVtZToMS8FcNVyFm8GWdZh

3doLq7Vu46GjVf+n3M6T0+83A6uvYFvzhV80FN0Boc
0aR161S9Tw5q5TOVu4I2H4oZDaVc0eOES859Il5/49bMps7+AMf6FpdZPIhEe6kqGOhu5FpWPL61mg+m

y3KZmBuqWjyb75L1y8wjN8Bncny7eXZ69G5cQ9+nADHelz2YMwY+i/jhTlfVzzyc7byp9TtoerC05wlo

w0krl4kvjMxj47IU2dQ0DYFEuI4dM1bGqm9P8L7fAY
eeY++I2hUT6aw1xRsTcNkRbUZzHB/CwMxjhjIdOx56Wn5R6fcZOre2eCGroII4xWA57jm5dlKrrg3srC

PHX1M/50fu00+iMdpCrPvcPdnHQN+iS/OveWLDrsN3CzSdTp5BAjQtT5sbuD9++0qG44Vst80C7cm2/l

d143VfK6ECwwDFt1+7W3zG90N5UuTTtYviI+y9R3U+
n4sZbME3MdfHOWqeszLZMzovo32ukaGQAwczin6I+9pHEZEWEo1jTEoYy28u5HWQeL2jAALgsTaNgG0s

fwRP3Bp3vfgpwXZJAjM4FUIC4QwkWyLF5CvjckjG+uvK5L8tY9401z3TJXTZOJpDRknjbdlluxNlCHDj

/Ro6CpgM9aawz4Cgh04l64bY2hT9yP/9WGgguZ6HuN
8iaMY2CqERs9MUaNlKJFX+rZY3EVdNyCV/NQm5uWlPo2u/b0Yf0xklyM5UYmM2e9YFcLRjavZK7RPOdD

GQEyan5lWbtD5dW6Cf3CWRlOX92diDRCdrwa2qAetRLbWQZgWkuCS0EMPaVlVFvByxyM5UB+xX8GRFaE

alClSuZIKv9UYqELWiNZYcsbAyy5vzSj4bcBM+8PRI
CYv5EmDG+fINRALhgZ+RryASCC/9FDMmi25gNEIp1T7AcgFCYGzLKUlqx8BXxTzZd3VaMi/ioZkncy4L

Re+r+j5bCmOKMp+fHL7/Mervat+cbCUTDxq1T1u7eleBp1F/GnXkfJ40bOl0nTr5PakdigLOVEyDMedYywi

l66nT7Hpi92inz4DB1UlSRljZu+xy1Nls+8rXHjZ6c
hwy7exG1QZNfeJfu11LZxvKhkVsXmsOEdcrdNVn1F8jL7iT+jTAO+SnhwIfi76Cn9dPv/LeZ3WC/Sjvh

G5f4WRc2UVjqur5hsCfNFwY94nN96U+wKR2av6rAJc81+xxYpqNn2orIFbDEgx8iI/pLSZ2haSfEDgBx

mWS2iOZ1KS20TanZ1Wlqm91JJCtU9LnldRSx1bf9hg
0KpYDHSzRtjcPZ54QCMBz5mp+gYXUT19G8Y5Vc9Dll6TKXcO2D2HD/jvxLz+OP3cbjBqhkMnf8iIir5o

9UzmrHQm1lY2ktt6f6tzKaMUnXNPXNP1fvX1QyL8M/g6s8EN9WWAcv/XfPFFS2gy+sA1F6MK+GP2/orq

h7ASVkJvHXRgiO6sX14aah34HVhHRotW2jpPH07LeJ
09QnmEIKxAzK2oBJhmdjVtAoGTx8/nHNvWyUyhIE38R/fd+YxEswl4D4SXfyk6X5+jlylMzBx3JX0mG3

Dq6uTjapfMSNBl5bfC2CB3Lz2ve1DZtKgo4g3o3ao2i1KGX6ohc3NhL6Gar/h5j5nYkMBqBamAiBlPVK

yOp/KZVgdqbqnA64X+6Znqlo94wYRUQquWrVHByCbE
lMAuSy3wjkHPvxD0hcitZvrN9urs+ER1zTErd61lYSccuRv1gNfL9ycu6sX0W6rLcYrgR7I8QJjznsJD

c3l0sOdn6+mWztIxB9VOO2fGhg3ErB/92eF/Uloj2gIfxi6xQ7jZrhzAU02ov0/uuf/kN0h/7E6el1C+

U3WOM/8Tnz7tmkyPpsuonwhwFOcUDGuDwdH5ti8/0K
mMZfE8uGJ/p24Jfy4PIog/5Yx5BK0YtzAXvSyJkGnr1fzzxp1/M9pBRr6D5eP6PZfo2Jnd7Jr4fmjtAp

x6F0gA4wT1z6f/lKZWBpU7dewr78CGIW/hvysM1X/h+h42wTbZz+A4FNgWaOzG6mziUPb2/gcn5bO2aY

fNGzpiWMl5ny3acdlsJxgU9VLS5bY+VySsfyic+35d
jeyxjmN1SWg76etA7i/JINBBReab2xaSVY6CqppdZihq6w0yy3GKQAjq4Xo80cDd6eo6x7h9lQ63Ba8/

bjv+u+3yo6kvi3G2LO3gBJzv2R4ojPZbyTQ+YPZbvuqRwK2FtVsktD3Hxex2NPNCxglkpmSR/Gscuw8S

n+fiszTCj0FJ96ud06tzMDaNzb9R+W2IDNa8K/14pt
u3f3Julju5e6u6pU5YakMz8jnDy2Tpa3lG6JRaE2kLrbAoSf/tdrugSA4Fq91skUqZ6rjFwro7npitHJ

++zs4WjW884MV4rqSytBIZHp8nFL+V4ivtC79k8qMr5p9eElqjNOnesbThdx9LSl270kmjuJupxJwns3

+Al8V40l/aCzjjIr0oIhu81Mp5Miz1YprB+xLxsBf4
nPcyBoB8byHthE4Vj27TUJmabp/hA1AbTVAvo7WNYeOb79fwvRpA3cnhdnoJcwDzv6TSu+v7Dmp/aRb0

+HvkU3d/LZrh3CMFoagMtxQOHCLbJe7kOLvAKL/YUuCDrqMR7JuHT50wzZkkhlXr9+HXYe/EI/wqYA7c

KmRnBNqsIXENkZcqE5LSfxL/hJxbmlBhIG7iATuFUx
tscLxab2+lZinOEgzK5cqt3cifyWh7tJhu93NIF/ffSukwNVqj5qBylJ+ZbTwm8YrgYpE/lbZ3nb4uxS

6i/AjsvU4uIb6MJISD6vy+p/35TGdUBi2G4RNeK6LNX8fjStZlyxi09Q+PbrhY0TGcfspE5QveXpmxdY

L90N96sDXpc3gE4ojUyZU8MsWKl4J3+M0lvk+s7hIf
gb56a82aeIUYp8nGVcZLnt5adOHiXxc9L1MyKcZ/V03PQc6076T7x+3mqjawDi4fessSP2Q9232y5+dj

vROi1aH6emzB/DNurTggxZegJ12DUO5WzLev480Q0g906ybLWuk47vWgZI5IYgKbi0pyNZ8nmXul90ZF

93jdSZtF3kIr79nBuL/h33vf0zgupZiLEy9BqUqbyg
n+U0caXeh7t9X9ZS31L65pTaxzF07mNmcu08wdq8S1WH9bpymiOf5e5VJJnl/BaqzZt39bxvffZVoA3g

f3nkmM6us4n92+Xu8hY3Qea+gXNep04rw3y/U6X3LxvnqogRbbXny8GKmZ6xv0/2HO/p587GI3Tk9XRM

S6lnWqmjP4bjfWadA3Zaxb5D7qIX+miXF9aJHni5Z+
CUCewHJJhVNdR2znX8FmhDz1g0CHI+4EGfgppyZ4Xn71uE7i/7s5wkMXUYpu+aatg+XZ8QVnKlJDo0Ot

uhwrhMwq5yIne6311tWDubbKKps9EJk1mqeaSdi/m4cCGiRio03Q9ErTIjUKIsj2M2bB6bwRwoTKFStk

8svUke/21q+7ePtld8V/UeuVIYhnG+S0ne/do0n0Fx
vZZ/wmcw1H6xbayj8l8x60w0ucjX76ptPttoaelSwkIj2LK3ATd9gncqbpS7oGRf3izi/eVr/5f3cx4u

qDqJ++sx0VEoen7U5uTOz1ngwF+vLKyinLbq5sDEzMDMR4DEnX7FkTpTOlEa1TtgR+Plgc41Twxdm/O8

T0Em1nujabI2QwSO8Fhs3PAo5aiSeavP2LxVIk3w4w
Bv+ugKF+O+ViE9+rUXeh+eHlqPbPwL3IwcZsSl9PuYJEPOss6BCK0Mf7uBY15hR+XL2Jswq5qM31Kq3J

/SamSEtEPZz+fVUWh+xYrUlj7awW8alKniMjfUH+IGHZw8HtBhZyIkBNDWn1uNU11ds0cnst44vhK0jF

cJfUBqbm8ksxrN4YrqoRZhFgxOj5H2KKH77q0j8LWb
Y6B/BiHsuE/wStFl/Owx9L8OAusHn9O05q/ntej6XOlrx3ZIrN/RALvj/CBMh3uv6W5Dd62RFaEMmSg+

Agf05Z8xuxX4Q3W+Lkh8u6f+7BQmjUKDGoXwAD3bdlB3x7Y+t60G6GurnpVdJOvjRbAG3hsptE6OA8Qq

LB9aJ/TV1R/xjpv5CwtDyOqMTGMkW+deAi7dZwG+VW
bcoD4dGUJBdcioa8hN1soPuSvvxAq5lsEvkBglFKIu7r9B8N8ius00doB5qZ2PrSF7yG3c8/7WteDx9/

FNuN01k2Z6rGtou88TV6HqzLesvE62d0s5xSZxCDvMkPol0lkDd5YTO3lLmCOY2T59f+xV2zlofMlxlQ

NajuGKHQZhfjOnGCr4EXbN3OCaY6mRfsS72UTCKJM0
3O50vJpxky/mk82bWs5BAFvocMROut1IO49sG/YqvL4JgEsCru36uh4laRF2Ud6UA74y/FMzw0LeqpdB

VUX5YR58nvTj0lJojr704B/5O9ZKgOMBRRAuFYvdmKZ9cWvtwHnyUo2FsQN6Bb31S+rKNYPzUEzGOPu/

bdfliAWUkFOQ2t0Pf73rFEqSqMsp71oxOe9QUW6Xs2
rNQnhAxGi9U21Ws96x4wqwAiGAqKFUzYBumd6IQzK63h4AXwShbl1ngbBMpw2xuDStlaW4eADfl5tiAd

PEBHb11OSZ2DzWfQF1Zm0U0MR4/vrsv5/OD5dOMweOJr71c6ggD2cj5TPZTmY/ja0lG7fZRTZfHztfsi

v5OAnVx9znP+71UgH3RNO5SOTcfQWzw3U+/9/9L35n
dPyaX8UF+1nG8dzttvwRmUItrSmDqs2esT41wdGOSF3VEnpzE4hc/AHd7JDGJ+W3+MPDd1IOTzLAxBbJ

pPCm51TtsE5il/hf01R+2VNle/RTo2jq1n4xSBS3vMr2mJBIBWfnX+hrjnW92Mol1a3d3+wiwChbU9sL

bpAG2cbe8tk/k3zDPM/zNg3VtwKa2sF63SE9xPS7n5
W450JwuoAv0nv9fEBPkHwVnpM1VFHnmq3Q7NcqIrKQJl+7iWZ8UnyIN5XypvlvKpepWYBhgLNr0bemv1

ambzyg5s3Egbi2A+KYaIiJdkQ7xf3B/wDn4Dd+2sHPPAPaqj4DTbp/PX5LDsaNyBvhxMAxW3C+gz9nmp

MSvAHqd5DQA1e3TNnLSx9jvh4loI0wZJr1l+57V9+G
+P+LdDzpq/k/u4TL0FOxSYRCKTC7Fh/LUqfy133D5zwVqhQ6quyRmZRf1fZmesllhbXHBm3uAFT9i0ux

2q/IIjuZflKZlErhTYwxI6MBeaBN0ro/XPU9xFRS0GaJ/9BHbydush5NvqklMgOYzFdcNY57Fay5hIwU

+Yzls4UOU/hnacWkDQbc8XYefxLd7+ezr16oLxku3O
0stqto0DEkFFil9p46IrEnay+b3dRuybGkLwxAdE44X8IUEHfOEVGgtMnnP1PZSNwOIRlPB6Xg6yjVPe

pweGDOnC/sAUBEwJCygN+RGUfyZPyF+p3PpxdiaLTL6GD1ouCns2Pwm/P/Pg7mfcV/CHDuQG/MCl1nhd

mGnvOqo7xp+Lg8Ox06uhKjZFVYNLm66Acx1/JEr9ea
M0d1kh4wwnjF92u+tmTH924Z6fvBl0ZKiL7Du1MmvvQm4AAGPHxhJ0UunzBmIYmpQOhh6jCEZDMlV7oI

beKeRXM7szupCFG8x9aI9OzJxVs2BU/2nzM4ZYkbeqQWlCUSgZ/wx+L3aNB204W0XS0ir8iDUuxHcxtZ

fy4eS2wTIDyXpUdbAZjx4/oaGgd2tanwqVgcmad4E5
OUX73E7dZfhAnI9XAY2suk+ZDuLZyIDdg3FeQ5EvALLF4O+gCcnFJcUhsRhF+vlN+K3s97P/S0ry0Ls/

MVnKWZhmiueRglreAkGR60GjdfDHA3tagBYzzdxGZNvXYZcWgJtVTcmnBYMxx1doUEVordbHiTT6yUE3

FatvanjCqs5KBlLu82WAgoHuMVt9xQfHYNKCOCNac+
cQVEYYtDPqwgocZEIPq9KLbuNDBYOzpVfOCOQMlGOuTw3h7VSbZGzlk/mQ5VRYV/31zJApToiZFvstsZ

M7FC5XZ7ijQxDkwJd77w8xmVFrTyGYba+zS+kjpfNvVBgRC7Cby2Jh1cubcYYN/lkx9MF01DlqGzfGqY

OKbb+jGvhgnQFvRdoEhbCOAliuwxmsZx1W+uSlAfTk
audBOmAwa4t7Q2oxr+G26Z5UhaSSAN+b0bdLDeLxRfi145rFfwkbIIuTo95pMV/umBQFg2x2BQx7AOZp

Yu1/XIOhlNMdDZHx1t4Orkdy0oHNe7Qpdh1hzv8sGv3EkaRAh1UrBqaWm1IkoOGWuHQmjUZ2/s5zcsTk

2buFKiykbiAjoOmt6EXi8EVdFgy8SM1izxTy+nNCVI
SRmYa/HhxYi3FFhqaTEfenGeU/A5geagXO9AhG9XTl9kJuIwUgZKV+lK1iTOe7unzsiAqkze9B4JSwlO

ur0fvNxq1s5yz3P7Hlp0DT6srh9DOjeJPwIVXIlhNKyeEYfbY9PuImw93bryGW8bAaf4T3L1gqBw/0uw

wNSKjP9ZLxorxMNxYTtqL/5w3rNrCT1SFJ3z+tFaTC
lC6GnTe53z7TxLcZQwBOFFZRe1YKAlsKVK2yAyldp8X1kJqB7Ocq8q6VyJn1h8kzR7lgaLhCK9ivAdZW

M1MOBVVoWK1S8o9mEPtv45XjIBhKMnyAwSfmwkOR44WQNIy5djVKXC9GR5qqpez4EoO15PSS1pUmOYsL

5FTC/55W3X6SNKqVgQidnXmPyDw29RcWf1Lf3OjHqz
6lo9xX5TIBZpbX3mA+ya0tlpDYPJYHf1QqH6v1lHkuJLQ4psVK6RsJx6UYQyMCROiZlixlrg/4X/wvzo

Pa/2gefm2V/gQlJZ2BML5fs0UjSLLcFSwxzzRcTscHYjZsMTVsw1ViJRkfSDs5N0DwcOIqusBfDmnmvG

HTJSHuWTVlJ9uqsaj8rKYhXCJ+Av5CKSLiqKKfQS6T
AnePhRGKBwOeFZxpzc7iM3QP5hrhbCHWPpLYP6H+PTlZt7PuUnwqeQ2muu10UTrgJbSycf67RXhT4qqE

hnpZ7RuezALGr10pUFhMDtSFEpdsl8Aj5ZiChz8L11e5zn5r2b7aaEOuciVcaPhRmFwdjxXTu9nO0b25

0uJ4D+bCGDQ57XAcGs85Qs/CBtPtPBpgrtr84G7Qu5
DWaX8E/UwOgaeO/Uu8NQo3DQXR4WHUndgzxWMnBQeiEGz/hKkPEGTAt5Wpdmuw+ON2PM9lptVYP6f/pW

Z7cdLiitKr08EqNtxARXsenl3kO6jd79hD6Kr+v2U/zjjVRsJmYBU9cwTfnT1UDP8sj0EfLMvrCqTgBC

5zoi5DMRuQKaSzL0Y7eUJgTl2abAVjw4RxcYM9d6BL
VvAjL7ZjkiFDZU2pH2TUSYTYUikLalfedY8XBDWpNQFfJO55QVmdGX1QUMHLLBpbiCJkSNZ0N0Mdo5Du

ltHdYTUaMw7HDHTpDeoxM8TwAwYHYnWlO6IexmFUHj25JMisSI6dNAVeRPQrFFI3VHTpTUbwUO2CbYOw

jGMKqwllNNQzI0X1VM5HSUAZPyFvI9DxmjFTbQobBh
AyMCAwIFINCj4+OQxphkBsJuoDKnHqSKQio4LzWJx4NL1IAEUlGPksAB9Sx886X8G0MeT7kPTzY0cVTb

2caBL8vYEkV0Njq6ULk866B0JHGTEPKzvKpuzovE8WLRDJb6oACE3gjH8OWJQkoWx4yX8LKOBpG8vDN7

uzsRBwEE8oqrQ8TW7QNBtul1UhvYGuMHVsAiStX29j
ZJExoG6yNYvFZXSvnYk2oZjpW9B1zMOcED0fkuXxOY6+GS4GTZPzQv3dhPFpc8FkfPS5c2InQqPbJXQI

LNoeMA7ZPwRdCPXmT6pjWHCcEcEgAWFzSnNyGyH3QS8rXLi2GFmlJLRlXMPzFNv+Fp1CXJ0jf2FfQXpf

LFCfUW2rms8DJWeLOoSsU6J7mKXzFd9leG2UjHQ3iW
VmU5W4xMZhM6Wrm3IPi502A1XOJCPJOzdMoxaihQ1PqtDoZArtXc2RHUTcyGa1bF1FORlxTD5MSBBxCX

5pXR79Ms8biONzZxGuXCRbGh0IRiMlG2ZlYS2cY05iRAEfXMSbXPFCTg4+ZQtlsmDoTjgCHlG3KHEyw8

WeVLhvLD4BGS7yo4JqERwvAPYig9JjLTj3PT7WNHNb
VPOdR7FglIShC6BYBw0IKXk2U5dkHMqzQw4KkKAtTIPkYIaoSJ7Fv078XPu3VI2JPRHbOjGsLMSZGmFa

KFTrDbDuMSotOPXBIUaxPNIuC3SrUHC8JZTnCb7+SRpqLO4UA1KoMDM2HDb2GH7+GFstFV7ThKPOL5Sl

hNUmIFotT0NAYP8PTMY5PX3IzADtEM8BkNUPA2LckZ
SsBf9yGHFqh8KdQf6lF6IDICCKLQYeRUyvEKbhZHQaKGc4W5M4YINgM6ITM912bYVwvVh2Qe9eO3HGDA

bZDmCkCMpwMHywBBQkNRg2D1G7SYJjZ6XWN0DlPsXremGgV3B+QtZvXLCVSL3CRFURSSw4H0G1wCXmP9

R5vThFmMO0HS3WVL0FzXYkmIBvy55+JzTTVyWxQ2VV
J5WWLZ7WNRz7L4I4oANxC4K3wHaTaCL1HJ2QFT5EzWjwgMXkWk9vRPmhRYC+Mi6GYn1TOaZfNV0ecr3C

PeSwMZUoThpRItm3E4gfvsh4aNTyMfH1I6Q4AhW3yAUuYS5YQ4D9cPUrEEK2LKKrnHJ+Ve1Pb9UqJYEi

DIx4D3ulRJRhBKLuFqEicX05T++4bhusiFN8L5x3RU
EUjBMkgPdAmtUsI9nPWAV8p4E3WEw/Xh7BJUL4cXu7pTZwRKBhBQc5jA4plFr7MyPsMG13VPBfQKaixX

0xYbc5M2Cgk9GvQs1uXg5iuASbJo1BGyTqBBA5ilMxDsRKDgV3yMtddumsYJR9W1h0sRJ4Iv62o3ngdf

Doq0NtSrH0XWhiXUDkKtTmukMiSJS9tvKzxO8nrnDp
Pb2JZHDyMWdduoDeGfCAKr3UYpXtCI66QfcalZ9sdNB+DQogICAgICAgICAgICAgICAgICAgICAgICAg

ICAgICAgICAgICAgICAgICAgICAgICAgICAgICAgICAgICAgICAgICAgICAgICAgICAgICAgICAgICAg

ICAgICAgICAgICAgICAgDQogICAgICAgICAgICAgIC
AgICAgICAgICAgICAgICAgICAgICAgICAgICAgICAgICAgICAgICAgICAgICAgICAgICAgICAgICAgIC

AgICAgICAgICAgICAgICAgICAgICAgICAgDQogICAgICAgICAgICAgICAgICAgICAgICAgICAgICAgIC

AgICAgICAgICAgICAgICAgICAgICAgICAgICAgICAg
ICAgICAgICAgICAgICAgICAgICAgICAgICAgICAgICAgICAgDQogICAgICAgICAgICAgICAgICAgICAg

ICAgICAgICAgICAgICAgICAgICAgICAgICAgICAgICAgICAgICAgICAgICAgICAgICAgICAgICAgICAg

ICAgICAgICAgICAgICAgICAgDQogICAgICAgICAgIC
AgICAgICAgICAgICAgICAgICAgICAgICAgICAgICAgICAgICAgICAgICAgICAgICAgICAgICAgICAgIC

AgICAgICAgICAgICAgICAgICAgICAgICAgICAgDQogICAgICAgICAgICAgICAgICAgICAgICAgICAgIC

AgICAgICAgICAgICAgICAgICAgICAgICAgICAgICAg
ICAgICAgICAgICAgICAgICAgICAgICAgICAgICAgICAgICAgICAgDQogICAgICAgICAgICAgICAgICAg

ICAgICAgICAgICAgICAgICAgICAgICAgICAgICAgICAgICAgICAgICAgICAgICAgICAgICAgICAgICAg

ICAgICAgICAgICAgICAgICAgICAgDQogICAgICAgIC
AgICAgICAgICAgICAgICAgICAgICAgICAgICAgICAgICAgICAgICAgICAgICAgICAgICAgICAgICAgIC

AgICAgICAgICAgICAgICAgICAgICAgICAgICAgICAgDQogICAgICAgICAgICAgICAgICAgICAgICAgIC

AgICAgICAgICAgICAgICAgICAgICAgICAgICAgICAg
ICAgICAgICAgICAgICAgICAgICAgICAgICAgICAgICAgICAgICAgICAgDQogICAgICAgICAgICAgICAg

ICAgICAgICAgICAgICAgICAgICAgICAgICAgICAgICAgICAgICAgICAgICAgICAgICAgICAgICAgICAg

ZPSoGXVzZEOzHIKcXOSlJOOrGBSrCVTnWWe7N9nfVO
UnDKSdCV1rKCo1Ph3+CDfEAtOrDDW9qoEfiZ7DBH3dn5NyYSxjFPVny5LdIFv8TQ8EQIMtBLhsLY8GMJ

hcvl2DCEKsXYRoeETGa4qcMbWpBPX2HFWkGkzwSS5DNVHdF2xvjlFxWRFsFFKYJGlkFFRKHXpgMZCJZD

OfDCMaQeOeKgDxELRmMQ3RWZHwA893cxNkPZ2KKj6J
CwJwKA5deg6JPqulCATzHgcMJvx6XMeaMC5PaITkpQTgLFKzYBDNShPvQ9kyu8VhRjtdQKUOBQzyWY8I

m5FioDOmAHd+Eh5OJG4ox1LzQEjeDNYpLB7rbi6RJLqFPiKeO9AsiOreVTTbd7nmZHNbRW3sfDZaKYN5

NSgbS1bqnjoiYuFAc67tAOzdOIUzXOUbSGVcFmJuIx
KkJAZnEWhuPBDJULlADnQnL8Nqq4ThPaF8XXGlMrJcILjpYBShWpF1RS98pReaYY5GCGTwZVYoJH42AI

A6LIVkMm6BHv1KGdQzAJ3gul9HFulmMPWbYyyOFdv2VQhlID8BjGSpL1LegWUmc9hELzKpO8HZWDJ9VN

HmQr7YDPNgOzMaVUWxLPvcII9wUSMiQVGHkNaxupM0
BH7NQB9izxWuZJ9OKwRdQl8pSu2EXuXhL8EqM2RfEHZdGJNIISdvSB8BYEdjXN3nKZ1Ry0CQeOVtsA5e

un8DWJEsHAPdUyakfp8YJugkC2H8dVvyMEXiQhkmHUCNFMrvAV8ECKBpCWE4KTZtAxSvHTFZYePlD51v

ZU7UX8Jlt32fEuZ1KDCkJvCdQLffCA31iCwxvmLurQ
YosGhsWX6HJo8+XUhhshPaTokMRimqMOAKPsZfIgADKrRxMXFuPPNyMGCwEsJ6YeJdVx7JWZMlIDCnEJ

XwIqMgIEUkPZGsEHwdYXWmUXG0HcY2VCCjNOMrAI3FZoYjGKBcHiW3LLvlHHCwFMKjsm2DVREiYLJxIK

E8AxNvIRXhZDOlAGvaJOGvKWNjSKyjXUAyOXDrEU7P
YuFxCGAuFOEpUYUkQRHgEZGewv9DNBKbMMCjDLtuSAOvRMJpJSZzPCjtIMKiMHP5AWXuQXAgPAOnEB6U

RkMqSHSbPYmyOGGiLCVjFNQhwt3LSRIaXVQeUJGxEGQbPXIyMXWoLKqjDBQfXSCvDDp8WEFfGGFkUW4B

PqYdURYyEBU9EBUtRMRyTQCbko8KMOYyWYBkGwI8Vk
GlPPLyZHTuMWgvVRHmRTHgVmg0SZCaWMSmWX8CGpMuXAJeLSQ1LTutOCHkZXFmmn6VHECnNMLaHHv1HP

QtCCEiKGVhMFylVUVjOWE6QXU7EUOrNBVdCM6AQyZmVNJbLOU3JVvqIJBfKIBraa3OJOXnMVDqVmE1IK

LbWGEhQUXoBPyfCPHkJZD2Pma3BIJpNCDoZR2SDdXp
RMLwGfy2EEekMONqWIPwun8JPJTrJHFiKRT5XpZsZWUvTNRkCOdcZCQpRII1HzCrQSTfWIIpFO0PGlRf

PICiWja3BBQnIOSsCIYlvo3XMVAcDJKhBMf7KqSpITMvGMRwBKtiUZBrWMU3VEY7XMSoRGMzJF1JQgBa

VLTuBlW6KOInISMqVUQrmq4CNEGpDWMkHWA0OyHyYA
IrMPIrFZdnYUAkQVVhONK3YZOcFYQwSF5EOgNaUPvlVIILQtj0NUhpX0w6GWKoJP9KI8Bch9ZfJxwvNP

VKNLteBT1rxiFlQTTpEh7AG9mWVqrcUYBaFXhuVOIuBIpdKYX0ALSaQ0YkFBL2JUEkOUMrTo7vLXUhEx

Z1KnYfCIV4GzD4ORX7FuM1DiE2PKH3KXCvAQE8ScDq
BN3UHl1HQuT3DSY0cYPmCy2NNpDlNuxZXoSiVP5ECSb=









                    ID                  Date                Data Source

 

                    [id number]            10/23/2020 01:53:00 PM EDT [facility]









          Name      Value     Range     Interpretation Code Description Data Gregoria

rce(s) Supporting 

Document(s)

 

                Erythrocyte sedimentation rate by Westergren method 51 mm/hr    

    0-20            Above high 

normal                                  [facility]  

 

                                        @Reenter manual test result: 51@by Yuki Cain at 10/23/20 1353. 









                    ID                  Date                Data Source

 

                    [id number]            10/23/2020 01:55:00 PM [facility]









          Name      Value     Range     Interpretation Code Description Data Gregoria

rce(s) Supporting 

Document(s)

 

                                        Alanine aminotransferase [Enzymatic acti

vity/volume] in Serum or Plasma by With 

P-5'-P     23 U/L     10-49      N                     [facility]

ital  

 

                                        Aspartate aminotransferase [Enzymatic ac

tivity/volume] in Serum or Plasma by 

With P-5'-P 10 U/L     0-33       N                     [facility]

pital  

 

                    Alkaline phosphatase [Enzymatic activity/volume] in Serum or

 Plasma 81 U/L              

          N                               [facility]  

 

           Bilirubin.total [Mass/volume] in Serum or Plasma 0.3 mg/dL  0.3-1.2  

  N                     [facility]                  

 

           Bilirubin.direct [Mass/volume] in Serum or Plasma Less Than 0.1 0.0-0

.2    N                     

[facility]            

 

                                        Albumin [Mass/volume] in Serum or Plasma

 by Bromocresol purple (BCP) dye binding

 method    3.7 g/dL   3.2-4.8    N                     [facility]  

 

           Protein [Mass/volume] in Serum or Plasma 7.6 g/dL   5.7-8.2    N     

                [facility]                         









                    ID                  Date                Data Source

 

                    [id number]            [facility]2020 [facility]









          Name      Value     Range     Interpretation Code Description Data Gregoria

rce(s) Supporting 

Document(s)

 

           Aldolase [Enzymatic activity/volume] in Serum or Plasma 2.9 U/L    < 

OR = 8.1                       

[facility]            

 

                                        THIS TEST WAS PERFORMED AT:[address]  [address]UTHMGR MERATI,MD 









                    ID                  Date                Data Source

 

                    [id number]            10/23/2020 01:55:00 PM [facility]









          Name      Value     Range     Interpretation Code Description Data Gregoria

rce(s) Supporting 

Document(s)

 

                    Lactate dehydrogenase [Enzymatic activity/volume] in Serum o

r Plasma 133 U/L             

120-246         N                               [facility]  









                    ID                  Date                Data Source

 

                    [id number]            10/26/2020 04:36:00 PM [facility]









          Name      Value     Range     Interpretation Code Description Data Gregoria

rce(s) Supporting 

Document(s)

 

                          25-Hydroxyvitamin D2+25-Hydroxyvitamin D3 [Mass/volume

] in Serum or Plasma 14 

ng/mL               [place]                        [facility]

l  

 

                                        Vitamin D Status         25-OH Vitamin D

:Deficiency:                    <20 

ng/mLInsufficiency:             20 - 29 ng/mLOptimal:                 > or = 30 
ng/mLFor 25-OH Vitamin D testing on patients onD2-supplementation and patients 
for whom quantitationof D2 and D3 fractions is required, the QuestAssureD(TM)25-
OH VIT D, (D2,D3), LC/MS/MS is recommended: ordercode 37158 (patients >2yrs).See
 Note 1Note 1For additional information, please refer 
tohttp://education.MongoDB/faq/VSE557(This link is being provided 
for informational/educational purposes only.)THIS TEST WAS PERFORMED AT:[employer]  [address]RADHA MORIN MD 









                    ID                  Date                Data Source

 

                    [id number]            10/23/2020 01:55:00 PM [facility]      Value     Range     Interpretation Code Description Data Gregoria

rce(s) Supporting 

Document(s)

 

           Phosphate [Mass/volume] in Serum or Plasma 3.5 mg/dL  2.4-5.1    N   

                  [facility]                         









                    ID                  Date                Data Source

 

                    [id number]            10/23/2020 01:55:00 PM [facility]      Value     Range     Interpretation Code Description Data Gregoria

rce(s) Supporting 

Document(s)

 

           Magnesium [Mass/volume] in Serum or Plasma 2.2 mg/dL  1.3-2.7    N   

                  [facility]                         









                    ID                  Date                Data Source

 

                    [id number]            10/23/2020 01:55:00 PM [facility]      Value     Range     Interpretation Code Description Data Gregoria

rce(s) Supporting 

Document(s)

 

             Creatine kinase [Enzymatic activity/volume] in Serum or Plasma 62 U

/L              N             

                          [facility]  









                    ID                  Date                Data Source

 

                    [id number]            10/23/2020 01:55:00 PM [facility]      Value     Range     Interpretation Code Description Data Gregoria

rce(s) Supporting 

Document(s)

 

           C reactive protein [Mass/volume] in Serum or Plasma 4.2 mg/L   0.0-5.

0    [facility]                  









                    ID                  Date                Data Source

 

                    [id number]            10/23/2020 01:55:00 PM [facility]      Value     Range     Interpretation Code Description Data Gregoria

rce(s) Supporting 

Document(s)

 

                          Thyrotropin [Units/volume] in Serum or Plasma by Detec

tion limit <= 0.005 mIU/L 

1.67 u[iU]/mL 0.35-5.50    [facility]  









                    ID                  Date                Data Source

 

                    [id number]            10/26/2020 12:01:00 PM [facility]      Value     Range     Interpretation Code Description Data Gregoria

rce(s) Supporting 

Document(s)

 

           Myoglobin [Mass/volume] in Serum or Plasma 32 mcg/L   <=66           

                  [facility]                         

 

                                        THIS TEST WAS PERFORMED AT:[facility] [address] [place]  [address]ESXKPMNAML DIAMOND MD,PHD 









                    ID                  Date                Data Source

 

                    [id number]        10/23/2020 01:00:00 PM EDT [facility] [address]

[address] [place] [id number] [phone]  NAME                          
                    SEX    PT STATUS         ACCOUNT NUMBER  ROMELIA CORADO   REG REF              [id number]    ORDERING
 PHYSICIAN                                LOCATION                 [facility] 
[facility].  [facility]               
       [id number]    ATTENDING PHYSICIAN                               DATE OF 
BIRTH            DATE OF EXAM/TIME  Cecille Maguire NP                            
      1980               10/23/20 / 1255    TYPE / EXAM  XRAY HIP LT 2-3 
VIEW W/PELVIS    REASON FOR EXAM  Pain in unspecified hip  CLINICAL HISTORY: 
Pain in unspecified hip      TECHNIQUE: AP view of the pelvis, AP and frog-leg 
views of the left hip were obtained.      COMPARISON: None available.      
FINDINGS:       There is no acute displaced fracture. There is no significant 
arthritis. There is no bone destruction.  Mineralization is seen adjacent to the
 anterior inferior iliac spine.      IMPRESSION:        1.  No significant 
arthritis.   2.  Mineralization is seen adjacent to the anterior inferior iliac 
spine, possibly related to chronic rectus femoris tendinosis or subspine 
impingement.      Note: nondisplaced/minimally displaced hip and pelvic 
fractures are often not visible on x-ray. If there is clinical concern or severe
 pain/inability to weight bear, MR is recommended for further evaluation before 
ambulation is attempted.           Reported By Nick Crowder DO on 10/23/20 1300  
    Signed By Nick Crowder DO on 10/23/20 1303                          
______________________________________________   _______  _______  Date        
Time  CC:  LOGAN Win  Techn: BAIAB             Trans 
Dt/Tm:             Trans by: DT             Prt Dt/Tm:    [id number]: Total DLP =
    0.00 mGy-cm  Fluoroscopy Time (in secs):    









          Name      Value     Range     Interpretation Code Description Data Gregoria

rce(s) Supporting 

Document(s)

 

                                                                       









                    ID                  Date                Data Source

 

                    [id number]        10/23/2020 12:59:00 PM EDT [facility] [address]

[place] [address]                

                                  [phone]  NAME                          
                    SEX    PT STATUS         ACCOUNT NUMBER  RMOELIA CORADO   REG REF              [id number]    ORDERING
 PHYSICIAN                                LOCATION                 MEDICAL 
RECORD NO.  [facility]               
       [id number]    ATTENDING PHYSICIAN                               DATE OF 
BIRTH            DATE OF EXAM/TIME  Cecille Maguire NP                            
      1980               10/23/20 /     TYPE / EXAM  XRAY HIP RT 2-3 
VIEW    REASON FOR EXAM  Pain in unspecified hip  CLINICAL HISTORY: Pain in 
unspecified hip      TECHNIQUE: AP and frog-leg views of the right hip were 
obtained.      COMPARISON: None available.      FINDINGS:       There is no 
acute displaced fracture. There is no significant arthritis. There is no bone 
destruction. 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 
tendons.      IMPRESSION:        No significant arthritis.      Note: 
nondisplaced/minimally displaced hip and pelvic fractures are often not visible 
on x-ray. If there is clinical concern or severe pain/inability to weight bear, 
MR is recommended for further evaluation before ambulation is attempted.        
   Reported By Nick Crowder DO on 10/23/20 1259      Signed By Nick Crowder DO on 
10/23/20 1300                          
______________________________________________   _______  _______  Date        
Time  CC:  Nick Crowder DO; Cecille FNP Ting  Techn: BAIAB             Trans 
Dt/Tm:             Trans by: DT             Prt Dt/Tm:    [id number]: Total DLP =
    0.00 mGy-cm  Fluoroscopy Time (in secs):    









          Name      Value     Range     Interpretation Code Description Data Gregoria

rce(s) Supporting 

Document(s)

 

                                                                       









                    ID                  Date                Data Source

 

                    [id number]    2020 12:34:45 PM EDT [facility]

 

                                        Vital SignsBlood Pressure:     121/90   

  Patient History Medical 

History:AsthmaDepressionHypertensionDiabetes, Type 2Surgical 
History:D&CTonsillectomyFamily History:Social/Personal History:Crack-smoking 
MarijuanaMarital StatusMarriedChildren:2 biological (10 yo son, 6 yo daughter), 
1 step (10 yo son)Occupation: NoneSmoking History:Patient has never smoked. 
Current Problems: Need for prophylactic vaccination and inoculation against 
other combinations of diseases (ICD-V06.8) (GXO15-K22)Compliance poor with 
medications (ICD-V15.81) (VZX82-Y33.19)Anxiety (ICD-300.00) (ICD10-
F41.9)Depression, major (ICD-296.20) (SRV98-F83.9)Obesity (ICD-278.00) (ICD10-
E66.9)URI (ICD-465.9) (AEM45-C42.9)Pharyngitis, viral (ICD-462) (ICD10-
J02.9)Mixed hyperlipidemia (ICD-272.2) (WYT39-U51.2)Diabetes, Type 2 (ICD-250
.00) (RTP25-H02.9)Abnormal Pap Smear (ICD-V13.29) (ZBH96-S83.6)Hypertension 
(ICD-401.9) (SES69-Q09)Asthma (ICD-493.90) (KOO55-Y62.909)depression (ICD-311) 
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 
(TQI33-A61.10)Problem list reviewed during this update.Current Medications: 
METFORMIN  MG ORAL TABLET (METFORMIN HCL) 1 tab tid; Route: ORAL* TEST 
STRIPS Check BG once daily.1ST CHOICE LANCETS SUPER THIN (LANCETS) Check BG once
 daily and as needed.* GLUCOMETER Check BG once daily and as needed,Medication 
list reviewed during this update.Allergy list reviewed during this update.No 
known allergies.Past Medical History:(reviewed - no changes required) 
AsthmaDepressionHypertensionDiabetes, Type 2                           Dental 
Chart:  Procedures:Type - CDT Code - Description B - () Periodic oral 
evaluation - established patient (Performed by OMA Kohler, Brian) B - () 
Prophylaxis, adult (Performed by Rosalia Glasgow RDH) Treatments:Type - CDT Code -
 Description T - () Resin-based composite - one surface, posterior on Tooth
 # 18 on Tooth Surface L (Performed by Rosalia Glasgow RDH) T - () Resin, one
 surface, anterior on Tooth # 7 on Tooth Surface F (Performed by Rosalia Glasgow RDH)    Existing:Type - CDT Code - Description[E] Decay On #18 Surface L, #7 
Surface F   Chart Notes:jone (Sep 28 2020  1:05PM): [facility]Adult prophy, IO/EO
 completedExam with Dr MORENO-Patient brushes twice/day and is flossing 
regularly.Generalized marginal biofilm and marginal and interproximal calculus 
in sextant 5. Franklin hand scaling well. use warm waterGingiva- pink with bleeding 
on papillaOHI-brushing twice/day, flossing Patient was cooperative. BP was 
121/90Pt is a non-smoker.NV-6 months recall, restoAdditional PPE requirements 
due to COVID-19 in the dental setting, N95, surgical mask, hair covering, gown, 
face shield. Rosalia Glasgow RDH by jone (2020 1:05 PM): ; henrique (Sep 28 
2020  2:46PM): PEcc: noneMed hx - reviewedEO - WNLIO - poor OH, caries, gen mild
 to mod gingival inflammationDx - caries, gen mild to mod gingivitisTx - 
restoration, improved OHOCS - WNLNV - #7,8 compGrRosalia delgado RDH by henrique 
(2020 2:46 PM): Tooth Notes and Watches:- Tooth 6 Watch: distalWeaver Rosemary MACIEL by kenanaver (2020 1:42 PM): - Tooth 8 Note: may need extWeaver Rosemary MACIEL by henrique (2020 2:05 PM): - Tooth 9 Note: may need extWeaver Rosemary MACIEL by henrique (2020 2:05 PM):  Assessment & Plan Medications:METFORMIN 
 MG ORAL TABLETTEST [id number] CHOICE LANCETS SUPER 
THINGLUCOMETERAllergies:No Known Allergies (updated 2020) Electronically s
igned by Brian Kohler DDS on 2020 at 2:46 
PM________________________________________________________________________ 









          Name      Value     Range     Interpretation Code Description Data Gregoria

rce(s) Supporting 

Document(s)

 

                                                                       









                    ID                  Date                Data Source

 

                    [id number]            2020 09:46:00 AM EDT [facility]









          Name      Value     Range     Interpretation Code Description Data Gregoria

rce(s) Supporting 

Document(s)

 

           Glutamate decarboxylase 65 Ab [Moles/volume] in Serum <5 [IU]/mL <5  

                             [facility]                  

 

                                        This test was performed using the GAD65 

CAMILLE method,which is standardized 

against the Internationalreference preparation 97/550.THIS TEST WAS PERFORMED 
AT:[employer] [place]  [address]PATAMIKO DIAMOND MD,PHD 









                    ID                  Date                Data Source

 

                    [id number]            2020 09:46:00 AM [facility]









          Name      Value     Range     Interpretation Code Description Data Gregoria

rce(s) Supporting 

Document(s)

 

           C peptide [Moles/volume] in Serum or Plasma 2.12 ng/mL 0.80-3.85     

                   [facility]                  

 

                                        THIS TEST WAS PERFORMED AT:[facility]

[address]  [address]VMOFVE MERATI,MD 









                    ID                  Date                Data Source

 

                    [id number]            2020 11:33:00 AM [facility]









          Name      Value     Range     Interpretation Code Description Data Gregoria

rce(s) Supporting 

Document(s)

 

           Urea nitrogen [Mass/volume] in Serum or Plasma 17 mg/dL   9-23       

N                     [facility]                         

 

           Sodium [Moles/volume] in Serum or Plasma 141 mmol/L 132-146    N     

                [facility]                         

 

           Potassium [Moles/volume] in Serum or Plasma 4.3 mmol/L 3.5-5.5    N  

                   [facility]                         

 

           Chloride [Moles/volume] in Serum or Plasma 109 mmol/L      N   

                  [facility]                         

 

           Carbon dioxide, total [Moles/volume] in Serum or Plasma 25 mmol/L  20

-31      N                     

[facility]            

 

          Anion gap in Serum or Plasma 11 mmol/L 8-16      [facility]  

 

           Glucose [Mass/volume] in Serum or Plasma 196 mg/dL       Above 

high normal            

[facility]            

 

          Creatinine 0.8 mg/dL 0.5-1.1   [facility]  

 

                                        Glomerular filtration rate/1.73 sq M.pre

dicted [Volume Rate/Area] in Serum or 

Plasma     Greater Than 60 ABOVE 60                         [facility]  

 

           Calcium [Mass/volume] in Serum or Plasma 9.2 mg/dL  8.5-10.1   [facility]                         









                    ID                  Date                Data Source

 

                    [id number]             2020 10:46:00 AM EDT [facility] ([facility])









          Name      Value     Range     Interpretation Code Description Data Gregoria

rce(s) Supporting 

Document(s)

 

                Glutamate decarboxylase 65 Ab [Units/volume] in Serum Laboratory

 test result                  

                                        [facility] ([facility])  

 

                                        This test was performed using the GAD65 

CAMILLE method,

which is standardized against the International

reference preparation 97/550.

THIS TEST WAS PERFORMED AT:

[employer]/[address]  [address]

MAL DIAMOND MD,PHD

 

 

           C peptide [Moles/volume] in Serum or Plasma 2.12 ng/mL 0.80-3.85     

                   [facility] 

([facility])           

 

                                        THIS TEST WAS PERFORMED AT:

[facility]  [address]

RADHA MORIN MD

 









                    ID                  Date                Data Source

 

                    [id number]             2020 10:46:00 AM EDT [facility] ([facility])









          Name      Value     Range     Interpretation Code Description Data Gregoria

rce(s) Supporting 

Document(s)

 

           Urea nitrogen [Mass/volume] in Serum or Plasma 17 mg/dL   9-23       

                      [facility] ([facility])                  

 

                                        E11.65,E10.65 

 

           Sodium [Moles/volume] in Serum or Plasma 141 mmol/L 132-146          

                [facility] ([facility])                  

 

                                        E11.65,E10.65 

 

           Chloride [Moles/volume] in Serum or Plasma 109 mmol/L          

                  [facility] ([facility])                  

 

                                        E11.65,E10.65 

 

           Potassium [Moles/volume] in Serum or Plasma 4.3 mmol/L 3.5-5.5       

                   [facility] ([facility])                 

 

                                        E11.65,E10.65 

 

           Carbon dioxide, total [Moles/volume] in Serum or Plasma 25 mmol/L  20

-31                            

[facility] ([facility])    

 

                                        E11.65,E10.65 

 

           Glucose [Mass/volume] in Serum or Plasma 196 mg/dL             

                [facility] ([facility])                  

 

                                        E11.65,E10.65 

 

           Anion gap in Serum or Plasma 11 mmol/L  8-16                         

    [facility] ([facility])                          

 

                                        E11.65,E10.65 

 

                                        Glomerular filtration rate/1.73 sq M.pre

dicted [Volume Rate/Area] in Serum or 

Plasma     Laboratory test result                                  [facility] ([facility])  

 

                                        E11.65,E10.65 

 

          Creatinine 0.8 mg/dL 0.5-1.1                       [facility] ([facility])  

 

                                        E11.65,E10.65 

 

           Calcium [Mass/volume] in Serum or Plasma 9.2 mg/dL  8.5-10.1         

                [facility] ([facility])                  

 

                                        E11.65,E10.65 







                                        Procedure

 

                                          



                                                                                
                                                                                
                                                                                
                                                                                
                                                                                
                                                                                
                                                                                
                                                                                
                                                                                
                                                                                
                                                                                
                                                                                
                                                                                
                            



Social History

          



           Code       Duration   Value      Status     Description Data Source(s

)

 

           Smoking    2021 12:00:00 AM EDT Never Smoker completed  Never S

[facility] eCW1 

([facility])

 

                      2021 08:17:36 AM EDT No         completed  No       

  [facility]

 

                      2021 08:17:36 AM EDT No         completed  No       

  [facility]

 

                      2021 08:17:36 AM EDT Never smoker completed  Never s

[facility]

 

           Smoking    2021 08:17:00 AM EDT Never smoker completed  Never s

[facility]

 

                      2021 09:55:11 AM EDT Never smoker completed  Never [facility]

 

           Smoking    2021 09:55:00 AM EDT Never smoker completed  Never [facility]

 

             Alcohol intake 2021 12:00:00 AM EDT Ex-drinker (finding) comp

leted    Ex-

drinker (finding)                       [facility]

 

             Tobacco use and exposure 2021 12:00:00 AM EDT Never used   co

mpleted    Never 

used                                    [facility]

 

           Smoking    2021 12:00:00 AM EDT Never smoker completed  Never s

[facility]

 

             Smoking      2021 12:00:00 AM EST Patient has never smoked co

mpleted    Patient 

has never smoked                        [facility] ([facility] )

 

             Smoking      2021 12:00:00 AM EST Never Smoked Cigarettes com

pleted    Never 

Smoked Cigarettes                       [facility] ([facility])

 

                      2021 07:30:24 AM EST No         completed  No       

  [facility]

 

                      2021 07:30:24 AM EST No         completed  No       

  [facility]

 

                      2021 07:30:24 AM EST No         completed  No       

  [facility]

 

                      2021 07:30:24 AM EST No         completed  No       

  [facility]

 

                      2021 07:30:24 AM EST Never smoker completed  Never s

[facility]

 

           Smoking    2021 07:30:00 AM EST Never smoker completed  Never s

[facility]

 

             Alcohol intake 2021 12:00:00 AM EST Ex-drinker (finding) comp

leted    Ex-

drinker (finding)                       [facility] [facility]

 

           Smoking    2021 12:00:00 AM EST Never Smoker completed  Never S

moker eCW1 

([facility])

 

           Smoking    2021 12:00:00 AM EST Never Smoker completed  Never S

moker eCW1 

([facility])

 

           Smoking    2021 12:00:00 AM EST Never Smoker completed  Never S

moker eCW1 

([facility])

 

                      2021 01:43:13 PM EST No         completed  No       

  [facility]

 

                      2021 01:43:13 PM EST No         completed  No       

  [facility]

 

                      2021 01:43:13 PM EST Never smoker completed  Never s

[facility]

 

                      2021 01:43:13 PM EST No         completed  No       

  [facility]

 

                      2021 01:43:13 PM EST No         completed  No       

  [facility]

 

                      2021 01:43:13 PM EST Never smoker completed  Never s

[facility]

 

           Smoking    2021 01:43:00 PM EST Never smoker completed  Never s

[facility]

 

           Smoking    2021 01:43:00 PM EST Never smoker completed  Never s

[facility]

 

             Alcohol intake 2021 12:00:00 AM EST Ex-drinker (finding) comp

leted    Ex-

drinker (finding)                       [facility]

 

             Alcohol intake 2020 12:00:00 AM EST Ex-drinker (finding) comp

leted    Ex-

drinker (finding)                       [facility]

 

             Alcohol intake 2020 12:00:00 AM EST Ex-drinker (finding) comp

leted    Ex-

drinker (finding)                       [facility]

 

           Smoking    10/28/2020 12:00:00 AM EDT Never Smoker completed  Never S

[facility] eCW1 

([facility])

 

             Alcohol intake 10/22/2020 12:00:00 AM EDT Ex-drinker (finding) comp

leted    Ex-

drinker (finding)                       [facility]



                                                                                
                                                                                
                                                                                
                                                                                
                                                                                
                                      



Vital Signs

          



                    ID                  Date                Data Source

 

                    UNK                                      









           Name       Value      Range      Interpretation Code Description Data

 Source(s)

 

           Systolic blood pressure 120 mm[Hg]                       120 mm[Hg] M

[facility]

 

           Diastolic blood pressure 80 mm[Hg]                        80 mm[Hg]  

[facility] ([facility])

 

           Heart rate 109 /min                         109 /min   [facility] ([facility])

 

           Oxygen saturation in Arterial blood by Pulse oximetry 98 %           

                  98 %       [facility] 

([facility])

 

           Body temperature 97.0 [degF]                       97.0 [degF] [facility]

 ([facility])

 

           Body height 65 [in_i]                        65 [in_i]  [facility] ([facility])

 

                                        5'5" 

 

           Body weight 181.00 [lb_av]                       181.00 [facility]

[facility] ([facility]

 

           Body mass index (BMI) [Ratio] 30.1 kg/m2                       30.1 k

g/m2 [facility] ([facility])

 

           Ideal body weight 125 [lb_av]                       125 [lb_av] [facility]

T ([facility])

 

           Body weight 82.102 kg                        82.102 kg  [facility] ([facility])

 

           Body surface area Derived from formula 1.90 m2                       

   1.90 m2    [facility] ([facility], )

 

           Body mass index (BMI) [Ratio] 29.8 kg/m2                       29.8 k

g/m2 [facility] ([facility])

 

           Oxygen saturation in Arterial blood by Pulse oximetry 98 %           

                  98 %       [facility] 

([facility])

 

           Systolic blood pressure 114 mm[Hg]                       114 mm[Hg] M

[facility] ([facility]

 

           Diastolic blood pressure 70 mm[Hg]                        70 mm[Hg]  

[facility] ([facility])

 

           Heart rate 101 /min                         101 /min   [facility] ([facility])

 

           Body temperature 97.1 [degF]                       97.1 [degF] [facility]

 ([facility])

 

           Body height 65.1 [in_i]                       65.1 [in_i] [facility] ([facility] )

 

                                        5'5.10" 

 

           Body weight 179.38 [lb_av]                       179.38 [lb_av] [facility]

T ([facility])

 

           Body weight 181.2 [lb_av]                       181.2 [lb_av] eCW1 ([facility])

 

           Body weight 82.1 kg                          82.1 kg    W1 ([facility])

 

           Body height 65 [in_i]                        65 [in_i]  eCW1 ([facility])

 

           Body mass index (BMI) [Ratio] 30.15 kg/m2                       30.15

 kg/m2 [facility] ([facility])

 

           Heart rate 105 /min                         105 /min   eCW1 ([facility])

 

           Respiratory rate 18 /min                          18 /min    eCW1 ([facility])

 

           Body temperature 97.1 [degF]                       97.1 [degF] eCW1 (

[facility])

 

           Systolic blood pressure 122 mm[Hg]                       122 mm[Hg] e

CW1 ([facility])

 

           Diastolic blood pressure 76 mm[Hg]                        76 mm[Hg]  

eCW1 ([facility])

 

           Systolic blood pressure 102 mm[Hg]                       102 mm[Hg] M

EDENT ([facility], )

 

           Diastolic blood pressure 64 mm[Hg]                        64 mm[Hg]  

[facility] ([facility], )

 

           Heart rate 103 /min                         103 /min   [facility] ([facility] )

 

           Oxygen saturation in Arterial blood by Pulse oximetry 99 %           

                  99 %       [facility] 

([facility])

 

           Body temperature 97.1 [degF]                       97.1 [degF] [facility]

 

           Body height 65 [in_i]                        65 [in_i]  [facility] ([facility])

 

                                        5'5" 

 

           Body weight 179.00 [lb_av]                       179.00 [lb_av] [facility]

T ([facility])

 

           Body mass index (BMI) [Ratio] 29.8 kg/m2                       29.8 k

g/m2 [facility] ([facility])

 

           Ideal body weight 125 [lb_av]                       125 [lb_av] [facility]

T ([facility])

 

           Body weight 81.194 kg                        81.194 kg  [facility] ([facility])

 

           Body surface area Derived from formula 1.89 m2                       

   1.89 m2    [facility] ([facility])

 

           Systolic blood pressure 122 mm[Hg]                       122 mm[Hg] M

EDENT ([facility] )

 

           Diastolic blood pressure 76 mm[Hg]                        76 mm[Hg]  

[facility] ([facility])

 

           Heart rate 103 /min                         103 /min   [facility] ([facility])

 

           Body temperature 9.6 [degF]                       9.6 [degF] [facility] (

[facility])

 

           Body height 65.1 [in_i]                       65.1 [in_i] [facility] ([facility])

 

                                        5'5.10" 

 

           Body weight 184.50 [lb_av]                       184.50 [lb_av] [facility]

T ([facility] 

)

 

           Body mass index (BMI) [Ratio] 30.6 kg/m2                       30.6 k

g/m2 [facility] ([facility] )

 

           Oxygen saturation in Arterial blood by Pulse oximetry 98 %           

                  98 %       [facility]

 

           Systolic blood pressure 118 mm[Hg]                       118 mm[Hg] M

EDENT ([facility] )

 

           Body mass index (BMI) [Ratio] 29.9 kg/m2                       29.9 k

g/m2 [facility] ([facility] )

 

           Heart rate 103 /min                         103 /min   [facility] ([facility] )

 

           Body height 65.1 [in_i]                       65.1 [in_i] [facility] ([facility])

 

                                        5'5.10" 

 

           Diastolic blood pressure 70 mm[Hg]                        70 mm[Hg]  

[facility] ([facility]

 

           Body weight 180.25 [lb_av]                       180.25 [lb_av] [facility]

T ([facility])

 

           Oxygen saturation in Arterial blood by Pulse oximetry 97 %           

                  97 %       [facility] 

([facility])



                                                                                
                  



Patient Treatment Plan of Care

          



             Planned Activity Planned Date Details      Description  Data Source

(s)

 

                          bevacizumab-awwb (MVASI) 3 mg/0.12 mL intravitreal inj

ection 1.25 mg 2021 

11:00:00 AM [facility]

ospital

 

                Lidocaine Hydrochloride 0.035 MG/MG Ophthalmic Gel 2021 10

:44:04 AM [facility]

 

                          Proparacaine hydrochloride 5 MG/ML Ophthalmic Solution

 2021 10:44:04 AM 

[facility]

ospital

 

                          Proparacaine hydrochloride 5 MG/ML Ophthalmic Solution

 2021 09:45:00 AM 

[facility]

ospital

 

                          Phenylephrine Hydrochloride 25 MG/ML Ophthalmic Soluti

on 2021 09:45:00 AM 

[facility]

ospital

 

             Tropicamide 10 MG/ML Ophthalmic Solution 2021 09:45:00 AM [facility]

 

                    Tetracaine hydrochloride 5 MG/ML Ophthalmic Solution  10:45:00 AM [facility]

 

                          bevacizumab-awwb (MVASI) 3 mg/0.12 mL intravitreal inj

ection 1.25 mg 2021 

10:45:00 AM [facility]

ospital

 

                          Proparacaine hydrochloride 5 MG/ML Ophthalmic Solution

 2021 10:00:00 AM 

[facility]

ospital

 

                          Phenylephrine Hydrochloride 25 MG/ML Ophthalmic Soluti

on 2021 10:00:00 AM 

[facility]

ospital

 

             Tropicamide 10 MG/ML Ophthalmic Solution 2021 10:00:00 AM [facility]

 

                    Tetracaine hydrochloride 5 MG/ML Ophthalmic Solution  11:15:00 AM [facility]

 

             Tropicamide 10 MG/ML Ophthalmic Solution 2021 10:15:00 AM [facility]

 

                          Phenylephrine Hydrochloride 25 MG/ML Ophthalmic Soluti

on 2021 10:15:00 AM 

[facility]

ospital

 

                          Proparacaine hydrochloride 5 MG/ML Ophthalmic Solution

 2021 10:15:00 AM 

[facility]

ospital

 

                    Tetracaine hydrochloride 5 MG/ML Ophthalmic Solution  09:30:00 AM [facility]

 

                          bevacizumab-awwb (MVASI) 3 mg/0.12 mL intravitreal inj

ection 1.25 mg 2020 

09:30:00 AM [facility]

ospital

 

                          Proparacaine hydrochloride 5 MG/ML Ophthalmic Solution

 2020 08:15:00 AM 

[facility]

ospital

 

                          Proparacaine hydrochloride 5 MG/ML Ophthalmic Solution

 2020 10:15:00 AM 

[facility]

ospital

 

                          Phenylephrine Hydrochloride 25 MG/ML Ophthalmic Soluti

on 2020 10:15:00 AM 

[facility]

ospital

 

             Tropicamide 10 MG/ML Ophthalmic Solution 2020 10:15:00 AM [facility]

 

             Ergocalciferol 40814 UNT Oral Capsule 10/27/2020 12:00:00 AM EDT   

                        eCW1 

([facility])

 

             Vitamin D (Cholecalciferol) 50 MCG ( [place]) 10/27/2020 12:00:00 AM

 EDT                           eCW1

 ([facility])

 

             Tropicamide 10 MG/ML Ophthalmic Solution 10/22/2020 10:15:00 AM [facility]

 

                          Phenylephrine Hydrochloride 25 MG/ML Ophthalmic Soluti

on 10/22/2020 10:15:00 AM 

[facility]

ospital

 

                          Proparacaine hydrochloride 5 MG/ML Ophthalmic Solution

 10/22/2020 10:15:00 AM 

[facility]

ospital

## 2021-11-10 NOTE — REPVR
PROCEDURE INFORMATION: 

Exam: US Duplex Right Lower Extremity Veins, Limited 

Exam date and time: 11/10/2021 9:01 PM 

Age: 41 years old 

Clinical indication: Swelling (edema) of limb; Lower extremity, right; 

Additional info: R/O dvt rle 



TECHNIQUE: 

Imaging protocol: Real-time Duplex ultrasound of the Right Lower Extremity with 

2-D gray scale, color Doppler flow and spectral waveform analysis with image 

documentation. Limited exam was focused on the right lower extremity veins. 



COMPARISON: 

US UNI LOW EXTREM ARTERIAL LIMIT 11/9/2021 10:47 AM 



FINDINGS: 

Right deep veins:  Suboptimal visualization of the calf veins. The common 

femoral, femoral and popliteal veins are patent without thrombus. Normal 

Doppler waveforms. Normal compressibility and/or augmentation response.  

Right superficial veins: Unremarkable. Saphenofemoral junction is patent 

without thrombus. 



Soft tissues: Unremarkable. 

Lymph nodes: Mildly enlarged right inguinal lymph nodes, measuring up to 2.8 x 

2.1 0.2 cm, likely reactive.



IMPRESSION: 

1. No sonographic evidence of deep venous thrombosis.

2. Mildly enlarged right inguinal lymph nodes, likely reactive.



Electronically signed by: Shar Florez On 11/10/2021  21:29:17 PM

## 2021-11-11 NOTE — IPNPDOC
Text Note


Date of Service


The patient was seen on 11/11/21.





NOTE


Subjective: Patient is a 41-year-old female with past medical history of poorly 

controlled type 2 diabetes with neuropathy, peripheral artery disease in the 

right leg, and chronic wound to persistent cellulitis in the right foot.  

Patient's been followed by Dr. Stillerman in the wound care clinic.  Patient 

developed infection with cellulitis of the right foot wound the end of September

and was started on a course of Keflex, although's notes say clindamycin which is

not successful.  She was given a 10-day course of doxycycline was also failed to

suppress the infection.  Patient presented emergency department with purulent 

discharge from the wound with surrounding cellulitis extending to the right 

distal leg.  Vital be febrile with elevated white blood cell count elevated ESR 

and CRP.  A1c was 10.3.  Foot x-ray shows finding consistent with osteomyelitis 

of the fifth metatarsal and possibly proximal phalanx.  Venous duplex negative 

for DVT.  Patient says she is feeling better.  Patient was awaiting going down 

to the operating room.





Review of systems:


General: Patient denies fevers


HEENT: Patient denies headaches


Cardiovascular: Patient denies chest pain


Respiratory: Patient denies shortness of breath, cough


GI: Patient denies abdominal pain, nausea, vomiting, diarrhea


: Patient denies increased frequency or pain with urination


Extremities: Patient reports pain in her right foot


Neurological: Patient denies numbness or tingling in legs











Physical exam:


Vitals: See below


General: Alert and oriented female patient who was laying in bed when I walked i

n.  Patient not appear to be in acute distress.


HEENT: Normocephalic, atraumatic, moist mucous membranes.


Neck: No lymphadenopathy or thyromegaly


Cardiac: Regular rate and rhythm, no murmurs, normal S1, normal S2


Pulm: Clear to auscultation bilaterally. No wheezes, rhonchi, rales


Abd: Nondistended, nontender to palpation, normal bowel sounds


Ext: Wound the dorsal aspect of the right foot proximal to the fifth digit was 

ulcerated with purulent drainage and surrounding erythema


Labs: See below


Imaging: No new imaging is been performed








Assessment/plan:


41-year-old female past medical history of poorly controlled insulin-dependent 

type 2 diabetes mellitus with neuropathy, peripheral artery disease in her right

leg, and a chronic wound persistent cellulitis of the right foot.  Be admitted 

for management of osteomyelitis of the right fifth metatarsal.





1.  Acute osteomyelitis of the right foot/diabetic foot ulcer.  Osteomyelitis 

seen on x-ray of the right foot.  Dr. Medina of podiatry suggested we defer MRI 

for now.  Patient will be taken to the OR for debridement and possibly 

amputation.  Continue vancomycin and Zosyn until cultures from surgery come 

back.  We will trend inflammatory markers.





2.  Insulin-dependent diabetes mellitus type 2.  Poorly controlled as she is 

noncompliant with Lantus.  Follows with Dr. Nails.  We will continue with 

medication at this time.





3.  Depression/anxiety.  Continue outpatient follow-up with psychiatry.





DVT Prophylaxis: Heparin





Disposition: Pending surgery and culture results of osteomyelitis





VS,Josefina, I+O


VS, Josefina, I+O


Laboratory Tests


11/10/21 20:18








11/11/21 06:08











Vital Signs








  Date Time  Temp Pulse Resp B/P (MAP) Pulse Ox O2 Delivery O2 Flow Rate FiO2


 


11/11/21 03:00 98.0 112 19 131/93 (106) 100 Room Air  














I&O- Last 24 Hours up to 6 AM 


 


 11/11/21





 06:00


 


Intake Total 1915 ml


 


Balance 1915 ml

















YAHIR BOGGS DO               Nov 11, 2021 13:59

## 2021-11-11 NOTE — HPEPDOC
[facility] Medical History & Physical


Date of Admission


Nov 10, 2021


Date of Service:  Nov 10, 2021





History and Physical


CHIEF COMPLAINT: R foot wound redness.





HISTORY OF PRESENT ILLNESS: 41 F with a PMHx of poorly controlled IDDM2 with 

neuropathy, peripheral arterial disease in R leg, and a chronic wound with 

peristent cellulitis of the R foot. patient is being followed by Dr. Stillerman 

in wound care clinic. She had developed infection with cellulitis of the R foot 

wound at the end of 9/21, and was started on a course of keflex (although notes 

states clindamycin), which was not successful. She was then given a 10 day c

ourse of doxycycline which also failed to suppress infection. Patient presents 

to ER with purulent discharge from the wound with surrounding cellulitis 

extending to the R distal leg. Found to be febrile to 100.7. Elevated WBC to 

11.2. Elecated . Elevated CRP 14.60. A1c 10.3. Foot XR showing findings 

c/w osteomyelitis of the 5th metatarsal and possibly proximal phalanx. Venous 

duplex negative for DVT. Started on empiric vancomycin and zosyn. Dr. Medina was 

consulted from ER, plan for OR in am. Patient will be admitted to hospitalist 

service. 





PAST MEDICAL HISTORY:


DM2


Migraines


Asthma


Anxiety/Depression


Herpes Genitalis





PAST SURGICAL HISTORY:


Retina detachment surgery (5/2020)


Tonsillectomy


D&C in 2011





SOCIAL HISTORY:


denies tobacco use


denies etoh use


denies illicits use





FAMILY HISTORY:


reviewed with patient, no pertinent history provided





ALLERGIES: Please see below.





REVIEW OF SYSTEMS:


10 point ROS conducted, relevant findings are noted in HPI. 





HOME MEDICATIONS: Please see below. 





PHYSICAL EXAMINATION:


VITAL SIGNS: please see below


General: NAD, comfortable


HEENT: PERRLA, EOMI, sclerae clear


Neck: supple, normal ROM, no JVD


Respiratory: lungs CTAB, no wheeze, no rales, no crackles


CVS: RRR, normal S1, S2, no murmurs


Abdo: soft, no masses, no hepatosplenomegaly, BS+, no rebound tenderness


Extremities: R foot ulceration, on lateral aspect, overlying heads of the 4th 

and 5th metatarsals, toes are not gangrenous. Cap refill 2 sec. Pulses weakly 

palpable by manual palpation.


MSK: no joint deformities, normal ROM


Neuro: no focal neuro deficits, moving all 4 extremities, CN2-12 intact. 

Strength 5/5 in all 4 extremities. No nystagmus. 


Psych: calm, cooperative, AAO x 3.





LABORATORY DATA: See below.





IMAGING: 





Venous Duplex RLE (11/10/21):


1. No sonographic evidence of deep venous thrombosis.


2. Mildly enlarged right inguinal lymph nodes, likely reactive





R foot XR (11/10/21):


Acute osteomyelitis of the 5th metatarsal and, possibly, the 5th proximal 


phalanx, as described above. 





RLE Arterial Doppler Study (11/9/21):


FINDINGS:


All numeric values represent peak systolic velocities in cm/SEC





The ankle brachial index is 1.16.


CFA: 119 triphasic


Profunda: 85 triphasic


SFA proximal: 128 triphasic


SFA Mid: 115 triphasic


SFA distal: 114 triphasic


Popliteal: 105 monophasic


Tibioperoneal trunk: 74 monophasic


PTA proximal: 38 monophasic


PTA distal: 88 monophasic


Peroneal proximal: Not visualized


Peroneal distal: Not visualized


MIRIAN proximal: 107 monophasic


MIRIAN distal: 137 monophasic





Mild to moderate plaque formation was seen with calcifications throughout.  No


significant stenosis was identified.





An ankle brachial index was also obtained on the left which is 1.16





MICROBIOLOGY: Please see below. 





ASSESSMENT: 41 F with a PMHx of poorly controlled IDDM2 with neuropathy, pe

ripheral arterial disease in R leg, and a chronic wound with peristent 

cellulitis of the R foot. Being admitted for management of acute ostemyelitis of

R 5th metarsal. Empiric vancomycin and zosyn. Dr. Medina has been consulted for 

debridement in OR on 11/11/21





PLAN:


Acute osteomyelitis of R foot/Diabetic foot ulcer


- osteomyelitis seen on XR of R foot


- WBC 11.2. . CRP 14.6


- Dr. Medina suggested we defer MRI for now


- started empiric vancomycin and zosyn in ER. Will continue with same


- f/u blood cultures x 2, and f/u wound cultures. 


- will trend inflammatory markers. 


- NPO after midnight


- plan for debridement in OR in am.





IDDM2


- poorly controlled, as non compliant with lantus


- follows with Dr. Nails


- regular dose lantus is 40 units qam. Will reduce to 20 units qam as NPO


- start ISS and FSBS AC and HS. 


- hold glipizide





Depression/anxiety


- to follow up with psychiatry outpatient


- stable.





DVT ppx: hepain





Dispo: admitted expected to span > 2 midnights.





Vital Signs





Vital Signs








  Date Time  Temp Pulse Resp B/P (MAP) Pulse Ox O2 Delivery O2 Flow Rate FiO2


 


11/10/21 20:34 100.7       


 


11/10/21 15:16  125 16 165/83 (110) 100 Room Air  











Laboratory Data


Labs 24H


Laboratory Tests 2


11/10/21 20:12: Lactic Acid Level 1.2


11/10/21 20:18: 


Immature Granulocyte % (Auto) 1.1, Neutrophils (%) (Auto) 74.4H, Lymphocytes (%)

(Auto) 18.4L, Monocytes (%) (Auto) 5.4, Eosinophils (%) (Auto) 0.3, Basophils 

(%) (Auto) 0.4, Neutrophils # (Auto) 8.4, Lymphocytes # (Auto) 2.1, Monocytes # 

(Auto) 0.6, Eosinophils # (Auto) 0.0, Basophils # (Auto) 0.0, Nucleated Red 

Blood Cells % (auto) 0.0, Erythrocyte Sedimentation Rate 117H, Anion Gap 6L, 

Glomerular Filtration Rate > 60.0, Estimated Mean Plasma Glucose 249H, 

Hemoglobin A1c 10.3, Calcium Level 9.2, C-Reactive Protein, Quantitative 14.60H,

Coronavirus (COVID-19)(PCR) NEGATIVE, Influenza Type A (RT-PCR) NEGATIVE, 

Influenza Type B (RT-PCR) NEGATIVE, Respiratory Syncytial Virus (PCR) NEGATIVE


11/10/21 20:48: POC Beta HCG, Quantitative < 5.0


CBC/BMP


Laboratory Tests


11/10/21 20:18








Microbiology





Microbiology


11/10/21 Blood Culture, Received


           Pending


11/10/21 Blood Culture, Received


           Pending





Home Medications


Scheduled


Glipizide (Glipizide) 5 Mg Tablet, 5 MG PO BID


Insulin Glargine,Hum.rec.anlog (Lantus Solostar) 100 Unit/1 Ml Insuln.pen, 40 

UNIT SC DAILY





Allergies


Coded Allergies:  


     No Known Allergies (Unverified , 9/14/18)





A-FIB/CHADSVASC


A-FIB History


Current/History of A-Fib/PAF?:  No











SNEHAL SHEETS MD       Nov 11, 2021 00:19

## 2021-11-11 NOTE — CR
CONSULTATION

DATE: 11/11/2021



REASON FOR CONSULTATION:  Right foot infection.



HISTORY OF PRESENT ILLNESS:  Ms. Corado is a pleasant 41-year-old female with

a history of right foot ulceration present about two months.  She had recently

started seeing Dr. Stillerman, had developed an infection, was treated with

outpatient antibiotics which did not clear the infection.  She was sent to the

Emergency Room for further treatment.  



MEDICAL HISTORY:  Significant for diabetes, migraines, asthma, anxiety,

depression. 



SURGICAL HISTORY:  Tonsillectomy, D&C,  retina.



FAMILY HISTORY:   Noncontributory. 



SOCIAL HISTORY:  Denies alcohol or tobacco. 



ALLERGIES:  No known drug allergies. 



REVIEW OF SYSTEMS:  Positive for fevers. Denies nausea or vomiting. 



LABORATORY DATA:  Labs are reviewed.  White blood cell count on admission is

11.2. Today it is 10.2.  ESR is 117. Hemoglobin was 10.3. CRP on admission was

14.6.  



IMAGING DATA:  X-rays are reviewed.  There are findings concerning for

osteomyelitis of the fifth metatarsal.  There is soft tissue gas around the

metatarsal neck.  



PHYSICAL EXAMINATION:   

LOWER EXTREMITY EXAMINATION:  Pedal pulses are nonpalpable.  There is erythema

and edema to the right foot mostly centered around the ulceration which is

dorsal to the fifth metatarsal head.  There is exposed tendon and purulence

within the wound.  The patient does not have protective sensation.



IMPRESSION:  A 41-year-old diabetic female with neuropathy, right foot

osteomyelitis.



PLAN:  The patient to go to OR now for right foot incision and drainage and

removal of infected bone.  We will operative cultures.  She is scheduled to see

Dr. Fish next week for angiogram, possible intervention.  I agree with

this.  This will be necessary for ultimate wound healing.  Further treatment

depending on operative findings.

## 2021-11-12 NOTE — RO
OPERATIVE NOTE



DATE OF OPERATION: 11/11/2021



PREOPERATIVE DIAGNOSIS: Right foot diabetic infection.



POSTOPERATIVE DIAGNOSIS: Right foot diabetic infection.



PROCEDURE: Right 5th toe and metatarsal head amputation.



SURGEON: Pascual Medina DPM



ASSISTANT: None.



ANESTHESIA: Monitored anesthesia care, preop injection of 10 cc of 1:1 mixture

of 1% Lidocaine plain and 0.5% Marcaine plain.



ESTIMATED BLOOD LOSS: Minimal.



MATERIALS: 3-0 nylon.



INJECTABLES: None.



SPECIMEN: Right 5th toe and metatarsal, aerobic and anaerobic cultures.



COMPLICATIONS: None.



CONDITION: Stable.



INDICATIONS: Romelia Corado is a 41-year-old female who was admitted through

the ER with right foot infection. She had x-rays that showed signs consistent

with osteomyelitis and soft tissue gas. Decision was made to bring her to the

operating room for surgical intervention. Patient site and side were identified

and marked in preoperative area. Consent was reviewed and obtained. Risks,

complications and alternatives to procedure were explained to the patient in

detail and all questions were answered.



DESCRIPTION OF PROCEDURE: The patient was brought to the operating room and

placed on the table in supine position.  Monitored anesthesia care was

delivered by the anesthesia team.10 cc of 1:1 mixture of 1% Lidocaine plain and

0.5% mar plain were injected into the right foot. The right foot was prepped

and draped in normal sterile fashion. Tourniquet was applied on the right ankle

but was not utilized during the procedure. Wound was noted to be dorsal to the

5th metatarsal head, plantar aspect of the 5th toe had black necrotic tissue.

Any nonviable tissue overlying the bone was debrided. There was purulent tissue

and the bone was noted to be fractured and soft consistent with osteomyelitis.

Metatarsal head was resected using a sagittal saw proximal to the wound, toe

was inspected and the entire plantar surface was necrotic and nonviable. The

5th toe was disarticulated at metatarsophalangeal joint and this too was sent

for pathology. Wound cultures were taken of the purulent fluid. Site was

irrigated with normal saline. Partial wound closure was performed with 3-0

nylon; the remaining wound was packed with saline gauze. The patient was

brought to PACU with vital signs stable and neurovascular status intact. She

will be readmitted to the floor for continue antibiotics. She is scheduled to

see Dr. Polina Zaragoza for angiogram, this should take place.

## 2021-11-12 NOTE — IPNPDOC
Text Note


Date of Service


The patient was seen on 11/12/21.





NOTE


Subjective: Patient is a 41-year-old female past medical history of well-con

trolled type 2 diabetes who had a right foot wound.  Patient had surgery to 

debride the wound and had a to amputated by podiatry.  Patient states she is 

feeling well after the surgery other than some nausea.  Patient does not know 

she has a history of gastroparesis but does have a history of neuropathy.  

Patient says the foot pain is relatively well controlled does not have any other

complaints today.





Review of systems:


General: Patient denies fevers


HEENT: Patient denies headaches


Cardiovascular: Patient denies chest pain


Respiratory: Patient denies shortness of breath, cough


GI: Patient reports some nausea but denies any abdominal pain, vomiting, or 

diarrhea


: Patient denies increased frequency or pain with urination


Extremities: Patient denies swelling or pain in extremities


Neurological: Patient denies numbness or tingling in legs











Physical exam:


Vitals: See below


General: Alert and oriented female patient who was laying in bed when I walked 

in.  Patient not appear to be in any acute distress.


HEENT: Normocephalic, atraumatic, moist mucous membranes.


Neck: No lymphadenopathy or thyromegaly


Cardiac: Regular rate and rhythm, no murmurs, normal S1, normal S2


Pulm: Clear to auscultation bilaterally. No wheezes, rhonchi, rales


Abd: Nondistended, nontender to palpation, normal bowel sounds


Ext: No edema bilateral lower extremities, the right foot was wrapped in Ace 

bandage without any drainage or soilage on the dressing.





Labs: See below


Imaging: No new imaging is been performed








Assessment/plan:


41-year-old female with past medical history of poorly controlled insulin-

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

her right leg, and a chronic wound persistent with cellulitis of the right foot.

 She has been admitted for management of osteoarthritis osteomyelitis of the 

right fifth metatarsal.





1.  Acute osteomyelitis of the right foot/diabetic foot ulcer.  Osteomyelitis 

seen on x-ray.  Dr. Medina of podiatry brought the patient to the operating room 

yesterday for debridement with amputation.  We will continue vancomycin and Zo

syn till cultures come back.  Trending inflammatory markers.





2.  Insulin-dependent diabetes mellitus type 2.  Poorly controlled as you 

noncompliant with her medications.  Follows with Dr. Nails.  Continue home 

medications at this time.





3.  Depression/anxiety.  Continue outpatient follow-up and continue her home 

medications.





DVT Prophylaxis: Heparin





Disposition: Pending culture results





Josefina GALVAN, I+O


Josefina GALVAN I+O


Laboratory Tests


11/12/21 14:29











Vital Signs








  Date Time  Temp Pulse Resp B/P (MAP) Pulse Ox O2 Delivery O2 Flow Rate FiO2


 


11/12/21 14:00 98.9 98 20 102/55 (71) 94   


 


11/12/21 06:18      Room Air  














I&O- Last 24 Hours up to 6 AM 


 


 11/12/21





 06:00


 


Intake Total 920 ml


 


Output Total 100 ml


 


Balance 820 ml

















YAHIR BOGGS DO               Nov 12, 2021 17:27

## 2021-11-13 NOTE — IPNPDOC
Text Note


Date of Service


The patient was seen on 11/13/21.





NOTE


Subjective: Patient is a 41-year-old female with past medical history of poorly 

controlled type 2 diabetes with a right foot wound.  Patient had surgery to do 

bride the wound and have the toe amputated by podiatry.  Patient states she is 

feeling well as of surgery.  Patient apparently has been nauseous all night and 

was vomiting throughout the night.  Patient does have a history of neuropathy 

and may possibly have gastroparesis secondary to diabetes although does not sewell

y an official diagnosis of this.  Patient says the foot pain is relatively well 

controlled but she has been struggling to eat or drink because of the nausea.





Review of systems:


General: Patient denies fevers


HEENT: Patient denies headaches


Cardiovascular: Patient denies chest pain


Respiratory: Patient denies shortness of breath, cough


GI: Patient reports nausea as above.


: Patient denies increased frequency or pain with urination


Extremities: Patient denies swelling or pain in extremities


Neurological: Patient denies numbness or tingling in legs











Physical exam:


Vitals: See below


General: Alert and oriented female patient who was laying in bed when I walked 

in.  Patient not appear to be in any acute distress.


HEENT: Normocephalic, atraumatic, moist mucous membranes.


Neck: No lymphadenopathy or thyromegaly


Cardiac: Regular rate and rhythm, no murmurs, normal S1, normal S2


Pulm: Clear to auscultation bilaterally. No wheezes, rhonchi, rales


Abd: Nondistended, nontender to palpation, normal bowel sounds


Ext: No edema bilateral lower extremities, the foot was wrapped in gauze without

any drainage or swelling on the dressing.





Labs: See below


Imaging: No imaging is been performed








Assessment/plan:


41-year-old female with past medical history of poorly controlled insulin-

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

her right leg, and a chronic wound with cellulitis of the right foot.  Admitted 

for management of osteomyelitis of the right 5th metatarsal





1.  Acute osteomyelitis of the right foot.  Osteomyelitis was seen on x-ray.  

Dr. Medina of podiatry brought the patient in the operating room 2 days ago for 

debridement and amputation.  Continue vancomycin and Zosyn until cultures come 

back.  Trending inflammatory markers.





2.  Insulin-dependent diabetes mellitus type 2.  Poorly controlled as patient is

noncompliant with her insulin.  Follows with Dr. Dunn.  Continue insulin at 

this time.





3.  Depression/anxiety.  Continue outpatient follow-up and her home medications.





4.  Nausea and vomiting.  Continue metoclopramide and Zosyn.  If this continues,

patient may require gastric emptying study for gastroparesis work-up.





5.  Hypoxia.  Patient became hypoxic overnight requiring 2 L via nasal cannula 

oxygen.  Patient has done well with this.  Patient was on IV fluids and received

6 L of normal saline which has been stopped which may be contributing to the 

patient's hypoxia.  Patient does not complain of any shortness of breath this 

time.





DVT Prophylaxis: Heparin





Disposition: Pending culture results.





VS,Fishbone, I+O


VS, Fishbone, I+O


Laboratory Tests


11/12/21 14:29








11/13/21 07:46











Vital Signs








  Date Time  Temp Pulse Resp B/P (MAP) Pulse Ox O2 Delivery O2 Flow Rate FiO2


 


11/13/21 06:07 95.8 101 18 141/81 (101) 96 Nasal Cannula 2.0 














I&O- Last 24 Hours up to 6 AM 


 


 11/13/21





 06:00


 


Intake Total 3920 ml


 


Output Total 450 ml


 


Balance 3470 ml

















YAHIR BOGGS DO               Nov 13, 2021 14:27

## 2021-11-14 NOTE — ECGEPIP
[facility]

                                       

                                       Test Date:    2021

Pat Name:     TERENCE PURCELL         Department:   

Patient ID:   [id number]                 Room:         [place] [id number]

Gender:       Female                   Technician:   ashley

:          1980               Requested By: YAHIR BOGGS 

Order Number: [id number]     Reading MD:   Angela Mendiola

                                 Measurements

Intervals                              Axis          

Rate:         101                      P:            14

FL:           90                       QRS:          57

QRSD:         96                       T:            16

QT:           344                                    

QTc:          446                                    

                           Interpretive Statements

Sinus tachycardia with short FL

STTabn Inferolateral  no prior

Electronically Signed on 2021 15:31:47 EST by Angela Mendiola

## 2021-11-14 NOTE — IPNPDOC
Text Note


Date of Service


The patient was seen on 11/14/21.





NOTE


Subjective: Patient is a 41-year-old female medical history of poorly controlled

type 2 diabetes with a right foot wound.  Patient had surgery to deep-fried the 

wound and have head of the fifth metatarsal amputated by podiatry 3 days ago.  

Patient states her foot is feeling better but she has been having nausea and 

vomiting has not been able to tolerate much p.o. intake.  Patient states that 

she has been dealing with this for about a month.  Patient does have a history 

of neuropathy and may possibly gastroparesis secondary to diabetes but does not 

carry an official diagnosis.  Patient was doing better this morning when I went 

to reevaluate her later on in the morning, she was complaining of abdominal pain

stating that she felt like she was going to vomit.  Patient apparently did vomit

later on in the day.  Patient was able to eat a small amount yesterday for dinne

r.





Review of systems:


General: Patient denies fevers


HEENT: Patient denies headaches


Cardiovascular: Patient denies chest pain


Respiratory: Patient denies shortness of breath, cough


GI: Patient reports abdominal pain and nausea as above


: Patient denies increased frequency or pain with urination


Extremities: Patient denies swelling or pain in extremities


Neurological: Patient denies numbness or tingling in legs











Physical exam:


Vitals: See below


General: Alert and oriented female patient who was laying in bed when I walked 

in.  Patient does not appear to be in any acute distress.


HEENT: Normocephalic, atraumatic, moist mucous membranes.


Neck: No lymphadenopathy or thyromegaly


Cardiac: Regular rate and rhythm, no murmurs, normal S1, normal S2


Pulm: Clear to auscultation bilaterally. No wheezes, rhonchi, rales


Abd: Nondistended, minimal abdominal pain to palpation of the left upper columba

drant., normal bowel sounds


Ext: No edema bilateral lower extremities, right foot is wrapped in gauze 

without any drainage or blood on the dressing.





Labs: See below


Imaging: No new imaging is been performed








Assessment/plan:


41-year-old female with past medical history of poorly controlled insulin-

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

her right leg, and chronic wound with cellulitis of the right foot.  Admitted 

for management of osteomyelitis of the right fifth metatarsal.





1.  Acute osteomyelitis of the right foot.  Osteomyelitis was seen on x-ray.  

Dr. Medina of podiatry brought the patient to the operating 3 days ago for 

debridement.  Patient had amputation.  Antibiotics were switched to Levaquin due

to cultures.  ESR has been elevated but CRP is trending down.  


Patient will be given a surgical shoe and crutches for ambulation.





2.  Insulin-dependent diabetes mellitus type 2.  Poorly controlled as the 

patient is noncompliant with her insulin.  Follows with Dr. Nails outpatient.  

Continue insulin at this time.





3.  Depression/anxiety.  Continue outpatient follow-up and her home medications.





4.  Nausea and vomiting.  Metoclopramide has been scheduled.  If the patient 

continues to have nausea and vomiting, patient may require gastric emptying 

study for gastroparesis work-up.  Advised the patient try to eat small more 

frequent meals if gastroparesis is truly was causing her nausea and vomiting.





5.  Patient became hypoxic overnight two nights ago but has not required any 

oxygen therapy since waking up the morning of eleven thirteen thousand twenty-

one.  Patient is not complaining of any shortness of breath at this time.





DVT Prophylaxis: Heparin





Disposition: Pending clinical improvement.





VS,Tushare, I+O


VS, Fishbone, I+O


Laboratory Tests


11/14/21 06:58











Vital Signs








  Date Time  Temp Pulse Resp B/P (MAP) Pulse Ox O2 Delivery O2 Flow Rate FiO2


 


11/14/21 06:00 98.4 90 16 116/66 (83) 96 Room Air  


 


11/13/21 06:07       2.0 














I&O- Last 24 Hours up to 6 AM 


 


 11/14/21





 06:00


 


Intake Total 1010 ml


 


Output Total 650 ml


 


Balance 360 ml

















YAHIR BOGGS DO               Nov 14, 2021 12:55

## 2021-11-15 NOTE — IPNPDOC
Text Note


Date of Service


The patient was seen on 11/15/21.





NOTE


Subjective: Patient is a 41-year-old female with medical history of poorly c

ontrolled type 2 diabetes with right foot wound.  Patient has surgery to do 

bride the wound and have the head of the fifth metatarsal amputated by podiatry 

4 days ago.  Patient states her foot is feeling better but she is been having 

nausea and vomiting throughout her entire admission which is worsened over the 

past 2 days.  Patient's not been able to tolerate any p.o. intake.  Patient 

states that she been dealing for this for about a month but it is worsened 

acutely over the past few days.  Patient does have a history of neuropathy and a

possible gastroparesis secondary to diabetes but does not carry an official 

diagnosis at this time.  Patient was placed on p.o. scheduled Reglan as well as 

p.o. Zofran as needed which has not helped.  Patient continues to vomit anytime 

she tries to eat or drink anything.  Patient is complaining of stomach pain at 

this time.  Patient states that she has not had a bowel movement although when 

talking with nursing, patient did have a bowel movement yesterday which is 

described as brown and formed.





Review of systems:


General: Patient denies fevers


HEENT: Patient denies headaches


Cardiovascular: Patient denies chest pain


Respiratory: Patient denies shortness of breath, cough


GI: Patient reports abdominal pain and nausea and vomiting as above.


: Patient denies increased frequency or pain with urination


Extremities: Patient denies swelling or pain in extremities


Neurological: Patient denies numbness or tingling in legs











Physical exam:


Vitals: See below


General: Alert and oriented female patient who was laying in bed on her side 

when I walked in.  Patient appeared uncomfortable but did not appear to be in 

any acute distress.


HEENT: Normocephalic, atraumatic, moist mucous membranes.


Neck: No lymphadenopathy or thyromegaly


Cardiac: Regular rate and rhythm, no murmurs, normal S1, normal S2


Pulm: Clear to auscultation bilaterally. No wheezes, rhonchi, rales


Abd: Nondistended, minimal tenderness in the upper abdomen, no rebound 

tenderness normal bowel sounds


Ext: No edema bilateral lower extremities





Labs: See below


Imaging: CT of the abdomen and pelvis with p.o. and IV contrast performed on 

11/15/2021 is reported to show relatively symmetric bilateral perinephric 

stranding sense of the pelvis with small amount of pelvic fluid raise 

possibility of acute pyelonephritis and correlation is required.  Differential 

diagnosis may include pancreatitis although the pancreas itself appears 

relatively normal.  Nonacute findings include diverticulosis and suspected left 

adrenal adenoma.








Assessment/plan:


41-year-old female presented to the hospital with a diabetic foot wound who had 

amputation of the fifth metatarsal for management of osteomyelitis.





1.  Acute osteomyelitis of the right foot.  Osteomyelitis seen on x-ray.  

Podiatry took the patient to the operating room 4 days ago for debridement.  

Patient had amputation.  Antibiotics switched to Levaquin due to culture 

results.  Patient may only need a few more days of antibiotics at this time.





2.  Anemia, unknown cause.  Patient's hemoglobin has been downtrending to a low 

of 7.1 this morning.  Patient was consented for blood.  Stool occult blood 

negative.  With the patient vomiting, vomitus does not appear to have any blood 

in it.  Patient had repeat CBC performed which does show that the hemoglobin is 

back to where it was when the patient was admitted at 9.8.  Iron panel will be 

ordered.





3.  Insulin-dependent diabetes mellitus type 2.  Poorly controlled as the gloria

ent is noncompliant with her insulin.  Follows with Dr. Nails outpatient.  

Continue insulin at this time.





4.  Depression/anxiety.  Continue outpatient follow-up.





5.  Nausea and vomiting.  Metoclopramide has been discontinued as it is not 

helping the patient.  Patient was switched to p.o. Benadryl which does appear to

be helping.  CT of the abdomen pelvis did not show a reason for the patient's vo

miting.  Patient's CT does show possible pancreatitis although the pancreas is 

appear normal.  Lipase has been ordered.





6.  Hypoxia.  Patient became hypoxic overnight a few nights ago but is not requ

ired oxygen therapy since.





7.  Hypertension.  Patient's blood pressures have been elevated over the last 24

to 48 hours.  Patient was started on amlodipine starting with a one-time dose 

today.  Amlodipine will be daily with hold parameters.  Patient's blood pressure

is still difficult to control.  We will use a dose of chlorthalidone.  Patient 

received IV contrast today and can be restarted on lisinopril as she does have 

diabetes and lisinopril will be helpful in protecting her kidneys from further 

damage secondary to diabetes however, I do not want to start this at this time 

secondary to the IV contrast.





DVT Prophylaxis: Heparin which is being held due to anemia but this can be 

restarted at this time as the patient does not appear to have a GI bleed.





Disposition: Pending clinical improvement.





VS,Josefina, I+O


VS, Fishbone, I+O


Laboratory Tests


11/15/21 07:48








11/15/21 12:00











Vital Signs








  Date Time  Temp Pulse Resp B/P (MAP) Pulse Ox O2 Delivery O2 Flow Rate FiO2


 


11/15/21 09:00       2.0 


 


11/15/21 06:00 98.0 101 18 185/83 (117) 100 Room Air  














I&O- Last 24 Hours up to 6 AM 


 


 11/15/21





 06:00


 


Intake Total 1390 ml


 


Output Total 900 ml


 


Balance 490 ml

















YAHIR BOGGS DO               Nov 15, 2021 15:28

## 2021-11-15 NOTE — REP
INDICATION:

nausea, vomiting, abd pain.



COMPARISON:

None



TECHNIQUE:

Axial contrast-enhanced images from the lung bases to the pubic symphysis using oral

and 100 cc Isovue 370 intravenous contrast material.  Coronal and sagittal

reformations obtained.



This CT examination was performed using the following dose reduction techniques:

Automated exposure control, adjustment of mA and/or kv according to the patient's

size, and the use of iterative reconstruction technique.



FINDINGS:

Lung bases demonstrate very small pleural effusions and trace basilar atelectasis.



Liver demonstrates fatty infiltration without focal hepatic lesion.



Spleen, pancreas, gallbladder, and right adrenal gland appear normal.  Incidental

small 1 cm left adrenal adenoma suggested.



Evaluation of the kidneys demonstrate normal symmetric enhancement along with possible

punctate nonobstructing calculi and suspected acute bilateral perinephric stranding as

well as small amount of free fluid in the pelvis raising the possibility of acute

pyelonephritis and correlation is required.



The enteric system including stomach, small, and large bowel appears normal.  No

evidence for obstruction or acute inflammatory process.  Normal terminal ileum and

appendix are identified in the right lower quadrant.  Scattered sigmoid diverticula

noted without definite acute diverticulitis.



Pelvis demonstrates normal bladder and age-appropriate uterus/adnexa.



No significant ascites.  No free air.  No intraperitoneal or retroperitoneal

adenopathy.  Abdominal aorta and vasculature appear normal.  Musculoskeletal

structures are intact and without acute osseous abnormality.



IMPRESSION:

1.  Relatively symmetric bilateral perinephric stranding extends to the pelvis with

small amount of pelvic fluid raise the possibility of acute pyelonephritis and

correlation is required.  Differential diagnosis may include pancreatitis although the

pancreas itself appears relatively normal.

2.  Nonacute findings including diverticulosis and suspected small left adrenal

adenoma.





<Electronically signed by Rafael Villatoro > 11/15/21 3130

## 2021-11-16 NOTE — IPNPDOC
Date Seen


The patient was seen on 11/16/21.





Progress Note


Subjective: 


Complains of intractable nausea vomiting without abdominal pain fever chills or 

shortness of breath





Objective:


Physical exam:


Vitals: See below


General: Lying on her left side in fetal position speaks in full sentences no 

distress pallor icterus or jaundice


HEENT: Normocephalic, atraumatic, moist mucous membranes.  No carotid bruit


Neck: No lymphadenopathy or thyromegaly


Cardiac: Regular rate and rhythm, no murmurs, normal S1, normal S2 no gallops


Pulm: Clear to auscultation bilaterally. No wheezes, rhonchi, rales air entry is

equal


Abd: No rebound or guarding nontender nondistended, no rebound tenderness normal

bowel sounds


Ext: No edema bilateral lower extremities postop right foot and a bandage





Labs: See below





Imaging: CT of the abdomen and pelvis with p.o. and IV contrast performed on 

11/15/2021 is reported to show relatively symmetric bilateral perinephric stran

ding sense of the pelvis with small amount of pelvic fluid raise possibility of 

acute pyelonephritis and correlation is required.  Differential diagnosis may 

include pancreatitis although the pancreas itself appears relatively normal.  

Nonacute findings include diverticulosis and suspected left adrenal adenoma.








Assessment/plan:


41-year-old female presented to the hospital with a diabetic foot wound who had 

amputation of the fifth metatarsal for management of osteomyelitis.





 Acute osteomyelitis of the right foot.  


Status post debridement/amputation in the OR by podiatry


Reviewed culture results currently on Levaquin


As needed pain meds


Postop orders including activity dressing changes per podiatry Dr. Medina with 

outpatient follow-up at discharge





 Anemia


Corrected without intervention


Consented for blood but did not receive any blood transfusion


Repeat hemoglobin is stable


No active GI bleed





 Insulin-dependent diabetes mellitus type 2.  


On insulin,


Outpatient follow-up with Dr. Nails





 Depression/anxiety.  Continue outpatient follow-up.





Diabetic gastroparesis


Liquid diet for now


Reglan


Nuclear med gastric emptying study in the morning


Small more frequent meals





 Hypertension. 


Holding ACE inhibitor due to recent contrast study


Norvasc with holding parameters





Disposition: 1 to 2 days





VS, I&O, 24H, Josefina


Vital Signs/I&O





Vital Signs








  Date Time  Temp Pulse Resp B/P (MAP) Pulse Ox O2 Delivery O2 Flow Rate FiO2


 


11/16/21 08:35  91  158/88    


 


11/16/21 06:00 97.8  18  98 Room Air  


 


11/15/21 09:00       2.0 














I&O- Last 24 Hours up to 6 AM 


 


 11/16/21





 06:00


 


Intake Total 620 ml


 


Output Total 150 ml


 


Balance 470 ml











Laboratory Data


24H LABS


Laboratory Tests 2


11/15/21 14:57: 


Urine Color STRAW, Urine Appearance CLEAR, Urine pH 9.0, Urine Specific Gravity 

1.030, Urine Protein 2+H, Urine Glucose (UA) 2+H, Urine Ketones 1+H, Urine Blood

2+H, Urine Nitrite NEGATIVE, Urine Bilirubin NEGATIVE, Urine Urobilinogen 0.2, 

Urine Leukocyte Esterase NEGATIVE, Urine WBC (Auto) 2, Urine RBC (Auto) 9H, 

Urine Hyaline Casts (Auto) 0, Urine Bacteria (Auto) NEGATIVE, Urine Squamous 

Epithelial Cells 1, Urine Sperm (Auto) 


11/15/21 17:49: Bedside Glucose (Misc Panel) 235H


11/15/21 17:57: Nucleated Red Blood Cells % (auto) 0.0


11/15/21 20:36: Bedside Glucose (Misc Panel) 170H


11/16/21 00:25: Nucleated Red Blood Cells % (auto) 0.0


11/16/21 05:53: 


Nucleated Red Blood Cells % (auto) 0.0, Immature Granulocyte % (Auto) 2.4, Layla

trophils (%) (Auto) 70.9H, Lymphocytes (%) (Auto) 20.2L, Monocytes (%) (Auto) 

4.8, Eosinophils (%) (Auto) 1.2, Basophils (%) (Auto) 0.5, Neutrophils # (Auto) 

7.5, Lymphocytes # (Auto) 2.1, Monocytes # (Auto) 0.5, Eosinophils # (Auto) 0.1,

Basophils # (Auto) 0.1, Anion Gap 10, Glomerular Filtration Rate > 60.0, Calcium

Level 9.2, Magnesium Level 1.9


11/16/21 12:02: Nucleated Red Blood Cells % (auto) 0.0


11/16/21 12:04: Bedside Glucose (Misc Panel) 150H


CBC/BMP


Laboratory Tests


11/15/21 17:57








11/16/21 00:25








11/16/21 05:53








11/16/21 12:02








Microbiology





Microbiology


11/15/21 Stool Occult Blood (PACHECO) - Final, Complete


           


11/11/21 Gram Stain - Final, Complete


           


11/11/21 Wound Culture - Final, Complete


           Enterobacter Cloacae Complex


           Enterobacter Cloacae Complex#2


           Strep Agalactiae Group B


11/11/21 Anaerobic Culture - Final, Complete


           


11/10/21 Blood Culture - Final, Complete


           NO GROWTH AFTER 5 DAYS


11/10/21 Blood Culture - Final, Complete


           NO GROWTH AFTER 5 DAYS











DELANEY SCHWARTZ MD            Nov 16, 2021 13:40

## 2021-11-17 NOTE — REP
INDICATION:

INTRACTABLE N/V.



TECHNIQUE:

This procedure was performed by [facility], under the direct supervision

of Dr Thomson.  Images were reviewed with Dr Thomson prior to dictation.



Liquid barium  was given in the erect position  in order to perform a single contrast

upper GI examination.  Additional liquid barium was given at the end of the

examination in order to perform a small-bowel follow-through.





FINDINGS:

The  film shows no organomegaly or pathological masses.  The intestinal gas

pattern is unremarkable.



The oral and pharyngeal stages of deglutition were unremarkable.  Esophageal transport

is prompt and efficient and there is no evidence of esophagitis, stricture, or mucosal

ring.  There is no evidence of a   hiatal hernia. .



The stomach walls are normally outlined.  The rugal folds are smooth and regular.

There is no gastritis, neoplasm, or ulcerative disease.



The duodenal walls are normally outlined.  The mucosal folds are smooth and regular.

There is no duodenitis, peptic ulcer disease or neoplasm.  The visualized portion of

the proximal small bowel appears normal in course and caliber.



The barium column was followed through the small bowel to the level of the terminal

ileum.  Small bowel transit time is approximately 90 minutes.  During fluoroscopy

gentle palpation shows all loops are freely movable and pliable.  There is no fixed

angulated loops.  The small bowel mucosal pattern is normal in course and caliber.

There is no transition to suggest a partial small bowel obstruction.  Spot filming of

the terminal ileum shows it to be unremarkable.





IMPRESSION:

Unremarkable upper GI with small-bowel follow-through.



0.2 minutes of fluoroscopy time was utilized for this procedure. Some fluoroscopic

images are performed with last image hold technology.  These images require no

additional radiation.



<Electronically signed by Lore Bustillo > 11/17/21 1609

<Electronically signed by Kareem Thomson > 11/17/21 1623

## 2021-11-17 NOTE — IPN
PROGRESS NOTE



DATE:  11/17/2021



SUBJECTIVE: Patient continues to complain of intractable nausea and vomiting at

the bedside, epigastric abdominal pain without chest pain, pressure, tightness,

or shortness of breath. 



OBJECTIVE: 

VITAL SIGNS: Temperature 98.1, pulse 100, respiratory rate 18, blood pressure

158/91, 96% on room air.

GENERAL: Awake, alert and oriented to person, place and time, answering

questions appropriately. 

NECK: No JVD. No thyromegaly. No cervical lymphadenopathy. 

LUNGS: Clear to auscultation. No wheezing or rales.

HEART: S1 and S2, sinus rhythm.

ABDOMEN: Soft, tender in the epigastric region. No rebound or guarding.

Positive bowel sounds x4 quadrants.

EXTREMITIES: Right foot in a bandage.



LABORATORY DATA/IMAGING/MICROBIOLOGY:  Please see the chart. 



ASSESSMENT AND PLAN: A 41-year-old with diabetic foot infection, found to have

a 5th metatarsal osteomyelitis status post amputation with Dr. Medina, currently

on Levaquin due to one culture showing Enterobacter and Strep Group B. Patient

has been stable but now complains of intractable abdominal epigastric pain with

nausea and vomiting thought to be secondary to gastroparesis, given Reglan

without improvement, Zofran, Phenergan as well as Prilosec, Carafate. Patient

has gone for an upper GI series this morning, still not tolerating her diet.



IMPRESSION: 

  1.  Intractable nausea and vomiting, epigastric abdominal pain, CT of the

      abdomen and pelvis showed bilateral perinephric stranding, correlate

      clinically with the possibility of pyelonephritis with UA being negative.

      Differential includes pancreatitis. Patient had been NPO and on IV fluids

      with no significant improvement on PPI, IV fluids for now. 

  2.  Osteomyelitis status post amputation of the right fifth toe and

      metatarsal head, managed by Dr. Medina, doing well, currently on Levaquin. 

  3.  Diabetic gastroparesis on Reglan, started on a small liquid diet and

      advanced to small frequent meals and consistent carb as tolerated.

      Antiemetics and Reglan for now.

  4.  Hypokalemia due to decreased oral intake, potassium has been replaced.

  5.  Depression and anxiety, stable.

  6.  Type 2 diabetes, on insulin sliding scale, currently on liquid diet,

      advance as tolerated. 

  7.  Hypertension, currently on Norvasc. Lisinopril has been held due to

      persistent nausea and vomiting, recent contrast , to prevent contrast

      nephropathy.

  8.  Disposition: Not tolerating her diet for 1-2 days, GI consult is

      unavailable this week, general surgical consult for EGD if persistent

      symptoms. 

MTDD

## 2021-11-18 NOTE — DSES
DISCHARGE SUMMARY



DATE OF ADMISSION:  11/10/2021

DATE OF DISCHARGE:  11/18/2021



CONSULTANTS:

1.  Dr. Medina, podiatrist.

2.  Dr. Cameron, general surgeon.



PROCEDURES DURING THIS ADMISSION:  11/11/2021 right fifth toe and metatarsal

head amputation due to right foot diabetic infection.



PRIMARY DISCHARGE DIAGNOSES: 

1.  Right foot diabetic infection.

2.  Right fifth toe and metatarsal amputation secondary to osteomyelitis of the

    right foot.

3.  Anemia requiring 1 unit of red blood cells transfusion.

4.  Gastroparesis.

5.  Type-2 diabetes. 

6.  Depression and anxiety. 

7.  Hypertension. 

8.  Hyponatremia. 

9.  Hypokalemia. 



DISCHARGE MEDICATIONS: 

1.  Scopolamine patch.

2.  Bactrim one tablet twice a day. 

3.  Zofran 4 mg every 6-8 hours as needed.

4.  Bacid one tablet with meals.

5.  Norvasc 5 mg daily.

6.  Amoxicillin 875 twice a day. 

7.  Glipizide 5 twice a day. 

8.  Lantus insulin 40 units subcutaneously daily. 



DISCHARGE INSTRUCTIONS:  Postoperative management per Dr. Medina.  Outpatient

followup with Dr. Cameron for EGD if intractable nausea and vomiting.

Endocrinologist or PCP appointment within five days regarding the patient's

type-2 diabetes.  The patient is to have fingerstick before every meal at

bedtime at home with a diary to bring in to her primary care physician to

adjust her long acting insulin if needed.



HOSPITAL COURSE:  This is a 41-year-old female admitted on 11/11/2021 with

persistent redness, swelling of the right foot, seen by Dr. Stillerman in the

office.  The patient had developed an infection in September, was given a dose

of Keflex and Clindamycin with persistent symptoms.  Dopplers were negative for

DVT.  Sed rate and CRP and white cell count were elevated.  A1c was 10.3. 

X-ray confirmed by MRI shows fifth metatarsal and proximal phalanx

osteomyelitis.  Dr. Medina was consulted.  The patient underwent amputation of

the right fifth toe and was initially treated with Vancomycin and Zosyn. 

Cultures grew out enterobacter and Streptococcal agalactiae group B. She

remained afebrile after a T-max of 100.7 on admission and 100.6 on 11/12/2021

but remained afebrile since.  White blood cell count peaked at 11.6 on 11/16

and back down to normal at 9.5 on discharge.  Postoperatively the patient did

have anemia at 7.1 probably slightly hemodilutional and did not receive any

blood transfusion with resultant improvement to 10 without intervention.  The

patient complained of intractable nausea and vomiting, treated with IV Reglan. 

CT abdomen and pelvis showed questionable pancreatitis or pyelonephritis. 

Urinalysis had negative leukocyte esterase, negative bacteria.  The patient was

not in diabetic ketoacidosis with normal anion gap.  General Surgery was

consulted and recommended a scopolamine patch.  Upper GI series was negative. 

The patient was kept on Reglan, IV fluids. Electrolytes were supplemented due

to hypokalemia with potassium decreasing to 2.9.  The patient's glucose ranged

from 180 to 204.  She tolerated a liquid diet, advanced to consistent

carbohydrates.  The patient was changed to Levaquin orally but developed

worsening nausea and vomiting and was changed to Amoxicillin and Bactrim with

normal creatinine at 0.96.  She complained of severe depressive symptoms and

was started on Lexapro 10 mg daily. The patient had uncontrolled blood pressure

at 148 to 160, given Norvasc. 



PHYSICAL EXAM ON DISCHARGE: 

VITAL SIGNS:  Temperature 97.6, pulse 98, respiratory rate 19, blood pressure

162/82, 100% on room air. 

GENERAL: Generally awake, alert and oriented times three, answering questions

appropriately. 

LUNGS:  Clear to auscultation, no wheezing, rales, or rhonchi.   

HEART: S1, S2, sinus rhythm. 

ABDOMEN:  Soft, nontender, nondistended.  Positive bowel sounds. 

EXTREMITIES:  The patient has a right foot bandage status post right fifth toe

amputation due to osteomyelitis.  



LABORATORY DATA, IMAGING STUDIES, MICROBIOLOGY:  Please see the chart. 



Time spent on discharge 30 minutes.

## 2021-11-18 NOTE — CR.PDOC
General


Date of Consultation:  Nov 18, 2021





Consultation


General Surgery Dr Cameron. 











HISTORY OF PRESENT ILLNESS: The patient is a 41-year-old female who was admitted

11/10/2021 with right foot osteomyelitis/diabetic foot ulcer.  The patient is 

known to have a history of diabetes which is poorly controlled.  General surgery

is consulted for persistent nausea/vomiting.


The patient had reported feeling nauseated with vomiting on the evening of 

11/12/2021.  Initially the patient was tried on metoclopramide.  She was also on

Zosyn antibiotic.  Metoclopramide did not seem to help with her symptoms.  She 

was tried on oral Benadryl which seemed to be helping.  CT abdomen/pelvis 

indicated no acute abnormality.  Lipase has been noted to be normal.  LFTs 

normal.  The patient has not been reporting abdominal pain.  The patient denies 

any headache, blurred vision, diplopia, dizziness, vertigo, dysphagia.  Denies 

heartburn symptoms.  Denies bloating or distention.  The patient seems to 

associate her nausea and vomiting with doses of Levaquin, she is currently on 

750 mg p.o. times 1 in the morning.  She states the nausea and vomiting is worse

in the morning and better in the later afternoon and evening.





ALLERGIES: Please see below.





HOME MEDICATIONS: Please see below.





PAST MEDICAL HISTORY:


DM, poorly controlled. 


neuropathy


PAD/chronic wound RLE


Migraines


Asthma


Anxiety/Depression


Herpes Genitalis





PAST SURGICAL HISTORY: 


Retina detachment surgery (5/2020)


Tonsillectomy


D&C in 2011





SOCIAL HISTORY:


denies tobacco use


denies etoh use





REVIEW OF SYSTEMS:


As noted in HPI otherwise 10 Pt ROS unremarkable. 





PHYSICAL EXAMINATION:


VITAL SIGNS: Please see below.


GENERAL APPEARANCE: Sitting on side of the bed, nauseated and vomiting.  No 

pallor, icterus or jaundice.


HEENT: Normocephalic, atraumatic, moist mucous membranes.


ABDOMEN: Exam is limited as the patient was not feeling well at the time of 

exam.  Soft, nondistended.


Bandage on right foot.








LABORATORY DATA:


WBC this morning 9.5


Hemoglobin 10.1 which has been stable.


LFTs within normal limits.  Lipase within normal limits.


CRP 1.30 which is decreased from last value 11/14, 7.63.





Imaging.


Upper GI 11/17/2021 unremarkable.





CT abdomen/pelvis with contrast 11/15/2021


IMPRESSION:


1.  Relatively symmetric bilateral perinephric stranding extends to the pelvis 

with


small amount of pelvic fluid raise the possibility of acute pyelonephritis and


correlation is required.  Differential diagnosis may include pancreatitis 

although the


pancreas itself appears relatively normal.


2.  Nonacute findings including diverticulosis and suspected small left adrenal


adenoma.


<Electronically signed by Rafael Villatoro > 11/15/21 





ASSESSMENT/PLAN: 


Nausea/vomiting. 


The patient is reviewed and examined this morning as per Dr. Cameron.


No leukocytosis.  LFTs and lipase within normal limits.


Imaging is reviewed as per Dr. Cameron.  Upper GI noted to be unremarkable, CT 

abdomen/pelvis with no acute findings.


Seems to be most associated with doses of Levaquin.


Trial of scopolamine patch to see if this helps with her symptoms.


No surgical intervention is recommended at this time.





Vital Signs/I&O





Vital Signs








  Date Time  Temp Pulse Resp B/P (MAP) Pulse Ox O2 Delivery O2 Flow Rate FiO2


 


11/18/21 11:35  98  162/82    


 


11/18/21 08:30 97.6  19  100 Room Air  


 


11/15/21 09:00       2.0 














I&O- Last 24 Hours up to 6 AM 


 


 11/18/21





 06:00


 


Intake Total 150 ml


 


Output Total 100 ml


 


Balance 50 ml











Laboratory Data


Labs 24H


Laboratory Tests 2


11/17/21 16:33: 


Urine Color YELLOW, Urine Appearance CLOUDYH, Urine pH 7.0, Urine Specific 

Gravity 1.016, Urine Protein 3+H, Urine Glucose (UA) 2+H, Urine Ketones TRACEH, 

Urine Blood 3+H, Urine Nitrite NEGATIVE, Urine Bilirubin NEGATIVE, Urine 

Urobilinogen 0.2, Urine Leukocyte Esterase NEGATIVE, Urine WBC (Auto) 4H, Urine 

RBC (Auto) TNTCH, Urine Hyaline Casts (Auto) 2, Urine Bacteria (Auto) NEGATIVE, 

Urine Squamous Epithelial Cells 4, Urine Mucus (Auto) SMALL, Urine Yeast-Like 

Cells (Auto) SMALLH, Urine Sperm (Auto) 


11/17/21 17:57: Bedside Glucose (Misc Panel) 196H


11/17/21 22:13: Bedside Glucose (Misc Panel) 169H


11/18/21 07:22: 


Immature Granulocyte % (Auto) 3.0, Neutrophils (%) (Auto) 67.8H, Lymphocytes (%)

(Auto) 21.2L, Monocytes (%) (Auto) 5.4, Eosinophils (%) (Auto) 2.1, Basophils 

(%) (Auto) 0.5, Neutrophils # (Auto) 6.4, Lymphocytes # (Auto) 2.0, Monocytes # 

(Auto) 0.5, Eosinophils # (Auto) 0.2, Basophils # (Auto) 0.1, Nucleated Red 

Blood Cells % (auto) 0.0, Anion Gap 7L, Glomerular Filtration Rate > 60.0, 

Calcium Level 8.7, Magnesium Level 2.0


CBC/BMP


Laboratory Tests


11/18/21 07:22








Microbiology





Microbiology


11/15/21 Stool Occult Blood (PACHECO) - Final, Complete


           


11/11/21 Gram Stain - Final, Complete


           


11/11/21 Wound Culture - Final, Complete


           Enterobacter Cloacae Complex


           Enterobacter Cloacae Complex#2


           Strep Agalactiae Group B


11/11/21 Anaerobic Culture - Final, Complete


           


11/10/21 Blood Culture - Final, Complete


           NO GROWTH AFTER 5 DAYS


11/10/21 Blood Culture - Final, Complete


           NO GROWTH AFTER 5 DAYS





Allergies


Coded Allergies:  


     No Known Allergies (Unverified , 9/14/18)





Home Medications


Scheduled


Amlodipine Besylate (Amlodipine Besylate) 5 Mg Tablet, 5 MG PO DAILY for 30 

Days, #30


Amoxicillin/Potassium Clav (Augmentin 875-125 Tablet) 1 Each Tablet, 1 TAB PO 

BID for 7 Days, #14


Glipizide (Glipizide) 5 Mg Tablet, 5 MG PO BID, (Reported)


Insulin Glargine,Hum.rec.anlog (Lantus Solostar) 100 Unit/1 Ml Insuln.pen, 40 UN

IT SC DAILY, (Reported)


L.acidoph/L.bulg/B.bif/S.therm (Bacid Caplet) 1 Each Tablet, 1 TAB PO WM for 7 

Days, #21


Metoclopramide Hcl (Reglan) 5 Mg Tablet, 5 MG PO ACHS, #20


Scopolamine (Transderm-Scop) 1 Each Patch.td.3, 1 MG TOP Q72H for 6 Days, #2


Sulfamethoxazole/Trimethoprim (Bactrim Ds Tablet) 1 Each Tablet, 1 TAB PO BID 

for 7 Days, #14





Scheduled PRN


Ondansetron HCl (Zofran) 4 Mg Tablet, 1 TAB PO Q6-8HP PRN for nausea/vomiting 

for 2 Days, #5











Roxie Foster              Nov 18, 2021 13:06

## 2022-07-02 ENCOUNTER — HOSPITAL ENCOUNTER (INPATIENT)
Dept: HOSPITAL 53 - M ED | Age: 42
LOS: 7 days | Discharge: ANOTHER HOSPITAL | DRG: 751 | End: 2022-07-09
Attending: STUDENT IN AN ORGANIZED HEALTH CARE EDUCATION/TRAINING PROGRAM | Admitting: STUDENT IN AN ORGANIZED HEALTH CARE EDUCATION/TRAINING PROGRAM
Payer: MEDICAID

## 2022-07-02 VITALS — HEIGHT: 65 IN | WEIGHT: 173.06 LBS | BODY MASS INDEX: 28.83 KG/M2

## 2022-07-02 DIAGNOSIS — Z79.899: ICD-10-CM

## 2022-07-02 DIAGNOSIS — F12.90: ICD-10-CM

## 2022-07-02 DIAGNOSIS — F41.9: ICD-10-CM

## 2022-07-02 DIAGNOSIS — F14.90: ICD-10-CM

## 2022-07-02 DIAGNOSIS — K59.00: ICD-10-CM

## 2022-07-02 DIAGNOSIS — F60.89: ICD-10-CM

## 2022-07-02 DIAGNOSIS — Z89.421: ICD-10-CM

## 2022-07-02 DIAGNOSIS — E10.43: ICD-10-CM

## 2022-07-02 DIAGNOSIS — Z79.4: ICD-10-CM

## 2022-07-02 DIAGNOSIS — F33.1: Primary | ICD-10-CM

## 2022-07-02 DIAGNOSIS — E10.621: ICD-10-CM

## 2022-07-02 DIAGNOSIS — R45.851: ICD-10-CM

## 2022-07-02 DIAGNOSIS — G43.909: ICD-10-CM

## 2022-07-02 DIAGNOSIS — J45.909: ICD-10-CM

## 2022-07-02 DIAGNOSIS — N17.9: ICD-10-CM

## 2022-07-02 LAB
ALBUMIN SERPL BCG-MCNC: 3.3 GM/DL (ref 3.2–5.2)
ALT SERPL W P-5'-P-CCNC: 15 U/L (ref 12–78)
AMPHETAMINES UR QL SCN: NEGATIVE
APAP SERPL-MCNC: < 2 UG/ML (ref 10–30)
B-HCG SERPL QL: NEGATIVE
BARBITURATES UR QL SCN: NEGATIVE
BENZODIAZ UR QL SCN: NEGATIVE
BILIRUB CONJ SERPL-MCNC: < 0.1 MG/DL (ref 0–0.2)
BILIRUB SERPL-MCNC: 0.3 MG/DL (ref 0.2–1)
BUN SERPL-MCNC: 22 MG/DL (ref 7–18)
BZE UR QL SCN: POSITIVE
CALCIUM SERPL-MCNC: 8.7 MG/DL (ref 8.5–10.1)
CANNABINOIDS UR QL SCN: NEGATIVE
CHLORIDE SERPL-SCNC: 100 MEQ/L (ref 98–107)
CO2 SERPL-SCNC: 23 MEQ/L (ref 21–32)
CREAT SERPL-MCNC: 1.18 MG/DL (ref 0.55–1.3)
EST. AVERAGE GLUCOSE BLD GHB EST-MCNC: 266 MG/DL (ref 60–110)
ETHANOL SERPL-MCNC: < 0.003 % (ref 0–0.01)
GFR SERPL CREATININE-BSD FRML MDRD: 54.3 ML/MIN/{1.73_M2} (ref 60–?)
GLUCOSE SERPL-MCNC: 549 MG/DL (ref 70–100)
HCT VFR BLD AUTO: 30.3 % (ref 36–47)
HGB BLD-MCNC: 10.3 G/DL (ref 12–15.5)
MCH RBC QN AUTO: 28.7 PG (ref 27–33)
MCHC RBC AUTO-ENTMCNC: 34 G/DL (ref 32–36.5)
MCV RBC AUTO: 84.4 FL (ref 80–96)
METHADONE UR QL SCN: NEGATIVE
OPIATES UR QL SCN: NEGATIVE
PCP UR QL SCN: NEGATIVE
PLATELET # BLD AUTO: 442 10^3/UL (ref 150–450)
POTASSIUM SERPL-SCNC: 4.2 MEQ/L (ref 3.5–5.1)
PROT SERPL-MCNC: 7.3 GM/DL (ref 6.4–8.2)
RBC # BLD AUTO: 3.59 10^6/UL (ref 4–5.4)
RSV RNA NPH QL NAA+PROBE: NEGATIVE
SALICYLATES SERPL-MCNC: < 1.7 MG/DL (ref 5–30)
SODIUM SERPL-SCNC: 133 MEQ/L (ref 136–145)
TSH SERPL DL<=0.005 MIU/L-ACNC: 0.76 UIU/ML (ref 0.36–3.74)
WBC # BLD AUTO: 7.4 10^3/UL (ref 4–10)

## 2022-07-03 VITALS — DIASTOLIC BLOOD PRESSURE: 66 MMHG | SYSTOLIC BLOOD PRESSURE: 123 MMHG

## 2022-07-03 RX ADMIN — DULOXETINE SCH MG: 20 CAPSULE, DELAYED RELEASE ORAL at 21:45

## 2022-07-03 RX ADMIN — INSULIN LISPRO SCH UNITS: 100 INJECTION, SOLUTION INTRAVENOUS; SUBCUTANEOUS at 22:19

## 2022-07-03 RX ADMIN — INSULIN LISPRO SCH UNITS: 100 INJECTION, SOLUTION INTRAVENOUS; SUBCUTANEOUS at 13:32

## 2022-07-03 RX ADMIN — INSULIN LISPRO SCH UNITS: 100 INJECTION, SOLUTION INTRAVENOUS; SUBCUTANEOUS at 08:49

## 2022-07-03 RX ADMIN — INSULIN LISPRO SCH UNITS: 100 INJECTION, SOLUTION INTRAVENOUS; SUBCUTANEOUS at 18:06

## 2022-07-03 RX ADMIN — DULOXETINE SCH MG: 20 CAPSULE, DELAYED RELEASE ORAL at 09:08

## 2022-07-04 VITALS — SYSTOLIC BLOOD PRESSURE: 124 MMHG | DIASTOLIC BLOOD PRESSURE: 76 MMHG

## 2022-07-04 VITALS — DIASTOLIC BLOOD PRESSURE: 76 MMHG | SYSTOLIC BLOOD PRESSURE: 115 MMHG

## 2022-07-04 RX ADMIN — AMOXICILLIN AND CLAVULANATE POTASSIUM SCH MG: 875; 125 TABLET, FILM COATED ORAL at 21:53

## 2022-07-04 RX ADMIN — DOXYCYCLINE HYCLATE SCH MG: 100 TABLET, COATED ORAL at 20:45

## 2022-07-04 RX ADMIN — INSULIN LISPRO SCH UNITS: 100 INJECTION, SOLUTION INTRAVENOUS; SUBCUTANEOUS at 17:29

## 2022-07-04 RX ADMIN — INSULIN LISPRO SCH UNITS: 100 INJECTION, SOLUTION INTRAVENOUS; SUBCUTANEOUS at 12:02

## 2022-07-04 RX ADMIN — INSULIN LISPRO SCH UNITS: 100 INJECTION, SOLUTION INTRAVENOUS; SUBCUTANEOUS at 06:50

## 2022-07-04 RX ADMIN — DULOXETINE SCH MG: 20 CAPSULE, DELAYED RELEASE ORAL at 20:45

## 2022-07-04 RX ADMIN — DULOXETINE SCH MG: 20 CAPSULE, DELAYED RELEASE ORAL at 09:51

## 2022-07-04 RX ADMIN — INSULIN LISPRO SCH UNITS: 100 INJECTION, SOLUTION INTRAVENOUS; SUBCUTANEOUS at 20:47

## 2022-07-05 VITALS — SYSTOLIC BLOOD PRESSURE: 183 MMHG | DIASTOLIC BLOOD PRESSURE: 86 MMHG

## 2022-07-05 VITALS — SYSTOLIC BLOOD PRESSURE: 136 MMHG | DIASTOLIC BLOOD PRESSURE: 72 MMHG

## 2022-07-05 LAB
ALBUMIN SERPL BCG-MCNC: 3.4 GM/DL (ref 3.2–5.2)
ALT SERPL W P-5'-P-CCNC: 19 U/L (ref 12–78)
BASOPHILS # BLD AUTO: 0 10^3/UL (ref 0–0.2)
BASOPHILS NFR BLD AUTO: 0.2 % (ref 0–1)
BILIRUB SERPL-MCNC: 0.3 MG/DL (ref 0.2–1)
BUN SERPL-MCNC: 26 MG/DL (ref 7–18)
CALCIUM SERPL-MCNC: 9.9 MG/DL (ref 8.5–10.1)
CHLORIDE SERPL-SCNC: 106 MEQ/L (ref 98–107)
CO2 SERPL-SCNC: 24 MEQ/L (ref 21–32)
CREAT SERPL-MCNC: 1.03 MG/DL (ref 0.55–1.3)
EOSINOPHIL # BLD AUTO: 0 10^3/UL (ref 0–0.5)
EOSINOPHIL NFR BLD AUTO: 0 % (ref 0–3)
GFR SERPL CREATININE-BSD FRML MDRD: > 60 ML/MIN/{1.73_M2} (ref 60–?)
GLUCOSE SERPL-MCNC: 252 MG/DL (ref 70–100)
HCT VFR BLD AUTO: 35.2 % (ref 36–47)
HGB BLD-MCNC: 12.1 G/DL (ref 12–15.5)
LYMPHOCYTES # BLD AUTO: 1.2 10^3/UL (ref 1.5–5)
LYMPHOCYTES NFR BLD AUTO: 9.1 % (ref 24–44)
MCH RBC QN AUTO: 28.3 PG (ref 27–33)
MCHC RBC AUTO-ENTMCNC: 34.4 G/DL (ref 32–36.5)
MCV RBC AUTO: 82.2 FL (ref 80–96)
MONOCYTES # BLD AUTO: 0.1 10^3/UL (ref 0–0.8)
MONOCYTES NFR BLD AUTO: 1 % (ref 2–8)
NEUTROPHILS # BLD AUTO: 11.5 10^3/UL (ref 1.5–8.5)
NEUTROPHILS NFR BLD AUTO: 89.1 % (ref 36–66)
PLATELET # BLD AUTO: 502 10^3/UL (ref 150–450)
POTASSIUM SERPL-SCNC: 4.4 MEQ/L (ref 3.5–5.1)
PROT SERPL-MCNC: 8.2 GM/DL (ref 6.4–8.2)
RBC # BLD AUTO: 4.28 10^6/UL (ref 4–5.4)
SODIUM SERPL-SCNC: 138 MEQ/L (ref 136–145)
WBC # BLD AUTO: 12.9 10^3/UL (ref 4–10)

## 2022-07-05 RX ADMIN — DULOXETINE SCH MG: 20 CAPSULE, DELAYED RELEASE ORAL at 21:00

## 2022-07-05 RX ADMIN — DULOXETINE SCH MG: 20 CAPSULE, DELAYED RELEASE ORAL at 10:00

## 2022-07-05 RX ADMIN — DOXYCYCLINE HYCLATE SCH MG: 100 TABLET, COATED ORAL at 21:00

## 2022-07-05 RX ADMIN — INSULIN DETEMIR SCH UNITS: 100 INJECTION, SOLUTION SUBCUTANEOUS at 10:01

## 2022-07-05 RX ADMIN — INSULIN LISPRO SCH UNITS: 100 INJECTION, SOLUTION INTRAVENOUS; SUBCUTANEOUS at 06:51

## 2022-07-05 RX ADMIN — FAMOTIDINE SCH MG: 20 TABLET, FILM COATED ORAL at 10:00

## 2022-07-05 RX ADMIN — FAMOTIDINE SCH MG: 20 TABLET, FILM COATED ORAL at 11:24

## 2022-07-05 RX ADMIN — DOXYCYCLINE HYCLATE SCH MG: 100 TABLET, COATED ORAL at 10:01

## 2022-07-05 RX ADMIN — AMOXICILLIN AND CLAVULANATE POTASSIUM SCH MG: 875; 125 TABLET, FILM COATED ORAL at 10:00

## 2022-07-05 RX ADMIN — ONDANSETRON PRN MG: 4 TABLET, ORALLY DISINTEGRATING ORAL at 17:57

## 2022-07-05 RX ADMIN — SUCRALFATE SCH GM: 1 TABLET ORAL at 11:24

## 2022-07-05 RX ADMIN — INSULIN LISPRO SCH UNITS: 100 INJECTION, SOLUTION INTRAVENOUS; SUBCUTANEOUS at 17:57

## 2022-07-05 RX ADMIN — SUCRALFATE SCH GM: 1 TABLET ORAL at 21:00

## 2022-07-05 RX ADMIN — SUCRALFATE SCH GM: 1 TABLET ORAL at 17:57

## 2022-07-05 RX ADMIN — INSULIN LISPRO SCH UNITS: 100 INJECTION, SOLUTION INTRAVENOUS; SUBCUTANEOUS at 21:00

## 2022-07-05 RX ADMIN — AMOXICILLIN AND CLAVULANATE POTASSIUM SCH MG: 875; 125 TABLET, FILM COATED ORAL at 21:00

## 2022-07-05 RX ADMIN — INSULIN LISPRO SCH UNITS: 100 INJECTION, SOLUTION INTRAVENOUS; SUBCUTANEOUS at 12:31

## 2022-07-06 VITALS — SYSTOLIC BLOOD PRESSURE: 146 MMHG | DIASTOLIC BLOOD PRESSURE: 77 MMHG

## 2022-07-06 VITALS — DIASTOLIC BLOOD PRESSURE: 69 MMHG | SYSTOLIC BLOOD PRESSURE: 159 MMHG

## 2022-07-06 LAB
BUN SERPL-MCNC: 43 MG/DL (ref 7–18)
CALCIUM SERPL-MCNC: 9.6 MG/DL (ref 8.5–10.1)
CHLORIDE SERPL-SCNC: 104 MEQ/L (ref 98–107)
CO2 SERPL-SCNC: 24 MEQ/L (ref 21–32)
CREAT SERPL-MCNC: 1.7 MG/DL (ref 0.55–1.3)
GFR SERPL CREATININE-BSD FRML MDRD: 35.6 ML/MIN/{1.73_M2} (ref 60–?)
GLUCOSE SERPL-MCNC: 309 MG/DL (ref 70–100)
POTASSIUM SERPL-SCNC: 4.4 MEQ/L (ref 3.5–5.1)
SODIUM SERPL-SCNC: 136 MEQ/L (ref 136–145)

## 2022-07-06 PROCEDURE — 0JBQ0ZZ EXCISION OF RIGHT FOOT SUBCUTANEOUS TISSUE AND FASCIA, OPEN APPROACH: ICD-10-PCS | Performed by: PODIATRIST

## 2022-07-06 RX ADMIN — INSULIN LISPRO SCH UNITS: 100 INJECTION, SOLUTION INTRAVENOUS; SUBCUTANEOUS at 17:50

## 2022-07-06 RX ADMIN — SUCRALFATE SCH GM: 1 TABLET ORAL at 12:53

## 2022-07-06 RX ADMIN — AMOXICILLIN AND CLAVULANATE POTASSIUM SCH MG: 875; 125 TABLET, FILM COATED ORAL at 21:36

## 2022-07-06 RX ADMIN — DULOXETINE SCH MG: 20 CAPSULE, DELAYED RELEASE ORAL at 12:54

## 2022-07-06 RX ADMIN — INSULIN DETEMIR SCH UNITS: 100 INJECTION, SOLUTION SUBCUTANEOUS at 12:58

## 2022-07-06 RX ADMIN — FAMOTIDINE SCH MG: 20 TABLET, FILM COATED ORAL at 21:36

## 2022-07-06 RX ADMIN — DOXYCYCLINE HYCLATE SCH MG: 100 TABLET, COATED ORAL at 12:55

## 2022-07-06 RX ADMIN — INSULIN LISPRO SCH UNITS: 100 INJECTION, SOLUTION INTRAVENOUS; SUBCUTANEOUS at 12:54

## 2022-07-06 RX ADMIN — INSULIN LISPRO SCH UNITS: 100 INJECTION, SOLUTION INTRAVENOUS; SUBCUTANEOUS at 21:00

## 2022-07-06 RX ADMIN — AMOXICILLIN AND CLAVULANATE POTASSIUM SCH MG: 875; 125 TABLET, FILM COATED ORAL at 12:55

## 2022-07-06 RX ADMIN — METOCLOPRAMIDE SCH MG: 5 TABLET ORAL at 17:44

## 2022-07-06 RX ADMIN — SUCRALFATE SCH GM: 1 TABLET ORAL at 21:36

## 2022-07-06 RX ADMIN — SUCRALFATE SCH GM: 1 TABLET ORAL at 09:19

## 2022-07-06 RX ADMIN — SUCRALFATE SCH GM: 1 TABLET ORAL at 17:44

## 2022-07-06 RX ADMIN — ONDANSETRON PRN MG: 4 TABLET, ORALLY DISINTEGRATING ORAL at 09:19

## 2022-07-06 RX ADMIN — FAMOTIDINE SCH MG: 20 TABLET, FILM COATED ORAL at 09:19

## 2022-07-06 RX ADMIN — DOXYCYCLINE HYCLATE SCH MG: 100 TABLET, COATED ORAL at 21:36

## 2022-07-06 RX ADMIN — INSULIN LISPRO SCH UNITS: 100 INJECTION, SOLUTION INTRAVENOUS; SUBCUTANEOUS at 07:30

## 2022-07-07 VITALS — DIASTOLIC BLOOD PRESSURE: 78 MMHG | SYSTOLIC BLOOD PRESSURE: 170 MMHG

## 2022-07-07 VITALS — DIASTOLIC BLOOD PRESSURE: 98 MMHG | SYSTOLIC BLOOD PRESSURE: 150 MMHG

## 2022-07-07 VITALS — SYSTOLIC BLOOD PRESSURE: 156 MMHG | DIASTOLIC BLOOD PRESSURE: 81 MMHG

## 2022-07-07 VITALS — SYSTOLIC BLOOD PRESSURE: 184 MMHG | DIASTOLIC BLOOD PRESSURE: 94 MMHG

## 2022-07-07 LAB
BUN SERPL-MCNC: 37 MG/DL (ref 7–18)
CALCIUM SERPL-MCNC: 9.2 MG/DL (ref 8.5–10.1)
CHLORIDE SERPL-SCNC: 103 MEQ/L (ref 98–107)
CO2 SERPL-SCNC: 23 MEQ/L (ref 21–32)
CREAT SERPL-MCNC: 1.18 MG/DL (ref 0.55–1.3)
GFR SERPL CREATININE-BSD FRML MDRD: 54.3 ML/MIN/{1.73_M2} (ref 60–?)
GLUCOSE SERPL-MCNC: 227 MG/DL (ref 70–100)
POTASSIUM SERPL-SCNC: 3.9 MEQ/L (ref 3.5–5.1)
SODIUM SERPL-SCNC: 136 MEQ/L (ref 136–145)

## 2022-07-07 RX ADMIN — ONDANSETRON PRN MG: 4 TABLET, ORALLY DISINTEGRATING ORAL at 09:35

## 2022-07-07 RX ADMIN — BACITRACIN ZINC, NEOMYCIN, POLYMYXIN B SCH DOSE: 400; 3.5; 5 OINTMENT TOPICAL at 22:07

## 2022-07-07 RX ADMIN — SUCRALFATE SCH GM: 1 TABLET ORAL at 07:13

## 2022-07-07 RX ADMIN — INSULIN LISPRO SCH UNITS: 100 INJECTION, SOLUTION INTRAVENOUS; SUBCUTANEOUS at 22:04

## 2022-07-07 RX ADMIN — DULOXETINE SCH MG: 20 CAPSULE, DELAYED RELEASE ORAL at 09:36

## 2022-07-07 RX ADMIN — AMOXICILLIN AND CLAVULANATE POTASSIUM SCH MG: 875; 125 TABLET, FILM COATED ORAL at 22:07

## 2022-07-07 RX ADMIN — METOCLOPRAMIDE SCH MG: 5 TABLET ORAL at 12:00

## 2022-07-07 RX ADMIN — SUCRALFATE SCH GM: 1 TABLET ORAL at 12:00

## 2022-07-07 RX ADMIN — FAMOTIDINE SCH MG: 20 TABLET, FILM COATED ORAL at 09:36

## 2022-07-07 RX ADMIN — SUCRALFATE SCH GM: 1 TABLET ORAL at 17:24

## 2022-07-07 RX ADMIN — PANTOPRAZOLE SODIUM SCH MG: 40 TABLET, DELAYED RELEASE ORAL at 22:05

## 2022-07-07 RX ADMIN — INSULIN LISPRO SCH UNITS: 100 INJECTION, SOLUTION INTRAVENOUS; SUBCUTANEOUS at 07:21

## 2022-07-07 RX ADMIN — ONDANSETRON PRN MG: 4 TABLET, ORALLY DISINTEGRATING ORAL at 22:33

## 2022-07-07 RX ADMIN — INSULIN DETEMIR SCH UNITS: 100 INJECTION, SOLUTION SUBCUTANEOUS at 09:36

## 2022-07-07 RX ADMIN — FAMOTIDINE SCH MG: 20 TABLET, FILM COATED ORAL at 22:04

## 2022-07-07 RX ADMIN — SUCRALFATE SCH GM: 1 TABLET ORAL at 22:05

## 2022-07-07 RX ADMIN — METOCLOPRAMIDE SCH MG: 5 TABLET ORAL at 17:24

## 2022-07-07 RX ADMIN — BACITRACIN ZINC, NEOMYCIN, POLYMYXIN B SCH DOSE: 400; 3.5; 5 OINTMENT TOPICAL at 09:37

## 2022-07-07 RX ADMIN — METOCLOPRAMIDE SCH MG: 5 TABLET ORAL at 07:13

## 2022-07-07 RX ADMIN — INSULIN LISPRO SCH UNITS: 100 INJECTION, SOLUTION INTRAVENOUS; SUBCUTANEOUS at 12:00

## 2022-07-07 RX ADMIN — INSULIN LISPRO SCH UNITS: 100 INJECTION, SOLUTION INTRAVENOUS; SUBCUTANEOUS at 17:35

## 2022-07-07 RX ADMIN — AMOXICILLIN AND CLAVULANATE POTASSIUM SCH MG: 875; 125 TABLET, FILM COATED ORAL at 09:35

## 2022-07-07 RX ADMIN — DOXYCYCLINE HYCLATE SCH MG: 100 TABLET, COATED ORAL at 09:36

## 2022-07-08 VITALS — SYSTOLIC BLOOD PRESSURE: 160 MMHG | DIASTOLIC BLOOD PRESSURE: 99 MMHG

## 2022-07-08 VITALS — SYSTOLIC BLOOD PRESSURE: 170 MMHG | DIASTOLIC BLOOD PRESSURE: 100 MMHG

## 2022-07-08 LAB
BUN SERPL-MCNC: 38 MG/DL (ref 7–18)
CALCIUM SERPL-MCNC: 9.3 MG/DL (ref 8.5–10.1)
CHLORIDE SERPL-SCNC: 101 MEQ/L (ref 98–107)
CHOLEST SERPL-MCNC: 289 MG/DL (ref ?–200)
CHOLEST/HDLC SERPL: 8.26 {RATIO} (ref ?–5)
CO2 SERPL-SCNC: 28 MEQ/L (ref 21–32)
CREAT SERPL-MCNC: 1.28 MG/DL (ref 0.55–1.3)
GFR SERPL CREATININE-BSD FRML MDRD: 49.4 ML/MIN/{1.73_M2} (ref 60–?)
GLUCOSE SERPL-MCNC: 256 MG/DL (ref 70–100)
HDLC SERPL-MCNC: 35 MG/DL (ref 40–?)
LDLC SERPL CALC-MCNC: 224 MG/DL (ref ?–100)
LIPASE SERPL-CCNC: 143 U/L (ref 73–393)
NONHDLC SERPL-MCNC: 254 MG/DL
POTASSIUM SERPL-SCNC: 4.1 MEQ/L (ref 3.5–5.1)
SODIUM SERPL-SCNC: 135 MEQ/L (ref 136–145)
TRIGL SERPL-MCNC: 152 MG/DL (ref ?–150)

## 2022-07-08 RX ADMIN — INSULIN LISPRO SCH UNITS: 100 INJECTION, SOLUTION INTRAVENOUS; SUBCUTANEOUS at 12:00

## 2022-07-08 RX ADMIN — FAMOTIDINE SCH MG: 20 TABLET, FILM COATED ORAL at 21:00

## 2022-07-08 RX ADMIN — DOCUSATE SODIUM SCH MG: 100 CAPSULE, LIQUID FILLED ORAL at 21:00

## 2022-07-08 RX ADMIN — METOCLOPRAMIDE SCH MG: 5 TABLET ORAL at 17:30

## 2022-07-08 RX ADMIN — INSULIN LISPRO SCH UNITS: 100 INJECTION, SOLUTION INTRAVENOUS; SUBCUTANEOUS at 21:00

## 2022-07-08 RX ADMIN — SUCRALFATE SCH GM: 1 TABLET ORAL at 21:00

## 2022-07-08 RX ADMIN — FAMOTIDINE SCH MG: 20 TABLET, FILM COATED ORAL at 09:00

## 2022-07-08 RX ADMIN — ONDANSETRON PRN MG: 4 TABLET, ORALLY DISINTEGRATING ORAL at 17:34

## 2022-07-08 RX ADMIN — SUCRALFATE SCH GM: 1 TABLET ORAL at 12:00

## 2022-07-08 RX ADMIN — AMOXICILLIN AND CLAVULANATE POTASSIUM SCH MG: 875; 125 TABLET, FILM COATED ORAL at 21:00

## 2022-07-08 RX ADMIN — DULOXETINE SCH MG: 20 CAPSULE, DELAYED RELEASE ORAL at 09:00

## 2022-07-08 RX ADMIN — PANTOPRAZOLE SODIUM SCH MG: 40 TABLET, DELAYED RELEASE ORAL at 21:00

## 2022-07-08 RX ADMIN — INSULIN LISPRO SCH UNITS: 100 INJECTION, SOLUTION INTRAVENOUS; SUBCUTANEOUS at 06:53

## 2022-07-08 RX ADMIN — AMOXICILLIN AND CLAVULANATE POTASSIUM SCH MG: 875; 125 TABLET, FILM COATED ORAL at 09:00

## 2022-07-08 RX ADMIN — BACITRACIN ZINC, NEOMYCIN, POLYMYXIN B SCH DOSE: 400; 3.5; 5 OINTMENT TOPICAL at 09:41

## 2022-07-08 RX ADMIN — METOCLOPRAMIDE SCH MG: 5 TABLET ORAL at 12:00

## 2022-07-08 RX ADMIN — METOCLOPRAMIDE SCH MG: 5 TABLET ORAL at 06:53

## 2022-07-08 RX ADMIN — SUCRALFATE SCH GM: 1 TABLET ORAL at 17:30

## 2022-07-08 RX ADMIN — INSULIN LISPRO SCH UNITS: 100 INJECTION, SOLUTION INTRAVENOUS; SUBCUTANEOUS at 17:30

## 2022-07-08 RX ADMIN — INSULIN DETEMIR SCH UNITS: 100 INJECTION, SOLUTION SUBCUTANEOUS at 10:58

## 2022-07-08 RX ADMIN — SUCRALFATE SCH GM: 1 TABLET ORAL at 06:53

## 2022-07-08 RX ADMIN — BACITRACIN ZINC, NEOMYCIN, POLYMYXIN B SCH DOSE: 400; 3.5; 5 OINTMENT TOPICAL at 21:00

## 2022-07-09 VITALS — SYSTOLIC BLOOD PRESSURE: 140 MMHG | DIASTOLIC BLOOD PRESSURE: 88 MMHG

## 2022-07-09 VITALS — DIASTOLIC BLOOD PRESSURE: 81 MMHG | SYSTOLIC BLOOD PRESSURE: 184 MMHG

## 2022-07-09 LAB
BUN SERPL-MCNC: 47 MG/DL (ref 7–18)
CALCIUM SERPL-MCNC: 9.4 MG/DL (ref 8.5–10.1)
CHLORIDE SERPL-SCNC: 103 MEQ/L (ref 98–107)
CO2 SERPL-SCNC: 23 MEQ/L (ref 21–32)
CREAT SERPL-MCNC: 1.46 MG/DL (ref 0.55–1.3)
GFR SERPL CREATININE-BSD FRML MDRD: 42.5 ML/MIN/{1.73_M2} (ref 60–?)
GLUCOSE SERPL-MCNC: 252 MG/DL (ref 70–100)
POTASSIUM SERPL-SCNC: 4.4 MEQ/L (ref 3.5–5.1)
SODIUM SERPL-SCNC: 137 MEQ/L (ref 136–145)

## 2022-07-09 RX ADMIN — INSULIN DETEMIR SCH UNITS: 100 INJECTION, SOLUTION SUBCUTANEOUS at 09:00

## 2022-07-09 RX ADMIN — AMOXICILLIN AND CLAVULANATE POTASSIUM SCH MG: 875; 125 TABLET, FILM COATED ORAL at 09:00

## 2022-07-09 RX ADMIN — INSULIN LISPRO SCH UNITS: 100 INJECTION, SOLUTION INTRAVENOUS; SUBCUTANEOUS at 12:00

## 2022-07-09 RX ADMIN — BACITRACIN ZINC, NEOMYCIN, POLYMYXIN B SCH DOSE: 400; 3.5; 5 OINTMENT TOPICAL at 09:35

## 2022-07-09 RX ADMIN — SUCRALFATE SCH GM: 1 TABLET ORAL at 07:30

## 2022-07-09 RX ADMIN — ONDANSETRON PRN MG: 4 TABLET, ORALLY DISINTEGRATING ORAL at 06:23

## 2022-07-09 RX ADMIN — DOCUSATE SODIUM SCH MG: 100 CAPSULE, LIQUID FILLED ORAL at 09:00

## 2022-07-09 RX ADMIN — SUCRALFATE SCH GM: 1 TABLET ORAL at 12:00

## 2022-07-09 RX ADMIN — FAMOTIDINE SCH MG: 20 TABLET, FILM COATED ORAL at 09:00

## 2022-07-09 RX ADMIN — DULOXETINE SCH MG: 20 CAPSULE, DELAYED RELEASE ORAL at 09:00

## 2022-07-09 RX ADMIN — METOCLOPRAMIDE SCH MG: 5 TABLET ORAL at 12:00

## 2022-07-09 RX ADMIN — METOCLOPRAMIDE SCH MG: 5 TABLET ORAL at 09:33

## 2022-07-09 RX ADMIN — INSULIN LISPRO SCH UNITS: 100 INJECTION, SOLUTION INTRAVENOUS; SUBCUTANEOUS at 07:30

## 2022-07-16 ENCOUNTER — HOSPITAL ENCOUNTER (INPATIENT)
Dept: HOSPITAL 53 - M PSY | Age: 42
LOS: 6 days | Discharge: HOME | DRG: 751 | End: 2022-07-22
Attending: STUDENT IN AN ORGANIZED HEALTH CARE EDUCATION/TRAINING PROGRAM | Admitting: PSYCHIATRY & NEUROLOGY
Payer: MEDICAID

## 2022-07-16 DIAGNOSIS — F32.A: ICD-10-CM

## 2022-07-16 DIAGNOSIS — F60.89: ICD-10-CM

## 2022-07-16 DIAGNOSIS — F33.1: Primary | ICD-10-CM

## 2022-07-16 DIAGNOSIS — F41.9: ICD-10-CM

## 2022-07-16 DIAGNOSIS — J45.909: ICD-10-CM

## 2022-07-16 DIAGNOSIS — R45.851: ICD-10-CM

## 2022-07-16 DIAGNOSIS — G43.909: ICD-10-CM

## 2022-07-16 DIAGNOSIS — E10.43: ICD-10-CM

## 2022-07-16 DIAGNOSIS — Z89.421: ICD-10-CM

## 2022-07-16 DIAGNOSIS — Z63.5: ICD-10-CM

## 2022-07-16 DIAGNOSIS — F14.14: ICD-10-CM

## 2022-07-16 DIAGNOSIS — Z90.49: ICD-10-CM

## 2022-07-16 RX ADMIN — INSULIN LISPRO SCH UNITS: 100 INJECTION, SOLUTION INTRAVENOUS; SUBCUTANEOUS at 21:00

## 2022-07-16 SDOH — SOCIAL STABILITY - SOCIAL INSECURITY: DISRUPTION OF FAMILY BY SEPARATION AND DIVORCE: Z63.5

## 2022-07-17 VITALS — SYSTOLIC BLOOD PRESSURE: 124 MMHG | DIASTOLIC BLOOD PRESSURE: 80 MMHG

## 2022-07-17 VITALS — DIASTOLIC BLOOD PRESSURE: 77 MMHG | SYSTOLIC BLOOD PRESSURE: 122 MMHG

## 2022-07-17 RX ADMIN — INSULIN LISPRO SCH UNITS: 100 INJECTION, SOLUTION INTRAVENOUS; SUBCUTANEOUS at 06:54

## 2022-07-17 RX ADMIN — INSULIN LISPRO SCH UNITS: 100 INJECTION, SOLUTION INTRAVENOUS; SUBCUTANEOUS at 12:14

## 2022-07-17 RX ADMIN — INSULIN LISPRO SCH UNITS: 100 INJECTION, SOLUTION INTRAVENOUS; SUBCUTANEOUS at 21:41

## 2022-07-17 RX ADMIN — BACITRACIN ZINC, NEOMYCIN, POLYMYXIN B SCH DOSE: 400; 3.5; 5 OINTMENT TOPICAL at 09:53

## 2022-07-17 RX ADMIN — FLUOXETINE HYDROCHLORIDE SCH MG: 20 CAPSULE ORAL at 14:51

## 2022-07-17 RX ADMIN — PANTOPRAZOLE SODIUM SCH MG: 20 TABLET, DELAYED RELEASE ORAL at 09:53

## 2022-07-17 RX ADMIN — INSULIN LISPRO SCH UNITS: 100 INJECTION, SOLUTION INTRAVENOUS; SUBCUTANEOUS at 17:38

## 2022-07-17 RX ADMIN — BACITRACIN ZINC, NEOMYCIN, POLYMYXIN B SCH DOSE: 400; 3.5; 5 OINTMENT TOPICAL at 21:00

## 2022-07-17 RX ADMIN — SENNOSIDES SCH TAB: 8.6 TABLET, FILM COATED ORAL at 21:00

## 2022-07-18 VITALS — DIASTOLIC BLOOD PRESSURE: 73 MMHG | SYSTOLIC BLOOD PRESSURE: 144 MMHG

## 2022-07-18 VITALS — DIASTOLIC BLOOD PRESSURE: 80 MMHG | SYSTOLIC BLOOD PRESSURE: 140 MMHG

## 2022-07-18 RX ADMIN — INSULIN LISPRO SCH UNITS: 100 INJECTION, SOLUTION INTRAVENOUS; SUBCUTANEOUS at 07:00

## 2022-07-18 RX ADMIN — INSULIN LISPRO SCH UNITS: 100 INJECTION, SOLUTION INTRAVENOUS; SUBCUTANEOUS at 17:27

## 2022-07-18 RX ADMIN — BACITRACIN ZINC, NEOMYCIN, POLYMYXIN B SCH DOSE: 400; 3.5; 5 OINTMENT TOPICAL at 09:47

## 2022-07-18 RX ADMIN — ONDANSETRON HYDROCHLORIDE PRN MG: 4 TABLET, FILM COATED ORAL at 21:16

## 2022-07-18 RX ADMIN — INSULIN LISPRO SCH UNITS: 100 INJECTION, SOLUTION INTRAVENOUS; SUBCUTANEOUS at 21:15

## 2022-07-18 RX ADMIN — ONDANSETRON HYDROCHLORIDE PRN MG: 4 TABLET, FILM COATED ORAL at 12:22

## 2022-07-18 RX ADMIN — INSULIN LISPRO SCH UNITS: 100 INJECTION, SOLUTION INTRAVENOUS; SUBCUTANEOUS at 12:23

## 2022-07-18 RX ADMIN — FLUOXETINE HYDROCHLORIDE SCH MG: 20 CAPSULE ORAL at 09:47

## 2022-07-18 RX ADMIN — BACITRACIN ZINC, NEOMYCIN, POLYMYXIN B SCH DOSE: 400; 3.5; 5 OINTMENT TOPICAL at 21:16

## 2022-07-18 RX ADMIN — SENNOSIDES SCH TAB: 8.6 TABLET, FILM COATED ORAL at 21:00

## 2022-07-18 RX ADMIN — PANTOPRAZOLE SODIUM SCH MG: 20 TABLET, DELAYED RELEASE ORAL at 09:47

## 2022-07-19 VITALS — DIASTOLIC BLOOD PRESSURE: 73 MMHG | SYSTOLIC BLOOD PRESSURE: 109 MMHG

## 2022-07-19 RX ADMIN — INSULIN LISPRO SCH UNITS: 100 INJECTION, SOLUTION INTRAVENOUS; SUBCUTANEOUS at 06:42

## 2022-07-19 RX ADMIN — FLUOXETINE HYDROCHLORIDE SCH MG: 20 CAPSULE ORAL at 09:08

## 2022-07-19 RX ADMIN — INSULIN LISPRO SCH UNITS: 100 INJECTION, SOLUTION INTRAVENOUS; SUBCUTANEOUS at 20:44

## 2022-07-19 RX ADMIN — BACITRACIN ZINC, NEOMYCIN, POLYMYXIN B SCH DOSE: 400; 3.5; 5 OINTMENT TOPICAL at 12:54

## 2022-07-19 RX ADMIN — INSULIN LISPRO SCH UNITS: 100 INJECTION, SOLUTION INTRAVENOUS; SUBCUTANEOUS at 17:10

## 2022-07-19 RX ADMIN — INSULIN LISPRO SCH UNITS: 100 INJECTION, SOLUTION INTRAVENOUS; SUBCUTANEOUS at 12:36

## 2022-07-19 RX ADMIN — SENNOSIDES SCH TAB: 8.6 TABLET, FILM COATED ORAL at 20:42

## 2022-07-19 RX ADMIN — PANTOPRAZOLE SODIUM SCH MG: 20 TABLET, DELAYED RELEASE ORAL at 09:08

## 2022-07-19 RX ADMIN — BACITRACIN ZINC, NEOMYCIN, POLYMYXIN B SCH DOSE: 400; 3.5; 5 OINTMENT TOPICAL at 20:43

## 2022-07-19 RX ADMIN — ONDANSETRON HYDROCHLORIDE PRN MG: 4 TABLET, FILM COATED ORAL at 09:08

## 2022-07-20 VITALS — DIASTOLIC BLOOD PRESSURE: 67 MMHG | SYSTOLIC BLOOD PRESSURE: 118 MMHG

## 2022-07-20 VITALS — DIASTOLIC BLOOD PRESSURE: 67 MMHG | SYSTOLIC BLOOD PRESSURE: 124 MMHG

## 2022-07-20 RX ADMIN — BACITRACIN ZINC, NEOMYCIN, POLYMYXIN B SCH DOSE: 400; 3.5; 5 OINTMENT TOPICAL at 09:33

## 2022-07-20 RX ADMIN — PANTOPRAZOLE SODIUM SCH MG: 20 TABLET, DELAYED RELEASE ORAL at 08:22

## 2022-07-20 RX ADMIN — INSULIN LISPRO SCH UNITS: 100 INJECTION, SOLUTION INTRAVENOUS; SUBCUTANEOUS at 12:39

## 2022-07-20 RX ADMIN — INSULIN LISPRO SCH UNITS: 100 INJECTION, SOLUTION INTRAVENOUS; SUBCUTANEOUS at 17:04

## 2022-07-20 RX ADMIN — FLUOXETINE HYDROCHLORIDE SCH MG: 20 CAPSULE ORAL at 08:22

## 2022-07-20 RX ADMIN — BACITRACIN ZINC, NEOMYCIN, POLYMYXIN B SCH DOSE: 400; 3.5; 5 OINTMENT TOPICAL at 20:25

## 2022-07-20 RX ADMIN — INSULIN LISPRO SCH UNITS: 100 INJECTION, SOLUTION INTRAVENOUS; SUBCUTANEOUS at 06:44

## 2022-07-20 RX ADMIN — INSULIN LISPRO SCH UNITS: 100 INJECTION, SOLUTION INTRAVENOUS; SUBCUTANEOUS at 20:25

## 2022-07-20 RX ADMIN — ONDANSETRON HYDROCHLORIDE PRN MG: 4 TABLET, FILM COATED ORAL at 08:22

## 2022-07-20 RX ADMIN — SENNOSIDES SCH TAB: 8.6 TABLET, FILM COATED ORAL at 20:25

## 2022-07-21 VITALS — DIASTOLIC BLOOD PRESSURE: 85 MMHG | SYSTOLIC BLOOD PRESSURE: 149 MMHG

## 2022-07-21 VITALS — SYSTOLIC BLOOD PRESSURE: 131 MMHG | DIASTOLIC BLOOD PRESSURE: 70 MMHG

## 2022-07-21 RX ADMIN — INSULIN LISPRO SCH UNITS: 100 INJECTION, SOLUTION INTRAVENOUS; SUBCUTANEOUS at 17:11

## 2022-07-21 RX ADMIN — INSULIN LISPRO SCH UNITS: 100 INJECTION, SOLUTION INTRAVENOUS; SUBCUTANEOUS at 07:11

## 2022-07-21 RX ADMIN — SENNOSIDES SCH TAB: 8.6 TABLET, FILM COATED ORAL at 20:34

## 2022-07-21 RX ADMIN — OMEGA-3 FATTY ACIDS CAP 1000 MG SCH CAP: 1000 CAP at 20:34

## 2022-07-21 RX ADMIN — BACITRACIN ZINC, NEOMYCIN, POLYMYXIN B SCH DOSE: 400; 3.5; 5 OINTMENT TOPICAL at 22:38

## 2022-07-21 RX ADMIN — BACITRACIN ZINC, NEOMYCIN, POLYMYXIN B SCH DOSE: 400; 3.5; 5 OINTMENT TOPICAL at 08:06

## 2022-07-21 RX ADMIN — PANTOPRAZOLE SODIUM SCH MG: 20 TABLET, DELAYED RELEASE ORAL at 08:05

## 2022-07-21 RX ADMIN — FLUOXETINE HYDROCHLORIDE SCH MG: 20 CAPSULE ORAL at 08:05

## 2022-07-21 RX ADMIN — INSULIN LISPRO SCH UNITS: 100 INJECTION, SOLUTION INTRAVENOUS; SUBCUTANEOUS at 20:39

## 2022-07-21 RX ADMIN — INSULIN LISPRO SCH UNITS: 100 INJECTION, SOLUTION INTRAVENOUS; SUBCUTANEOUS at 12:05

## 2022-07-21 RX ADMIN — OMEGA-3 FATTY ACIDS CAP 1000 MG SCH CAP: 1000 CAP at 09:43

## 2022-07-22 VITALS — SYSTOLIC BLOOD PRESSURE: 127 MMHG | DIASTOLIC BLOOD PRESSURE: 63 MMHG

## 2022-07-22 RX ADMIN — INSULIN LISPRO SCH UNITS: 100 INJECTION, SOLUTION INTRAVENOUS; SUBCUTANEOUS at 12:09

## 2022-07-22 RX ADMIN — OMEGA-3 FATTY ACIDS CAP 1000 MG SCH CAP: 1000 CAP at 08:41

## 2022-07-22 RX ADMIN — PANTOPRAZOLE SODIUM SCH MG: 20 TABLET, DELAYED RELEASE ORAL at 08:41

## 2022-07-22 RX ADMIN — BACITRACIN ZINC, NEOMYCIN, POLYMYXIN B SCH DOSE: 400; 3.5; 5 OINTMENT TOPICAL at 08:41

## 2022-07-22 RX ADMIN — INSULIN LISPRO SCH UNITS: 100 INJECTION, SOLUTION INTRAVENOUS; SUBCUTANEOUS at 06:37

## 2022-07-22 RX ADMIN — FLUOXETINE HYDROCHLORIDE SCH MG: 20 CAPSULE ORAL at 08:41

## 2022-11-04 ENCOUNTER — HOSPITAL ENCOUNTER (INPATIENT)
Dept: HOSPITAL 53 - M ED | Age: 42
LOS: 3 days | Discharge: HOME | DRG: 314 | End: 2022-11-07
Attending: INTERNAL MEDICINE | Admitting: INTERNAL MEDICINE
Payer: MEDICAID

## 2022-11-04 VITALS — SYSTOLIC BLOOD PRESSURE: 163 MMHG | DIASTOLIC BLOOD PRESSURE: 87 MMHG

## 2022-11-04 VITALS — BODY MASS INDEX: 28.21 KG/M2 | HEIGHT: 65 IN | WEIGHT: 169.32 LBS

## 2022-11-04 VITALS — DIASTOLIC BLOOD PRESSURE: 88 MMHG | SYSTOLIC BLOOD PRESSURE: 162 MMHG

## 2022-11-04 VITALS — SYSTOLIC BLOOD PRESSURE: 155 MMHG | DIASTOLIC BLOOD PRESSURE: 86 MMHG

## 2022-11-04 VITALS — DIASTOLIC BLOOD PRESSURE: 79 MMHG | SYSTOLIC BLOOD PRESSURE: 144 MMHG

## 2022-11-04 VITALS — SYSTOLIC BLOOD PRESSURE: 162 MMHG | DIASTOLIC BLOOD PRESSURE: 91 MMHG

## 2022-11-04 VITALS — DIASTOLIC BLOOD PRESSURE: 97 MMHG | SYSTOLIC BLOOD PRESSURE: 154 MMHG

## 2022-11-04 VITALS — DIASTOLIC BLOOD PRESSURE: 91 MMHG | SYSTOLIC BLOOD PRESSURE: 159 MMHG

## 2022-11-04 VITALS — SYSTOLIC BLOOD PRESSURE: 121 MMHG | DIASTOLIC BLOOD PRESSURE: 68 MMHG

## 2022-11-04 DIAGNOSIS — E10.43: ICD-10-CM

## 2022-11-04 DIAGNOSIS — Z79.899: ICD-10-CM

## 2022-11-04 DIAGNOSIS — F41.9: ICD-10-CM

## 2022-11-04 DIAGNOSIS — N17.9: ICD-10-CM

## 2022-11-04 DIAGNOSIS — Z91.14: ICD-10-CM

## 2022-11-04 DIAGNOSIS — E86.0: ICD-10-CM

## 2022-11-04 DIAGNOSIS — K31.84: ICD-10-CM

## 2022-11-04 DIAGNOSIS — E10.52: Primary | ICD-10-CM

## 2022-11-04 DIAGNOSIS — F32.9: ICD-10-CM

## 2022-11-04 DIAGNOSIS — J45.909: ICD-10-CM

## 2022-11-04 DIAGNOSIS — F12.90: ICD-10-CM

## 2022-11-04 DIAGNOSIS — E10.621: ICD-10-CM

## 2022-11-04 DIAGNOSIS — E10.42: ICD-10-CM

## 2022-11-04 DIAGNOSIS — Z79.4: ICD-10-CM

## 2022-11-04 DIAGNOSIS — L97.519: ICD-10-CM

## 2022-11-04 DIAGNOSIS — F14.10: ICD-10-CM

## 2022-11-04 DIAGNOSIS — L03.031: ICD-10-CM

## 2022-11-04 LAB
APTT BLD: 29.1 SECONDS (ref 24.8–34.2)
BASOPHILS # BLD AUTO: 0 10^3/UL (ref 0–0.2)
BASOPHILS NFR BLD AUTO: 0.2 % (ref 0–1)
BUN SERPL-MCNC: 42 MG/DL (ref 7–18)
CALCIUM SERPL-MCNC: 9.1 MG/DL (ref 8.5–10.1)
CHLORIDE SERPL-SCNC: 100 MEQ/L (ref 98–107)
CO2 SERPL-SCNC: 27 MEQ/L (ref 21–32)
CREAT SERPL-MCNC: 1.49 MG/DL (ref 0.55–1.3)
CRP SERPL-MCNC: 2.38 MG/DL (ref 0–0.3)
EOSINOPHIL # BLD AUTO: 0 10^3/UL (ref 0–0.5)
EOSINOPHIL NFR BLD AUTO: 0.1 % (ref 0–3)
ERYTHROCYTE [SEDIMENTATION RATE] IN BLOOD BY WESTERGREN METHOD: 88 MM/HR (ref 0–20)
EST. AVERAGE GLUCOSE BLD GHB EST-MCNC: 200 MG/DL (ref 60–110)
GFR SERPL CREATININE-BSD FRML MDRD: 41.3 ML/MIN/{1.73_M2} (ref 58–?)
GLUCOSE SERPL-MCNC: 131 MG/DL (ref 70–100)
HCT VFR BLD AUTO: 33.4 % (ref 36–47)
HGB BLD-MCNC: 10.3 G/DL (ref 12–15.5)
INR PPP: 0.97
LYMPHOCYTES # BLD AUTO: 2.1 10^3/UL (ref 1.5–5)
LYMPHOCYTES NFR BLD AUTO: 16.6 % (ref 24–44)
MCH RBC QN AUTO: 26.2 PG (ref 27–33)
MCHC RBC AUTO-ENTMCNC: 30.8 G/DL (ref 32–36.5)
MCV RBC AUTO: 85 FL (ref 80–96)
MONOCYTES # BLD AUTO: 0.5 10^3/UL (ref 0–0.8)
MONOCYTES NFR BLD AUTO: 3.8 % (ref 2–8)
NEUTROPHILS # BLD AUTO: 9.8 10^3/UL (ref 1.5–8.5)
NEUTROPHILS NFR BLD AUTO: 78.9 % (ref 36–66)
PLATELET # BLD AUTO: 461 10^3/UL (ref 150–450)
POTASSIUM SERPL-SCNC: 4.1 MEQ/L (ref 3.5–5.1)
PROTHROMBIN TIME: 13.1 SECONDS (ref 12.5–14.5)
RBC # BLD AUTO: 3.93 10^6/UL (ref 4–5.4)
RSV RNA NPH QL NAA+PROBE: NEGATIVE
SODIUM SERPL-SCNC: 133 MEQ/L (ref 136–145)
WBC # BLD AUTO: 12.4 10^3/UL (ref 4–10)

## 2022-11-04 PROCEDURE — 0Y6T0Z1 DETACHMENT AT RIGHT 3RD TOE, HIGH, OPEN APPROACH: ICD-10-PCS | Performed by: PODIATRIST

## 2022-11-04 RX ADMIN — METOCLOPRAMIDE PRN MG: 5 INJECTION, SOLUTION INTRAMUSCULAR; INTRAVENOUS at 11:54

## 2022-11-04 RX ADMIN — CEFEPIME HYDROCHLORIDE SCH MLS/HR: 2 INJECTION, POWDER, FOR SOLUTION INTRAVENOUS at 15:34

## 2022-11-04 RX ADMIN — METOCLOPRAMIDE PRN MG: 5 INJECTION, SOLUTION INTRAMUSCULAR; INTRAVENOUS at 23:58

## 2022-11-04 RX ADMIN — PROBIOTIC PRODUCT - TAB SCH EA: TAB at 18:00

## 2022-11-04 RX ADMIN — SODIUM CHLORIDE SCH MLS/HR: 9 INJECTION, SOLUTION INTRAVENOUS at 11:53

## 2022-11-04 RX ADMIN — SODIUM CHLORIDE SCH MLS/HR: 9 INJECTION, SOLUTION INTRAVENOUS at 19:02

## 2022-11-05 VITALS — SYSTOLIC BLOOD PRESSURE: 170 MMHG | DIASTOLIC BLOOD PRESSURE: 84 MMHG

## 2022-11-05 VITALS — DIASTOLIC BLOOD PRESSURE: 60 MMHG | SYSTOLIC BLOOD PRESSURE: 98 MMHG

## 2022-11-05 VITALS — DIASTOLIC BLOOD PRESSURE: 61 MMHG | SYSTOLIC BLOOD PRESSURE: 100 MMHG

## 2022-11-05 VITALS — SYSTOLIC BLOOD PRESSURE: 131 MMHG | DIASTOLIC BLOOD PRESSURE: 81 MMHG

## 2022-11-05 VITALS — DIASTOLIC BLOOD PRESSURE: 75 MMHG | SYSTOLIC BLOOD PRESSURE: 158 MMHG

## 2022-11-05 VITALS — SYSTOLIC BLOOD PRESSURE: 151 MMHG | DIASTOLIC BLOOD PRESSURE: 84 MMHG

## 2022-11-05 VITALS — DIASTOLIC BLOOD PRESSURE: 87 MMHG | SYSTOLIC BLOOD PRESSURE: 160 MMHG

## 2022-11-05 LAB
ALBUMIN SERPL BCG-MCNC: 2.8 GM/DL (ref 3.2–5.2)
ALT SERPL W P-5'-P-CCNC: 18 U/L (ref 12–78)
BILIRUB SERPL-MCNC: 0.3 MG/DL (ref 0.2–1)
BUN SERPL-MCNC: 22 MG/DL (ref 7–18)
CALCIUM SERPL-MCNC: 8.9 MG/DL (ref 8.5–10.1)
CHLORIDE SERPL-SCNC: 101 MEQ/L (ref 98–107)
CO2 SERPL-SCNC: 25 MEQ/L (ref 21–32)
CREAT SERPL-MCNC: 1.09 MG/DL (ref 0.55–1.3)
GFR SERPL CREATININE-BSD FRML MDRD: 59.2 ML/MIN/{1.73_M2} (ref 58–?)
GLUCOSE SERPL-MCNC: 132 MG/DL (ref 70–100)
HCT VFR BLD AUTO: 28.8 % (ref 36–47)
HGB BLD-MCNC: 9.2 G/DL (ref 12–15.5)
MCH RBC QN AUTO: 26.9 PG (ref 27–33)
MCHC RBC AUTO-ENTMCNC: 31.9 G/DL (ref 32–36.5)
MCV RBC AUTO: 84.2 FL (ref 80–96)
PLATELET # BLD AUTO: 380 10^3/UL (ref 150–450)
POTASSIUM SERPL-SCNC: 4.2 MEQ/L (ref 3.5–5.1)
PROT SERPL-MCNC: 6.9 GM/DL (ref 6.4–8.2)
RBC # BLD AUTO: 3.42 10^6/UL (ref 4–5.4)
SODIUM SERPL-SCNC: 134 MEQ/L (ref 136–145)
VANCOMYCIN SERPL-MCNC: 21.8 UG/ML
WBC # BLD AUTO: 8.7 10^3/UL (ref 4–10)

## 2022-11-05 RX ADMIN — DEXTROSE MONOHYDRATE SCH MLS/HR: 50 INJECTION, SOLUTION INTRAVENOUS at 22:30

## 2022-11-05 RX ADMIN — CEFEPIME HYDROCHLORIDE SCH MLS/HR: 2 INJECTION, POWDER, FOR SOLUTION INTRAVENOUS at 15:56

## 2022-11-05 RX ADMIN — PROBIOTIC PRODUCT - TAB SCH EA: TAB at 09:11

## 2022-11-05 RX ADMIN — INSULIN LISPRO SCH UNITS: 100 INJECTION, SOLUTION INTRAVENOUS; SUBCUTANEOUS at 17:57

## 2022-11-05 RX ADMIN — METOCLOPRAMIDE PRN MG: 5 INJECTION, SOLUTION INTRAMUSCULAR; INTRAVENOUS at 17:57

## 2022-11-05 RX ADMIN — INSULIN LISPRO SCH UNITS: 100 INJECTION, SOLUTION INTRAVENOUS; SUBCUTANEOUS at 12:00

## 2022-11-05 RX ADMIN — SODIUM CHLORIDE SCH MLS/HR: 9 INJECTION, SOLUTION INTRAVENOUS at 09:12

## 2022-11-05 RX ADMIN — DEXTROSE MONOHYDRATE SCH MLS/HR: 50 INJECTION, SOLUTION INTRAVENOUS at 12:02

## 2022-11-05 RX ADMIN — PROBIOTIC PRODUCT - TAB SCH EA: TAB at 17:57

## 2022-11-05 RX ADMIN — ONDANSETRON PRN MG: 2 INJECTION INTRAMUSCULAR; INTRAVENOUS at 22:23

## 2022-11-05 RX ADMIN — METOCLOPRAMIDE PRN MG: 5 INJECTION, SOLUTION INTRAMUSCULAR; INTRAVENOUS at 09:11

## 2022-11-05 RX ADMIN — INSULIN LISPRO SCH UNITS: 100 INJECTION, SOLUTION INTRAVENOUS; SUBCUTANEOUS at 21:00

## 2022-11-05 RX ADMIN — CEFEPIME HYDROCHLORIDE SCH MLS/HR: 2 INJECTION, POWDER, FOR SOLUTION INTRAVENOUS at 01:43

## 2022-11-06 VITALS — DIASTOLIC BLOOD PRESSURE: 60 MMHG | SYSTOLIC BLOOD PRESSURE: 91 MMHG

## 2022-11-06 VITALS — DIASTOLIC BLOOD PRESSURE: 66 MMHG | SYSTOLIC BLOOD PRESSURE: 105 MMHG

## 2022-11-06 VITALS — DIASTOLIC BLOOD PRESSURE: 82 MMHG | SYSTOLIC BLOOD PRESSURE: 152 MMHG

## 2022-11-06 VITALS — SYSTOLIC BLOOD PRESSURE: 128 MMHG | DIASTOLIC BLOOD PRESSURE: 72 MMHG

## 2022-11-06 LAB
ALBUMIN SERPL BCG-MCNC: 2.7 GM/DL (ref 3.2–5.2)
ALT SERPL W P-5'-P-CCNC: 16 U/L (ref 12–78)
BILIRUB SERPL-MCNC: 0.3 MG/DL (ref 0.2–1)
BUN SERPL-MCNC: 15 MG/DL (ref 7–18)
CALCIUM SERPL-MCNC: 8.6 MG/DL (ref 8.5–10.1)
CHLORIDE SERPL-SCNC: 101 MEQ/L (ref 98–107)
CO2 SERPL-SCNC: 27 MEQ/L (ref 21–32)
CREAT SERPL-MCNC: 1.05 MG/DL (ref 0.55–1.3)
GFR SERPL CREATININE-BSD FRML MDRD: > 60 ML/MIN/{1.73_M2} (ref 58–?)
GLUCOSE SERPL-MCNC: 159 MG/DL (ref 70–100)
HCT VFR BLD AUTO: 27.9 % (ref 36–47)
HGB BLD-MCNC: 8.8 G/DL (ref 12–15.5)
MCH RBC QN AUTO: 26.7 PG (ref 27–33)
MCHC RBC AUTO-ENTMCNC: 31.5 G/DL (ref 32–36.5)
MCV RBC AUTO: 84.5 FL (ref 80–96)
PLATELET # BLD AUTO: 357 10^3/UL (ref 150–450)
POTASSIUM SERPL-SCNC: 4.1 MEQ/L (ref 3.5–5.1)
PROT SERPL-MCNC: 6.4 GM/DL (ref 6.4–8.2)
RBC # BLD AUTO: 3.3 10^6/UL (ref 4–5.4)
SODIUM SERPL-SCNC: 136 MEQ/L (ref 136–145)
WBC # BLD AUTO: 7.5 10^3/UL (ref 4–10)

## 2022-11-06 RX ADMIN — INSULIN DETEMIR SCH UNITS: 100 INJECTION, SOLUTION SUBCUTANEOUS at 08:41

## 2022-11-06 RX ADMIN — ONDANSETRON PRN MG: 2 INJECTION INTRAMUSCULAR; INTRAVENOUS at 08:40

## 2022-11-06 RX ADMIN — METOCLOPRAMIDE PRN MG: 5 INJECTION, SOLUTION INTRAMUSCULAR; INTRAVENOUS at 22:05

## 2022-11-06 RX ADMIN — CEFEPIME HYDROCHLORIDE SCH MLS/HR: 2 INJECTION, POWDER, FOR SOLUTION INTRAVENOUS at 15:35

## 2022-11-06 RX ADMIN — CEFEPIME HYDROCHLORIDE SCH MLS/HR: 2 INJECTION, POWDER, FOR SOLUTION INTRAVENOUS at 02:04

## 2022-11-06 RX ADMIN — PROBIOTIC PRODUCT - TAB SCH EA: TAB at 18:00

## 2022-11-06 RX ADMIN — INSULIN LISPRO SCH UNITS: 100 INJECTION, SOLUTION INTRAVENOUS; SUBCUTANEOUS at 11:35

## 2022-11-06 RX ADMIN — METOCLOPRAMIDE PRN MG: 5 INJECTION, SOLUTION INTRAMUSCULAR; INTRAVENOUS at 02:04

## 2022-11-06 RX ADMIN — INSULIN LISPRO SCH UNITS: 100 INJECTION, SOLUTION INTRAVENOUS; SUBCUTANEOUS at 18:35

## 2022-11-06 RX ADMIN — INSULIN LISPRO SCH UNITS: 100 INJECTION, SOLUTION INTRAVENOUS; SUBCUTANEOUS at 08:41

## 2022-11-06 RX ADMIN — DEXTROSE MONOHYDRATE SCH MLS/HR: 50 INJECTION, SOLUTION INTRAVENOUS at 11:53

## 2022-11-06 RX ADMIN — INSULIN LISPRO SCH UNITS: 100 INJECTION, SOLUTION INTRAVENOUS; SUBCUTANEOUS at 21:00

## 2022-11-06 RX ADMIN — PROBIOTIC PRODUCT - TAB SCH EA: TAB at 08:00

## 2022-11-06 RX ADMIN — DEXTROSE MONOHYDRATE SCH MLS/HR: 50 INJECTION, SOLUTION INTRAVENOUS at 23:06

## 2022-11-07 VITALS — DIASTOLIC BLOOD PRESSURE: 71 MMHG | SYSTOLIC BLOOD PRESSURE: 118 MMHG

## 2022-11-07 LAB
ALBUMIN SERPL BCG-MCNC: 2.6 GM/DL (ref 3.2–5.2)
ALT SERPL W P-5'-P-CCNC: 17 U/L (ref 12–78)
BILIRUB SERPL-MCNC: 0.2 MG/DL (ref 0.2–1)
BUN SERPL-MCNC: 19 MG/DL (ref 7–18)
CALCIUM SERPL-MCNC: 9 MG/DL (ref 8.5–10.1)
CHLORIDE SERPL-SCNC: 102 MEQ/L (ref 98–107)
CO2 SERPL-SCNC: 27 MEQ/L (ref 21–32)
CREAT SERPL-MCNC: 1.35 MG/DL (ref 0.55–1.3)
GFR SERPL CREATININE-BSD FRML MDRD: 46.2 ML/MIN/{1.73_M2} (ref 58–?)
GLUCOSE SERPL-MCNC: 157 MG/DL (ref 70–100)
HCT VFR BLD AUTO: 28.6 % (ref 36–47)
HGB BLD-MCNC: 9.1 G/DL (ref 12–15.5)
MCH RBC QN AUTO: 26.5 PG (ref 27–33)
MCHC RBC AUTO-ENTMCNC: 31.8 G/DL (ref 32–36.5)
MCV RBC AUTO: 83.4 FL (ref 80–96)
PLATELET # BLD AUTO: 375 10^3/UL (ref 150–450)
POTASSIUM SERPL-SCNC: 3.5 MEQ/L (ref 3.5–5.1)
PROT SERPL-MCNC: 6.8 GM/DL (ref 6.4–8.2)
RBC # BLD AUTO: 3.43 10^6/UL (ref 4–5.4)
SODIUM SERPL-SCNC: 136 MEQ/L (ref 136–145)
WBC # BLD AUTO: 7.7 10^3/UL (ref 4–10)

## 2022-11-07 RX ADMIN — PROBIOTIC PRODUCT - TAB SCH EA: TAB at 08:51

## 2022-11-07 RX ADMIN — METOCLOPRAMIDE PRN MG: 5 INJECTION, SOLUTION INTRAMUSCULAR; INTRAVENOUS at 08:51

## 2022-11-07 RX ADMIN — INSULIN DETEMIR SCH UNITS: 100 INJECTION, SOLUTION SUBCUTANEOUS at 08:52

## 2022-11-07 RX ADMIN — INSULIN LISPRO SCH UNITS: 100 INJECTION, SOLUTION INTRAVENOUS; SUBCUTANEOUS at 08:52

## 2022-11-07 RX ADMIN — CEFEPIME HYDROCHLORIDE SCH MLS/HR: 2 INJECTION, POWDER, FOR SOLUTION INTRAVENOUS at 02:22

## 2022-11-07 RX ADMIN — INSULIN LISPRO SCH UNITS: 100 INJECTION, SOLUTION INTRAVENOUS; SUBCUTANEOUS at 12:00

## 2023-04-16 ENCOUNTER — HOSPITAL ENCOUNTER (INPATIENT)
Dept: HOSPITAL 53 - M ED | Age: 43
LOS: 4 days | Discharge: HOME | DRG: 720 | End: 2023-04-20
Attending: INTERNAL MEDICINE | Admitting: INTERNAL MEDICINE
Payer: COMMERCIAL

## 2023-04-16 VITALS — BODY MASS INDEX: 25.75 KG/M2 | HEIGHT: 65 IN | WEIGHT: 154.54 LBS

## 2023-04-16 DIAGNOSIS — L97.519: ICD-10-CM

## 2023-04-16 DIAGNOSIS — F19.10: ICD-10-CM

## 2023-04-16 DIAGNOSIS — J96.01: ICD-10-CM

## 2023-04-16 DIAGNOSIS — F41.8: ICD-10-CM

## 2023-04-16 DIAGNOSIS — E11.43: ICD-10-CM

## 2023-04-16 DIAGNOSIS — E11.621: ICD-10-CM

## 2023-04-16 DIAGNOSIS — Z79.4: ICD-10-CM

## 2023-04-16 DIAGNOSIS — E11.42: ICD-10-CM

## 2023-04-16 DIAGNOSIS — E87.20: ICD-10-CM

## 2023-04-16 DIAGNOSIS — E11.51: ICD-10-CM

## 2023-04-16 DIAGNOSIS — E83.42: ICD-10-CM

## 2023-04-16 DIAGNOSIS — J69.0: ICD-10-CM

## 2023-04-16 DIAGNOSIS — I73.9: ICD-10-CM

## 2023-04-16 DIAGNOSIS — E11.22: ICD-10-CM

## 2023-04-16 DIAGNOSIS — Z89.421: ICD-10-CM

## 2023-04-16 DIAGNOSIS — L97.529: ICD-10-CM

## 2023-04-16 DIAGNOSIS — B95.1: ICD-10-CM

## 2023-04-16 DIAGNOSIS — Z90.49: ICD-10-CM

## 2023-04-16 DIAGNOSIS — J45.909: ICD-10-CM

## 2023-04-16 DIAGNOSIS — B96.20: ICD-10-CM

## 2023-04-16 DIAGNOSIS — E11.628: ICD-10-CM

## 2023-04-16 DIAGNOSIS — D63.8: ICD-10-CM

## 2023-04-16 DIAGNOSIS — L08.9: ICD-10-CM

## 2023-04-16 DIAGNOSIS — Z20.822: ICD-10-CM

## 2023-04-16 DIAGNOSIS — A41.9: Primary | ICD-10-CM

## 2023-04-16 DIAGNOSIS — K31.84: ICD-10-CM

## 2023-04-16 LAB
BASOPHILS # BLD AUTO: 0 10^3/UL (ref 0–0.2)
BASOPHILS NFR BLD AUTO: 0.2 % (ref 0–1)
EOSINOPHIL # BLD AUTO: 0 10^3/UL (ref 0–0.5)
EOSINOPHIL NFR BLD AUTO: 0.3 % (ref 0–3)
ERYTHROCYTE [SEDIMENTATION RATE] IN BLOOD BY WESTERGREN METHOD: 90 MM/HR (ref 0–20)
HCT VFR BLD AUTO: 27.9 % (ref 36–47)
HGB BLD-MCNC: 9.4 G/DL (ref 12–15.5)
LYMPHOCYTES # BLD AUTO: 1.7 10^3/UL (ref 1.5–5)
LYMPHOCYTES NFR BLD AUTO: 11 % (ref 24–44)
MCH RBC QN AUTO: 27.6 PG (ref 27–33)
MCHC RBC AUTO-ENTMCNC: 33.7 G/DL (ref 32–36.5)
MCV RBC AUTO: 81.8 FL (ref 80–96)
MONOCYTES # BLD AUTO: 0.7 10^3/UL (ref 0–0.8)
MONOCYTES NFR BLD AUTO: 4.2 % (ref 2–8)
NEUTROPHILS # BLD AUTO: 13.3 10^3/UL (ref 1.5–8.5)
NEUTROPHILS NFR BLD AUTO: 83.6 % (ref 36–66)
PLATELET # BLD AUTO: 372 10^3/UL (ref 150–450)
RBC # BLD AUTO: 3.41 10^6/UL (ref 4–5.4)
RSV RNA NPH QL NAA+PROBE: NEGATIVE
WBC # BLD AUTO: 15.9 10^3/UL (ref 4–10)

## 2023-04-17 VITALS — DIASTOLIC BLOOD PRESSURE: 73 MMHG | SYSTOLIC BLOOD PRESSURE: 120 MMHG

## 2023-04-17 VITALS — DIASTOLIC BLOOD PRESSURE: 75 MMHG | SYSTOLIC BLOOD PRESSURE: 126 MMHG

## 2023-04-17 VITALS — DIASTOLIC BLOOD PRESSURE: 88 MMHG | SYSTOLIC BLOOD PRESSURE: 134 MMHG

## 2023-04-17 VITALS — DIASTOLIC BLOOD PRESSURE: 90 MMHG | SYSTOLIC BLOOD PRESSURE: 149 MMHG

## 2023-04-17 LAB
APTT BLD: 31.4 SECONDS (ref 24.8–34.2)
BUN SERPL-MCNC: 18 MG/DL (ref 9–23)
CALCIUM SERPL-MCNC: 7.5 MG/DL (ref 8.5–10.1)
CHLORIDE SERPL-SCNC: 104 MMOL/L (ref 98–107)
CO2 SERPL-SCNC: 24 MMOL/L (ref 20–31)
CREAT SERPL-MCNC: 1.1 MG/DL (ref 0.55–1.3)
CRP SERPL-MCNC: 9.5 MG/DL (ref ?–1)
EST. AVERAGE GLUCOSE BLD GHB EST-MCNC: 269 MG/DL (ref 60–110)
GFR SERPL CREATININE-BSD FRML MDRD: 58.6 ML/MIN/{1.73_M2} (ref 58–?)
GLUCOSE SERPL-MCNC: 95 MG/DL (ref 60–100)
HCT VFR BLD AUTO: 26.4 % (ref 36–47)
HGB BLD-MCNC: 8.4 G/DL (ref 12–15.5)
INR PPP: 1
MAGNESIUM SERPL-MCNC: 1.6 MG/DL (ref 1.8–2.4)
MCH RBC QN AUTO: 26.8 PG (ref 27–33)
MCHC RBC AUTO-ENTMCNC: 31.8 G/DL (ref 32–36.5)
MCV RBC AUTO: 84.3 FL (ref 80–96)
PLATELET # BLD AUTO: 335 10^3/UL (ref 150–450)
POTASSIUM SERPL-SCNC: 3.9 MMOL/L (ref 3.5–5.1)
PROTHROMBIN TIME: 13.4 SECONDS (ref 12.5–14.5)
RBC # BLD AUTO: 3.13 10^6/UL (ref 4–5.4)
SODIUM SERPL-SCNC: 136 MMOL/L (ref 136–145)
WBC # BLD AUTO: 11.1 10^3/UL (ref 4–10)

## 2023-04-17 RX ADMIN — DEXTROSE MONOHYDRATE SCH MLS/HR: 50 INJECTION, SOLUTION INTRAVENOUS at 21:52

## 2023-04-17 RX ADMIN — SODIUM CHLORIDE SCH MLS/HR: 9 INJECTION, SOLUTION INTRAVENOUS at 18:47

## 2023-04-17 RX ADMIN — DEXTROSE MONOHYDRATE SCH MLS/HR: 50 INJECTION, SOLUTION INTRAVENOUS at 11:44

## 2023-04-17 RX ADMIN — INSULIN LISPRO SCH UNITS: 100 INJECTION, SOLUTION INTRAVENOUS; SUBCUTANEOUS at 18:30

## 2023-04-17 RX ADMIN — SODIUM CHLORIDE SCH MLS/HR: 9 INJECTION, SOLUTION INTRAVENOUS at 13:27

## 2023-04-17 RX ADMIN — SODIUM CHLORIDE SCH UNITS: 4.5 INJECTION, SOLUTION INTRAVENOUS at 21:52

## 2023-04-17 RX ADMIN — SODIUM CHLORIDE SCH UNITS: 4.5 INJECTION, SOLUTION INTRAVENOUS at 15:18

## 2023-04-17 RX ADMIN — SODIUM CHLORIDE SCH MLS/HR: 9 INJECTION, SOLUTION INTRAVENOUS at 01:21

## 2023-04-17 RX ADMIN — DEXTROSE MONOHYDRATE SCH MLS/HR: 5 INJECTION INTRAVENOUS at 13:27

## 2023-04-17 RX ADMIN — INSULIN LISPRO SCH UNITS: 100 INJECTION, SOLUTION INTRAVENOUS; SUBCUTANEOUS at 13:18

## 2023-04-17 RX ADMIN — DEXTROSE MONOHYDRATE SCH MLS/HR: 5 INJECTION INTRAVENOUS at 23:46

## 2023-04-17 RX ADMIN — MAGNESIUM SULFATE IN DEXTROSE SCH MLS/HR: 10 INJECTION, SOLUTION INTRAVENOUS at 09:05

## 2023-04-17 RX ADMIN — MAGNESIUM SULFATE IN DEXTROSE SCH MLS/HR: 10 INJECTION, SOLUTION INTRAVENOUS at 10:16

## 2023-04-17 RX ADMIN — DEXTROSE MONOHYDRATE SCH MLS/HR: 5 INJECTION INTRAVENOUS at 18:44

## 2023-04-17 RX ADMIN — INSULIN LISPRO SCH UNITS: 100 INJECTION, SOLUTION INTRAVENOUS; SUBCUTANEOUS at 18:43

## 2023-04-17 RX ADMIN — INSULIN LISPRO SCH UNITS: 100 INJECTION, SOLUTION INTRAVENOUS; SUBCUTANEOUS at 07:30

## 2023-04-18 VITALS — DIASTOLIC BLOOD PRESSURE: 84 MMHG | SYSTOLIC BLOOD PRESSURE: 122 MMHG

## 2023-04-18 VITALS — DIASTOLIC BLOOD PRESSURE: 86 MMHG | SYSTOLIC BLOOD PRESSURE: 141 MMHG

## 2023-04-18 VITALS — DIASTOLIC BLOOD PRESSURE: 56 MMHG | SYSTOLIC BLOOD PRESSURE: 144 MMHG

## 2023-04-18 VITALS — SYSTOLIC BLOOD PRESSURE: 136 MMHG | DIASTOLIC BLOOD PRESSURE: 87 MMHG

## 2023-04-18 VITALS — DIASTOLIC BLOOD PRESSURE: 85 MMHG | SYSTOLIC BLOOD PRESSURE: 140 MMHG

## 2023-04-18 LAB
BASOPHILS # BLD AUTO: 0 10^3/UL (ref 0–0.2)
BASOPHILS NFR BLD AUTO: 0.4 % (ref 0–1)
BUN SERPL-MCNC: 16 MG/DL (ref 9–23)
CALCIUM SERPL-MCNC: 7 MG/DL (ref 8.5–10.1)
CHLORIDE SERPL-SCNC: 106 MMOL/L (ref 98–107)
CO2 SERPL-SCNC: 24 MMOL/L (ref 20–31)
CREAT SERPL-MCNC: 0.97 MG/DL (ref 0.55–1.3)
CRP SERPL-MCNC: 6.9 MG/DL (ref ?–1)
EOSINOPHIL # BLD AUTO: 0.1 10^3/UL (ref 0–0.5)
EOSINOPHIL NFR BLD AUTO: 1.4 % (ref 0–3)
FERRITIN SERPL-MCNC: 35.9 NG/ML (ref 7.3–270.7)
FOLATE SERPL-MCNC: 6.3 NG/ML (ref 5.4–?)
GFR SERPL CREATININE-BSD FRML MDRD: > 60 ML/MIN/{1.73_M2} (ref 58–?)
GLUCOSE SERPL-MCNC: 101 MG/DL (ref 60–100)
HCT VFR BLD AUTO: 23.3 % (ref 36–47)
HCT VFR BLD AUTO: 23.3 % (ref 36–47)
HGB BLD-MCNC: 7.5 G/DL (ref 12–15.5)
HGB BLD-MCNC: 7.6 G/DL (ref 12–15.5)
IRON SATN MFR SERPL: 5.1 % (ref 13.2–45)
IRON SERPL-MCNC: 10 UG/DL (ref 50–170)
LYMPHOCYTES # BLD AUTO: 1.4 10^3/UL (ref 1.5–5)
LYMPHOCYTES NFR BLD AUTO: 17.2 % (ref 24–44)
MAGNESIUM SERPL-MCNC: 1.9 MG/DL (ref 1.8–2.4)
MCH RBC QN AUTO: 27.2 PG (ref 27–33)
MCHC RBC AUTO-ENTMCNC: 32.2 G/DL (ref 32–36.5)
MCV RBC AUTO: 84.4 FL (ref 80–96)
MONOCYTES # BLD AUTO: 0.4 10^3/UL (ref 0–0.8)
MONOCYTES NFR BLD AUTO: 5.4 % (ref 2–8)
NEUTROPHILS # BLD AUTO: 6 10^3/UL (ref 1.5–8.5)
NEUTROPHILS NFR BLD AUTO: 74.6 % (ref 36–66)
PLATELET # BLD AUTO: 268 10^3/UL (ref 150–450)
POTASSIUM SERPL-SCNC: 4.5 MMOL/L (ref 3.5–5.1)
RBC # BLD AUTO: 2.76 10^6/UL (ref 4–5.4)
SODIUM SERPL-SCNC: 135 MMOL/L (ref 136–145)
TIBC SERPL-MCNC: 196 UG/DL (ref 250–425)
VIT B12 SERPL-MCNC: 266 PG/ML (ref 211–911)
WBC # BLD AUTO: 8 10^3/UL (ref 4–10)

## 2023-04-18 PROCEDURE — 0JBQ0ZZ EXCISION OF RIGHT FOOT SUBCUTANEOUS TISSUE AND FASCIA, OPEN APPROACH: ICD-10-PCS | Performed by: PODIATRIST

## 2023-04-18 RX ADMIN — INSULIN LISPRO SCH UNITS: 100 INJECTION, SOLUTION INTRAVENOUS; SUBCUTANEOUS at 17:30

## 2023-04-18 RX ADMIN — DEXTROSE MONOHYDRATE SCH MLS/HR: 5 INJECTION INTRAVENOUS at 18:56

## 2023-04-18 RX ADMIN — DEXTROSE MONOHYDRATE SCH MLS/HR: 5 INJECTION INTRAVENOUS at 12:18

## 2023-04-18 RX ADMIN — DEXTROSE MONOHYDRATE SCH MLS/HR: 50 INJECTION, SOLUTION INTRAVENOUS at 22:55

## 2023-04-18 RX ADMIN — INSULIN LISPRO SCH UNITS: 100 INJECTION, SOLUTION INTRAVENOUS; SUBCUTANEOUS at 12:00

## 2023-04-18 RX ADMIN — DEXTROSE MONOHYDRATE SCH MLS/HR: 50 INJECTION, SOLUTION INTRAVENOUS at 09:37

## 2023-04-18 RX ADMIN — SODIUM CHLORIDE SCH MLS/HR: 9 INJECTION, SOLUTION INTRAVENOUS at 12:18

## 2023-04-18 RX ADMIN — SODIUM CHLORIDE SCH MLS/HR: 9 INJECTION, SOLUTION INTRAVENOUS at 16:30

## 2023-04-18 RX ADMIN — INSULIN LISPRO SCH UNITS: 100 INJECTION, SOLUTION INTRAVENOUS; SUBCUTANEOUS at 18:30

## 2023-04-18 RX ADMIN — INSULIN LISPRO SCH UNITS: 100 INJECTION, SOLUTION INTRAVENOUS; SUBCUTANEOUS at 07:30

## 2023-04-18 RX ADMIN — SODIUM CHLORIDE SCH MLS/HR: 9 INJECTION, SOLUTION INTRAVENOUS at 00:30

## 2023-04-18 RX ADMIN — SODIUM CHLORIDE SCH UNITS: 4.5 INJECTION, SOLUTION INTRAVENOUS at 03:19

## 2023-04-18 RX ADMIN — INSULIN LISPRO SCH UNITS: 100 INJECTION, SOLUTION INTRAVENOUS; SUBCUTANEOUS at 08:30

## 2023-04-18 RX ADMIN — DEXTROSE MONOHYDRATE SCH MLS/HR: 5 INJECTION INTRAVENOUS at 05:23

## 2023-04-18 RX ADMIN — INSULIN DETEMIR SCH UNITS: 100 INJECTION, SOLUTION SUBCUTANEOUS at 09:00

## 2023-04-19 VITALS — DIASTOLIC BLOOD PRESSURE: 88 MMHG | SYSTOLIC BLOOD PRESSURE: 155 MMHG

## 2023-04-19 VITALS — DIASTOLIC BLOOD PRESSURE: 94 MMHG | SYSTOLIC BLOOD PRESSURE: 170 MMHG

## 2023-04-19 VITALS — DIASTOLIC BLOOD PRESSURE: 64 MMHG | SYSTOLIC BLOOD PRESSURE: 129 MMHG

## 2023-04-19 VITALS — SYSTOLIC BLOOD PRESSURE: 162 MMHG | DIASTOLIC BLOOD PRESSURE: 91 MMHG

## 2023-04-19 LAB
BASE EXCESS BLDA CALC-SCNC: -2.9 MMOL/L (ref -2–2)
BASOPHILS # BLD AUTO: 0.1 10^3/UL (ref 0–0.2)
BASOPHILS NFR BLD AUTO: 0.5 % (ref 0–1)
BUN SERPL-MCNC: 11 MG/DL (ref 9–23)
CALCIUM SERPL-MCNC: 8 MG/DL (ref 8.5–10.1)
CHLORIDE SERPL-SCNC: 104 MMOL/L (ref 98–107)
CO2 BLDA CALC-SCNC: 21.9 MEQ/L (ref 22–29)
CO2 SERPL-SCNC: 21 MMOL/L (ref 20–31)
CREAT SERPL-MCNC: 0.99 MG/DL (ref 0.55–1.3)
CRP SERPL-MCNC: 8.8 MG/DL (ref ?–1)
EOSINOPHIL # BLD AUTO: 0.1 10^3/UL (ref 0–0.5)
EOSINOPHIL NFR BLD AUTO: 0.6 % (ref 0–3)
GFR SERPL CREATININE-BSD FRML MDRD: > 60 ML/MIN/{1.73_M2} (ref 58–?)
GLUCOSE SERPL-MCNC: 237 MG/DL (ref 60–100)
HCO3 BLDA-SCNC: 20.9 MEQ/L (ref 22–26)
HCO3 STD BLDA-SCNC: 22 MEQ/L (ref 22–26)
HCT VFR BLD AUTO: 29.6 % (ref 36–47)
HGB BLD-MCNC: 9.4 G/DL (ref 12–15.5)
LYMPHOCYTES # BLD AUTO: 0.9 10^3/UL (ref 1.5–5)
LYMPHOCYTES NFR BLD AUTO: 8.8 % (ref 24–44)
MCH RBC QN AUTO: 26.7 PG (ref 27–33)
MCHC RBC AUTO-ENTMCNC: 31.8 G/DL (ref 32–36.5)
MCV RBC AUTO: 84.1 FL (ref 80–96)
MONOCYTES # BLD AUTO: 0.3 10^3/UL (ref 0–0.8)
MONOCYTES NFR BLD AUTO: 2.6 % (ref 2–8)
NEUTROPHILS # BLD AUTO: 8.8 10^3/UL (ref 1.5–8.5)
NEUTROPHILS NFR BLD AUTO: 86.4 % (ref 36–66)
PCO2 BLDA: 32.6 MMHG (ref 35–45)
PH BLDA: 7.42 UNITS (ref 7.35–7.45)
PLATELET # BLD AUTO: 418 10^3/UL (ref 150–450)
PO2 BLDA: 68.4 MMHG (ref 75–100)
POTASSIUM SERPL-SCNC: 3.9 MMOL/L (ref 3.5–5.1)
RBC # BLD AUTO: 3.52 10^6/UL (ref 4–5.4)
SAO2 % BLDA: 94.2 % (ref 95–99)
SODIUM SERPL-SCNC: 134 MMOL/L (ref 136–145)
WBC # BLD AUTO: 10.2 10^3/UL (ref 4–10)

## 2023-04-19 RX ADMIN — INSULIN LISPRO SCH UNITS: 100 INJECTION, SOLUTION INTRAVENOUS; SUBCUTANEOUS at 12:00

## 2023-04-19 RX ADMIN — INSULIN LISPRO SCH UNITS: 100 INJECTION, SOLUTION INTRAVENOUS; SUBCUTANEOUS at 08:45

## 2023-04-19 RX ADMIN — INSULIN LISPRO SCH UNITS: 100 INJECTION, SOLUTION INTRAVENOUS; SUBCUTANEOUS at 18:44

## 2023-04-19 RX ADMIN — INSULIN LISPRO SCH UNITS: 100 INJECTION, SOLUTION INTRAVENOUS; SUBCUTANEOUS at 12:01

## 2023-04-19 RX ADMIN — DEXTROSE MONOHYDRATE SCH MLS/HR: 5 INJECTION INTRAVENOUS at 01:08

## 2023-04-19 RX ADMIN — SODIUM CHLORIDE SCH MLS/HR: 9 INJECTION, SOLUTION INTRAVENOUS at 04:35

## 2023-04-19 RX ADMIN — METOCLOPRAMIDE PRN MG: 5 TABLET ORAL at 16:57

## 2023-04-19 RX ADMIN — DEXTROSE MONOHYDRATE SCH MLS/HR: 5 INJECTION INTRAVENOUS at 18:44

## 2023-04-19 RX ADMIN — DEXTROSE MONOHYDRATE SCH MLS/HR: 5 INJECTION INTRAVENOUS at 12:13

## 2023-04-19 RX ADMIN — DEXTROSE MONOHYDRATE SCH MLS/HR: 50 INJECTION, SOLUTION INTRAVENOUS at 10:12

## 2023-04-19 RX ADMIN — METOCLOPRAMIDE PRN MG: 5 TABLET ORAL at 06:22

## 2023-04-19 RX ADMIN — SODIUM CHLORIDE SCH UNITS: 4.5 INJECTION, SOLUTION INTRAVENOUS at 14:40

## 2023-04-19 RX ADMIN — SODIUM CHLORIDE SCH UNITS: 4.5 INJECTION, SOLUTION INTRAVENOUS at 21:45

## 2023-04-19 RX ADMIN — INSULIN DETEMIR SCH UNITS: 100 INJECTION, SOLUTION SUBCUTANEOUS at 08:46

## 2023-04-19 RX ADMIN — DEXTROSE MONOHYDRATE SCH MLS/HR: 5 INJECTION INTRAVENOUS at 06:36

## 2023-04-19 RX ADMIN — INSULIN LISPRO SCH UNITS: 100 INJECTION, SOLUTION INTRAVENOUS; SUBCUTANEOUS at 18:45

## 2023-04-19 RX ADMIN — ONDANSETRON PRN MG: 2 INJECTION INTRAMUSCULAR; INTRAVENOUS at 04:36

## 2023-04-19 RX ADMIN — DEXTROSE MONOHYDRATE SCH MLS/HR: 50 INJECTION, SOLUTION INTRAVENOUS at 21:44

## 2023-04-19 RX ADMIN — INSULIN LISPRO SCH UNITS: 100 INJECTION, SOLUTION INTRAVENOUS; SUBCUTANEOUS at 08:46

## 2023-04-20 VITALS — SYSTOLIC BLOOD PRESSURE: 153 MMHG | DIASTOLIC BLOOD PRESSURE: 88 MMHG

## 2023-04-20 VITALS — SYSTOLIC BLOOD PRESSURE: 153 MMHG | DIASTOLIC BLOOD PRESSURE: 89 MMHG

## 2023-04-20 LAB
BASOPHILS # BLD AUTO: 0 10^3/UL (ref 0–0.2)
BASOPHILS NFR BLD AUTO: 0.4 % (ref 0–1)
BUN SERPL-MCNC: 10 MG/DL (ref 9–23)
CALCIUM SERPL-MCNC: 7.9 MG/DL (ref 8.5–10.1)
CHLORIDE SERPL-SCNC: 104 MMOL/L (ref 98–107)
CO2 SERPL-SCNC: 26 MMOL/L (ref 20–31)
CREAT SERPL-MCNC: 0.96 MG/DL (ref 0.55–1.3)
CRP SERPL-MCNC: 7.8 MG/DL (ref ?–1)
EOSINOPHIL # BLD AUTO: 0.1 10^3/UL (ref 0–0.5)
EOSINOPHIL NFR BLD AUTO: 0.7 % (ref 0–3)
GFR SERPL CREATININE-BSD FRML MDRD: > 60 ML/MIN/{1.73_M2} (ref 58–?)
GLUCOSE SERPL-MCNC: 155 MG/DL (ref 60–100)
HCT VFR BLD AUTO: 25.5 % (ref 36–47)
HGB BLD-MCNC: 8.1 G/DL (ref 12–15.5)
LYMPHOCYTES # BLD AUTO: 1.3 10^3/UL (ref 1.5–5)
LYMPHOCYTES NFR BLD AUTO: 13.1 % (ref 24–44)
MCH RBC QN AUTO: 27 PG (ref 27–33)
MCHC RBC AUTO-ENTMCNC: 31.8 G/DL (ref 32–36.5)
MCV RBC AUTO: 85 FL (ref 80–96)
MONOCYTES # BLD AUTO: 0.3 10^3/UL (ref 0–0.8)
MONOCYTES NFR BLD AUTO: 3.4 % (ref 2–8)
NEUTROPHILS # BLD AUTO: 7.7 10^3/UL (ref 1.5–8.5)
NEUTROPHILS NFR BLD AUTO: 80.8 % (ref 36–66)
PLATELET # BLD AUTO: 381 10^3/UL (ref 150–450)
POTASSIUM SERPL-SCNC: 4.2 MMOL/L (ref 3.5–5.1)
RBC # BLD AUTO: 3 10^6/UL (ref 4–5.4)
SODIUM SERPL-SCNC: 134 MMOL/L (ref 136–145)
WBC # BLD AUTO: 9.6 10^3/UL (ref 4–10)

## 2023-04-20 RX ADMIN — METOCLOPRAMIDE PRN MG: 5 TABLET ORAL at 12:06

## 2023-04-20 RX ADMIN — SODIUM CHLORIDE SCH UNITS: 4.5 INJECTION, SOLUTION INTRAVENOUS at 14:00

## 2023-04-20 RX ADMIN — DEXTROSE MONOHYDRATE SCH MLS/HR: 50 INJECTION, SOLUTION INTRAVENOUS at 10:45

## 2023-04-20 RX ADMIN — DEXTROSE MONOHYDRATE SCH MLS/HR: 5 INJECTION INTRAVENOUS at 12:00

## 2023-04-20 RX ADMIN — INSULIN LISPRO SCH UNITS: 100 INJECTION, SOLUTION INTRAVENOUS; SUBCUTANEOUS at 07:30

## 2023-04-20 RX ADMIN — INSULIN LISPRO SCH UNITS: 100 INJECTION, SOLUTION INTRAVENOUS; SUBCUTANEOUS at 13:31

## 2023-04-20 RX ADMIN — SODIUM CHLORIDE SCH UNITS: 4.5 INJECTION, SOLUTION INTRAVENOUS at 06:29

## 2023-04-20 RX ADMIN — INSULIN LISPRO SCH UNITS: 100 INJECTION, SOLUTION INTRAVENOUS; SUBCUTANEOUS at 13:00

## 2023-04-20 RX ADMIN — DEXTROSE MONOHYDRATE SCH MLS/HR: 5 INJECTION INTRAVENOUS at 00:12

## 2023-04-20 RX ADMIN — DEXTROSE MONOHYDRATE SCH MLS/HR: 5 INJECTION INTRAVENOUS at 06:29

## 2023-04-20 RX ADMIN — INSULIN LISPRO SCH UNITS: 100 INJECTION, SOLUTION INTRAVENOUS; SUBCUTANEOUS at 07:56

## 2023-04-20 RX ADMIN — ONDANSETRON PRN MG: 2 INJECTION INTRAMUSCULAR; INTRAVENOUS at 10:23

## 2023-04-20 RX ADMIN — INSULIN DETEMIR SCH UNITS: 100 INJECTION, SOLUTION SUBCUTANEOUS at 09:10

## 2023-05-02 ENCOUNTER — HOSPITAL ENCOUNTER (OUTPATIENT)
Dept: HOSPITAL 53 - M LAB REF | Age: 43
End: 2023-05-02
Attending: PODIATRIST
Payer: COMMERCIAL

## 2023-05-02 DIAGNOSIS — L03.031: Primary | ICD-10-CM

## 2023-05-09 ENCOUNTER — HOSPITAL ENCOUNTER (OUTPATIENT)
Dept: HOSPITAL 53 - M IRPRO | Age: 43
End: 2023-05-09
Attending: SURGERY
Payer: COMMERCIAL

## 2023-05-09 VITALS — SYSTOLIC BLOOD PRESSURE: 136 MMHG | DIASTOLIC BLOOD PRESSURE: 83 MMHG

## 2023-05-09 DIAGNOSIS — E13.52: ICD-10-CM

## 2023-05-09 DIAGNOSIS — I70.261: Primary | ICD-10-CM

## 2023-05-09 LAB
APTT BLD: 26.8 SECONDS (ref 24.8–34.2)
BUN SERPL-MCNC: 13 MG/DL (ref 9–23)
CALCIUM SERPL-MCNC: 8.6 MG/DL (ref 8.5–10.1)
CHLORIDE SERPL-SCNC: 100 MMOL/L (ref 98–107)
CO2 SERPL-SCNC: 27 MMOL/L (ref 20–31)
CREAT SERPL-MCNC: 0.93 MG/DL (ref 0.55–1.3)
GFR SERPL CREATININE-BSD FRML MDRD: > 60 ML/MIN/{1.73_M2} (ref 58–?)
GLUCOSE SERPL-MCNC: 246 MG/DL (ref 60–100)
HCT VFR BLD AUTO: 25.3 % (ref 36–47)
HGB BLD-MCNC: 7.9 G/DL (ref 12–15.5)
INR PPP: 0.94
MAGNESIUM SERPL-MCNC: 1.7 MG/DL (ref 1.8–2.4)
MCH RBC QN AUTO: 25.9 PG (ref 27–33)
MCHC RBC AUTO-ENTMCNC: 31.2 G/DL (ref 32–36.5)
MCV RBC AUTO: 83 FL (ref 80–96)
PLATELET # BLD AUTO: 482 10^3/UL (ref 150–450)
POTASSIUM SERPL-SCNC: 3.9 MMOL/L (ref 3.5–5.1)
PROTHROMBIN TIME: 12.8 SECONDS (ref 12.5–14.5)
RBC # BLD AUTO: 3.05 10^6/UL (ref 4–5.4)
SODIUM SERPL-SCNC: 130 MMOL/L (ref 136–145)
WBC # BLD AUTO: 9.3 10^3/UL (ref 4–10)

## 2023-05-09 PROCEDURE — 80048 BASIC METABOLIC PNL TOTAL CA: CPT

## 2023-05-09 PROCEDURE — 37224: CPT

## 2023-05-09 PROCEDURE — 83735 ASSAY OF MAGNESIUM: CPT

## 2023-05-09 PROCEDURE — 85610 PROTHROMBIN TIME: CPT

## 2023-05-09 PROCEDURE — 99152 MOD SED SAME PHYS/QHP 5/>YRS: CPT

## 2023-05-09 PROCEDURE — 86850 RBC ANTIBODY SCREEN: CPT

## 2023-05-09 PROCEDURE — 37228: CPT

## 2023-05-09 PROCEDURE — 85027 COMPLETE CBC AUTOMATED: CPT

## 2023-05-09 PROCEDURE — 85730 THROMBOPLASTIN TIME PARTIAL: CPT

## 2023-05-09 PROCEDURE — 99153 MOD SED SAME PHYS/QHP EA: CPT

## 2023-05-09 PROCEDURE — 37232: CPT

## 2023-05-09 PROCEDURE — 86900 BLOOD TYPING SEROLOGIC ABO: CPT

## 2023-05-09 PROCEDURE — 86901 BLOOD TYPING SEROLOGIC RH(D): CPT

## 2023-05-19 ENCOUNTER — HOSPITAL ENCOUNTER (OUTPATIENT)
Dept: HOSPITAL 53 - M SDC | Age: 43
Discharge: HOME | End: 2023-05-19
Attending: PODIATRIST
Payer: COMMERCIAL

## 2023-05-19 VITALS — WEIGHT: 176.4 LBS | HEIGHT: 65 IN | BODY MASS INDEX: 29.39 KG/M2

## 2023-05-19 VITALS — SYSTOLIC BLOOD PRESSURE: 166 MMHG | DIASTOLIC BLOOD PRESSURE: 83 MMHG

## 2023-05-19 DIAGNOSIS — I10: ICD-10-CM

## 2023-05-19 DIAGNOSIS — I96: ICD-10-CM

## 2023-05-19 DIAGNOSIS — Z79.899: ICD-10-CM

## 2023-05-19 DIAGNOSIS — F32.A: ICD-10-CM

## 2023-05-19 DIAGNOSIS — E11.9: ICD-10-CM

## 2023-05-19 DIAGNOSIS — M86.171: Primary | ICD-10-CM

## 2023-05-19 DIAGNOSIS — K21.9: ICD-10-CM

## 2023-05-19 DIAGNOSIS — J45.909: ICD-10-CM

## 2023-05-19 DIAGNOSIS — F41.9: ICD-10-CM

## 2023-05-19 DIAGNOSIS — Z79.02: ICD-10-CM

## 2023-05-19 PROCEDURE — 87077 CULTURE AEROBIC IDENTIFY: CPT

## 2023-05-19 PROCEDURE — 87075 CULTR BACTERIA EXCEPT BLOOD: CPT

## 2023-05-19 PROCEDURE — 87186 SC STD MICRODIL/AGAR DIL: CPT

## 2023-05-19 PROCEDURE — 28820 AMPUTATION OF TOE: CPT

## 2023-05-19 PROCEDURE — 87070 CULTURE OTHR SPECIMN AEROBIC: CPT

## 2023-05-19 PROCEDURE — 73630 X-RAY EXAM OF FOOT: CPT

## 2023-07-10 ENCOUNTER — HOSPITAL ENCOUNTER (OUTPATIENT)
Dept: HOSPITAL 53 - M LAB REF | Age: 43
End: 2023-07-10
Attending: PODIATRIST
Payer: COMMERCIAL

## 2023-07-10 DIAGNOSIS — L03.119: Primary | ICD-10-CM

## 2023-08-22 ENCOUNTER — HOSPITAL ENCOUNTER (INPATIENT)
Dept: HOSPITAL 53 - M SDC | Age: 43
LOS: 8 days | Discharge: HOME | DRG: 314 | End: 2023-08-30
Attending: STUDENT IN AN ORGANIZED HEALTH CARE EDUCATION/TRAINING PROGRAM | Admitting: INTERNAL MEDICINE
Payer: COMMERCIAL

## 2023-08-22 VITALS — WEIGHT: 186.95 LBS | HEIGHT: 65 IN | BODY MASS INDEX: 31.15 KG/M2

## 2023-08-22 VITALS — SYSTOLIC BLOOD PRESSURE: 151 MMHG | DIASTOLIC BLOOD PRESSURE: 87 MMHG | OXYGEN SATURATION: 99 % | TEMPERATURE: 97 F

## 2023-08-22 VITALS — SYSTOLIC BLOOD PRESSURE: 152 MMHG | DIASTOLIC BLOOD PRESSURE: 87 MMHG | TEMPERATURE: 97.5 F | OXYGEN SATURATION: 99 %

## 2023-08-22 DIAGNOSIS — Z87.891: ICD-10-CM

## 2023-08-22 DIAGNOSIS — Z95.828: ICD-10-CM

## 2023-08-22 DIAGNOSIS — E11.69: Primary | ICD-10-CM

## 2023-08-22 DIAGNOSIS — L97.519: ICD-10-CM

## 2023-08-22 DIAGNOSIS — N04.9: ICD-10-CM

## 2023-08-22 DIAGNOSIS — Z79.899: ICD-10-CM

## 2023-08-22 DIAGNOSIS — I73.9: ICD-10-CM

## 2023-08-22 DIAGNOSIS — Z79.2: ICD-10-CM

## 2023-08-22 DIAGNOSIS — E11.43: ICD-10-CM

## 2023-08-22 DIAGNOSIS — Z79.02: ICD-10-CM

## 2023-08-22 DIAGNOSIS — E11.621: ICD-10-CM

## 2023-08-22 DIAGNOSIS — Z79.4: ICD-10-CM

## 2023-08-22 DIAGNOSIS — E11.51: ICD-10-CM

## 2023-08-22 DIAGNOSIS — D63.8: ICD-10-CM

## 2023-08-22 DIAGNOSIS — M86.8X7: ICD-10-CM

## 2023-08-22 DIAGNOSIS — K31.84: ICD-10-CM

## 2023-08-22 DIAGNOSIS — K21.9: ICD-10-CM

## 2023-08-22 DIAGNOSIS — Z79.82: ICD-10-CM

## 2023-08-22 LAB
ALBUMIN SERPL BCG-MCNC: 1.4 G/DL (ref 3.2–5.2)
ALP SERPL-CCNC: 183 U/L (ref 46–116)
ALT SERPL W P-5'-P-CCNC: < 9 U/L (ref 7–40)
AST SERPL-CCNC: 8 U/L (ref ?–34)
BILIRUB SERPL-MCNC: 0.3 MG/DL (ref 0.3–1.2)
BUN SERPL-MCNC: 15 MG/DL (ref 9–23)
CALCIUM SERPL-MCNC: 7.7 MG/DL (ref 8.5–10.1)
CHLORIDE SERPL-SCNC: 99 MMOL/L (ref 98–107)
CO2 SERPL-SCNC: 26 MMOL/L (ref 20–31)
CREAT SERPL-MCNC: 0.89 MG/DL (ref 0.55–1.3)
FERRITIN SERPL-MCNC: 16.2 NG/ML (ref 7.3–270.7)
GFR SERPL CREATININE-BSD FRML MDRD: > 60 ML/MIN/{1.73_M2} (ref 58–?)
GLUCOSE SERPL-MCNC: 90 MG/DL (ref 60–100)
HCT VFR BLD AUTO: 25.1 % (ref 36–47)
HGB BLD-MCNC: 7.1 G/DL (ref 12–15.5)
IRON SATN MFR SERPL: 6.5 % (ref 13.2–45)
IRON SERPL-MCNC: 15 UG/DL (ref 50–170)
MCH RBC QN AUTO: 19 PG (ref 27–33)
MCHC RBC AUTO-ENTMCNC: 28.3 G/DL (ref 32–36.5)
MCV RBC AUTO: 67.3 FL (ref 80–96)
PLATELET # BLD AUTO: 647 10^3/UL (ref 150–450)
POTASSIUM SERPL-SCNC: 4.6 MMOL/L (ref 3.5–5.1)
PROT SERPL-MCNC: 5.4 G/DL (ref 5.7–8.2)
RBC # BLD AUTO: 3.73 10^6/UL (ref 4–5.4)
SODIUM SERPL-SCNC: 132 MMOL/L (ref 136–145)
TIBC SERPL-MCNC: 230 UG/DL (ref 250–425)
WBC # BLD AUTO: 11.9 10^3/UL (ref 4–10)

## 2023-08-22 PROCEDURE — 0QTN0ZZ RESECTION OF RIGHT METATARSAL, OPEN APPROACH: ICD-10-PCS | Performed by: PODIATRIST

## 2023-08-23 VITALS — DIASTOLIC BLOOD PRESSURE: 94 MMHG | OXYGEN SATURATION: 95 % | TEMPERATURE: 98.1 F | SYSTOLIC BLOOD PRESSURE: 152 MMHG

## 2023-08-23 VITALS — OXYGEN SATURATION: 98 % | DIASTOLIC BLOOD PRESSURE: 89 MMHG | TEMPERATURE: 97.9 F | SYSTOLIC BLOOD PRESSURE: 145 MMHG

## 2023-08-23 VITALS — SYSTOLIC BLOOD PRESSURE: 144 MMHG | DIASTOLIC BLOOD PRESSURE: 82 MMHG | OXYGEN SATURATION: 96 % | TEMPERATURE: 98.3 F

## 2023-08-23 VITALS — SYSTOLIC BLOOD PRESSURE: 156 MMHG | TEMPERATURE: 98.2 F | OXYGEN SATURATION: 99 % | DIASTOLIC BLOOD PRESSURE: 94 MMHG

## 2023-08-23 VITALS — DIASTOLIC BLOOD PRESSURE: 84 MMHG | OXYGEN SATURATION: 95 % | TEMPERATURE: 97.5 F | SYSTOLIC BLOOD PRESSURE: 137 MMHG

## 2023-08-23 VITALS — TEMPERATURE: 98.4 F | DIASTOLIC BLOOD PRESSURE: 85 MMHG | SYSTOLIC BLOOD PRESSURE: 143 MMHG | OXYGEN SATURATION: 97 %

## 2023-08-23 VITALS — OXYGEN SATURATION: 98 % | DIASTOLIC BLOOD PRESSURE: 85 MMHG | SYSTOLIC BLOOD PRESSURE: 143 MMHG | TEMPERATURE: 98.6 F

## 2023-08-23 VITALS — DIASTOLIC BLOOD PRESSURE: 70 MMHG | TEMPERATURE: 98.2 F | OXYGEN SATURATION: 98 % | SYSTOLIC BLOOD PRESSURE: 121 MMHG

## 2023-08-23 VITALS — DIASTOLIC BLOOD PRESSURE: 88 MMHG | SYSTOLIC BLOOD PRESSURE: 147 MMHG | TEMPERATURE: 98.8 F | OXYGEN SATURATION: 95 %

## 2023-08-23 VITALS — TEMPERATURE: 99.1 F | OXYGEN SATURATION: 94 % | SYSTOLIC BLOOD PRESSURE: 144 MMHG | DIASTOLIC BLOOD PRESSURE: 86 MMHG

## 2023-08-23 VITALS — DIASTOLIC BLOOD PRESSURE: 82 MMHG | TEMPERATURE: 99 F | SYSTOLIC BLOOD PRESSURE: 136 MMHG

## 2023-08-23 VITALS — SYSTOLIC BLOOD PRESSURE: 149 MMHG | TEMPERATURE: 98.1 F | OXYGEN SATURATION: 97 % | DIASTOLIC BLOOD PRESSURE: 91 MMHG

## 2023-08-23 VITALS — SYSTOLIC BLOOD PRESSURE: 160 MMHG | OXYGEN SATURATION: 96 % | TEMPERATURE: 98.1 F | DIASTOLIC BLOOD PRESSURE: 97 MMHG

## 2023-08-23 VITALS — SYSTOLIC BLOOD PRESSURE: 140 MMHG | OXYGEN SATURATION: 100 % | TEMPERATURE: 98.1 F | DIASTOLIC BLOOD PRESSURE: 83 MMHG

## 2023-08-23 VITALS — TEMPERATURE: 98.6 F | DIASTOLIC BLOOD PRESSURE: 89 MMHG | OXYGEN SATURATION: 95 % | SYSTOLIC BLOOD PRESSURE: 146 MMHG

## 2023-08-23 VITALS — SYSTOLIC BLOOD PRESSURE: 147 MMHG | TEMPERATURE: 99 F | DIASTOLIC BLOOD PRESSURE: 90 MMHG | OXYGEN SATURATION: 95 %

## 2023-08-23 LAB
BASE EXCESS BLDA CALC-SCNC: 2.3 MMOL/L (ref -2–2)
BUN SERPL-MCNC: 16 MG/DL (ref 9–23)
CALCIUM SERPL-MCNC: 7.3 MG/DL (ref 8.5–10.1)
CHLORIDE SERPL-SCNC: 99 MMOL/L (ref 98–107)
CO2 BLDA CALC-SCNC: 26.8 MMOL/L (ref 22–29)
CO2 SERPL-SCNC: 24 MMOL/L (ref 20–31)
CREAT SERPL-MCNC: 0.88 MG/DL (ref 0.55–1.3)
CREAT UR-MCNC: 74.8 MG/DL
GFR SERPL CREATININE-BSD FRML MDRD: > 60 ML/MIN/{1.73_M2} (ref 58–?)
GLUCOSE SERPL-MCNC: 199 MG/DL (ref 60–100)
HCO3 BLDA-SCNC: 25.7 MMOL/L (ref 22–26)
HCO3 STD BLDA-SCNC: 26.5 MMOL/L. (ref 22–26)
HCT VFR BLD AUTO: 23.2 % (ref 36–47)
HCT VFR BLD AUTO: 29.3 % (ref 36–47)
HGB BLD-MCNC: 6.6 G/DL (ref 12–15.5)
HGB BLD-MCNC: 8.6 G/DL (ref 12–15.5)
MCH RBC QN AUTO: 18.8 PG (ref 27–33)
MCHC RBC AUTO-ENTMCNC: 28.4 G/DL (ref 32–36.5)
MCV RBC AUTO: 66.1 FL (ref 80–96)
PCO2 BLDA: 34.8 MMHG (ref 35–45)
PH BLDA: 7.49 UNITS (ref 7.35–7.45)
PLATELET # BLD AUTO: 606 10^3/UL (ref 150–450)
PO2 BLDA: 86.2 MMHG (ref 75–100)
POTASSIUM SERPL-SCNC: 5 MMOL/L (ref 3.5–5.1)
PROT UR-MCNC: 632.7 MG/DL (ref 0–14)
RBC # BLD AUTO: 3.51 10^6/UL (ref 4–5.4)
SAO2 % BLDA: 96.9 % (ref 95–99)
SODIUM SERPL-SCNC: 130 MMOL/L (ref 136–145)
WBC # BLD AUTO: 11.6 10^3/UL (ref 4–10)

## 2023-08-23 PROCEDURE — 30233N1 TRANSFUSION OF NONAUTOLOGOUS RED BLOOD CELLS INTO PERIPHERAL VEIN, PERCUTANEOUS APPROACH: ICD-10-PCS | Performed by: GENERAL PRACTICE

## 2023-08-23 RX ADMIN — DEXTROSE MONOHYDRATE SCH MLS/HR: 50 INJECTION, SOLUTION INTRAVENOUS at 10:03

## 2023-08-23 RX ADMIN — DEXTROSE MONOHYDRATE SCH MLS/HR: 50 INJECTION, SOLUTION INTRAVENOUS at 21:30

## 2023-08-23 RX ADMIN — METOCLOPRAMIDE SCH MG: 10 TABLET ORAL at 16:44

## 2023-08-23 RX ADMIN — INSULIN LISPRO SCH UNITS: 100 INJECTION, SOLUTION INTRAVENOUS; SUBCUTANEOUS at 07:39

## 2023-08-23 RX ADMIN — INSULIN LISPRO SCH UNITS: 100 INJECTION, SOLUTION INTRAVENOUS; SUBCUTANEOUS at 16:44

## 2023-08-23 RX ADMIN — INSULIN LISPRO SCH UNITS: 100 INJECTION, SOLUTION INTRAVENOUS; SUBCUTANEOUS at 11:48

## 2023-08-23 RX ADMIN — INSULIN LISPRO SCH UNITS: 100 INJECTION, SOLUTION INTRAVENOUS; SUBCUTANEOUS at 20:22

## 2023-08-23 RX ADMIN — METOCLOPRAMIDE SCH MG: 10 TABLET ORAL at 20:28

## 2023-08-23 RX ADMIN — SODIUM CHLORIDE SCH UNITS: 4.5 INJECTION, SOLUTION INTRAVENOUS at 07:17

## 2023-08-23 RX ADMIN — ONDANSETRON PRN MG: 2 INJECTION INTRAMUSCULAR; INTRAVENOUS at 12:05

## 2023-08-23 RX ADMIN — METOCLOPRAMIDE SCH MG: 10 TABLET ORAL at 11:47

## 2023-08-23 RX ADMIN — SODIUM CHLORIDE SCH UNITS: 4.5 INJECTION, SOLUTION INTRAVENOUS at 13:07

## 2023-08-23 RX ADMIN — SODIUM CHLORIDE SCH UNITS: 4.5 INJECTION, SOLUTION INTRAVENOUS at 20:29

## 2023-08-24 VITALS — TEMPERATURE: 97.7 F | OXYGEN SATURATION: 97 % | SYSTOLIC BLOOD PRESSURE: 127 MMHG | DIASTOLIC BLOOD PRESSURE: 73 MMHG

## 2023-08-24 VITALS — TEMPERATURE: 97.7 F | DIASTOLIC BLOOD PRESSURE: 91 MMHG | OXYGEN SATURATION: 95 % | SYSTOLIC BLOOD PRESSURE: 153 MMHG

## 2023-08-24 VITALS — TEMPERATURE: 97.7 F | SYSTOLIC BLOOD PRESSURE: 145 MMHG | OXYGEN SATURATION: 93 % | DIASTOLIC BLOOD PRESSURE: 83 MMHG

## 2023-08-24 LAB
ANISOCYTOSIS BLD QL SMEAR: (no result)
BASOPHILS NFR BLD MANUAL: 1 % (ref 0–1)
BUN SERPL-MCNC: 18 MG/DL (ref 9–23)
CALCIUM SERPL-MCNC: 7.7 MG/DL (ref 8.5–10.1)
CHLORIDE SERPL-SCNC: 99 MMOL/L (ref 98–107)
CO2 SERPL-SCNC: 25 MMOL/L (ref 20–31)
CREAT SERPL-MCNC: 1.23 MG/DL (ref 0.55–1.3)
EOSINOPHIL NFR BLD MANUAL: 2 % (ref 0–3)
GFR SERPL CREATININE-BSD FRML MDRD: 51.5 ML/MIN/{1.73_M2} (ref 58–?)
GLUCOSE SERPL-MCNC: 233 MG/DL (ref 60–100)
HCT VFR BLD AUTO: 29.8 % (ref 36–47)
HGB BLD-MCNC: 8.6 G/DL (ref 12–15.5)
HYPOCHROMIA BLD QL SMEAR: (no result)
LYMPHOCYTES NFR BLD MANUAL: 20 % (ref 16–44)
MCH RBC QN AUTO: 20.2 PG (ref 27–33)
MCHC RBC AUTO-ENTMCNC: 28.9 G/DL (ref 32–36.5)
MCV RBC AUTO: 70.1 FL (ref 80–96)
METAMYELOCYTES NFR BLD MANUAL: 2 % (ref 0–0)
MICROCYTES BLD QL SMEAR: (no result)
MONOCYTES NFR BLD MANUAL: 6 % (ref 0–5)
MYELOBLASTS NFR BLD MANUAL: 5 % (ref 0–0)
NEUTROPHILS NFR BLD MANUAL: 62 % (ref 28–66)
OVALOCYTES BLD QL SMEAR: (no result)
PLATELET # BLD AUTO: 566 10^3/UL (ref 150–450)
PLATELET BLD QL SMEAR: NORMAL
POTASSIUM SERPL-SCNC: 5.1 MMOL/L (ref 3.5–5.1)
RBC # BLD AUTO: 4.25 10^6/UL (ref 4–5.4)
SODIUM SERPL-SCNC: 131 MMOL/L (ref 136–145)
SPHEROCYTES BLD QL SMEAR: (no result)
WBC # BLD AUTO: 8.8 10^3/UL (ref 4–10)

## 2023-08-24 RX ADMIN — DEXTROSE MONOHYDRATE SCH MLS/HR: 50 INJECTION, SOLUTION INTRAVENOUS at 12:13

## 2023-08-24 RX ADMIN — PANTOPRAZOLE SODIUM SCH MG: 40 TABLET, DELAYED RELEASE ORAL at 07:36

## 2023-08-24 RX ADMIN — INSULIN LISPRO SCH UNITS: 100 INJECTION, SOLUTION INTRAVENOUS; SUBCUTANEOUS at 20:44

## 2023-08-24 RX ADMIN — INSULIN LISPRO SCH UNITS: 100 INJECTION, SOLUTION INTRAVENOUS; SUBCUTANEOUS at 12:13

## 2023-08-24 RX ADMIN — METOCLOPRAMIDE SCH MG: 10 TABLET ORAL at 07:36

## 2023-08-24 RX ADMIN — INSULIN LISPRO SCH UNITS: 100 INJECTION, SOLUTION INTRAVENOUS; SUBCUTANEOUS at 16:57

## 2023-08-24 RX ADMIN — METOCLOPRAMIDE SCH MG: 10 TABLET ORAL at 12:12

## 2023-08-24 RX ADMIN — SODIUM CHLORIDE SCH UNITS: 4.5 INJECTION, SOLUTION INTRAVENOUS at 05:27

## 2023-08-24 RX ADMIN — SODIUM CHLORIDE SCH MLS/HR: 9 INJECTION, SOLUTION INTRAVENOUS at 13:29

## 2023-08-24 RX ADMIN — SODIUM CHLORIDE SCH UNITS: 4.5 INJECTION, SOLUTION INTRAVENOUS at 20:44

## 2023-08-24 RX ADMIN — INSULIN LISPRO SCH UNITS: 100 INJECTION, SOLUTION INTRAVENOUS; SUBCUTANEOUS at 07:35

## 2023-08-24 RX ADMIN — METOCLOPRAMIDE SCH MG: 10 TABLET ORAL at 20:44

## 2023-08-24 RX ADMIN — SODIUM CHLORIDE SCH UNITS: 4.5 INJECTION, SOLUTION INTRAVENOUS at 13:30

## 2023-08-24 RX ADMIN — METOCLOPRAMIDE SCH MG: 10 TABLET ORAL at 16:58

## 2023-08-25 VITALS — OXYGEN SATURATION: 99 % | TEMPERATURE: 97.3 F | DIASTOLIC BLOOD PRESSURE: 75 MMHG | SYSTOLIC BLOOD PRESSURE: 125 MMHG

## 2023-08-25 VITALS — OXYGEN SATURATION: 95 % | DIASTOLIC BLOOD PRESSURE: 98 MMHG | TEMPERATURE: 97.7 F | SYSTOLIC BLOOD PRESSURE: 156 MMHG

## 2023-08-25 VITALS — SYSTOLIC BLOOD PRESSURE: 168 MMHG | DIASTOLIC BLOOD PRESSURE: 101 MMHG | TEMPERATURE: 97.7 F | OXYGEN SATURATION: 99 %

## 2023-08-25 LAB
ANISOCYTOSIS BLD QL SMEAR: (no result)
BASOPHILS NFR BLD MANUAL: 1 % (ref 0–1)
BUN SERPL-MCNC: 21 MG/DL (ref 9–23)
CALCIUM SERPL-MCNC: 7.4 MG/DL (ref 8.5–10.1)
CHLORIDE SERPL-SCNC: 100 MMOL/L (ref 98–107)
CO2 SERPL-SCNC: 25 MMOL/L (ref 20–31)
CREAT SERPL-MCNC: 1.12 MG/DL (ref 0.55–1.3)
EOSINOPHIL NFR BLD MANUAL: 2 % (ref 0–3)
GFR SERPL CREATININE-BSD FRML MDRD: 57.4 ML/MIN/{1.73_M2} (ref 58–?)
GLUCOSE SERPL-MCNC: 240 MG/DL (ref 60–100)
HCT VFR BLD AUTO: 28.4 % (ref 36–47)
HGB BLD-MCNC: 8.2 G/DL (ref 12–15.5)
HYPOCHROMIA BLD QL SMEAR: (no result)
LYMPHOCYTES NFR BLD MANUAL: 16 % (ref 16–44)
MCH RBC QN AUTO: 20 PG (ref 27–33)
MCHC RBC AUTO-ENTMCNC: 28.9 G/DL (ref 32–36.5)
MCV RBC AUTO: 69.3 FL (ref 80–96)
METAMYELOCYTES NFR BLD MANUAL: 3 % (ref 0–0)
MICROCYTES BLD QL SMEAR: (no result)
MYELOBLASTS NFR BLD MANUAL: 5 % (ref 0–0)
NEUTROPHILS NFR BLD MANUAL: 73 % (ref 28–66)
OVALOCYTES BLD QL SMEAR: (no result)
PLATELET # BLD AUTO: 592 10^3/UL (ref 150–450)
PLATELET BLD QL SMEAR: NORMAL
POTASSIUM SERPL-SCNC: 4.9 MMOL/L (ref 3.5–5.1)
RBC # BLD AUTO: 4.1 10^6/UL (ref 4–5.4)
SODIUM SERPL-SCNC: 132 MMOL/L (ref 136–145)
WBC # BLD AUTO: 8.5 10^3/UL (ref 4–10)

## 2023-08-25 RX ADMIN — METOCLOPRAMIDE SCH MG: 10 TABLET ORAL at 17:57

## 2023-08-25 RX ADMIN — INSULIN LISPRO SCH UNITS: 100 INJECTION, SOLUTION INTRAVENOUS; SUBCUTANEOUS at 21:00

## 2023-08-25 RX ADMIN — INSULIN LISPRO SCH UNITS: 100 INJECTION, SOLUTION INTRAVENOUS; SUBCUTANEOUS at 12:16

## 2023-08-25 RX ADMIN — SODIUM CHLORIDE SCH UNITS: 4.5 INJECTION, SOLUTION INTRAVENOUS at 05:38

## 2023-08-25 RX ADMIN — ONDANSETRON PRN MG: 2 INJECTION INTRAMUSCULAR; INTRAVENOUS at 05:42

## 2023-08-25 RX ADMIN — METOCLOPRAMIDE SCH MG: 10 TABLET ORAL at 21:00

## 2023-08-25 RX ADMIN — PANTOPRAZOLE SODIUM SCH MG: 40 TABLET, DELAYED RELEASE ORAL at 08:24

## 2023-08-25 RX ADMIN — SODIUM CHLORIDE SCH UNITS: 4.5 INJECTION, SOLUTION INTRAVENOUS at 13:39

## 2023-08-25 RX ADMIN — SODIUM CHLORIDE SCH MLS/HR: 9 INJECTION, SOLUTION INTRAVENOUS at 13:39

## 2023-08-25 RX ADMIN — DEXTROSE MONOHYDRATE SCH MLS/HR: 50 INJECTION, SOLUTION INTRAVENOUS at 12:02

## 2023-08-25 RX ADMIN — METOCLOPRAMIDE SCH MG: 10 TABLET ORAL at 08:24

## 2023-08-25 RX ADMIN — INSULIN LISPRO SCH UNITS: 100 INJECTION, SOLUTION INTRAVENOUS; SUBCUTANEOUS at 08:24

## 2023-08-25 RX ADMIN — INSULIN LISPRO SCH UNITS: 100 INJECTION, SOLUTION INTRAVENOUS; SUBCUTANEOUS at 17:58

## 2023-08-25 RX ADMIN — METOCLOPRAMIDE SCH MG: 10 TABLET ORAL at 11:54

## 2023-08-25 RX ADMIN — SODIUM CHLORIDE SCH UNITS: 4.5 INJECTION, SOLUTION INTRAVENOUS at 21:04

## 2023-08-26 VITALS — SYSTOLIC BLOOD PRESSURE: 152 MMHG | OXYGEN SATURATION: 95 % | TEMPERATURE: 97.9 F | DIASTOLIC BLOOD PRESSURE: 91 MMHG

## 2023-08-26 VITALS — OXYGEN SATURATION: 99 % | TEMPERATURE: 97.5 F | SYSTOLIC BLOOD PRESSURE: 154 MMHG | DIASTOLIC BLOOD PRESSURE: 93 MMHG

## 2023-08-26 VITALS — SYSTOLIC BLOOD PRESSURE: 156 MMHG | DIASTOLIC BLOOD PRESSURE: 78 MMHG

## 2023-08-26 VITALS — DIASTOLIC BLOOD PRESSURE: 92 MMHG | SYSTOLIC BLOOD PRESSURE: 159 MMHG | TEMPERATURE: 97.5 F | OXYGEN SATURATION: 95 %

## 2023-08-26 LAB
BASOPHILS # BLD AUTO: 0.1 10^3/UL (ref 0–0.2)
BASOPHILS NFR BLD AUTO: 0.7 % (ref 0–1)
BUN SERPL-MCNC: 25 MG/DL (ref 9–23)
CALCIUM SERPL-MCNC: 8.4 MG/DL (ref 8.5–10.1)
CHLORIDE SERPL-SCNC: 99 MMOL/L (ref 98–107)
CO2 SERPL-SCNC: 27 MMOL/L (ref 20–31)
CREAT SERPL-MCNC: 1.24 MG/DL (ref 0.55–1.3)
EOSINOPHIL # BLD AUTO: 0.2 10^3/UL (ref 0–0.5)
EOSINOPHIL NFR BLD AUTO: 1.7 % (ref 0–3)
GFR SERPL CREATININE-BSD FRML MDRD: 51 ML/MIN/{1.73_M2} (ref 58–?)
GLUCOSE SERPL-MCNC: 203 MG/DL (ref 60–100)
HCT VFR BLD AUTO: 28.3 % (ref 36–47)
HGB BLD-MCNC: 8.1 G/DL (ref 12–15.5)
LYMPHOCYTES # BLD AUTO: 2.3 10^3/UL (ref 1.5–5)
LYMPHOCYTES NFR BLD AUTO: 22.4 % (ref 24–44)
MCH RBC QN AUTO: 20.2 PG (ref 27–33)
MCHC RBC AUTO-ENTMCNC: 28.6 G/DL (ref 32–36.5)
MCV RBC AUTO: 70.6 FL (ref 80–96)
MONOCYTES # BLD AUTO: 0.4 10^3/UL (ref 0–0.8)
MONOCYTES NFR BLD AUTO: 4 % (ref 2–8)
NEUTROPHILS # BLD AUTO: 6.8 10^3/UL (ref 1.5–8.5)
NEUTROPHILS NFR BLD AUTO: 66.7 % (ref 36–66)
PLATELET # BLD AUTO: 613 10^3/UL (ref 150–450)
POTASSIUM SERPL-SCNC: 5.3 MMOL/L (ref 3.5–5.1)
RBC # BLD AUTO: 4.01 10^6/UL (ref 4–5.4)
SODIUM SERPL-SCNC: 131 MMOL/L (ref 136–145)
WBC # BLD AUTO: 10.1 10^3/UL (ref 4–10)

## 2023-08-26 RX ADMIN — INSULIN LISPRO SCH UNITS: 100 INJECTION, SOLUTION INTRAVENOUS; SUBCUTANEOUS at 17:40

## 2023-08-26 RX ADMIN — CEFEPIME HYDROCHLORIDE SCH MLS/HR: 2 INJECTION, POWDER, FOR SOLUTION INTRAVENOUS at 21:24

## 2023-08-26 RX ADMIN — METOCLOPRAMIDE SCH MG: 10 TABLET ORAL at 12:48

## 2023-08-26 RX ADMIN — SODIUM CHLORIDE SCH UNITS: 4.5 INJECTION, SOLUTION INTRAVENOUS at 21:27

## 2023-08-26 RX ADMIN — SODIUM CHLORIDE SCH UNITS: 4.5 INJECTION, SOLUTION INTRAVENOUS at 05:55

## 2023-08-26 RX ADMIN — SODIUM CHLORIDE SCH MLS/HR: 9 INJECTION, SOLUTION INTRAVENOUS at 15:01

## 2023-08-26 RX ADMIN — METOCLOPRAMIDE SCH MG: 10 TABLET ORAL at 21:24

## 2023-08-26 RX ADMIN — INSULIN LISPRO SCH UNITS: 100 INJECTION, SOLUTION INTRAVENOUS; SUBCUTANEOUS at 21:00

## 2023-08-26 RX ADMIN — ONDANSETRON PRN MG: 2 INJECTION INTRAMUSCULAR; INTRAVENOUS at 05:54

## 2023-08-26 RX ADMIN — INSULIN LISPRO SCH UNITS: 100 INJECTION, SOLUTION INTRAVENOUS; SUBCUTANEOUS at 08:15

## 2023-08-26 RX ADMIN — INSULIN LISPRO SCH UNITS: 100 INJECTION, SOLUTION INTRAVENOUS; SUBCUTANEOUS at 12:48

## 2023-08-26 RX ADMIN — CEFEPIME HYDROCHLORIDE SCH MLS/HR: 2 INJECTION, POWDER, FOR SOLUTION INTRAVENOUS at 09:49

## 2023-08-26 RX ADMIN — METOCLOPRAMIDE SCH MG: 10 TABLET ORAL at 08:15

## 2023-08-26 RX ADMIN — METOCLOPRAMIDE SCH MG: 10 TABLET ORAL at 17:40

## 2023-08-26 RX ADMIN — PANTOPRAZOLE SODIUM SCH MG: 40 TABLET, DELAYED RELEASE ORAL at 08:15

## 2023-08-26 RX ADMIN — DEXTROSE MONOHYDRATE SCH MLS/HR: 50 INJECTION, SOLUTION INTRAVENOUS at 12:49

## 2023-08-26 RX ADMIN — SODIUM CHLORIDE SCH UNITS: 4.5 INJECTION, SOLUTION INTRAVENOUS at 15:01

## 2023-08-27 VITALS — DIASTOLIC BLOOD PRESSURE: 93 MMHG | OXYGEN SATURATION: 96 % | SYSTOLIC BLOOD PRESSURE: 158 MMHG | TEMPERATURE: 97.2 F

## 2023-08-27 VITALS — SYSTOLIC BLOOD PRESSURE: 160 MMHG | OXYGEN SATURATION: 99 % | TEMPERATURE: 97.7 F | DIASTOLIC BLOOD PRESSURE: 95 MMHG

## 2023-08-27 VITALS — SYSTOLIC BLOOD PRESSURE: 153 MMHG | OXYGEN SATURATION: 98 % | TEMPERATURE: 98.1 F | DIASTOLIC BLOOD PRESSURE: 93 MMHG

## 2023-08-27 LAB
BASOPHILS # BLD AUTO: 0.1 10^3/UL (ref 0–0.2)
BASOPHILS NFR BLD AUTO: 0.5 % (ref 0–1)
BUN SERPL-MCNC: 23 MG/DL (ref 9–23)
CALCIUM SERPL-MCNC: 8.3 MG/DL (ref 8.5–10.1)
CHLORIDE SERPL-SCNC: 103 MMOL/L (ref 98–107)
CO2 SERPL-SCNC: 26 MMOL/L (ref 20–31)
CREAT SERPL-MCNC: 1.18 MG/DL (ref 0.55–1.3)
CRP SERPL-MCNC: 1.5 MG/DL (ref ?–1)
EOSINOPHIL # BLD AUTO: 0 10^3/UL (ref 0–0.5)
EOSINOPHIL NFR BLD AUTO: 0.2 % (ref 0–3)
ERYTHROCYTE [SEDIMENTATION RATE] IN BLOOD BY WESTERGREN METHOD: 120 MM/HR (ref 0–20)
GFR SERPL CREATININE-BSD FRML MDRD: 54 ML/MIN/{1.73_M2} (ref 58–?)
GLUCOSE SERPL-MCNC: 206 MG/DL (ref 60–100)
HCT VFR BLD AUTO: 26.8 % (ref 36–47)
HGB BLD-MCNC: 7.8 G/DL (ref 12–15.5)
LYMPHOCYTES # BLD AUTO: 1.8 10^3/UL (ref 1.5–5)
LYMPHOCYTES NFR BLD AUTO: 18.7 % (ref 24–44)
MCH RBC QN AUTO: 20.1 PG (ref 27–33)
MCHC RBC AUTO-ENTMCNC: 29.1 G/DL (ref 32–36.5)
MCV RBC AUTO: 68.9 FL (ref 80–96)
MONOCYTES # BLD AUTO: 0.4 10^3/UL (ref 0–0.8)
MONOCYTES NFR BLD AUTO: 3.8 % (ref 2–8)
NEUTROPHILS # BLD AUTO: 7.2 10^3/UL (ref 1.5–8.5)
NEUTROPHILS NFR BLD AUTO: 74.4 % (ref 36–66)
PLATELET # BLD AUTO: 590 10^3/UL (ref 150–450)
POTASSIUM SERPL-SCNC: 5 MMOL/L (ref 3.5–5.1)
RBC # BLD AUTO: 3.89 10^6/UL (ref 4–5.4)
SODIUM SERPL-SCNC: 133 MMOL/L (ref 136–145)
WBC # BLD AUTO: 9.7 10^3/UL (ref 4–10)

## 2023-08-27 RX ADMIN — LINEZOLID SCH MG: 600 TABLET, FILM COATED ORAL at 21:00

## 2023-08-27 RX ADMIN — INSULIN LISPRO SCH UNITS: 100 INJECTION, SOLUTION INTRAVENOUS; SUBCUTANEOUS at 12:48

## 2023-08-27 RX ADMIN — METOCLOPRAMIDE SCH MG: 10 TABLET ORAL at 21:00

## 2023-08-27 RX ADMIN — ONDANSETRON PRN MG: 2 INJECTION INTRAMUSCULAR; INTRAVENOUS at 08:10

## 2023-08-27 RX ADMIN — INSULIN LISPRO SCH UNITS: 100 INJECTION, SOLUTION INTRAVENOUS; SUBCUTANEOUS at 17:18

## 2023-08-27 RX ADMIN — SODIUM CHLORIDE SCH UNITS: 4.5 INJECTION, SOLUTION INTRAVENOUS at 06:16

## 2023-08-27 RX ADMIN — PANTOPRAZOLE SODIUM SCH MG: 40 TABLET, DELAYED RELEASE ORAL at 08:02

## 2023-08-27 RX ADMIN — METOCLOPRAMIDE SCH MG: 10 TABLET ORAL at 08:02

## 2023-08-27 RX ADMIN — METOCLOPRAMIDE SCH MG: 10 TABLET ORAL at 17:18

## 2023-08-27 RX ADMIN — METOCLOPRAMIDE SCH MG: 10 TABLET ORAL at 12:48

## 2023-08-27 RX ADMIN — INSULIN LISPRO SCH UNITS: 100 INJECTION, SOLUTION INTRAVENOUS; SUBCUTANEOUS at 20:53

## 2023-08-27 RX ADMIN — SODIUM CHLORIDE SCH UNITS: 4.5 INJECTION, SOLUTION INTRAVENOUS at 12:48

## 2023-08-27 RX ADMIN — SODIUM CHLORIDE SCH UNITS: 4.5 INJECTION, SOLUTION INTRAVENOUS at 21:00

## 2023-08-27 RX ADMIN — LINEZOLID SCH MG: 600 TABLET, FILM COATED ORAL at 08:02

## 2023-08-27 RX ADMIN — INSULIN LISPRO SCH UNITS: 100 INJECTION, SOLUTION INTRAVENOUS; SUBCUTANEOUS at 08:02

## 2023-08-28 VITALS — OXYGEN SATURATION: 98 % | TEMPERATURE: 97.7 F | SYSTOLIC BLOOD PRESSURE: 129 MMHG | DIASTOLIC BLOOD PRESSURE: 86 MMHG

## 2023-08-28 VITALS — OXYGEN SATURATION: 98 % | DIASTOLIC BLOOD PRESSURE: 86 MMHG | TEMPERATURE: 97.5 F | SYSTOLIC BLOOD PRESSURE: 134 MMHG

## 2023-08-28 VITALS — DIASTOLIC BLOOD PRESSURE: 62 MMHG | SYSTOLIC BLOOD PRESSURE: 132 MMHG

## 2023-08-28 LAB
BASOPHILS # BLD AUTO: 0.1 10^3/UL (ref 0–0.2)
BASOPHILS NFR BLD AUTO: 0.7 % (ref 0–1)
BUN SERPL-MCNC: 20 MG/DL (ref 9–23)
CALCIUM SERPL-MCNC: 7.9 MG/DL (ref 8.5–10.1)
CHLORIDE SERPL-SCNC: 102 MMOL/L (ref 98–107)
CO2 SERPL-SCNC: 25 MMOL/L (ref 20–31)
CREAT SERPL-MCNC: 1.16 MG/DL (ref 0.55–1.3)
CRP SERPL-MCNC: 1 MG/DL (ref ?–1)
EOSINOPHIL # BLD AUTO: 0 10^3/UL (ref 0–0.5)
EOSINOPHIL NFR BLD AUTO: 0.1 % (ref 0–3)
ERYTHROCYTE [SEDIMENTATION RATE] IN BLOOD BY WESTERGREN METHOD: 118 MM/HR (ref 0–20)
GFR SERPL CREATININE-BSD FRML MDRD: 55.1 ML/MIN/{1.73_M2} (ref 58–?)
GLUCOSE SERPL-MCNC: 230 MG/DL (ref 60–100)
HCT VFR BLD AUTO: 26.6 % (ref 36–47)
HGB BLD-MCNC: 7.8 G/DL (ref 12–15.5)
LYMPHOCYTES # BLD AUTO: 1.8 10^3/UL (ref 1.5–5)
LYMPHOCYTES NFR BLD AUTO: 17.8 % (ref 24–44)
MCH RBC QN AUTO: 20.4 PG (ref 27–33)
MCHC RBC AUTO-ENTMCNC: 29.3 G/DL (ref 32–36.5)
MCV RBC AUTO: 69.6 FL (ref 80–96)
MONOCYTES # BLD AUTO: 0.5 10^3/UL (ref 0–0.8)
MONOCYTES NFR BLD AUTO: 4.8 % (ref 2–8)
NEUTROPHILS # BLD AUTO: 7.6 10^3/UL (ref 1.5–8.5)
NEUTROPHILS NFR BLD AUTO: 74.7 % (ref 36–66)
PLATELET # BLD AUTO: 543 10^3/UL (ref 150–450)
POTASSIUM SERPL-SCNC: 4.9 MMOL/L (ref 3.5–5.1)
RBC # BLD AUTO: 3.82 10^6/UL (ref 4–5.4)
SODIUM SERPL-SCNC: 135 MMOL/L (ref 136–145)
WBC # BLD AUTO: 10.2 10^3/UL (ref 4–10)

## 2023-08-28 RX ADMIN — METOCLOPRAMIDE SCH MG: 10 TABLET ORAL at 16:48

## 2023-08-28 RX ADMIN — METOCLOPRAMIDE SCH MG: 10 TABLET ORAL at 11:49

## 2023-08-28 RX ADMIN — METOCLOPRAMIDE SCH MG: 10 TABLET ORAL at 21:36

## 2023-08-28 RX ADMIN — SODIUM CHLORIDE SCH UNITS: 4.5 INJECTION, SOLUTION INTRAVENOUS at 22:44

## 2023-08-28 RX ADMIN — LINEZOLID SCH MG: 600 TABLET, FILM COATED ORAL at 21:36

## 2023-08-28 RX ADMIN — LINEZOLID SCH MG: 600 TABLET, FILM COATED ORAL at 08:11

## 2023-08-28 RX ADMIN — ONDANSETRON PRN MG: 2 INJECTION INTRAMUSCULAR; INTRAVENOUS at 06:14

## 2023-08-28 RX ADMIN — INSULIN LISPRO SCH UNITS: 100 INJECTION, SOLUTION INTRAVENOUS; SUBCUTANEOUS at 11:50

## 2023-08-28 RX ADMIN — ONDANSETRON PRN MG: 2 INJECTION INTRAMUSCULAR; INTRAVENOUS at 21:37

## 2023-08-28 RX ADMIN — INSULIN LISPRO SCH UNITS: 100 INJECTION, SOLUTION INTRAVENOUS; SUBCUTANEOUS at 16:48

## 2023-08-28 RX ADMIN — METOCLOPRAMIDE SCH MG: 10 TABLET ORAL at 08:11

## 2023-08-28 RX ADMIN — SODIUM CHLORIDE SCH UNITS: 4.5 INJECTION, SOLUTION INTRAVENOUS at 13:51

## 2023-08-28 RX ADMIN — INSULIN LISPRO SCH UNITS: 100 INJECTION, SOLUTION INTRAVENOUS; SUBCUTANEOUS at 08:12

## 2023-08-28 RX ADMIN — INSULIN LISPRO SCH UNITS: 100 INJECTION, SOLUTION INTRAVENOUS; SUBCUTANEOUS at 21:00

## 2023-08-28 RX ADMIN — PANTOPRAZOLE SODIUM SCH MG: 40 TABLET, DELAYED RELEASE ORAL at 08:11

## 2023-08-28 RX ADMIN — SODIUM CHLORIDE SCH UNITS: 4.5 INJECTION, SOLUTION INTRAVENOUS at 06:00

## 2023-08-28 RX ADMIN — ONDANSETRON PRN MG: 2 INJECTION INTRAMUSCULAR; INTRAVENOUS at 11:54

## 2023-08-29 VITALS — SYSTOLIC BLOOD PRESSURE: 111 MMHG | TEMPERATURE: 98.1 F | DIASTOLIC BLOOD PRESSURE: 65 MMHG | OXYGEN SATURATION: 99 %

## 2023-08-29 VITALS — SYSTOLIC BLOOD PRESSURE: 136 MMHG | DIASTOLIC BLOOD PRESSURE: 88 MMHG

## 2023-08-29 VITALS — SYSTOLIC BLOOD PRESSURE: 126 MMHG | DIASTOLIC BLOOD PRESSURE: 79 MMHG | OXYGEN SATURATION: 98 % | TEMPERATURE: 97.9 F

## 2023-08-29 VITALS — OXYGEN SATURATION: 97 % | SYSTOLIC BLOOD PRESSURE: 136 MMHG | TEMPERATURE: 97.2 F | DIASTOLIC BLOOD PRESSURE: 88 MMHG

## 2023-08-29 LAB
BASOPHILS # BLD AUTO: 0.1 10^3/UL (ref 0–0.2)
BASOPHILS NFR BLD AUTO: 1 % (ref 0–1)
BUN SERPL-MCNC: 25 MG/DL (ref 9–23)
CALCIUM SERPL-MCNC: 7.9 MG/DL (ref 8.5–10.1)
CHLORIDE SERPL-SCNC: 101 MMOL/L (ref 98–107)
CO2 SERPL-SCNC: 24 MMOL/L (ref 20–31)
CREAT SERPL-MCNC: 1.28 MG/DL (ref 0.55–1.3)
EOSINOPHIL # BLD AUTO: 0 10^3/UL (ref 0–0.5)
EOSINOPHIL NFR BLD AUTO: 0.1 % (ref 0–3)
GFR SERPL CREATININE-BSD FRML MDRD: 49.2 ML/MIN/{1.73_M2} (ref 58–?)
GLUCOSE SERPL-MCNC: 219 MG/DL (ref 60–100)
HCT VFR BLD AUTO: 27.3 % (ref 36–47)
HGB BLD-MCNC: 7.8 G/DL (ref 12–15.5)
LYMPHOCYTES # BLD AUTO: 2.4 10^3/UL (ref 1.5–5)
LYMPHOCYTES NFR BLD AUTO: 21.4 % (ref 24–44)
MCH RBC QN AUTO: 20.1 PG (ref 27–33)
MCHC RBC AUTO-ENTMCNC: 28.6 G/DL (ref 32–36.5)
MCV RBC AUTO: 70.2 FL (ref 80–96)
MONOCYTES # BLD AUTO: 0.5 10^3/UL (ref 0–0.8)
MONOCYTES NFR BLD AUTO: 4.3 % (ref 2–8)
NEUTROPHILS # BLD AUTO: 8.2 10^3/UL (ref 1.5–8.5)
NEUTROPHILS NFR BLD AUTO: 71.6 % (ref 36–66)
PLATELET # BLD AUTO: 572 10^3/UL (ref 150–450)
POTASSIUM SERPL-SCNC: 5.3 MMOL/L (ref 3.5–5.1)
RBC # BLD AUTO: 3.89 10^6/UL (ref 4–5.4)
SODIUM SERPL-SCNC: 133 MMOL/L (ref 136–145)
WBC # BLD AUTO: 11.4 10^3/UL (ref 4–10)

## 2023-08-29 RX ADMIN — METOCLOPRAMIDE SCH MG: 10 TABLET ORAL at 08:19

## 2023-08-29 RX ADMIN — METOCLOPRAMIDE SCH MG: 10 TABLET ORAL at 12:00

## 2023-08-29 RX ADMIN — SODIUM CHLORIDE SCH UNITS: 4.5 INJECTION, SOLUTION INTRAVENOUS at 14:07

## 2023-08-29 RX ADMIN — SODIUM CHLORIDE SCH UNITS: 4.5 INJECTION, SOLUTION INTRAVENOUS at 21:55

## 2023-08-29 RX ADMIN — INSULIN LISPRO SCH UNITS: 100 INJECTION, SOLUTION INTRAVENOUS; SUBCUTANEOUS at 12:00

## 2023-08-29 RX ADMIN — METOCLOPRAMIDE SCH MG: 10 TABLET ORAL at 17:44

## 2023-08-29 RX ADMIN — INSULIN LISPRO SCH UNITS: 100 INJECTION, SOLUTION INTRAVENOUS; SUBCUTANEOUS at 08:21

## 2023-08-29 RX ADMIN — ONDANSETRON PRN MG: 2 INJECTION INTRAMUSCULAR; INTRAVENOUS at 14:07

## 2023-08-29 RX ADMIN — ONDANSETRON PRN MG: 2 INJECTION INTRAMUSCULAR; INTRAVENOUS at 20:38

## 2023-08-29 RX ADMIN — PANTOPRAZOLE SODIUM SCH MG: 40 TABLET, DELAYED RELEASE ORAL at 08:20

## 2023-08-29 RX ADMIN — SODIUM CHLORIDE SCH UNITS: 4.5 INJECTION, SOLUTION INTRAVENOUS at 05:41

## 2023-08-29 RX ADMIN — LINEZOLID SCH MG: 600 TABLET, FILM COATED ORAL at 20:38

## 2023-08-29 RX ADMIN — ONDANSETRON PRN MG: 2 INJECTION INTRAMUSCULAR; INTRAVENOUS at 08:28

## 2023-08-29 RX ADMIN — INSULIN LISPRO SCH UNITS: 100 INJECTION, SOLUTION INTRAVENOUS; SUBCUTANEOUS at 21:00

## 2023-08-29 RX ADMIN — LINEZOLID SCH MG: 600 TABLET, FILM COATED ORAL at 08:19

## 2023-08-29 RX ADMIN — INSULIN LISPRO SCH UNITS: 100 INJECTION, SOLUTION INTRAVENOUS; SUBCUTANEOUS at 17:44

## 2023-08-29 RX ADMIN — METOCLOPRAMIDE SCH MG: 10 TABLET ORAL at 20:38

## 2023-08-30 VITALS — OXYGEN SATURATION: 98 % | SYSTOLIC BLOOD PRESSURE: 140 MMHG | TEMPERATURE: 97.5 F | DIASTOLIC BLOOD PRESSURE: 85 MMHG

## 2023-08-30 VITALS — TEMPERATURE: 97.5 F | DIASTOLIC BLOOD PRESSURE: 81 MMHG | SYSTOLIC BLOOD PRESSURE: 136 MMHG | OXYGEN SATURATION: 99 %

## 2023-08-30 LAB
BASOPHILS # BLD AUTO: 0.1 10^3/UL (ref 0–0.2)
BASOPHILS NFR BLD AUTO: 1.1 % (ref 0–1)
BUN SERPL-MCNC: 29 MG/DL (ref 9–23)
CALCIUM SERPL-MCNC: 8.3 MG/DL (ref 8.5–10.1)
CHLORIDE SERPL-SCNC: 103 MMOL/L (ref 98–107)
CO2 SERPL-SCNC: 21 MMOL/L (ref 20–31)
CREAT SERPL-MCNC: 1.38 MG/DL (ref 0.55–1.3)
EOSINOPHIL # BLD AUTO: 0 10^3/UL (ref 0–0.5)
EOSINOPHIL NFR BLD AUTO: 0.1 % (ref 0–3)
GFR SERPL CREATININE-BSD FRML MDRD: 45.1 ML/MIN/{1.73_M2} (ref 58–?)
GLUCOSE SERPL-MCNC: 180 MG/DL (ref 60–100)
HCT VFR BLD AUTO: 28.9 % (ref 36–47)
HGB BLD-MCNC: 8.2 G/DL (ref 12–15.5)
LYMPHOCYTES # BLD AUTO: 2.4 10^3/UL (ref 1.5–5)
LYMPHOCYTES NFR BLD AUTO: 26.5 % (ref 24–44)
MCH RBC QN AUTO: 19.9 PG (ref 27–33)
MCHC RBC AUTO-ENTMCNC: 28.4 G/DL (ref 32–36.5)
MCV RBC AUTO: 70.1 FL (ref 80–96)
MONOCYTES # BLD AUTO: 0.5 10^3/UL (ref 0–0.8)
MONOCYTES NFR BLD AUTO: 5 % (ref 2–8)
NEUTROPHILS # BLD AUTO: 5.9 10^3/UL (ref 1.5–8.5)
NEUTROPHILS NFR BLD AUTO: 66.2 % (ref 36–66)
PLATELET # BLD AUTO: 599 10^3/UL (ref 150–450)
POTASSIUM SERPL-SCNC: 5.8 MMOL/L (ref 3.5–5.1)
RBC # BLD AUTO: 4.12 10^6/UL (ref 4–5.4)
SODIUM SERPL-SCNC: 134 MMOL/L (ref 136–145)
WBC # BLD AUTO: 9 10^3/UL (ref 4–10)

## 2023-08-30 RX ADMIN — PANTOPRAZOLE SODIUM SCH MG: 40 TABLET, DELAYED RELEASE ORAL at 09:00

## 2023-08-30 RX ADMIN — SODIUM CHLORIDE SCH UNITS: 4.5 INJECTION, SOLUTION INTRAVENOUS at 05:04

## 2023-08-30 RX ADMIN — METOCLOPRAMIDE SCH MG: 10 TABLET ORAL at 09:46

## 2023-08-30 RX ADMIN — LINEZOLID SCH MG: 600 TABLET, FILM COATED ORAL at 09:48

## 2023-08-30 RX ADMIN — INSULIN LISPRO SCH UNITS: 100 INJECTION, SOLUTION INTRAVENOUS; SUBCUTANEOUS at 07:49

## 2023-08-30 RX ADMIN — INSULIN LISPRO SCH UNITS: 100 INJECTION, SOLUTION INTRAVENOUS; SUBCUTANEOUS at 13:07

## 2023-08-30 RX ADMIN — METOCLOPRAMIDE SCH MG: 10 TABLET ORAL at 13:06

## 2023-08-30 RX ADMIN — ONDANSETRON PRN MG: 2 INJECTION INTRAMUSCULAR; INTRAVENOUS at 07:49

## 2025-07-14 ENCOUNTER — HOSPITAL ENCOUNTER (INPATIENT)
Dept: HOSPITAL 53 - M ED | Age: 45
LOS: 3 days | Discharge: HOME | DRG: 194 | End: 2025-07-17
Attending: INTERNAL MEDICINE | Admitting: INTERNAL MEDICINE
Payer: MEDICAID

## 2025-07-14 VITALS — TEMPERATURE: 97.8 F | DIASTOLIC BLOOD PRESSURE: 74 MMHG | OXYGEN SATURATION: 98 % | SYSTOLIC BLOOD PRESSURE: 155 MMHG

## 2025-07-14 VITALS — DIASTOLIC BLOOD PRESSURE: 79 MMHG | OXYGEN SATURATION: 98 % | TEMPERATURE: 97.6 F | SYSTOLIC BLOOD PRESSURE: 166 MMHG

## 2025-07-14 VITALS — WEIGHT: 248.24 LBS | HEIGHT: 65 IN | BODY MASS INDEX: 41.36 KG/M2

## 2025-07-14 DIAGNOSIS — Z79.899: ICD-10-CM

## 2025-07-14 DIAGNOSIS — Z89.512: ICD-10-CM

## 2025-07-14 DIAGNOSIS — D63.1: ICD-10-CM

## 2025-07-14 DIAGNOSIS — D50.9: ICD-10-CM

## 2025-07-14 DIAGNOSIS — I50.33: ICD-10-CM

## 2025-07-14 DIAGNOSIS — E10.51: ICD-10-CM

## 2025-07-14 DIAGNOSIS — E10.43: ICD-10-CM

## 2025-07-14 DIAGNOSIS — E87.20: ICD-10-CM

## 2025-07-14 DIAGNOSIS — Z79.4: ICD-10-CM

## 2025-07-14 DIAGNOSIS — E78.5: ICD-10-CM

## 2025-07-14 DIAGNOSIS — Z91.041: ICD-10-CM

## 2025-07-14 DIAGNOSIS — E87.70: ICD-10-CM

## 2025-07-14 DIAGNOSIS — E87.5: ICD-10-CM

## 2025-07-14 DIAGNOSIS — Z89.421: ICD-10-CM

## 2025-07-14 DIAGNOSIS — J45.909: ICD-10-CM

## 2025-07-14 DIAGNOSIS — K21.9: ICD-10-CM

## 2025-07-14 DIAGNOSIS — F32.A: ICD-10-CM

## 2025-07-14 DIAGNOSIS — Z79.82: ICD-10-CM

## 2025-07-14 DIAGNOSIS — I25.10: ICD-10-CM

## 2025-07-14 DIAGNOSIS — N17.9: ICD-10-CM

## 2025-07-14 DIAGNOSIS — N18.5: ICD-10-CM

## 2025-07-14 DIAGNOSIS — I13.2: Primary | ICD-10-CM

## 2025-07-14 DIAGNOSIS — E66.9: ICD-10-CM

## 2025-07-14 DIAGNOSIS — F41.9: ICD-10-CM

## 2025-07-14 LAB
ALBUMIN SERPL BCG-MCNC: 2.7 G/DL (ref 3.2–5.2)
ALP SERPL-CCNC: 183 U/L (ref 35–104)
ALT SERPL W P-5'-P-CCNC: 19 U/L (ref 7–40)
AST SERPL-CCNC: 13 U/L (ref ?–34)
BASOPHILS # BLD AUTO: 0 10^3/UL (ref 0–0.2)
BASOPHILS NFR BLD AUTO: 0.7 % (ref 0–1)
BILIRUB CONJ SERPL-MCNC: < 0.1 MG/DL (ref ?–0.4)
BILIRUB SERPL-MCNC: 0.2 MG/DL (ref 0.3–1.2)
BUN SERPL-MCNC: 76 MG/DL (ref 9–23)
CALCIUM SERPL-MCNC: 7.6 MG/DL (ref 8.5–10.1)
CHLORIDE SERPL-SCNC: 114 MMOL/L (ref 98–107)
CK MB CFR.DF SERPL CALC: 9.72
CK MB SERPL-MCNC: 10.7 NG/ML (ref ?–3.6)
CK SERPL-CCNC: 110 U/L (ref 34–145)
CO2 SERPL-SCNC: 14 MMOL/L (ref 20–31)
CREAT SERPL-MCNC: 4.47 MG/DL (ref 0.55–1.3)
EOSINOPHIL # BLD AUTO: 0.1 10^3/UL (ref 0–0.5)
EOSINOPHIL NFR BLD AUTO: 2 % (ref 0–3)
GFR SERPL CREATININE-BSD FRML MDRD: 12 ML/MIN/{1.73_M2} (ref 58–?)
GLUCOSE SERPL-MCNC: 117 MG/DL (ref 60–100)
HCT VFR BLD AUTO: 29.4 % (ref 36–47)
HGB BLD-MCNC: 8.7 G/DL (ref 12–15.5)
INR PPP: 1.12
LYMPHOCYTES # BLD AUTO: 0.8 10^3/UL (ref 1.5–5)
LYMPHOCYTES NFR BLD AUTO: 13.1 % (ref 24–44)
MCH RBC QN AUTO: 26.6 PG (ref 27–33)
MCHC RBC AUTO-ENTMCNC: 29.6 G/DL (ref 32–36.5)
MCV RBC AUTO: 89.9 FL (ref 80–96)
MONOCYTES # BLD AUTO: 0.3 10^3/UL (ref 0–0.8)
MONOCYTES NFR BLD AUTO: 4.9 % (ref 2–8)
NEUTROPHILS # BLD AUTO: 4.8 10^3/UL (ref 1.5–8.5)
NEUTROPHILS NFR BLD AUTO: 79 % (ref 36–66)
PLATELET # BLD AUTO: 288 10^3/UL (ref 150–450)
POTASSIUM SERPL-SCNC: 5.5 MMOL/L (ref 3.5–5.1)
PROT SERPL-MCNC: 5.6 G/DL (ref 5.7–8.2)
PROTHROMBIN TIME: 14.7 SECONDS (ref 12.5–14.5)
RBC # BLD AUTO: 3.27 10^6/UL (ref 4–5.4)
SODIUM SERPL-SCNC: 141 MMOL/L (ref 136–145)
T4 SERPL-MCNC: 4.3 UG/DL (ref 4.5–10.9)
TSH SERPL DL<=0.005 MIU/L-ACNC: 3.56 UIU/ML (ref 0.55–4.78)
WBC # BLD AUTO: 6.1 10^3/UL (ref 4–10)

## 2025-07-14 RX ADMIN — FUROSEMIDE ONE MG: 10 INJECTION, SOLUTION INTRAVENOUS at 17:59

## 2025-07-14 RX ADMIN — INSULIN GLARGINE SCH UNITS: 100 INJECTION, SOLUTION SUBCUTANEOUS at 21:00

## 2025-07-15 VITALS — SYSTOLIC BLOOD PRESSURE: 155 MMHG | TEMPERATURE: 97.5 F | OXYGEN SATURATION: 94 % | DIASTOLIC BLOOD PRESSURE: 74 MMHG

## 2025-07-15 VITALS — OXYGEN SATURATION: 97 % | TEMPERATURE: 97.7 F | SYSTOLIC BLOOD PRESSURE: 138 MMHG | DIASTOLIC BLOOD PRESSURE: 80 MMHG

## 2025-07-15 VITALS — TEMPERATURE: 98.7 F | SYSTOLIC BLOOD PRESSURE: 152 MMHG | DIASTOLIC BLOOD PRESSURE: 68 MMHG | OXYGEN SATURATION: 98 %

## 2025-07-15 VITALS — TEMPERATURE: 98 F | OXYGEN SATURATION: 99 %

## 2025-07-15 VITALS — OXYGEN SATURATION: 99 % | DIASTOLIC BLOOD PRESSURE: 83 MMHG | SYSTOLIC BLOOD PRESSURE: 184 MMHG | TEMPERATURE: 98.2 F

## 2025-07-15 VITALS — DIASTOLIC BLOOD PRESSURE: 79 MMHG | SYSTOLIC BLOOD PRESSURE: 154 MMHG | OXYGEN SATURATION: 98 % | TEMPERATURE: 97.6 F

## 2025-07-15 VITALS — SYSTOLIC BLOOD PRESSURE: 140 MMHG | DIASTOLIC BLOOD PRESSURE: 69 MMHG

## 2025-07-15 VITALS — SYSTOLIC BLOOD PRESSURE: 183 MMHG | DIASTOLIC BLOOD PRESSURE: 85 MMHG

## 2025-07-15 LAB
BASOPHILS # BLD AUTO: 0.1 10^3/UL (ref 0–0.2)
BASOPHILS NFR BLD AUTO: 1.2 % (ref 0–1)
BUN SERPL-MCNC: 78 MG/DL (ref 9–23)
CALCIUM SERPL-MCNC: 7.6 MG/DL (ref 8.5–10.1)
CHLORIDE SERPL-SCNC: 114 MMOL/L (ref 98–107)
CO2 SERPL-SCNC: 13 MMOL/L (ref 20–31)
CREAT SERPL-MCNC: 4.62 MG/DL (ref 0.55–1.3)
EOSINOPHIL # BLD AUTO: 0.2 10^3/UL (ref 0–0.5)
EOSINOPHIL NFR BLD AUTO: 3.6 % (ref 0–3)
GFR SERPL CREATININE-BSD FRML MDRD: 11.5 ML/MIN/{1.73_M2} (ref 58–?)
GLUCOSE SERPL-MCNC: 127 MG/DL (ref 60–100)
HBV CORE IGM SER QL: NEGATIVE
HBV SURFACE AB SER QL: NEGATIVE
HBV SURFACE AG SER-ACNC: NEGATIVE [IU]/L
HCT VFR BLD AUTO: 30.2 % (ref 36–47)
HCV AB SER QL: < 0.02 INDEX (ref ?–0.8)
HGB BLD-MCNC: 8.7 G/DL (ref 12–15.5)
IRON SATN MFR SERPL: 8.8 % (ref 13.2–45)
IRON SERPL-MCNC: 24 UG/DL (ref 50–170)
LYMPHOCYTES # BLD AUTO: 1 10^3/UL (ref 1.5–5)
LYMPHOCYTES NFR BLD AUTO: 20.2 % (ref 24–44)
MCH RBC QN AUTO: 26.2 PG (ref 27–33)
MCHC RBC AUTO-ENTMCNC: 28.8 G/DL (ref 32–36.5)
MCV RBC AUTO: 91 FL (ref 80–96)
MONOCYTES # BLD AUTO: 0.3 10^3/UL (ref 0–0.8)
MONOCYTES NFR BLD AUTO: 5.3 % (ref 2–8)
NEUTROPHILS # BLD AUTO: 3.5 10^3/UL (ref 1.5–8.5)
NEUTROPHILS NFR BLD AUTO: 69.3 % (ref 36–66)
PLATELET # BLD AUTO: 296 10^3/UL (ref 150–450)
POTASSIUM SERPL-SCNC: 4.8 MMOL/L (ref 3.5–5.1)
PTH-INTACT SERPL-MCNC: 453.7 PG/ML (ref 18.5–88)
RBC # BLD AUTO: 3.32 10^6/UL (ref 4–5.4)
SODIUM SERPL-SCNC: 142 MMOL/L (ref 136–145)
TIBC SERPL-MCNC: 272 UG/DL (ref 250–425)
WBC # BLD AUTO: 5.1 10^3/UL (ref 4–10)

## 2025-07-15 PROCEDURE — 02HV33Z INSERTION OF INFUSION DEVICE INTO SUPERIOR VENA CAVA, PERCUTANEOUS APPROACH: ICD-10-PCS | Performed by: RADIOLOGY

## 2025-07-15 PROCEDURE — 0JH63XZ INSERTION OF TUNNELED VASCULAR ACCESS DEVICE INTO CHEST SUBCUTANEOUS TISSUE AND FASCIA, PERCUTANEOUS APPROACH: ICD-10-PCS | Performed by: RADIOLOGY

## 2025-07-15 RX ADMIN — INSULIN LISPRO SCH UNITS: 100 INJECTION, SOLUTION INTRAVENOUS; SUBCUTANEOUS at 08:03

## 2025-07-15 RX ADMIN — ASPIRIN SCH MG: 81 TABLET ORAL at 08:03

## 2025-07-15 RX ADMIN — CARVEDILOL SCH MG: 12.5 TABLET, FILM COATED ORAL at 00:21

## 2025-07-15 RX ADMIN — SIMVASTATIN SCH MG: 10 TABLET, FILM COATED ORAL at 00:22

## 2025-07-15 RX ADMIN — AMLODIPINE BESYLATE SCH MG: 5 TABLET ORAL at 20:27

## 2025-07-15 RX ADMIN — FUROSEMIDE SCH MG: 10 INJECTION, SOLUTION INTRAVENOUS at 00:22

## 2025-07-15 RX ADMIN — CEFAZOLIN ONE MLS/HR: 2 INJECTION, POWDER, LYOPHILIZED, FOR SOLUTION INTRAVENOUS at 13:20

## 2025-07-15 RX ADMIN — CLONIDINE HYDROCHLORIDE ONE MG: 0.2 TABLET ORAL at 21:00

## 2025-07-15 RX ADMIN — ESCITALOPRAM SCH MG: 10 TABLET, FILM COATED ORAL at 08:06

## 2025-07-15 RX ADMIN — PANTOPRAZOLE SODIUM SCH MG: 40 TABLET, DELAYED RELEASE ORAL at 08:03

## 2025-07-15 RX ADMIN — EZETIMIBE SCH MG: 10 TABLET ORAL at 00:23

## 2025-07-16 VITALS — SYSTOLIC BLOOD PRESSURE: 159 MMHG | TEMPERATURE: 97.8 F | OXYGEN SATURATION: 98 % | DIASTOLIC BLOOD PRESSURE: 73 MMHG

## 2025-07-16 VITALS — OXYGEN SATURATION: 97 % | TEMPERATURE: 98.1 F | DIASTOLIC BLOOD PRESSURE: 71 MMHG | SYSTOLIC BLOOD PRESSURE: 154 MMHG

## 2025-07-16 VITALS — SYSTOLIC BLOOD PRESSURE: 175 MMHG | DIASTOLIC BLOOD PRESSURE: 75 MMHG

## 2025-07-16 VITALS — DIASTOLIC BLOOD PRESSURE: 79 MMHG | OXYGEN SATURATION: 99 % | TEMPERATURE: 98.1 F | SYSTOLIC BLOOD PRESSURE: 171 MMHG

## 2025-07-16 VITALS — SYSTOLIC BLOOD PRESSURE: 154 MMHG | OXYGEN SATURATION: 97 % | DIASTOLIC BLOOD PRESSURE: 76 MMHG | TEMPERATURE: 98.4 F

## 2025-07-16 VITALS — OXYGEN SATURATION: 97 %

## 2025-07-16 VITALS — DIASTOLIC BLOOD PRESSURE: 75 MMHG | SYSTOLIC BLOOD PRESSURE: 175 MMHG

## 2025-07-16 LAB
BASOPHILS # BLD AUTO: 0.1 10^3/UL (ref 0–0.2)
BASOPHILS NFR BLD AUTO: 0.9 % (ref 0–1)
BUN SERPL-MCNC: 49 MG/DL (ref 9–23)
CALCIUM SERPL-MCNC: 7.6 MG/DL (ref 8.5–10.1)
CHLORIDE SERPL-SCNC: 110 MMOL/L (ref 98–107)
CO2 SERPL-SCNC: 20 MMOL/L (ref 20–31)
CREAT SERPL-MCNC: 3.39 MG/DL (ref 0.55–1.3)
EOSINOPHIL # BLD AUTO: 0.2 10^3/UL (ref 0–0.5)
EOSINOPHIL NFR BLD AUTO: 3.3 % (ref 0–3)
GFR SERPL CREATININE-BSD FRML MDRD: 16.7 ML/MIN/{1.73_M2} (ref 58–?)
GLUCOSE SERPL-MCNC: 107 MG/DL (ref 60–100)
HCT VFR BLD AUTO: 27.5 % (ref 36–47)
HGB BLD-MCNC: 8.5 G/DL (ref 12–15.5)
LYMPHOCYTES # BLD AUTO: 1 10^3/UL (ref 1.5–5)
LYMPHOCYTES NFR BLD AUTO: 17.6 % (ref 24–44)
MCH RBC QN AUTO: 27 PG (ref 27–33)
MCHC RBC AUTO-ENTMCNC: 30.9 G/DL (ref 32–36.5)
MCV RBC AUTO: 87.3 FL (ref 80–96)
MONOCYTES # BLD AUTO: 0.3 10^3/UL (ref 0–0.8)
MONOCYTES NFR BLD AUTO: 6.3 % (ref 2–8)
NEUTROPHILS # BLD AUTO: 3.9 10^3/UL (ref 1.5–8.5)
NEUTROPHILS NFR BLD AUTO: 71.5 % (ref 36–66)
PLATELET # BLD AUTO: 314 10^3/UL (ref 150–450)
POTASSIUM SERPL-SCNC: 4 MMOL/L (ref 3.5–5.1)
RBC # BLD AUTO: 3.15 10^6/UL (ref 4–5.4)
SODIUM SERPL-SCNC: 143 MMOL/L (ref 136–145)
WBC # BLD AUTO: 5.4 10^3/UL (ref 4–10)

## 2025-07-16 RX ADMIN — CLONIDINE HYDROCHLORIDE ONE MG: 0.1 TABLET ORAL at 18:20

## 2025-07-16 RX ADMIN — LISINOPRIL SCH MG: 20 TABLET ORAL at 21:50

## 2025-07-17 VITALS — TEMPERATURE: 98.2 F | SYSTOLIC BLOOD PRESSURE: 152 MMHG | DIASTOLIC BLOOD PRESSURE: 72 MMHG | OXYGEN SATURATION: 97 %

## 2025-07-17 VITALS — SYSTOLIC BLOOD PRESSURE: 131 MMHG | DIASTOLIC BLOOD PRESSURE: 63 MMHG | OXYGEN SATURATION: 100 % | TEMPERATURE: 96.6 F

## 2025-07-17 VITALS — TEMPERATURE: 96.6 F | OXYGEN SATURATION: 100 % | DIASTOLIC BLOOD PRESSURE: 63 MMHG | SYSTOLIC BLOOD PRESSURE: 131 MMHG

## 2025-07-17 LAB
BASOPHILS # BLD AUTO: 0 10^3/UL (ref 0–0.2)
BASOPHILS NFR BLD AUTO: 0.8 % (ref 0–1)
BUN SERPL-MCNC: 36 MG/DL (ref 9–23)
CALCIUM SERPL-MCNC: 7.4 MG/DL (ref 8.5–10.1)
CHLORIDE SERPL-SCNC: 108 MMOL/L (ref 98–107)
CO2 SERPL-SCNC: 20 MMOL/L (ref 20–31)
CREAT SERPL-MCNC: 2.86 MG/DL (ref 0.55–1.3)
EOSINOPHIL # BLD AUTO: 0.2 10^3/UL (ref 0–0.5)
EOSINOPHIL NFR BLD AUTO: 4.4 % (ref 0–3)
GFR SERPL CREATININE-BSD FRML MDRD: 20.4 ML/MIN/{1.73_M2} (ref 58–?)
GLUCOSE SERPL-MCNC: 161 MG/DL (ref 60–100)
HCT VFR BLD AUTO: 28.4 % (ref 36–47)
HGB BLD-MCNC: 8.6 G/DL (ref 12–15.5)
LYMPHOCYTES # BLD AUTO: 0.8 10^3/UL (ref 1.5–5)
LYMPHOCYTES NFR BLD AUTO: 15.9 % (ref 24–44)
MCH RBC QN AUTO: 26.9 PG (ref 27–33)
MCHC RBC AUTO-ENTMCNC: 30.3 G/DL (ref 32–36.5)
MCV RBC AUTO: 88.8 FL (ref 80–96)
MONOCYTES # BLD AUTO: 0.5 10^3/UL (ref 0–0.8)
MONOCYTES NFR BLD AUTO: 8.6 % (ref 2–8)
NEUTROPHILS # BLD AUTO: 3.6 10^3/UL (ref 1.5–8.5)
NEUTROPHILS NFR BLD AUTO: 69.5 % (ref 36–66)
PLATELET # BLD AUTO: 295 10^3/UL (ref 150–450)
POTASSIUM SERPL-SCNC: 4.2 MMOL/L (ref 3.5–5.1)
RBC # BLD AUTO: 3.2 10^6/UL (ref 4–5.4)
SODIUM SERPL-SCNC: 142 MMOL/L (ref 136–145)
WBC # BLD AUTO: 5.2 10^3/UL (ref 4–10)

## 2025-07-17 RX ADMIN — ACETAMINOPHEN, DEXTROMETHORPHAN HBR,GUAIFENESIN, PHENYLEPHRINE HCL ONE MG: 325; 10; 200; 5 CAPSULE, LIQUID FILLED ORAL at 13:26
